# Patient Record
Sex: FEMALE | Race: WHITE | NOT HISPANIC OR LATINO | Employment: OTHER | URBAN - METROPOLITAN AREA
[De-identification: names, ages, dates, MRNs, and addresses within clinical notes are randomized per-mention and may not be internally consistent; named-entity substitution may affect disease eponyms.]

---

## 2017-01-05 ENCOUNTER — TELEPHONE (OUTPATIENT)
Dept: INFUSION THERAPY | Facility: HOSPITAL | Age: 62
End: 2017-01-05

## 2017-01-05 ENCOUNTER — OFFICE VISIT (OUTPATIENT)
Dept: HEMATOLOGY/ONCOLOGY | Facility: CLINIC | Age: 62
End: 2017-01-05
Payer: COMMERCIAL

## 2017-01-05 ENCOUNTER — INFUSION (OUTPATIENT)
Dept: INFUSION THERAPY | Facility: HOSPITAL | Age: 62
End: 2017-01-05
Attending: INTERNAL MEDICINE
Payer: COMMERCIAL

## 2017-01-05 VITALS
BODY MASS INDEX: 27.13 KG/M2 | WEIGHT: 158.06 LBS | HEART RATE: 76 BPM | RESPIRATION RATE: 16 BRPM | DIASTOLIC BLOOD PRESSURE: 84 MMHG | TEMPERATURE: 98 F | SYSTOLIC BLOOD PRESSURE: 172 MMHG

## 2017-01-05 DIAGNOSIS — C50.111 CANCER OF CENTRAL PORTION OF RIGHT FEMALE BREAST: Primary | ICD-10-CM

## 2017-01-05 DIAGNOSIS — C79.51 BONE METASTASIS: ICD-10-CM

## 2017-01-05 DIAGNOSIS — C79.51 METASTATIC CANCER TO SPINE: Primary | ICD-10-CM

## 2017-01-05 DIAGNOSIS — C50.111 CANCER OF CENTRAL PORTION OF RIGHT FEMALE BREAST: ICD-10-CM

## 2017-01-05 PROCEDURE — 63600175 PHARM REV CODE 636 W HCPCS: Performed by: INTERNAL MEDICINE

## 2017-01-05 PROCEDURE — 99999 PR PBB SHADOW E&M-EST. PATIENT-LVL III: CPT | Mod: PBBFAC,,, | Performed by: INTERNAL MEDICINE

## 2017-01-05 PROCEDURE — 1159F MED LIST DOCD IN RCRD: CPT | Mod: S$GLB,,, | Performed by: INTERNAL MEDICINE

## 2017-01-05 PROCEDURE — 3079F DIAST BP 80-89 MM HG: CPT | Mod: S$GLB,,, | Performed by: INTERNAL MEDICINE

## 2017-01-05 PROCEDURE — 99214 OFFICE O/P EST MOD 30 MIN: CPT | Mod: S$GLB,,, | Performed by: INTERNAL MEDICINE

## 2017-01-05 PROCEDURE — 96402 CHEMO HORMON ANTINEOPL SQ/IM: CPT

## 2017-01-05 PROCEDURE — 3077F SYST BP >= 140 MM HG: CPT | Mod: S$GLB,,, | Performed by: INTERNAL MEDICINE

## 2017-01-05 PROCEDURE — 96401 CHEMO ANTI-NEOPL SQ/IM: CPT

## 2017-01-05 RX ORDER — MORPHINE SULFATE 30 MG/1
30 TABLET, FILM COATED, EXTENDED RELEASE ORAL EVERY 8 HOURS PRN
Qty: 60 TABLET | Refills: 0 | Status: SHIPPED | OUTPATIENT
Start: 2017-01-05 | End: 2017-02-07 | Stop reason: SDUPTHER

## 2017-01-05 RX ORDER — MORPHINE SULFATE 15 MG/1
15 TABLET, FILM COATED, EXTENDED RELEASE ORAL EVERY 8 HOURS PRN
Qty: 60 TABLET | Refills: 0 | Status: SHIPPED | OUTPATIENT
Start: 2017-01-05 | End: 2017-02-07 | Stop reason: SDUPTHER

## 2017-01-05 RX ORDER — LAMOTRIGINE 25 MG/1
500 TABLET ORAL
Status: COMPLETED | OUTPATIENT
Start: 2017-01-05 | End: 2017-01-05

## 2017-01-05 RX ORDER — LAMOTRIGINE 25 MG/1
500 TABLET ORAL
Status: CANCELLED | OUTPATIENT
Start: 2017-01-05

## 2017-01-05 RX ORDER — DEXAMETHASONE SODIUM PHOSPHATE 10 MG/ML
10 INJECTION INTRAMUSCULAR; INTRAVENOUS
Status: CANCELLED
Start: 2017-01-05

## 2017-01-05 RX ADMIN — FULVESTRANT 500 MG: 50 INJECTION INTRAMUSCULAR at 12:01

## 2017-01-05 RX ADMIN — DENOSUMAB 120 MG: 120 INJECTION SUBCUTANEOUS at 12:01

## 2017-01-05 NOTE — PROGRESS NOTES
Subjective:       Patient ID: Rebecca Crain is a 61 y.o. female.    Chief Complaint: Breast Cancer    HPI  Ms. Crain is here for clinical followup of carcinoma of the right breast. She was started on Faslodex  in June 2016.        She is doing better since her last visit and has been taking Aleve twice a day with improvement in her generalized pain.  She continues to have some pain in her hips and back after her Faslodex injection.  She also has ongoing skin rash for which she's been using steroid cream for her arms and Benadryl cream for her face.  She tried some Zyrtec as well but that did not help her rash, it did help her with some positional dizziness that she was having.  She continues to have some chalazion issues also seemingly related to her injections.  Appetite is down a bit and bowel function is variable and related to what she eats.  She's having no shortness of breath.  Emotionally she has had some challenges.  She canceled her psychology appointment because she was not sure she would have follow through with the same person.        Breast history: In 2013 she was diagnosed with stage IIB carcinoma of the right breast, ER positive with a low Oncotype score.  She was enrolled on protocol  and randomized to endocrine therapy alone.  She was tried on all 3 aromatase inhibitors and had itching and rash to all of them.  In August 2013 she was started on tamoxifen.   She was found to have  bone metastasis in May 2015, 2015.  A subsequent bone biopsy was performed on a lesion at the T8 vertebra.   The pathology from that was consistent with metastatic breast cancer, ER +80%, MI +80%, and HER-2 negative.      She received letrozole plus palbociclib from July 2015 until May 2016.  She also received bone protective therapy with Xgeva.     Faslodex was started in June 2016.  Scans in September 2016 showed improvement.    Review of Systems   Constitutional: Positive for fatigue. Negative for activity  change, appetite change and unexpected weight change.   HENT: Negative for trouble swallowing.    Respiratory: Negative for cough and shortness of breath.    Cardiovascular: Negative for chest pain.   Gastrointestinal: Positive for nausea. Negative for abdominal pain and constipation.   Musculoskeletal: Positive for arthralgias and back pain ( mid back).   Skin: Positive for rash.   Neurological: Negative for numbness and headaches.   Psychiatric/Behavioral: Positive for sleep disturbance. Negative for dysphoric mood.       Objective:      Physical Exam   Constitutional: She is oriented to person, place, and time. She appears well-developed and well-nourished. No distress.   HENT:   Mouth/Throat: Oropharynx is clear and moist. No oropharyngeal exudate.   Eyes: No scleral icterus.   Cardiovascular: Normal rate, regular rhythm and normal heart sounds.    Pulmonary/Chest: Effort normal and breath sounds normal. She has no wheezes. She has no rales.       Abdominal: Soft. She exhibits no mass. There is no tenderness.   Lymphadenopathy:     She has no cervical adenopathy.     She has no axillary adenopathy.        Right: No supraclavicular adenopathy present.        Left: No supraclavicular adenopathy present.   Neurological: She is alert and oriented to person, place, and time.   Skin: No rash noted.   Psychiatric: She has a normal mood and affect. Her behavior is normal. Thought content normal.   Vitals reviewed.      Assessment:     1. Cancer of central portion of right female breast    2. Bone metastasis        Plan:       Continue current therapy and return in one month.  Scans at that time.  She would like to try to taper her long-acting morphine.  We'll taper to 45 mg twice a day.  Reschedule psychology follow-up.

## 2017-01-05 NOTE — MR AVS SNAPSHOT
[facility]  [address]  [facility] [address]  Phone: [phone]                  Rebecca Crain   2017 10:15 AM   Office Visit    Description:  Female : 1955   Provider:  Rodrigo Schroeder MD   Department:  [facility]           Reason for Visit     Breast Cancer           Diagnoses this Visit        Comments    Cancer of central portion of right female breast    -  Primary     Bone metastasis                To Do List           Future Appointments        Provider Department Dept Phone    2017 11:00 AM INJECTION, [facility] [phone]      Goals (5 Years of Data)     None       These Medications        Disp Refills Start End    morphine (MS CONTIN) 15 MG 12 hr tablet 60 tablet 0 2017     Take 1 tablet (15 mg total) by mouth every 8 (eight) hours as needed for Pain. - Oral    Pharmacy: [facility]/pharmacy #[id number] [facility] [address] [place] [address] Ph #: [phone]       morphine (MS CONTIN) 30 MG 12 hr tablet 60 tablet 0 2017     Take 1 tablet (30 mg total) by mouth every 8 (eight) hours as needed for Pain. - Oral    Pharmacy: [facility]/pharmacy #[id number] [facility] [address] [facility] Ph #: [phone]         [facility] On Call     [facility] Line -  Assistance  Registered nurses in the [facility] Center provide clinical advisement, health education, appointment booking, and other advisory services.  Call for this free service at [phone].             Medications           Message regarding Medications     Verify the changes and/or additions to your medication regime listed below are the same as discussed with your clinician today.  If any of these changes or additions are incorrect, please notify your healthcare provider.        START taking these NEW medications        Refills    morphine (MS CONTIN) 15 MG 12 hr tablet 0    Sig: Take 1 tablet (15 mg  total) by mouth every 8 (eight) hours as needed for Pain.    Class: Normal    Route: Oral    morphine (MS CONTIN) 30 MG 12 hr tablet 0    Sig: Take 1 tablet (30 mg total) by mouth every 8 (eight) hours as needed for Pain.    Class: Normal    Route: Oral           Verify that the below list of medications is an accurate representation of the medications you are currently taking.  If none reported, the list may be blank. If incorrect, please contact your healthcare provider. Carry this list with you in case of emergency.           Current Medications     aspirin (ECOTRIN) 81 MG EC tablet Take 81 mg by mouth once daily.    atorvastatin (LIPITOR) 20 MG tablet Take 20 mg by mouth every evening.     calcium gluconate 650 MG tablet Take 650 mg by mouth 2 (two) times daily. Contains Vit D also.     citalopram (CELEXA) 10 MG tablet TAKE 1 TABLET BY MOUTH EVERY DAY    cloNIDine (CATAPRES) 0.1 MG tablet Take 1 tablet (0.1 mg total) by mouth 2 (two) times daily.    docusate sodium (COLACE) 100 MG capsule Take 100 mg by mouth 2 (two) times daily.    estradiol (VAGIFEM) 10 mcg Tab Place 1 tablet (10 mcg total) vaginally once daily.    fluocinonide 0.05% (LIDEX) 0.05 % cream AAA on hands BID x 1-2 wks then prn flares only. Avoid face, armpits, and groin.    fluticasone (FLONASE) 50 mcg/actuation nasal spray 1 spray by Each Nare route once daily.    gabapentin (NEURONTIN) 300 MG capsule TAKE ONE CAPSULE BY MOUTH 3 TIMES A DAY    HYDROmorphone (DILAUDID) 4 MG tablet Take 1 tablet (4 mg total) by mouth every 4 (four) hours as needed for Pain.    hydrOXYzine HCl (ATARAX) 25 MG tablet Take 1 tablet (25 mg total) by mouth 3 (three) times daily as needed for Itching.    levothyroxine (SYNTHROID) 137 MCG Tab tablet Take 1 tablet (137 mcg total) by mouth before breakfast.    multivit-iron-min-folic acid (MULTIVITAMIN-IRON-MINERALS-FOLIC ACID) 3,500-18-0.4 unit-mg-mg Chew Take 1 tablet by mouth once daily.      MV-MINERALS/FA/OMEGA 3,6,9 #3  (ZS-EV-XG-OMEGA 3,6,9 #3 ORAL) Take 1 tablet by mouth once daily.      NAPROXEN SODIUM (ALEVE ORAL) Take by mouth.    ondansetron (ZOFRAN) 4 MG tablet Take 1 tablet (4 mg total) by mouth every 8 (eight) hours as needed for Nausea.    pantoprazole (PROTONIX) 40 MG tablet Take 1 tablet (40 mg total) by mouth 2 (two) times daily before meals.    simethicone (MYLICON) 80 MG chewable tablet Take 1 tablet (80 mg total) by mouth 3 (three) times daily as needed for Flatulence.    triamterene-hydrochlorothiazide 37.5-25 mg (MAXZIDE-25) 37.5-25 mg per tablet     vitamin D 1000 units Tab Take 2,000 mg by mouth every morning.     morphine (MS CONTIN) 15 MG 12 hr tablet Take 1 tablet (15 mg total) by mouth every 8 (eight) hours as needed for Pain.    morphine (MS CONTIN) 30 MG 12 hr tablet Take 1 tablet (30 mg total) by mouth every 8 (eight) hours as needed for Pain.           Clinical Reference Information           Vital Signs - Last Recorded  Most recent update: 1/5/2017 10:17 AM by Eloisa Sands MA    BP Pulse Temp Resp Wt LMP    (!) 172/84 (BP Location: Right leg, Patient Position: Sitting, BP Method: Automatic) 76 97.5 °F (36.4 °C) 16 71.7 kg (158 lb 1.1 oz) 11/08/2009 (Exact Date)    BMI                27.13 kg/m2          Blood Pressure          Most Recent Value    BP  (!)  172/84      Allergies as of 1/5/2017     Adhesive    Codeine    Demerol [Meperidine]    Iodine And Iodide Containing Products    Penicillins    Arimidex [Anastrozole]    Aromasin [Exemestane]    Clindamycin      Immunizations Administered on Date of Encounter - 1/5/2017     None      Orders Placed During Today's Visit      Normal Orders This Visit    Ambulatory Referral to Hematology/Oncology/Psychology     Future Labs/Procedures Expected by Expires    Cancer antigen 27.29  1/5/2017 1/5/2018    CBC Oncology  1/5/2017 1/5/2018    Comprehensive metabolic panel  1/5/2017 1/5/2018    NM Bone Scan Whole Body  1/5/2017 1/5/2018

## 2017-01-06 ENCOUNTER — PATIENT MESSAGE (OUTPATIENT)
Dept: INTERNAL MEDICINE | Facility: CLINIC | Age: 62
End: 2017-01-06

## 2017-01-06 ENCOUNTER — PATIENT MESSAGE (OUTPATIENT)
Dept: HEMATOLOGY/ONCOLOGY | Facility: CLINIC | Age: 62
End: 2017-01-06

## 2017-01-09 ENCOUNTER — TELEPHONE (OUTPATIENT)
Dept: INFUSION THERAPY | Facility: HOSPITAL | Age: 62
End: 2017-01-09

## 2017-01-09 RX ORDER — ATORVASTATIN CALCIUM 20 MG/1
20 TABLET, FILM COATED ORAL NIGHTLY
Qty: 90 TABLET | Refills: 3 | Status: SHIPPED | OUTPATIENT
Start: 2017-01-09 | End: 2018-10-11

## 2017-01-19 ENCOUNTER — PATIENT MESSAGE (OUTPATIENT)
Dept: HEMATOLOGY/ONCOLOGY | Facility: CLINIC | Age: 62
End: 2017-01-19

## 2017-01-30 ENCOUNTER — PATIENT MESSAGE (OUTPATIENT)
Dept: HEMATOLOGY/ONCOLOGY | Facility: CLINIC | Age: 62
End: 2017-01-30

## 2017-02-03 ENCOUNTER — HOSPITAL ENCOUNTER (OUTPATIENT)
Dept: RADIOLOGY | Facility: HOSPITAL | Age: 62
Discharge: HOME | End: 2017-02-03
Attending: INTERNAL MEDICINE
Payer: COMMERCIAL

## 2017-02-03 DIAGNOSIS — C50.111 CANCER OF CENTRAL PORTION OF RIGHT FEMALE BREAST: ICD-10-CM

## 2017-02-03 DIAGNOSIS — C79.51 BONE METASTASIS: ICD-10-CM

## 2017-02-03 DIAGNOSIS — C50.111 CANCER OF CENTRAL PORTION OF RIGHT FEMALE BREAST: Primary | ICD-10-CM

## 2017-02-03 PROCEDURE — A9503 TC99M MEDRONATE: HCPCS

## 2017-02-03 PROCEDURE — 78306 BONE IMAGING WHOLE BODY: CPT | Mod: 26,,, | Performed by: RADIOLOGY

## 2017-02-06 ENCOUNTER — TELEPHONE (OUTPATIENT)
Dept: HEMATOLOGY/ONCOLOGY | Facility: CLINIC | Age: 62
End: 2017-02-06

## 2017-02-06 NOTE — TELEPHONE ENCOUNTER
----- Message from Swetha Hassan sent at 2/6/2017  9:55 AM CST -----  Contact: Pt  Pt would like to know if her Ct Scans were approved by her insurance also pt state she is allergic to Iodine which is required for one of the scans  and will need a prescription for steroids called in,      Pt contact number [phone]  [facility]

## 2017-02-07 ENCOUNTER — HOSPITAL ENCOUNTER (OUTPATIENT)
Dept: RADIOLOGY | Facility: HOSPITAL | Age: 62
Discharge: HOME | End: 2017-02-07
Attending: INTERNAL MEDICINE
Payer: COMMERCIAL

## 2017-02-07 ENCOUNTER — OFFICE VISIT (OUTPATIENT)
Dept: HEMATOLOGY/ONCOLOGY | Facility: CLINIC | Age: 62
End: 2017-02-07
Payer: COMMERCIAL

## 2017-02-07 ENCOUNTER — INFUSION (OUTPATIENT)
Dept: INFUSION THERAPY | Facility: HOSPITAL | Age: 62
End: 2017-02-07
Attending: INTERNAL MEDICINE
Payer: COMMERCIAL

## 2017-02-07 ENCOUNTER — OFFICE VISIT (OUTPATIENT)
Dept: PSYCHIATRY | Facility: CLINIC | Age: 62
End: 2017-02-07
Payer: COMMERCIAL

## 2017-02-07 VITALS
WEIGHT: 163.56 LBS | DIASTOLIC BLOOD PRESSURE: 109 MMHG | RESPIRATION RATE: 16 BRPM | TEMPERATURE: 98 F | SYSTOLIC BLOOD PRESSURE: 176 MMHG | BODY MASS INDEX: 28.08 KG/M2 | HEART RATE: 69 BPM

## 2017-02-07 DIAGNOSIS — F43.21 ADJUSTMENT DISORDER WITH DEPRESSED MOOD: ICD-10-CM

## 2017-02-07 DIAGNOSIS — C79.51 METASTATIC CANCER TO SPINE: Primary | ICD-10-CM

## 2017-02-07 DIAGNOSIS — C50.111 CANCER OF CENTRAL PORTION OF RIGHT FEMALE BREAST: ICD-10-CM

## 2017-02-07 DIAGNOSIS — C79.51 BONE METASTASIS: ICD-10-CM

## 2017-02-07 DIAGNOSIS — C50.111 CANCER OF CENTRAL PORTION OF RIGHT FEMALE BREAST: Primary | ICD-10-CM

## 2017-02-07 PROCEDURE — 74176 CT ABD & PELVIS W/O CONTRAST: CPT | Mod: 26,,, | Performed by: RADIOLOGY

## 2017-02-07 PROCEDURE — 96402 CHEMO HORMON ANTINEOPL SQ/IM: CPT

## 2017-02-07 PROCEDURE — 90791 PSYCH DIAGNOSTIC EVALUATION: CPT | Mod: PBBFAC | Performed by: PSYCHOLOGIST

## 2017-02-07 PROCEDURE — 99212 OFFICE O/P EST SF 10 MIN: CPT | Mod: PBBFAC,25 | Performed by: PSYCHOLOGIST

## 2017-02-07 PROCEDURE — 3080F DIAST BP >= 90 MM HG: CPT | Mod: S$GLB,,, | Performed by: INTERNAL MEDICINE

## 2017-02-07 PROCEDURE — 90791 PSYCH DIAGNOSTIC EVALUATION: CPT | Mod: S$GLB,,, | Performed by: PSYCHOLOGIST

## 2017-02-07 PROCEDURE — 63600175 PHARM REV CODE 636 W HCPCS: Performed by: INTERNAL MEDICINE

## 2017-02-07 PROCEDURE — 96401 CHEMO ANTI-NEOPL SQ/IM: CPT

## 2017-02-07 PROCEDURE — 99999 PR PBB SHADOW E&M-EST. PATIENT-LVL II: CPT | Mod: PBBFAC,,, | Performed by: PSYCHOLOGIST

## 2017-02-07 PROCEDURE — 99999 PR PBB SHADOW E&M-EST. PATIENT-LVL III: CPT | Mod: PBBFAC,,, | Performed by: INTERNAL MEDICINE

## 2017-02-07 PROCEDURE — 71250 CT THORAX DX C-: CPT | Mod: 26,,, | Performed by: RADIOLOGY

## 2017-02-07 PROCEDURE — 99214 OFFICE O/P EST MOD 30 MIN: CPT | Mod: S$GLB,,, | Performed by: INTERNAL MEDICINE

## 2017-02-07 PROCEDURE — 3077F SYST BP >= 140 MM HG: CPT | Mod: S$GLB,,, | Performed by: INTERNAL MEDICINE

## 2017-02-07 RX ORDER — MORPHINE SULFATE 15 MG/1
15 TABLET, FILM COATED, EXTENDED RELEASE ORAL EVERY 8 HOURS PRN
Qty: 60 TABLET | Refills: 0 | Status: SHIPPED | OUTPATIENT
Start: 2017-02-07 | End: 2017-03-07 | Stop reason: SDUPTHER

## 2017-02-07 RX ORDER — LAMOTRIGINE 25 MG/1
500 TABLET ORAL
Status: CANCELLED | OUTPATIENT
Start: 2017-02-07

## 2017-02-07 RX ORDER — PREDNISONE 50 MG/1
TABLET ORAL
COMMUNITY
Start: 2017-02-06 | End: 2017-02-07

## 2017-02-07 RX ORDER — MORPHINE SULFATE 30 MG/1
30 TABLET, FILM COATED, EXTENDED RELEASE ORAL EVERY 8 HOURS PRN
Qty: 60 TABLET | Refills: 0 | Status: SHIPPED | OUTPATIENT
Start: 2017-02-07 | End: 2017-03-07 | Stop reason: SDUPTHER

## 2017-02-07 RX ORDER — CITALOPRAM 20 MG/1
20 TABLET, FILM COATED ORAL DAILY
Qty: 30 TABLET | Refills: 6 | Status: SHIPPED | OUTPATIENT
Start: 2017-02-07 | End: 2017-03-06 | Stop reason: SDUPTHER

## 2017-02-07 RX ORDER — LAMOTRIGINE 25 MG/1
500 TABLET ORAL
Status: COMPLETED | OUTPATIENT
Start: 2017-02-07 | End: 2017-02-07

## 2017-02-07 RX ORDER — DEXAMETHASONE SODIUM PHOSPHATE 10 MG/ML
10 INJECTION INTRAMUSCULAR; INTRAVENOUS
Status: CANCELLED
Start: 2017-02-07

## 2017-02-07 RX ADMIN — IOHEXOL 15 ML: 350 INJECTION, SOLUTION INTRAVENOUS at 09:02

## 2017-02-07 RX ADMIN — FULVESTRANT 500 MG: 50 INJECTION INTRAMUSCULAR at 02:02

## 2017-02-07 RX ADMIN — IOHEXOL 15 ML: 350 INJECTION, SOLUTION INTRAVENOUS at 10:02

## 2017-02-07 RX ADMIN — DENOSUMAB 120 MG: 120 INJECTION SUBCUTANEOUS at 02:02

## 2017-02-07 NOTE — LETTER
February 8, 2017        Rodrigo Schroeder MD  [address] [facility] [address]             [facility]  [address] [facility] [address]  Phone: [phone]  Fax: [phone]   Patient: Rebecca Crain   MR Number: [id number]   YOB: 1955   Date of Visit: 2/7/2017       Dear Dr. Schroeder:    Thank you for referring Rebecca Crain to me for evaluation. Below are the relevant portions of my assessment and plan of care.    Rebecca Crain is a 61 y.o. female referred by Dr. Schroeder for psychological evaluation and treatment.  Ms. Crain was previously in treatment with Dr. Caba.  She would like to return to therapy to address remaining depression symptoms and chronic pain complaints.  She will follow up with me for CBT in 3 weeks.  She was also encouraged to consider increasing her Cymbalta dose to the more typical range for treatment of depression, in consultation with Dr. Schroeder.    If you have questions, please do not hesitate to call me. I look forward to following Rebecca along with you.    Sincerely,      Nicanor Friedman, PhD           CC  No Recipients

## 2017-02-07 NOTE — PROGRESS NOTES
Subjective:       Patient ID: Rebecca Crain is a 61 y.o. female.    Chief Complaint: Results    HPI  Ms. Crain is here for clinical followup of carcinoma of the right breast. She was started on Faslodex  in June 2016.      Over the last 2 months she's had a tough emotional time due to her inability to leave her house without someone driving her.  There have also been several deaths in her family.  She continues to have significant hot flashes and reports some off-and-on nausea.    Other issues include some positional dizziness when she lays down, that is worsened in spite of taking zyrtec daily.  She continues to have lots of fatigue which is actually worse and she's been having tp nap some during the day and sleeping at night quite a bit.      Her pain continues to be an issue - she has more her fingers and elbows, her back and hips are about the same, while  her shoulders are a bit better.  She continues to experience some locking in her fingers at times.  She did taper her MS Contin to 45 mg twice a day at this however last visit.  She does not wish to go up.  She's not using Dilaudid.  Her facial rash is better with cortisone cream but her eyes are dry in spite of using ointment and drops and she is going to see her ophthalmologist.  In addition her mouth is dry.  Constipation has been better and she's eating fruits and vegetables and has not needed to use Metamucil.  She's been growing some new hair and it's turned out to be curly.        Breast history: In 2013 she was diagnosed with stage IIB carcinoma of the right breast, ER positive with a low Oncotype score.  She was enrolled on protocol  and randomized to endocrine therapy alone.  She was tried on all 3 aromatase inhibitors and had itching and rash to all of them.  In August 2013 she was started on tamoxifen.   She was found to have  bone metastasis in May 2015, 2015.  A subsequent bone biopsy was performed on a lesion at the T8 vertebra.   The  pathology from that was consistent with metastatic breast cancer, ER +80%, SC +80%, and HER-2 negative.      She received letrozole plus palbociclib from July 2015 until May 2016.  She also received bone protective therapy with Xgeva.     Faslodex was started in June 2016.  Scans in September 2016 showed improvement.    Review of Systems   Constitutional: Positive for fatigue. Negative for activity change, appetite change and unexpected weight change.        Hot flashes   HENT: Negative for trouble swallowing.    Respiratory: Negative for cough and shortness of breath.    Cardiovascular: Negative for chest pain.   Gastrointestinal: Positive for nausea. Negative for abdominal pain and constipation.   Musculoskeletal: Positive for arthralgias and back pain ( mid back).   Skin: Positive for rash (facial rash better).   Neurological: Positive for dizziness. Negative for numbness and headaches.   Psychiatric/Behavioral: Negative for dysphoric mood and sleep disturbance.       Objective:      Physical Exam   Constitutional: She is oriented to person, place, and time. She appears well-developed and well-nourished. No distress.   HENT:   Mouth/Throat: Oropharynx is clear and moist. No oropharyngeal exudate.   Eyes: No scleral icterus.   Cardiovascular: Normal rate, regular rhythm and normal heart sounds.    Pulmonary/Chest: Effort normal and breath sounds normal. She has no wheezes. She has no rales.       Abdominal: Soft. She exhibits no mass. There is no tenderness.   Lymphadenopathy:     She has no cervical adenopathy.     She has no axillary adenopathy.        Right: No supraclavicular adenopathy present.        Left: No supraclavicular adenopathy present.   Neurological: She is alert and oriented to person, place, and time.   Skin: No rash noted.   Psychiatric: She has a normal mood and affect. Her behavior is normal. Thought content normal.   Vitals reviewed.      Assessment:   Bone scan shows some improvement in bone  metastasis.  CT scan of the chest and abdomen and pelvis show no evidence of any new malignant changes.  1. Cancer of central portion of right female breast    2. Bone metastasis      3.  Some probable increase in depressive symptoms  Plan:     increase Celexa to 20 mg daily.  Continue Faslodex and other medications.
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
## 2017-02-07 NOTE — LETTER
February 7, 2017    Rebecca Crain  [address] [place] [address]         [facility]  [address] [place] [address]  Phone: [phone] February 7, 2017     Patient: Rebecca Crain   YOB: 1955   Date of Visit: 2/7/2017       To Whom It May Concern:    It is my medical opinion that Rebecca Crain should remain out of work until August 1, 2017.    If you have any questions or concerns, please don't hesitate to call.    Sincerely,        Rodrigo Schroeder MD

## 2017-02-12 ENCOUNTER — PATIENT MESSAGE (OUTPATIENT)
Dept: HEMATOLOGY/ONCOLOGY | Facility: CLINIC | Age: 62
End: 2017-02-12

## 2017-02-13 ENCOUNTER — PATIENT MESSAGE (OUTPATIENT)
Dept: PSYCHIATRY | Facility: CLINIC | Age: 62
End: 2017-02-13

## 2017-03-05 ENCOUNTER — PATIENT MESSAGE (OUTPATIENT)
Dept: HEMATOLOGY/ONCOLOGY | Facility: CLINIC | Age: 62
End: 2017-03-05

## 2017-03-06 DIAGNOSIS — F43.21 ADJUSTMENT DISORDER WITH DEPRESSED MOOD: ICD-10-CM

## 2017-03-06 DIAGNOSIS — I10 BENIGN ESSENTIAL HTN: Primary | ICD-10-CM

## 2017-03-06 RX ORDER — CITALOPRAM 20 MG/1
20 TABLET, FILM COATED ORAL DAILY
Qty: 90 TABLET | Refills: 3 | Status: SHIPPED | OUTPATIENT
Start: 2017-03-06 | End: 2018-02-19 | Stop reason: SDUPTHER

## 2017-03-07 ENCOUNTER — INFUSION (OUTPATIENT)
Dept: INFUSION THERAPY | Facility: HOSPITAL | Age: 62
End: 2017-03-07
Attending: INTERNAL MEDICINE
Payer: COMMERCIAL

## 2017-03-07 ENCOUNTER — OFFICE VISIT (OUTPATIENT)
Dept: HEMATOLOGY/ONCOLOGY | Facility: CLINIC | Age: 62
End: 2017-03-07
Payer: COMMERCIAL

## 2017-03-07 VITALS
RESPIRATION RATE: 16 BRPM | BODY MASS INDEX: 28 KG/M2 | DIASTOLIC BLOOD PRESSURE: 107 MMHG | WEIGHT: 163.13 LBS | SYSTOLIC BLOOD PRESSURE: 151 MMHG | TEMPERATURE: 97 F | HEART RATE: 75 BPM

## 2017-03-07 DIAGNOSIS — C79.51 METASTATIC CANCER TO SPINE: Primary | ICD-10-CM

## 2017-03-07 DIAGNOSIS — C79.51 BONE METASTASIS: ICD-10-CM

## 2017-03-07 DIAGNOSIS — C50.111 CANCER OF CENTRAL PORTION OF RIGHT FEMALE BREAST: Primary | ICD-10-CM

## 2017-03-07 DIAGNOSIS — C50.111 CANCER OF CENTRAL PORTION OF RIGHT FEMALE BREAST: ICD-10-CM

## 2017-03-07 DIAGNOSIS — E03.9 ACQUIRED HYPOTHYROIDISM: ICD-10-CM

## 2017-03-07 DIAGNOSIS — C79.51 METASTATIC CANCER TO SPINE: ICD-10-CM

## 2017-03-07 PROCEDURE — 1160F RVW MEDS BY RX/DR IN RCRD: CPT | Mod: S$GLB,,, | Performed by: INTERNAL MEDICINE

## 2017-03-07 PROCEDURE — 3080F DIAST BP >= 90 MM HG: CPT | Mod: S$GLB,,, | Performed by: INTERNAL MEDICINE

## 2017-03-07 PROCEDURE — 63600175 PHARM REV CODE 636 W HCPCS: Performed by: INTERNAL MEDICINE

## 2017-03-07 PROCEDURE — 99214 OFFICE O/P EST MOD 30 MIN: CPT | Mod: S$GLB,,, | Performed by: INTERNAL MEDICINE

## 2017-03-07 PROCEDURE — 99999 PR PBB SHADOW E&M-EST. PATIENT-LVL III: CPT | Mod: PBBFAC,,, | Performed by: INTERNAL MEDICINE

## 2017-03-07 PROCEDURE — 3077F SYST BP >= 140 MM HG: CPT | Mod: S$GLB,,, | Performed by: INTERNAL MEDICINE

## 2017-03-07 PROCEDURE — 96401 CHEMO ANTI-NEOPL SQ/IM: CPT

## 2017-03-07 PROCEDURE — 96402 CHEMO HORMON ANTINEOPL SQ/IM: CPT

## 2017-03-07 RX ORDER — LEVOTHYROXINE SODIUM 150 UG/1
150 TABLET ORAL DAILY
Qty: 90 TABLET | Refills: 3 | Status: SHIPPED | OUTPATIENT
Start: 2017-03-07 | End: 2017-03-10 | Stop reason: SDUPTHER

## 2017-03-07 RX ORDER — LAMOTRIGINE 25 MG/1
500 TABLET ORAL
Status: COMPLETED | OUTPATIENT
Start: 2017-03-07 | End: 2017-03-07

## 2017-03-07 RX ORDER — MORPHINE SULFATE 30 MG/1
30 TABLET, FILM COATED, EXTENDED RELEASE ORAL EVERY 8 HOURS PRN
Qty: 60 TABLET | Refills: 0 | Status: SHIPPED | OUTPATIENT
Start: 2017-03-07 | End: 2017-04-07 | Stop reason: SDUPTHER

## 2017-03-07 RX ORDER — LAMOTRIGINE 25 MG/1
500 TABLET ORAL
Status: CANCELLED | OUTPATIENT
Start: 2017-03-30

## 2017-03-07 RX ORDER — DEXAMETHASONE SODIUM PHOSPHATE 10 MG/ML
10 INJECTION INTRAMUSCULAR; INTRAVENOUS
Status: CANCELLED
Start: 2017-03-30

## 2017-03-07 RX ORDER — MORPHINE SULFATE 15 MG/1
15 TABLET, FILM COATED, EXTENDED RELEASE ORAL EVERY 8 HOURS PRN
Qty: 60 TABLET | Refills: 0 | Status: SHIPPED | OUTPATIENT
Start: 2017-03-07 | End: 2017-04-07 | Stop reason: SDUPTHER

## 2017-03-07 RX ORDER — LAMOTRIGINE 25 MG/1
500 TABLET ORAL
Status: CANCELLED | OUTPATIENT
Start: 2017-03-07

## 2017-03-07 RX ORDER — DEXAMETHASONE SODIUM PHOSPHATE 10 MG/ML
10 INJECTION INTRAMUSCULAR; INTRAVENOUS
Status: CANCELLED
Start: 2017-03-07

## 2017-03-07 RX ORDER — DIPHENHYDRAMINE HCL 50 MG
CAPSULE ORAL
Refills: 0 | COMMUNITY
Start: 2017-02-06 | End: 2019-02-21

## 2017-03-07 RX ADMIN — FULVESTRANT 500 MG: 50 INJECTION INTRAMUSCULAR at 11:03

## 2017-03-07 RX ADMIN — DENOSUMAB 120 MG: 120 INJECTION SUBCUTANEOUS at 11:03

## 2017-03-07 NOTE — PROGRESS NOTES
Subjective:       Patient ID: Rebecca Crain is a 61 y.o. female.    Chief Complaint: Breast Cancer    HPI  Ms. Crain is here for clinical followup of carcinoma of the right breast. She was started on Faslodex  in June 2016.    Her pain was worse over the last 6 weeks.  She found that she had not been taking her full dose of morphine for almost a week.  She's been back on her regular dosing for the last 10 days.  She's had especially bad problems with her shoulders and her hips.  Her mood is better since increasing her Celexa.  She does still cry easily.         Breast history: In 2013 she was diagnosed with stage IIB carcinoma of the right breast, ER positive with a low Oncotype score.  She was enrolled on protocol  and randomized to endocrine therapy alone.  She was tried on all 3 aromatase inhibitors and had itching and rash to all of them.  In August 2013 she was started on tamoxifen.   She was found to have  bone metastasis in May 2015, 2015.  A subsequent bone biopsy was performed on a lesion at the T8 vertebra.   The pathology from that was consistent with metastatic breast cancer, ER +80%, SD +80%, and HER-2 negative.      She received letrozole plus palbociclib from July 2015 until May 2016.  She also received bone protective therapy with Xgeva.     Faslodex was started in June 2016.  Bone scan in February 2017 showed improvement, CT scans showed no new findings.    Review of Systems   Constitutional: Positive for fatigue. Negative for activity change, appetite change and unexpected weight change.        Hot flashes   HENT: Negative for trouble swallowing.    Respiratory: Negative for cough and shortness of breath.    Cardiovascular: Negative for chest pain.   Gastrointestinal: Negative for abdominal pain and constipation.   Musculoskeletal: Positive for arthralgias ( Hips and shoulders) and back pain ( mid back).   Skin: Positive for rash.   Neurological: Negative for numbness and headaches.    Psychiatric/Behavioral: Negative for dysphoric mood and sleep disturbance.       Objective:      Physical Exam   Constitutional: She is oriented to person, place, and time. She appears well-developed and well-nourished. No distress.   HENT:   Mouth/Throat: Oropharynx is clear and moist. No oropharyngeal exudate.   Eyes: No scleral icterus.   Cardiovascular: Normal rate, regular rhythm and normal heart sounds.    Pulmonary/Chest: Effort normal and breath sounds normal. She has no wheezes. She has no rales.       Abdominal: Soft. She exhibits no mass. There is no tenderness.   Lymphadenopathy:     She has no cervical adenopathy.     She has no axillary adenopathy.        Right: No supraclavicular adenopathy present.        Left: No supraclavicular adenopathy present.   Neurological: She is alert and oriented to person, place, and time.   Skin: No rash noted.   Psychiatric: She has a normal mood and affect. Her behavior is normal. Thought content normal.   Vitals reviewed.      Assessment:     1. Cancer of central portion of right female breast    2. Bone metastasis    3. Metastatic cancer to spine        Plan:    Continue current therapy. RTC 1 M.

## 2017-03-08 ENCOUNTER — PATIENT MESSAGE (OUTPATIENT)
Dept: PSYCHIATRY | Facility: CLINIC | Age: 62
End: 2017-03-08

## 2017-03-10 ENCOUNTER — PATIENT MESSAGE (OUTPATIENT)
Dept: HEMATOLOGY/ONCOLOGY | Facility: CLINIC | Age: 62
End: 2017-03-10

## 2017-03-10 DIAGNOSIS — E03.9 ACQUIRED HYPOTHYROIDISM: ICD-10-CM

## 2017-03-10 RX ORDER — LEVOTHYROXINE SODIUM 150 UG/1
150 TABLET ORAL DAILY
Qty: 90 TABLET | Refills: 3 | Status: SHIPPED | OUTPATIENT
Start: 2017-03-10 | End: 2018-01-12 | Stop reason: SDUPTHER

## 2017-03-15 ENCOUNTER — TELEPHONE (OUTPATIENT)
Dept: HEMATOLOGY/ONCOLOGY | Facility: CLINIC | Age: 62
End: 2017-03-15

## 2017-03-15 NOTE — TELEPHONE ENCOUNTER
Spoke to Pina with [employer] and she had questions about the patient long term disability. I provided her with our fax number and asked if she could send something by fax requesting the required information.

## 2017-03-15 NOTE — TELEPHONE ENCOUNTER
----- Message from Oanh Ann sent at 3/14/2017  2:29 PM CDT -----  Contact: Anurag Torres  Need to speak with someone regarding patient, long term disability claim. Need information regarding claim to be faxed to her.    Call: [phone]

## 2017-04-04 ENCOUNTER — LAB VISIT (OUTPATIENT)
Dept: LAB | Facility: HOSPITAL | Age: 62
End: 2017-04-04
Payer: COMMERCIAL

## 2017-04-04 ENCOUNTER — OFFICE VISIT (OUTPATIENT)
Dept: PSYCHIATRY | Facility: CLINIC | Age: 62
End: 2017-04-04
Payer: COMMERCIAL

## 2017-04-04 ENCOUNTER — OFFICE VISIT (OUTPATIENT)
Dept: HEMATOLOGY/ONCOLOGY | Facility: CLINIC | Age: 62
End: 2017-04-04
Payer: COMMERCIAL

## 2017-04-04 ENCOUNTER — INFUSION (OUTPATIENT)
Dept: INFUSION THERAPY | Facility: HOSPITAL | Age: 62
End: 2017-04-04
Attending: INTERNAL MEDICINE
Payer: COMMERCIAL

## 2017-04-04 VITALS
RESPIRATION RATE: 16 BRPM | BODY MASS INDEX: 28 KG/M2 | HEART RATE: 71 BPM | WEIGHT: 163.13 LBS | SYSTOLIC BLOOD PRESSURE: 152 MMHG | DIASTOLIC BLOOD PRESSURE: 105 MMHG | TEMPERATURE: 98 F

## 2017-04-04 DIAGNOSIS — C79.51 METASTATIC CANCER TO SPINE: Primary | ICD-10-CM

## 2017-04-04 DIAGNOSIS — C50.111 CANCER OF CENTRAL PORTION OF RIGHT FEMALE BREAST: ICD-10-CM

## 2017-04-04 DIAGNOSIS — C79.51 BONE METASTASIS: ICD-10-CM

## 2017-04-04 DIAGNOSIS — C50.111 CANCER OF CENTRAL PORTION OF RIGHT FEMALE BREAST: Primary | ICD-10-CM

## 2017-04-04 DIAGNOSIS — F43.21 ADJUSTMENT DISORDER WITH DEPRESSED MOOD: Primary | ICD-10-CM

## 2017-04-04 DIAGNOSIS — G89.3 CANCER RELATED PAIN: ICD-10-CM

## 2017-04-04 LAB
ALBUMIN SERPL BCP-MCNC: 3.4 G/DL
ALP SERPL-CCNC: 60 U/L
ALT SERPL W/O P-5'-P-CCNC: 13 U/L
ANION GAP SERPL CALC-SCNC: 5 MMOL/L
AST SERPL-CCNC: 20 U/L
BILIRUB SERPL-MCNC: 0.5 MG/DL
BUN SERPL-MCNC: 19 MG/DL
CALCIUM SERPL-MCNC: 8.8 MG/DL
CHLORIDE SERPL-SCNC: 109 MMOL/L
CO2 SERPL-SCNC: 28 MMOL/L
CREAT SERPL-MCNC: 0.8 MG/DL
ERYTHROCYTE [DISTWIDTH] IN BLOOD BY AUTOMATED COUNT: 13.1 %
EST. GFR  (AFRICAN AMERICAN): >60 ML/MIN/1.73 M^2
EST. GFR  (NON AFRICAN AMERICAN): >60 ML/MIN/1.73 M^2
GLUCOSE SERPL-MCNC: 122 MG/DL
HCT VFR BLD AUTO: 38.7 %
HGB BLD-MCNC: 13.3 G/DL
MCH RBC QN AUTO: 29.4 PG
MCHC RBC AUTO-ENTMCNC: 34.4 %
MCV RBC AUTO: 86 FL
NEUTROPHILS # BLD AUTO: 2.2 K/UL
PLATELET # BLD AUTO: 157 K/UL
PMV BLD AUTO: 9.7 FL
POTASSIUM SERPL-SCNC: 4.1 MMOL/L
PROT SERPL-MCNC: 6.5 G/DL
RBC # BLD AUTO: 4.52 M/UL
SODIUM SERPL-SCNC: 142 MMOL/L
WBC # BLD AUTO: 3.98 K/UL

## 2017-04-04 PROCEDURE — 90834 PSYTX W PT 45 MINUTES: CPT | Mod: PBBFAC | Performed by: PSYCHOLOGIST

## 2017-04-04 PROCEDURE — 90834 PSYTX W PT 45 MINUTES: CPT | Mod: S$GLB,,, | Performed by: PSYCHOLOGIST

## 2017-04-04 PROCEDURE — 99212 OFFICE O/P EST SF 10 MIN: CPT | Mod: PBBFAC,25 | Performed by: PSYCHOLOGIST

## 2017-04-04 PROCEDURE — 99999 PR PBB SHADOW E&M-EST. PATIENT-LVL II: CPT | Mod: PBBFAC,,, | Performed by: INTERNAL MEDICINE

## 2017-04-04 PROCEDURE — 3077F SYST BP >= 140 MM HG: CPT | Mod: S$GLB,,, | Performed by: INTERNAL MEDICINE

## 2017-04-04 PROCEDURE — 96402 CHEMO HORMON ANTINEOPL SQ/IM: CPT

## 2017-04-04 PROCEDURE — 1160F RVW MEDS BY RX/DR IN RCRD: CPT | Mod: S$GLB,,, | Performed by: INTERNAL MEDICINE

## 2017-04-04 PROCEDURE — 99214 OFFICE O/P EST MOD 30 MIN: CPT | Mod: S$GLB,,, | Performed by: INTERNAL MEDICINE

## 2017-04-04 PROCEDURE — 63600175 PHARM REV CODE 636 W HCPCS: Performed by: INTERNAL MEDICINE

## 2017-04-04 PROCEDURE — 3080F DIAST BP >= 90 MM HG: CPT | Mod: S$GLB,,, | Performed by: INTERNAL MEDICINE

## 2017-04-04 PROCEDURE — 99999 PR PBB SHADOW E&M-EST. PATIENT-LVL II: CPT | Mod: PBBFAC,,, | Performed by: PSYCHOLOGIST

## 2017-04-04 RX ORDER — LAMOTRIGINE 25 MG/1
500 TABLET ORAL
Status: COMPLETED | OUTPATIENT
Start: 2017-04-04 | End: 2017-04-04

## 2017-04-04 RX ADMIN — FULVESTRANT 500 MG: 50 INJECTION INTRAMUSCULAR at 09:04

## 2017-04-04 NOTE — PROGRESS NOTES
Subjective:       Patient ID: Rebecca Crain is a 61 y.o. female.    Chief Complaint: No chief complaint on file.    HPI  Ms. Crain is here for followup of metastatic carcinoma of the right breast. She was started on Faslodex  in June 2016.    She reports that her major areas of pain are her shoulders and neck and her knees.  Her hip are doing all right.  She's sleeping less.  Appetite has been good, bowel function is better with increase ingestion of foods.  She reports no shortness of breath.  Her mood is better overall.          Breast history: In 2013 she was diagnosed with stage IIB carcinoma of the right breast, ER positive with a low Oncotype score.  She was enrolled on protocol  and randomized to endocrine therapy alone.  She was tried on all 3 aromatase inhibitors and had itching and rash to all of them.  In August 2013 she was started on tamoxifen.   She was found to have  bone metastasis in May 2015, 2015.  A subsequent bone biopsy was performed on a lesion at the T8 vertebra.   The pathology from that was consistent with metastatic breast cancer, ER +80%, NM +80%, and HER-2 negative.      She received letrozole plus palbociclib from July 2015 until May 2016.  She also received bone protective therapy with Xgeva.     Faslodex was started in June 2016.  Bone scan in February 2017 showed improvement, CT scans showed no new findings.    Review of Systems   Constitutional: Positive for fatigue. Negative for activity change, appetite change and unexpected weight change.        Hot flashes   HENT: Negative for trouble swallowing.    Respiratory: Negative for cough and shortness of breath.    Cardiovascular: Negative for chest pain.   Gastrointestinal: Negative for abdominal pain and constipation.   Musculoskeletal: Positive for arthralgias ( neck shoulders ) and back pain.   Skin: Positive for rash.   Neurological: Negative for numbness and headaches.   Psychiatric/Behavioral: Negative for dysphoric  mood and sleep disturbance.       Objective:      Physical Exam   Constitutional: She is oriented to person, place, and time. She appears well-developed and well-nourished. No distress.   HENT:   Mouth/Throat: Oropharynx is clear and moist. No oropharyngeal exudate.   Eyes: No scleral icterus.   Cardiovascular: Normal rate, regular rhythm and normal heart sounds.    Pulmonary/Chest: Effort normal and breath sounds normal. She has no wheezes. She has no rales.       Abdominal: Soft. She exhibits no mass. There is no tenderness.   Lymphadenopathy:     She has no cervical adenopathy.     She has no axillary adenopathy.        Right: No supraclavicular adenopathy present.        Left: No supraclavicular adenopathy present.   Neurological: She is alert and oriented to person, place, and time.   Skin: No rash noted.   Psychiatric: She has a normal mood and affect. Her behavior is normal. Thought content normal.   Vitals reviewed.      Assessment:     1. Cancer of central portion of right female breast    2. Bone metastasis    3. Cancer related pain        Plan:    Continue current therapy. RTC 1 M.

## 2017-04-04 NOTE — PROGRESS NOTES
PSYCHO-ONCOLOGY NOTE/ Individual Psychotherapy     Date: 4/4/2017   Site:  [facility]         Therapeutic Intervention: Met with patient.  Outpatient - Behavior modifying psychotherapy 45 min - CPT code 04838    Chief complaint/reason for encounter: depression    Objective:  Rebecca Crain arrived promptly for the session.  Ms. Crain was independently ambulatory at the time of session. The patient was fully cooperative throughout the session.  Appearance: age appropriate, casually dressed, well groomed  Behavior/Cooperation: friendly and cooperative  Speech: Not pressured, clearly audible, no slurring  Mood:  depressed  Affect: mildly constricted  Thought Process: goal-directed, logical  Thought Content: normal,  No delusions or paranoia; did not appear to be responding to internal stimuli during the session  Orientation: grossly intact  Memory: Grossly intact  Attention Span/Concentration: Attends to session without distraction; reports no difficulty  Fund of Knowledge: average  Estimate of Intelligence: average from verbal skills and history  Cognition: grossly intact  Insight: patient has awareness of illness; good insight into own behavior and behavior of others  Judgment: the patient's behavior is adequate to circumstances    Interval history and content of current session:  Discussed current adaptation to disease and treatment status.  Patient reports improved mood and activity level with change in Cymbalta dose and Synthroid.  Patient engaged in increased daily activities and outings with family.  Trip to [place] planned for mid-month. Still not driving due to sedation with meds.  Does plan to increase physical exercise to improve coping with chronic pain (regular walking, yoga).    Discussed maintenance insomnia- Patient encourage to improve sleep hygiene ( d/c ipad use at night) and consider sleep restriction in AM (stay up after 7 AM medications) to improve sleep continuity.     Risk  parameters:   Patient reports no suicidal ideation  Patient reports no homicidal ideation  Patient reports no self-injurious behavior  Patient reports no violent behavior   Safety needs:  None at this time      Verbal deficits: None     Patient's response to intervention:The patient's response to intervention is accepting.     Progress toward goals and other mental status changes:  The patient's progress toward goals is good.        Progress to date:Progress as Expected      Goals from last visit: partially met      Patient reported outcomes:  Distress Thermometer:    Distress Score    Distress Score: 4        Practical Problems Physical Problems         Housing: Yes            Transportation: Yes                            Family Problems Fatigue: Yes                                   Memory / Concentration: Yes   Emotional Problems      Depression: Yes               Nervousness: Yes  Pain: Yes           Worry: Yes Skin Dry / Itchy: Yes      Sleep: Yes          Spiritual/Religions Concerns Tingling in Hands / Feet: Yes             Other Problems             PHQ-9= 7 (11 at initial visit)   MAIA-7= 4 (5 at initial visit)      Client Strengths: determined, verbal, intelligent      Diagnosis:     ICD-10-CM ICD-9-CM   1. Adjustment disorder with depressed mood F43.21 309.0       Treatment Plan:individual psychotherapy and medication management by physician  · Target symptoms: depression, sleep, pain  · Why chosen therapy is appropriate versus another modality: relevant to diagnosis, evidence based practice  · Outcome monitoring methods: self-report, checklist/rating scale  · Therapeutic intervention type: behavior modifying psychotherapy  · Prognosis: Good      Behavioral goals:    Exercise:  Increase walking, try yoga   Stress management:  Photos on trip with family   Social engagement: trip with family    Nutrition:   Smoking Cessation:   Therapy: imp sleep hygiene, sleep restriction in AM, early light    Patient has  never used Remeron for sleep, has had hypnotics    Return to clinic: 1 month     Length of Service (minutes direct face-to-face contact): 45    Nicanor Friedman, PhD  [place] License #[id number]

## 2017-04-05 LAB — CANCER AG27-29 SERPL-ACNC: 64 U/ML

## 2017-04-06 ENCOUNTER — PATIENT MESSAGE (OUTPATIENT)
Dept: HEMATOLOGY/ONCOLOGY | Facility: CLINIC | Age: 62
End: 2017-04-06

## 2017-04-07 DIAGNOSIS — C50.111 CANCER OF CENTRAL PORTION OF RIGHT FEMALE BREAST: ICD-10-CM

## 2017-04-07 DIAGNOSIS — L24.9 IRRITANT CONTACT DERMATITIS, UNSPECIFIED TRIGGER: ICD-10-CM

## 2017-04-07 DIAGNOSIS — C79.51 BONE METASTASIS: ICD-10-CM

## 2017-04-07 RX ORDER — MORPHINE SULFATE 30 MG/1
30 TABLET, FILM COATED, EXTENDED RELEASE ORAL EVERY 8 HOURS PRN
Qty: 60 TABLET | Refills: 0 | Status: SHIPPED | OUTPATIENT
Start: 2017-04-07 | End: 2017-05-02 | Stop reason: SDUPTHER

## 2017-04-07 RX ORDER — MORPHINE SULFATE 15 MG/1
15 TABLET, FILM COATED, EXTENDED RELEASE ORAL EVERY 8 HOURS PRN
Qty: 60 TABLET | Refills: 0 | Status: SHIPPED | OUTPATIENT
Start: 2017-04-07 | End: 2017-06-29

## 2017-04-07 RX ORDER — FLUOCINONIDE 0.5 MG/G
CREAM TOPICAL
Qty: 60 G | Refills: 1 | Status: SHIPPED | OUTPATIENT
Start: 2017-04-07 | End: 2019-04-04

## 2017-05-02 ENCOUNTER — INFUSION (OUTPATIENT)
Dept: INFUSION THERAPY | Facility: HOSPITAL | Age: 62
End: 2017-05-02
Attending: INTERNAL MEDICINE
Payer: COMMERCIAL

## 2017-05-02 ENCOUNTER — OFFICE VISIT (OUTPATIENT)
Dept: HEMATOLOGY/ONCOLOGY | Facility: CLINIC | Age: 62
End: 2017-05-02
Payer: COMMERCIAL

## 2017-05-02 ENCOUNTER — OFFICE VISIT (OUTPATIENT)
Dept: PSYCHIATRY | Facility: CLINIC | Age: 62
End: 2017-05-02
Payer: COMMERCIAL

## 2017-05-02 VITALS
HEART RATE: 62 BPM | SYSTOLIC BLOOD PRESSURE: 178 MMHG | BODY MASS INDEX: 28.42 KG/M2 | WEIGHT: 165.56 LBS | TEMPERATURE: 98 F | RESPIRATION RATE: 17 BRPM | DIASTOLIC BLOOD PRESSURE: 93 MMHG

## 2017-05-02 DIAGNOSIS — T30.0 BURN: ICD-10-CM

## 2017-05-02 DIAGNOSIS — C79.51 METASTATIC CANCER TO SPINE: Primary | ICD-10-CM

## 2017-05-02 DIAGNOSIS — C79.51 BONE METASTASIS: ICD-10-CM

## 2017-05-02 DIAGNOSIS — R52 INTRACTABLE PAIN: ICD-10-CM

## 2017-05-02 DIAGNOSIS — C50.111 CANCER OF CENTRAL PORTION OF RIGHT FEMALE BREAST: Primary | ICD-10-CM

## 2017-05-02 DIAGNOSIS — F43.21 ADJUSTMENT DISORDER WITH DEPRESSED MOOD: Primary | ICD-10-CM

## 2017-05-02 DIAGNOSIS — C50.111 CANCER OF CENTRAL PORTION OF RIGHT FEMALE BREAST: ICD-10-CM

## 2017-05-02 PROCEDURE — 96401 CHEMO ANTI-NEOPL SQ/IM: CPT

## 2017-05-02 PROCEDURE — 3080F DIAST BP >= 90 MM HG: CPT | Mod: S$GLB,,, | Performed by: INTERNAL MEDICINE

## 2017-05-02 PROCEDURE — 99214 OFFICE O/P EST MOD 30 MIN: CPT | Mod: S$GLB,,, | Performed by: INTERNAL MEDICINE

## 2017-05-02 PROCEDURE — 96402 CHEMO HORMON ANTINEOPL SQ/IM: CPT

## 2017-05-02 PROCEDURE — 63600175 PHARM REV CODE 636 W HCPCS: Performed by: INTERNAL MEDICINE

## 2017-05-02 PROCEDURE — 90834 PSYTX W PT 45 MINUTES: CPT | Mod: S$GLB,,, | Performed by: PSYCHOLOGIST

## 2017-05-02 PROCEDURE — 99999 PR PBB SHADOW E&M-EST. PATIENT-LVL I: CPT | Mod: PBBFAC,,, | Performed by: PSYCHOLOGIST

## 2017-05-02 PROCEDURE — 1160F RVW MEDS BY RX/DR IN RCRD: CPT | Mod: S$GLB,,, | Performed by: INTERNAL MEDICINE

## 2017-05-02 PROCEDURE — 99999 PR PBB SHADOW E&M-EST. PATIENT-LVL III: CPT | Mod: 25,PBBFAC,, | Performed by: INTERNAL MEDICINE

## 2017-05-02 PROCEDURE — 3077F SYST BP >= 140 MM HG: CPT | Mod: S$GLB,,, | Performed by: INTERNAL MEDICINE

## 2017-05-02 RX ORDER — ONDANSETRON 4 MG/1
4 TABLET, FILM COATED ORAL EVERY 8 HOURS PRN
Qty: 30 TABLET | Refills: 11 | Status: SHIPPED | OUTPATIENT
Start: 2017-05-02 | End: 2018-11-30 | Stop reason: SDUPTHER

## 2017-05-02 RX ORDER — MORPHINE SULFATE 30 MG/1
60 TABLET, FILM COATED, EXTENDED RELEASE ORAL 2 TIMES DAILY
Qty: 120 TABLET | Refills: 0 | Status: SHIPPED | OUTPATIENT
Start: 2017-05-02 | End: 2017-05-30 | Stop reason: SDUPTHER

## 2017-05-02 RX ORDER — SILVER SULFADIAZINE 10 G/1000G
CREAM TOPICAL
Qty: 50 G | Refills: 3 | Status: SHIPPED | OUTPATIENT
Start: 2017-05-02 | End: 2017-08-02 | Stop reason: ALTCHOICE

## 2017-05-02 RX ORDER — LAMOTRIGINE 25 MG/1
500 TABLET ORAL
Status: CANCELLED | OUTPATIENT
Start: 2017-05-02

## 2017-05-02 RX ORDER — DEXAMETHASONE SODIUM PHOSPHATE 10 MG/ML
10 INJECTION INTRAMUSCULAR; INTRAVENOUS
Status: CANCELLED
Start: 2017-05-02

## 2017-05-02 RX ORDER — LAMOTRIGINE 25 MG/1
500 TABLET ORAL
Status: COMPLETED | OUTPATIENT
Start: 2017-05-02 | End: 2017-05-02

## 2017-05-02 RX ADMIN — FULVESTRANT 500 MG: 50 INJECTION INTRAMUSCULAR at 10:05

## 2017-05-02 RX ADMIN — DENOSUMAB 120 MG: 120 INJECTION SUBCUTANEOUS at 10:05

## 2017-05-02 NOTE — PROGRESS NOTES
Subjective:       Patient ID: Rebecca Crain is a 61 y.o. female.    Chief Complaint: Follow-up    HPI  Ms. Crain is here for followup of metastatic carcinoma of the right breast. She was started on Faslodex  in June 2016.      For the last month she's had more pain especially in her shoulders and back.  Yesterday she developed some pain in her right anterior lower ribs.  She has increased her MS Contin to 60 mg twice a day.  She continues with significant hot flashes.  Appetites been good but she has some constipation.  She has minimal and shortness of breath.  She also reports continued positional lightheadedness when she lays her head back or turns her head on the pillow.  She has some occasional nausea her skin remained sensitive.          Breast history: In 2013 she was diagnosed with stage IIB carcinoma of the right breast, ER positive with a low Oncotype score.  She was enrolled on protocol  and randomized to endocrine therapy alone.  She was tried on all 3 aromatase inhibitors and had itching and rash to all of them.  In August 2013 she was started on tamoxifen.   She was found to have  bone metastasis in May 2015, 2015.  A subsequent bone biopsy was performed on a lesion at the T8 vertebra.   The pathology from that was consistent with metastatic breast cancer, ER +80%, CT +80%, and HER-2 negative.      She received letrozole plus palbociclib from July 2015 until May 2016.  She also received bone protective therapy with Xgeva.     Faslodex was started in June 2016.  Bone scan in February 2017 showed improvement, CT scans showed no new findings.    Review of Systems   Constitutional: Positive for fatigue. Negative for activity change, appetite change and unexpected weight change.        Hot flashes   HENT: Negative for trouble swallowing.    Respiratory: Negative for cough and shortness of breath.    Cardiovascular: Negative for chest pain.   Gastrointestinal: Positive for nausea. Negative for  abdominal pain and constipation.   Musculoskeletal: Positive for arthralgias ( neck shoulders ) and back pain.   Skin: Positive for rash.   Neurological: Positive for light-headedness. Negative for numbness and headaches.   Psychiatric/Behavioral: Negative for dysphoric mood and sleep disturbance.       Objective:      Physical Exam   Constitutional: She is oriented to person, place, and time. She appears well-developed and well-nourished. No distress.   HENT:   Mouth/Throat: Oropharynx is clear and moist. No oropharyngeal exudate.   Eyes: No scleral icterus.   Cardiovascular: Normal rate, regular rhythm and normal heart sounds.    Pulmonary/Chest: Effort normal and breath sounds normal. She has no wheezes. She has no rales.       Abdominal: Soft. She exhibits no mass. There is no tenderness.   Lymphadenopathy:     She has no cervical adenopathy.     She has no axillary adenopathy.        Right: No supraclavicular adenopathy present.        Left: No supraclavicular adenopathy present.   Neurological: She is alert and oriented to person, place, and time.   Skin: No rash noted.   Psychiatric: She has a normal mood and affect. Her behavior is normal. Thought content normal.   Vitals reviewed.      Assessment:     1. Cancer of central portion of right female breast    2. Bone metastasis        Plan:    Continue current therapy. RTC 1 M. scans and labs at that time.

## 2017-05-02 NOTE — NURSING
Patient here for injections-complains of pain in leg and right lower ribs-no jaw pain-tolerated injection s well-Faslodex given in 2 divided doses and xgeva given in 1 injection.

## 2017-05-03 NOTE — PROGRESS NOTES
PSYCHO-ONCOLOGY NOTE/ Individual Psychotherapy     Date: 5/2/2017   Site:  [facility]         Therapeutic Intervention: Met with patient.  Outpatient - Behavior modifying psychotherapy 45 min - CPT code 53627    Chief complaint/reason for encounter: depression    Objective:  Rebecca Crain arrived promptly for the session.  Ms. Crain was independently ambulatory at the time of session. The patient was fully cooperative throughout the session.  Appearance: age appropriate, casually dressed, well groomed  Behavior/Cooperation: friendly and cooperative  Speech: Not pressured, clearly audible, no slurring  Mood:  depressed  Affect: mildly constricted  Thought Process: goal-directed, logical  Thought Content: normal,  No delusions or paranoia; did not appear to be responding to internal stimuli during the session  Orientation: grossly intact  Memory: Grossly intact  Attention Span/Concentration: Attends to session without distraction; reports no difficulty  Fund of Knowledge: average  Estimate of Intelligence: average from verbal skills and history  Cognition: grossly intact  Insight: patient has awareness of illness; good insight into own behavior and behavior of others  Judgment: the patient's behavior is adequate to circumstances    Interval history and content of current session:  Discussed current adaptation to disease and treatment status.  Patient reports continued improved mood and activity level with change in Celexa dose (decreased tearfulness).  Patient discussed worry related to inc bone pain and concern that cancer is spreading.  Redirecting worry thoughts discussed.  Trip to [place] with family discussed. Is planning another trip (possibly- Mother's Day) and cruise in December. Ongoing sleep problems discussed (has improved sleep hygiene but difficulty in continuity remains).     Risk parameters:   Patient reports no suicidal ideation  Patient reports no homicidal ideation  Patient reports  "no self-injurious behavior  Patient reports no violent behavior   Safety needs:  None at this time      Verbal deficits: None     Patient's response to intervention:The patient's response to intervention is accepting.     Progress toward goals and other mental status changes:  The patient's progress toward goals is good.        Progress to date:Progress as Expected      Goals from last visit: partially met      Patient reported outcomes:  Distress Thermometer:    Distress Score    Distress Score: 2        Practical Problems Physical Problems                 Breathing: Yes    Transportation: Yes          Constipation: Yes                 Family Problems Fatigue: Yes      Feeling Swollen: Yes                            Memory / Concentration: Yes   Emotional Problems      Depression: Yes  Nausea: Yes            Nervousness: Yes  Pain: Yes           Worry: Yes Skin Dry / Itchy: Yes    Lost of Interest in Usual Activities: Yes Sleep: Yes          Spiritual/Religions Concerns Tingling in Hands / Feet: Yes             Other Problems             PHQ-9= 5 (11 at initial visit)   MAIA-7= 5 (5 at initial visit)      Client Strengths: determined, verbal, intelligent      Diagnosis:     ICD-10-CM ICD-9-CM   1. Adjustment disorder with depressed mood F43.21 309.0       Treatment Plan:individual psychotherapy and medication management by physician  · Target symptoms: depression, sleep, pain  · Why chosen therapy is appropriate versus another modality: relevant to diagnosis, evidence based practice  · Outcome monitoring methods: self-report, checklist/rating scale  · Therapeutic intervention type: behavior modifying psychotherapy  · Prognosis: Good      Behavioral goals:    Exercise:  Increase walking   Stress management:  Photos on trip with family   Social engagement: trip with family    Nutrition:   Smoking Cessation:   Therapy: sleep restriction past 7 AM, challenges to "what if" thinking, increase activity level     Return to " clinic: 1 month     Length of Service (minutes direct face-to-face contact): 45    Nicanor Friedman, PhD  [place] License #[id number]

## 2017-05-08 ENCOUNTER — PATIENT MESSAGE (OUTPATIENT)
Dept: HEMATOLOGY/ONCOLOGY | Facility: CLINIC | Age: 62
End: 2017-05-08

## 2017-05-11 ENCOUNTER — HOSPITAL ENCOUNTER (OUTPATIENT)
Dept: RADIOLOGY | Facility: HOSPITAL | Age: 62
Discharge: HOME | End: 2017-05-11
Attending: INTERNAL MEDICINE
Payer: COMMERCIAL

## 2017-05-11 ENCOUNTER — PATIENT MESSAGE (OUTPATIENT)
Dept: HEMATOLOGY/ONCOLOGY | Facility: CLINIC | Age: 62
End: 2017-05-11

## 2017-05-11 DIAGNOSIS — C50.111 CANCER OF CENTRAL PORTION OF RIGHT FEMALE BREAST: ICD-10-CM

## 2017-05-11 PROCEDURE — 71250 CT THORAX DX C-: CPT | Mod: 26,,, | Performed by: RADIOLOGY

## 2017-05-11 PROCEDURE — 74176 CT ABD & PELVIS W/O CONTRAST: CPT | Mod: TC

## 2017-05-11 PROCEDURE — 74176 CT ABD & PELVIS W/O CONTRAST: CPT | Mod: 26,,, | Performed by: RADIOLOGY

## 2017-05-11 PROCEDURE — 78306 BONE IMAGING WHOLE BODY: CPT | Mod: 26,,, | Performed by: RADIOLOGY

## 2017-05-11 PROCEDURE — 25500020 PHARM REV CODE 255: Performed by: INTERNAL MEDICINE

## 2017-05-11 PROCEDURE — A9503 TC99M MEDRONATE: HCPCS

## 2017-05-11 PROCEDURE — 71250 CT THORAX DX C-: CPT | Mod: TC

## 2017-05-11 RX ADMIN — IOHEXOL 15 ML: 300 INJECTION, SOLUTION INTRAVENOUS at 09:05

## 2017-05-30 ENCOUNTER — OFFICE VISIT (OUTPATIENT)
Dept: HEMATOLOGY/ONCOLOGY | Facility: CLINIC | Age: 62
End: 2017-05-30
Payer: COMMERCIAL

## 2017-05-30 ENCOUNTER — OFFICE VISIT (OUTPATIENT)
Dept: PSYCHIATRY | Facility: CLINIC | Age: 62
End: 2017-05-30
Payer: COMMERCIAL

## 2017-05-30 VITALS
BODY MASS INDEX: 26.65 KG/M2 | SYSTOLIC BLOOD PRESSURE: 194 MMHG | OXYGEN SATURATION: 97 % | DIASTOLIC BLOOD PRESSURE: 90 MMHG | WEIGHT: 165.81 LBS | HEART RATE: 56 BPM | HEIGHT: 66 IN | RESPIRATION RATE: 14 BRPM | TEMPERATURE: 98 F

## 2017-05-30 DIAGNOSIS — F43.21 ADJUSTMENT DISORDER WITH DEPRESSED MOOD: Primary | ICD-10-CM

## 2017-05-30 DIAGNOSIS — C79.51 BONE METASTASIS: ICD-10-CM

## 2017-05-30 DIAGNOSIS — C50.111 CANCER OF CENTRAL PORTION OF RIGHT FEMALE BREAST: Primary | ICD-10-CM

## 2017-05-30 PROCEDURE — 99999 PR PBB SHADOW E&M-EST. PATIENT-LVL II: CPT | Mod: PBBFAC,,, | Performed by: PSYCHOLOGIST

## 2017-05-30 PROCEDURE — 99214 OFFICE O/P EST MOD 30 MIN: CPT | Mod: S$GLB,,, | Performed by: INTERNAL MEDICINE

## 2017-05-30 PROCEDURE — 90834 PSYTX W PT 45 MINUTES: CPT | Mod: S$GLB,,, | Performed by: PSYCHOLOGIST

## 2017-05-30 PROCEDURE — 99999 PR PBB SHADOW E&M-EST. PATIENT-LVL III: CPT | Mod: PBBFAC,,, | Performed by: INTERNAL MEDICINE

## 2017-05-30 RX ORDER — PREDNISONE 50 MG/1
TABLET ORAL
COMMUNITY
Start: 2017-05-09 | End: 2017-08-02 | Stop reason: ALTCHOICE

## 2017-05-30 RX ORDER — MORPHINE SULFATE 30 MG/1
60 TABLET, FILM COATED, EXTENDED RELEASE ORAL 2 TIMES DAILY
Qty: 120 TABLET | Refills: 0 | Status: SHIPPED | OUTPATIENT
Start: 2017-05-30 | End: 2017-06-29
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
## 2017-05-30 NOTE — PROGRESS NOTES
Subjective:       Patient ID: Rebecca Crain is a 61 y.o. female.    Chief Complaint: No chief complaint on file.    HPI  Ms. Crain is here for followup of metastatic carcinoma of the right breast. She was started on Faslodex  in June 2016.      She recently had more pain and increased her MS Contin 60 mg twice a day.  CA 27.29 had increased to 94.    Subsequently she has undergone scans.  Bone scan from May 11 showed a new focal uptake at T4 which is felt to be possibly related to a prior compression fraction or a Schmorl's node.  Uptake in T12 was unchanged.    CT scan of the chest abdomen and pelvis showed no evidence of lung metastasis, liver was unremarkable.      Overall, there is not been much change in her symptoms.  She did have another fall since her last visit.        Breast history: In 2013 she was diagnosed with stage IIB carcinoma of the right breast, ER positive with a low Oncotype score.  She was enrolled on protocol  and randomized to endocrine therapy alone.  She was tried on all 3 aromatase inhibitors and had itching and rash to all of them.  In August 2013 she was started on tamoxifen.   She was found to have  bone metastasis in May 2015, 2015.  A subsequent bone biopsy was performed on a lesion at the T8 vertebra.   The pathology from that was consistent with metastatic breast cancer, ER +80%, IN +80%, and HER-2 negative.      She received letrozole plus palbociclib from July 2015 until May 2016.  She also received bone protective therapy with Xgeva.     Faslodex was started in June 2016.  Bone scan in February 2017 showed improvement, CT scans showed no new findings.    Review of Systems   Constitutional: Positive for fatigue. Negative for activity change, appetite change and unexpected weight change.        Hot flashes   Respiratory: Negative for cough and shortness of breath.    Cardiovascular: Negative for chest pain.   Gastrointestinal: Positive for nausea. Negative for abdominal  pain and constipation.   Musculoskeletal: Positive for arthralgias ( neck shoulders ) and back pain.   Skin: Positive for rash.   Neurological: Negative for numbness and headaches.   Psychiatric/Behavioral: Negative for dysphoric mood and sleep disturbance.       Objective:      Physical Exam   Constitutional: She is oriented to person, place, and time. She appears well-developed and well-nourished. No distress.   HENT:   Mouth/Throat: Oropharynx is clear and moist. No oropharyngeal exudate.   Eyes: No scleral icterus.   Cardiovascular: Normal rate, regular rhythm and normal heart sounds.    Pulmonary/Chest: Effort normal and breath sounds normal. She has no wheezes. She has no rales.       Abdominal: Soft. She exhibits no mass. There is no tenderness.   Lymphadenopathy:     She has no cervical adenopathy.     She has no axillary adenopathy.        Right: No supraclavicular adenopathy present.        Left: No supraclavicular adenopathy present.   Neurological: She is alert and oriented to person, place, and time.   Skin: No rash noted.   Psychiatric: She has a normal mood and affect. Her behavior is normal. Thought content normal.   Vitals reviewed.      Assessment:     1. Cancer of central portion of right female breast    2. Bone metastasis        Plan:

## 2017-06-29 ENCOUNTER — LAB VISIT (OUTPATIENT)
Dept: LAB | Facility: HOSPITAL | Age: 62
End: 2017-06-29
Attending: INTERNAL MEDICINE
Payer: COMMERCIAL

## 2017-06-29 ENCOUNTER — OFFICE VISIT (OUTPATIENT)
Dept: HEMATOLOGY/ONCOLOGY | Facility: CLINIC | Age: 62
End: 2017-06-29
Payer: COMMERCIAL

## 2017-06-29 VITALS
WEIGHT: 163.13 LBS | HEART RATE: 75 BPM | TEMPERATURE: 99 F | SYSTOLIC BLOOD PRESSURE: 138 MMHG | HEIGHT: 64 IN | OXYGEN SATURATION: 100 % | BODY MASS INDEX: 27.85 KG/M2 | DIASTOLIC BLOOD PRESSURE: 78 MMHG | RESPIRATION RATE: 18 BRPM

## 2017-06-29 DIAGNOSIS — R52 PAIN: Primary | ICD-10-CM

## 2017-06-29 DIAGNOSIS — E03.9 ACQUIRED HYPOTHYROIDISM: ICD-10-CM

## 2017-06-29 DIAGNOSIS — C50.111 CANCER OF CENTRAL PORTION OF RIGHT FEMALE BREAST: ICD-10-CM

## 2017-06-29 DIAGNOSIS — C50.111 CANCER OF CENTRAL PORTION OF RIGHT FEMALE BREAST: Primary | ICD-10-CM

## 2017-06-29 DIAGNOSIS — C79.51 METASTATIC CANCER TO SPINE: ICD-10-CM

## 2017-06-29 DIAGNOSIS — G89.3 CANCER RELATED PAIN: ICD-10-CM

## 2017-06-29 LAB
ALBUMIN SERPL BCP-MCNC: 3.5 G/DL
ALP SERPL-CCNC: 78 U/L
ALT SERPL W/O P-5'-P-CCNC: 15 U/L
ANION GAP SERPL CALC-SCNC: 10 MMOL/L
AST SERPL-CCNC: 35 U/L
BILIRUB SERPL-MCNC: 0.7 MG/DL
BUN SERPL-MCNC: 24 MG/DL
CALCIUM SERPL-MCNC: 9.5 MG/DL
CHLORIDE SERPL-SCNC: 103 MMOL/L
CO2 SERPL-SCNC: 25 MMOL/L
CREAT SERPL-MCNC: 0.8 MG/DL
ERYTHROCYTE [DISTWIDTH] IN BLOOD BY AUTOMATED COUNT: 12.5 %
EST. GFR  (AFRICAN AMERICAN): >60 ML/MIN/1.73 M^2
EST. GFR  (NON AFRICAN AMERICAN): >60 ML/MIN/1.73 M^2
GLUCOSE SERPL-MCNC: 107 MG/DL
HCT VFR BLD AUTO: 40.7 %
HGB BLD-MCNC: 13.9 G/DL
MCH RBC QN AUTO: 29.8 PG
MCHC RBC AUTO-ENTMCNC: 34.2 %
MCV RBC AUTO: 87 FL
NEUTROPHILS # BLD AUTO: 3.3 K/UL
PLATELET # BLD AUTO: 186 K/UL
PMV BLD AUTO: 9.3 FL
POTASSIUM SERPL-SCNC: 4.4 MMOL/L
PROT SERPL-MCNC: 7.1 G/DL
RBC # BLD AUTO: 4.67 M/UL
SODIUM SERPL-SCNC: 138 MMOL/L
T4 FREE SERPL-MCNC: 0.96 NG/DL
TSH SERPL DL<=0.005 MIU/L-ACNC: 24.9 UIU/ML
WBC # BLD AUTO: 5.45 K/UL

## 2017-06-29 PROCEDURE — 86300 IMMUNOASSAY TUMOR CA 15-3: CPT

## 2017-06-29 PROCEDURE — 84439 ASSAY OF FREE THYROXINE: CPT

## 2017-06-29 PROCEDURE — 84443 ASSAY THYROID STIM HORMONE: CPT

## 2017-06-29 PROCEDURE — 99999 PR PBB SHADOW E&M-EST. PATIENT-LVL V: CPT | Mod: PBBFAC,,, | Performed by: PHYSICIAN ASSISTANT

## 2017-06-29 PROCEDURE — 80053 COMPREHEN METABOLIC PANEL: CPT

## 2017-06-29 PROCEDURE — 85027 COMPLETE CBC AUTOMATED: CPT

## 2017-06-29 PROCEDURE — 36415 COLL VENOUS BLD VENIPUNCTURE: CPT

## 2017-06-29 PROCEDURE — 99213 OFFICE O/P EST LOW 20 MIN: CPT | Mod: S$GLB,,, | Performed by: PHYSICIAN ASSISTANT

## 2017-06-29 RX ORDER — MORPHINE SULFATE 60 MG/1
60 TABLET, FILM COATED, EXTENDED RELEASE ORAL EVERY 12 HOURS
Qty: 60 TABLET | Refills: 0 | Status: SHIPPED | OUTPATIENT
Start: 2017-06-29 | End: 2017-08-10 | Stop reason: SDUPTHER

## 2017-06-29 NOTE — Clinical Note
Can you send refill on patient's MS contin 60 mg BID to [facility] on [address]? She takes 2, 30 mg tablets but thinking we can just send for the 60's. Thanks.

## 2017-06-29 NOTE — Clinical Note
Also,please refer asap to endorcinology for hypothyroid And add tsh/free T4 to her next appt with dallin, thanks

## 2017-06-29 NOTE — PROGRESS NOTES
Subjective:       Patient ID: Rebecca Crain is a 61 y.o. female.    Chief Complaint: No chief complaint on file.    Dr. Schroeder's patient    61 year old female with metastatic breast cancer, most recently on Faslodex but with progression seen on scans from May.    She was given a 4 week break from treatment with plans to start Aromasin/Afinitor.     Patient feeling well other than cold symptoms that she has had for 2 weeks. She denies fever but has had productive cough and congestion.   No nausea, vomiting. She has some shortness of breath associated with cough.  Back pain is currently controlled with MS Contin 60 mg BID and breakthrough Aleve used very occasionally.   She notes continued fatigue.  Also admits to dry eyes and mouth.  Remains on 150 mcg synthroid daily; previously has seen outside endocrinologist but would like to switch that service to [facility].       Review of Systems   Constitutional: Negative for appetite change and unexpected weight change.   Eyes: Negative for visual disturbance.   Respiratory: Positive for cough (recent URI) and shortness of breath.    Cardiovascular: Positive for chest pain (intermittent, associated with productive cough and recent cold).   Gastrointestinal: Negative for abdominal pain and diarrhea.   Genitourinary: Negative for frequency.   Musculoskeletal: Positive for back pain (controlled wt current pain meds).   Skin: Negative for rash.   Neurological: Negative for headaches.   Hematological: Negative for adenopathy.   Psychiatric/Behavioral: The patient is not nervous/anxious.        Objective:      Physical Exam   Constitutional: She is oriented to person, place, and time. She appears well-developed and well-nourished. No distress.   Presents alone  ECOG 0   HENT:   Head: Normocephalic and atraumatic.   Mouth/Throat: No oropharyngeal exudate.   Eyes: EOM are normal. Pupils are equal, round, and reactive to light. No scleral icterus.   Neck: Normal range of motion.  Neck supple.   Cardiovascular: Normal rate, regular rhythm and normal heart sounds.    Pulmonary/Chest: Effort normal and breath sounds normal. She has no wheezes. She has no rales.   Right and left breast without mass, nodule or skin changes. No axillary or supraclavicular adenopathy    Lungs clear to auscultation bilaterally   Abdominal: Soft. Bowel sounds are normal. She exhibits no mass. There is no tenderness.   Musculoskeletal: Normal range of motion. She exhibits no edema or deformity.   No spinal or paraspinal tenderness to palpation       Lymphadenopathy:     She has no cervical adenopathy.     She has no axillary adenopathy.        Right: No supraclavicular adenopathy present.        Left: No supraclavicular adenopathy present.   Neurological: She is alert and oriented to person, place, and time. She has normal reflexes. She displays normal reflexes. No cranial nerve deficit. She exhibits normal muscle tone. Coordination normal.   Walking with slight limp to left side  No focal neurologic deficits   Skin: Skin is warm and dry. No rash noted. No erythema.        Psychiatric: She has a normal mood and affect. Her behavior is normal. Judgment and thought content normal.   Patient is teary     Vitals reviewed.      Assessment:       1. Cancer of central portion of right female breast    2. 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 Acquired hypothyroidism    4. Cancer related pain        Plan:       Will discuss next line of treatment with Dr. Schroeder- as previously mentioned to patient likely Aromasin/Afinitor with patient to start those next week.  Referral to endocrinology for hypothyroid; TSH is elevated but normal T4 today.  Refill on MS Contin 60 mg to be taken BID; pain currently controlled on this regimen.

## 2017-06-29 NOTE — LETTER
To Whom it May Concern:    Rebecca Crain is under my care at [facility] for breast cancer.  She has our permission to complete a dive in the shark tank at the aquarium.  If you have any questions please do not hesitate to contact me at [phone].    Roshni Sxaena PA-C

## 2017-06-29 NOTE — LETTER
June 29, 2017                 [facility]  [address]  [place] [address]  Phone: [phone] June 29, 2017     Patient: Rebecca Crain    YOB: 1955   Date of Visit: 6/29/2017       To Whom It May Concern:    It is my medical opinion that Rebecca Crain can participate in an underwater dive at the [facility] the [facility].  She is currently under my care for breast cancer.     If you have any questions or concerns, please don't hesitate to call.    Sincerely,        Roshni Briceno PA-C

## 2017-07-03 LAB — CANCER AG27-29 SERPL-ACNC: 199 U/ML

## 2017-07-06 DIAGNOSIS — C50.111 CANCER OF CENTRAL PORTION OF RIGHT FEMALE BREAST: Primary | ICD-10-CM

## 2017-07-06 RX ORDER — EXEMESTANE 25 MG/1
25 TABLET ORAL DAILY
Qty: 30 TABLET | Refills: 11 | Status: SHIPPED | OUTPATIENT
Start: 2017-07-06 | End: 2017-08-05

## 2017-07-06 RX ORDER — EVEROLIMUS 10 MG/1
10 TABLET ORAL DAILY
Qty: 30 TABLET | Refills: 6 | Status: SHIPPED | OUTPATIENT
Start: 2017-07-06 | End: 2018-02-06 | Stop reason: ALTCHOICE

## 2017-07-07 ENCOUNTER — TELEPHONE (OUTPATIENT)
Dept: PHARMACY | Facility: CLINIC | Age: 62
End: 2017-07-07

## 2017-07-07 ENCOUNTER — PATIENT MESSAGE (OUTPATIENT)
Dept: HEMATOLOGY/ONCOLOGY | Facility: CLINIC | Age: 62
End: 2017-07-07

## 2017-07-07 DIAGNOSIS — R23.2 HOT FLASHES RELATED TO AROMATASE INHIBITOR THERAPY: ICD-10-CM

## 2017-07-07 DIAGNOSIS — T45.1X5A HOT FLASHES RELATED TO AROMATASE INHIBITOR THERAPY: ICD-10-CM

## 2017-07-07 RX ORDER — CLONIDINE HYDROCHLORIDE 0.1 MG/1
0.1 TABLET ORAL 2 TIMES DAILY
Qty: 270 TABLET | Refills: 2 | OUTPATIENT
Start: 2017-07-07

## 2017-07-07 NOTE — LETTER
[facility]  [address]  [facility] [address]  Phone: [phone]  Fax: [phone]    2017   [facility] Appeals Unit  Fax: [phone]    Regarding:  Rebecca Crain    ID: [id number]  :  1955  PA Ref: [id number]    Greetings:    I have recently requested authorization to treat my patient, Rebecca Crain, with Afinitor (everolimus) for the treatment of her metastatic breast cancer.  Unfortunately, coverage for Afinitor was denied because plan requires its use with Aromasin (exemestane) after progression on Arimidex (anastrozole), Femara (letrozole), or tamoxifen.     Ms. Crain was diagnosed with stage IIB carcinoma of the right breast, ER positive with a low Oncotype score in . She was enrolled on protocol  and randomized to endocrine therapy alone. In 2013, she was started on tamoxifen and Arimidex, and she experienced an allergic reaction (hives) to Arimidex. She was found to have bone metastasis in May 2015. A subsequent bone biopsy was performed on a lesion at the T8 vertebra. The pathology from that was consistent with metastatic breast cancer, ER +80%, SD +80%, and HER-2 negative. She received letrozole plus palbociclib from 2015 until May 2016. She developed a rash while on letrozole plus palbociclib. She also received bone protective therapy with Xgeva. She was most recently on Faslodex but, there was some progression seen on scans from May 2017. The NCCN guidelines recommend the use of everolimus with exemestane in postmenopausal women with metastatic breast cancer (NCCN Guidelines 2.2017 Invasive Breast Cancer). The patient will be taking the Afinitor with exemestane, which is in accordance with the NCCN guidelines. Ms. Crain has progressed on all required prerequisite treatment for Afinitor and NCCN supports its use as well in patients with metastatic breast cancer.    At this time, Ms. Crain and I ask that you reconsider and  authorize the use of Afinitor with exemestane. Although I appreciate the significant cost of the drug, the treatment is medically necessary to prevent further progression of her breast cancer. Please dennis Ms. Crain an opportunity to achieve and maintain maximum functional capacity in performing her daily activities. Ms. Crain and I eagerly await your response to this urgent reconsideration of everolimus therapy.      Yours truly,        Rodrigo Schroeder MD

## 2017-07-07 NOTE — TELEPHONE ENCOUNTER
[facility] received prescription for Afinitor 10mg daily.     Patient's Estimated Creatinine Clearance: 86.548 mL/min (based on Cr of 0.8) from 6/29/2017     Dosage appropriate based on therapy for breast cancer, advanced, hormone receptor-positive, HER2-negative: Oral: 10 mg once daily (in combination with exemestane), continue treatment until disease progression or unacceptable toxicity.

## 2017-07-08 DIAGNOSIS — R23.2 HOT FLASHES RELATED TO AROMATASE INHIBITOR THERAPY: ICD-10-CM

## 2017-07-08 DIAGNOSIS — T45.1X5A HOT FLASHES RELATED TO AROMATASE INHIBITOR THERAPY: ICD-10-CM

## 2017-07-08 RX ORDER — CLONIDINE HYDROCHLORIDE 0.1 MG/1
0.1 TABLET ORAL 2 TIMES DAILY
Qty: 270 TABLET | Refills: 3 | Status: SHIPPED | OUTPATIENT
Start: 2017-07-08 | End: 2017-09-19 | Stop reason: SDUPTHER

## 2017-07-18 NOTE — TELEPHONE ENCOUNTER
Afinitor prior authorization denied because plan requires use with exemestane.  Patient will be taking with exemestane, but plan refused to review decision without an appeal.    [facility]GENT appeal faxed to [phone]

## 2017-07-27 ENCOUNTER — TELEPHONE (OUTPATIENT)
Dept: HEMATOLOGY/ONCOLOGY | Facility: CLINIC | Age: 62
End: 2017-07-27

## 2017-07-27 NOTE — TELEPHONE ENCOUNTER
----- Message from Louis Wang sent at 7/27/2017  3:29 PM CDT -----  Contact: Patient  Patient stated that Dr. Wallace prescribed a chemo medication that has not been filled yet and Patient wants to see if Change or continue waiting. Patient stated that its been a month and michael hasn't approved it. Please contact Patient at [phone].

## 2017-07-27 NOTE — TELEPHONE ENCOUNTER
Patient is panicking because she has not been on anything for almost 2 months. Afinitor prescribed July 7 was denied and now still in appeals status. Our pharmacy is working on it, but patient is not taking anything and worried right now

## 2017-07-28 DIAGNOSIS — C50.111 MALIGNANT NEOPLASM OF CENTRAL PORTION OF RIGHT BREAST IN FEMALE, ESTROGEN RECEPTOR POSITIVE: Primary | ICD-10-CM

## 2017-07-28 DIAGNOSIS — Z17.0 MALIGNANT NEOPLASM OF CENTRAL PORTION OF RIGHT BREAST IN FEMALE, ESTROGEN RECEPTOR POSITIVE: Primary | ICD-10-CM

## 2017-08-01 NOTE — TELEPHONE ENCOUNTER
Documentation Only    Afinitor 10 mg  Approved 7-28-17 through 7-28-18      Afin[employer] Co Pay Card  15,000 annual  $0 co pay  Effective through 12-31-17  Id# [id number]  Group [id number]  [facility]  BIN [id number]

## 2017-08-02 NOTE — TELEPHONE ENCOUNTER
[facility] received prescription for Afinitor 10mg.     Results from SWISH trial show reduction in incidence and severity of stomatitis with the use of alcohol free dexamethasone mouth wash.       2016; J Clin Oncol. 34(suppl; abstr 525).    If appropriate, please send dexamethasone 0.5mg/5mL solution (swish for 2 min then spit 10ml qid) prescription to [facility].  1000mL per 4 week cycle (2 cycles assessed in trial with optional 2 cycle extension)

## 2017-08-02 NOTE — TELEPHONE ENCOUNTER
[facility] Initial Medication Consult: Afinitor     Initial Afinitor 10 mg consult completed on 17. Afinitor 10mg (everolimus) will be shipped on 8/3/17 to arrive at patient's home on 17 via [employer]. $0.00 copay. Patient will start Afinitor on 17. Address confirmed, CC on file. Confirmed 2 patient identifiers - name and . Therapy Appropriate.     Patient was counseled on the administration directions for Afinitor:  Administration Directions:  - Take 1 tablet by mouth once daily with or without food, at the same time of the day.    - Do not chew, crush, or break the tablets.   - If possible, patient was instructed tip the tablets from the RX bottle to the cap, and take directly from the cap to the mouth.  Patient may handle the medication with their hands if they wear with a latex or nitrile glove and wash their hands before and after handling the tablets.    Patient was counseled on the following possible side effects, which include, but are not limited to:   - swelling, hypertension, fatigue, headache, skin rash, hyperglycemia, mouth sores, diarrhea, nausea, loss of appetite, increased risk for infection, and may bleed more easily.   - Patient was given hydrocortisone cream for dermatitis.     DDIs:  Reviewed medication list; None     Patient was given 2 patient education handouts on how to handle oral chemotherapy and specific recommendations- do's and don'ts. Instructed the patient that if they have any remaining oral chemotherapy, not to flush down the toilet or throw away in the trash; the patient or caregiver should return the unused oral chemotherapy to either the clinic or to myself in the Pharmacy where the oral chemotherapy can be disposed of properly.     Patient confirmed understanding. Patient did not have additional questions.   Patient will start Afinitor. Consultation included: indication; goals of treatment; administration; storage and handling; side effects; how to handle side effects; the  importance of compliance; how to handle missed doses; the importance of laboratory monitoring; the importance of keeping all follow up appointments.  Patient understands to report any medication changes to [facility] and provider. All questions answered and addressed to patients satisfaction. I will f/u with patient in 1 week from start, [facility] to contact patient in 3 weeks for refills.     Giovana Bass, PharmD, [facility]  Clinical Pharmacist   [facility]  Phone: [phone]

## 2017-08-02 NOTE — ADDENDUM NOTE
Encounter addended by: Giovana Dixon, PharmD on: 8/2/2017 12:11 PM<BR>    Actions taken: Pend clinical note

## 2017-08-10 ENCOUNTER — OFFICE VISIT (OUTPATIENT)
Dept: HEMATOLOGY/ONCOLOGY | Facility: CLINIC | Age: 62
End: 2017-08-10
Payer: COMMERCIAL

## 2017-08-10 ENCOUNTER — LAB VISIT (OUTPATIENT)
Dept: LAB | Facility: HOSPITAL | Age: 62
End: 2017-08-10
Payer: COMMERCIAL

## 2017-08-10 VITALS
TEMPERATURE: 91 F | DIASTOLIC BLOOD PRESSURE: 79 MMHG | BODY MASS INDEX: 28.19 KG/M2 | RESPIRATION RATE: 16 BRPM | WEIGHT: 164.25 LBS | HEART RATE: 63 BPM | SYSTOLIC BLOOD PRESSURE: 135 MMHG

## 2017-08-10 DIAGNOSIS — C50.111 MALIGNANT NEOPLASM OF CENTRAL PORTION OF RIGHT BREAST IN FEMALE, ESTROGEN RECEPTOR POSITIVE: ICD-10-CM

## 2017-08-10 DIAGNOSIS — Z17.0 MALIGNANT NEOPLASM OF CENTRAL PORTION OF RIGHT BREAST IN FEMALE, ESTROGEN RECEPTOR POSITIVE: ICD-10-CM

## 2017-08-10 DIAGNOSIS — R52 PAIN: ICD-10-CM

## 2017-08-10 DIAGNOSIS — Z17.0 MALIGNANT NEOPLASM OF CENTRAL PORTION OF RIGHT BREAST IN FEMALE, ESTROGEN RECEPTOR POSITIVE: Primary | ICD-10-CM

## 2017-08-10 DIAGNOSIS — C50.111 CANCER OF CENTRAL PORTION OF RIGHT FEMALE BREAST: ICD-10-CM

## 2017-08-10 DIAGNOSIS — C50.111 MALIGNANT NEOPLASM OF CENTRAL PORTION OF RIGHT BREAST IN FEMALE, ESTROGEN RECEPTOR POSITIVE: Primary | ICD-10-CM

## 2017-08-10 DIAGNOSIS — C79.51 BONE METASTASIS: ICD-10-CM

## 2017-08-10 LAB
ALBUMIN SERPL BCP-MCNC: 3.4 G/DL
ALP SERPL-CCNC: 78 U/L
ALT SERPL W/O P-5'-P-CCNC: 16 U/L
ANION GAP SERPL CALC-SCNC: 8 MMOL/L
AST SERPL-CCNC: 31 U/L
BILIRUB SERPL-MCNC: 0.5 MG/DL
BUN SERPL-MCNC: 20 MG/DL
CALCIUM SERPL-MCNC: 8.9 MG/DL
CHLORIDE SERPL-SCNC: 105 MMOL/L
CO2 SERPL-SCNC: 26 MMOL/L
CREAT SERPL-MCNC: 0.9 MG/DL
ERYTHROCYTE [DISTWIDTH] IN BLOOD BY AUTOMATED COUNT: 12.7 %
EST. GFR  (AFRICAN AMERICAN): >60 ML/MIN/1.73 M^2
EST. GFR  (NON AFRICAN AMERICAN): >60 ML/MIN/1.73 M^2
GLUCOSE SERPL-MCNC: 113 MG/DL
HCT VFR BLD AUTO: 40 %
HGB BLD-MCNC: 13.5 G/DL
MCH RBC QN AUTO: 29.2 PG
MCHC RBC AUTO-ENTMCNC: 33.8 G/DL
MCV RBC AUTO: 87 FL
NEUTROPHILS # BLD AUTO: 1.9 K/UL
PLATELET # BLD AUTO: 132 K/UL
PMV BLD AUTO: 9.7 FL
POTASSIUM SERPL-SCNC: 4.3 MMOL/L
PROT SERPL-MCNC: 7 G/DL
RBC # BLD AUTO: 4.62 M/UL
SODIUM SERPL-SCNC: 139 MMOL/L
WBC # BLD AUTO: 3.62 K/UL

## 2017-08-10 PROCEDURE — 86300 IMMUNOASSAY TUMOR CA 15-3: CPT

## 2017-08-10 PROCEDURE — 85027 COMPLETE CBC AUTOMATED: CPT

## 2017-08-10 PROCEDURE — 99213 OFFICE O/P EST LOW 20 MIN: CPT | Mod: S$GLB,,, | Performed by: INTERNAL MEDICINE

## 2017-08-10 PROCEDURE — 99999 PR PBB SHADOW E&M-EST. PATIENT-LVL III: CPT | Mod: PBBFAC,,, | Performed by: INTERNAL MEDICINE

## 2017-08-10 PROCEDURE — 3075F SYST BP GE 130 - 139MM HG: CPT | Mod: S$GLB,,, | Performed by: INTERNAL MEDICINE

## 2017-08-10 PROCEDURE — 3078F DIAST BP <80 MM HG: CPT | Mod: S$GLB,,, | Performed by: INTERNAL MEDICINE

## 2017-08-10 PROCEDURE — 3008F BODY MASS INDEX DOCD: CPT | Mod: S$GLB,,, | Performed by: INTERNAL MEDICINE

## 2017-08-10 PROCEDURE — 80053 COMPREHEN METABOLIC PANEL: CPT

## 2017-08-10 RX ORDER — MORPHINE SULFATE 60 MG/1
60 TABLET, FILM COATED, EXTENDED RELEASE ORAL EVERY 12 HOURS
Qty: 60 TABLET | Refills: 0 | Status: SHIPPED | OUTPATIENT
Start: 2017-08-10 | End: 2017-09-11 | Stop reason: SDUPTHER

## 2017-08-10 RX ORDER — EXEMESTANE 25 MG/1
25 TABLET ORAL DAILY
COMMUNITY
End: 2017-09-11 | Stop reason: SDUPTHER

## 2017-08-10 NOTE — PROGRESS NOTES
Subjective:       Patient ID: Rebecca Crain is a 61 y.o. female.    Chief Complaint: Breast Cancer    HPI    Ms. Crain is here for followup of metastatic carcinoma of the right breast. She has had multiple endocrine therapies.  She recently has been changed to exemestane and Afinitor.  She started exemestane about 2 weeks ago and after one week ago.    She had felt much better after stopping Faslodex but after restarting the exemestane she's had a number of different side effects including some increased frequency of bowel movements-8 per day.  She also had some decrease in her appetite.  She has some cramps in her hands right greater than left.  She has occasional mild shortness of breath.  Her back pain is present in her mid back and interscapular area which is less intense compared to previous.  She has noted some itching since starting the Aromasin.    Breast history: In 2013 she was diagnosed with stage IIB carcinoma of the right breast, ER positive with a low Oncotype score.  She was enrolled on protocol  and randomized to endocrine therapy alone.  She was tried on all 3 aromatase inhibitors and had itching and rash to all of them.  In August 2013 she was started on tamoxifen.   She was found to have  bone metastasis in May 2015, 2015.  A subsequent bone biopsy was performed on a lesion at the T8 vertebra.   The pathology from that was consistent with metastatic breast cancer, ER +80%, [place] +80%, and HER-2 negative.      She received letrozole plus palbociclib from July 2015 until May 2016.  She also received bone protective therapy with Xgeva.     Faslodex was started in June 2016.  Bone scan in February 2017 showed improvement, CT scans showed no new findings.    In May she had more pain.  CA 27.29 had increased to 94.    Subsequently she has undergone scans.  Bone scan from May 11 showed a new focal uptake at T4 which is felt to be possibly related to a prior compression fraction or a Schmorl's  node.  Uptake in T12 was unchanged.    CT scan of the chest abdomen and pelvis showed no evidence of lung metastasis, liver was unremarkable.    Review of Systems   Constitutional: Positive for fatigue. Negative for activity change, appetite change and unexpected weight change.        Hot flashes   Respiratory: Negative for cough and shortness of breath.    Cardiovascular: Negative for chest pain.   Gastrointestinal: Positive for nausea. Negative for abdominal pain and constipation. Diarrhea: lose . frequent BMs.   Musculoskeletal: Positive for arthralgias ( neck shoulders ) and back pain.   Skin: Positive for rash.   Neurological: Positive for dizziness (positional). Negative for numbness and headaches.   Psychiatric/Behavioral: Negative for dysphoric mood and sleep disturbance.       Objective:      Physical Exam   Constitutional: She is oriented to person, place, and time. She appears well-developed and well-nourished. No distress.   HENT:   Mouth/Throat: Oropharynx is clear and moist. No oropharyngeal exudate.   Eyes: No scleral icterus.   Cardiovascular: Normal rate, regular rhythm and normal heart sounds.    Pulmonary/Chest: Effort normal and breath sounds normal. She has no wheezes. She has no rales.       Abdominal: Soft. She exhibits no mass. There is no tenderness.   Lymphadenopathy:     She has no cervical adenopathy.     She has no axillary adenopathy.        Right: No supraclavicular adenopathy present.        Left: No supraclavicular adenopathy present.   Neurological: She is alert and oriented to person, place, and time.   Skin: No rash noted.   Psychiatric: She has a normal mood and affect. Her behavior is normal. Thought content normal.   Vitals reviewed.      Assessment:     1. Malignant neoplasm of central portion of right breast in female, estrogen receptor positive    2. Bone metastasis        Plan:        RTC 1 Month

## 2017-08-11 ENCOUNTER — TELEPHONE (OUTPATIENT)
Dept: PHARMACY | Facility: CLINIC | Age: 62
End: 2017-08-11

## 2017-08-11 NOTE — TELEPHONE ENCOUNTER
Patient reports that she has been tolerating Afinitor well with no missed doses.  She sets an alarm to remind herself to take medication in the evenings.  Today she woke up nauseated but has not taken any ondansetron yet.  She wants to delay taking ondansetron until she absolutely needs it.  Advised patient that she can take ondansetron as preventive as well if nausea becomes persistent.      She reports that she has had some issues that she attributes to exemestane - rash on her face, itching, diarrhea.  She has been using Benadryl cream for the rash that has controlled the itching.  Patient reports Dr Schroeder instructed her to start Metamucil wafers and to use Imodium as needed.  Reminded patient that if she if she has >4 episodes of diarrhea with imodium on board to contact pharmacy or provider.

## 2017-08-14 LAB — CANCER AG27-29 SERPL-ACNC: 258 U/ML

## 2017-08-18 ENCOUNTER — TELEPHONE (OUTPATIENT)
Dept: HEMATOLOGY/ONCOLOGY | Facility: CLINIC | Age: 62
End: 2017-08-18

## 2017-08-18 ENCOUNTER — PATIENT MESSAGE (OUTPATIENT)
Dept: HEMATOLOGY/ONCOLOGY | Facility: CLINIC | Age: 62
End: 2017-08-18

## 2017-08-18 DIAGNOSIS — K12.30 MUCOSITIS: Primary | ICD-10-CM

## 2017-08-18 NOTE — TELEPHONE ENCOUNTER
Patient called back to pharmacy reporting that she has developed ulcerations in her mouth.  Currently she has 2 that are throbbing and causing major disruption to her day.  Patient requesting mouth wash to help with ulcers.

## 2017-08-18 NOTE — TELEPHONE ENCOUNTER
MD Becki Cruz   Caller: Unspecified (1 week ago, 12:14 PM)             I sent a prescription for Duke's solution.

## 2017-08-25 ENCOUNTER — TELEPHONE (OUTPATIENT)
Dept: PHARMACY | Facility: CLINIC | Age: 62
End: 2017-08-25

## 2017-08-25 NOTE — TELEPHONE ENCOUNTER
patient confirmed need of refill for afinitor, 7 doses remaining, no missed doses, no new medications or allergies and has no questions for a pharmacist at this time. rx will ship 8/29 to arrive 8/30 with consent, shipping address confirmed, $0 copay. patient stated that she is having alot of symptoms from medication , but stated that she did not want to speak with a pharmacist.    Katie Hondroulis [facility]- Refill Technician  Phone: [phone]

## 2017-08-31 ENCOUNTER — PATIENT MESSAGE (OUTPATIENT)
Dept: HEMATOLOGY/ONCOLOGY | Facility: CLINIC | Age: 62
End: 2017-08-31

## 2017-09-11 ENCOUNTER — OFFICE VISIT (OUTPATIENT)
Dept: HEMATOLOGY/ONCOLOGY | Facility: CLINIC | Age: 62
End: 2017-09-11
Payer: COMMERCIAL

## 2017-09-11 ENCOUNTER — HOSPITAL ENCOUNTER (OUTPATIENT)
Dept: RADIOLOGY | Facility: HOSPITAL | Age: 62
Discharge: HOME | End: 2017-09-11
Attending: INTERNAL MEDICINE
Payer: COMMERCIAL

## 2017-09-11 ENCOUNTER — PATIENT MESSAGE (OUTPATIENT)
Dept: HEMATOLOGY/ONCOLOGY | Facility: CLINIC | Age: 62
End: 2017-09-11

## 2017-09-11 VITALS
WEIGHT: 160.94 LBS | RESPIRATION RATE: 17 BRPM | HEART RATE: 73 BPM | TEMPERATURE: 98 F | DIASTOLIC BLOOD PRESSURE: 111 MMHG | BODY MASS INDEX: 27.62 KG/M2 | SYSTOLIC BLOOD PRESSURE: 182 MMHG

## 2017-09-11 DIAGNOSIS — R52 PAIN: ICD-10-CM

## 2017-09-11 DIAGNOSIS — C50.111 MALIGNANT NEOPLASM OF CENTRAL PORTION OF RIGHT BREAST IN FEMALE, ESTROGEN RECEPTOR POSITIVE: Primary | ICD-10-CM

## 2017-09-11 DIAGNOSIS — Z17.0 MALIGNANT NEOPLASM OF CENTRAL PORTION OF RIGHT BREAST IN FEMALE, ESTROGEN RECEPTOR POSITIVE: ICD-10-CM

## 2017-09-11 DIAGNOSIS — Z17.0 MALIGNANT NEOPLASM OF CENTRAL PORTION OF RIGHT BREAST IN FEMALE, ESTROGEN RECEPTOR POSITIVE: Primary | ICD-10-CM

## 2017-09-11 DIAGNOSIS — C50.111 MALIGNANT NEOPLASM OF CENTRAL PORTION OF RIGHT BREAST IN FEMALE, ESTROGEN RECEPTOR POSITIVE: ICD-10-CM

## 2017-09-11 DIAGNOSIS — E03.9 ACQUIRED HYPOTHYROIDISM: ICD-10-CM

## 2017-09-11 DIAGNOSIS — G89.3 CANCER RELATED PAIN: ICD-10-CM

## 2017-09-11 DIAGNOSIS — C79.51 BONE METASTASIS: ICD-10-CM

## 2017-09-11 PROCEDURE — 71020 XR CHEST PA AND LATERAL: CPT | Mod: 26,,, | Performed by: RADIOLOGY

## 2017-09-11 PROCEDURE — 99999 PR PBB SHADOW E&M-EST. PATIENT-LVL III: CPT | Mod: PBBFAC,,, | Performed by: INTERNAL MEDICINE

## 2017-09-11 PROCEDURE — 3080F DIAST BP >= 90 MM HG: CPT | Mod: S$GLB,,, | Performed by: INTERNAL MEDICINE

## 2017-09-11 PROCEDURE — 3077F SYST BP >= 140 MM HG: CPT | Mod: S$GLB,,, | Performed by: INTERNAL MEDICINE

## 2017-09-11 PROCEDURE — 99214 OFFICE O/P EST MOD 30 MIN: CPT | Mod: S$GLB,,, | Performed by: INTERNAL MEDICINE

## 2017-09-11 PROCEDURE — 71020 XR CHEST PA AND LATERAL: CPT | Mod: TC

## 2017-09-11 PROCEDURE — 3008F BODY MASS INDEX DOCD: CPT | Mod: S$GLB,,, | Performed by: INTERNAL MEDICINE

## 2017-09-11 RX ORDER — MORPHINE SULFATE 60 MG/1
60 TABLET, FILM COATED, EXTENDED RELEASE ORAL EVERY 12 HOURS
Qty: 60 TABLET | Refills: 0 | Status: ON HOLD | OUTPATIENT
Start: 2017-09-11 | End: 2017-09-23 | Stop reason: HOSPADM

## 2017-09-11 RX ORDER — EXEMESTANE 25 MG/1
25 TABLET ORAL DAILY
Qty: 90 TABLET | Refills: 3 | Status: SHIPPED | OUTPATIENT
Start: 2017-09-11 | End: 2017-12-05 | Stop reason: SDUPTHER

## 2017-09-11 NOTE — PROGRESS NOTES
Subjective:       Patient ID: Rebecca Crain is a 61 y.o. female.    Chief Complaint: No chief complaint on file.    HPI  Ms. Crain is here for followup of metastatic carcinoma of the right breast. She has had multiple endocrine therapies.  She has been on exemestane and Afinitor.  That was started August 2017.   She has been feeling poorly over the last month with some mouth sores and tenderness of her tongue which seems to be a bit better now.  In addition she's had some pleuritic chest pain and some shortness of breath.  The pain was worse with movement.  Bowels have been slightly more regular and soft.  Her activity level is markedly decreased because of her symptoms.    She continues to have dizziness and hot flashes and some chronic nausea.        Breast history: In 2013 she was diagnosed with stage IIB carcinoma of the right breast, ER positive with a low Oncotype score.  She was enrolled on protocol  and randomized to endocrine therapy alone.  She was tried on all 3 aromatase inhibitors and had itching and rash to all of them.  In August 2013 she was started on tamoxifen.   She was found to have  bone metastasis in May 2015, 2015.  A subsequent bone biopsy was performed on a lesion at the T8 vertebra.   The pathology from that was consistent with metastatic breast cancer, ER +80%, [place] +80%, and HER-2 negative.      She received letrozole plus palbociclib from July 2015 until May 2016.  She also received bone protective therapy with Xgeva.     Faslodex was started in June 2016.  Bone scan in February 2017 showed improvement, CT scans showed no new findings.    In May she had more pain.  CA 27.29 had increased to 94.    Subsequently she has undergone scans.  Bone scan from May 11 showed a new focal uptake at T4 which is felt to be possibly related to a prior compression fraction or a Schmorl's node.  Uptake in T12 was unchanged.    CT scan of the chest abdomen and pelvis showed no evidence of lung  metastasis, liver was unremarkable.    Review of Systems   Constitutional: Positive for fatigue. Negative for activity change, appetite change and unexpected weight change.        Hot flashes   HENT: Positive for mouth sores. Negative for trouble swallowing.    Respiratory: Positive for shortness of breath. Negative for cough.    Cardiovascular: Positive for chest pain (chest wall, pleuritic).   Gastrointestinal: Positive for nausea. Negative for abdominal pain and constipation. Diarrhea: lose . frequent BMs.   Musculoskeletal: Positive for arthralgias ( neck shoulders ) and back pain.   Skin: Positive for rash.   Neurological: Positive for dizziness (positional). Negative for numbness and headaches.   Psychiatric/Behavioral: Negative for dysphoric mood and sleep disturbance.       Objective:      Physical Exam   Constitutional: She is oriented to person, place, and time. She appears well-developed and well-nourished. No distress.   HENT:   Mouth/Throat: No oropharyngeal exudate.   Single ulcer lower inner lip   Eyes: No scleral icterus.   Cardiovascular: Normal rate, regular rhythm and normal heart sounds.    Pulmonary/Chest: Effort normal and breath sounds normal. She has no wheezes. She has no rales. She exhibits tenderness (anterior chest wall).       Abdominal: Soft. She exhibits no mass. There is no tenderness.   Lymphadenopathy:     She has no cervical adenopathy.     She has no axillary adenopathy.        Right: No supraclavicular adenopathy present.        Left: No supraclavicular adenopathy present.   Neurological: She is alert and oriented to person, place, and time.   Skin: No rash noted.   Psychiatric: She has a normal mood and affect. Her behavior is normal. Thought content normal.   Vitals reviewed.      Assessment:     1. Malignant neoplasm of central portion of right breast in female, estrogen receptor positive    2. Bone metastasis    3. Cancer related pain        Plan:      I instructed her to  hold her Afinitor.  We will obtain a chest x-ray today.  She will let me know how she's feeling in 1 week.  Chest x-ray  RTC 1 Month

## 2017-09-18 ENCOUNTER — PATIENT MESSAGE (OUTPATIENT)
Dept: HEMATOLOGY/ONCOLOGY | Facility: CLINIC | Age: 62
End: 2017-09-18

## 2017-09-19 ENCOUNTER — TELEPHONE (OUTPATIENT)
Dept: HEMATOLOGY/ONCOLOGY | Facility: CLINIC | Age: 62
End: 2017-09-19

## 2017-09-19 DIAGNOSIS — R23.2 HOT FLASHES RELATED TO AROMATASE INHIBITOR THERAPY: ICD-10-CM

## 2017-09-19 DIAGNOSIS — T45.1X5A HOT FLASHES RELATED TO AROMATASE INHIBITOR THERAPY: ICD-10-CM

## 2017-09-19 RX ORDER — CLONIDINE HYDROCHLORIDE 0.1 MG/1
0.1 TABLET ORAL 2 TIMES DAILY
Qty: 270 TABLET | Refills: 3 | Status: SHIPPED | OUTPATIENT
Start: 2017-09-19 | End: 2017-12-05 | Stop reason: SDUPTHER

## 2017-09-19 NOTE — TELEPHONE ENCOUNTER
Spoke to patient daughter Becki and she's very concerned about the patient. Complaints of chest pain and has not been out of bed since Sunday. Taking morphine and dilaudid. Tried to bring patient to the hospital and she refused. She would like to know what she can do. Please advise

## 2017-09-19 NOTE — TELEPHONE ENCOUNTER
----- Message from Elena Jackson sent at 9/19/2017  9:36 AM CDT -----  Contact: pt daughter Becki  Pt daughter Becki [phone] contact    Daughter states pt came in last week and she been complaining of chest pain that started on the right side now the pt says it is on the left side and she can't get out the bed    Daughter also states that an Xray was done and that it came back clear    Daughter states she is worried about mom health and needs a call back to let her know what to do

## 2017-09-19 NOTE — TELEPHONE ENCOUNTER
Spoke to Becki and informed she should take patient to emergency room and she voiced understanding.

## 2017-09-20 ENCOUNTER — TELEPHONE (OUTPATIENT)
Dept: PHARMACY | Facility: CLINIC | Age: 62
End: 2017-09-20

## 2017-09-21 ENCOUNTER — HOSPITAL ENCOUNTER (INPATIENT)
Facility: HOSPITAL | Age: 62
LOS: 2 days | Discharge: HOME | DRG: 948 | End: 2017-09-23
Attending: EMERGENCY MEDICINE | Admitting: INTERNAL MEDICINE
Payer: COMMERCIAL

## 2017-09-21 DIAGNOSIS — C50.919 METASTATIC BREAST CANCER: ICD-10-CM

## 2017-09-21 DIAGNOSIS — R07.9 CHEST PAIN: Primary | ICD-10-CM

## 2017-09-21 LAB
ALBUMIN SERPL BCP-MCNC: 3.4 G/DL
ALP SERPL-CCNC: 157 U/L
ALT SERPL W/O P-5'-P-CCNC: 23 U/L
ANION GAP SERPL CALC-SCNC: 10 MMOL/L
AST SERPL-CCNC: 25 U/L
BASOPHILS # BLD AUTO: 0 K/UL
BASOPHILS NFR BLD: 0 %
BILIRUB SERPL-MCNC: 0.6 MG/DL
BNP SERPL-MCNC: 72 PG/ML
BUN SERPL-MCNC: 12 MG/DL
CALCIUM SERPL-MCNC: 9.8 MG/DL
CHLORIDE SERPL-SCNC: 107 MMOL/L
CO2 SERPL-SCNC: 24 MMOL/L
CREAT SERPL-MCNC: 0.8 MG/DL
DIFFERENTIAL METHOD: ABNORMAL
EOSINOPHIL # BLD AUTO: 0 K/UL
EOSINOPHIL NFR BLD: 0.1 %
ERYTHROCYTE [DISTWIDTH] IN BLOOD BY AUTOMATED COUNT: 13 %
EST. GFR  (AFRICAN AMERICAN): >60 ML/MIN/1.73 M^2
EST. GFR  (NON AFRICAN AMERICAN): >60 ML/MIN/1.73 M^2
GLUCOSE SERPL-MCNC: 118 MG/DL
HCT VFR BLD AUTO: 38.7 %
HGB BLD-MCNC: 13.2 G/DL
LYMPHOCYTES # BLD AUTO: 1.2 K/UL
LYMPHOCYTES NFR BLD: 16.8 %
MCH RBC QN AUTO: 27.8 PG
MCHC RBC AUTO-ENTMCNC: 34.1 G/DL
MCV RBC AUTO: 82 FL
MONOCYTES # BLD AUTO: 0.6 K/UL
MONOCYTES NFR BLD: 8 %
NEUTROPHILS # BLD AUTO: 5.4 K/UL
NEUTROPHILS NFR BLD: 74.8 %
PLATELET # BLD AUTO: 210 K/UL
PMV BLD AUTO: 9.2 FL
POTASSIUM SERPL-SCNC: 4.1 MMOL/L
PROT SERPL-MCNC: 7.2 G/DL
RBC # BLD AUTO: 4.75 M/UL
SODIUM SERPL-SCNC: 141 MMOL/L
TROPONIN I SERPL DL<=0.01 NG/ML-MCNC: <0.006 NG/ML
TROPONIN I SERPL DL<=0.01 NG/ML-MCNC: <0.006 NG/ML
WBC # BLD AUTO: 7.16 K/UL

## 2017-09-21 PROCEDURE — 84484 ASSAY OF TROPONIN QUANT: CPT

## 2017-09-21 PROCEDURE — 96374 THER/PROPH/DIAG INJ IV PUSH: CPT

## 2017-09-21 PROCEDURE — 94761 N-INVAS EAR/PLS OXIMETRY MLT: CPT

## 2017-09-21 PROCEDURE — 63600175 PHARM REV CODE 636 W HCPCS: Performed by: EMERGENCY MEDICINE

## 2017-09-21 PROCEDURE — 25000003 PHARM REV CODE 250: Performed by: STUDENT IN AN ORGANIZED HEALTH CARE EDUCATION/TRAINING PROGRAM

## 2017-09-21 PROCEDURE — 83880 ASSAY OF NATRIURETIC PEPTIDE: CPT

## 2017-09-21 PROCEDURE — 99285 EMERGENCY DEPT VISIT HI MDM: CPT | Mod: 25

## 2017-09-21 PROCEDURE — 12000002 HC ACUTE/MED SURGE SEMI-PRIVATE ROOM

## 2017-09-21 PROCEDURE — 80053 COMPREHEN METABOLIC PANEL: CPT

## 2017-09-21 PROCEDURE — 99285 EMERGENCY DEPT VISIT HI MDM: CPT | Mod: ,,, | Performed by: EMERGENCY MEDICINE

## 2017-09-21 PROCEDURE — 93010 ELECTROCARDIOGRAM REPORT: CPT | Mod: ,,, | Performed by: INTERNAL MEDICINE

## 2017-09-21 PROCEDURE — 96375 TX/PRO/DX INJ NEW DRUG ADDON: CPT

## 2017-09-21 PROCEDURE — 93010 ELECTROCARDIOGRAM REPORT: CPT | Mod: 76,,, | Performed by: INTERNAL MEDICINE

## 2017-09-21 PROCEDURE — 63600175 PHARM REV CODE 636 W HCPCS: Performed by: STUDENT IN AN ORGANIZED HEALTH CARE EDUCATION/TRAINING PROGRAM

## 2017-09-21 PROCEDURE — 85025 COMPLETE CBC W/AUTO DIFF WBC: CPT

## 2017-09-21 PROCEDURE — 93005 ELECTROCARDIOGRAM TRACING: CPT

## 2017-09-21 RX ORDER — ONDANSETRON 2 MG/ML
4 INJECTION INTRAMUSCULAR; INTRAVENOUS
Status: COMPLETED | OUTPATIENT
Start: 2017-09-21 | End: 2017-09-21

## 2017-09-21 RX ORDER — CITALOPRAM 20 MG/1
20 TABLET, FILM COATED ORAL DAILY
Status: DISCONTINUED | OUTPATIENT
Start: 2017-09-22 | End: 2017-09-23 | Stop reason: HOSPADM

## 2017-09-21 RX ORDER — IBUPROFEN 200 MG
16 TABLET ORAL
Status: DISCONTINUED | OUTPATIENT
Start: 2017-09-22 | End: 2017-09-23 | Stop reason: HOSPADM

## 2017-09-21 RX ORDER — MORPHINE SULFATE 15 MG/1
60 TABLET, FILM COATED, EXTENDED RELEASE ORAL EVERY 12 HOURS
Status: DISCONTINUED | OUTPATIENT
Start: 2017-09-22 | End: 2017-09-21

## 2017-09-21 RX ORDER — ATORVASTATIN CALCIUM 20 MG/1
20 TABLET, FILM COATED ORAL NIGHTLY
Status: DISCONTINUED | OUTPATIENT
Start: 2017-09-22 | End: 2017-09-23 | Stop reason: HOSPADM

## 2017-09-21 RX ORDER — AMOXICILLIN 250 MG
1 CAPSULE ORAL 2 TIMES DAILY PRN
Status: DISCONTINUED | OUTPATIENT
Start: 2017-09-22 | End: 2017-09-22

## 2017-09-21 RX ORDER — HYDROXYZINE HYDROCHLORIDE 25 MG/1
25 TABLET, FILM COATED ORAL 3 TIMES DAILY PRN
Status: DISCONTINUED | OUTPATIENT
Start: 2017-09-22 | End: 2017-09-23 | Stop reason: HOSPADM

## 2017-09-21 RX ORDER — SODIUM,POTASSIUM PHOSPHATES 280-250MG
2 POWDER IN PACKET (EA) ORAL
Status: DISCONTINUED | OUTPATIENT
Start: 2017-09-22 | End: 2017-09-23 | Stop reason: HOSPADM

## 2017-09-21 RX ORDER — HYDROMORPHONE HYDROCHLORIDE 1 MG/ML
2 INJECTION, SOLUTION INTRAMUSCULAR; INTRAVENOUS; SUBCUTANEOUS
Status: DISCONTINUED | OUTPATIENT
Start: 2017-09-21 | End: 2017-09-22

## 2017-09-21 RX ORDER — GLUCAGON 1 MG
1 KIT INJECTION
Status: DISCONTINUED | OUTPATIENT
Start: 2017-09-22 | End: 2017-09-23 | Stop reason: HOSPADM

## 2017-09-21 RX ORDER — LEVOTHYROXINE SODIUM 75 UG/1
150 TABLET ORAL DAILY
Status: DISCONTINUED | OUTPATIENT
Start: 2017-09-22 | End: 2017-09-23 | Stop reason: HOSPADM

## 2017-09-21 RX ORDER — ACETAMINOPHEN 325 MG/1
650 TABLET ORAL EVERY 4 HOURS PRN
Status: DISCONTINUED | OUTPATIENT
Start: 2017-09-22 | End: 2017-09-23 | Stop reason: HOSPADM

## 2017-09-21 RX ORDER — NALOXONE HYDROCHLORIDE 1 MG/ML
1 INJECTION INTRAMUSCULAR; INTRAVENOUS; SUBCUTANEOUS
Status: DISCONTINUED | OUTPATIENT
Start: 2017-09-22 | End: 2017-09-23 | Stop reason: HOSPADM

## 2017-09-21 RX ORDER — RAMELTEON 8 MG/1
8 TABLET ORAL NIGHTLY PRN
Status: DISCONTINUED | OUTPATIENT
Start: 2017-09-22 | End: 2017-09-23 | Stop reason: HOSPADM

## 2017-09-21 RX ORDER — IPRATROPIUM BROMIDE AND ALBUTEROL SULFATE 2.5; .5 MG/3ML; MG/3ML
3 SOLUTION RESPIRATORY (INHALATION) EVERY 4 HOURS PRN
Status: DISCONTINUED | OUTPATIENT
Start: 2017-09-22 | End: 2017-09-23 | Stop reason: HOSPADM

## 2017-09-21 RX ORDER — CLONIDINE HYDROCHLORIDE 0.1 MG/1
0.1 TABLET ORAL
Status: COMPLETED | OUTPATIENT
Start: 2017-09-21 | End: 2017-09-21

## 2017-09-21 RX ORDER — ASPIRIN 81 MG/1
81 TABLET ORAL DAILY
Status: DISCONTINUED | OUTPATIENT
Start: 2017-09-22 | End: 2017-09-23 | Stop reason: HOSPADM

## 2017-09-21 RX ORDER — GABAPENTIN 300 MG/1
300 CAPSULE ORAL 3 TIMES DAILY
Status: DISCONTINUED | OUTPATIENT
Start: 2017-09-22 | End: 2017-09-23 | Stop reason: HOSPADM

## 2017-09-21 RX ORDER — ASPIRIN 325 MG
325 TABLET ORAL
Status: COMPLETED | OUTPATIENT
Start: 2017-09-21 | End: 2017-09-21

## 2017-09-21 RX ORDER — IBUPROFEN 200 MG
24 TABLET ORAL
Status: DISCONTINUED | OUTPATIENT
Start: 2017-09-22 | End: 2017-09-23 | Stop reason: HOSPADM

## 2017-09-21 RX ORDER — MORPHINE SULFATE 4 MG/ML
8 INJECTION, SOLUTION INTRAMUSCULAR; INTRAVENOUS
Status: COMPLETED | OUTPATIENT
Start: 2017-09-21 | End: 2017-09-21

## 2017-09-21 RX ORDER — HYDROMORPHONE HYDROCHLORIDE 4 MG/1
4 TABLET ORAL EVERY 6 HOURS PRN
Status: ON HOLD | COMMUNITY
End: 2017-09-23

## 2017-09-21 RX ORDER — ENOXAPARIN SODIUM 100 MG/ML
40 INJECTION SUBCUTANEOUS EVERY 24 HOURS
Status: DISCONTINUED | OUTPATIENT
Start: 2017-09-22 | End: 2017-09-23 | Stop reason: HOSPADM

## 2017-09-21 RX ORDER — CLONIDINE HYDROCHLORIDE 0.1 MG/1
0.1 TABLET ORAL 3 TIMES DAILY
Status: DISCONTINUED | OUTPATIENT
Start: 2017-09-22 | End: 2017-09-23 | Stop reason: HOSPADM

## 2017-09-21 RX ORDER — HYDROMORPHONE HYDROCHLORIDE 1 MG/ML
1 INJECTION, SOLUTION INTRAMUSCULAR; INTRAVENOUS; SUBCUTANEOUS
Status: DISCONTINUED | OUTPATIENT
Start: 2017-09-22 | End: 2017-09-22

## 2017-09-21 RX ORDER — CHOLECALCIFEROL (VITAMIN D3) 25 MCG
2000 TABLET ORAL NIGHTLY
Status: DISCONTINUED | OUTPATIENT
Start: 2017-09-22 | End: 2017-09-23 | Stop reason: HOSPADM

## 2017-09-21 RX ORDER — ONDANSETRON 2 MG/ML
4 INJECTION INTRAMUSCULAR; INTRAVENOUS EVERY 6 HOURS PRN
Status: DISCONTINUED | OUTPATIENT
Start: 2017-09-22 | End: 2017-09-23 | Stop reason: HOSPADM

## 2017-09-21 RX ORDER — CALCIUM CARBONATE 500(1250)
500 TABLET ORAL 2 TIMES DAILY
Status: DISCONTINUED | OUTPATIENT
Start: 2017-09-22 | End: 2017-09-23 | Stop reason: HOSPADM

## 2017-09-21 RX ORDER — KETOROLAC TROMETHAMINE 30 MG/ML
10 INJECTION, SOLUTION INTRAMUSCULAR; INTRAVENOUS
Status: COMPLETED | OUTPATIENT
Start: 2017-09-21 | End: 2017-09-22

## 2017-09-21 RX ADMIN — ONDANSETRON 4 MG: 2 INJECTION INTRAMUSCULAR; INTRAVENOUS at 09:09

## 2017-09-21 RX ADMIN — MORPHINE SULFATE 8 MG: 4 INJECTION INTRAVENOUS at 09:09

## 2017-09-21 RX ADMIN — CLONIDINE HYDROCHLORIDE 0.1 MG: 0.1 TABLET ORAL at 10:09

## 2017-09-21 RX ADMIN — ASPIRIN 325 MG ORAL TABLET 325 MG: 325 PILL ORAL at 08:09

## 2017-09-22 PROBLEM — R51.9 HEADACHE: Status: ACTIVE | Noted: 2017-09-22

## 2017-09-22 LAB
ALBUMIN SERPL BCP-MCNC: 3.5 G/DL
ALP SERPL-CCNC: 155 U/L
ALT SERPL W/O P-5'-P-CCNC: 21 U/L
ANION GAP SERPL CALC-SCNC: 8 MMOL/L
AST SERPL-CCNC: 24 U/L
BACTERIA #/AREA URNS AUTO: ABNORMAL /HPF
BASOPHILS # BLD AUTO: 0 K/UL
BASOPHILS NFR BLD: 0 %
BILIRUB SERPL-MCNC: 0.6 MG/DL
BILIRUB UR QL STRIP: NEGATIVE
BUN SERPL-MCNC: 14 MG/DL
CALCIUM SERPL-MCNC: 9.1 MG/DL
CHLORIDE SERPL-SCNC: 105 MMOL/L
CLARITY UR REFRACT.AUTO: ABNORMAL
CO2 SERPL-SCNC: 27 MMOL/L
COLOR UR AUTO: YELLOW
CREAT SERPL-MCNC: 0.8 MG/DL
DIFFERENTIAL METHOD: ABNORMAL
EOSINOPHIL # BLD AUTO: 0 K/UL
EOSINOPHIL NFR BLD: 0 %
ERYTHROCYTE [DISTWIDTH] IN BLOOD BY AUTOMATED COUNT: 13.2 %
EST. GFR  (AFRICAN AMERICAN): >60 ML/MIN/1.73 M^2
EST. GFR  (NON AFRICAN AMERICAN): >60 ML/MIN/1.73 M^2
GLUCOSE SERPL-MCNC: 136 MG/DL
GLUCOSE UR QL STRIP: ABNORMAL
HCT VFR BLD AUTO: 40.8 %
HGB BLD-MCNC: 13.5 G/DL
HGB UR QL STRIP: ABNORMAL
KETONES UR QL STRIP: NEGATIVE
LEUKOCYTE ESTERASE UR QL STRIP: ABNORMAL
LYMPHOCYTES # BLD AUTO: 1 K/UL
LYMPHOCYTES NFR BLD: 11 %
MAGNESIUM SERPL-MCNC: 2.2 MG/DL
MCH RBC QN AUTO: 27.8 PG
MCHC RBC AUTO-ENTMCNC: 33.1 G/DL
MCV RBC AUTO: 84 FL
MICROSCOPIC COMMENT: ABNORMAL
MONOCYTES # BLD AUTO: 0.4 K/UL
MONOCYTES NFR BLD: 4.4 %
NEUTROPHILS # BLD AUTO: 7.3 K/UL
NEUTROPHILS NFR BLD: 84.4 %
NITRITE UR QL STRIP: POSITIVE
NON-SQ EPI CELLS #/AREA URNS AUTO: 1 /HPF
PH UR STRIP: 5 [PH] (ref 5–8)
PHOSPHATE SERPL-MCNC: 2.9 MG/DL
PLATELET # BLD AUTO: 191 K/UL
PMV BLD AUTO: 9.2 FL
POTASSIUM SERPL-SCNC: 4.4 MMOL/L
PROT SERPL-MCNC: 7.3 G/DL
PROT UR QL STRIP: NEGATIVE
RBC # BLD AUTO: 4.86 M/UL
RBC #/AREA URNS AUTO: 4 /HPF (ref 0–4)
SODIUM SERPL-SCNC: 140 MMOL/L
SP GR UR STRIP: 1.03 (ref 1–1.03)
SQUAMOUS #/AREA URNS AUTO: 1 /HPF
URN SPEC COLLECT METH UR: ABNORMAL
UROBILINOGEN UR STRIP-ACNC: NEGATIVE EU/DL
WBC # BLD AUTO: 8.66 K/UL
WBC #/AREA URNS AUTO: 23 /HPF (ref 0–5)

## 2017-09-22 PROCEDURE — 97530 THERAPEUTIC ACTIVITIES: CPT

## 2017-09-22 PROCEDURE — 81001 URINALYSIS AUTO W/SCOPE: CPT

## 2017-09-22 PROCEDURE — 87186 SC STD MICRODIL/AGAR DIL: CPT

## 2017-09-22 PROCEDURE — 99223 1ST HOSP IP/OBS HIGH 75: CPT | Mod: ,,, | Performed by: INTERNAL MEDICINE

## 2017-09-22 PROCEDURE — 97161 PT EVAL LOW COMPLEX 20 MIN: CPT

## 2017-09-22 PROCEDURE — 25000003 PHARM REV CODE 250: Performed by: INTERNAL MEDICINE

## 2017-09-22 PROCEDURE — 87088 URINE BACTERIA CULTURE: CPT

## 2017-09-22 PROCEDURE — 80053 COMPREHEN METABOLIC PANEL: CPT

## 2017-09-22 PROCEDURE — 36415 COLL VENOUS BLD VENIPUNCTURE: CPT

## 2017-09-22 PROCEDURE — 63600175 PHARM REV CODE 636 W HCPCS: Performed by: STUDENT IN AN ORGANIZED HEALTH CARE EDUCATION/TRAINING PROGRAM

## 2017-09-22 PROCEDURE — 25000003 PHARM REV CODE 250: Performed by: STUDENT IN AN ORGANIZED HEALTH CARE EDUCATION/TRAINING PROGRAM

## 2017-09-22 PROCEDURE — 63600175 PHARM REV CODE 636 W HCPCS: Performed by: INTERNAL MEDICINE

## 2017-09-22 PROCEDURE — 85025 COMPLETE CBC W/AUTO DIFF WBC: CPT

## 2017-09-22 PROCEDURE — 20600001 HC STEP DOWN PRIVATE ROOM

## 2017-09-22 PROCEDURE — 87086 URINE CULTURE/COLONY COUNT: CPT

## 2017-09-22 PROCEDURE — 84100 ASSAY OF PHOSPHORUS: CPT

## 2017-09-22 PROCEDURE — 83735 ASSAY OF MAGNESIUM: CPT

## 2017-09-22 PROCEDURE — 97165 OT EVAL LOW COMPLEX 30 MIN: CPT

## 2017-09-22 PROCEDURE — 87077 CULTURE AEROBIC IDENTIFY: CPT

## 2017-09-22 RX ORDER — BUTALBITAL, ACETAMINOPHEN AND CAFFEINE 50; 325; 40 MG/1; MG/1; MG/1
1 TABLET ORAL ONCE
Status: COMPLETED | OUTPATIENT
Start: 2017-09-22 | End: 2017-09-22

## 2017-09-22 RX ORDER — CYCLOBENZAPRINE HCL 5 MG
5 TABLET ORAL 3 TIMES DAILY PRN
Status: DISCONTINUED | OUTPATIENT
Start: 2017-09-22 | End: 2017-09-23 | Stop reason: HOSPADM

## 2017-09-22 RX ORDER — BUTALBITAL, ACETAMINOPHEN AND CAFFEINE 50; 325; 40 MG/1; MG/1; MG/1
1 TABLET ORAL EVERY 6 HOURS PRN
Status: DISCONTINUED | OUTPATIENT
Start: 2017-09-22 | End: 2017-09-23 | Stop reason: HOSPADM

## 2017-09-22 RX ORDER — PROCHLORPERAZINE EDISYLATE 5 MG/ML
10 INJECTION INTRAMUSCULAR; INTRAVENOUS EVERY 6 HOURS PRN
Status: DISCONTINUED | OUTPATIENT
Start: 2017-09-22 | End: 2017-09-23 | Stop reason: HOSPADM

## 2017-09-22 RX ORDER — HYDROMORPHONE HYDROCHLORIDE 2 MG/1
4 TABLET ORAL EVERY 6 HOURS PRN
Status: DISCONTINUED | OUTPATIENT
Start: 2017-09-22 | End: 2017-09-23 | Stop reason: HOSPADM

## 2017-09-22 RX ORDER — AMOXICILLIN 250 MG
1 CAPSULE ORAL 2 TIMES DAILY
Status: DISCONTINUED | OUTPATIENT
Start: 2017-09-22 | End: 2017-09-23 | Stop reason: HOSPADM

## 2017-09-22 RX ADMIN — Medication 2000 UNITS: at 09:09

## 2017-09-22 RX ADMIN — HYDROMORPHONE HYDROCHLORIDE 4 MG: 2 TABLET ORAL at 09:09

## 2017-09-22 RX ADMIN — STANDARDIZED SENNA CONCENTRATE AND DOCUSATE SODIUM 1 TABLET: 8.6; 5 TABLET, FILM COATED ORAL at 09:09

## 2017-09-22 RX ADMIN — HYDROMORPHONE HYDROCHLORIDE 4 MG: 2 TABLET ORAL at 02:09

## 2017-09-22 RX ADMIN — BUTALBITAL, ACETAMINOPHEN AND CAFFEINE 1 TABLET: 50; 325; 40 TABLET ORAL at 05:09

## 2017-09-22 RX ADMIN — Medication 10 ML: at 12:09

## 2017-09-22 RX ADMIN — CYCLOBENZAPRINE HYDROCHLORIDE 5 MG: 5 TABLET, FILM COATED ORAL at 02:09

## 2017-09-22 RX ADMIN — CLONIDINE HYDROCHLORIDE 0.1 MG: 0.1 TABLET ORAL at 09:09

## 2017-09-22 RX ADMIN — CLONIDINE HYDROCHLORIDE 0.1 MG: 0.1 TABLET ORAL at 01:09

## 2017-09-22 RX ADMIN — CALCIUM 500 MG: 500 TABLET ORAL at 09:09

## 2017-09-22 RX ADMIN — ATORVASTATIN CALCIUM 20 MG: 20 TABLET, FILM COATED ORAL at 09:09

## 2017-09-22 RX ADMIN — ONDANSETRON 4 MG: 2 INJECTION INTRAMUSCULAR; INTRAVENOUS at 02:09

## 2017-09-22 RX ADMIN — ASPIRIN 81 MG: 81 TABLET, COATED ORAL at 09:09

## 2017-09-22 RX ADMIN — LEVOTHYROXINE SODIUM 150 MCG: 75 TABLET ORAL at 06:09

## 2017-09-22 RX ADMIN — HYDROMORPHONE HYDROCHLORIDE 2 MG: 1 INJECTION, SOLUTION INTRAMUSCULAR; INTRAVENOUS; SUBCUTANEOUS at 03:09

## 2017-09-22 RX ADMIN — KETOROLAC TROMETHAMINE 10 MG: 30 INJECTION, SOLUTION INTRAMUSCULAR at 12:09

## 2017-09-22 RX ADMIN — ENOXAPARIN SODIUM 40 MG: 100 INJECTION SUBCUTANEOUS at 04:09

## 2017-09-22 RX ADMIN — MORPHINE SULFATE 75 MG: 60 TABLET, EXTENDED RELEASE ORAL at 09:09

## 2017-09-22 RX ADMIN — GABAPENTIN 300 MG: 300 CAPSULE ORAL at 01:09

## 2017-09-22 RX ADMIN — HYDROMORPHONE HYDROCHLORIDE 1 MG: 1 INJECTION, SOLUTION INTRAMUSCULAR; INTRAVENOUS; SUBCUTANEOUS at 12:09

## 2017-09-22 RX ADMIN — GABAPENTIN 300 MG: 300 CAPSULE ORAL at 09:09

## 2017-09-22 RX ADMIN — CLONIDINE HYDROCHLORIDE 0.1 MG: 0.1 TABLET ORAL at 05:09

## 2017-09-22 RX ADMIN — Medication 10 ML: at 07:09

## 2017-09-22 RX ADMIN — GABAPENTIN 300 MG: 300 CAPSULE ORAL at 05:09

## 2017-09-22 RX ADMIN — CITALOPRAM HYDROBROMIDE 20 MG: 20 TABLET ORAL at 09:09

## 2017-09-22 RX ADMIN — CYCLOBENZAPRINE HYDROCHLORIDE 5 MG: 5 TABLET, FILM COATED ORAL at 09:09

## 2017-09-22 RX ADMIN — HYDROMORPHONE HYDROCHLORIDE 2 MG: 1 INJECTION, SOLUTION INTRAMUSCULAR; INTRAVENOUS; SUBCUTANEOUS at 07:09

## 2017-09-22 NOTE — PT/OT/SLP EVAL
Physical Therapy   Evaluation/Discharge Summary  Rebecca Crain   [id number]    PT Received On: 17   PT Start Time: 1328   PT Stop Time: 1340   PT Total Time (min): 12 min     Charges: Evaluation 12      Discharge recommendations: Home     Equipment recommendations: none    Barriers to discharge: None    Assessment: Rebecca Crain is a 61 y.o. female with a medical diagnosis of Chest pain.  Pt is functioning at a high level and does not require further acute PT services at this time.  Please reconsult if situation changes.      Rehab identified problems: none    PLAN:    D/C Acute PT    Plan of Care reviewed with: patient    General Precautions: Standard, fall  Orthopedic Precautions : N/A  Braces: N/A    Past Medical History:   Diagnosis Date    Adjustment disorder with depressed mood 2017    Allergy     Anxiety     Asthma     seasonal    Blood transfusion         Breast cancer     stage IIB carcinoma of the right breast, ER positive with a low Oncotype score    Chemotherapy-induced neuropathy 2016    Depression     Diverticulosis     Falls frequently 2014    Hepatic cyst 2014    Hx of psychiatric care     Hypercholesteremia     Hypertension     Lung nodule 2014    Lymphedema     S/P right breast mastectomy    Metastatic bone cancer     MVP (mitral valve prolapse)     Osteoporosis, unspecified 2014    Psychiatric problem     Therapy     Thyroid disease     Hasimoto disease     Past Surgical History:   Procedure Laterality Date    BREAST LUMPECTOMY Right     X 2    BREAST RECONSTRUCTION  2013    X 2     SECTION      x2    COLONOSCOPY N/A 2016    Procedure: COLONOSCOPY;  Surgeon: Miguel Vu MD;  Location: [address];  Service: Endoscopy;  Laterality: N/A;  MRSA-at time of scheduling pending results on 2016    endometriosis      EYE SURGERY      RK bilateral     GANGLION CYST EXCISION      right index finger  "   HEMORRHOID SURGERY  1995    KNEE SURGERY Bilateral     ortho    MASTECTOMY  1/2013    Bilateral     TONSILLECTOMY  1962    TUBAL LIGATION         Does this patient have any cultural, spiritual, Latter day conflicts given the current situation? Patient has no barriers to learning. Patient verbalizes understanding of his/her program and goals and demonstrates them correctly. No cultural, spiritual, or educational needs identified.    Subjective:  Communicated with RN prior to evaluation, appropriate to see for evaluation.    Pt found supine upon PT entry to room, agreeable to evaluation.    Patient comments: "I move around alright"    Pain Rating 1: 0/10   Pain Rating Post-Intervention 1: 0/10    Living Environment:  Living Environment Comment: Pt reported that she lives in [place], [place], 27 y/o daughter lives with her. Home is 2 SH with threshold to enter. Walk in shower with suction grab bar. She reported she is active at home with home task (laundry, mopping, cleaning) with rest breaks as needed. (daughter works during the day, but is home with her at night. Sister reported family assistance)  Equipment Currently Used at Home: Rolling Walker (uses in the community)    Objective:    Cognitive Exam:  Patient is oriented to Person, Place, Time and Situation. Patient follows 100% of single-step commands.    Physical Exam:  Postural examination/scapula alignment: No postural abnormalities identified    Skin integrity: Visible skin intact  Edema: None noted     Sensation:   Intact    Lower Extremity Range of Motion:  Right Lower Extremity: WFL  Left Lower Extremity: WFL    Lower Extremity Strength:  Right Lower Extremity: WFL  Left Lower Extremity: WFL    Functional Mobility:    Bed Mobility:  Scooting/Bridging: Modified Independent  Supine to Sit: Modified Independent  Sit to Supine: Modified Independent    Transfers:    Sit <> Stand Assistance: Modified Independent  Sit <> Stand Assistive Device: No Assistive " Device    Ambulation:    Gait Distance: ~200 feet with no LOB or SOB   Assistance 1: Stand by Assistance, Supervision    Therapeutic Activities and Exercises:  PT evaluation performed.  White board updated.  Pt educated on role of PT, safety with functional mobility  Pt encouraged to ambulate 3x/daily with nursing staff or family.        Pt safe to transfer with RN staff    Patient left supine with all lines intact, call button in reach and RN notified.    AM-PAC 6 CLICK MOBILITY  Turning over in bed (including adjusting bedclothes, sheets and blankets)?: 4  Sitting down on and standing up from a chair with arms (e.g., wheelchair, bedside commode, etc.): 4  Moving from lying on back to sitting on the side of the bed?: 4  Moving to and from a bed to a chair (including a wheelchair)?: 4  Need to walk in hospital room?: 3  Climbing 3-5 steps with a railing?: 3  Total Score: 22    AM-PAC Raw Score   CMS G-Code Modifier   Level of Impairment   Assistance     6   CN   100%         Total / Unable   7 - 9   CM   80 - 100%   Maximal Assist     10 - 14   CL   60 - 80%   Moderate Assist     15 - 19   CK   40 - 60%   Moderate Assist     20 - 22   CJ   20 - 40%   Minimal Assist     23   CI   1-20%         SBA / CGA     24 CH   0%   Independent/Modified Independent       GOALS:   None on file    Oscar Jones, PT, DPT  9/22/2017   [phone]

## 2017-09-22 NOTE — ASSESSMENT & PLAN NOTE
"- troponin and BNP wnl  - CXR unremarkable  - CT chest shows "New sclerotic lesions involving the T4 and T10 vertebral bodies, worrisome for metastatic disease. Possible sclerotic lesion involving the right 5th rib anteriorly."  - consider bone scan  - increase home dose MS contin from 60 mg BID to 75 mg BID, titrate up prn  - dilaudid IV prn  "

## 2017-09-22 NOTE — HPI
61 YOF with breast cancer with mets to spine, osteoporosis, HTN, hypothyroidism presents for evaluation of chest wall and back pain for the past 6 weeks. R sided CP along anterior rib cage, constant, associated with R sided back spasms with movement, not relieved by MS contin 60 mg BID and PRN dilaudid 4 mg - taken BID - outpatient. Onset of symptoms coincided with starting afinitor and exemestane in 8/2017. Chest pain and back spasms improved when she discontinued afinitor and restarted when she resumed it. She denies trauma to chest wall and back. ROS notable for mouth sores over the past month that resolved with duke's solution and chronic nausea. She also reports headache for the last two days that is described as throbbing pain that wraps from posterior head to temples. She denies vision changes, weakness, chills, dysuria. She reports mild low grade fever at home at night (tmax 99 degrees).    Oncological history:  In 2013 she was diagnosed with stage IIB carcinoma of the right breast, ER positive with a low Oncotype score.  She was enrolled on protocol  and randomized to endocrine therapy alone.  She was tried on all 3 aromatase inhibitors and had itching and rash to all of them.  In August 2013 she was started on tamoxifen.  She was found to have  bone metastasis in May 2015, 2015.  A subsequent bone biopsy was performed on a lesion at the T8 vertebra.   The pathology from that was consistent with metastatic breast cancer, ER +80%, [place] +80%, and HER-2 negative.      She received letrozole plus palbociclib from July 2015 until May 2016.  She also received bone protective therapy with Xgeva.      Faslodex was started in June 2016.  Bone scan in February 2017 showed improvement, CT scans showed no new findings.     In May she had more pain.  CA 27.29 had increased to 94.     Subsequently she has undergone scans.  Bone scan from May 11 showed a new focal uptake at T4 which is felt to be possibly related to a  prior compression fraction or a Schmorl's node.  Uptake in T12 was unchanged.     CT scan of the chest abdomen and pelvis showed no evidence of lung metastasis, liver was unremarkable.

## 2017-09-22 NOTE — PLAN OF CARE
Problem: Occupational Therapy Goal  Goal: Occupational Therapy Goal  Outcome: Outcome(s) achieved Date Met: 09/22/17  No acute care OT needs. Safe for functional mobility/Completion of ADLs/self care with staff supervision at this time. Recommending outpatient for d/c planning. LUIS Pollard 9/22/2017

## 2017-09-22 NOTE — PROGRESS NOTES
TISSUE NOTE:  No breakdown noted to back, buttocks, back of head, elbows, or heels. Also, no redness found in these areas. Patient encouraged to change position frequently while in bed and ambulate when possible.     FALLS:  Patient at risk for falls at this time. Complaints of HA, SOB, and weakness at this time. Connected to a telemetry unit. Currently is not taking drugs for HTN.  Dilaudid and morphine IV administered in ED. Patient instructed to call for help when getting out of bed or needing assistance. Non-skid socks in place. Lighting adjusted for safety. Will continue to monitor.

## 2017-09-22 NOTE — PLAN OF CARE
Problem: Patient Care Overview  Goal: Plan of Care Review  Outcome: Ongoing (interventions implemented as appropriate)  Patient is AAOx4. Pt has a persistent HA from the posterior head to the temples. No medications has shown any relief. Pt is calm and cooperative. Teaching and education performed. Patient remains free from falls and injury this shift. Bed in low, locked position, environment is free of clutter, with call bell in reach. Patient encouraged to call for assistance. Patient verbalized understanding. All belongings within reach. NPO diet. No N/V throughout shift. Afebrile. C/O HA and back spasm throughout shift - administered flexeril and dilaudid IV x2. Will continue to monitor.

## 2017-09-22 NOTE — HOSPITAL COURSE
Found to be afebrile on admission. No chest wall tenderness, abdominal tenderness, CVA/paraspinal tenderness on exam. CMP and CBC unremarkable, trop negative, BNP 71, CXR negative, CT head negative. CT chest was notable for new sclerotic lesions in T4 and T10 vertebral body concerning for mets and possible R anterior 5th rib involvement. Stable T 11 compression fracture. Was given IV morphine and IV dilaudid on admission with resolution of rib/chest pain the next morning. On hospital day 2, was started on MS contin 75 mg BID (increased home dose) with PRN PO dilaudid. Narcotic requirement during first 24 hours of admission: 8 mg IV morphine, 5 mg IV dilaudid, 8 mg PO dilaudid, 150 mg PO long acting morphine. Narcotic requirement during second 24 hours of admission: 16 mg PO dilaudid, 150 mg PO long acting morphine.    Back pain/spasm controlled with flexeril 5 mg PRN.  HA not improved with toradol x 1 but resolved with fiorecet x 1 on admission.  Nausea resolved with zofran 8 mg IV x 1 on admission. Tolerated regular diet without n/v throughout hospital course.    Reported burning pain with urination and hx of UTI 2 years ago. UA on hospital day 1 showed UTI. Started on 3 day course of cipro on day of discharge. Urine culture pending.    Participated in PT/OT without difficulty. Per OT evaluation, pt would benefit from outpatient OT.

## 2017-09-22 NOTE — PLAN OF CARE
Problem: Physical Therapy Goal  Goal: Physical Therapy Goal  Outcome: Outcome(s) achieved Date Met: 09/22/17  PT eval complete.  No further needs.

## 2017-09-22 NOTE — SUBJECTIVE & OBJECTIVE
Oncology Treatment Plan:   OP FULVESTRANT (LOADING & MAINTENANCE)    Medications:  Continuous Infusions:   Scheduled Meds:   aspirin  81 mg Oral Daily    atorvastatin  20 mg Oral QHS    calcium carbonate  500 mg Oral BID    citalopram  20 mg Oral Daily    cloNIDine  0.1 mg Oral TID    duke's soln (benadryl 30 mL, mylanta 30 mL, lidocane 30 mL, nystatin 30 mL) 120 mL  10 mL Oral QID    enoxaparin  40 mg Subcutaneous Daily    gabapentin  300 mg Oral TID    levothyroxine  150 mcg Oral Daily    morphine  75 mg Oral Q12H    vitamin D  2,000 Units Oral QHS     PRN Meds:acetaminophen, albuterol-ipratropium 2.5mg-0.5mg/3mL, dextrose 50%, dextrose 50%, glucagon (human recombinant), glucose, glucose, HYDROmorphone, HYDROmorphone, hydrOXYzine HCl, naloxone, ondansetron, potassium, sodium phosphates, potassium, sodium phosphates, ramelteon, senna-docusate 8.6-50 mg     Review of patient's allergies indicates:   Allergen Reactions    Adhesive Rash     Tape, Paper tape is OK.    Codeine Itching     Itching very bad to upper body only.    Demerol [meperidine] Itching     To upper body only    Iodine and iodide containing products Anaphylaxis    Penicillins      Ulcers in mouth.    Arimidex [anastrozole] Hives    Aromasin [exemestane]     Clindamycin Itching and Rash        Past Medical History:   Diagnosis Date    Adjustment disorder with depressed mood 2/7/2017    Allergy     Anxiety     Asthma     seasonal    Blood transfusion     1981    Breast cancer 2013    stage IIB carcinoma of the right breast, ER positive with a low Oncotype score    Chemotherapy-induced neuropathy 7/26/2016    Depression     Diverticulosis     Falls frequently 6/18/2014    Hepatic cyst 1/30/2014    Hx of psychiatric care     Hypercholesteremia     Hypertension     Lung nodule 1/14 1/30/2014    Lymphedema     S/P right breast mastectomy    Metastatic bone cancer 2015    MVP (mitral valve prolapse)     Osteoporosis,  unspecified 2014    Psychiatric problem     Therapy     Thyroid disease     Hasimoto disease     Past Surgical History:   Procedure Laterality Date    BREAST LUMPECTOMY Right     X 2    BREAST RECONSTRUCTION  2013    X 2     SECTION      x2    COLONOSCOPY N/A 2016    Procedure: COLONOSCOPY;  Surgeon: Miguel Vu MD;  Location: [facility] ([address] [place] [address]);  Service: Endoscopy;  Laterality: N/A;  MRSA-at time of scheduling pending results on 2016    endometriosis      EYE SURGERY      RK bilateral     GANGLION CYST EXCISION      right index finger    HEMORRHOID SURGERY      KNEE SURGERY Bilateral     ortho    MASTECTOMY  2013    Bilateral     TONSILLECTOMY  196    TUBAL LIGATION       Family History     Problem Relation (Age of Onset)    ADD / ADHD Daughter    Arthritis Brother    COPD Paternal Grandfather    Cancer Mother, Father, Paternal Grandmother (94)    Crohn's disease Son    Depression Sister, Brother, Sister, Sister    Heart disease Sister    Hypertension Mother    Melanoma Mother    Ovarian cancer Paternal Grandmother    Physical abuse Father    Stroke Mother        Social History Main Topics    Smoking status: Never Smoker    Smokeless tobacco: Never Used    Alcohol use No    Drug use: No    Sexual activity: Not Currently     Birth control/ protection: Post-menopausal       Review of Systems   Constitutional: Positive for fatigue and fever. Negative for chills.   Eyes: Negative for visual disturbance.   Respiratory: Positive for shortness of breath. Negative for cough.    Cardiovascular: Positive for chest pain. Negative for palpitations.   Gastrointestinal: Positive for abdominal pain, constipation and nausea. Negative for diarrhea and vomiting.   Genitourinary: Negative for dysuria.   Musculoskeletal: Positive for arthralgias, back pain, gait problem and myalgias.   Neurological: Positive for headaches. Negative for weakness.     Objective:     Vital Signs (Most  Recent):  Temp: 98.4 °F (36.9 °C) (09/21/17 1859)  Pulse: 71 (09/22/17 0008)  Resp: 18 (09/22/17 0008)  BP: (!) 161/72 (09/22/17 0008)  SpO2: 96 % (09/22/17 0008) Vital Signs (24h Range):  Temp:  [98.4 °F (36.9 °C)] 98.4 °F (36.9 °C)  Pulse:  [71-87] 71  Resp:  [15-22] 18  SpO2:  [92 %-99 %] 96 %  BP: (152-227)/(72-99) 161/72     Weight: 71.7 kg (158 lb)  Body mass index is 27.12 kg/m².  Body surface area is 1.8 meters squared.    No intake or output data in the 24 hours ending 09/22/17 0033    Physical Exam   Constitutional: She is oriented to person, place, and time. She appears well-developed and well-nourished. She appears distressed.   HENT:   Head: Normocephalic and atraumatic.   Eyes: Conjunctivae are normal. No scleral icterus.   Neck: Normal range of motion. Neck supple.   Cardiovascular: Normal rate and regular rhythm.    Pulmonary/Chest: Effort normal and breath sounds normal. No respiratory distress. She has no wheezes. She has no rales.   Abdominal: Soft. Bowel sounds are normal. She exhibits no distension. There is no tenderness.   Musculoskeletal: She exhibits no edema or tenderness.   No point tenderness along spine  Chest wall nontender    Neurological: She is alert and oriented to person, place, and time.   Skin: Skin is warm and dry. She is not diaphoretic.   Vitals reviewed.      Significant Labs:   All pertinent labs from the last 24 hours have been reviewed.    Diagnostic Results:  I have reviewed all pertinent imaging results/findings within the past 24 hours.

## 2017-09-22 NOTE — ED TRIAGE NOTES
Reports chest pain x 6 weeks, to (r) side of chest and back. States it started on (l) and moved to (r) side. Dmitriy  10/10. (+) SOB

## 2017-09-22 NOTE — H&P
[facility]  Hematology/Oncology  H&P    Patient Name: Rebecca Crain  MRN: [id number]  Admission Date: 9/21/2017  Code Status: Full Code   Attending Provider: Maxwell Townsend MD  Primary Care Physician: Colleen Valero MD  Principal Problem:Chest pain    Subjective:     HPI: 61 YOF with breast cancer with mets to spine, HTN, hypothyroid, and osteoporosis presenting for evaluation of chest wall and back pain not controlled by home medications. Patient reports both the CP and back pain began 6 weeks ago when she started taking a new chemo drug (afinitor). She also noted new dyspnea that began at the same time as the chest and back pain. She also reports headache for the last two days that is described as throbbing pain that wraps from posterior head to temples. She denies vision changes, weakness, chills, dysuria. She reports mild low grade fever at home at night (tmax 99 degrees).  chest and back pain are constant but become much worse with movement. Reports movement causes back muscle spasms. She denies trauma to chest wall and back    Oncology Treatment Plan:   OP FULVESTRANT (LOADING & MAINTENANCE)    Medications:  Continuous Infusions:   Scheduled Meds:   aspirin  81 mg Oral Daily    atorvastatin  20 mg Oral QHS    calcium carbonate  500 mg Oral BID    citalopram  20 mg Oral Daily    cloNIDine  0.1 mg Oral TID    duke's soln (benadryl 30 mL, mylanta 30 mL, lidocane 30 mL, nystatin 30 mL) 120 mL  10 mL Oral QID    enoxaparin  40 mg Subcutaneous Daily    gabapentin  300 mg Oral TID    levothyroxine  150 mcg Oral Daily    morphine  75 mg Oral Q12H    vitamin D  2,000 Units Oral QHS     PRN Meds:acetaminophen, albuterol-ipratropium 2.5mg-0.5mg/3mL, dextrose 50%, dextrose 50%, glucagon (human recombinant), glucose, glucose, HYDROmorphone, HYDROmorphone, hydrOXYzine HCl, naloxone, ondansetron, potassium, sodium phosphates, potassium, sodium phosphates, ramelteon, senna-docusate 8.6-50 mg      Review of patient's allergies indicates:   Allergen Reactions    Adhesive Rash     Tape, Paper tape is OK.    Codeine Itching     Itching very bad to upper body only.    Demerol [meperidine] Itching     To upper body only    Iodine and iodide containing products Anaphylaxis    Penicillins      Ulcers in mouth.    Arimidex [anastrozole] Hives    Aromasin [exemestane]     Clindamycin Itching and Rash        Past Medical History:   Diagnosis Date    Adjustment disorder with depressed mood 2017    Allergy     Anxiety     Asthma     seasonal    Blood transfusion     1981    Breast cancer     stage IIB carcinoma of the right breast, ER positive with a low Oncotype score    Chemotherapy-induced neuropathy 2016    Depression     Diverticulosis     Falls frequently 2014    Hepatic cyst 2014    Hx of psychiatric care     Hypercholesteremia     Hypertension     Lung nodule 2014    Lymphedema     S/P right breast mastectomy    Metastatic bone cancer     MVP (mitral valve prolapse)     Osteoporosis, unspecified 2014    Psychiatric problem     Therapy     Thyroid disease     Hasimoto disease     Past Surgical History:   Procedure Laterality Date    BREAST LUMPECTOMY Right     X 2    BREAST RECONSTRUCTION  2013    X 2     SECTION      x2    COLONOSCOPY N/A 2016    Procedure: COLONOSCOPY;  Surgeon: Miguel Vu MD;  Location: [address]);  Service: Endoscopy;  Laterality: N/A;  MRSA-at time of scheduling pending results on 2016    endometriosis      EYE SURGERY      RK bilateral     GANGLION CYST EXCISION      right index finger    HEMORRHOID SURGERY      KNEE SURGERY Bilateral     ortho    MASTECTOMY  2013    Bilateral     TONSILLECTOMY      TUBAL LIGATION       Family History     Problem Relation (Age of Onset)    ADD / ADHD Daughter    Arthritis Brother    COPD Paternal Grandfather    Cancer Mother,  Father, Paternal Grandmother (94)    Crohn's disease Son    Depression Sister, Brother, Sister, Sister    Heart disease Sister    Hypertension Mother    Melanoma Mother    Ovarian cancer Paternal Grandmother    Physical abuse Father    Stroke Mother        Social History Main Topics    Smoking status: Never Smoker    Smokeless tobacco: Never Used    Alcohol use No    Drug use: No    Sexual activity: Not Currently     Birth control/ protection: Post-menopausal       Review of Systems   Constitutional: Positive for fatigue and fever. Negative for chills.   Eyes: Negative for visual disturbance.   Respiratory: Positive for shortness of breath. Negative for cough.    Cardiovascular: Positive for chest pain. Negative for palpitations.   Gastrointestinal: Positive for abdominal pain, constipation and nausea. Negative for diarrhea and vomiting.   Genitourinary: Negative for dysuria.   Musculoskeletal: Positive for arthralgias, back pain, gait problem and myalgias.   Neurological: Positive for headaches. Negative for weakness.     Objective:     Vital Signs (Most Recent):  Temp: 98.4 °F (36.9 °C) (09/21/17 1859)  Pulse: 71 (09/22/17 0008)  Resp: 18 (09/22/17 0008)  BP: (!) 161/72 (09/22/17 0008)  SpO2: 96 % (09/22/17 0008) Vital Signs (24h Range):  Temp:  [98.4 °F (36.9 °C)] 98.4 °F (36.9 °C)  Pulse:  [71-87] 71  Resp:  [15-22] 18  SpO2:  [92 %-99 %] 96 %  BP: (152-227)/(72-99) 161/72     Weight: 71.7 kg (158 lb)  Body mass index is 27.12 kg/m².  Body surface area is 1.8 meters squared.    No intake or output data in the 24 hours ending 09/22/17 0033    Physical Exam   Constitutional: She is oriented to person, place, and time. She appears well-developed and well-nourished. She appears distressed.   HENT:   Head: Normocephalic and atraumatic.   Eyes: Conjunctivae are normal. No scleral icterus.   Neck: Normal range of motion. Neck supple.   Cardiovascular: Normal rate and regular rhythm.    Pulmonary/Chest: Effort  "normal and breath sounds normal. No respiratory distress. She has no wheezes. She has no rales.   Abdominal: Soft. Bowel sounds are normal. She exhibits no distension. There is no tenderness.   Musculoskeletal: She exhibits no edema or tenderness.   No point tenderness along spine  Chest wall nontender    Neurological: She is alert and oriented to person, place, and time.   Skin: Skin is warm and dry. She is not diaphoretic.   Vitals reviewed.      Significant Labs:   All pertinent labs from the last 24 hours have been reviewed.    Diagnostic Results:  I have reviewed all pertinent imaging results/findings within the past 24 hours.    Assessment/Plan:     * Chest pain    - troponin and BNP wnl  - CXR unremarkable  - CT chest shows "New sclerotic lesions involving the T4 and T10 vertebral bodies, worrisome for metastatic disease. Possible sclerotic lesion involving the right 5th rib anteriorly."  - consider bone scan  - increase home dose MS contin from 60 mg BID to 75 mg BID, titrate up prn  - dilaudid IV prn        Adjustment disorder with depressed mood    - cont home dose cymbalta        Chemotherapy-induced neuropathy    - cont home dose gabapentin        Benign essential HTN    - cont home dose clonidine  - hold triamterene/HCTZ for now; restart prn        Gait instability    - fall precautions  - PT/OT        Constipation    - senna-doc BID; psylium daily        Acquired hypothyroidism    - cont home dose synthroid        Cancer of central portion of right female breast    - Per oncology              Karen Chandler MD  Hematology/Oncology  [facility]      "

## 2017-09-22 NOTE — ED NOTES
Pt identifiers Rebecca B Paras checked and correct  LOC: The patient is awake, alert, aware of environment with an appropriate affect. Oriented x3, speaking appropriately  APPEARANCE: Pt restless, pt is clean and well groomed, clothing properly fastened  SKIN: Skin warm, dry and intact, normal skin turgor, moist mucus membranes  RESPIRATORY: Airway is open and patent, respirations are spontaneous, even and unlabored, normal effort and rate  CARDIAC: Normal rate and rhythm, no peripheral edema noted, capillary refill < 3 seconds, bilateral radial pulses 2+  ABDOMEN: Soft, nontender, nondistended. (+) nausea  NEUROLOGIC: PERRL, facial expression is symmetrical, patient moving all extremities spontaneously, normal sensation in all extremities when touched with a finger.  Follows all commands appropriately  MUSCULOSKELETAL: No obvious deformities.

## 2017-09-22 NOTE — PLAN OF CARE
Problem: Patient Care Overview  Goal: Plan of Care Review  Outcome: Ongoing (interventions implemented as appropriate)  Patient VSS, afebrile, and without injury. Family at bedside. Fall precautions maintained; patient instructed on how to contact nurse when in need of assistance to ambulate. Pain medications and side effects reviewed with the patient. Pain controlled with extended release morphine (scheduled) and PRN dilautid tablets q6. Nausea controlled by PRN zofran. OT visited with patient today. Patient is without questions or complaints at this time; will continue to monitor.

## 2017-09-22 NOTE — PLAN OF CARE
MDR's with Dr Vargas.  Patient admitted for pain control.  On PRN pain meds and plan to adjust long acting as appropriate.  Plan for potential d/c tomorrow.  Message sent to the oncology clinic to schedule a f/u in 1 week. PT/OT eval placed to assess d/c needs.  Will continue to follow.

## 2017-09-22 NOTE — PROVIDER PROGRESS NOTES - EMERGENCY DEPT.
Encounter Date: 9/21/2017    ED Physician Progress Notes       SCRIBE NOTE: I, Letty Benton, am scribing for, and in the presence of,  Dr. Coe.  Physician Statement: I, Dr. Coe, personally performed the services described in this documentation as scribed by Letty Benton in my presence, and it is both accurate and complete.      EKG - STEMI Decision  Initial Reading: No STEMI present.

## 2017-09-22 NOTE — PT/OT/SLP EVAL
Occupational Therapy  Evaluation/Discharge    Rebecca Crain   MRN: [id number]   Admitting Diagnosis: Chest pain    OT Date of Treatment: 17   OT Start Time: 1007  OT Stop Time: 1033  OT Total Time (min): 26 min    Billable Minutes:  Evaluation 19  Therapeutic Activity 8    Diagnosis: Chest pain   Metastatic cancer to spine; Bone metastasis; Cancer related pain and fatigue    Past Medical History:   Diagnosis Date    Adjustment disorder with depressed mood 2017    Allergy     Anxiety     Asthma     seasonal    Blood transfusion     1981    Breast cancer     stage IIB carcinoma of the right breast, ER positive with a low Oncotype score    Chemotherapy-induced neuropathy 2016    Depression     Diverticulosis     Falls frequently 2014    Hepatic cyst 2014    Hx of psychiatric care     Hypercholesteremia     Hypertension     Lung nodule 2014    Lymphedema     S/P right breast mastectomy    Metastatic bone cancer     MVP (mitral valve prolapse)     Osteoporosis, unspecified 2014    Psychiatric problem     Therapy     Thyroid disease     Hasimoto disease      Past Surgical History:   Procedure Laterality Date    BREAST LUMPECTOMY Right     X 2    BREAST RECONSTRUCTION  2013    X 2     SECTION      x2    COLONOSCOPY N/A 2016    Procedure: COLONOSCOPY;  Surgeon: Miguel Vu MD;  Location: [address]);  Service: Endoscopy;  Laterality: N/A;  MRSA-at time of scheduling pending results on 2016    endometriosis      EYE SURGERY      RK bilateral     GANGLION CYST EXCISION      right index finger    HEMORRHOID SURGERY      KNEE SURGERY Bilateral     ortho    MASTECTOMY  2013    Bilateral     TONSILLECTOMY      TUBAL LIGATION         Referring physician: Geraldine  Date referred to OT: 2017    General Precautions: Standard, fall  Orthopedic Precautions: N/A  Braces: N/A    Do you have any cultural, spiritual,  "Anglican conflicts, given your current situation?: Gnosticism     Patient History:  Living Environment  Living Environment Comment: Pt reported that she lives in [place], [place], 27 y/o daughter lives with her. Home is 2 SH with threshold to enter. Walk in shower with suction grab bar. She reported she is active at home with home task (laundry, mopping, cleaning) with rest breaks as needed. (daughter works during the day, but is home with her at night. Sister reported family assistance)  Equipment Currently Used at Home: Rolling Walker (uses in the community)  *Pt reported 1 fall in past 6 months; 0 falls in past 3 months.    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  Pt's daughter and sister present for portion of session. Pt agreeable to therapy session.   Chief Complaint: no reports  Patient/Family stated goals: "To find normalcy"    Pain/Comfort  Pain Rating 1: 0/10 ("My headache went away around 8 this morning")  Pain Rating Post-Intervention 1: 0/10    Objective:  Patient found with: peripheral IV    Cognitive Exam:  Oriented to: Person, Place, Time and Situation  Follows Commands/attention: Follows multistep  commands  Communication: clear/fluent  Memory:  No Deficits noted  Safety awareness/insight to disability: intact  Coping skills/emotional control: Appropriate to situation    Visual/perceptual:  Intact    Physical Exam:  Postural examination/scapula " alignment: Rounded shoulder and Posterior pelvic tilt  Skin integrity: Visible skin intact  Edema: None noted     Sensation:   Impaired  at times reports L UE with ulnar nerve distribution numbness/tingling    Upper Extremity Range of Motion:  Right Upper Extremity: WFL  Left Upper Extremity: WFL    Upper Extremity Strength:  Right Upper Extremity: 4+/5  Left Upper Extremity: 4+/5   Strength: 5/5 B    Fine motor coordination:   Intact  Left hand, finger to nose, Right hand, finger to nose, Left hand thumb/finger opposition skills, Right hand thumb/finger opposition skills, Left hand, manipulation of objects and Right hand, manipulation of objects    Gross motor coordination: WFL for B UE    Functional Mobility:  Bed Mobility:  Rolling/Turning Right: Modified independent, With side rail  Scooting/Bridging: Modified Independent  Supine to Sit: Modified Independent    Transfers:  Sit <> Stand Assistance: Supervision  Sit <> Stand Assistive Device: No Assistive Device  Bed <> Chair Technique: Stand Pivot  Bed <> Chair Transfer Assistance: Supervision  Bed <> Chair Assistive Device: No Assistive Device    Functional Ambulation: SBA for household distance x2 trials  *Verbal cues for postural control at times 2/2 tendency to reach out for wall     Activities of Daily Living:  Feeding Level of Assistance: Modified independent, Set-up Assistance  UE Dressing Level of Assistance: Set-up Assistance (gown as jacket; completed fine motor aspects for tie and snaps)  LE Dressing Level of Assistance: Set-up Assistance (seated in chair; B socks)  Grooming Position: Standing at sink  Grooming Level of Assistance: Stand by assistance    Balance:   Dynamic stand: SBA    Additional:  -Pt alert and agreeable; edu on OT role in care  -Discussed energy conversation techniques for home chores with pt and daughter' edu on modifications for laundry tasks   -Edu on cancer related fatigue and need for exercise and functional mobility within  "safe range for best functional outcomes  -Discussed outpatient therapy as possible d/c plan  -Communication board updated; discussed with RN pt's functional mobility status     AM-PAC 6 CLICK ADL  How much help from another person does this patient currently need?  1 = Unable, Total/Dependent Assistance  2 = A lot, Maximum/Moderate Assistance  3 = A little, Minimum/Contact Guard/Supervision  4 = None, Modified [place]/Independent    Putting on and taking off regular lower body clothing? : 3  Bathing (including washing, rinsing, drying)?: 3  Toileting, which includes using toilet, bedpan, or urinal? : 3  Putting on and taking off regular upper body clothing?: 3  Taking care of personal grooming such as brushing teeth?: 3  Eating meals?: 4  Total Score: 19    AM-PAC Raw Score CMS "G-Code Modifier Level of Impairment Assistance   6 % Total / Unable   7 - 9 CM 80 - 100% Maximal Assist   10-14 CL 60 - 80% Moderate Assist   15 - 19 CK 40 - 60% Moderate Assist   20 - 22 CJ 20 - 40% Minimal Assist   23 CI 1-20% SBA / CGA   24 CH 0% Independent/ Mod I       Patient left up in chair with all lines intact, call button in reach, RN notified and family present    Assessment:  Rebecca Crain is a 61 y.o. female with a medical diagnosis of Chest pain and presents with Metastatic cancer to spine, Bone metastasis and Cancer related pain and fatigue. She demo overall decline in endurance with poor safety awareness. She demo openness to education provided on this date and she will cont to benefit from skilled OT services in outpatient setting for best functional outcome.     Rehab identified problem list/impairments: Rehab identified problem list/impairments: impaired endurance, weakness, impaired functional mobilty, gait instability, impaired balance, impaired self care skills    Rehab potential is good.    Activity tolerance: Good    Discharge recommendations: Discharge Facility/Level Of Care Needs: outpatient OT " (endurance/strengthening/safety)     Barriers to discharge: Barriers to Discharge: None    Equipment recommendations: none     PLAN:  Patient would benefit from outpatient OT services. No need for skilled OT services at this time.  Plan of Care reviewed with: patient, family    LUIS Pollard  09/22/2017

## 2017-09-23 ENCOUNTER — NURSE TRIAGE (OUTPATIENT)
Dept: ADMINISTRATIVE | Facility: CLINIC | Age: 62
End: 2017-09-23

## 2017-09-23 VITALS
BODY MASS INDEX: 26.95 KG/M2 | DIASTOLIC BLOOD PRESSURE: 49 MMHG | TEMPERATURE: 98 F | OXYGEN SATURATION: 97 % | WEIGHT: 157.88 LBS | RESPIRATION RATE: 17 BRPM | HEIGHT: 64 IN | SYSTOLIC BLOOD PRESSURE: 96 MMHG | HEART RATE: 69 BPM

## 2017-09-23 LAB
ALBUMIN SERPL BCP-MCNC: 3 G/DL
ALP SERPL-CCNC: 139 U/L
ALT SERPL W/O P-5'-P-CCNC: 17 U/L
ANION GAP SERPL CALC-SCNC: 6 MMOL/L
AST SERPL-CCNC: 19 U/L
BASOPHILS # BLD AUTO: 0 K/UL
BASOPHILS NFR BLD: 0 %
BILIRUB SERPL-MCNC: 0.4 MG/DL
BUN SERPL-MCNC: 19 MG/DL
CALCIUM SERPL-MCNC: 8.3 MG/DL
CHLORIDE SERPL-SCNC: 106 MMOL/L
CO2 SERPL-SCNC: 27 MMOL/L
CREAT SERPL-MCNC: 0.8 MG/DL
DIFFERENTIAL METHOD: NORMAL
EOSINOPHIL # BLD AUTO: 0.1 K/UL
EOSINOPHIL NFR BLD: 1.5 %
ERYTHROCYTE [DISTWIDTH] IN BLOOD BY AUTOMATED COUNT: 13.4 %
EST. GFR  (AFRICAN AMERICAN): >60 ML/MIN/1.73 M^2
EST. GFR  (NON AFRICAN AMERICAN): >60 ML/MIN/1.73 M^2
GLUCOSE SERPL-MCNC: 123 MG/DL
HCT VFR BLD AUTO: 37.6 %
HGB BLD-MCNC: 12.1 G/DL
LYMPHOCYTES # BLD AUTO: 1.8 K/UL
LYMPHOCYTES NFR BLD: 32.1 %
MAGNESIUM SERPL-MCNC: 2.3 MG/DL
MCH RBC QN AUTO: 27.7 PG
MCHC RBC AUTO-ENTMCNC: 32.2 G/DL
MCV RBC AUTO: 86 FL
MONOCYTES # BLD AUTO: 0.4 K/UL
MONOCYTES NFR BLD: 7.5 %
NEUTROPHILS # BLD AUTO: 3.2 K/UL
NEUTROPHILS NFR BLD: 58.7 %
PHOSPHATE SERPL-MCNC: 2.4 MG/DL
PLATELET # BLD AUTO: 180 K/UL
PMV BLD AUTO: 9.6 FL
POTASSIUM SERPL-SCNC: 4.3 MMOL/L
PROT SERPL-MCNC: 6.3 G/DL
RBC # BLD AUTO: 4.37 M/UL
SODIUM SERPL-SCNC: 139 MMOL/L
WBC # BLD AUTO: 5.45 K/UL

## 2017-09-23 PROCEDURE — 83735 ASSAY OF MAGNESIUM: CPT

## 2017-09-23 PROCEDURE — 99238 HOSP IP/OBS DSCHRG MGMT 30/<: CPT | Mod: ,,, | Performed by: INTERNAL MEDICINE

## 2017-09-23 PROCEDURE — 25000003 PHARM REV CODE 250: Performed by: INTERNAL MEDICINE

## 2017-09-23 PROCEDURE — 25000003 PHARM REV CODE 250: Performed by: STUDENT IN AN ORGANIZED HEALTH CARE EDUCATION/TRAINING PROGRAM

## 2017-09-23 PROCEDURE — 36415 COLL VENOUS BLD VENIPUNCTURE: CPT

## 2017-09-23 PROCEDURE — 80053 COMPREHEN METABOLIC PANEL: CPT

## 2017-09-23 PROCEDURE — 85025 COMPLETE CBC W/AUTO DIFF WBC: CPT

## 2017-09-23 PROCEDURE — 84100 ASSAY OF PHOSPHORUS: CPT

## 2017-09-23 RX ORDER — CYCLOBENZAPRINE HCL 5 MG
5 TABLET ORAL 3 TIMES DAILY PRN
Qty: 90 TABLET | Refills: 11 | Status: SHIPPED | OUTPATIENT
Start: 2017-09-23 | End: 2017-10-03

## 2017-09-23 RX ORDER — RAMELTEON 8 MG/1
8 TABLET ORAL NIGHTLY PRN
Qty: 30 TABLET | Refills: 0 | Status: SHIPPED | OUTPATIENT
Start: 2017-09-23 | End: 2017-12-04

## 2017-09-23 RX ORDER — HYDROMORPHONE HYDROCHLORIDE 4 MG/1
4 TABLET ORAL EVERY 4 HOURS PRN
Qty: 180 TABLET | Refills: 0 | Status: SHIPPED | OUTPATIENT
Start: 2017-09-23 | End: 2017-12-05 | Stop reason: SDUPTHER

## 2017-09-23 RX ORDER — CIPROFLOXACIN 250 MG/1
500 TABLET, FILM COATED ORAL EVERY 12 HOURS
Status: DISCONTINUED | OUTPATIENT
Start: 2017-09-23 | End: 2017-09-23 | Stop reason: HOSPADM

## 2017-09-23 RX ORDER — BUTALBITAL, ACETAMINOPHEN AND CAFFEINE 50; 325; 40 MG/1; MG/1; MG/1
1 TABLET ORAL EVERY 6 HOURS PRN
Qty: 30 TABLET | Refills: 3 | Status: SHIPPED | OUTPATIENT
Start: 2017-09-23 | End: 2017-10-23

## 2017-09-23 RX ORDER — AMOXICILLIN 250 MG
1 CAPSULE ORAL 2 TIMES DAILY
Qty: 60 TABLET | Refills: 11 | Status: SHIPPED | OUTPATIENT
Start: 2017-09-23 | End: 2018-02-09

## 2017-09-23 RX ORDER — MORPHINE SULFATE 15 MG/1
75 TABLET, FILM COATED, EXTENDED RELEASE ORAL EVERY 12 HOURS
Qty: 60 TABLET | Refills: 0 | Status: SHIPPED | OUTPATIENT
Start: 2017-09-23 | End: 2017-09-28 | Stop reason: DRUGHIGH

## 2017-09-23 RX ORDER — CIPROFLOXACIN 500 MG/1
500 TABLET ORAL EVERY 12 HOURS
Qty: 5 TABLET | Refills: 0 | Status: SHIPPED | OUTPATIENT
Start: 2017-09-23 | End: 2017-09-28

## 2017-09-23 RX ORDER — RAMELTEON 8 MG/1
8 TABLET ORAL NIGHTLY PRN
Qty: 30 TABLET | Refills: 0 | Status: SHIPPED | OUTPATIENT
Start: 2017-09-23 | End: 2017-09-23

## 2017-09-23 RX ADMIN — CALCIUM 500 MG: 500 TABLET ORAL at 08:09

## 2017-09-23 RX ADMIN — STANDARDIZED SENNA CONCENTRATE AND DOCUSATE SODIUM 1 TABLET: 8.6; 5 TABLET, FILM COATED ORAL at 08:09

## 2017-09-23 RX ADMIN — POTASSIUM & SODIUM PHOSPHATES POWDER PACK 280-160-250 MG 2 PACKET: 280-160-250 PACK at 09:09

## 2017-09-23 RX ADMIN — CIPROFLOXACIN HYDROCHLORIDE 500 MG: 250 TABLET, FILM COATED ORAL at 09:09

## 2017-09-23 RX ADMIN — Medication 10 ML: at 12:09

## 2017-09-23 RX ADMIN — HYDROMORPHONE HYDROCHLORIDE 4 MG: 2 TABLET ORAL at 04:09

## 2017-09-23 RX ADMIN — ASPIRIN 81 MG: 81 TABLET, COATED ORAL at 08:09

## 2017-09-23 RX ADMIN — CLONIDINE HYDROCHLORIDE 0.1 MG: 0.1 TABLET ORAL at 05:09

## 2017-09-23 RX ADMIN — Medication 10 ML: at 11:09

## 2017-09-23 RX ADMIN — Medication 10 ML: at 06:09

## 2017-09-23 RX ADMIN — LEVOTHYROXINE SODIUM 150 MCG: 75 TABLET ORAL at 06:09

## 2017-09-23 RX ADMIN — CITALOPRAM HYDROBROMIDE 20 MG: 20 TABLET ORAL at 08:09

## 2017-09-23 RX ADMIN — MORPHINE SULFATE 75 MG: 60 TABLET, EXTENDED RELEASE ORAL at 08:09

## 2017-09-23 RX ADMIN — GABAPENTIN 300 MG: 300 CAPSULE ORAL at 05:09

## 2017-09-23 RX ADMIN — HYDROMORPHONE HYDROCHLORIDE 4 MG: 2 TABLET ORAL at 10:09

## 2017-09-23 NOTE — TELEPHONE ENCOUNTER
Call placed to pharmacy call regarding MS Contin dosage of 75 mg every 12 hr; provided/confirmed/accept per Floridalma Aleman

## 2017-09-23 NOTE — DISCHARGE SUMMARY
[facility]  Hematology/Oncology  Discharge Summary      Patient Name: Rebecca Crain  MRN: [id number]  Admission Date: 9/21/2017  Hospital Length of Stay: 2 days  Discharge Date and Time:  09/23/2017 12:11 PM  Attending Physician: Penny Wiley MD   Discharging Provider: Heladio Ash MD  Primary Care Provider: Colleen Valero MD    HPI: 61 YOF with breast cancer with mets to spine, osteoporosis, HTN, hypothyroidism presents for evaluation of chest wall and back pain for the past 6 weeks. R sided CP along anterior rib cage, constant, associated with R sided back spasms with movement, not relieved by MS contin 60 mg BID and PRN dilaudid 4 mg - taken BID - outpatient. Onset of symptoms coincided with starting afinitor and exemestane in 8/2017. Chest pain and back spasms improved when she discontinued afinitor and restarted when she resumed it. She denies trauma to chest wall and back. ROS notable for mouth sores over the past month that resolved with duke's solution and chronic nausea. She also reports headache for the last two days that is described as throbbing pain that wraps from posterior head to temples. She denies vision changes, weakness, chills, dysuria. She reports mild low grade fever at home at night (tmax 99 degrees).    Oncological history:  In 2013 she was diagnosed with stage IIB carcinoma of the right breast, ER positive with a low Oncotype score.  She was enrolled on protocol  and randomized to endocrine therapy alone.  She was tried on all 3 aromatase inhibitors and had itching and rash to all of them.  In August 2013 she was started on tamoxifen.  She was found to have  bone metastasis in May 2015, 2015.  A subsequent bone biopsy was performed on a lesion at the T8 vertebra.   The pathology from that was consistent with metastatic breast cancer, ER +80%, [place] +80%, and HER-2 negative.      She received letrozole plus palbociclib from July 2015 until May 2016.  She also  received bone protective therapy with Xgeva.      Faslodex was started in June 2016.  Bone scan in February 2017 showed improvement, CT scans showed no new findings.     In May she had more pain.  CA 27.29 had increased to 94.     Subsequently she has undergone scans.  Bone scan from May 11 showed a new focal uptake at T4 which is felt to be possibly related to a prior compression fraction or a Schmorl's node.  Uptake in T12 was unchanged.     CT scan of the chest abdomen and pelvis showed no evidence of lung metastasis, liver was unremarkable.    Hospital Course: Found to be afebrile on admission. No chest wall tenderness, abdominal tenderness, CVA/paraspinal tenderness on exam. CMP and CBC unremarkable, trop negative, BNP 71, CXR negative, CT head negative. CT chest was notable for new sclerotic lesions in T4 and T10 vertebral body concerning for mets and possible R anterior 5th rib involvement. Stable T 11 compression fracture. Was given IV morphine and IV dilaudid on admission with resolution of rib/chest pain the next morning. On hospital day 2, was started on MS contin 75 mg BID (increased home dose) with PRN PO dilaudid. Narcotic requirement during first 24 hours of admission: 8 mg IV morphine, 5 mg IV dilaudid, 8 mg PO dilaudid, 150 mg PO long acting morphine. Narcotic requirement during second 24 hours of admission: 16 mg PO dilaudid, 150 mg PO long acting morphine.    Back pain/spasm controlled with flexeril 5 mg PRN.  HA not improved with toradol x 1 but resolved with fiorecet x 1 on admission.  Nausea resolved with zofran 8 mg IV x 1 on admission. Tolerated regular diet without n/v throughout hospital course.    Reported burning pain with urination and hx of UTI 2 years ago. UA on hospital day 1 showed UTI. Started on 3 day course of cipro on day of discharge. Urine culture pending.    Participated in PT/OT without difficulty. Per OT evaluation, pt would benefit from outpatient OT.    Physical exam on  day of discharge:  Constitutional: She is oriented to person, place, and time. She appears well-developed and well-nourished.   HENT:   Head: Normocephalic and atraumatic.   Eyes: Conjunctivae are normal. No scleral icterus.   Neck: Normal range of motion. Neck supple.   Cardiovascular: Normal rate and regular rhythm.    Pulmonary/Chest: Effort normal and breath sounds normal. No respiratory distress. She has no wheezes. She has no rales.   Abdominal: Soft. Bowel sounds are normal. She exhibits no distension. There is no tenderness.   Musculoskeletal: She exhibits no edema or tenderness.   No chest wall tenderness  No CVA tenderness  No point tenderness along spine  Neurological: She is alert and oriented to person, place, and time.   Skin: Skin is warm and dry. She is not diaphoretic.   Vitals reviewed.    Consults:   Consults         Status Ordering Provider     Inpatient consult to Hematology/Oncology  Once     Provider:  (Not yet assigned)    Completed LIBERTY LONGO        Surgeries: none.    Significant Diagnostic Studies:  Labs:  Cardiac profile:   9/21/2017 19:35 9/21/2017 22:51   Troponin I <0.006 <0.006   BNP 72      UA 9/22/17:   9/22/2017 12:21   Specimen UA Urine, Clean Catch   Color, UA Yellow   pH, UA 5.0   Specific [place], UA 1.030   Appearance, UA Hazy (A)   Protein, UA Negative   Glucose, UA 1+ (A)   Ketones, UA Negative   Occult Blood UA 1+ (A)   Nitrite, UA Positive (A)   Urobilinogen, UA Negative   Bilirubin (UA) Negative   Leukocytes, UA 2+ (A)   RBC, UA 4   WBC, UA 23 (H)   Bacteria, UA Many (A)   Squam Epithel, UA 1   Non-Squam Epith 1 (A)   Microscopic Comment SEE COMMENT     CMP:   9/21/2017 19:35 9/22/2017 05:32 9/23/2017 04:47   Sodium 141 140 139   Potassium 4.1 4.4 4.3   Chloride 107 105 106   CO2 24 27 27   Anion Gap 10 8 6 (L)   BUN, Bld 12 14 19   Creatinine 0.8 0.8 0.8   eGFR if non African American >60.0 >60.0 >60.0   eGFR if African American >60.0 >60.0 >60.0   Glucose 118  (H) 136 (H) 123 (H)   Calcium 9.8 9.1 8.3 (L)   Phosphorus  2.9 2.4 (L)   Magnesium  2.2 2.3   Alkaline Phosphatase 157 (H) 155 (H) 139 (H)   Total Protein 7.2 7.3 6.3   Albumin 3.4 (L) 3.5 3.0 (L)   Total Bilirubin 0.6 0.6 0.4   AST 25 24 19   ALT 23 21 17     CBC:   9/21/2017 19:35 9/22/2017 05:32 9/23/2017 04:47   WBC 7.16 8.66 5.45   RBC 4.75 4.86 4.37   Hemoglobin 13.2 13.5 12.1   Hematocrit 38.7 40.8 37.6   MCV 82 84 86   MCH 27.8 27.8 27.7   MCHC 34.1 33.1 32.2   RDW 13.0 13.2 13.4   Platelets 210 191 180   MPV 9.2 9.2 9.6   Gran% 74.8 (H) 84.4 (H) 58.7   Gran # 5.4 7.3 3.2   Lymph% 16.8 (L) 11.0 (L) 32.1   Lymph # 1.2 1.0 1.8   Mono% 8.0 4.4 7.5   Mono # 0.6 0.4 0.4   Eosinophil% 0.1 0.0 1.5   Eos # 0.0 0.0 0.1   Basophil% 0.0 0.0 0.0   Baso # 0.00 0.00 0.00     Imaging:  CT chest without contrast 9/22/17:  New sclerotic lesions involving the T4 and T10 vertebral bodies, worrisome for metastatic disease. Possible sclerotic lesion involving the right 5th rib anteriorly. Consider correlation with nuclear medicine bone scan.  Stable sclerotic compression fracture involving the T11 vertebral body.    CT head without contrast 9/22/17:  No acute intracranial abnormality. Specifically, no intracranial hemorrhage.    Pending Diagnostic Studies:     Urine culture 9/22/17        Final Active Diagnoses:    Diagnosis Date Noted POA    PRINCIPAL PROBLEM:  Chest pain [R07.9] 09/21/2017 Yes    Headache [R51] 09/22/2017 Yes    Cancer related pain [G89.3] 04/04/2017 Yes    Adjustment disorder with depressed mood [F43.21] 02/07/2017 Yes    Bone metastasis [C79.51] 09/14/2016 Yes    Chemotherapy-induced neuropathy [G62.0, T45.1X5A] 07/26/2016 Yes    Benign essential HTN [I10] 06/03/2016 Yes    Gait instability [R26.81] 06/02/2016 Yes    Constipation [K59.00] 05/26/2016 Yes    Metastatic cancer to spine [C79.51] 05/26/2016 Yes    Acquired hypothyroidism [E03.9] 03/16/2016 Yes    Cancer of central portion of right  female breast [C50.111] 10/19/2015 Yes      Problems Resolved During this Admission:    Diagnosis Date Noted Date Resolved POA      Discharged Condition: stable    Disposition: Home or Self Care    Follow Up:  Follow-up Information     Rodrigo Schroeder MD On 9/28/2017.    Specialties:  Hematology and Oncology, Hematology  Why:  follow up- 10:00am  Contact information:  [address]  [facility] [address]  [phone]                 Patient Instructions:     Referral to Outpatient Occupational therapy   Referral Priority: Routine Referral Type: Occupational Therapy   Referral Reason: Specialty Services Required    Requested Specialty: Occupational Therapy    Number of Visits Requested: 1      Diet general     Activity as tolerated     Call MD for:  temperature >100.4     Call MD for:  persistent nausea and vomiting or diarrhea     Call MD for:  severe uncontrolled pain     Call MD for:  redness, tenderness, or signs of infection (pain, swelling, redness, odor or green/yellow discharge around incision site)     Call MD for:  difficulty breathing or increased cough     Call MD for:  severe persistent headache     Call MD for:  worsening rash     Call MD for:  persistent dizziness, light-headedness, or visual disturbances     Call MD for:  increased confusion or weakness       Medications:  Reconciled Home Medications:   Current Discharge Medication List      START taking these medications    Details   butalbital-acetaminophen-caffeine -40 mg (FIORICET, ESGIC) -40 mg per tablet Take 1 tablet by mouth every 6 (six) hours as needed for Headaches.  Qty: 30 tablet, Refills: 3      ciprofloxacin HCl (CIPRO) 500 MG tablet Take 1 tablet (500 mg total) by mouth every 12 (twelve) hours.  Qty: 5 tablet, Refills: 0      cyclobenzaprine (FLEXERIL) 5 MG tablet Take 1 tablet (5 mg total) by mouth 3 (three) times daily as needed for Muscle spasms.  Qty: 90 tablet, Refills: 11      ramelteon (ROZEREM) 8 mg tablet Take 1  tablet (8 mg total) by mouth nightly as needed for Insomnia.  Qty: 30 tablet, Refills: 0      senna-docusate 8.6-50 mg (PERICOLACE) 8.6-50 mg per tablet Take 1 tablet by mouth 2 (two) times daily.  Qty: 60 tablet, Refills: 11         CONTINUE these medications which have CHANGED    Details   HYDROmorphone (DILAUDID) 4 MG tablet Take 1 tablet (4 mg total) by mouth every 4 (four) hours as needed for Pain.  Qty: 180 tablet, Refills: 0      morphine (MS CONTIN) 15 MG 12 hr tablet Take 5 tablets (75 mg total) by mouth every 12 (twelve) hours.  Qty: 60 tablet, Refills: 0         CONTINUE these medications which have NOT CHANGED    Details   aspirin (ECOTRIN) 81 MG EC tablet Take 81 mg by mouth once daily.      atorvastatin (LIPITOR) 20 MG tablet Take 1 tablet (20 mg total) by mouth every evening.  Qty: 90 tablet, Refills: 3      calcium gluconate 650 MG tablet Take 650 mg by mouth 2 (two) times daily. Contains Vit D also.       citalopram (CELEXA) 20 MG tablet Take 1 tablet (20 mg total) by mouth once daily.  Qty: 90 tablet, Refills: 3    Associated Diagnoses: Adjustment disorder with depressed mood      cloNIDine (CATAPRES) 0.1 MG tablet Take 1 tablet (0.1 mg total) by mouth 2 (two) times daily.  Qty: 270 tablet, Refills: 3    Associated Diagnoses: Hot flashes related to aromatase inhibitor therapy      docusate sodium (COLACE) 100 MG capsule Take 100 mg by mouth 2 (two) times daily.      everolimus (AFINITOR) 10 mg Tab Take 1 tablet (10 mg total) by mouth once daily at 6am.  Qty: 30 tablet, Refills: 6    Associated Diagnoses: Cancer of central portion of right female breast      exemestane (AROMASIN) 25 mg tablet Take 1 tablet (25 mg total) by mouth once daily.  Qty: 90 tablet, Refills: 3    Associated Diagnoses: Malignant neoplasm of central portion of right breast in female, estrogen receptor positive      gabapentin (NEURONTIN) 300 MG capsule TAKE ONE CAPSULE BY MOUTH 3 TIMES A DAY  Qty: 90 capsule, Refills: 11     Associated Diagnoses: Neuropathy      levothyroxine (SYNTHROID) 150 MCG tablet Take 1 tablet (150 mcg total) by mouth once daily.  Qty: 90 tablet, Refills: 3    Associated Diagnoses: Acquired hypothyroidism      multivit-iron-min-folic acid (MULTIVITAMIN-IRON-MINERALS-FOLIC ACID) 3,500-18-0.4 unit-mg-mg Chew Take 1 tablet by mouth once daily.        NYSTATIN (DUKE'S SOLUTION) Take 10 mLs by mouth 4 (four) times daily.  Qty: 240 mL, Refills: 2    Comments: Mix in equal parts.  Generic is fine.  Associated Diagnoses: Mucositis      ondansetron (ZOFRAN) 4 MG tablet Take 1 tablet (4 mg total) by mouth every 8 (eight) hours as needed for Nausea.  Qty: 30 tablet, Refills: 11    Associated Diagnoses: Cancer of central portion of right female breast      vitamin D 1000 units Tab Take 2,000 mg by mouth every morning.       diphenhydrAMINE (BENADRYL) 50 MG capsule TAKE 1 CAPSULE 1 HOUR BEFORE CT SCAN  Refills: 0      fluocinonide 0.05% (LIDEX) 0.05 % cream AAA on hands BID x 1-2 wks then prn flares only. Avoid face, armpits, and groin.  Qty: 60 g, Refills: 1    Associated Diagnoses: Irritant contact dermatitis, unspecified trigger      hydrOXYzine HCl (ATARAX) 25 MG tablet Take 1 tablet (25 mg total) by mouth 3 (three) times daily as needed for Itching.  Qty: 30 tablet, Refills: 0    Associated Diagnoses: Contact dermatitis, unspecified contact dermatitis type, unspecified trigger      MV-MINERALS/FA/OMEGA 3,6,9 #3 (WH-ZS-MV-OMEGA 3,6,9 #3 ORAL) Take 1 tablet by mouth once daily.        pantoprazole (PROTONIX) 40 MG tablet Take 1 tablet (40 mg total) by mouth 2 (two) times daily before meals.  Qty: 180 tablet, Refills: 3    Associated Diagnoses: Intractable pain      triamterene-hydrochlorothiazide 37.5-25 mg (MAXZIDE-25) 37.5-25 mg per tablet          STOP taking these medications       NAPROXEN SODIUM (ALEVE ORAL) Comments:   Reason for Stopping:               Electronically signed by:  Heladio Ash  MD  Hematology/Oncology  [facility]

## 2017-09-23 NOTE — PLAN OF CARE
Problem: Patient Care Overview  Goal: Plan of Care Review  Outcome: Ongoing (interventions implemented as appropriate)  Patient is AAOx4. Pt is pleasant, calm and cooperative. Teaching and education performed. Patient remains free from falls and injury this shift. Bed in low, locked position, environment is free of clutter, with call bell in reach. Patient encouraged to call for assistance. Patient verbalized understanding. All belongings within reach. Regular diet - great appetite. No N/V throughout shift. Afebrile. C/O pre-existing chest and back spasm throughout shift - administered flexeril and dilaudid PO x2. Pt states Fioricet has provided full relief for HA. UA resulted showing UTI - Dr. Chandler ordered Rocephin. Delayed administration due to no IV pole. Awaiting equipment for administration. Will continue to monitor.

## 2017-09-23 NOTE — PROGRESS NOTES
Road Test  Oxygen-Patient tolerating room air, no distress.  Ambulation-Patient up with stand-by assistance.  Devices-Patient going home with no devices  Tolerating-Regular diet.  Elimination-Patient voiding without difficulty.  Self Care-Performs self care with standby assist to prevent falls.  Teaching-Verbal and written discharge teaching given.     Patient tolerates room air, ambulates with stand-by assistance, regular diet, voiding without difficulty, and is going home with no devices. Peripheral IV removed, catheter intact, dressed with dry gauze and coban. No bleeding present. Patient going home with family. Discharge paperwork discussed; paper prescriptions with the patient. Medications reviewed. Patient has all belongings and has no questions at this time. Transport arrived with wheelchair to bring patient to car.

## 2017-09-25 ENCOUNTER — PATIENT MESSAGE (OUTPATIENT)
Dept: HEMATOLOGY/ONCOLOGY | Facility: CLINIC | Age: 62
End: 2017-09-25

## 2017-09-25 ENCOUNTER — TELEPHONE (OUTPATIENT)
Dept: INTERNAL MEDICINE | Facility: CLINIC | Age: 62
End: 2017-09-25

## 2017-09-25 LAB — BACTERIA UR CULT: NORMAL

## 2017-09-25 NOTE — TELEPHONE ENCOUNTER
----- Message from Rox Flores sent at 9/25/2017 12:45 PM CDT -----  Contact: [facility] [phone]  Mihir Valdes ordered a morphine sulfate 15mg rx and pharmacy is waiting for prior authorization.

## 2017-09-28 ENCOUNTER — OFFICE VISIT (OUTPATIENT)
Dept: HEMATOLOGY/ONCOLOGY | Facility: CLINIC | Age: 62
End: 2017-09-28
Payer: COMMERCIAL

## 2017-09-28 VITALS
DIASTOLIC BLOOD PRESSURE: 113 MMHG | WEIGHT: 166.44 LBS | TEMPERATURE: 98 F | HEART RATE: 91 BPM | BODY MASS INDEX: 28.57 KG/M2 | RESPIRATION RATE: 16 BRPM | SYSTOLIC BLOOD PRESSURE: 178 MMHG

## 2017-09-28 DIAGNOSIS — C50.111 MALIGNANT NEOPLASM OF CENTRAL PORTION OF RIGHT BREAST IN FEMALE, ESTROGEN RECEPTOR POSITIVE: Primary | ICD-10-CM

## 2017-09-28 DIAGNOSIS — C79.51 METASTATIC CANCER TO SPINE: ICD-10-CM

## 2017-09-28 DIAGNOSIS — G89.3 CANCER RELATED PAIN: ICD-10-CM

## 2017-09-28 DIAGNOSIS — Z17.0 MALIGNANT NEOPLASM OF CENTRAL PORTION OF RIGHT BREAST IN FEMALE, ESTROGEN RECEPTOR POSITIVE: Primary | ICD-10-CM

## 2017-09-28 PROCEDURE — 99999 PR PBB SHADOW E&M-EST. PATIENT-LVL III: CPT | Mod: PBBFAC,,, | Performed by: INTERNAL MEDICINE

## 2017-09-28 PROCEDURE — 3080F DIAST BP >= 90 MM HG: CPT | Mod: S$GLB,,, | Performed by: INTERNAL MEDICINE

## 2017-09-28 PROCEDURE — 99214 OFFICE O/P EST MOD 30 MIN: CPT | Mod: S$GLB,,, | Performed by: INTERNAL MEDICINE

## 2017-09-28 PROCEDURE — 3008F BODY MASS INDEX DOCD: CPT | Mod: S$GLB,,, | Performed by: INTERNAL MEDICINE

## 2017-09-28 PROCEDURE — 3077F SYST BP >= 140 MM HG: CPT | Mod: S$GLB,,, | Performed by: INTERNAL MEDICINE

## 2017-09-28 RX ORDER — NYSTATIN 100000 [USP'U]/ML
SUSPENSION ORAL
COMMUNITY
Start: 2017-08-18 | End: 2017-12-04 | Stop reason: SDUPTHER

## 2017-09-28 NOTE — PROGRESS NOTES
Subjective:       Patient ID: Rebecca Crain is a 61 y.o. female.    Chief Complaint: No chief complaint on file.    HPI Ms. Crain is here for followup of metastatic carcinoma of the right breast. She has had multiple endocrine therapies.  She has been on exemestane and Afinitor.  That was started August 2017.     She was admitted on September 21  with poorly controlled pain including headache.  She underwent imaging including an negative head CT.  Labs were unremarkable.    Chest CT scan showed sclerotic lesions involving T4 and T10 and possible sclerotic lesion involving the right fifth rib.  This is stable compression fracture T11. Lungs were clear.   Her MS Contin was increased to 75 mg twice a day.  Her headache responded to Fioricet.  She was also treated for urinary tract infection.    Since discharge she has continued to have significant pain which is primarily around her lower ribs extending into her mid back.  This increases with breathing.   She's also had constipation in spite of taking Colace and fiber.        Breast history: In 2013 she was diagnosed with stage IIB carcinoma of the right breast, ER positive with a low Oncotype score.  She was enrolled on protocol  and randomized to endocrine therapy alone.  She was tried on all 3 aromatase inhibitors and had itching and rash to all of them.  In August 2013 she was started on tamoxifen.   She was found to have  bone metastasis in May 2015, 2015.  A subsequent bone biopsy was performed on a lesion at the T8 vertebra.   The pathology from that was consistent with metastatic breast cancer, ER +80%, [place] +80%, and HER-2 negative.      She received letrozole plus palbociclib from July 2015 until May 2016.  She also received bone protective therapy with Xgeva.      Faslodex was started in June 2016.  Bone scan in February 2017 showed improvement, CT scans showed no new findings.     In May she had more pain.  CA 27.29 had increased to  94.     Subsequently she has undergone scans.  Bone scan from May 11 showed a new focal uptake at T4 which is felt to be possibly related to a prior compression fraction or a Schmorl's node.  Uptake in T12 was unchanged.     CT scan of the chest abdomen and pelvis showed no evidence of lung metastasis, liver was unremarkable.  Review of Systems   Constitutional: Negative for appetite change and unexpected weight change.   Eyes: Negative for visual disturbance.   Respiratory: Positive for shortness of breath (due to chest pain). Negative for cough.    Cardiovascular: Positive for chest pain.   Gastrointestinal: Positive for abdominal pain and constipation. Negative for diarrhea.   Genitourinary: Negative for frequency.   Musculoskeletal: Positive for back pain.   Skin: Positive for rash.   Neurological: Positive for headaches.   Hematological: Negative for adenopathy.   Psychiatric/Behavioral: The patient is not nervous/anxious.        Objective:      Physical Exam   Constitutional: She is oriented to person, place, and time. She appears well-developed and well-nourished.   Uncomfortable   Cardiovascular: Normal rate.    Pulmonary/Chest: Effort normal and breath sounds normal. She has no wheezes. She has no rales.   Abdominal: Soft. Bowel sounds are normal. She exhibits no distension. There is no tenderness.   Lymphadenopathy:     She has no cervical adenopathy.   Neurological: She is alert and oriented to person, place, and time.   Skin: Skin is warm.   Psychiatric: She has a normal mood and affect. Her behavior is normal. Thought content normal.   Vitals reviewed.      Assessment:       1. Malignant neoplasm of central portion of right breast in female, estrogen receptor positive    2. 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 Cancer related pain        Plan:       She will increase her MS Contin to 60 mg every 8 hours.  Continue when necessary Dilaudid and add ibuprofen 40 mg 3 times a day.  She will hold her exemestane  and everolimus.  Also instructed her to hold her atorvastatin.  She will call me in 3 days with an update.

## 2017-09-29 ENCOUNTER — TELEPHONE (OUTPATIENT)
Dept: HEMATOLOGY/ONCOLOGY | Facility: CLINIC | Age: 62
End: 2017-09-29

## 2017-09-29 ENCOUNTER — PATIENT MESSAGE (OUTPATIENT)
Dept: HEMATOLOGY/ONCOLOGY | Facility: CLINIC | Age: 62
End: 2017-09-29

## 2017-09-29 NOTE — TELEPHONE ENCOUNTER
----- Message from Rachel Espitia sent at 9/29/2017 12:18 PM CDT -----  Contact: Yuki from [facility]  Yuki from [facility] is calling about medication     Prescription is being denied needs prior authorization    morphine 60 mg Tr12    For authorization at [facility] prior authorization is [phone] Opt 2 then opt 4    Yuki can be reached at [phone] thanks

## 2017-09-29 NOTE — TELEPHONE ENCOUNTER
Prior authorization has been submitted there are additional questions that the patient must initial and sign. Dr zamarripa will have to do the same. I emailed a copy to the patient for her to sign and send back to us.

## 2017-10-02 ENCOUNTER — PATIENT MESSAGE (OUTPATIENT)
Dept: HEMATOLOGY/ONCOLOGY | Facility: CLINIC | Age: 62
End: 2017-10-02

## 2017-10-02 DIAGNOSIS — K59.01 SLOW TRANSIT CONSTIPATION: Primary | ICD-10-CM

## 2017-10-02 RX ORDER — LACTULOSE 10 G/15ML
10 SOLUTION ORAL 3 TIMES DAILY
Qty: 450 ML | Refills: 6 | Status: SHIPPED | OUTPATIENT
Start: 2017-10-02 | End: 2017-10-12

## 2017-10-03 ENCOUNTER — OUTPATIENT CASE MANAGEMENT (OUTPATIENT)
Dept: ADMINISTRATIVE | Facility: OTHER | Age: 62
End: 2017-10-03

## 2017-10-03 NOTE — PROGRESS NOTES
10-3-17--OPCM Referral received from Leeann Rodriguez RN with [facility]. Spoke with patient. Patient reports that she is doing better. Patient reports that she had recent hospital stay for excruciating pain associated with her cancer. Patient reports that she has extensive history with cancer- breast cancer with mets to spine and sternum. Patient reports that she has a spot on T11 that will never completely be rid of cancer. Patient reports that she had reoccurrence on rib and sternum, sacrum T2 and T4. Patient reports that she was given new medication-Ms Contin. Patient reports that she also had additional bone scans preformed while she was in the hospital. Patient reports that she also had follow-up with  on 9-28-17. Patient reports that MD increased MS contin, instruct patient to continue taking dilaudid as needed and instructed patient to stop taking her 2 cancer pills and her cholesterol pill. Patient reports that her spasms are much better, patient is no longer having chest pain. Patient reports that she still has pain to her back left side. Patient reports that her current pain is at 3/10on PRS. Patient reports that she messaged MD to see if she could resume her 2 cancer medications, Md instructed patient to wait a few days and then reach back out to him. Patient reports that she will call MD on Friday to see if she can begin taking cancer pills again(exemestane and everolimus). Patient reports that while she was in the hospital there were 2 new spots that may be cancerous found on bone scan- on T10 and on right 5th rib. Patient reports that she is unsure if these areas are cancer and unsure of treatment plan. Patient reports that she has an appointment on Monday 10-9-17 with  to discuss treatment plan at that time. Patient reports that she was ordered for Outpatient PT, she had  write an order for Outpatient PT in [place] near her home, as she is unable to drive and relies on her daughter  "to bring her after her daughter gets off of work. Patient reports that she is waiting on approval from [employer]. At the end of our conversation patient became very emotional and started crying on the phone. Patient reports that she received a form from [employer], she was very offended from form. Patient reports that the form makes her feel like [facility] is saying she is a "Druggie". Patient reports that she refuses to fill out form, Patient reports that she would rather stop taking pain medication and suffer. I provided emotional support. I advise patient not to stop taking pain medication abruptly. I advise that she should return form as requested- so [facility] could have a clear picture of her needs, patient again refused. Patient reports that she is very frustrated with [employer], as  has to preform a Peer to peer every time he orders a test on her. Patient reports that she needs to be scanned every 3 months to checks for any new suspicious areas, so she can begin treatment for them if they are cancer. Patient reports that she feels that she is not valued by [employer]. I inform patient that I would refer her case to [facility] . Patient reports that she is already being called by Herlinda Jiménez(?spelling) with [employer] on a monthly basis. Patient reports that she would be more than happy to have assistance from [employer], but she just did not know where to go to get the help that she needs with care coordination. I will send e-mail to Elia with above information and ask for assistance.  I will mail my contact information should any new problems/concerns arise in the future.      "

## 2017-10-04 DIAGNOSIS — C79.51 METASTATIC CANCER TO SPINE: Primary | ICD-10-CM

## 2017-10-04 DIAGNOSIS — G89.3 CANCER RELATED PAIN: ICD-10-CM

## 2017-10-09 ENCOUNTER — OFFICE VISIT (OUTPATIENT)
Dept: HEMATOLOGY/ONCOLOGY | Facility: CLINIC | Age: 62
End: 2017-10-09
Payer: COMMERCIAL

## 2017-10-09 ENCOUNTER — INFUSION (OUTPATIENT)
Dept: INFUSION THERAPY | Facility: HOSPITAL | Age: 62
End: 2017-10-09
Attending: INTERNAL MEDICINE
Payer: COMMERCIAL

## 2017-10-09 VITALS
WEIGHT: 164.69 LBS | DIASTOLIC BLOOD PRESSURE: 111 MMHG | HEART RATE: 99 BPM | TEMPERATURE: 98 F | BODY MASS INDEX: 28.27 KG/M2 | RESPIRATION RATE: 16 BRPM | SYSTOLIC BLOOD PRESSURE: 176 MMHG

## 2017-10-09 DIAGNOSIS — C79.51 METASTATIC CANCER TO SPINE: Primary | ICD-10-CM

## 2017-10-09 DIAGNOSIS — E03.9 ACQUIRED HYPOTHYROIDISM: ICD-10-CM

## 2017-10-09 DIAGNOSIS — Z17.0 MALIGNANT NEOPLASM OF CENTRAL PORTION OF RIGHT BREAST IN FEMALE, ESTROGEN RECEPTOR POSITIVE: ICD-10-CM

## 2017-10-09 DIAGNOSIS — C79.51 METASTATIC CANCER TO SPINE: ICD-10-CM

## 2017-10-09 DIAGNOSIS — C50.111 MALIGNANT NEOPLASM OF CENTRAL PORTION OF RIGHT BREAST IN FEMALE, ESTROGEN RECEPTOR POSITIVE: ICD-10-CM

## 2017-10-09 DIAGNOSIS — C50.111 MALIGNANT NEOPLASM OF CENTRAL PORTION OF RIGHT BREAST IN FEMALE, ESTROGEN RECEPTOR POSITIVE: Primary | ICD-10-CM

## 2017-10-09 DIAGNOSIS — Z17.0 MALIGNANT NEOPLASM OF CENTRAL PORTION OF RIGHT BREAST IN FEMALE, ESTROGEN RECEPTOR POSITIVE: Primary | ICD-10-CM

## 2017-10-09 DIAGNOSIS — G89.3 CANCER RELATED PAIN: ICD-10-CM

## 2017-10-09 PROCEDURE — 96401 CHEMO ANTI-NEOPL SQ/IM: CPT

## 2017-10-09 PROCEDURE — 99999 PR PBB SHADOW E&M-EST. PATIENT-LVL III: CPT | Mod: PBBFAC,,, | Performed by: INTERNAL MEDICINE

## 2017-10-09 PROCEDURE — 63600175 PHARM REV CODE 636 W HCPCS: Performed by: INTERNAL MEDICINE

## 2017-10-09 PROCEDURE — 99213 OFFICE O/P EST LOW 20 MIN: CPT | Mod: S$GLB,,, | Performed by: INTERNAL MEDICINE

## 2017-10-09 RX ORDER — LACTULOSE 10 G/15ML
SOLUTION ORAL; RECTAL
COMMUNITY
Start: 2017-10-02 | End: 2018-02-09

## 2017-10-09 RX ORDER — MORPHINE SULFATE 60 MG/1
TABLET, FILM COATED, EXTENDED RELEASE ORAL
COMMUNITY
Start: 2017-10-06 | End: 2018-02-09 | Stop reason: SDUPTHER

## 2017-10-09 RX ADMIN — DENOSUMAB 120 MG: 120 INJECTION SUBCUTANEOUS at 02:10

## 2017-10-09 NOTE — PROGRESS NOTES
Subjective:       Patient ID: Rebecca Crain is a 62 y.o. female.    Chief Complaint: Breast Cancer    HPI Ms. Crain is here for followup of metastatic carcinoma of the right breast. She has had multiple endocrine therapies.  She has been on exemestane and Afinitor.  That was started August 2017.     She was admitted on September 21  with poorly controlled pain including headache.  She underwent imaging including an negative head CT.  Labs were unremarkable.  Chest CT scan showed sclerotic lesions involving T4 and T10 and possible sclerotic lesion involving the right fifth rib.  This is stable compression fracture T11. Lungs were clear.  At the time of her last visit I increased her MS Contin 60 mg 3 times a day and ask her to hold her hormonal therapy, her Afinitor, and her atorvastatin.    She reports that her pain control is better.  Her rib pain is largely resolved but she continues to have some midsternal pain radiating posteriorly into the left.  Constipation continues to be a problem and she is taking lactulose plus stool softeners.          Breast history: In 2013 she was diagnosed with stage IIB carcinoma of the right breast, ER positive with a low Oncotype score.  She was enrolled on protocol  and randomized to endocrine therapy alone.  She was tried on all 3 aromatase inhibitors and had itching and rash to all of them.  In August 2013 she was started on tamoxifen.   She was found to have  bone metastasis in May 2015, 2015.  A subsequent bone biopsy was performed on a lesion at the T8 vertebra.   The pathology from that was consistent with metastatic breast cancer, ER +80%, [place] +80%, and HER-2 negative.      She received letrozole plus palbociclib from July 2015 until May 2016.  She also received bone protective therapy with Xgeva.      Faslodex was started in June 2016.  Bone scan in February 2017 showed improvement, CT scans showed no new findings.     In May she had more pain.  CA 27.29 had  increased to 94.     Subsequently she has undergone scans.  Bone scan from May 11 showed a new focal uptake at T4 which is felt to be possibly related to a prior compression fraction or a Schmorl's node.  Uptake in T12 was unchanged.     CT scan of the chest abdomen and pelvis showed no evidence of lung metastasis, liver was unremarkable.  Review of Systems   Constitutional: Negative for appetite change and unexpected weight change.   Eyes: Negative for visual disturbance.   Respiratory: Negative for cough and shortness of breath.    Cardiovascular: Positive for chest pain.   Gastrointestinal: Positive for constipation. Negative for abdominal pain and diarrhea.   Genitourinary: Negative for frequency.   Musculoskeletal: Positive for back pain.   Skin: Negative for rash.   Neurological: Positive for headaches.   Hematological: Negative for adenopathy.   Psychiatric/Behavioral: The patient is not nervous/anxious.        Objective:      Physical Exam   Constitutional: She is oriented to person, place, and time. She appears well-developed and well-nourished.   Cardiovascular: Normal rate.    Pulmonary/Chest: Effort normal and breath sounds normal. She has no wheezes. She has no rales.   Abdominal: Soft. Bowel sounds are normal. She exhibits no distension. There is no tenderness.   Lymphadenopathy:     She has no cervical adenopathy.   Neurological: She is alert and oriented to person, place, and time.   Skin: Skin is warm.   Psychiatric: She has a normal mood and affect. Her behavior is normal. Thought content normal.   Vitals reviewed.      Assessment:       1. Malignant neoplasm of central portion of right breast in female, estrogen receptor positive    2. 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 Cancer related pain    4. Acquired hypothyroidism        Plan:         She will restart her exemestane and Afinitor.  She will restart her Lipitor 1 week after she has restarted her other medications.  Xgeva today.  Return in one  month.

## 2017-10-09 NOTE — PROGRESS NOTES
Distress Screening Results: Psychosocial Distress screening score of Distress Score: 1 noted and reviewed. No intervention indicated.

## 2017-10-13 ENCOUNTER — TELEPHONE (OUTPATIENT)
Dept: PHARMACY | Facility: CLINIC | Age: 62
End: 2017-10-13

## 2017-10-26 ENCOUNTER — TELEPHONE (OUTPATIENT)
Dept: HEMATOLOGY/ONCOLOGY | Facility: CLINIC | Age: 62
End: 2017-10-26

## 2017-10-26 DIAGNOSIS — R53.81 DEBILITY: Primary | ICD-10-CM

## 2017-10-31 ENCOUNTER — TELEPHONE (OUTPATIENT)
Dept: PHARMACY | Facility: CLINIC | Age: 62
End: 2017-10-31

## 2017-10-31 NOTE — TELEPHONE ENCOUNTER
Patient reports she has been tolerating Afinitor well with no missed doses.  She denies any side effects at this time.

## 2017-11-06 ENCOUNTER — OFFICE VISIT (OUTPATIENT)
Dept: HEMATOLOGY/ONCOLOGY | Facility: CLINIC | Age: 62
End: 2017-11-06
Payer: COMMERCIAL

## 2017-11-06 ENCOUNTER — LAB VISIT (OUTPATIENT)
Dept: LAB | Facility: HOSPITAL | Age: 62
End: 2017-11-06
Attending: INTERNAL MEDICINE
Payer: COMMERCIAL

## 2017-11-06 VITALS
SYSTOLIC BLOOD PRESSURE: 176 MMHG | TEMPERATURE: 98 F | HEART RATE: 82 BPM | RESPIRATION RATE: 16 BRPM | BODY MASS INDEX: 27.36 KG/M2 | DIASTOLIC BLOOD PRESSURE: 90 MMHG | WEIGHT: 159.38 LBS

## 2017-11-06 DIAGNOSIS — Z17.0 MALIGNANT NEOPLASM OF CENTRAL PORTION OF RIGHT BREAST IN FEMALE, ESTROGEN RECEPTOR POSITIVE: ICD-10-CM

## 2017-11-06 DIAGNOSIS — C79.51 BONE METASTASIS: ICD-10-CM

## 2017-11-06 DIAGNOSIS — C50.111 MALIGNANT NEOPLASM OF CENTRAL PORTION OF RIGHT BREAST IN FEMALE, ESTROGEN RECEPTOR POSITIVE: ICD-10-CM

## 2017-11-06 DIAGNOSIS — G89.3 CANCER RELATED PAIN: ICD-10-CM

## 2017-11-06 LAB
ALBUMIN SERPL BCP-MCNC: 3.4 G/DL
ALP SERPL-CCNC: 113 U/L
ALT SERPL W/O P-5'-P-CCNC: 21 U/L
ANION GAP SERPL CALC-SCNC: 7 MMOL/L
AST SERPL-CCNC: 25 U/L
BASOPHILS # BLD AUTO: 0.01 K/UL
BASOPHILS NFR BLD: 0.3 %
BILIRUB SERPL-MCNC: 0.9 MG/DL
BUN SERPL-MCNC: 13 MG/DL
CALCIUM SERPL-MCNC: 8.9 MG/DL
CHLORIDE SERPL-SCNC: 106 MMOL/L
CO2 SERPL-SCNC: 27 MMOL/L
CREAT SERPL-MCNC: 0.8 MG/DL
DIFFERENTIAL METHOD: ABNORMAL
EOSINOPHIL # BLD AUTO: 0.1 K/UL
EOSINOPHIL NFR BLD: 3.8 %
ERYTHROCYTE [DISTWIDTH] IN BLOOD BY AUTOMATED COUNT: 13.5 %
EST. GFR  (AFRICAN AMERICAN): >60 ML/MIN/1.73 M^2
EST. GFR  (NON AFRICAN AMERICAN): >60 ML/MIN/1.73 M^2
GLUCOSE SERPL-MCNC: 128 MG/DL
HCT VFR BLD AUTO: 37.7 %
HGB BLD-MCNC: 12.3 G/DL
IMM GRANULOCYTES # BLD AUTO: 0.01 K/UL
IMM GRANULOCYTES NFR BLD AUTO: 0.3 %
LYMPHOCYTES # BLD AUTO: 1.1 K/UL
LYMPHOCYTES NFR BLD: 29.9 %
MCH RBC QN AUTO: 27.2 PG
MCHC RBC AUTO-ENTMCNC: 32.6 G/DL
MCV RBC AUTO: 83 FL
MONOCYTES # BLD AUTO: 0.3 K/UL
MONOCYTES NFR BLD: 7.1 %
NEUTROPHILS # BLD AUTO: 2.1 K/UL
NEUTROPHILS NFR BLD: 58.6 %
NRBC BLD-RTO: 0 /100 WBC
PLATELET # BLD AUTO: 172 K/UL
PMV BLD AUTO: 9.8 FL
POTASSIUM SERPL-SCNC: 4 MMOL/L
PROT SERPL-MCNC: 6.8 G/DL
RBC # BLD AUTO: 4.53 M/UL
SODIUM SERPL-SCNC: 140 MMOL/L
WBC # BLD AUTO: 3.64 K/UL

## 2017-11-06 PROCEDURE — 80053 COMPREHEN METABOLIC PANEL: CPT

## 2017-11-06 PROCEDURE — 99999 PR PBB SHADOW E&M-EST. PATIENT-LVL III: CPT | Mod: PBBFAC,,, | Performed by: INTERNAL MEDICINE

## 2017-11-06 PROCEDURE — 85025 COMPLETE CBC W/AUTO DIFF WBC: CPT

## 2017-11-06 PROCEDURE — 86300 IMMUNOASSAY TUMOR CA 15-3: CPT

## 2017-11-06 PROCEDURE — 36415 COLL VENOUS BLD VENIPUNCTURE: CPT

## 2017-11-06 PROCEDURE — 99213 OFFICE O/P EST LOW 20 MIN: CPT | Mod: S$GLB,,, | Performed by: INTERNAL MEDICINE

## 2017-11-06 NOTE — PROGRESS NOTES
Subjective:       Patient ID: Rebecca Crain is a 62 y.o. female.    Chief Complaint: No chief complaint on file.    HPI   Ms. Crain is here for followup of metastatic carcinoma of the right breast. She has had multiple endocrine therapies.  She has been on exemestane and Afinitor.  That was started August 2017.     Her pain control has been better since she has been on 60 mg of MS Contin 3 times per day.  She takes her Dilaudid sparingly.  She has had a single oral ulcer week on her hard palate.  She's also had some skin rash and some itchy areas in her scalp.  She's had some constipation which is been manageable.  Appetites been good.  She denies any shortness of breath.          Breast history: In 2013 she was diagnosed with stage IIB carcinoma of the right breast, ER positive with a low Oncotype score.  She was enrolled on protocol  and randomized to endocrine therapy alone.  She was tried on all 3 aromatase inhibitors and had itching and rash to all of them.  In August 2013 she was started on tamoxifen.   She was found to have  bone metastasis in May 2015, 2015.  A subsequent bone biopsy was performed on a lesion at the T8 vertebra.   The pathology from that was consistent with metastatic breast cancer, ER +80%, [place] +80%, and HER-2 negative.      She received letrozole plus palbociclib from July 2015 until May 2016.  She also received bone protective therapy with Xgeva.      Faslodex was started in June 2016.  Bone scan in February 2017 showed improvement, CT scans showed no new findings.     In May she had more pain.  CA 27.29 had increased to 94.     Subsequently she has undergone scans.  Bone scan from May 11 showed a new focal uptake at T4 which is felt to be possibly related to a prior compression fraction or a Schmorl's node.  Uptake in T12 was unchanged.     CT scan of the chest abdomen and pelvis showed no evidence of lung metastasis, liver was unremarkable.    She was admitted on September 21   with poorly controlled pain including headache.  She underwent imaging including an negative head CT.  Labs were unremarkable.  Chest CT scan showed sclerotic lesions involving T4 and T10 and possible sclerotic lesion involving the right fifth rib.  This is stable compression fracture T11. Lungs were clear.        Review of Systems   Constitutional: Negative for appetite change and unexpected weight change.   Eyes: Negative for visual disturbance.   Respiratory: Negative for cough and shortness of breath.    Cardiovascular: Negative for chest pain.   Gastrointestinal: Positive for constipation. Negative for abdominal pain and diarrhea.   Genitourinary: Negative for frequency.   Musculoskeletal: Positive for back pain.   Skin: Negative for rash.   Neurological: Positive for headaches. Light-headedness: mild.   Hematological: Negative for adenopathy.   Psychiatric/Behavioral: The patient is not nervous/anxious.        Objective:      Physical Exam   Constitutional: She is oriented to person, place, and time. She appears well-developed and well-nourished.   Cardiovascular: Normal rate.    Pulmonary/Chest: Effort normal and breath sounds normal. She has no wheezes. She has no rales.   Abdominal: Soft. Bowel sounds are normal. She exhibits no distension. There is no tenderness.   Lymphadenopathy:     She has no cervical adenopathy.   Neurological: She is alert and oriented to person, place, and time.   Skin: Skin is warm.   Psychiatric: She has a normal mood and affect. Her behavior is normal. Thought content normal.   Vitals reviewed.      Assessment:       1. Malignant neoplasm of central portion of right breast in female, estrogen receptor positive    2. Bone metastasis    3. Cancer related pain        Plan:         She will continue her exemestane and Afinitor.   Return in one month.  Xgeva at that time    Distress Screening Results: Psychosocial Distress screening score of Distress Score: 0 noted and reviewed. No  intervention indicated.

## 2017-11-07 ENCOUNTER — TELEPHONE (OUTPATIENT)
Dept: PHARMACY | Facility: CLINIC | Age: 62
End: 2017-11-07

## 2017-11-07 LAB — CANCER AG27-29 SERPL-ACNC: 37 U/ML

## 2017-11-08 ENCOUNTER — PATIENT MESSAGE (OUTPATIENT)
Dept: HEMATOLOGY/ONCOLOGY | Facility: CLINIC | Age: 62
End: 2017-11-08

## 2017-11-20 ENCOUNTER — TELEPHONE (OUTPATIENT)
Dept: HEMATOLOGY/ONCOLOGY | Facility: CLINIC | Age: 62
End: 2017-11-20

## 2017-11-20 NOTE — TELEPHONE ENCOUNTER
----- Message from Tarik Ramsey sent at 11/20/2017  2:39 PM CST -----  Contact: Marty Nurse   Will like a call from office regarding updating info     States if pt is having any gaps she's willing to help office    Contact::[phone] Ext::[phone]

## 2017-11-27 ENCOUNTER — PATIENT MESSAGE (OUTPATIENT)
Dept: HEMATOLOGY/ONCOLOGY | Facility: CLINIC | Age: 62
End: 2017-11-27

## 2017-11-30 ENCOUNTER — TELEPHONE (OUTPATIENT)
Dept: PHARMACY | Facility: CLINIC | Age: 62
End: 2017-11-30

## 2017-12-03 ENCOUNTER — PATIENT MESSAGE (OUTPATIENT)
Dept: HEMATOLOGY/ONCOLOGY | Facility: CLINIC | Age: 62
End: 2017-12-03

## 2017-12-04 ENCOUNTER — LAB VISIT (OUTPATIENT)
Dept: LAB | Facility: HOSPITAL | Age: 62
End: 2017-12-04
Attending: INTERNAL MEDICINE
Payer: COMMERCIAL

## 2017-12-04 ENCOUNTER — OFFICE VISIT (OUTPATIENT)
Dept: HEMATOLOGY/ONCOLOGY | Facility: CLINIC | Age: 62
End: 2017-12-04
Payer: COMMERCIAL

## 2017-12-04 VITALS
HEART RATE: 71 BPM | TEMPERATURE: 98 F | WEIGHT: 160.5 LBS | HEIGHT: 64 IN | SYSTOLIC BLOOD PRESSURE: 132 MMHG | OXYGEN SATURATION: 96 % | RESPIRATION RATE: 16 BRPM | BODY MASS INDEX: 27.4 KG/M2 | DIASTOLIC BLOOD PRESSURE: 58 MMHG

## 2017-12-04 DIAGNOSIS — C50.111 MALIGNANT NEOPLASM OF CENTRAL PORTION OF RIGHT BREAST IN FEMALE, ESTROGEN RECEPTOR POSITIVE: ICD-10-CM

## 2017-12-04 DIAGNOSIS — Z17.0 MALIGNANT NEOPLASM OF CENTRAL PORTION OF RIGHT BREAST IN FEMALE, ESTROGEN RECEPTOR POSITIVE: ICD-10-CM

## 2017-12-04 DIAGNOSIS — R21 RASH: ICD-10-CM

## 2017-12-04 DIAGNOSIS — G89.3 CANCER RELATED PAIN: ICD-10-CM

## 2017-12-04 DIAGNOSIS — C79.51 BONE METASTASIS: ICD-10-CM

## 2017-12-04 LAB
ALBUMIN SERPL BCP-MCNC: 3.1 G/DL
ALP SERPL-CCNC: 104 U/L
ALT SERPL W/O P-5'-P-CCNC: 42 U/L
ANION GAP SERPL CALC-SCNC: 7 MMOL/L
AST SERPL-CCNC: 33 U/L
BASOPHILS # BLD AUTO: 0.01 K/UL
BASOPHILS NFR BLD: 0.2 %
BILIRUB SERPL-MCNC: 0.6 MG/DL
BUN SERPL-MCNC: 13 MG/DL
CALCIUM SERPL-MCNC: 9.3 MG/DL
CHLORIDE SERPL-SCNC: 106 MMOL/L
CO2 SERPL-SCNC: 30 MMOL/L
CREAT SERPL-MCNC: 0.8 MG/DL
DIFFERENTIAL METHOD: ABNORMAL
EOSINOPHIL # BLD AUTO: 0.1 K/UL
EOSINOPHIL NFR BLD: 3.1 %
ERYTHROCYTE [DISTWIDTH] IN BLOOD BY AUTOMATED COUNT: 13.1 %
EST. GFR  (AFRICAN AMERICAN): >60 ML/MIN/1.73 M^2
EST. GFR  (NON AFRICAN AMERICAN): >60 ML/MIN/1.73 M^2
GLUCOSE SERPL-MCNC: 135 MG/DL
HCT VFR BLD AUTO: 38 %
HGB BLD-MCNC: 12.3 G/DL
IMM GRANULOCYTES # BLD AUTO: 0 K/UL
IMM GRANULOCYTES NFR BLD AUTO: 0 %
LYMPHOCYTES # BLD AUTO: 1.3 K/UL
LYMPHOCYTES NFR BLD: 31.4 %
MCH RBC QN AUTO: 26.5 PG
MCHC RBC AUTO-ENTMCNC: 32.4 G/DL
MCV RBC AUTO: 82 FL
MONOCYTES # BLD AUTO: 0.4 K/UL
MONOCYTES NFR BLD: 8.3 %
NEUTROPHILS # BLD AUTO: 2.4 K/UL
NEUTROPHILS NFR BLD: 57 %
NRBC BLD-RTO: 0 /100 WBC
PLATELET # BLD AUTO: 141 K/UL
PMV BLD AUTO: 9.7 FL
POTASSIUM SERPL-SCNC: 4 MMOL/L
PROT SERPL-MCNC: 6.5 G/DL
RBC # BLD AUTO: 4.64 M/UL
SODIUM SERPL-SCNC: 143 MMOL/L
WBC # BLD AUTO: 4.24 K/UL

## 2017-12-04 PROCEDURE — 80053 COMPREHEN METABOLIC PANEL: CPT

## 2017-12-04 PROCEDURE — 36415 COLL VENOUS BLD VENIPUNCTURE: CPT

## 2017-12-04 PROCEDURE — 86300 IMMUNOASSAY TUMOR CA 15-3: CPT

## 2017-12-04 PROCEDURE — 85025 COMPLETE CBC W/AUTO DIFF WBC: CPT

## 2017-12-04 PROCEDURE — 99214 OFFICE O/P EST MOD 30 MIN: CPT | Mod: S$GLB,,, | Performed by: PHYSICIAN ASSISTANT

## 2017-12-04 PROCEDURE — 99999 PR PBB SHADOW E&M-EST. PATIENT-LVL V: CPT | Mod: PBBFAC,,, | Performed by: PHYSICIAN ASSISTANT

## 2017-12-04 RX ORDER — MORPHINE SULFATE 60 MG/1
TABLET, FILM COATED, EXTENDED RELEASE ORAL
Status: CANCELLED | OUTPATIENT
Start: 2017-12-04

## 2017-12-04 RX ORDER — HYDROMORPHONE HYDROCHLORIDE 4 MG/1
4 TABLET ORAL EVERY 4 HOURS PRN
Qty: 180 TABLET | Refills: 0 | Status: CANCELLED | OUTPATIENT
Start: 2017-12-04

## 2017-12-04 RX ORDER — CYCLOBENZAPRINE HCL 5 MG
5 TABLET ORAL
Refills: 11 | COMMUNITY
Start: 2017-11-09 | End: 2020-10-21

## 2017-12-04 RX ORDER — EXEMESTANE 25 MG/1
25 TABLET ORAL DAILY
Qty: 90 TABLET | Refills: 3 | Status: CANCELLED | OUTPATIENT
Start: 2017-12-04

## 2017-12-04 RX ORDER — DOXYCYCLINE 100 MG/1
100 CAPSULE ORAL 2 TIMES DAILY
Qty: 10 CAPSULE | Refills: 0 | Status: SHIPPED | OUTPATIENT
Start: 2017-12-04 | End: 2018-02-09

## 2017-12-04 NOTE — Clinical Note
Can you refill her Aromasin, MS Contin 60 mg TID and Dilaudid (breakthrough) - she said this might be a little earlier than prior but wants them in anticipation of upcoming cruise she is going on. Thanks.

## 2017-12-04 NOTE — PROGRESS NOTES
"Subjective:       Patient ID: Rebecca Crain is a 62 y.o. female.    Chief Complaint: Malignant neoplasm of central portion of right breast in fem    Ms. Crain is here for followup of metastatic carcinoma of the right breast. She has had multiple endocrine therapies.  She has been on exemestane and Afinitor.  That was started August 2017.     Symptoms overall getting better.   Rash at face, neck and scalp.  Not sleeping well, bouts of insomnia followed by a day or two of sleeping. Takes occasional benadryl for relief.  Patient with continued hot flashes;  Dry mouth continues, on RX toothpaste for relief.   Nausea or headache, these symptoms "rotate" but not at the same time.  Headache encompasses entire head, relieved with fiorcet, if mild doesn't take anything.  No visual changes. Headache present for 6-8 weeks.  Patient with negative CT head in 9/2017.  Still with pain in back, takes occasional dilaudid for breakthrough pain, on MS Contin 60 mg TID.   Going on Cruise 12/31.    Has cracked tooth and hopefully getting crown placed soon.        Breast history: In 2013 she was diagnosed with stage IIB carcinoma of the right breast, ER positive with a low Oncotype score.  She was enrolled on protocol  and randomized to endocrine therapy alone.  She was tried on all 3 aromatase inhibitors and had itching and rash to all of them.  In August 2013 she was started on tamoxifen.   She was found to have  bone metastasis in May 2015, 2015.  A subsequent bone biopsy was performed on a lesion at the T8 vertebra.   The pathology from that was consistent with metastatic breast cancer, ER +80%, [place] +80%, and HER-2 negative.      She received letrozole plus palbociclib from July 2015 until May 2016.  She also received bone protective therapy with Xgeva.      Faslodex was started in June 2016.  Bone scan in February 2017 showed improvement, CT scans showed no new findings.     In May she had more pain.  CA 27.29 had increased " to 94.     Subsequently she has undergone scans.  Bone scan from May 11 showed a new focal uptake at T4 which is felt to be possibly related to a prior compression fraction or a Schmorl's node.  Uptake in T12 was unchanged.     CT scan of the chest abdomen and pelvis showed no evidence of lung metastasis, liver was unremarkable.     She was admitted on September 21  with poorly controlled pain including headache.  She underwent imaging including an negative head CT.  Labs were unremarkable.  Chest CT scan showed sclerotic lesions involving T4 and T10 and possible sclerotic lesion involving the right fifth rib.  This is stable compression fracture T11. Lungs were clear.         Review of Systems   Constitutional: Positive for appetite change (appetite decreased). Negative for unexpected weight change.   HENT: Negative for congestion and mouth sores.    Respiratory: Negative for cough and shortness of breath.    Cardiovascular: Negative for chest pain.   Gastrointestinal: Negative for abdominal pain and diarrhea.   Genitourinary: Negative for frequency.   Musculoskeletal: Positive for back pain (controlled on current pain medications).   Skin: Positive for rash. Negative for pallor.   Neurological: Positive for headaches (see HPI).   Hematological: Negative for adenopathy.   Psychiatric/Behavioral: The patient is not nervous/anxious.        Objective:      Physical Exam   Constitutional: She is oriented to person, place, and time. She appears well-developed and well-nourished. No distress.   Presents alone  ECOG 0   HENT:   Head: Normocephalic and atraumatic.   Mouth/Throat: No oropharyngeal exudate.   Eyes: EOM are normal. Pupils are equal, round, and reactive to light. No scleral icterus.   Neck: Normal range of motion. Neck supple.   Cardiovascular: Normal rate, regular rhythm and normal heart sounds.    Pulmonary/Chest: Effort normal and breath sounds normal. She has no wheezes. She has no rales.   Right and left  breast without mass, nodule or skin changes. No axillary or supraclavicular adenopathy    Lungs clear to auscultation bilaterally   Abdominal: Soft. Bowel sounds are normal. She exhibits no mass. There is no tenderness.   Musculoskeletal: Normal range of motion. She exhibits no edema or deformity.   No spinal or paraspinal tenderness to palpation       Lymphadenopathy:     She has no cervical adenopathy.     She has no axillary adenopathy.        Right: No supraclavicular adenopathy present.        Left: No supraclavicular adenopathy present.   Neurological: She is alert and oriented to person, place, and time. She has normal reflexes. She displays normal reflexes. No cranial nerve deficit. She exhibits normal muscle tone. Coordination normal.   Walking with slight limp to left side  No focal neurologic deficits   Skin: Skin is warm and dry. No rash noted. No erythema.   Acneform rash at chin, bridge of nose and scalp     Psychiatric: She has a normal mood and affect. Her behavior is normal. Judgment and thought content normal.   Patient is teary     Vitals reviewed.      Assessment:       1. Malignant neoplasm of central portion of right breast in female, estrogen receptor positive    2. Bone metastasis    3. Cancer related pain    4. Rash        Plan:       Continue Aromasin and Arimidex and return to clinic in 1 month with repeat labs.  Hold Xgeva today as possible invasive dental work coming up related to recent cracked tooth.  Pain controlled on dilaudid (breakthrough) and  MS Contin 60 mg TID.  Doxycycline for drug related rash.  All questions answered to satisfaction of patient; she knows to call in interim if any issues.

## 2017-12-04 NOTE — PROGRESS NOTES
Subjective:       Patient ID: Rebecca Crain is a 62 y.o. female.    Chief Complaint: Malignant neoplasm of central portion of right breast in fem    HPI  Review of Systems   Constitutional: Positive for appetite change. Negative for unexpected weight change.   Eyes: Negative for visual disturbance.   Respiratory: Negative for cough and shortness of breath.    Cardiovascular: Negative for chest pain.   Gastrointestinal: Negative for abdominal pain and diarrhea.   Genitourinary: Negative for frequency.   Musculoskeletal: Positive for back pain.   Skin: Positive for rash.   Neurological: Positive for headaches.   Hematological: Negative for adenopathy.   Psychiatric/Behavioral: The patient is not nervous/anxious.        Objective:      Physical Exam    Assessment:       1. Rash        Plan:       ***

## 2017-12-04 NOTE — Clinical Note
Alexandre with [facility] and cA27.29; Xgeva the same day.  She can see him either that tday or 1/11, whenever he has availability. thanks

## 2017-12-05 ENCOUNTER — PATIENT MESSAGE (OUTPATIENT)
Dept: HEMATOLOGY/ONCOLOGY | Facility: CLINIC | Age: 62
End: 2017-12-05

## 2017-12-05 DIAGNOSIS — T45.1X5A HOT FLASHES RELATED TO AROMATASE INHIBITOR THERAPY: ICD-10-CM

## 2017-12-05 DIAGNOSIS — R23.2 HOT FLASHES RELATED TO AROMATASE INHIBITOR THERAPY: ICD-10-CM

## 2017-12-05 DIAGNOSIS — C50.111 MALIGNANT NEOPLASM OF CENTRAL PORTION OF RIGHT BREAST IN FEMALE, ESTROGEN RECEPTOR POSITIVE: ICD-10-CM

## 2017-12-05 DIAGNOSIS — Z17.0 MALIGNANT NEOPLASM OF CENTRAL PORTION OF RIGHT BREAST IN FEMALE, ESTROGEN RECEPTOR POSITIVE: ICD-10-CM

## 2017-12-05 LAB — CANCER AG27-29 SERPL-ACNC: 39 U/ML

## 2017-12-05 RX ORDER — CLONIDINE HYDROCHLORIDE 0.1 MG/1
0.1 TABLET ORAL 3 TIMES DAILY
Qty: 270 TABLET | Refills: 3 | Status: SHIPPED | OUTPATIENT
Start: 2017-12-05 | End: 2018-11-01 | Stop reason: SDUPTHER

## 2017-12-05 RX ORDER — EXEMESTANE 25 MG/1
25 TABLET ORAL DAILY
Qty: 90 TABLET | Refills: 3 | Status: SHIPPED | OUTPATIENT
Start: 2017-12-05 | End: 2018-02-09

## 2017-12-05 RX ORDER — HYDROMORPHONE HYDROCHLORIDE 4 MG/1
4 TABLET ORAL EVERY 4 HOURS PRN
Qty: 180 TABLET | Refills: 0 | Status: SHIPPED | OUTPATIENT
Start: 2017-12-05 | End: 2019-04-04 | Stop reason: SDUPTHER

## 2017-12-06 ENCOUNTER — OFFICE VISIT (OUTPATIENT)
Dept: ENDOCRINOLOGY | Facility: CLINIC | Age: 62
End: 2017-12-06
Payer: COMMERCIAL

## 2017-12-06 VITALS
HEIGHT: 64 IN | SYSTOLIC BLOOD PRESSURE: 149 MMHG | WEIGHT: 162.25 LBS | HEART RATE: 82 BPM | RESPIRATION RATE: 16 BRPM | DIASTOLIC BLOOD PRESSURE: 82 MMHG | BODY MASS INDEX: 27.7 KG/M2

## 2017-12-06 DIAGNOSIS — C79.51 METASTATIC CANCER TO SPINE: ICD-10-CM

## 2017-12-06 DIAGNOSIS — I10 BENIGN ESSENTIAL HTN: ICD-10-CM

## 2017-12-06 DIAGNOSIS — E03.8 HYPOTHYROIDISM DUE TO HASHIMOTO'S THYROIDITIS: Primary | ICD-10-CM

## 2017-12-06 DIAGNOSIS — Z17.0 MALIGNANT NEOPLASM OF CENTRAL PORTION OF RIGHT BREAST IN FEMALE, ESTROGEN RECEPTOR POSITIVE: ICD-10-CM

## 2017-12-06 DIAGNOSIS — C50.111 MALIGNANT NEOPLASM OF CENTRAL PORTION OF RIGHT BREAST IN FEMALE, ESTROGEN RECEPTOR POSITIVE: ICD-10-CM

## 2017-12-06 DIAGNOSIS — E06.3 HYPOTHYROIDISM DUE TO HASHIMOTO'S THYROIDITIS: Primary | ICD-10-CM

## 2017-12-06 PROCEDURE — 99204 OFFICE O/P NEW MOD 45 MIN: CPT | Mod: S$GLB,,, | Performed by: INTERNAL MEDICINE

## 2017-12-06 PROCEDURE — 99999 PR PBB SHADOW E&M-EST. PATIENT-LVL III: CPT | Mod: PBBFAC,,, | Performed by: INTERNAL MEDICINE

## 2017-12-06 NOTE — LETTER
December 6, 2017      Rodrigo Schroeder MD  [address]  [facility] [address]           [facility] - Endo/Diab/Metab  [address]  [facility] [address]  Phone: [phone]  Fax: [phone]          Patient: Rebecca Crain   MR Number: [id number]   YOB: 1955   Date of Visit: 12/6/2017       Dear Dr. Rodrigo Schroeder:    Thank you for referring Rebecca Crain to me for evaluation. Attached you will find relevant portions of my assessment and plan of care.    If you have questions, please do not hesitate to call me. I look forward to following Rebecca Crain along with you.    Sincerely,    Katelynn Mcdonald, JESUS    Enclosure  CC:  No Recipients    If you would like to receive this communication electronically, please contact [email or web address] or [phone] to request more information on [employer] Link access.    For providers and/or their staff who would like to refer a patient to [facility], please contact us through our one-stop-shop provider referral line, [facility], at [phone].    If you feel you have received this communication in error or would no longer like to receive these types of communications, please e-mail [email or web address]

## 2017-12-06 NOTE — PROGRESS NOTES
Chief Complaint: Hypothyroidism      HPI:  Rebecca Crain is 62 y.o. female with preexisting thyroid disease. Patient is referred by Dr. Schroeder for further evaluation     She was diagnosed with Hypothyroidism due to Hashimoto's thyroiditis approx. 20 years ago   Currently she is taking Synthroid 150 mcg once daily     She is taking in the morning with all of her medications. She takes vitamins and minerals around 2pm. The patient reports taking LT4 with water and she waits 1 hour before eating     She denies family history of thyroid disease   The patient has + family history of autoimmune disease:   Son -  crohn's disease   Sister - Lupus     Currently, she endorses fatigue, dry skin, dry mouth, constipation, cold intolerance and decreased concentration     The patient has metastatic carcinoma of the right breast, diagnosed with stage IIB in 2013. ER positive with low Oncotype score. She was found to have bone metastasis in May 2015.    According to most recent Oncology note from 12/2017 - the patient will continue Aromasin and Arimidex and return to clinic in 1 month with repeat labs     For bone metastasis - she is taking Xgeva which is on hold for now due to possible invasive dental work     Review of Systems   Constitutional: Positive for fatigue. Negative for unexpected weight change.   HENT:        Dry mouth    Eyes: Negative for visual disturbance.   Respiratory: Negative for cough and shortness of breath.    Cardiovascular: Negative for chest pain and palpitations.   Gastrointestinal: Positive for abdominal pain, constipation and nausea. Negative for vomiting.   Endocrine: Positive for heat intolerance.   Genitourinary: Negative for dysuria and hematuria.   Musculoskeletal: Positive for back pain, gait problem and myalgias.   Skin:        Dry skin    Allergic/Immunologic: Positive for immunocompromised state.   Neurological: Positive for headaches. Negative for tremors.   Hematological: Does not  bruise/bleed easily.   Psychiatric/Behavioral: Positive for decreased concentration. Negative for sleep disturbance.     Physical Exam   Constitutional: She is oriented to person, place, and time. She appears well-developed and well-nourished. No distress.   HENT:   Head: Normocephalic and atraumatic.   Right Ear: External ear normal.   Left Ear: External ear normal.   Eyes: EOM are normal. Pupils are equal, round, and reactive to light.   Neck: Normal range of motion. Neck supple. No tracheal deviation present. No thyromegaly present.   Cardiovascular: Normal rate and regular rhythm.    Pulmonary/Chest: Effort normal and breath sounds normal.   Abdominal: Soft. She exhibits no mass. There is no tenderness.   Musculoskeletal: Normal range of motion. She exhibits no edema.   Lymphadenopathy:     She has no cervical adenopathy.   Neurological: She is alert and oriented to person, place, and time. She displays normal reflexes.   Skin: Skin is warm and dry.   Psychiatric: She has a normal mood and affect. Her behavior is normal.   Nursing note and vitals reviewed.    Labs:  Results for STAN SCHMIDT (MRN [id number]) as of 12/6/2017 08:52   Ref. Range 3/7/2017 07:18 6/29/2017 07:23 10/9/2017 11:38   TSH Latest Ref Range: 0.400 - 4.000 uIU/mL 15.125 (H) 24.904 (H) 2.134   Free T4 Latest Ref Range: 0.71 - 1.51 ng/dL 0.91 0.96      Results for STAN SCHMIDT (MRN [id number]) as of 12/6/2017 08:52   Ref. Range 5/26/2015 13:50   Hemoglobin A1C Latest Ref Range: 4.5 - 6.2 % 5.5   Estimated Avg Glucose Latest Ref Range: 68 - 131 mg/dL 111     Results for STAN SCHMIDT (MRN [id number]) as of 12/6/2017 08:52   Ref. Range 12/4/2017 07:40   Sodium Latest Ref Range: 136 - 145 mmol/L 143   Potassium Latest Ref Range: 3.5 - 5.1 mmol/L 4.0   Chloride Latest Ref Range: 95 - 110 mmol/L 106   CO2 Latest Ref Range: 23 - 29 mmol/L 30 (H)   Anion Gap Latest Ref Range: 8 - 16 mmol/L 7 (L)   BUN, Bld Latest Ref Range: 8 - 23 mg/dL 13    Creatinine Latest Ref Range: 0.5 - 1.4 mg/dL 0.8   eGFR if non African American Latest Ref Range: >60 mL/min/1.73 m^2 >60.0   eGFR if African American Latest Ref Range: >60 mL/min/1.73 m^2 >60.0   Glucose Latest Ref Range: 70 - 110 mg/dL 135 (H)   Calcium Latest Ref Range: 8.7 - 10.5 mg/dL 9.3   Alkaline Phosphatase Latest Ref Range: 55 - 135 U/L 104   Total Protein Latest Ref Range: 6.0 - 8.4 g/dL 6.5   Albumin Latest Ref Range: 3.5 - 5.2 g/dL 3.1 (L)   Total Bilirubin Latest Ref Range: 0.1 - 1.0 mg/dL 0.6   AST Latest Ref Range: 10 - 40 U/L 33   ALT Latest Ref Range: 10 - 44 U/L 42     Assessment and Plan:  1. Hypothyroidism due to Hashimoto's thyroiditis  -- clinically and biochemically euthyroid   -- check TFTs with next set of labs   -- goal of therapy is a normal TSH   -- reinforced to take LT4 as prescribed. On an empty stomach with water ideally an hour before eating or taking other medications. Vitamins and supplements 4 hours apart from LT4   -- avoid exogenous hyperthyroidism as this can accelerate bone loss and increase risk of CV complications   -     TSH; Future; Expected date: 12/06/2017  -     Hemoglobin A1c; Future; Expected date: 12/06/2017   -     Vitamin D; Future; Expected date: 12/06/2017    2. Metastatic cancer to spine  -- continue regimen as prescribed by Oncology     3. Malignant neoplasm of central portion of right breast in female, estrogen receptor positive  -- continue regimen as prescribed by Oncology     4. Benign essential HTN  -- BP elevated today   -- encouraged medication compliance   -- follow a low salt diet   -- continue current regimen         Case discussed in consultation with Dr. Guerrero   Recommendations were discussed with the patient in detail  The patient verbalized understanding and agrees with the treatment plan as outlined above.

## 2017-12-12 ENCOUNTER — PATIENT MESSAGE (OUTPATIENT)
Dept: HEMATOLOGY/ONCOLOGY | Facility: CLINIC | Age: 62
End: 2017-12-12

## 2017-12-22 ENCOUNTER — PATIENT MESSAGE (OUTPATIENT)
Dept: HEMATOLOGY/ONCOLOGY | Facility: CLINIC | Age: 62
End: 2017-12-22

## 2017-12-26 DIAGNOSIS — G62.9 NEUROPATHY: ICD-10-CM

## 2017-12-26 RX ORDER — GABAPENTIN 300 MG/1
300 CAPSULE ORAL 3 TIMES DAILY
Qty: 270 CAPSULE | Refills: 0 | Status: SHIPPED | OUTPATIENT
Start: 2017-12-26 | End: 2018-03-22 | Stop reason: SDUPTHER

## 2017-12-27 ENCOUNTER — PATIENT MESSAGE (OUTPATIENT)
Dept: HEMATOLOGY/ONCOLOGY | Facility: CLINIC | Age: 62
End: 2017-12-27

## 2017-12-28 ENCOUNTER — TELEPHONE (OUTPATIENT)
Dept: PHARMACY | Facility: CLINIC | Age: 62
End: 2017-12-28

## 2017-12-29 ENCOUNTER — PATIENT MESSAGE (OUTPATIENT)
Dept: HEMATOLOGY/ONCOLOGY | Facility: CLINIC | Age: 62
End: 2017-12-29

## 2018-01-02 ENCOUNTER — PATIENT MESSAGE (OUTPATIENT)
Dept: HEMATOLOGY/ONCOLOGY | Facility: CLINIC | Age: 63
End: 2018-01-02

## 2018-01-08 ENCOUNTER — TELEPHONE (OUTPATIENT)
Dept: PHARMACY | Facility: CLINIC | Age: 63
End: 2018-01-08

## 2018-01-08 NOTE — PROGRESS NOTES
Subjective:       Patient ID: Rebecca Crain is a 62 y.o. female.    Chief Complaint: No chief complaint on file.    HPI Ms. Crain is here for followup of metastatic carcinoma of the right breast. She has had multiple endocrine therapies.  She has been on exemestane and Afinitor.  That was started August 2017.     She had a significant flare in her pain at the latter part of December and had to cancel a planned trip.  She took extra pain medication with hydromorphone and also took muscle relaxers for about 6 days then tapered off.  I had her take a break from her exemestane, Afinitor, and cholesterol lowering medication to see if that would help her.  Her pain did improve and then she restarted her exemestane and Lipitor.  She had a recurrence of pain and I advised her to stop her Lipitor.  Overall she is improving but not back to baseline quite yet.  She had a recent URI but some antibiotics and cough medicine for that in her breathing is improved.  Appetite remains poor/decreased.              Breast history: In 2013 she was diagnosed with stage IIB carcinoma of the right breast, ER positive with a low Oncotype score.  She was enrolled on protocol  and randomized to endocrine therapy alone.  She was tried on all 3 aromatase inhibitors and had itching and rash to all of them.  In August 2013 she was started on tamoxifen.   She was found to have  bone metastasis in May 2015, 2015.  A subsequent bone biopsy was performed on a lesion at the T8 vertebra.   The pathology from that was consistent with metastatic breast cancer, ER +80%, PA +80%, and HER-2 negative.      She received letrozole plus palbociclib from July 2015 until May 2016.  She also received bone protective therapy with Xgeva.      Faslodex was started in June 2016.  Bone scan in February 2017 showed improvement, CT scans showed no new findings.     In May she had more pain.  CA 27.29 had increased to 94.     Subsequently she has undergone scans.   Bone scan from May 11 showed a new focal uptake at T4 which is felt to be possibly related to a prior compression fraction or a Schmorl's node.  Uptake in T12 was unchanged.     CT scan of the chest abdomen and pelvis showed no evidence of lung metastasis, liver was unremarkable.    She was admitted on September 21  with poorly controlled pain including headache.  She underwent imaging including an negative head CT.  Labs were unremarkable.  Chest CT scan showed sclerotic lesions involving T4 and T10 and possible sclerotic lesion involving the right fifth rib.  This is stable compression fracture T11. Lungs were clear.        Review of Systems   Constitutional: Negative for appetite change and unexpected weight change.   Eyes: Negative for visual disturbance.   Respiratory: Negative for cough and shortness of breath.    Cardiovascular: Positive for chest pain.   Gastrointestinal: Positive for constipation. Negative for abdominal pain and diarrhea.   Genitourinary: Negative for frequency.   Musculoskeletal: Positive for back pain.   Skin: Negative for rash.   Neurological: Positive for headaches.   Hematological: Negative for adenopathy.   Psychiatric/Behavioral: The patient is not nervous/anxious.        Objective:      Physical Exam   Constitutional: She is oriented to person, place, and time. She appears well-developed and well-nourished.   Cardiovascular: Normal rate.    Pulmonary/Chest: Effort normal and breath sounds normal. She has no wheezes. She has no rales.   Abdominal: Soft. Bowel sounds are normal. She exhibits no distension. There is no tenderness.   Lymphadenopathy:     She has no cervical adenopathy.   Neurological: She is alert and oriented to person, place, and time.   Skin: Skin is warm.   Psychiatric: She has a normal mood and affect. Her behavior is normal. Thought content normal.   Vitals reviewed.      Assessment:     CBC is normal, metabolic profile shows normal hepatic and renal function,  TSH is 7, hemoglobin A1c is 5.5  1. Malignant neoplasm of central portion of right breast in female, estrogen receptor positive    2. 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 Cancer related pain        Plan:         She will continue her exemestane and restart Afinitor.   Continue to hold Xgeva due to her dental issues which she has not resolved yet.  Rest a lot so hectic right  Return in one month with scans.      Distress Screening Results: Psychosocial Distress screening score of Distress Score: 0 noted and reviewed. No intervention indicated.

## 2018-01-09 ENCOUNTER — OFFICE VISIT (OUTPATIENT)
Dept: HEMATOLOGY/ONCOLOGY | Facility: CLINIC | Age: 63
End: 2018-01-09
Payer: COMMERCIAL

## 2018-01-09 ENCOUNTER — LAB VISIT (OUTPATIENT)
Dept: LAB | Facility: HOSPITAL | Age: 63
End: 2018-01-09
Attending: INTERNAL MEDICINE
Payer: COMMERCIAL

## 2018-01-09 VITALS
SYSTOLIC BLOOD PRESSURE: 138 MMHG | DIASTOLIC BLOOD PRESSURE: 86 MMHG | BODY MASS INDEX: 27.47 KG/M2 | WEIGHT: 160.06 LBS | HEART RATE: 89 BPM | RESPIRATION RATE: 16 BRPM | TEMPERATURE: 98 F

## 2018-01-09 DIAGNOSIS — Z17.0 MALIGNANT NEOPLASM OF CENTRAL PORTION OF RIGHT BREAST IN FEMALE, ESTROGEN RECEPTOR POSITIVE: Primary | ICD-10-CM

## 2018-01-09 DIAGNOSIS — C50.111 MALIGNANT NEOPLASM OF CENTRAL PORTION OF RIGHT BREAST IN FEMALE, ESTROGEN RECEPTOR POSITIVE: Primary | ICD-10-CM

## 2018-01-09 DIAGNOSIS — C50.111 MALIGNANT NEOPLASM OF CENTRAL PORTION OF RIGHT BREAST IN FEMALE, ESTROGEN RECEPTOR POSITIVE: ICD-10-CM

## 2018-01-09 DIAGNOSIS — C79.51 METASTATIC CANCER TO SPINE: ICD-10-CM

## 2018-01-09 DIAGNOSIS — E03.8 HYPOTHYROIDISM DUE TO HASHIMOTO'S THYROIDITIS: ICD-10-CM

## 2018-01-09 DIAGNOSIS — E06.3 HYPOTHYROIDISM DUE TO HASHIMOTO'S THYROIDITIS: ICD-10-CM

## 2018-01-09 DIAGNOSIS — G89.3 CANCER RELATED PAIN: ICD-10-CM

## 2018-01-09 DIAGNOSIS — Z17.0 MALIGNANT NEOPLASM OF CENTRAL PORTION OF RIGHT BREAST IN FEMALE, ESTROGEN RECEPTOR POSITIVE: ICD-10-CM

## 2018-01-09 LAB
25(OH)D3+25(OH)D2 SERPL-MCNC: 48 NG/ML
ALBUMIN SERPL BCP-MCNC: 3.3 G/DL
ALP SERPL-CCNC: 140 U/L
ALT SERPL W/O P-5'-P-CCNC: 30 U/L
ANION GAP SERPL CALC-SCNC: 6 MMOL/L
AST SERPL-CCNC: 26 U/L
BILIRUB SERPL-MCNC: 0.7 MG/DL
BUN SERPL-MCNC: 12 MG/DL
CALCIUM SERPL-MCNC: 9 MG/DL
CHLORIDE SERPL-SCNC: 104 MMOL/L
CO2 SERPL-SCNC: 27 MMOL/L
CREAT SERPL-MCNC: 0.9 MG/DL
ERYTHROCYTE [DISTWIDTH] IN BLOOD BY AUTOMATED COUNT: 14.5 %
EST. GFR  (AFRICAN AMERICAN): >60 ML/MIN/1.73 M^2
EST. GFR  (NON AFRICAN AMERICAN): >60 ML/MIN/1.73 M^2
ESTIMATED AVG GLUCOSE: 111 MG/DL
GLUCOSE SERPL-MCNC: 110 MG/DL
HBA1C MFR BLD HPLC: 5.5 %
HCT VFR BLD AUTO: 37.6 %
HGB BLD-MCNC: 12.3 G/DL
IMM GRANULOCYTES # BLD AUTO: 0.02 K/UL
MCH RBC QN AUTO: 26.6 PG
MCHC RBC AUTO-ENTMCNC: 32.7 G/DL
MCV RBC AUTO: 81 FL
NEUTROPHILS # BLD AUTO: 5.1 K/UL
PLATELET # BLD AUTO: 256 K/UL
PMV BLD AUTO: 9.7 FL
POTASSIUM SERPL-SCNC: 4.2 MMOL/L
PROT SERPL-MCNC: 6.9 G/DL
RBC # BLD AUTO: 4.63 M/UL
SODIUM SERPL-SCNC: 137 MMOL/L
T4 FREE SERPL-MCNC: 1.04 NG/DL
TSH SERPL DL<=0.005 MIU/L-ACNC: 7 UIU/ML
WBC # BLD AUTO: 7 K/UL

## 2018-01-09 PROCEDURE — 36415 COLL VENOUS BLD VENIPUNCTURE: CPT

## 2018-01-09 PROCEDURE — 99999 PR PBB SHADOW E&M-EST. PATIENT-LVL III: CPT | Mod: PBBFAC,,, | Performed by: INTERNAL MEDICINE

## 2018-01-09 PROCEDURE — 84439 ASSAY OF FREE THYROXINE: CPT

## 2018-01-09 PROCEDURE — 84443 ASSAY THYROID STIM HORMONE: CPT

## 2018-01-09 PROCEDURE — 99214 OFFICE O/P EST MOD 30 MIN: CPT | Mod: S$GLB,,, | Performed by: INTERNAL MEDICINE

## 2018-01-09 PROCEDURE — 83036 HEMOGLOBIN GLYCOSYLATED A1C: CPT

## 2018-01-09 PROCEDURE — 80053 COMPREHEN METABOLIC PANEL: CPT

## 2018-01-09 PROCEDURE — 82306 VITAMIN D 25 HYDROXY: CPT

## 2018-01-09 PROCEDURE — 86300 IMMUNOASSAY TUMOR CA 15-3: CPT

## 2018-01-09 PROCEDURE — 85027 COMPLETE CBC AUTOMATED: CPT

## 2018-01-10 ENCOUNTER — PATIENT MESSAGE (OUTPATIENT)
Dept: ENDOCRINOLOGY | Facility: CLINIC | Age: 63
End: 2018-01-10

## 2018-01-11 LAB — CANCER AG27-29 SERPL-ACNC: 56.9 U/ML

## 2018-01-12 ENCOUNTER — PATIENT MESSAGE (OUTPATIENT)
Dept: ENDOCRINOLOGY | Facility: CLINIC | Age: 63
End: 2018-01-12

## 2018-01-12 DIAGNOSIS — E03.9 ACQUIRED HYPOTHYROIDISM: ICD-10-CM

## 2018-01-12 DIAGNOSIS — E03.8 HYPOTHYROIDISM DUE TO HASHIMOTO'S THYROIDITIS: Primary | ICD-10-CM

## 2018-01-12 DIAGNOSIS — E06.3 HYPOTHYROIDISM DUE TO HASHIMOTO'S THYROIDITIS: Primary | ICD-10-CM

## 2018-01-12 RX ORDER — LEVOTHYROXINE SODIUM 150 MCG
150 TABLET ORAL DAILY
Qty: 90 TABLET | Refills: 3 | Status: SHIPPED | OUTPATIENT
Start: 2018-01-12 | End: 2018-01-18 | Stop reason: DRUGHIGH

## 2018-01-12 RX ORDER — LEVOTHYROXINE SODIUM 150 MCG
150 TABLET ORAL DAILY
Qty: 90 TABLET | Refills: 3 | Status: CANCELLED | OUTPATIENT
Start: 2018-01-12 | End: 2019-01-12

## 2018-01-17 ENCOUNTER — PATIENT MESSAGE (OUTPATIENT)
Dept: ENDOCRINOLOGY | Facility: CLINIC | Age: 63
End: 2018-01-17

## 2018-01-17 ENCOUNTER — TELEPHONE (OUTPATIENT)
Dept: ENDOCRINOLOGY | Facility: CLINIC | Age: 63
End: 2018-01-17

## 2018-01-17 DIAGNOSIS — E06.3 HYPOTHYROIDISM DUE TO HASHIMOTO'S THYROIDITIS: Primary | ICD-10-CM

## 2018-01-17 DIAGNOSIS — E03.8 HYPOTHYROIDISM DUE TO HASHIMOTO'S THYROIDITIS: Primary | ICD-10-CM

## 2018-01-18 ENCOUNTER — TELEPHONE (OUTPATIENT)
Dept: PHARMACY | Facility: CLINIC | Age: 63
End: 2018-01-18

## 2018-01-18 RX ORDER — LEVOTHYROXINE SODIUM 175 UG/1
175 TABLET ORAL DAILY
Qty: 90 TABLET | Refills: 3 | Status: SHIPPED | OUTPATIENT
Start: 2018-01-18 | End: 2018-09-14 | Stop reason: DRUGHIGH

## 2018-02-02 ENCOUNTER — HOSPITAL ENCOUNTER (OUTPATIENT)
Dept: RADIOLOGY | Facility: HOSPITAL | Age: 63
Discharge: HOME | End: 2018-02-02
Attending: INTERNAL MEDICINE
Payer: COMMERCIAL

## 2018-02-02 DIAGNOSIS — Z17.0 MALIGNANT NEOPLASM OF CENTRAL PORTION OF RIGHT BREAST IN FEMALE, ESTROGEN RECEPTOR POSITIVE: Primary | ICD-10-CM

## 2018-02-02 DIAGNOSIS — Z17.0 MALIGNANT NEOPLASM OF CENTRAL PORTION OF RIGHT BREAST IN FEMALE, ESTROGEN RECEPTOR POSITIVE: ICD-10-CM

## 2018-02-02 DIAGNOSIS — C79.51 METASTATIC CANCER TO SPINE: ICD-10-CM

## 2018-02-02 DIAGNOSIS — C50.111 MALIGNANT NEOPLASM OF CENTRAL PORTION OF RIGHT BREAST IN FEMALE, ESTROGEN RECEPTOR POSITIVE: ICD-10-CM

## 2018-02-02 DIAGNOSIS — C50.111 MALIGNANT NEOPLASM OF CENTRAL PORTION OF RIGHT BREAST IN FEMALE, ESTROGEN RECEPTOR POSITIVE: Primary | ICD-10-CM

## 2018-02-02 PROCEDURE — A9698 NON-RAD CONTRAST MATERIALNOC: HCPCS | Performed by: INTERNAL MEDICINE

## 2018-02-02 PROCEDURE — 25500020 PHARM REV CODE 255: Performed by: INTERNAL MEDICINE

## 2018-02-02 PROCEDURE — 71250 CT THORAX DX C-: CPT | Mod: 26,,, | Performed by: RADIOLOGY

## 2018-02-02 PROCEDURE — 74176 CT ABD & PELVIS W/O CONTRAST: CPT | Mod: 26,,, | Performed by: RADIOLOGY

## 2018-02-02 PROCEDURE — 78306 BONE IMAGING WHOLE BODY: CPT | Mod: 26,,, | Performed by: RADIOLOGY

## 2018-02-02 PROCEDURE — 74176 CT ABD & PELVIS W/O CONTRAST: CPT | Mod: TC

## 2018-02-02 PROCEDURE — 71250 CT THORAX DX C-: CPT | Mod: TC

## 2018-02-02 PROCEDURE — A9503 TC99M MEDRONATE: HCPCS

## 2018-02-02 RX ADMIN — Medication 450 ML: at 01:02

## 2018-02-05 PROBLEM — C78.7 METASTASIS TO LIVER: Status: ACTIVE | Noted: 2018-02-05

## 2018-02-05 NOTE — PROGRESS NOTES
Subjective:       Patient ID: Rebecca Crain is a 62 y.o. female.    Chief Complaint: No chief complaint on file.    HPI Ms. Crain is here for followup of metastatic carcinoma of the right breast. She has had multiple endocrine therapies.  She has been on exemestane and Afinitor.  That was started August 2017.      Severe pain that she had around Jefry time has abated and her major area of pain now has been bilateral knees.  She was seen in orthopedics and given some Motrin for that with benefit; however, she ran out of that medication.  She has been taking some Aleve with benefit.  Appetite is poor.  She denies any shortness of breath.  She continues to sleep poorly.        Breast history: In 2013 she was diagnosed with stage IIB carcinoma of the right breast, ER positive with a low Oncotype score.  She was enrolled on protocol  and randomized to endocrine therapy alone.  She was tried on all 3 aromatase inhibitors and had itching and rash to all of them.  In August 2013 she was started on tamoxifen.   She was found to have  bone metastasis in May 2015, 2015.  A subsequent bone biopsy was performed on a lesion at the T8 vertebra.   The pathology from that was consistent with metastatic breast cancer, ER +80%, OH +80%, and HER-2 negative.      She received letrozole plus palbociclib from July 2015 until May 2016.  She also received bone protective therapy with Xgeva.      Faslodex was started in June 2016.     Exemestane and Afinitor started in August 2017.  Review of Systems   Constitutional: Negative for appetite change and unexpected weight change.   Eyes: Negative for visual disturbance.   Respiratory: Negative for cough and shortness of breath.    Cardiovascular: Negative for chest pain.   Gastrointestinal: Positive for diarrhea. Negative for abdominal pain.   Genitourinary: Negative for frequency.   Musculoskeletal: Positive for back pain.   Skin: Positive for rash.   Neurological: Positive for  headaches.   Hematological: Negative for adenopathy.   Psychiatric/Behavioral: The patient is nervous/anxious.        Objective:      Physical Exam   Constitutional: She is oriented to person, place, and time. She appears well-developed and well-nourished. No distress.   HENT:   Mouth/Throat: No oropharyngeal exudate.   Eyes: No scleral icterus.   Cardiovascular: Regular rhythm.    Pulmonary/Chest: Effort normal and breath sounds normal. She has no wheezes. She has no rales.   Abdominal: Soft. She exhibits no mass. There is no tenderness.   Lymphadenopathy:     She has no cervical adenopathy.   Neurological: She is alert and oriented to person, place, and time.   Psychiatric: She has a normal mood and affect. Her behavior is normal. Thought content normal.       Assessment:     CT scan from February 2 showed a progression of disease with new hepatic lesions and progression of bony lesions.    Bone scan shows extensive diffuse metastatic disease to the entire central axial skeleton.    Metabolic profile shows normal hepatic and renal function, CBC is unremarkable  1. Malignant neoplasm of central portion of right breast in female, estrogen receptor positive    2. Bone metastasis    3. Metastasis to liver        Plan:       I reviewed the CT results and images .  We discussed alternative therapies.  Given that she has had multiple endocrine therapies, I recommend that we consider chemotherapy.  Standard approaches would include weekly Taxol as a single agent or perhaps Xeloda if she wishes to avoid IV chemotherapy.  She may also be a candidate for clinical trial therapy on the RU11 study-Taxol versus erubulin.    She also asked about survival with no further therapy and I gave her an estimate of 3-6 months.    She will need a port placed     Distress Screening Results: Psychosocial Distress screening score of Distress Score: 10 noted and reviewed. A referral to the Psychologist initiated.

## 2018-02-06 ENCOUNTER — OFFICE VISIT (OUTPATIENT)
Dept: HEMATOLOGY/ONCOLOGY | Facility: CLINIC | Age: 63
End: 2018-02-06
Payer: COMMERCIAL

## 2018-02-06 VITALS
DIASTOLIC BLOOD PRESSURE: 114 MMHG | WEIGHT: 158.75 LBS | TEMPERATURE: 98 F | BODY MASS INDEX: 27.25 KG/M2 | RESPIRATION RATE: 17 BRPM | HEART RATE: 83 BPM | SYSTOLIC BLOOD PRESSURE: 193 MMHG

## 2018-02-06 DIAGNOSIS — C79.51 BONE METASTASIS: ICD-10-CM

## 2018-02-06 DIAGNOSIS — C50.111 MALIGNANT NEOPLASM OF CENTRAL PORTION OF RIGHT BREAST IN FEMALE, ESTROGEN RECEPTOR POSITIVE: Primary | ICD-10-CM

## 2018-02-06 DIAGNOSIS — Z17.0 MALIGNANT NEOPLASM OF CENTRAL PORTION OF RIGHT BREAST IN FEMALE, ESTROGEN RECEPTOR POSITIVE: Primary | ICD-10-CM

## 2018-02-06 DIAGNOSIS — C78.7 METASTASIS TO LIVER: ICD-10-CM

## 2018-02-06 DIAGNOSIS — C50.811 MALIGNANT NEOPLASM OF OVERLAPPING SITES OF RIGHT FEMALE BREAST, UNSPECIFIED ESTROGEN RECEPTOR STATUS: Primary | ICD-10-CM

## 2018-02-06 PROCEDURE — 99999 PR PBB SHADOW E&M-EST. PATIENT-LVL III: CPT | Mod: PBBFAC,,, | Performed by: INTERNAL MEDICINE

## 2018-02-06 PROCEDURE — 99215 OFFICE O/P EST HI 40 MIN: CPT | Mod: S$GLB,,, | Performed by: INTERNAL MEDICINE

## 2018-02-06 PROCEDURE — 3008F BODY MASS INDEX DOCD: CPT | Mod: S$GLB,,, | Performed by: INTERNAL MEDICINE

## 2018-02-07 ENCOUNTER — SURGERY (OUTPATIENT)
Age: 63
End: 2018-02-07

## 2018-02-07 ENCOUNTER — HOSPITAL ENCOUNTER (OUTPATIENT)
Facility: HOSPITAL | Age: 63
Discharge: HOME | End: 2018-02-07
Attending: SURGERY | Admitting: SURGERY
Payer: COMMERCIAL

## 2018-02-07 ENCOUNTER — ANESTHESIA EVENT (OUTPATIENT)
Dept: SURGERY | Facility: HOSPITAL | Age: 63
End: 2018-02-07
Payer: COMMERCIAL

## 2018-02-07 ENCOUNTER — ANESTHESIA (OUTPATIENT)
Dept: SURGERY | Facility: HOSPITAL | Age: 63
End: 2018-02-07
Payer: COMMERCIAL

## 2018-02-07 VITALS
DIASTOLIC BLOOD PRESSURE: 74 MMHG | OXYGEN SATURATION: 100 % | SYSTOLIC BLOOD PRESSURE: 137 MMHG | HEART RATE: 64 BPM | TEMPERATURE: 98 F | RESPIRATION RATE: 17 BRPM | WEIGHT: 158 LBS | BODY MASS INDEX: 26.98 KG/M2 | HEIGHT: 64 IN

## 2018-02-07 DIAGNOSIS — C50.911 BREAST CANCER, RIGHT: Primary | ICD-10-CM

## 2018-02-07 PROCEDURE — 37000008 HC ANESTHESIA 1ST 15 MINUTES: Performed by: SURGERY

## 2018-02-07 PROCEDURE — 25000003 PHARM REV CODE 250: Performed by: STUDENT IN AN ORGANIZED HEALTH CARE EDUCATION/TRAINING PROGRAM

## 2018-02-07 PROCEDURE — C1788 PORT, INDWELLING, IMP: HCPCS | Performed by: SURGERY

## 2018-02-07 PROCEDURE — 63600175 PHARM REV CODE 636 W HCPCS: Performed by: STUDENT IN AN ORGANIZED HEALTH CARE EDUCATION/TRAINING PROGRAM

## 2018-02-07 PROCEDURE — 71000044 HC DOSC ROUTINE RECOVERY FIRST HOUR: Performed by: SURGERY

## 2018-02-07 PROCEDURE — 37000009 HC ANESTHESIA EA ADD 15 MINS: Performed by: SURGERY

## 2018-02-07 PROCEDURE — 63600175 PHARM REV CODE 636 W HCPCS: Performed by: SURGERY

## 2018-02-07 PROCEDURE — 25000003 PHARM REV CODE 250: Performed by: SURGERY

## 2018-02-07 PROCEDURE — 77001 FLUOROGUIDE FOR VEIN DEVICE: CPT | Mod: 26,,, | Performed by: SURGERY

## 2018-02-07 PROCEDURE — 71000015 HC POSTOP RECOV 1ST HR: Performed by: SURGERY

## 2018-02-07 PROCEDURE — 63600175 PHARM REV CODE 636 W HCPCS: Performed by: ANESTHESIOLOGY

## 2018-02-07 PROCEDURE — 36561 INSERT TUNNELED CV CATH: CPT | Mod: ,,, | Performed by: SURGERY

## 2018-02-07 PROCEDURE — S0020 INJECTION, BUPIVICAINE HYDRO: HCPCS | Performed by: SURGERY

## 2018-02-07 PROCEDURE — 36000707: Performed by: SURGERY

## 2018-02-07 PROCEDURE — D9220A PRA ANESTHESIA: Mod: ,,, | Performed by: ANESTHESIOLOGY

## 2018-02-07 PROCEDURE — 36000706: Performed by: SURGERY

## 2018-02-07 PROCEDURE — 71000016 HC POSTOP RECOV ADDL HR: Performed by: SURGERY

## 2018-02-07 DEVICE — KIT POWERPORT SINGLE 8FR: Type: IMPLANTABLE DEVICE | Site: CHEST  WALL | Status: FUNCTIONAL

## 2018-02-07 RX ORDER — BUPIVACAINE HYDROCHLORIDE 5 MG/ML
INJECTION, SOLUTION EPIDURAL; INTRACAUDAL
Status: DISCONTINUED | OUTPATIENT
Start: 2018-02-07 | End: 2018-02-07 | Stop reason: HOSPADM

## 2018-02-07 RX ORDER — PROPOFOL 10 MG/ML
VIAL (ML) INTRAVENOUS CONTINUOUS PRN
Status: DISCONTINUED | OUTPATIENT
Start: 2018-02-07 | End: 2018-02-07

## 2018-02-07 RX ORDER — MIDAZOLAM HYDROCHLORIDE 1 MG/ML
2 INJECTION INTRAMUSCULAR; INTRAVENOUS ONCE
Status: COMPLETED | OUTPATIENT
Start: 2018-02-07 | End: 2018-02-07

## 2018-02-07 RX ORDER — KETAMINE HCL IN 0.9 % NACL 50 MG/5 ML
SYRINGE (ML) INTRAVENOUS
Status: DISCONTINUED | OUTPATIENT
Start: 2018-02-07 | End: 2018-02-07

## 2018-02-07 RX ORDER — DEXMEDETOMIDINE HYDROCHLORIDE 100 UG/ML
INJECTION, SOLUTION INTRAVENOUS
Status: DISCONTINUED | OUTPATIENT
Start: 2018-02-07 | End: 2018-02-07

## 2018-02-07 RX ORDER — FENTANYL CITRATE 50 UG/ML
25 INJECTION, SOLUTION INTRAMUSCULAR; INTRAVENOUS
Status: DISPENSED | OUTPATIENT
Start: 2018-02-07 | End: 2018-02-07

## 2018-02-07 RX ORDER — SODIUM CHLORIDE 0.9 % (FLUSH) 0.9 %
3 SYRINGE (ML) INJECTION
Status: DISCONTINUED | OUTPATIENT
Start: 2018-02-07 | End: 2018-02-07 | Stop reason: HOSPADM

## 2018-02-07 RX ORDER — FENTANYL CITRATE 50 UG/ML
25 INJECTION, SOLUTION INTRAMUSCULAR; INTRAVENOUS EVERY 5 MIN PRN
Status: DISCONTINUED | OUTPATIENT
Start: 2018-02-07 | End: 2018-02-07 | Stop reason: HOSPADM

## 2018-02-07 RX ORDER — LIDOCAINE HYDROCHLORIDE 10 MG/ML
1 INJECTION, SOLUTION EPIDURAL; INFILTRATION; INTRACAUDAL; PERINEURAL ONCE
Status: COMPLETED | OUTPATIENT
Start: 2018-02-07 | End: 2018-02-07

## 2018-02-07 RX ORDER — HEPARIN SODIUM (PORCINE) LOCK FLUSH IV SOLN 100 UNIT/ML 100 UNIT/ML
SOLUTION INTRAVENOUS
Status: DISCONTINUED | OUTPATIENT
Start: 2018-02-07 | End: 2018-02-07 | Stop reason: HOSPADM

## 2018-02-07 RX ORDER — LIDOCAINE HCL/PF 100 MG/5ML
SYRINGE (ML) INTRAVENOUS
Status: DISCONTINUED | OUTPATIENT
Start: 2018-02-07 | End: 2018-02-07

## 2018-02-07 RX ORDER — CIPROFLOXACIN 2 MG/ML
INJECTION, SOLUTION INTRAVENOUS
Status: DISCONTINUED | OUTPATIENT
Start: 2018-02-07 | End: 2018-02-07

## 2018-02-07 RX ORDER — MIDAZOLAM HYDROCHLORIDE 1 MG/ML
INJECTION, SOLUTION INTRAMUSCULAR; INTRAVENOUS
Status: DISCONTINUED | OUTPATIENT
Start: 2018-02-07 | End: 2018-02-07

## 2018-02-07 RX ORDER — FENTANYL CITRATE 50 UG/ML
INJECTION, SOLUTION INTRAMUSCULAR; INTRAVENOUS
Status: DISCONTINUED | OUTPATIENT
Start: 2018-02-07 | End: 2018-02-07

## 2018-02-07 RX ADMIN — MIDAZOLAM HYDROCHLORIDE 2 MG: 1 INJECTION, SOLUTION INTRAMUSCULAR; INTRAVENOUS at 02:02

## 2018-02-07 RX ADMIN — FENTANYL CITRATE 25 MCG: 50 INJECTION, SOLUTION INTRAMUSCULAR; INTRAVENOUS at 04:02

## 2018-02-07 RX ADMIN — BUPIVACAINE HYDROCHLORIDE 10 ML: 5 INJECTION, SOLUTION EPIDURAL; INTRACAUDAL; PERINEURAL at 03:02

## 2018-02-07 RX ADMIN — Medication 20 MG: at 02:02

## 2018-02-07 RX ADMIN — LIDOCAINE HYDROCHLORIDE 1 MG: 10 INJECTION, SOLUTION EPIDURAL; INFILTRATION; INTRACAUDAL; PERINEURAL at 12:02

## 2018-02-07 RX ADMIN — LIDOCAINE HYDROCHLORIDE 100 MG: 20 INJECTION, SOLUTION INTRAVENOUS at 02:02

## 2018-02-07 RX ADMIN — DEXMEDETOMIDINE HYDROCHLORIDE 8 MCG: 100 INJECTION, SOLUTION, CONCENTRATE INTRAVENOUS at 02:02

## 2018-02-07 RX ADMIN — DEXMEDETOMIDINE HYDROCHLORIDE 4 MCG: 100 INJECTION, SOLUTION, CONCENTRATE INTRAVENOUS at 02:02

## 2018-02-07 RX ADMIN — PROPOFOL 50 MCG/KG/MIN: 10 INJECTION, EMULSION INTRAVENOUS at 02:02

## 2018-02-07 RX ADMIN — CIPROFLOXACIN 400 MG: 2 INJECTION, SOLUTION INTRAVENOUS at 02:02

## 2018-02-07 RX ADMIN — MIDAZOLAM HYDROCHLORIDE 2 MG: 1 INJECTION, SOLUTION INTRAMUSCULAR; INTRAVENOUS at 01:02

## 2018-02-07 RX ADMIN — HEPARIN SODIUM (PORCINE) LOCK FLUSH IV SOLN 100 UNIT/ML 5 ML: 100 SOLUTION at 03:02

## 2018-02-07 RX ADMIN — FENTANYL CITRATE 100 MCG: 50 INJECTION, SOLUTION INTRAMUSCULAR; INTRAVENOUS at 02:02

## 2018-02-07 RX ADMIN — DEXMEDETOMIDINE HYDROCHLORIDE 28 MCG: 100 INJECTION, SOLUTION, CONCENTRATE INTRAVENOUS at 02:02

## 2018-02-07 RX ADMIN — FENTANYL CITRATE 25 MCG: 50 INJECTION, SOLUTION INTRAMUSCULAR; INTRAVENOUS at 01:02

## 2018-02-07 NOTE — TRANSFER OF CARE
"Anesthesia Transfer of Care Note    Patient: Rebecca Crain    Procedure(s) Performed: Procedure(s) (LRB):  [id number] (Left)    Patient location: PACU    Anesthesia Type: general    Transport from OR: Transported from OR on room air with adequate spontaneous ventilation    Post pain: adequate analgesia    Post assessment: no apparent anesthetic complications    Post vital signs: stable    Level of consciousness: awake, alert and oriented    Nausea/Vomiting: no nausea/vomiting    Complications: none    Transfer of care protocol was followed      Last vitals:   Visit Vitals  BP (!) 143/81   Pulse 60   Temp 36.6 °C (97.8 °F) (Oral)   Resp 16   Ht 5' 4" (1.626 m)   Wt 71.7 kg (158 lb)   LMP 11/08/2009 (Exact Date)   Breastfeeding? No   BMI 27.12 kg/m²     "

## 2018-02-07 NOTE — BRIEF OP NOTE
[facility]  Brief Operative Note     SUMMARY     Surgery Date: 2/7/2018     Surgeon(s) and Role:     * Keith Daniels MD - Primary    Assisting Surgeon: None    Pre-op Diagnosis:  Malignant neoplasm of overlapping sites of right female breast, unspecified estrogen receptor status [C50.811]    Post-op Diagnosis:  Post-Op Diagnosis Codes:     * Malignant neoplasm of overlapping sites of right female breast, unspecified estrogen receptor status [C50.811]    Procedure(s) (LRB):  Mtgaxhwqk-Rxmr-K-Cath (Left)    Anesthesia: Local MAC    Description of the findings of the procedure: Left subclavian port-a-cath placed via fluoroscopic guidance to the atrial-caval junction     Findings/Key Components: Left subclavian port-a-cath placed via fluoroscopic guidance to the atrial-caval junction      Estimated Blood Loss: * No values recorded between 2/7/2018  2:56 PM and 2/7/2018  3:36 PM *         Specimens:   Specimen (12h ago through future)    None          Discharge Note    SUMMARY     Admit Date: 2/7/2018    Discharge Date and Time:  02/07/2018 3:44 PM    Hospital Course (synopsis of major diagnoses, care, treatment, and services provided during the course of the hospital stay): Patient taken to OR given metastatic breast cancer for port-a-cath placement for chemotherapy administration. She was consented to the risks and benefits of the procedure pre-operatively and brought to the OR where she underwent the above stated procedures without any apparent complications. Post-op she was brought to the PACU where she recovered per protocol and was discharged home with follow up when deemed stable.      Final Diagnosis: Post-Op Diagnosis Codes:     * Malignant neoplasm of overlapping sites of right female breast, unspecified estrogen receptor status [C50.811]    Disposition: Home or Self Care    Follow Up/Patient Instructions:     Medications:  Reconciled Home Medications:   Current Discharge Medication  List      CONTINUE these medications which have NOT CHANGED    Details   aspirin (ECOTRIN) 81 MG EC tablet Take 81 mg by mouth once daily.      atorvastatin (LIPITOR) 20 MG tablet Take 1 tablet (20 mg total) by mouth every evening.  Qty: 90 tablet, Refills: 3      calcium gluconate 650 MG tablet Take 650 mg by mouth 2 (two) times daily. Contains Vit D also.       citalopram (CELEXA) 20 MG tablet Take 1 tablet (20 mg total) by mouth once daily.  Qty: 90 tablet, Refills: 3    Associated Diagnoses: Adjustment disorder with depressed mood      cloNIDine (CATAPRES) 0.1 MG tablet Take 1 tablet (0.1 mg total) by mouth 3 (three) times daily.  Qty: 270 tablet, Refills: 3    Associated Diagnoses: Hot flashes related to aromatase inhibitor therapy      cyclobenzaprine (FLEXERIL) 5 MG tablet Take 5 mg by mouth.  Refills: 11      docusate sodium (COLACE) 100 MG capsule Take 100 mg by mouth 2 (two) times daily.      doxycycline (VIBRAMYCIN) 100 MG Cap Take 1 capsule (100 mg total) by mouth 2 (two) times daily.  Qty: 10 capsule, Refills: 0    Associated Diagnoses: Rash      exemestane (AROMASIN) 25 mg tablet Take 1 tablet (25 mg total) by mouth once daily.  Qty: 90 tablet, Refills: 3    Associated Diagnoses: Malignant neoplasm of central portion of right breast in female, estrogen receptor positive      fluocinonide 0.05% (LIDEX) 0.05 % cream AAA on hands BID x 1-2 wks then prn flares only. Avoid face, armpits, and groin.  Qty: 60 g, Refills: 1    Associated Diagnoses: Irritant contact dermatitis, unspecified trigger      gabapentin (NEURONTIN) 300 MG capsule Take 1 capsule (300 mg total) by mouth 3 (three) times daily.  Qty: 270 capsule, Refills: 0    Associated Diagnoses: Neuropathy      HYDROmorphone (DILAUDID) 4 MG tablet Take 1 tablet (4 mg total) by mouth every 4 (four) hours as needed for Pain.  Qty: 180 tablet, Refills: 0    Associated Diagnoses: Malignant neoplasm of central portion of right breast in female, estrogen  receptor positive      hydrOXYzine HCl (ATARAX) 25 MG tablet Take 1 tablet (25 mg total) by mouth 3 (three) times daily as needed for Itching.  Qty: 30 tablet, Refills: 0    Associated Diagnoses: Contact dermatitis, unspecified contact dermatitis type, unspecified trigger      lactulose (CHRONULAC) 10 gram/15 mL solution       morphine 60 mg TR12 Take 60 mg by mouth every 8 (eight) hours.  Qty: 90 tablet, Refills: 0    Associated Diagnoses: Malignant neoplasm of central portion of right breast in female, estrogen receptor positive      multivit-iron-min-folic acid (MULTIVITAMIN-IRON-MINERALS-FOLIC ACID) 3,500-18-0.4 unit-mg-mg Chew Take 1 tablet by mouth once daily.        MV-MINERALS/FA/OMEGA 3,6,9 #3 (XI-FU-VL-OMEGA 3,6,9 #3 ORAL) Take 1 tablet by mouth once daily.        NYSTATIN (DUKE'S SOLUTION) Take 10 mLs by mouth 4 (four) times daily.  Qty: 240 mL, Refills: 2    Comments: Mix in equal parts.  Generic is fine.  Associated Diagnoses: Mucositis      ondansetron (ZOFRAN) 4 MG tablet Take 1 tablet (4 mg total) by mouth every 8 (eight) hours as needed for Nausea.  Qty: 30 tablet, Refills: 11    Associated Diagnoses: Cancer of central portion of right female breast      senna-docusate 8.6-50 mg (PERICOLACE) 8.6-50 mg per tablet Take 1 tablet by mouth 2 (two) times daily.  Qty: 60 tablet, Refills: 11      SYNTHROID 175 mcg tablet Take 1 tablet (175 mcg total) by mouth once daily.  Qty: 90 tablet, Refills: 3    Associated Diagnoses: Hypothyroidism due to Hashimoto's thyroiditis      vitamin D 1000 units Tab Take 2,000 mg by mouth every morning.       diphenhydrAMINE (BENADRYL) 50 MG capsule TAKE 1 CAPSULE 1 HOUR BEFORE CT SCAN  Refills: 0      morphine (MS CONTIN) 60 MG 12 hr tablet       pantoprazole (PROTONIX) 40 MG tablet Take 1 tablet (40 mg total) by mouth 2 (two) times daily before meals.  Qty: 180 tablet, Refills: 3    Associated Diagnoses: Intractable pain             Discharge Procedure Orders  Other  "restrictions (specify):   Order Comments: POSTOPERATIVE INSTRUCTIONS FOLLOWING PORT-A-CATH PLACMENT    The following are post-operative instructions that will help you to recover from your surgery.  Please read over these instructions carefully and contact us if we can answer any of your questions or concerns.    Dressing  You may remove the clear top dressing ("plastic wrap" looking dressing with attached gauze) after 48 hours. Please do not remove the white strips of tape (steri-strips) that cover your incision- they will be removed at your clinic visit. After 48 hours you can shower/wash over the incision/steri-strips with antibacterial soap and warm water. Do not take a tub bath and do not soak the surgical site.     Activity   You should be able to return to your regular activities 2 days after your surgery.  However, do not engage in strenuous activities in which you use your upper body such as:  golf, tennis, aerobics, washing windows, raking the yard, mopping, vacuuming, heavy lifting (e.g children) until you are seen for your follow-up appointment in clinic. Do not lift anything heavier than a gallon of milk.    Medication for pain  You may find that over the counter pain medications may be sufficient for your pain.  You will be given a prescription for pain medication for more severe pain.  You should not drive or operate machinery while taking these.  Please take prescription pain medicine (narcotics) with food.  Narcotics can cause, or worsen, constipation.  You will need to increase your fluid intake, eat high fiber foods (such as fruits and bran) and make sure that you are up and walking. You may need to take an over the counter stool softener for constipation.    Please report the following:  Temperature greater than 101 degrees  Discharge or bad odor from the wound  Excessive bleeding, such as saturated bloody dressing or extreme bruising  Redness at incision and/or drain sites  Swelling or buildup " of fluid around incision  Shortness of breath    Additional information  Your surgeon or medical oncologist will see you approximately 1-2 weeks following your surgery to check the incision.  If this follow-up appointment has not been made, please call the office.    If you have any questions or problems, please call my office or my nurse.    Dr. Юлия Valle, EDEL Coronado, EDEL Coronado, EDEL Hilliard RN  [phone]    After hours and on weekends, you may call the main [facility] line at [phone] and ask to have the general surgery resident paged.     Notify your health care provider if you experience any of the following:  increased confusion or weakness     Notify your health care provider if you experience any of the following:  persistent dizziness, light-headedness, or visual disturbances     Notify your health care provider if you experience any of the following:  severe persistent headache     Notify your health care provider if you experience any of the following:  worsening rash     Notify your health care provider if you experience any of the following:  difficulty breathing or increased cough     Notify your health care provider if you experience any of the following:  redness, tenderness, or signs of infection (pain, swelling, redness, odor or green/yellow discharge around incision site)     Notify your health care provider if you experience any of the following:  severe uncontrolled pain     Notify your health care provider if you experience any of the following:  persistent nausea and vomiting or diarrhea     Notify your health care provider if you experience any of the following:  temperature >100.4     Remove dressing in 48 hours       Follow-up Information     Keith Daniels MD In 2 weeks.    Specialty:  General Surgery  Contact information:  [address]  [facility] [address]  [phone]                 Anju Isbell MD  PGY-1 General Surgery   [phone]

## 2018-02-07 NOTE — H&P
[facility]  Breast Surgery  History & Physical    Patient Name: Rebecca Crain  MRN: [id number]  Admission Date: 2/7/2018      Subjective:     Reason for Admission: Outpatient surgery    History of Present Illness:  Patient is a 62 y.o. female with Hx of IDCA of the R breast (stage IIB) in 2013 with post-operative course complicated by metastatic disease with bony lesion in 2015 and now with progression of bony lesions to diffuse metastatic disease involving the entire central axial skeleton, as well as new hepatic lesions. She is in need of durable central venous access for chemotherapy and presents for Port-A-Cath placement under fluoroscopy.        No current facility-administered medications on file prior to encounter.      Current Outpatient Prescriptions on File Prior to Encounter   Medication Sig    aspirin (ECOTRIN) 81 MG EC tablet Take 81 mg by mouth once daily.    atorvastatin (LIPITOR) 20 MG tablet Take 1 tablet (20 mg total) by mouth every evening.    calcium gluconate 650 MG tablet Take 650 mg by mouth 2 (two) times daily. Contains Vit D also.     citalopram (CELEXA) 20 MG tablet Take 1 tablet (20 mg total) by mouth once daily.    cloNIDine (CATAPRES) 0.1 MG tablet Take 1 tablet (0.1 mg total) by mouth 3 (three) times daily.    cyclobenzaprine (FLEXERIL) 5 MG tablet Take 5 mg by mouth.    docusate sodium (COLACE) 100 MG capsule Take 100 mg by mouth 2 (two) times daily.    doxycycline (VIBRAMYCIN) 100 MG Cap Take 1 capsule (100 mg total) by mouth 2 (two) times daily.    exemestane (AROMASIN) 25 mg tablet Take 1 tablet (25 mg total) by mouth once daily.    fluocinonide 0.05% (LIDEX) 0.05 % cream AAA on hands BID x 1-2 wks then prn flares only. Avoid face, armpits, and groin.    gabapentin (NEURONTIN) 300 MG capsule Take 1 capsule (300 mg total) by mouth 3 (three) times daily.    HYDROmorphone (DILAUDID) 4 MG tablet Take 1 tablet (4 mg total) by mouth every 4 (four) hours as  needed for Pain.    hydrOXYzine HCl (ATARAX) 25 MG tablet Take 1 tablet (25 mg total) by mouth 3 (three) times daily as needed for Itching.    lactulose (CHRONULAC) 10 gram/15 mL solution     morphine 60 mg TR12 Take 60 mg by mouth every 8 (eight) hours.    multivit-iron-min-folic acid (MULTIVITAMIN-IRON-MINERALS-FOLIC ACID) 3,500-18-0.4 unit-mg-mg Chew Take 1 tablet by mouth once daily.      MV-MINERALS/FA/OMEGA 3,6,9 #3 (MZ-TN-HZ-OMEGA 3,6,9 #3 ORAL) Take 1 tablet by mouth once daily.      NYSTATIN (DUKE'S SOLUTION) Take 10 mLs by mouth 4 (four) times daily.    ondansetron (ZOFRAN) 4 MG tablet Take 1 tablet (4 mg total) by mouth every 8 (eight) hours as needed for Nausea.    senna-docusate 8.6-50 mg (PERICOLACE) 8.6-50 mg per tablet Take 1 tablet by mouth 2 (two) times daily.    SYNTHROID 175 mcg tablet Take 1 tablet (175 mcg total) by mouth once daily.    vitamin D 1000 units Tab Take 2,000 mg by mouth every morning.     diphenhydrAMINE (BENADRYL) 50 MG capsule TAKE 1 CAPSULE 1 HOUR BEFORE CT SCAN    morphine (MS CONTIN) 60 MG 12 hr tablet     pantoprazole (PROTONIX) 40 MG tablet Take 1 tablet (40 mg total) by mouth 2 (two) times daily before meals.     Review of patient's allergies indicates:   Allergen Reactions    Adhesive Rash     Tape, Paper tape is OK.    Codeine Itching     Itching very bad to upper body only.    Demerol [meperidine] Itching     To upper body only    Iodine and iodide containing products Anaphylaxis    Penicillins      Ulcers in mouth.    Arimidex [anastrozole] Hives    Aromasin [exemestane]     Clindamycin Itching and Rash     Past Medical History:   Diagnosis Date    Adjustment disorder with depressed mood 2/7/2017    Allergy     Anxiety     Asthma     seasonal    Blood transfusion     1981    Breast cancer 2013    stage IIB carcinoma of the right breast, ER positive with a low Oncotype score    Chemotherapy-induced neuropathy 7/26/2016    Depression      Diverticulosis     Falls frequently 2014    Hepatic cyst 2014    Hx of psychiatric care     Hypercholesteremia     Hypertension     Lung nodule 2014    Lymphedema     S/P right breast mastectomy    Metastatic bone cancer     MVP (mitral valve prolapse)     Osteoporosis, unspecified 2014    Psychiatric problem     Therapy     Thyroid disease     Hasimoto disease     Past Surgical History:   Procedure Laterality Date    BREAST LUMPECTOMY Right     X 2    BREAST RECONSTRUCTION  2013    X 2     SECTION      x2    COLONOSCOPY N/A 2016    Procedure: COLONOSCOPY;  Surgeon: Miguel Vu MD;  Location: [facility] ([address] [place] [address]);  Service: Endoscopy;  Laterality: N/A;  MRSA-at time of scheduling pending results on 2016    endometriosis      EYE SURGERY      RK bilateral     GANGLION CYST EXCISION      right index finger    HEMORRHOID SURGERY      KNEE SURGERY Bilateral     ortho    MASTECTOMY  2013    Bilateral     TONSILLECTOMY      TUBAL LIGATION       Family History     Problem Relation (Age of Onset)    ADD / ADHD Daughter    Arthritis Brother    COPD Paternal Grandfather    Cancer Mother, Father, Paternal Grandmother (94)    Crohn's disease Son    Depression Sister, Brother, Sister, Sister    Heart disease Sister    Hypertension Mother    Melanoma Mother    Ovarian cancer Paternal Grandmother    Physical abuse Father    Stroke Mother        Social History Main Topics    Smoking status: Never Smoker    Smokeless tobacco: Never Used    Alcohol use No    Drug use: No    Sexual activity: Not Currently     Birth control/ protection: Post-menopausal     Review of Systems   Constitutional: Positive for activity change and fatigue. Negative for chills and fever.   HENT: Negative for congestion, rhinorrhea and sore throat.    Eyes: Negative for visual disturbance.   Respiratory: Negative for cough, shortness of breath and wheezing.     Cardiovascular: Negative for chest pain, palpitations and leg swelling.   Gastrointestinal: Positive for diarrhea. Negative for abdominal distention, abdominal pain, constipation, nausea and vomiting.   Genitourinary: Negative for dysuria, frequency and hematuria.   Musculoskeletal: Positive for back pain. Negative for arthralgias and myalgias.   Skin: Negative for color change, rash and wound.   Neurological: Positive for headaches. Negative for syncope, weakness and numbness.   Hematological: Does not bruise/bleed easily.   Psychiatric/Behavioral: Negative for behavioral problems and confusion.        Objective:     Vital Signs (Most Recent):  Temp: 97.8 °F (36.6 °C) (02/07/18 1231)  Pulse: 60 (02/07/18 1231)  Resp: 16 (02/07/18 1231)  BP: (!) 143/81 (02/07/18 1233) Vital Signs (24h Range):  Temp:  [97.8 °F (36.6 °C)] 97.8 °F (36.6 °C)  Pulse:  [60] 60  Resp:  [16] 16  BP: (143)/(81) 143/81     Weight: 71.7 kg (158 lb)  Body mass index is 27.12 kg/m².    Physical Exam   Constitutional: She is oriented to person, place, and time. She appears well-developed and well-nourished. No distress.   HENT:   Head: Normocephalic and atraumatic.   Eyes: EOM are normal.   Neck: Normal range of motion.   Cardiovascular: Normal rate, regular rhythm and intact distal pulses.    Pulmonary/Chest: Effort normal. No respiratory distress. She has no wheezes.   Abdominal: Soft. She exhibits no distension. There is no tenderness.   Musculoskeletal: She exhibits no edema or deformity.   Neurological: She is alert and oriented to person, place, and time.   Skin: Skin is warm and dry. No rash noted. She is not diaphoretic. No erythema.   Psychiatric: She has a normal mood and affect. Her behavior is normal.   Nursing note and vitals reviewed.      Assessment/Plan:   61 y/o female with diffuse metastatic breast cancer    - Plan for Port-A-Cath placement under fluoroscopy today  - Risks, benefits, alternatives to the procedure discussed  with the patient who wishes to proceed  - All questions and concerns addressed  - Consents obtained today      Talha Haque MD  Surgery Resident, PGY-III  Pager: [phone]  2/7/2018 1:25 PM

## 2018-02-07 NOTE — ANESTHESIA PREPROCEDURE EVALUATION
02/07/2018  Rebecca Crain is a 62 y.o., female.    Anesthesia Evaluation         Review of Systems  Anesthesia Hx:  History of prior surgery of interest to airway management or planning: Denies Family Hx of Anesthesia complications.  Denies Personal Hx of Anesthesia complications.   Hematology/Oncology:        Current/Recent Cancer. Breast chemotherapy Oncology Comments: Metastatic to liver and bone     Cardiovascular:   Hypertension    Hepatic/GI:   Liver Disease,    Neurological:   Headaches    Psych:   Psychiatric History          Physical Exam  General:  Well nourished    Airway/Jaw/Neck:  Airway Findings: Mouth Opening: Normal Tongue: Normal  General Airway Assessment: Adult  Mallampati: I  Jaw/Neck Findings:  Neck ROM: Normal ROM      Dental:  Dental Findings: In tact   Chest/Lungs:  Chest/Lungs Findings: Clear to auscultation     Heart/Vascular:  Heart Findings: Rate: Normal  Rhythm: Regular Rhythm  Sounds: Normal        Mental Status:  Mental Status Findings:  Cooperative         Anesthesia Plan  Type of Anesthesia, risks & benefits discussed:  Anesthesia Type:  general  Patient's Preference:   Intra-op Monitoring Plan:   Intra-op Monitoring Plan Comments:   Post Op Pain Control Plan:   Post Op Pain Control Plan Comments:   Induction:   IV  Beta Blocker:  Patient is not currently on a Beta-Blocker (No further documentation required).       Informed Consent: Patient understands risks and agrees with Anesthesia plan.  Questions answered. Anesthesia consent signed with patient.  ASA Score: 3     Day of Surgery Review of History & Physical:    H&P update referred to the surgeon.         Ready For Surgery From Anesthesia Perspective.

## 2018-02-07 NOTE — PROGRESS NOTES
Power port booklet/patient discharge packet forgotten at bedside.  Spoke to daughter, states she will  tomorrow.  Instructed daughter that packet will be left in [facility] office.  Verbalized understanding

## 2018-02-07 NOTE — PLAN OF CARE
Pre op nursing care complete. Patient complains of anxiety and chronic pain bilateral knees 6/10. Anesthesia consent pending. Family at bedside.

## 2018-02-07 NOTE — PROGRESS NOTES
C/O pain to spine and bilat knees rating #10, Dr. Townsend with anesthesia notified. Xray at bedside.

## 2018-02-08 ENCOUNTER — TELEPHONE (OUTPATIENT)
Dept: HEMATOLOGY/ONCOLOGY | Facility: CLINIC | Age: 63
End: 2018-02-08

## 2018-02-08 NOTE — OP NOTE
DATE OF PROCEDURE:  02/07/2018.    PRIMARY SURGEON:  Keith Daniels M.D.    ASSISTANT:  Dr. Ahsan Isbell.    PREOPERATIVE DIAGNOSIS:  Stage IV metastatic breast cancer with need for central   venous access for intravenous systemic cytotoxic chemotherapy.    POSTOPERATIVE DIAGNOSIS:  Stage IV metastatic breast cancer with need for   central venous access for intravenous systemic cytotoxic chemotherapy.    PROCEDURE:  Insertion of left subclavian vein anterior chest wall 8-[place] MRI   compatible PowerPort Port-A-Cath with intraoperative fluoroscopy.    ANESTHESIA:  Monitored anesthesia care with IV sedation and local anesthesia.    PROCEDURE IN DETAIL:  The patient underwent informed consent.  The history and   physical examination was reviewed and updated.  She was brought to the Operating   Room.  She was in a supine position.  A vertical roll was placed parallel to   her spine between her shoulders.  Both arms were tucked.  The head and neck were   slightly extended and the head turned to the right.  The bilateral anterior   neck and chest regions were prepped and draped in a sterile fashion.  Using   standard landmarks at the mid clavicular line at the infraclavicular region, the   left subclavian vein was cannulated percutaneously with good venous blood   return.  The guidewire was advanced into the central venous system under   fluoroscopic guidance.  The tract was enlarged to create the subcutaneous pocket   for 3 to 4 cm.  Hemostasis was achieved.  The port was anchored to the catheter   with the locking hub.  It was anchored into the pocket with interrupted 2-0   Vicryl sutures x3.  The port and catheter system were connected with the locking   hub and flushed with injectable saline.  The catheter tip had been cut to the   appropriate length of approximately 18 cm.  The dilator and peel-away sheath   were advanced over the guidewire under fluoroscopic guidance.  The dilator and   guidewire were  removed.  The catheter was then advanced through the peel-away   sheath without difficulty.  The peel-away sheath was withdrawn.  The catheter   was in the appropriate position in the superior vena cava near the right atrial   junction.  The port was accessed with the Giron needle.  It flushed and   aspirated easily.  It was flushed with a final solution of 100 units of heparin   per mL of saline.  The deep dermal and subcutaneous layers were closed with 3-0   Vicryl suture and the skin closed with a running 4-0 Monocryl subcuticular skin   closure.  Mastisol, Steri-Strips, sterile Telfa gauze and Tegaderm dressings   were applied.  Estimated blood loss was minimal.  Postop chest x-ray would be   pending.  The patient tolerated the procedure well without complication.  There   were no specimens.  The patient was turned over to Anesthesia for transport to   the recovery area in a satisfactory condition.      MERCEDES/JOE  dd: 02/07/2018 18:32:45 (CST)  td: 02/07/2018 22:43:02 (CST)  Doc ID   #[id number]  Job ID #[id number]    CC:

## 2018-02-08 NOTE — TELEPHONE ENCOUNTER
----- Message from Eloisa Sands MA sent at 2/7/2018  7:56 AM CST -----  Patient getting port in today. Please schedule ultrasound, dallin and weekly taxol for next week.

## 2018-02-08 NOTE — TELEPHONE ENCOUNTER
Called and spoke with patient scheduled her apt with Dr. Schroeder tomorrow and her ulstrasound on Saturday

## 2018-02-09 ENCOUNTER — PATIENT MESSAGE (OUTPATIENT)
Dept: HEMATOLOGY/ONCOLOGY | Facility: CLINIC | Age: 63
End: 2018-02-09

## 2018-02-09 ENCOUNTER — OFFICE VISIT (OUTPATIENT)
Dept: HEMATOLOGY/ONCOLOGY | Facility: CLINIC | Age: 63
End: 2018-02-09
Payer: COMMERCIAL

## 2018-02-09 VITALS
TEMPERATURE: 98 F | BODY MASS INDEX: 26.42 KG/M2 | HEART RATE: 77 BPM | SYSTOLIC BLOOD PRESSURE: 196 MMHG | HEIGHT: 64 IN | WEIGHT: 154.75 LBS | DIASTOLIC BLOOD PRESSURE: 109 MMHG | RESPIRATION RATE: 17 BRPM

## 2018-02-09 DIAGNOSIS — C78.7 METASTASIS TO LIVER: ICD-10-CM

## 2018-02-09 DIAGNOSIS — Z51.11 ENCOUNTER FOR ANTINEOPLASTIC CHEMOTHERAPY: ICD-10-CM

## 2018-02-09 DIAGNOSIS — C79.51 BONE METASTASIS: ICD-10-CM

## 2018-02-09 DIAGNOSIS — Z17.0 MALIGNANT NEOPLASM OF CENTRAL PORTION OF RIGHT BREAST IN FEMALE, ESTROGEN RECEPTOR POSITIVE: Primary | ICD-10-CM

## 2018-02-09 DIAGNOSIS — C50.111 MALIGNANT NEOPLASM OF CENTRAL PORTION OF RIGHT BREAST IN FEMALE, ESTROGEN RECEPTOR POSITIVE: Primary | ICD-10-CM

## 2018-02-09 DIAGNOSIS — G89.3 CANCER RELATED PAIN: ICD-10-CM

## 2018-02-09 DIAGNOSIS — C79.51 METASTATIC CANCER TO SPINE: ICD-10-CM

## 2018-02-09 PROCEDURE — 99999 PR PBB SHADOW E&M-EST. PATIENT-LVL III: CPT | Mod: PBBFAC,,, | Performed by: INTERNAL MEDICINE

## 2018-02-09 PROCEDURE — 99213 OFFICE O/P EST LOW 20 MIN: CPT | Mod: S$GLB,,, | Performed by: INTERNAL MEDICINE

## 2018-02-09 PROCEDURE — 3008F BODY MASS INDEX DOCD: CPT | Mod: S$GLB,,, | Performed by: INTERNAL MEDICINE

## 2018-02-09 RX ORDER — HEPARIN 100 UNIT/ML
500 SYRINGE INTRAVENOUS
Status: CANCELLED | OUTPATIENT
Start: 2018-02-16

## 2018-02-09 RX ORDER — HEPARIN 100 UNIT/ML
500 SYRINGE INTRAVENOUS
Status: CANCELLED | OUTPATIENT
Start: 2018-02-23

## 2018-02-09 RX ORDER — SODIUM CHLORIDE 0.9 % (FLUSH) 0.9 %
10 SYRINGE (ML) INJECTION
Status: CANCELLED | OUTPATIENT
Start: 2018-02-23

## 2018-02-09 RX ORDER — SODIUM CHLORIDE 0.9 % (FLUSH) 0.9 %
10 SYRINGE (ML) INJECTION
Status: CANCELLED | OUTPATIENT
Start: 2018-02-16

## 2018-02-09 RX ORDER — DEXAMETHASONE 0.5 MG/1
4 TABLET ORAL
Status: CANCELLED | OUTPATIENT
Start: 2018-02-16

## 2018-02-09 RX ORDER — DEXAMETHASONE 0.5 MG/1
4 TABLET ORAL
Status: CANCELLED | OUTPATIENT
Start: 2018-02-23

## 2018-02-09 NOTE — PROGRESS NOTES
Subjective:       Patient ID: Rebecca Crain is a 62 y.o. female.    Chief Complaint: No chief complaint on file.    HPI Ms. Crain is here for followup of metastatic carcinoma of the right breast. She has had multiple endocrine therapies, most recently exemestane and Afinitor.      CT scan from February 2 showed a progression of disease with new hepatic lesions and progression of bony lesions.    Bone scan showed extensive diffuse metastatic disease to the entire central axial skeleton.        She had a port placed on February 7 by Dr. Daniels.          Breast history: In 2013 she was diagnosed with stage IIB carcinoma of the right breast, ER positive with a low Oncotype score.  She was enrolled on protocol  and randomized to endocrine therapy alone.  She was tried on all 3 aromatase inhibitors and had itching and rash to all of them.  In August 2013 she was started on tamoxifen.   She was found to have  bone metastasis in May 2015, 2015.  A subsequent bone biopsy was performed on a lesion at the T8 vertebra.   The pathology from that was consistent with metastatic breast cancer, ER +80%, MI +80%, and HER-2 negative.      She received letrozole plus palbociclib from July 2015 until May 2016.  She also received bone protective therapy with Xgeva.      Faslodex was started in June 2016.     Exemestane and Afinitor started in August 2017.  Review of Systems       Objective:      Physical Exam   Constitutional: She is oriented to person, place, and time. She appears well-developed and well-nourished. No distress.   Neurological: She is alert and oriented to person, place, and time.   Psychiatric: She has a normal mood and affect. Her behavior is normal. Thought content normal.       Assessment:       1. Malignant neoplasm of central portion of right breast in female, estrogen receptor positive    2. Metastasis to liver    3. 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 Cancer related pain    5. Bone metastasis         Plan:       I discussed options for chemotherapy.  At present her neuropathy seems very mild with her symptoms being primarily paresthesias of her hands; however, she has continued on gabapentin and did have pain early on.  Because of that I am reluctant to use taxanes therapy for fear of poor tolerance.  I recommended that we start with Navelbine and discussed side effects and provided her with written information.  Written informed consent was executed.    She would like to start on February 16.  Plans will be for day 1 and 8 with a 3 week cycle.   I will plan to see her when she begins cycle 2.      Distress Screening Results: Psychosocial Distress screening score of Distress Score: 0 noted and reviewed. No intervention indicated.

## 2018-02-12 ENCOUNTER — PATIENT MESSAGE (OUTPATIENT)
Dept: HEMATOLOGY/ONCOLOGY | Facility: CLINIC | Age: 63
End: 2018-02-12

## 2018-02-14 ENCOUNTER — HOSPITAL ENCOUNTER (OUTPATIENT)
Dept: RADIOLOGY | Facility: HOSPITAL | Age: 63
Discharge: HOME | End: 2018-02-14
Attending: INTERNAL MEDICINE
Payer: COMMERCIAL

## 2018-02-14 DIAGNOSIS — C50.111 MALIGNANT NEOPLASM OF CENTRAL PORTION OF RIGHT BREAST IN FEMALE, ESTROGEN RECEPTOR POSITIVE: ICD-10-CM

## 2018-02-14 DIAGNOSIS — Z17.0 MALIGNANT NEOPLASM OF CENTRAL PORTION OF RIGHT BREAST IN FEMALE, ESTROGEN RECEPTOR POSITIVE: ICD-10-CM

## 2018-02-14 PROCEDURE — 76705 ECHO EXAM OF ABDOMEN: CPT | Mod: 26,,, | Performed by: RADIOLOGY

## 2018-02-14 PROCEDURE — 76705 ECHO EXAM OF ABDOMEN: CPT | Mod: TC

## 2018-02-14 NOTE — ANESTHESIA POSTPROCEDURE EVALUATION
"Anesthesia Post Evaluation    Patient: Rebecca Crain    Procedure(s) Performed: Procedure(s) (LRB):  Qylxelprq-Letq-P-Cath (Left)    Final Anesthesia Type: general  Patient location during evaluation: PACU  Patient participation: Yes- Able to Participate  Level of consciousness: awake and alert  Post-procedure vital signs: reviewed and stable  Pain management: adequate  Airway patency: patent  PONV status at discharge: No PONV  Anesthetic complications: no      Cardiovascular status: blood pressure returned to baseline  Respiratory status: unassisted  Hydration status: euvolemic  Follow-up not needed.        Visit Vitals  /74 (BP Location: Left arm, Patient Position: Lying)   Pulse 64   Temp 36.8 °C (98.2 °F) (Temporal)   Resp 17   Ht 5' 4" (1.626 m)   Wt 71.7 kg (158 lb)   LMP 11/08/2009 (Exact Date)   SpO2 100%   Breastfeeding? No   BMI 27.12 kg/m²       Pain/Rickey Score: No Data Recorded      "

## 2018-02-15 ENCOUNTER — TELEPHONE (OUTPATIENT)
Dept: HEMATOLOGY/ONCOLOGY | Facility: CLINIC | Age: 63
End: 2018-02-15

## 2018-02-15 NOTE — TELEPHONE ENCOUNTER
Returned call, spoke with patient and gave her the chemo time for tomorrow. Patient voiced understanding

## 2018-02-15 NOTE — TELEPHONE ENCOUNTER
----- Message from Eloisa Sands MA sent at 2/15/2018 10:01 AM CST -----  Contact: Pt       ----- Message -----  From: Rodrigo Schroeder MD  Sent: 2/15/2018   9:58 AM  To: Eloisa Sands MA    nope  ----- Message -----  From: Eloisa Sands MA  Sent: 2/15/2018   8:51 AM  To: Rodrigo Schroeder MD        ----- Message -----  From: Maira Anglin  Sent: 2/15/2018   8:31 AM  To: Eloisa Sands MA    Her chemo is approved. Does she need labs before starting tomorrow?  ----- Message -----  From: Louisa Voss RN  Sent: 2/14/2018   1:39 PM  To: Maira Anglin    It looks like it has been approved right?  ----- Message -----  From: Rachel Espitia  Sent: 2/14/2018  11:19 AM  To: [facility] Staff    Pt says she was supposed to start Chemo on Friday and wants to know the status of the treatment    Pt is requesting a callback at [phone]    Thanks

## 2018-02-16 ENCOUNTER — TELEPHONE (OUTPATIENT)
Dept: HEMATOLOGY/ONCOLOGY | Facility: CLINIC | Age: 63
End: 2018-02-16

## 2018-02-16 ENCOUNTER — INFUSION (OUTPATIENT)
Dept: INFUSION THERAPY | Facility: HOSPITAL | Age: 63
End: 2018-02-16
Attending: INTERNAL MEDICINE
Payer: COMMERCIAL

## 2018-02-16 VITALS
TEMPERATURE: 98 F | SYSTOLIC BLOOD PRESSURE: 124 MMHG | HEART RATE: 80 BPM | RESPIRATION RATE: 17 BRPM | DIASTOLIC BLOOD PRESSURE: 69 MMHG

## 2018-02-16 DIAGNOSIS — C50.011 MALIGNANT NEOPLASM OF NIPPLE OF RIGHT BREAST IN FEMALE, UNSPECIFIED ESTROGEN RECEPTOR STATUS: ICD-10-CM

## 2018-02-16 DIAGNOSIS — C79.51 METASTATIC CANCER TO SPINE: ICD-10-CM

## 2018-02-16 DIAGNOSIS — Z17.0 MALIGNANT NEOPLASM OF CENTRAL PORTION OF RIGHT BREAST IN FEMALE, ESTROGEN RECEPTOR POSITIVE: Primary | ICD-10-CM

## 2018-02-16 DIAGNOSIS — Z51.11 ENCOUNTER FOR ANTINEOPLASTIC CHEMOTHERAPY: Primary | ICD-10-CM

## 2018-02-16 DIAGNOSIS — Z17.0 MALIGNANT NEOPLASM OF CENTRAL PORTION OF RIGHT BREAST IN FEMALE, ESTROGEN RECEPTOR POSITIVE: ICD-10-CM

## 2018-02-16 DIAGNOSIS — C50.111 MALIGNANT NEOPLASM OF CENTRAL PORTION OF RIGHT BREAST IN FEMALE, ESTROGEN RECEPTOR POSITIVE: Primary | ICD-10-CM

## 2018-02-16 DIAGNOSIS — C78.7 METASTASIS TO LIVER: ICD-10-CM

## 2018-02-16 DIAGNOSIS — C50.111 MALIGNANT NEOPLASM OF CENTRAL PORTION OF RIGHT BREAST IN FEMALE, ESTROGEN RECEPTOR POSITIVE: ICD-10-CM

## 2018-02-16 PROCEDURE — A4216 STERILE WATER/SALINE, 10 ML: HCPCS | Performed by: INTERNAL MEDICINE

## 2018-02-16 PROCEDURE — 63600175 PHARM REV CODE 636 W HCPCS: Performed by: INTERNAL MEDICINE

## 2018-02-16 PROCEDURE — 25000003 PHARM REV CODE 250: Performed by: INTERNAL MEDICINE

## 2018-02-16 PROCEDURE — 96409 CHEMO IV PUSH SNGL DRUG: CPT

## 2018-02-16 RX ORDER — HEPARIN 100 UNIT/ML
500 SYRINGE INTRAVENOUS
Status: DISCONTINUED | OUTPATIENT
Start: 2018-02-16 | End: 2018-02-16 | Stop reason: HOSPADM

## 2018-02-16 RX ORDER — DEXAMETHASONE 4 MG/1
4 TABLET ORAL
Status: COMPLETED | OUTPATIENT
Start: 2018-02-16 | End: 2018-02-16

## 2018-02-16 RX ORDER — SODIUM CHLORIDE 0.9 % (FLUSH) 0.9 %
10 SYRINGE (ML) INJECTION
Status: DISCONTINUED | OUTPATIENT
Start: 2018-02-16 | End: 2018-02-16 | Stop reason: HOSPADM

## 2018-02-16 RX ADMIN — VINORELBINE TARTRATE 45 MG: 10 INJECTION INTRAVENOUS at 10:02

## 2018-02-16 RX ADMIN — SODIUM CHLORIDE, PRESERVATIVE FREE 10 ML: 5 INJECTION INTRAVENOUS at 10:02

## 2018-02-16 RX ADMIN — HEPARIN 500 UNITS: 100 SYRINGE at 10:02

## 2018-02-16 RX ADMIN — DEXAMETHASONE 4 MG: 4 TABLET ORAL at 09:02

## 2018-02-16 NOTE — TELEPHONE ENCOUNTER
called and spoke with patient in regards to her wanting to get labs from her port. Since she is getting chemo at 8am on 2/23 her labs must come from her vein since they do not start drawing from the port until 8am. Patient voiced understanding and decided she will just get all labs from her vein

## 2018-02-16 NOTE — TELEPHONE ENCOUNTER
----- Message from Eloisa Sands MA sent at 2/16/2018 11:11 AM CST -----  Patient requesting all lab appointments be from the port. I have asked Dr Schroeder to enter orders as clinic collect

## 2018-02-16 NOTE — PLAN OF CARE
Problem: Chemotherapy Effects (Adult)  Goal: Signs and Symptoms of Listed Potential Problems Will be Absent, Minimized or Managed (Chemotherapy Effects)  Signs and symptoms of listed potential problems will be absent, minimized or managed by discharge/transition of care (reference Chemotherapy Effects (Adult) CPG).   Outcome: Ongoing (interventions implemented as appropriate)  Pt here for navelbine infusion D1C1, labs, hx, meds, allergies reviewed. Reclined in chair, no c/o pain, warm blanket provided, continue to monitor

## 2018-02-16 NOTE — PLAN OF CARE
Problem: Patient Care Overview  Goal: Plan of Care Review  Outcome: Ongoing (interventions implemented as appropriate)  Pt tolerated navelbine infusion without issue, avs given, pt to rtc 2/23/17, no distress noted upon d/c to home

## 2018-02-19 DIAGNOSIS — F43.21 ADJUSTMENT DISORDER WITH DEPRESSED MOOD: ICD-10-CM

## 2018-02-19 RX ORDER — CITALOPRAM 20 MG/1
20 TABLET, FILM COATED ORAL DAILY
Qty: 90 TABLET | Refills: 3 | Status: SHIPPED | OUTPATIENT
Start: 2018-02-19 | End: 2018-03-23 | Stop reason: SDUPTHER

## 2018-02-20 ENCOUNTER — TELEPHONE (OUTPATIENT)
Dept: INFUSION THERAPY | Facility: HOSPITAL | Age: 63
End: 2018-02-20

## 2018-02-21 ENCOUNTER — PATIENT MESSAGE (OUTPATIENT)
Dept: HEMATOLOGY/ONCOLOGY | Facility: CLINIC | Age: 63
End: 2018-02-21

## 2018-02-21 DIAGNOSIS — C79.51 METASTATIC CANCER TO SPINE: ICD-10-CM

## 2018-02-21 DIAGNOSIS — Z51.11 ENCOUNTER FOR ANTINEOPLASTIC CHEMOTHERAPY: Primary | ICD-10-CM

## 2018-02-21 RX ORDER — LIDOCAINE AND PRILOCAINE 25; 25 MG/G; MG/G
CREAM TOPICAL
Qty: 5 G | Refills: 3 | Status: SHIPPED | OUTPATIENT
Start: 2018-02-21 | End: 2018-06-25 | Stop reason: SDUPTHER

## 2018-02-21 RX ORDER — TRIAMCINOLONE ACETONIDE 5 MG/G
CREAM TOPICAL 2 TIMES DAILY
Qty: 15 G | Refills: 4 | Status: SHIPPED | OUTPATIENT
Start: 2018-02-21 | End: 2019-04-04

## 2018-02-22 DIAGNOSIS — C50.111 MALIGNANT NEOPLASM OF CENTRAL PORTION OF RIGHT BREAST IN FEMALE, ESTROGEN RECEPTOR POSITIVE: Primary | ICD-10-CM

## 2018-02-22 DIAGNOSIS — Z17.0 MALIGNANT NEOPLASM OF CENTRAL PORTION OF RIGHT BREAST IN FEMALE, ESTROGEN RECEPTOR POSITIVE: Primary | ICD-10-CM

## 2018-02-23 ENCOUNTER — PATIENT MESSAGE (OUTPATIENT)
Dept: HEMATOLOGY/ONCOLOGY | Facility: CLINIC | Age: 63
End: 2018-02-23

## 2018-02-23 ENCOUNTER — INFUSION (OUTPATIENT)
Dept: INFUSION THERAPY | Facility: HOSPITAL | Age: 63
End: 2018-02-23
Attending: INTERNAL MEDICINE
Payer: COMMERCIAL

## 2018-02-23 VITALS
RESPIRATION RATE: 18 BRPM | BODY MASS INDEX: 26.42 KG/M2 | SYSTOLIC BLOOD PRESSURE: 119 MMHG | HEART RATE: 71 BPM | DIASTOLIC BLOOD PRESSURE: 59 MMHG | TEMPERATURE: 98 F | WEIGHT: 154.75 LBS | HEIGHT: 64 IN

## 2018-02-23 DIAGNOSIS — C50.111 MALIGNANT NEOPLASM OF CENTRAL PORTION OF RIGHT BREAST IN FEMALE, ESTROGEN RECEPTOR POSITIVE: ICD-10-CM

## 2018-02-23 DIAGNOSIS — C50.011 MALIGNANT NEOPLASM OF NIPPLE OF RIGHT BREAST IN FEMALE, UNSPECIFIED ESTROGEN RECEPTOR STATUS: ICD-10-CM

## 2018-02-23 DIAGNOSIS — C78.7 METASTASIS TO LIVER: ICD-10-CM

## 2018-02-23 DIAGNOSIS — C79.51 METASTATIC CANCER TO SPINE: ICD-10-CM

## 2018-02-23 DIAGNOSIS — Z17.0 MALIGNANT NEOPLASM OF CENTRAL PORTION OF RIGHT BREAST IN FEMALE, ESTROGEN RECEPTOR POSITIVE: ICD-10-CM

## 2018-02-23 DIAGNOSIS — Z51.11 ENCOUNTER FOR ANTINEOPLASTIC CHEMOTHERAPY: Primary | ICD-10-CM

## 2018-02-23 PROCEDURE — 63600175 PHARM REV CODE 636 W HCPCS: Performed by: INTERNAL MEDICINE

## 2018-02-23 PROCEDURE — 25000003 PHARM REV CODE 250: Performed by: INTERNAL MEDICINE

## 2018-02-23 PROCEDURE — 96409 CHEMO IV PUSH SNGL DRUG: CPT

## 2018-02-23 PROCEDURE — A4216 STERILE WATER/SALINE, 10 ML: HCPCS | Performed by: INTERNAL MEDICINE

## 2018-02-23 RX ORDER — SODIUM CHLORIDE 0.9 % (FLUSH) 0.9 %
10 SYRINGE (ML) INJECTION
Status: DISCONTINUED | OUTPATIENT
Start: 2018-02-23 | End: 2018-02-23 | Stop reason: HOSPADM

## 2018-02-23 RX ORDER — HEPARIN 100 UNIT/ML
500 SYRINGE INTRAVENOUS
Status: DISCONTINUED | OUTPATIENT
Start: 2018-02-23 | End: 2018-02-23 | Stop reason: HOSPADM

## 2018-02-23 RX ORDER — DEXAMETHASONE 4 MG/1
4 TABLET ORAL
Status: COMPLETED | OUTPATIENT
Start: 2018-02-23 | End: 2018-02-23

## 2018-02-23 RX ADMIN — SODIUM CHLORIDE, PRESERVATIVE FREE 10 ML: 5 INJECTION INTRAVENOUS at 09:02

## 2018-02-23 RX ADMIN — HEPARIN 500 UNITS: 100 SYRINGE at 09:02

## 2018-02-23 RX ADMIN — SODIUM CHLORIDE: 9 INJECTION, SOLUTION INTRAVENOUS at 08:02

## 2018-02-23 RX ADMIN — VINORELBINE TARTRATE 45 MG: 10 INJECTION INTRAVENOUS at 08:02

## 2018-02-23 RX ADMIN — DEXAMETHASONE 4 MG: 4 TABLET ORAL at 08:02

## 2018-02-23 NOTE — PLAN OF CARE
Problem: Chemotherapy Effects (Adult)  Goal: Signs and Symptoms of Listed Potential Problems Will be Absent, Minimized or Managed (Chemotherapy Effects)  Signs and symptoms of listed potential problems will be absent, minimized or managed by discharge/transition of care (reference Chemotherapy Effects (Adult) CPG).   Outcome: Ongoing (interventions implemented as appropriate)  Pt here for D8C1 Navelbine. VSS. C/o fatigue,nausea, diarrhea. Consent/labs/meds/allergies reviewed. PAC accessed with blood return noted. All questions answered. Will continue to monitor.

## 2018-02-23 NOTE — PLAN OF CARE
Problem: Patient Care Overview  Goal: Plan of Care Review  Outcome: Ongoing (interventions implemented as appropriate)  Pt tolerated tx without complications. VSS. No s/s of reaction. Instructed to contact MD with any questions. PAC heparin locked and de-accessed. AVS given to patient.

## 2018-03-02 ENCOUNTER — TELEPHONE (OUTPATIENT)
Dept: INFUSION THERAPY | Facility: HOSPITAL | Age: 63
End: 2018-03-02

## 2018-03-08 ENCOUNTER — LAB VISIT (OUTPATIENT)
Dept: LAB | Facility: HOSPITAL | Age: 63
End: 2018-03-08
Attending: INTERNAL MEDICINE
Payer: COMMERCIAL

## 2018-03-08 ENCOUNTER — TELEPHONE (OUTPATIENT)
Dept: SURGERY | Facility: CLINIC | Age: 63
End: 2018-03-08

## 2018-03-08 ENCOUNTER — OFFICE VISIT (OUTPATIENT)
Dept: HEMATOLOGY/ONCOLOGY | Facility: CLINIC | Age: 63
End: 2018-03-08
Payer: COMMERCIAL

## 2018-03-08 VITALS
HEART RATE: 70 BPM | HEIGHT: 64 IN | WEIGHT: 156.5 LBS | SYSTOLIC BLOOD PRESSURE: 123 MMHG | DIASTOLIC BLOOD PRESSURE: 58 MMHG | BODY MASS INDEX: 26.72 KG/M2 | TEMPERATURE: 98 F | OXYGEN SATURATION: 95 % | RESPIRATION RATE: 16 BRPM

## 2018-03-08 DIAGNOSIS — C78.7 METASTASIS TO LIVER: ICD-10-CM

## 2018-03-08 DIAGNOSIS — C50.111 MALIGNANT NEOPLASM OF CENTRAL PORTION OF RIGHT BREAST IN FEMALE, ESTROGEN RECEPTOR POSITIVE: Primary | ICD-10-CM

## 2018-03-08 DIAGNOSIS — Z51.11 ENCOUNTER FOR ANTINEOPLASTIC CHEMOTHERAPY: ICD-10-CM

## 2018-03-08 DIAGNOSIS — C50.111 MALIGNANT NEOPLASM OF CENTRAL PORTION OF RIGHT BREAST IN FEMALE, ESTROGEN RECEPTOR POSITIVE: ICD-10-CM

## 2018-03-08 DIAGNOSIS — D70.1 CHEMOTHERAPY INDUCED NEUTROPENIA: ICD-10-CM

## 2018-03-08 DIAGNOSIS — Z17.0 MALIGNANT NEOPLASM OF CENTRAL PORTION OF RIGHT BREAST IN FEMALE, ESTROGEN RECEPTOR POSITIVE: Primary | ICD-10-CM

## 2018-03-08 DIAGNOSIS — T45.1X5A CHEMOTHERAPY INDUCED NEUTROPENIA: ICD-10-CM

## 2018-03-08 DIAGNOSIS — Z17.0 MALIGNANT NEOPLASM OF CENTRAL PORTION OF RIGHT BREAST IN FEMALE, ESTROGEN RECEPTOR POSITIVE: ICD-10-CM

## 2018-03-08 DIAGNOSIS — R19.7 DIARRHEA, UNSPECIFIED TYPE: ICD-10-CM

## 2018-03-08 DIAGNOSIS — C79.51 METASTATIC CANCER TO SPINE: ICD-10-CM

## 2018-03-08 DIAGNOSIS — E03.9 ACQUIRED HYPOTHYROIDISM: ICD-10-CM

## 2018-03-08 LAB
ALBUMIN SERPL BCP-MCNC: 3.5 G/DL
ALP SERPL-CCNC: 122 U/L
ALT SERPL W/O P-5'-P-CCNC: 18 U/L
ANION GAP SERPL CALC-SCNC: 8 MMOL/L
AST SERPL-CCNC: 22 U/L
BASOPHILS # BLD AUTO: 0.01 K/UL
BASOPHILS NFR BLD: 0.4 %
BILIRUB SERPL-MCNC: 0.8 MG/DL
BUN SERPL-MCNC: 14 MG/DL
CALCIUM SERPL-MCNC: 10.1 MG/DL
CHLORIDE SERPL-SCNC: 103 MMOL/L
CO2 SERPL-SCNC: 26 MMOL/L
CREAT SERPL-MCNC: 0.9 MG/DL
DIFFERENTIAL METHOD: ABNORMAL
EOSINOPHIL # BLD AUTO: 0.1 K/UL
EOSINOPHIL NFR BLD: 2.5 %
ERYTHROCYTE [DISTWIDTH] IN BLOOD BY AUTOMATED COUNT: 17.1 %
EST. GFR  (AFRICAN AMERICAN): >60 ML/MIN/1.73 M^2
EST. GFR  (NON AFRICAN AMERICAN): >60 ML/MIN/1.73 M^2
GLUCOSE SERPL-MCNC: 112 MG/DL
HCT VFR BLD AUTO: 36.5 %
HGB BLD-MCNC: 12.1 G/DL
IMM GRANULOCYTES # BLD AUTO: 0.02 K/UL
IMM GRANULOCYTES NFR BLD AUTO: 0.8 %
LYMPHOCYTES # BLD AUTO: 0.9 K/UL
LYMPHOCYTES NFR BLD: 39.5 %
MCH RBC QN AUTO: 27.1 PG
MCHC RBC AUTO-ENTMCNC: 33.2 G/DL
MCV RBC AUTO: 82 FL
MONOCYTES # BLD AUTO: 0.4 K/UL
MONOCYTES NFR BLD: 14.7 %
NEUTROPHILS # BLD AUTO: 1 K/UL
NEUTROPHILS NFR BLD: 42.1 %
NRBC BLD-RTO: 0 /100 WBC
PLATELET # BLD AUTO: 283 K/UL
PMV BLD AUTO: 9.2 FL
POTASSIUM SERPL-SCNC: 4.3 MMOL/L
PROT SERPL-MCNC: 6.7 G/DL
RBC # BLD AUTO: 4.46 M/UL
SODIUM SERPL-SCNC: 137 MMOL/L
T4 FREE SERPL-MCNC: 1.19 NG/DL
WBC # BLD AUTO: 2.38 K/UL

## 2018-03-08 PROCEDURE — 3074F SYST BP LT 130 MM HG: CPT | Mod: S$GLB,,, | Performed by: PHYSICIAN ASSISTANT

## 2018-03-08 PROCEDURE — 84439 ASSAY OF FREE THYROXINE: CPT

## 2018-03-08 PROCEDURE — 86300 IMMUNOASSAY TUMOR CA 15-3: CPT

## 2018-03-08 PROCEDURE — 36415 COLL VENOUS BLD VENIPUNCTURE: CPT

## 2018-03-08 PROCEDURE — 3078F DIAST BP <80 MM HG: CPT | Mod: S$GLB,,, | Performed by: PHYSICIAN ASSISTANT

## 2018-03-08 PROCEDURE — 80053 COMPREHEN METABOLIC PANEL: CPT

## 2018-03-08 PROCEDURE — 85025 COMPLETE CBC W/AUTO DIFF WBC: CPT

## 2018-03-08 PROCEDURE — 99214 OFFICE O/P EST MOD 30 MIN: CPT | Mod: S$GLB,,, | Performed by: PHYSICIAN ASSISTANT

## 2018-03-08 PROCEDURE — 99999 PR PBB SHADOW E&M-EST. PATIENT-LVL IV: CPT | Mod: PBBFAC,,, | Performed by: PHYSICIAN ASSISTANT

## 2018-03-08 RX ORDER — DEXAMETHASONE 0.5 MG/1
4 TABLET ORAL
Status: CANCELLED | OUTPATIENT
Start: 2018-03-09

## 2018-03-08 RX ORDER — MELOXICAM 15 MG/1
15 TABLET ORAL
Refills: 2 | COMMUNITY
Start: 2018-02-23 | End: 2018-11-29

## 2018-03-08 RX ORDER — SODIUM CHLORIDE 0.9 % (FLUSH) 0.9 %
10 SYRINGE (ML) INJECTION
Status: CANCELLED | OUTPATIENT
Start: 2018-03-09

## 2018-03-08 RX ORDER — HEPARIN 100 UNIT/ML
500 SYRINGE INTRAVENOUS
Status: CANCELLED | OUTPATIENT
Start: 2018-03-09

## 2018-03-08 NOTE — Clinical Note
neupogen on 3/10 and 3/12 (saturday and Monday)- is it possible to do these neupogen injections at [facility]? If not main campus is fine and patient requests 11 am.   CBC/cmp on 3/16 with navelbine the same day, neupogen on 3/17 and 3/19 Alexandre on 3/29 with cbc/cmp/ca.27.29 and navelbine on 3/30

## 2018-03-08 NOTE — TELEPHONE ENCOUNTER
----- Message from Pipo Dove sent at 3/8/2018  8:09 AM CST -----  Patient states that she had a port placed in Feb and believes that she has sutures that need to be removed;pt would like to speak with nurse to see if she needs to come in or not//please call back at [phone]//thank you

## 2018-03-08 NOTE — TELEPHONE ENCOUNTER
Returned call, instructed the pt that the sutures are dissolvable and that sometime the skin will push them out, so she can trim them with a small pair of scissors, if she was not comfortable, will schedule her a nurse visit and I will trim them for her. She stated that she is comfortable  And will call if she has any complications

## 2018-03-08 NOTE — PROGRESS NOTES
"Subjective:       Patient ID: Rebecca Crain is a 62 y.o. female.    Chief Complaint: Malignant neoplasm of central portion of right breast in fem    Ms. Crain is here for followup of metastatic carcinoma of the right breast. She has had multiple endocrine therapies, most recently exemestane and Afinitor.  She recently initiated therapy with Navelbine as CT from 2/2/8 showed progression of disease in the liver and Bone scan showed extensive diffuse metastatic disease to the entire central axial skeleton.    She is due to start C2D1 tomorrow.   Notes some mouth sores the first few days after chemo - "didn't hurt" so didn't pursue any treatment.  Those only happened after week 1.  Joint pain 2-3 days after chemo, gradually gets better.   She has had diarrhea since starting treatment.  She has not taken any medication for diarrhea.   No fever, chills.   She notes fatigue.   No infectious complaints.       Lab Results       Component                Value               Date                       WBC                      2.38 (L)            03/08/2018          (ANC 1.0)       HGB                      12.1                03/08/2018                 HCT                      36.5 (L)            03/08/2018                 MCV                      82                  03/08/2018                 PLT                      283                 03/08/2018                       Breast history: In 2013 she was diagnosed with stage IIB carcinoma of the right breast, ER positive with a low Oncotype score.  She was enrolled on protocol  and randomized to endocrine therapy alone.  She was tried on all 3 aromatase inhibitors and had itching and rash to all of them.  In August 2013 she was started on tamoxifen.   She was found to have  bone metastasis in May 2015, 2015.  A subsequent bone biopsy was performed on a lesion at the T8 vertebra.   The pathology from that was consistent with metastatic breast cancer, ER +80%, TX +80%, and " HER-2 negative.      She received letrozole plus palbociclib from July 2015 until May 2016.  She also received bone protective therapy with Xgeva.      Faslodex was started in June 2016.      Exemestane and Afinitor started in August 2017.      Review of Systems   Constitutional: Positive for appetite change (appetite decreased). Negative for unexpected weight change.   HENT: Negative for congestion, mouth sores and trouble swallowing.    Eyes: Negative for visual disturbance.   Respiratory: Negative for cough, chest tightness and shortness of breath.    Cardiovascular: Negative for chest pain.   Gastrointestinal: Positive for diarrhea (since starting navelbine). Negative for abdominal pain.   Genitourinary: Negative for dysuria and frequency.   Musculoskeletal: Positive for back pain (some worsening of upper left lateral back pain, felt on palpation).   Skin: Positive for rash. Negative for pallor.   Neurological: Negative for dizziness, weakness and headaches.   Hematological: Negative for adenopathy.   Psychiatric/Behavioral: The patient is not nervous/anxious.        Objective:      Physical Exam   Constitutional: She is oriented to person, place, and time. She appears well-developed and well-nourished. No distress.   Presents alone  ECOG 0  Ambulates with walker   HENT:   Head: Normocephalic and atraumatic.   Mouth/Throat: No oropharyngeal exudate.   Eyes: EOM are normal. Pupils are equal, round, and reactive to light. No scleral icterus.   Neck: Normal range of motion. Neck supple.   Cardiovascular: Normal rate, regular rhythm and normal heart sounds.    Pulmonary/Chest: Effort normal and breath sounds normal. She has no wheezes. She has no rales.     Lungs clear to auscultation bilaterally   Abdominal: Soft. Bowel sounds are normal. She exhibits no mass. There is no tenderness.   Musculoskeletal: Normal range of motion. She exhibits no edema or deformity.   Some mild tenderness to palpation at left upper  back, no pain at midline         Lymphadenopathy:     She has no cervical adenopathy.     She has no axillary adenopathy.        Right: No supraclavicular adenopathy present.        Left: No supraclavicular adenopathy present.   Neurological: She is alert and oriented to person, place, and time. She has normal reflexes. She displays normal reflexes. No cranial nerve deficit. She exhibits normal muscle tone. Coordination normal.   Walking with slight limp to left side  No focal neurologic deficits   Skin: Skin is warm and dry. No rash noted. No erythema.   Acneform rash at chin, bridge of nose and scalp     Psychiatric: She has a normal mood and affect. Her behavior is normal. Judgment and thought content normal.   Patient is teary     Vitals reviewed.      Assessment:       1. Malignant neoplasm of central portion of right breast in female, estrogen receptor positive    2. 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 Metastasis to liver    4. Chemotherapy induced neutropenia    5. Diarrhea, unspecified type        Plan:        1-3)tolerating treatment overall fairly well, continue with C2D1 tomorrow and return weekly to complete cycle. RTC to see Dr. Schroeder in 3 weeks with labs.  4)mild, no infectious signs/symptoms. Plan to give neupogen on days 2 and 4.  Patient knows to call if she develops fever or infectious signs/symptoms.  5)onset with start of new chemotherapy; she was instructed to use Imodium as needed.  All questions answered to satisfaction of patient.     Distress Screening Results: Psychosocial Distress screening score of Distress Score: 0 noted and reviewed. No intervention indicated.

## 2018-03-09 ENCOUNTER — INFUSION (OUTPATIENT)
Dept: INFUSION THERAPY | Facility: HOSPITAL | Age: 63
End: 2018-03-09
Attending: INTERNAL MEDICINE
Payer: COMMERCIAL

## 2018-03-09 VITALS
SYSTOLIC BLOOD PRESSURE: 122 MMHG | RESPIRATION RATE: 16 BRPM | HEART RATE: 63 BPM | TEMPERATURE: 98 F | DIASTOLIC BLOOD PRESSURE: 56 MMHG

## 2018-03-09 DIAGNOSIS — C50.111 MALIGNANT NEOPLASM OF CENTRAL PORTION OF RIGHT BREAST IN FEMALE, ESTROGEN RECEPTOR POSITIVE: ICD-10-CM

## 2018-03-09 DIAGNOSIS — C79.51 METASTATIC CANCER TO SPINE: ICD-10-CM

## 2018-03-09 DIAGNOSIS — C78.7 METASTASIS TO LIVER: ICD-10-CM

## 2018-03-09 DIAGNOSIS — Z17.0 MALIGNANT NEOPLASM OF CENTRAL PORTION OF RIGHT BREAST IN FEMALE, ESTROGEN RECEPTOR POSITIVE: ICD-10-CM

## 2018-03-09 DIAGNOSIS — C50.011 MALIGNANT NEOPLASM OF NIPPLE OF RIGHT BREAST IN FEMALE, UNSPECIFIED ESTROGEN RECEPTOR STATUS: ICD-10-CM

## 2018-03-09 DIAGNOSIS — Z51.11 ENCOUNTER FOR ANTINEOPLASTIC CHEMOTHERAPY: Primary | ICD-10-CM

## 2018-03-09 LAB — CANCER AG27-29 SERPL-ACNC: 78.1 U/ML

## 2018-03-09 PROCEDURE — 25000003 PHARM REV CODE 250: Performed by: PEDIATRICS

## 2018-03-09 PROCEDURE — A4216 STERILE WATER/SALINE, 10 ML: HCPCS | Performed by: INTERNAL MEDICINE

## 2018-03-09 PROCEDURE — 63600175 PHARM REV CODE 636 W HCPCS: Performed by: INTERNAL MEDICINE

## 2018-03-09 PROCEDURE — 25000003 PHARM REV CODE 250: Performed by: INTERNAL MEDICINE

## 2018-03-09 PROCEDURE — 96409 CHEMO IV PUSH SNGL DRUG: CPT

## 2018-03-09 RX ORDER — SODIUM CHLORIDE 0.9 % (FLUSH) 0.9 %
10 SYRINGE (ML) INJECTION
Status: DISCONTINUED | OUTPATIENT
Start: 2018-03-09 | End: 2018-03-09 | Stop reason: HOSPADM

## 2018-03-09 RX ORDER — HEPARIN 100 UNIT/ML
500 SYRINGE INTRAVENOUS
Status: DISCONTINUED | OUTPATIENT
Start: 2018-03-09 | End: 2018-03-09 | Stop reason: HOSPADM

## 2018-03-09 RX ORDER — DEXAMETHASONE 4 MG/1
4 TABLET ORAL
Status: COMPLETED | OUTPATIENT
Start: 2018-03-09 | End: 2018-03-09

## 2018-03-09 RX ADMIN — HEPARIN 500 UNITS: 100 SYRINGE at 09:03

## 2018-03-09 RX ADMIN — SODIUM CHLORIDE, PRESERVATIVE FREE 10 ML: 5 INJECTION INTRAVENOUS at 09:03

## 2018-03-09 RX ADMIN — DEXAMETHASONE 4 MG: 4 TABLET ORAL at 07:03

## 2018-03-09 RX ADMIN — VINORELBINE TARTRATE 45 MG: 10 INJECTION INTRAVENOUS at 09:03

## 2018-03-09 RX ADMIN — SODIUM CHLORIDE: 9 INJECTION, SOLUTION INTRAVENOUS at 07:03

## 2018-03-09 NOTE — PLAN OF CARE
Problem: Patient Care Overview  Goal: Plan of Care Review  Outcome: Ongoing (interventions implemented as appropriate)  Pt's ANC 1000. Per CANDICE Dias, okay to give today's treatment. Pt tolerated Navelbine with no complications. Pt instructed to call MD with any problems and RTC tomorrow and Monday for Granix injection. NAD. AVS given to patient and patient discharged home.

## 2018-03-10 ENCOUNTER — INFUSION (OUTPATIENT)
Dept: INFUSION THERAPY | Facility: HOSPITAL | Age: 63
End: 2018-03-10
Attending: INTERNAL MEDICINE
Payer: COMMERCIAL

## 2018-03-10 DIAGNOSIS — Z51.11 ENCOUNTER FOR ANTINEOPLASTIC CHEMOTHERAPY: Primary | ICD-10-CM

## 2018-03-10 DIAGNOSIS — C50.111 MALIGNANT NEOPLASM OF CENTRAL PORTION OF RIGHT BREAST IN FEMALE, ESTROGEN RECEPTOR POSITIVE: ICD-10-CM

## 2018-03-10 DIAGNOSIS — C78.7 METASTASIS TO LIVER: ICD-10-CM

## 2018-03-10 DIAGNOSIS — C50.011 MALIGNANT NEOPLASM OF NIPPLE OF RIGHT BREAST IN FEMALE, UNSPECIFIED ESTROGEN RECEPTOR STATUS: ICD-10-CM

## 2018-03-10 DIAGNOSIS — Z17.0 MALIGNANT NEOPLASM OF CENTRAL PORTION OF RIGHT BREAST IN FEMALE, ESTROGEN RECEPTOR POSITIVE: ICD-10-CM

## 2018-03-10 DIAGNOSIS — C79.51 METASTATIC CANCER TO SPINE: ICD-10-CM

## 2018-03-10 PROCEDURE — 63600175 PHARM REV CODE 636 W HCPCS: Mod: TB | Performed by: PHYSICIAN ASSISTANT

## 2018-03-10 PROCEDURE — 96372 THER/PROPH/DIAG INJ SC/IM: CPT

## 2018-03-10 RX ORDER — LOPERAMIDE HCL 2 MG
2 TABLET ORAL 3 TIMES DAILY PRN
Status: ON HOLD | COMMUNITY
End: 2021-08-25 | Stop reason: HOSPADM

## 2018-03-10 RX ADMIN — FILGRASTIM-SNDZ 480 MCG: 480 INJECTION, SOLUTION INTRAVENOUS; SUBCUTANEOUS at 10:03

## 2018-03-10 NOTE — NURSING
Pt. Tolerated treatment well. Discharged without complaints or signs of adverse effects. Pt. aware of Neupogen injection appointment for Monday.

## 2018-03-12 ENCOUNTER — INFUSION (OUTPATIENT)
Dept: INFUSION THERAPY | Facility: HOSPITAL | Age: 63
End: 2018-03-12
Attending: INTERNAL MEDICINE
Payer: COMMERCIAL

## 2018-03-12 DIAGNOSIS — Z17.0 MALIGNANT NEOPLASM OF CENTRAL PORTION OF RIGHT BREAST IN FEMALE, ESTROGEN RECEPTOR POSITIVE: ICD-10-CM

## 2018-03-12 DIAGNOSIS — C50.011 MALIGNANT NEOPLASM OF NIPPLE OF RIGHT BREAST IN FEMALE, UNSPECIFIED ESTROGEN RECEPTOR STATUS: ICD-10-CM

## 2018-03-12 DIAGNOSIS — Z51.11 ENCOUNTER FOR ANTINEOPLASTIC CHEMOTHERAPY: Primary | ICD-10-CM

## 2018-03-12 DIAGNOSIS — C79.51 METASTATIC CANCER TO SPINE: ICD-10-CM

## 2018-03-12 DIAGNOSIS — C78.7 METASTASIS TO LIVER: ICD-10-CM

## 2018-03-12 DIAGNOSIS — C50.111 MALIGNANT NEOPLASM OF CENTRAL PORTION OF RIGHT BREAST IN FEMALE, ESTROGEN RECEPTOR POSITIVE: ICD-10-CM

## 2018-03-12 PROCEDURE — 96372 THER/PROPH/DIAG INJ SC/IM: CPT

## 2018-03-12 PROCEDURE — 63600175 PHARM REV CODE 636 W HCPCS: Mod: TB | Performed by: PHYSICIAN ASSISTANT

## 2018-03-12 RX ADMIN — FILGRASTIM-SNDZ 480 MCG: 480 INJECTION, SOLUTION INTRAVENOUS; SUBCUTANEOUS at 11:03

## 2018-03-16 ENCOUNTER — INFUSION (OUTPATIENT)
Dept: INFUSION THERAPY | Facility: HOSPITAL | Age: 63
End: 2018-03-16
Attending: INTERNAL MEDICINE
Payer: COMMERCIAL

## 2018-03-16 VITALS
BODY MASS INDEX: 26.72 KG/M2 | SYSTOLIC BLOOD PRESSURE: 131 MMHG | RESPIRATION RATE: 16 BRPM | DIASTOLIC BLOOD PRESSURE: 72 MMHG | HEIGHT: 64 IN | WEIGHT: 156.5 LBS | HEART RATE: 60 BPM | TEMPERATURE: 98 F

## 2018-03-16 DIAGNOSIS — C79.51 METASTATIC CANCER TO SPINE: ICD-10-CM

## 2018-03-16 DIAGNOSIS — C50.111 MALIGNANT NEOPLASM OF CENTRAL PORTION OF RIGHT BREAST IN FEMALE, ESTROGEN RECEPTOR POSITIVE: ICD-10-CM

## 2018-03-16 DIAGNOSIS — Z17.0 MALIGNANT NEOPLASM OF CENTRAL PORTION OF RIGHT BREAST IN FEMALE, ESTROGEN RECEPTOR POSITIVE: ICD-10-CM

## 2018-03-16 DIAGNOSIS — C78.7 METASTASIS TO LIVER: ICD-10-CM

## 2018-03-16 DIAGNOSIS — Z51.11 ENCOUNTER FOR ANTINEOPLASTIC CHEMOTHERAPY: Primary | ICD-10-CM

## 2018-03-16 DIAGNOSIS — C50.011 MALIGNANT NEOPLASM OF NIPPLE OF RIGHT BREAST IN FEMALE, UNSPECIFIED ESTROGEN RECEPTOR STATUS: ICD-10-CM

## 2018-03-16 PROCEDURE — 96413 CHEMO IV INFUSION 1 HR: CPT

## 2018-03-16 PROCEDURE — 25000003 PHARM REV CODE 250: Performed by: INTERNAL MEDICINE

## 2018-03-16 PROCEDURE — 63600175 PHARM REV CODE 636 W HCPCS: Performed by: INTERNAL MEDICINE

## 2018-03-16 RX ORDER — SODIUM CHLORIDE 0.9 % (FLUSH) 0.9 %
10 SYRINGE (ML) INJECTION
Status: CANCELLED | OUTPATIENT
Start: 2018-03-16

## 2018-03-16 RX ORDER — HEPARIN 100 UNIT/ML
500 SYRINGE INTRAVENOUS
Status: DISCONTINUED | OUTPATIENT
Start: 2018-03-16 | End: 2018-03-16 | Stop reason: HOSPADM

## 2018-03-16 RX ORDER — HEPARIN 100 UNIT/ML
500 SYRINGE INTRAVENOUS
Status: CANCELLED | OUTPATIENT
Start: 2018-03-16

## 2018-03-16 RX ORDER — DEXAMETHASONE 0.5 MG/1
4 TABLET ORAL
Status: CANCELLED | OUTPATIENT
Start: 2018-03-16

## 2018-03-16 RX ORDER — DEXAMETHASONE 4 MG/1
4 TABLET ORAL
Status: COMPLETED | OUTPATIENT
Start: 2018-03-16 | End: 2018-03-16

## 2018-03-16 RX ORDER — SODIUM CHLORIDE 0.9 % (FLUSH) 0.9 %
10 SYRINGE (ML) INJECTION
Status: DISCONTINUED | OUTPATIENT
Start: 2018-03-16 | End: 2018-03-16 | Stop reason: HOSPADM

## 2018-03-16 RX ADMIN — SODIUM CHLORIDE: 9 INJECTION, SOLUTION INTRAVENOUS at 09:03

## 2018-03-16 RX ADMIN — HEPARIN 500 UNITS: 100 SYRINGE at 10:03

## 2018-03-16 RX ADMIN — VINORELBINE TARTRATE 45 MG: 10 INJECTION INTRAVENOUS at 09:03

## 2018-03-16 RX ADMIN — DEXAMETHASONE 4 MG: 4 TABLET ORAL at 09:03

## 2018-03-16 NOTE — PLAN OF CARE
Problem: Patient Care Overview  Goal: Plan of Care Review  Outcome: Ongoing (interventions implemented as appropriate)  Patient tolerated Navelbine infusion well.  VSS, NAD noted.  Released to home in stable condition.  To return tomorrow for injection.

## 2018-03-17 ENCOUNTER — INFUSION (OUTPATIENT)
Dept: INFUSION THERAPY | Facility: HOSPITAL | Age: 63
End: 2018-03-17
Attending: INTERNAL MEDICINE
Payer: COMMERCIAL

## 2018-03-17 VITALS — SYSTOLIC BLOOD PRESSURE: 139 MMHG | DIASTOLIC BLOOD PRESSURE: 91 MMHG | HEART RATE: 86 BPM | RESPIRATION RATE: 16 BRPM

## 2018-03-17 DIAGNOSIS — C50.111 MALIGNANT NEOPLASM OF CENTRAL PORTION OF RIGHT BREAST IN FEMALE, ESTROGEN RECEPTOR POSITIVE: ICD-10-CM

## 2018-03-17 DIAGNOSIS — Z51.11 ENCOUNTER FOR ANTINEOPLASTIC CHEMOTHERAPY: Primary | ICD-10-CM

## 2018-03-17 DIAGNOSIS — C50.011 MALIGNANT NEOPLASM OF NIPPLE OF RIGHT BREAST IN FEMALE, UNSPECIFIED ESTROGEN RECEPTOR STATUS: ICD-10-CM

## 2018-03-17 DIAGNOSIS — Z17.0 MALIGNANT NEOPLASM OF CENTRAL PORTION OF RIGHT BREAST IN FEMALE, ESTROGEN RECEPTOR POSITIVE: ICD-10-CM

## 2018-03-17 DIAGNOSIS — C78.7 METASTASIS TO LIVER: ICD-10-CM

## 2018-03-17 DIAGNOSIS — C79.51 METASTATIC CANCER TO SPINE: ICD-10-CM

## 2018-03-17 PROCEDURE — 63600175 PHARM REV CODE 636 W HCPCS: Mod: TB | Performed by: INTERNAL MEDICINE

## 2018-03-17 PROCEDURE — 96372 THER/PROPH/DIAG INJ SC/IM: CPT

## 2018-03-17 RX ADMIN — FILGRASTIM-SNDZ 480 MCG: 480 INJECTION, SOLUTION INTRAVENOUS; SUBCUTANEOUS at 10:03

## 2018-03-17 NOTE — PLAN OF CARE
Problem: Patient Care Overview  Goal: Plan of Care Review  Outcome: Ongoing (interventions implemented as appropriate)  Patient here for Zarxio injection, VSS, NAD noted.  Tolerated injection well.  Released to home in stable condition.

## 2018-03-19 ENCOUNTER — INFUSION (OUTPATIENT)
Dept: INFUSION THERAPY | Facility: HOSPITAL | Age: 63
End: 2018-03-19
Attending: INTERNAL MEDICINE
Payer: COMMERCIAL

## 2018-03-19 DIAGNOSIS — C50.119: Primary | ICD-10-CM

## 2018-03-19 PROCEDURE — 63600175 PHARM REV CODE 636 W HCPCS: Mod: TB | Performed by: INTERNAL MEDICINE

## 2018-03-19 PROCEDURE — 96372 THER/PROPH/DIAG INJ SC/IM: CPT

## 2018-03-19 PROCEDURE — 25000003 PHARM REV CODE 250: Performed by: INTERNAL MEDICINE

## 2018-03-19 RX ORDER — DIPHENHYDRAMINE HCL 25 MG
25 CAPSULE ORAL
Status: COMPLETED | OUTPATIENT
Start: 2018-03-19 | End: 2018-03-19

## 2018-03-19 RX ADMIN — DIPHENHYDRAMINE HYDROCHLORIDE 25 MG: 25 CAPSULE ORAL at 11:03

## 2018-03-19 RX ADMIN — FILGRASTIM-SNDZ 480 MCG: 480 INJECTION, SOLUTION INTRAVENOUS; SUBCUTANEOUS at 11:03

## 2018-03-19 NOTE — NURSING
Patient here for zarxio#2, patient reports after about  4 hours after receiving first dose face became very red and swollen, lasting all day.  Denies sob or throat tightening, no itching or rashes.  States swelling and redness resolved the next morning. Dr Schroeder notified of possible reaction, verbal order for benadryl 25mg po to be given before zarxio.  Patient update on plan of care, verbalized understanding.

## 2018-03-22 DIAGNOSIS — G62.9 NEUROPATHY: ICD-10-CM

## 2018-03-22 DIAGNOSIS — Z17.0 MALIGNANT NEOPLASM OF CENTRAL PORTION OF RIGHT BREAST IN FEMALE, ESTROGEN RECEPTOR POSITIVE: ICD-10-CM

## 2018-03-22 DIAGNOSIS — F43.21 ADJUSTMENT DISORDER WITH DEPRESSED MOOD: ICD-10-CM

## 2018-03-22 DIAGNOSIS — C50.111 MALIGNANT NEOPLASM OF CENTRAL PORTION OF RIGHT BREAST IN FEMALE, ESTROGEN RECEPTOR POSITIVE: ICD-10-CM

## 2018-03-22 RX ORDER — GABAPENTIN 300 MG/1
300 CAPSULE ORAL 3 TIMES DAILY
Qty: 270 CAPSULE | Refills: 0 | Status: SHIPPED | OUTPATIENT
Start: 2018-03-22 | End: 2018-03-23 | Stop reason: SDUPTHER

## 2018-03-22 RX ORDER — GABAPENTIN 300 MG/1
CAPSULE ORAL
Qty: 270 CAPSULE | Refills: 0 | OUTPATIENT
Start: 2018-03-22

## 2018-03-23 ENCOUNTER — PATIENT MESSAGE (OUTPATIENT)
Dept: HEMATOLOGY/ONCOLOGY | Facility: CLINIC | Age: 63
End: 2018-03-23

## 2018-03-23 RX ORDER — CITALOPRAM 20 MG/1
20 TABLET, FILM COATED ORAL DAILY
Qty: 90 TABLET | Refills: 3 | Status: SHIPPED | OUTPATIENT
Start: 2018-03-23 | End: 2018-11-01 | Stop reason: SDUPTHER

## 2018-03-23 RX ORDER — GABAPENTIN 300 MG/1
300 CAPSULE ORAL 3 TIMES DAILY
Qty: 270 CAPSULE | Refills: 0 | Status: SHIPPED | OUTPATIENT
Start: 2018-03-23 | End: 2018-06-26 | Stop reason: SDUPTHER

## 2018-03-26 ENCOUNTER — PATIENT MESSAGE (OUTPATIENT)
Dept: ENDOCRINOLOGY | Facility: CLINIC | Age: 63
End: 2018-03-26

## 2018-03-26 DIAGNOSIS — Z17.0 MALIGNANT NEOPLASM OF CENTRAL PORTION OF RIGHT BREAST IN FEMALE, ESTROGEN RECEPTOR POSITIVE: ICD-10-CM

## 2018-03-26 DIAGNOSIS — C50.111 MALIGNANT NEOPLASM OF CENTRAL PORTION OF RIGHT BREAST IN FEMALE, ESTROGEN RECEPTOR POSITIVE: ICD-10-CM

## 2018-03-26 DIAGNOSIS — E03.8 HYPOTHYROIDISM DUE TO HASHIMOTO'S THYROIDITIS: Primary | ICD-10-CM

## 2018-03-26 DIAGNOSIS — E06.3 HYPOTHYROIDISM DUE TO HASHIMOTO'S THYROIDITIS: Primary | ICD-10-CM

## 2018-03-26 RX ORDER — SODIUM CHLORIDE 0.9 % (FLUSH) 0.9 %
10 SYRINGE (ML) INJECTION
Status: CANCELLED | OUTPATIENT
Start: 2018-04-06

## 2018-03-26 RX ORDER — HEPARIN 100 UNIT/ML
500 SYRINGE INTRAVENOUS
Status: CANCELLED | OUTPATIENT
Start: 2018-03-30

## 2018-03-26 RX ORDER — DEXAMETHASONE 0.5 MG/1
4 TABLET ORAL
Status: CANCELLED | OUTPATIENT
Start: 2018-04-06

## 2018-03-26 RX ORDER — SODIUM CHLORIDE 0.9 % (FLUSH) 0.9 %
10 SYRINGE (ML) INJECTION
Status: CANCELLED | OUTPATIENT
Start: 2018-03-30

## 2018-03-26 RX ORDER — DEXAMETHASONE 0.5 MG/1
4 TABLET ORAL
Status: CANCELLED | OUTPATIENT
Start: 2018-03-30

## 2018-03-26 RX ORDER — HEPARIN 100 UNIT/ML
500 SYRINGE INTRAVENOUS
Status: CANCELLED | OUTPATIENT
Start: 2018-04-06

## 2018-03-26 NOTE — TELEPHONE ENCOUNTER
----- Message from Tarik Ramsey sent at 3/26/2018  8:12 AM CDT -----  Contact: [facility]   Will like office to know that Morphine is not in stock at that pharmacy     Contact::[phone]

## 2018-03-26 NOTE — TELEPHONE ENCOUNTER
Called patient to let her know we can send to [facility]. Provided her with the number. Medication is morphine.

## 2018-03-26 NOTE — TELEPHONE ENCOUNTER
Patient contacted via phone to discuss test results. Patient notified that I did not receive an email. She states she has Synthroid 150 mcg tabs and 175 mcg tabs. Adjust dosage as follows: take 1/2 150 mcg tab and 175 mcg on Sundays only and 175mcg the remainder of the week. Repeat labs in 6 weeks. She voiced understanding of the above information

## 2018-03-28 NOTE — PROGRESS NOTES
Subjective:       Patient ID: Rebecca Crain is a 62 y.o. female.    Chief Complaint: No chief complaint on file.    HPI Ms. Crain is here for followup of metastatic carcinoma of the right breast.  She's currently on Navelbine therapy and presents for cycle 3.    She reports that her pain is much better since getting off endocrine therapy.  Energy is improving as well.  She continues to have some diarrhea and occasionally has some leakage of stool.  She has no shortness of breath.  Appetite is variable.  She does have some sleep disturbance.          Breast history: In 2013 she was diagnosed with stage IIB carcinoma of the right breast, ER positive with a low Oncotype score.  She was enrolled on protocol  and randomized to endocrine therapy alone.  She was tried on all 3 aromatase inhibitors and had itching and rash to all of them.  In August 2013 she was started on tamoxifen.   She was found to have  bone metastasis in May 2015, 2015.  A subsequent bone biopsy was performed on a lesion at the T8 vertebra.   The pathology from that was consistent with metastatic breast cancer, ER +80%, CA +80%, and HER-2 negative.      She received letrozole plus palbociclib from July 2015 until May 2016.  She also received bone protective therapy with Xgeva.      Faslodex was started in June 2016.     Exemestane and Afinitor started in August 2017.  That was discontinued in February 2018 due to progression in the liver and bone.    Navelbine was started February 16, 2018.  Review of Systems   Constitutional: Positive for activity change (improved) and fatigue (improved).   HENT: Negative for trouble swallowing.         Dry mouth   Respiratory: Negative for cough and shortness of breath.    Gastrointestinal: Positive for diarrhea. Negative for abdominal pain and nausea.   Genitourinary: Negative.    Neurological: Positive for numbness (feet - mild).   Psychiatric/Behavioral: Negative for dysphoric mood. The patient is not  nervous/anxious.        Objective:      Physical Exam   Constitutional: She is oriented to person, place, and time. She appears well-developed and well-nourished. No distress.   HENT:   Mouth/Throat: No oropharyngeal exudate.   Eyes: No scleral icterus.   Cardiovascular: Normal rate and regular rhythm.    Pulmonary/Chest: Effort normal and breath sounds normal.       Abdominal: Soft. She exhibits no mass. There is no tenderness.   Lymphadenopathy:     She has no cervical adenopathy.   Neurological: She is alert and oriented to person, place, and time.   Psychiatric: She has a normal mood and affect. Her behavior is normal. Thought content normal.       Assessment:       1. Malignant neoplasm of central portion of right breast in female, estrogen receptor positive    2. Metastasis to liver    3. 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 Encounter for antineoplastic chemotherapy    5. Cancer related pain        Plan:       Continue current therapy and return in 3 weeks with scans.    Distress Screening Results: Psychosocial Distress screening score of Distress Score: 0 noted and reviewed. No intervention indicated.

## 2018-03-29 ENCOUNTER — OFFICE VISIT (OUTPATIENT)
Dept: HEMATOLOGY/ONCOLOGY | Facility: CLINIC | Age: 63
End: 2018-03-29
Payer: COMMERCIAL

## 2018-03-29 ENCOUNTER — INFUSION (OUTPATIENT)
Dept: INFUSION THERAPY | Facility: HOSPITAL | Age: 63
End: 2018-03-29
Attending: INTERNAL MEDICINE
Payer: COMMERCIAL

## 2018-03-29 VITALS
SYSTOLIC BLOOD PRESSURE: 133 MMHG | DIASTOLIC BLOOD PRESSURE: 86 MMHG | HEART RATE: 76 BPM | WEIGHT: 156.31 LBS | BODY MASS INDEX: 26.69 KG/M2 | RESPIRATION RATE: 18 BRPM | HEIGHT: 64 IN

## 2018-03-29 VITALS
WEIGHT: 156.31 LBS | RESPIRATION RATE: 17 BRPM | DIASTOLIC BLOOD PRESSURE: 79 MMHG | BODY MASS INDEX: 26.83 KG/M2 | HEART RATE: 80 BPM | SYSTOLIC BLOOD PRESSURE: 135 MMHG | TEMPERATURE: 98 F

## 2018-03-29 DIAGNOSIS — G89.3 CANCER RELATED PAIN: ICD-10-CM

## 2018-03-29 DIAGNOSIS — C50.111 MALIGNANT NEOPLASM OF CENTRAL PORTION OF RIGHT BREAST IN FEMALE, ESTROGEN RECEPTOR POSITIVE: Primary | ICD-10-CM

## 2018-03-29 DIAGNOSIS — C79.51 METASTATIC CANCER TO SPINE: ICD-10-CM

## 2018-03-29 DIAGNOSIS — Z17.0 MALIGNANT NEOPLASM OF CENTRAL PORTION OF RIGHT BREAST IN FEMALE, ESTROGEN RECEPTOR POSITIVE: ICD-10-CM

## 2018-03-29 DIAGNOSIS — C50.011 MALIGNANT NEOPLASM OF NIPPLE OF RIGHT BREAST IN FEMALE, UNSPECIFIED ESTROGEN RECEPTOR STATUS: ICD-10-CM

## 2018-03-29 DIAGNOSIS — C78.7 METASTASIS TO LIVER: ICD-10-CM

## 2018-03-29 DIAGNOSIS — Z51.11 ENCOUNTER FOR ANTINEOPLASTIC CHEMOTHERAPY: ICD-10-CM

## 2018-03-29 DIAGNOSIS — Z17.0 MALIGNANT NEOPLASM OF CENTRAL PORTION OF RIGHT BREAST IN FEMALE, ESTROGEN RECEPTOR POSITIVE: Primary | ICD-10-CM

## 2018-03-29 DIAGNOSIS — C50.111 MALIGNANT NEOPLASM OF CENTRAL PORTION OF RIGHT BREAST IN FEMALE, ESTROGEN RECEPTOR POSITIVE: ICD-10-CM

## 2018-03-29 DIAGNOSIS — Z51.11 ENCOUNTER FOR ANTINEOPLASTIC CHEMOTHERAPY: Primary | ICD-10-CM

## 2018-03-29 PROCEDURE — A4216 STERILE WATER/SALINE, 10 ML: HCPCS | Performed by: INTERNAL MEDICINE

## 2018-03-29 PROCEDURE — 25000003 PHARM REV CODE 250: Performed by: INTERNAL MEDICINE

## 2018-03-29 PROCEDURE — 96409 CHEMO IV PUSH SNGL DRUG: CPT

## 2018-03-29 PROCEDURE — 99214 OFFICE O/P EST MOD 30 MIN: CPT | Mod: S$GLB,,, | Performed by: INTERNAL MEDICINE

## 2018-03-29 PROCEDURE — 63600175 PHARM REV CODE 636 W HCPCS: Performed by: INTERNAL MEDICINE

## 2018-03-29 PROCEDURE — 3075F SYST BP GE 130 - 139MM HG: CPT | Mod: CPTII,S$GLB,, | Performed by: INTERNAL MEDICINE

## 2018-03-29 PROCEDURE — 99999 PR PBB SHADOW E&M-EST. PATIENT-LVL III: CPT | Mod: PBBFAC,,, | Performed by: INTERNAL MEDICINE

## 2018-03-29 PROCEDURE — 3078F DIAST BP <80 MM HG: CPT | Mod: CPTII,S$GLB,, | Performed by: INTERNAL MEDICINE

## 2018-03-29 RX ORDER — SODIUM CHLORIDE 0.9 % (FLUSH) 0.9 %
10 SYRINGE (ML) INJECTION
Status: DISCONTINUED | OUTPATIENT
Start: 2018-03-29 | End: 2018-03-29 | Stop reason: HOSPADM

## 2018-03-29 RX ORDER — DEXAMETHASONE 4 MG/1
4 TABLET ORAL
Status: COMPLETED | OUTPATIENT
Start: 2018-03-29 | End: 2018-03-29

## 2018-03-29 RX ORDER — HEPARIN 100 UNIT/ML
500 SYRINGE INTRAVENOUS
Status: DISCONTINUED | OUTPATIENT
Start: 2018-03-29 | End: 2018-03-29 | Stop reason: HOSPADM

## 2018-03-29 RX ADMIN — DEXAMETHASONE 4 MG: 4 TABLET ORAL at 08:03

## 2018-03-29 RX ADMIN — VINORELBINE TARTRATE 45 MG: 10 INJECTION INTRAVENOUS at 08:03

## 2018-03-29 RX ADMIN — SODIUM CHLORIDE, PRESERVATIVE FREE 10 ML: 5 INJECTION INTRAVENOUS at 09:03

## 2018-03-29 RX ADMIN — HEPARIN 500 UNITS: 100 SYRINGE at 09:03

## 2018-03-29 RX ADMIN — SODIUM CHLORIDE: 9 INJECTION, SOLUTION INTRAVENOUS at 08:03

## 2018-03-29 NOTE — PLAN OF CARE
Problem: Chemotherapy Effects (Adult)  Goal: Signs and Symptoms of Listed Potential Problems Will be Absent, Minimized or Managed (Chemotherapy Effects)  Signs and symptoms of listed potential problems will be absent, minimized or managed by discharge/transition of care (reference Chemotherapy Effects (Adult) CPG).   Outcome: Ongoing (interventions implemented as appropriate)  Pt here for D1C3 Navelbine. VSS. No complaints voiced. Consent/labs/meds/allergies reviewed. PAC accessed with blood return noted. All questions answered. Will continue to monitor.

## 2018-03-31 ENCOUNTER — INFUSION (OUTPATIENT)
Dept: INFUSION THERAPY | Facility: HOSPITAL | Age: 63
End: 2018-03-31
Attending: INTERNAL MEDICINE
Payer: COMMERCIAL

## 2018-03-31 DIAGNOSIS — Z51.11 ENCOUNTER FOR ANTINEOPLASTIC CHEMOTHERAPY: Primary | ICD-10-CM

## 2018-03-31 DIAGNOSIS — C50.011 MALIGNANT NEOPLASM OF NIPPLE OF RIGHT BREAST IN FEMALE, UNSPECIFIED ESTROGEN RECEPTOR STATUS: ICD-10-CM

## 2018-03-31 DIAGNOSIS — C50.111 MALIGNANT NEOPLASM OF CENTRAL PORTION OF RIGHT BREAST IN FEMALE, ESTROGEN RECEPTOR POSITIVE: ICD-10-CM

## 2018-03-31 DIAGNOSIS — C78.7 METASTASIS TO LIVER: ICD-10-CM

## 2018-03-31 DIAGNOSIS — Z17.0 MALIGNANT NEOPLASM OF CENTRAL PORTION OF RIGHT BREAST IN FEMALE, ESTROGEN RECEPTOR POSITIVE: ICD-10-CM

## 2018-03-31 DIAGNOSIS — C79.51 METASTATIC CANCER TO SPINE: ICD-10-CM

## 2018-03-31 PROCEDURE — 63600175 PHARM REV CODE 636 W HCPCS: Performed by: INTERNAL MEDICINE

## 2018-03-31 PROCEDURE — 96372 THER/PROPH/DIAG INJ SC/IM: CPT

## 2018-03-31 RX ADMIN — FILGRASTIM-SNDZ 480 MCG: 480 INJECTION, SOLUTION INTRAVENOUS; SUBCUTANEOUS at 11:03

## 2018-04-03 ENCOUNTER — INFUSION (OUTPATIENT)
Dept: INFUSION THERAPY | Facility: HOSPITAL | Age: 63
End: 2018-04-03
Attending: INTERNAL MEDICINE
Payer: COMMERCIAL

## 2018-04-03 DIAGNOSIS — Z51.11 ENCOUNTER FOR ANTINEOPLASTIC CHEMOTHERAPY: Primary | ICD-10-CM

## 2018-04-03 DIAGNOSIS — Z17.0 MALIGNANT NEOPLASM OF CENTRAL PORTION OF RIGHT BREAST IN FEMALE, ESTROGEN RECEPTOR POSITIVE: ICD-10-CM

## 2018-04-03 DIAGNOSIS — C79.51 METASTATIC CANCER TO SPINE: ICD-10-CM

## 2018-04-03 DIAGNOSIS — C50.011 MALIGNANT NEOPLASM OF NIPPLE OF RIGHT BREAST IN FEMALE, UNSPECIFIED ESTROGEN RECEPTOR STATUS: ICD-10-CM

## 2018-04-03 DIAGNOSIS — C78.7 METASTASIS TO LIVER: ICD-10-CM

## 2018-04-03 DIAGNOSIS — C50.111 MALIGNANT NEOPLASM OF CENTRAL PORTION OF RIGHT BREAST IN FEMALE, ESTROGEN RECEPTOR POSITIVE: ICD-10-CM

## 2018-04-03 PROCEDURE — 96372 THER/PROPH/DIAG INJ SC/IM: CPT

## 2018-04-03 PROCEDURE — 63600175 PHARM REV CODE 636 W HCPCS: Performed by: INTERNAL MEDICINE

## 2018-04-03 RX ADMIN — FILGRASTIM-SNDZ 480 MCG: 480 INJECTION, SOLUTION INTRAVENOUS; SUBCUTANEOUS at 02:04

## 2018-04-06 ENCOUNTER — PATIENT MESSAGE (OUTPATIENT)
Dept: HEMATOLOGY/ONCOLOGY | Facility: CLINIC | Age: 63
End: 2018-04-06

## 2018-04-06 ENCOUNTER — INFUSION (OUTPATIENT)
Dept: INFUSION THERAPY | Facility: HOSPITAL | Age: 63
End: 2018-04-06
Attending: INTERNAL MEDICINE
Payer: COMMERCIAL

## 2018-04-06 VITALS
HEART RATE: 64 BPM | TEMPERATURE: 98 F | SYSTOLIC BLOOD PRESSURE: 129 MMHG | RESPIRATION RATE: 16 BRPM | DIASTOLIC BLOOD PRESSURE: 72 MMHG

## 2018-04-06 DIAGNOSIS — C79.51 METASTATIC CANCER TO SPINE: ICD-10-CM

## 2018-04-06 DIAGNOSIS — C50.111 MALIGNANT NEOPLASM OF CENTRAL PORTION OF RIGHT BREAST IN FEMALE, ESTROGEN RECEPTOR POSITIVE: ICD-10-CM

## 2018-04-06 DIAGNOSIS — C78.7 METASTASIS TO LIVER: ICD-10-CM

## 2018-04-06 DIAGNOSIS — Z51.11 ENCOUNTER FOR ANTINEOPLASTIC CHEMOTHERAPY: Primary | ICD-10-CM

## 2018-04-06 DIAGNOSIS — C50.011 MALIGNANT NEOPLASM OF NIPPLE OF RIGHT BREAST IN FEMALE, UNSPECIFIED ESTROGEN RECEPTOR STATUS: ICD-10-CM

## 2018-04-06 DIAGNOSIS — Z17.0 MALIGNANT NEOPLASM OF CENTRAL PORTION OF RIGHT BREAST IN FEMALE, ESTROGEN RECEPTOR POSITIVE: ICD-10-CM

## 2018-04-06 PROCEDURE — 96409 CHEMO IV PUSH SNGL DRUG: CPT

## 2018-04-06 PROCEDURE — 25000003 PHARM REV CODE 250: Performed by: INTERNAL MEDICINE

## 2018-04-06 PROCEDURE — A4216 STERILE WATER/SALINE, 10 ML: HCPCS | Performed by: INTERNAL MEDICINE

## 2018-04-06 PROCEDURE — 63600175 PHARM REV CODE 636 W HCPCS: Performed by: INTERNAL MEDICINE

## 2018-04-06 RX ORDER — SODIUM CHLORIDE 0.9 % (FLUSH) 0.9 %
10 SYRINGE (ML) INJECTION
Status: DISCONTINUED | OUTPATIENT
Start: 2018-04-06 | End: 2018-04-06 | Stop reason: HOSPADM

## 2018-04-06 RX ORDER — DEXAMETHASONE 4 MG/1
4 TABLET ORAL
Status: COMPLETED | OUTPATIENT
Start: 2018-04-06 | End: 2018-04-06

## 2018-04-06 RX ORDER — HEPARIN 100 UNIT/ML
500 SYRINGE INTRAVENOUS
Status: DISCONTINUED | OUTPATIENT
Start: 2018-04-06 | End: 2018-04-06 | Stop reason: HOSPADM

## 2018-04-06 RX ADMIN — VINORELBINE TARTRATE 45 MG: 10 INJECTION INTRAVENOUS at 09:04

## 2018-04-06 RX ADMIN — DEXAMETHASONE 4 MG: 4 TABLET ORAL at 09:04

## 2018-04-06 RX ADMIN — HEPARIN 500 UNITS: 100 SYRINGE at 10:04

## 2018-04-06 RX ADMIN — SODIUM CHLORIDE: 9 INJECTION, SOLUTION INTRAVENOUS at 09:04

## 2018-04-06 RX ADMIN — SODIUM CHLORIDE, PRESERVATIVE FREE 10 ML: 5 INJECTION INTRAVENOUS at 10:04

## 2018-04-06 NOTE — PLAN OF CARE
Problem: Chemotherapy Effects (Adult)  Goal: Signs and Symptoms of Listed Potential Problems Will be Absent, Minimized or Managed (Chemotherapy Effects)  Signs and symptoms of listed potential problems will be absent, minimized or managed by discharge/transition of care (reference Chemotherapy Effects (Adult) CPG).   Outcome: Ongoing (interventions implemented as appropriate)  Pt here for D 8 C 3 OF Navelbine. VSS.  No complaints  voiced.Consent/labs/meds/allergies/treatment reviewed.  Accessed per flowsheet.  All questions asked were answered. Will Continue to monitor

## 2018-04-06 NOTE — PLAN OF CARE
Problem: Patient Care Overview  Goal: Plan of Care Review  Outcome: Ongoing (interventions implemented as appropriate)  Patient received Navelbine without any issues. Notified to call MD with any further concerns . Vss. No apparent distress noted.

## 2018-04-13 RX ORDER — HEPARIN 100 UNIT/ML
500 SYRINGE INTRAVENOUS
Status: CANCELLED | OUTPATIENT
Start: 2018-04-28

## 2018-04-13 RX ORDER — HEPARIN 100 UNIT/ML
500 SYRINGE INTRAVENOUS
Status: CANCELLED | OUTPATIENT
Start: 2018-04-20

## 2018-04-13 RX ORDER — SODIUM CHLORIDE 0.9 % (FLUSH) 0.9 %
10 SYRINGE (ML) INJECTION
Status: CANCELLED | OUTPATIENT
Start: 2018-04-20

## 2018-04-13 RX ORDER — DEXAMETHASONE 0.5 MG/1
4 TABLET ORAL
Status: CANCELLED | OUTPATIENT
Start: 2018-04-20

## 2018-04-13 RX ORDER — SODIUM CHLORIDE 0.9 % (FLUSH) 0.9 %
10 SYRINGE (ML) INJECTION
Status: CANCELLED | OUTPATIENT
Start: 2018-04-28

## 2018-04-13 RX ORDER — DEXAMETHASONE 0.5 MG/1
4 TABLET ORAL
Status: CANCELLED | OUTPATIENT
Start: 2018-04-28

## 2018-04-16 ENCOUNTER — TELEPHONE (OUTPATIENT)
Dept: HEMATOLOGY/ONCOLOGY | Facility: CLINIC | Age: 63
End: 2018-04-16

## 2018-04-16 ENCOUNTER — HOSPITAL ENCOUNTER (OUTPATIENT)
Dept: RADIOLOGY | Facility: HOSPITAL | Age: 63
Discharge: HOME | End: 2018-04-16
Attending: INTERNAL MEDICINE
Payer: COMMERCIAL

## 2018-04-16 ENCOUNTER — PATIENT MESSAGE (OUTPATIENT)
Dept: HEMATOLOGY/ONCOLOGY | Facility: CLINIC | Age: 63
End: 2018-04-16

## 2018-04-16 DIAGNOSIS — C50.111 MALIGNANT NEOPLASM OF CENTRAL PORTION OF RIGHT BREAST IN FEMALE, ESTROGEN RECEPTOR POSITIVE: ICD-10-CM

## 2018-04-16 DIAGNOSIS — Z17.0 MALIGNANT NEOPLASM OF CENTRAL PORTION OF RIGHT BREAST IN FEMALE, ESTROGEN RECEPTOR POSITIVE: ICD-10-CM

## 2018-04-16 PROCEDURE — 78306 BONE IMAGING WHOLE BODY: CPT | Mod: 26,,, | Performed by: RADIOLOGY

## 2018-04-16 PROCEDURE — 74176 CT ABD & PELVIS W/O CONTRAST: CPT | Mod: TC

## 2018-04-16 PROCEDURE — 71250 CT THORAX DX C-: CPT | Mod: TC

## 2018-04-16 PROCEDURE — A9503 TC99M MEDRONATE: HCPCS

## 2018-04-16 PROCEDURE — 71250 CT THORAX DX C-: CPT | Mod: 26,,, | Performed by: INTERNAL MEDICINE

## 2018-04-16 PROCEDURE — 74176 CT ABD & PELVIS W/O CONTRAST: CPT | Mod: 26,,, | Performed by: INTERNAL MEDICINE

## 2018-04-16 NOTE — TELEPHONE ENCOUNTER
----- Message from Tarik Ramsey sent at 4/16/2018  8:41 AM CDT -----  Contact: [facility]   Pt has allergy to contrast for ct scan appt and will like to know may they proceed w/o contrast    Ext::[phone]

## 2018-04-18 ENCOUNTER — PATIENT MESSAGE (OUTPATIENT)
Dept: HEMATOLOGY/ONCOLOGY | Facility: CLINIC | Age: 63
End: 2018-04-18

## 2018-04-19 ENCOUNTER — OFFICE VISIT (OUTPATIENT)
Dept: HEMATOLOGY/ONCOLOGY | Facility: CLINIC | Age: 63
End: 2018-04-19
Payer: COMMERCIAL

## 2018-04-19 VITALS
WEIGHT: 154.31 LBS | RESPIRATION RATE: 16 BRPM | DIASTOLIC BLOOD PRESSURE: 80 MMHG | BODY MASS INDEX: 26.49 KG/M2 | SYSTOLIC BLOOD PRESSURE: 178 MMHG | TEMPERATURE: 98 F | HEART RATE: 68 BPM

## 2018-04-19 DIAGNOSIS — C78.7 METASTASIS TO LIVER: ICD-10-CM

## 2018-04-19 DIAGNOSIS — C50.111 MALIGNANT NEOPLASM OF CENTRAL PORTION OF RIGHT BREAST IN FEMALE, ESTROGEN RECEPTOR POSITIVE: Primary | ICD-10-CM

## 2018-04-19 DIAGNOSIS — Z51.11 ENCOUNTER FOR ANTINEOPLASTIC CHEMOTHERAPY: ICD-10-CM

## 2018-04-19 DIAGNOSIS — Z17.0 MALIGNANT NEOPLASM OF CENTRAL PORTION OF RIGHT BREAST IN FEMALE, ESTROGEN RECEPTOR POSITIVE: Primary | ICD-10-CM

## 2018-04-19 DIAGNOSIS — N30.90 CYSTITIS: ICD-10-CM

## 2018-04-19 DIAGNOSIS — C79.51 METASTATIC CANCER TO SPINE: ICD-10-CM

## 2018-04-19 LAB
BACTERIA #/AREA URNS AUTO: ABNORMAL /HPF
BILIRUB UR QL STRIP: NEGATIVE
CAOX CRY UR QL COMP ASSIST: ABNORMAL
CLARITY UR REFRACT.AUTO: ABNORMAL
COLOR UR AUTO: YELLOW
GLUCOSE UR QL STRIP: NEGATIVE
HGB UR QL STRIP: NEGATIVE
HYALINE CASTS UR QL AUTO: 8 /LPF
KETONES UR QL STRIP: NEGATIVE
LEUKOCYTE ESTERASE UR QL STRIP: ABNORMAL
MICROSCOPIC COMMENT: ABNORMAL
NITRITE UR QL STRIP: NEGATIVE
PH UR STRIP: 5 [PH] (ref 5–8)
PROT UR QL STRIP: NEGATIVE
RBC #/AREA URNS AUTO: 2 /HPF (ref 0–4)
SP GR UR STRIP: 1.02 (ref 1–1.03)
SQUAMOUS #/AREA URNS AUTO: 1 /HPF
URN SPEC COLLECT METH UR: ABNORMAL
UROBILINOGEN UR STRIP-ACNC: NEGATIVE EU/DL
WBC #/AREA URNS AUTO: 3 /HPF (ref 0–5)

## 2018-04-19 PROCEDURE — 99999 PR PBB SHADOW E&M-EST. PATIENT-LVL III: CPT | Mod: PBBFAC,,, | Performed by: INTERNAL MEDICINE

## 2018-04-19 PROCEDURE — 87086 URINE CULTURE/COLONY COUNT: CPT

## 2018-04-19 PROCEDURE — 3077F SYST BP >= 140 MM HG: CPT | Mod: CPTII,S$GLB,, | Performed by: INTERNAL MEDICINE

## 2018-04-19 PROCEDURE — 99214 OFFICE O/P EST MOD 30 MIN: CPT | Mod: S$GLB,,, | Performed by: INTERNAL MEDICINE

## 2018-04-19 PROCEDURE — 81001 URINALYSIS AUTO W/SCOPE: CPT

## 2018-04-19 PROCEDURE — 3079F DIAST BP 80-89 MM HG: CPT | Mod: CPTII,S$GLB,, | Performed by: INTERNAL MEDICINE

## 2018-04-19 RX ORDER — SODIUM CHLORIDE 0.9 % (FLUSH) 0.9 %
10 SYRINGE (ML) INJECTION
Status: CANCELLED | OUTPATIENT
Start: 2018-05-29

## 2018-04-19 RX ORDER — HEPARIN 100 UNIT/ML
500 SYRINGE INTRAVENOUS
Status: CANCELLED | OUTPATIENT
Start: 2018-05-12

## 2018-04-19 RX ORDER — DEXAMETHASONE 0.5 MG/1
4 TABLET ORAL
Status: CANCELLED | OUTPATIENT
Start: 2018-05-12

## 2018-04-19 RX ORDER — SODIUM CHLORIDE 0.9 % (FLUSH) 0.9 %
10 SYRINGE (ML) INJECTION
Status: CANCELLED | OUTPATIENT
Start: 2018-05-12

## 2018-04-19 RX ORDER — HEPARIN 100 UNIT/ML
500 SYRINGE INTRAVENOUS
Status: CANCELLED | OUTPATIENT
Start: 2018-05-29

## 2018-04-19 RX ORDER — DEXAMETHASONE 0.5 MG/1
4 TABLET ORAL
Status: CANCELLED | OUTPATIENT
Start: 2018-05-29

## 2018-04-19 NOTE — PROGRESS NOTES
Subjective:       Patient ID: Rebecca Crain is a 62 y.o. female.    Chief Complaint: No chief complaint on file.    HPI Ms. Crain is here for followup of metastatic carcinoma of the right breast.  She's currently on Navelbine therapy and presents for cycle 4.    Today she reports that she's had some recent abdominal symptoms with bloating over the last week, some epigastric discomfort.  In addition, she's had some chronic loose bowels with anywhere from 1-8 bowel movements per day.  She has some fatigue which is variable with energy currently about 6 out of 10.  Appetites down a bit but she's not had any major issues with nausea.  She denies any shortness of breath.  She has noted some blurry vision.        Breast history: In 2013 she was diagnosed with stage IIB carcinoma of the right breast, ER positive with a low Oncotype score.  She was enrolled on protocol  and randomized to endocrine therapy alone.  She was tried on all 3 aromatase inhibitors and had itching and rash to all of them.  In August 2013 she was started on tamoxifen.   She was found to have  bone metastasis in May 2015, 2015.  A subsequent bone biopsy was performed on a lesion at the T8 vertebra.   The pathology from that was consistent with metastatic breast cancer, ER +80%, PA +80%, and HER-2 negative.      She received letrozole plus palbociclib from July 2015 until May 2016.  She also received bone protective therapy with Xgeva.      Faslodex was started in June 2016.     Exemestane and Afinitor started in August 2017.  That was discontinued in February 2018 due to progression in the liver and bone.    Navelbine was started February 16, 2018.  Review of Systems   HENT: Negative for trouble swallowing.    Respiratory: Negative for cough and shortness of breath.    Gastrointestinal: Negative for abdominal pain and nausea.   Genitourinary: Negative.    Neurological: Positive for numbness (feet - mild ).   Psychiatric/Behavioral: Negative  for dysphoric mood. The patient is not nervous/anxious.        Objective:      Physical Exam   Constitutional: She is oriented to person, place, and time. She appears well-developed and well-nourished. No distress.   HENT:   Mouth/Throat: No oropharyngeal exudate.   Eyes: No scleral icterus.   Cardiovascular: Normal rate and regular rhythm.    Pulmonary/Chest: Effort normal and breath sounds normal.       Abdominal: Soft. She exhibits no mass. There is no tenderness.   Lymphadenopathy:     She has no cervical adenopathy.   Neurological: She is alert and oriented to person, place, and time.   Psychiatric: She has a normal mood and affect. Her behavior is normal. Thought content normal.       Assessment:     CT and bone scan show stable disease  1. Malignant neoplasm of central portion of right breast in female, estrogen receptor positive    2. Metastasis to liver    3. 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 Encounter for antineoplastic chemotherapy        Plan:       Continue current therapy and RTC 3 weeks.  Trial of simethicone.    Distress Screening Results: Psychosocial Distress screening score of Distress Score: 6 noted and reviewed. No intervention indicated.

## 2018-04-20 ENCOUNTER — TELEPHONE (OUTPATIENT)
Dept: HEMATOLOGY/ONCOLOGY | Facility: CLINIC | Age: 63
End: 2018-04-20

## 2018-04-20 ENCOUNTER — INFUSION (OUTPATIENT)
Dept: INFUSION THERAPY | Facility: HOSPITAL | Age: 63
End: 2018-04-20
Attending: INTERNAL MEDICINE
Payer: COMMERCIAL

## 2018-04-20 VITALS
HEIGHT: 64 IN | RESPIRATION RATE: 20 BRPM | TEMPERATURE: 98 F | BODY MASS INDEX: 26.34 KG/M2 | DIASTOLIC BLOOD PRESSURE: 57 MMHG | HEART RATE: 64 BPM | SYSTOLIC BLOOD PRESSURE: 129 MMHG | WEIGHT: 154.31 LBS

## 2018-04-20 DIAGNOSIS — C79.51 METASTATIC CANCER TO SPINE: ICD-10-CM

## 2018-04-20 DIAGNOSIS — C50.111 MALIGNANT NEOPLASM OF CENTRAL PORTION OF RIGHT BREAST IN FEMALE, ESTROGEN RECEPTOR POSITIVE: ICD-10-CM

## 2018-04-20 DIAGNOSIS — C78.7 METASTASIS TO LIVER: ICD-10-CM

## 2018-04-20 DIAGNOSIS — Z51.11 ENCOUNTER FOR ANTINEOPLASTIC CHEMOTHERAPY: Primary | ICD-10-CM

## 2018-04-20 DIAGNOSIS — C50.011 MALIGNANT NEOPLASM OF NIPPLE OF RIGHT BREAST IN FEMALE, UNSPECIFIED ESTROGEN RECEPTOR STATUS: ICD-10-CM

## 2018-04-20 DIAGNOSIS — Z17.0 MALIGNANT NEOPLASM OF CENTRAL PORTION OF RIGHT BREAST IN FEMALE, ESTROGEN RECEPTOR POSITIVE: ICD-10-CM

## 2018-04-20 LAB — BACTERIA UR CULT: NO GROWTH

## 2018-04-20 PROCEDURE — 63600175 PHARM REV CODE 636 W HCPCS: Performed by: INTERNAL MEDICINE

## 2018-04-20 PROCEDURE — A4216 STERILE WATER/SALINE, 10 ML: HCPCS | Performed by: INTERNAL MEDICINE

## 2018-04-20 PROCEDURE — 25000003 PHARM REV CODE 250: Performed by: INTERNAL MEDICINE

## 2018-04-20 PROCEDURE — 96409 CHEMO IV PUSH SNGL DRUG: CPT

## 2018-04-20 RX ORDER — HEPARIN 100 UNIT/ML
500 SYRINGE INTRAVENOUS
Status: DISCONTINUED | OUTPATIENT
Start: 2018-04-20 | End: 2018-04-20 | Stop reason: HOSPADM

## 2018-04-20 RX ORDER — SODIUM CHLORIDE 0.9 % (FLUSH) 0.9 %
10 SYRINGE (ML) INJECTION
Status: DISCONTINUED | OUTPATIENT
Start: 2018-04-20 | End: 2018-04-20 | Stop reason: HOSPADM

## 2018-04-20 RX ORDER — DEXAMETHASONE 4 MG/1
4 TABLET ORAL
Status: COMPLETED | OUTPATIENT
Start: 2018-04-20 | End: 2018-04-20

## 2018-04-20 RX ADMIN — SODIUM CHLORIDE: 9 INJECTION, SOLUTION INTRAVENOUS at 08:04

## 2018-04-20 RX ADMIN — HEPARIN 500 UNITS: 100 SYRINGE at 09:04

## 2018-04-20 RX ADMIN — DEXAMETHASONE 4 MG: 4 TABLET ORAL at 08:04

## 2018-04-20 RX ADMIN — VINORELBINE 45 MG: 10 INJECTION, SOLUTION INTRAVENOUS at 08:04

## 2018-04-20 RX ADMIN — SODIUM CHLORIDE, PRESERVATIVE FREE 10 ML: 5 INJECTION INTRAVENOUS at 09:04

## 2018-04-20 NOTE — PLAN OF CARE
Problem: Patient Care Overview  Goal: Plan of Care Review  Outcome: Ongoing (interventions implemented as appropriate)  Pt tolerated D1C4 navelbine without adverse effects. VSS. Provided AVS & verbalized understanding of RTC date. DC ambulating independently.

## 2018-04-20 NOTE — TELEPHONE ENCOUNTER
Called and spoke with patient and assisted with scheduling her Neupogen injection for tomorrow and Monday.

## 2018-04-21 ENCOUNTER — INFUSION (OUTPATIENT)
Dept: INFUSION THERAPY | Facility: HOSPITAL | Age: 63
End: 2018-04-21
Attending: INTERNAL MEDICINE
Payer: COMMERCIAL

## 2018-04-21 VITALS — DIASTOLIC BLOOD PRESSURE: 56 MMHG | SYSTOLIC BLOOD PRESSURE: 127 MMHG | RESPIRATION RATE: 18 BRPM | HEART RATE: 70 BPM

## 2018-04-21 DIAGNOSIS — C79.51 METASTATIC CANCER TO SPINE: ICD-10-CM

## 2018-04-21 DIAGNOSIS — Z51.11 ENCOUNTER FOR ANTINEOPLASTIC CHEMOTHERAPY: Primary | ICD-10-CM

## 2018-04-21 DIAGNOSIS — C50.111 MALIGNANT NEOPLASM OF CENTRAL PORTION OF RIGHT BREAST IN FEMALE, ESTROGEN RECEPTOR POSITIVE: ICD-10-CM

## 2018-04-21 DIAGNOSIS — C78.7 METASTASIS TO LIVER: ICD-10-CM

## 2018-04-21 DIAGNOSIS — C50.011 MALIGNANT NEOPLASM OF NIPPLE OF RIGHT BREAST IN FEMALE, UNSPECIFIED ESTROGEN RECEPTOR STATUS: ICD-10-CM

## 2018-04-21 DIAGNOSIS — Z17.0 MALIGNANT NEOPLASM OF CENTRAL PORTION OF RIGHT BREAST IN FEMALE, ESTROGEN RECEPTOR POSITIVE: ICD-10-CM

## 2018-04-21 PROCEDURE — 63600175 PHARM REV CODE 636 W HCPCS: Mod: JG | Performed by: INTERNAL MEDICINE

## 2018-04-21 PROCEDURE — 96372 THER/PROPH/DIAG INJ SC/IM: CPT

## 2018-04-21 RX ADMIN — FILGRASTIM 480 MCG: 480 INJECTION, SOLUTION INTRAVENOUS; SUBCUTANEOUS at 08:04

## 2018-04-23 ENCOUNTER — INFUSION (OUTPATIENT)
Dept: INFUSION THERAPY | Facility: HOSPITAL | Age: 63
End: 2018-04-23
Attending: INTERNAL MEDICINE
Payer: COMMERCIAL

## 2018-04-23 ENCOUNTER — TELEPHONE (OUTPATIENT)
Dept: HEMATOLOGY/ONCOLOGY | Facility: CLINIC | Age: 63
End: 2018-04-23

## 2018-04-23 DIAGNOSIS — C78.7 METASTASIS TO LIVER: ICD-10-CM

## 2018-04-23 DIAGNOSIS — C50.011 MALIGNANT NEOPLASM OF NIPPLE OF RIGHT BREAST IN FEMALE, UNSPECIFIED ESTROGEN RECEPTOR STATUS: ICD-10-CM

## 2018-04-23 DIAGNOSIS — Z17.0 MALIGNANT NEOPLASM OF CENTRAL PORTION OF RIGHT BREAST IN FEMALE, ESTROGEN RECEPTOR POSITIVE: ICD-10-CM

## 2018-04-23 DIAGNOSIS — Z51.11 ENCOUNTER FOR ANTINEOPLASTIC CHEMOTHERAPY: Primary | ICD-10-CM

## 2018-04-23 DIAGNOSIS — C50.111 MALIGNANT NEOPLASM OF CENTRAL PORTION OF RIGHT BREAST IN FEMALE, ESTROGEN RECEPTOR POSITIVE: ICD-10-CM

## 2018-04-23 DIAGNOSIS — C79.51 METASTATIC CANCER TO SPINE: ICD-10-CM

## 2018-04-23 PROCEDURE — 96372 THER/PROPH/DIAG INJ SC/IM: CPT

## 2018-04-23 PROCEDURE — 63600175 PHARM REV CODE 636 W HCPCS: Mod: JG | Performed by: INTERNAL MEDICINE

## 2018-04-23 RX ORDER — MORPHINE SULFATE 60 MG/1
60 TABLET, FILM COATED, EXTENDED RELEASE ORAL 3 TIMES DAILY
COMMUNITY
Start: 2018-03-26 | End: 2018-08-27 | Stop reason: SDUPTHER

## 2018-04-23 RX ADMIN — FILGRASTIM 300 MCG: 300 INJECTION, SOLUTION INTRAVENOUS; SUBCUTANEOUS at 10:04

## 2018-04-23 NOTE — TELEPHONE ENCOUNTER
----- Message from Liz Kennedy sent at 4/23/2018  2:42 PM CDT -----  Contact: [facility]  Ms. Rivero is calling to speak with Staff regarding her Neulasta Injection.    She can be reached at [phone].    Thank you.

## 2018-04-24 NOTE — TELEPHONE ENCOUNTER
Patient insurance called and wanted to know if they could give the patient her neulasta injection. The will set everything up, but wanted to see if it would be ok.

## 2018-04-24 NOTE — TELEPHONE ENCOUNTER
Message left letting Josefa with [employer] know Dr Schroeder said it was ok for them to give the patient the neulasta. Asked patient to call back and let me know they received the message.

## 2018-04-27 ENCOUNTER — PATIENT MESSAGE (OUTPATIENT)
Dept: HEMATOLOGY/ONCOLOGY | Facility: CLINIC | Age: 63
End: 2018-04-27

## 2018-04-27 ENCOUNTER — INFUSION (OUTPATIENT)
Dept: INFUSION THERAPY | Facility: HOSPITAL | Age: 63
End: 2018-04-27
Attending: INTERNAL MEDICINE
Payer: COMMERCIAL

## 2018-04-27 VITALS
HEIGHT: 64 IN | SYSTOLIC BLOOD PRESSURE: 133 MMHG | RESPIRATION RATE: 18 BRPM | TEMPERATURE: 98 F | HEART RATE: 106 BPM | WEIGHT: 154.31 LBS | BODY MASS INDEX: 26.34 KG/M2 | DIASTOLIC BLOOD PRESSURE: 91 MMHG

## 2018-04-27 DIAGNOSIS — R19.7 DIARRHEA, UNSPECIFIED TYPE: Primary | ICD-10-CM

## 2018-04-27 DIAGNOSIS — Z51.11 ENCOUNTER FOR ANTINEOPLASTIC CHEMOTHERAPY: Primary | ICD-10-CM

## 2018-04-27 DIAGNOSIS — Z17.0 MALIGNANT NEOPLASM OF CENTRAL PORTION OF RIGHT BREAST IN FEMALE, ESTROGEN RECEPTOR POSITIVE: ICD-10-CM

## 2018-04-27 DIAGNOSIS — C50.111 MALIGNANT NEOPLASM OF CENTRAL PORTION OF RIGHT BREAST IN FEMALE, ESTROGEN RECEPTOR POSITIVE: ICD-10-CM

## 2018-04-27 DIAGNOSIS — C78.7 METASTASIS TO LIVER: ICD-10-CM

## 2018-04-27 DIAGNOSIS — C79.51 METASTATIC CANCER TO SPINE: ICD-10-CM

## 2018-04-27 DIAGNOSIS — C50.011 MALIGNANT NEOPLASM OF NIPPLE OF RIGHT BREAST IN FEMALE, UNSPECIFIED ESTROGEN RECEPTOR STATUS: ICD-10-CM

## 2018-04-27 PROCEDURE — 96409 CHEMO IV PUSH SNGL DRUG: CPT

## 2018-04-27 PROCEDURE — 25000003 PHARM REV CODE 250: Performed by: INTERNAL MEDICINE

## 2018-04-27 PROCEDURE — 63600175 PHARM REV CODE 636 W HCPCS: Performed by: INTERNAL MEDICINE

## 2018-04-27 RX ORDER — SODIUM CHLORIDE 0.9 % (FLUSH) 0.9 %
10 SYRINGE (ML) INJECTION
Status: DISCONTINUED | OUTPATIENT
Start: 2018-04-27 | End: 2018-04-27 | Stop reason: HOSPADM

## 2018-04-27 RX ORDER — HEPARIN 100 UNIT/ML
500 SYRINGE INTRAVENOUS
Status: DISCONTINUED | OUTPATIENT
Start: 2018-04-27 | End: 2018-04-27 | Stop reason: HOSPADM

## 2018-04-27 RX ORDER — DEXAMETHASONE 4 MG/1
4 TABLET ORAL
Status: COMPLETED | OUTPATIENT
Start: 2018-04-27 | End: 2018-04-27

## 2018-04-27 RX ORDER — DIPHENOXYLATE HYDROCHLORIDE AND ATROPINE SULFATE 2.5; .025 MG/1; MG/1
1 TABLET ORAL 4 TIMES DAILY PRN
Qty: 40 TABLET | Refills: 0 | Status: SHIPPED | OUTPATIENT
Start: 2018-04-27 | End: 2018-05-07

## 2018-04-27 RX ADMIN — HEPARIN 500 UNITS: 100 SYRINGE at 09:04

## 2018-04-27 RX ADMIN — VINORELBINE 45 MG: 10 INJECTION, SOLUTION INTRAVENOUS at 08:04

## 2018-04-27 RX ADMIN — DEXAMETHASONE 4 MG: 4 TABLET ORAL at 08:04

## 2018-04-27 NOTE — PLAN OF CARE
"Problem: Patient Care Overview  Goal: Plan of Care Review  Outcome: Ongoing (interventions implemented as appropriate)  Pt tolerated infusion. VSS. Port flushed, hep-locked and de-accessed. Per Dr. Schroeder, pt instructed to start taking magnesium and to report changes in "spasm-like pains." Per Dr. Schroeder, pt does not need neulasta injection tomorrow due to higher white counts.no questions at this time.      "

## 2018-04-27 NOTE — PLAN OF CARE
"Problem: Chemotherapy Effects (Adult)  Goal: Signs and Symptoms of Listed Potential Problems Will be Absent, Minimized or Managed (Chemotherapy Effects)  Signs and symptoms of listed potential problems will be absent, minimized or managed by discharge/transition of care (reference Chemotherapy Effects (Adult) CPG).   Outcome: Ongoing (interventions implemented as appropriate)  Pt here today for navelbine. Port accessed, blood return present, infusing without difficulty. Pt reports fatigue along with "spasm-like pains" that come and go in the legs and abdomen. Reports eating well and hydrating often. MD notified.       "

## 2018-05-10 ENCOUNTER — LAB VISIT (OUTPATIENT)
Dept: LAB | Facility: HOSPITAL | Age: 63
End: 2018-05-10
Attending: INTERNAL MEDICINE
Payer: COMMERCIAL

## 2018-05-10 ENCOUNTER — OFFICE VISIT (OUTPATIENT)
Dept: HEMATOLOGY/ONCOLOGY | Facility: CLINIC | Age: 63
End: 2018-05-10
Payer: COMMERCIAL

## 2018-05-10 VITALS
BODY MASS INDEX: 26.31 KG/M2 | RESPIRATION RATE: 16 BRPM | WEIGHT: 154.13 LBS | HEART RATE: 74 BPM | HEIGHT: 64 IN | TEMPERATURE: 98 F | DIASTOLIC BLOOD PRESSURE: 57 MMHG | SYSTOLIC BLOOD PRESSURE: 119 MMHG | OXYGEN SATURATION: 94 %

## 2018-05-10 DIAGNOSIS — Z51.11 ENCOUNTER FOR ANTINEOPLASTIC CHEMOTHERAPY: ICD-10-CM

## 2018-05-10 DIAGNOSIS — Z17.0 MALIGNANT NEOPLASM OF CENTRAL PORTION OF RIGHT BREAST IN FEMALE, ESTROGEN RECEPTOR POSITIVE: ICD-10-CM

## 2018-05-10 DIAGNOSIS — G89.3 CANCER RELATED PAIN: ICD-10-CM

## 2018-05-10 DIAGNOSIS — C79.51 METASTATIC CANCER TO SPINE: ICD-10-CM

## 2018-05-10 DIAGNOSIS — C50.111 MALIGNANT NEOPLASM OF CENTRAL PORTION OF RIGHT BREAST IN FEMALE, ESTROGEN RECEPTOR POSITIVE: Primary | ICD-10-CM

## 2018-05-10 DIAGNOSIS — C50.111 MALIGNANT NEOPLASM OF CENTRAL PORTION OF RIGHT BREAST IN FEMALE, ESTROGEN RECEPTOR POSITIVE: ICD-10-CM

## 2018-05-10 DIAGNOSIS — C78.7 METASTASIS TO LIVER: ICD-10-CM

## 2018-05-10 DIAGNOSIS — Z17.0 MALIGNANT NEOPLASM OF CENTRAL PORTION OF RIGHT BREAST IN FEMALE, ESTROGEN RECEPTOR POSITIVE: Primary | ICD-10-CM

## 2018-05-10 LAB
ALBUMIN SERPL BCP-MCNC: 3.4 G/DL
ALP SERPL-CCNC: 90 U/L
ALT SERPL W/O P-5'-P-CCNC: 16 U/L
ANION GAP SERPL CALC-SCNC: 10 MMOL/L
AST SERPL-CCNC: 17 U/L
BASOPHILS # BLD AUTO: 0.03 K/UL
BASOPHILS NFR BLD: 0.9 %
BILIRUB SERPL-MCNC: 0.8 MG/DL
BUN SERPL-MCNC: 12 MG/DL
CALCIUM SERPL-MCNC: 10.3 MG/DL
CHLORIDE SERPL-SCNC: 102 MMOL/L
CO2 SERPL-SCNC: 27 MMOL/L
CREAT SERPL-MCNC: 0.9 MG/DL
DIFFERENTIAL METHOD: ABNORMAL
EOSINOPHIL # BLD AUTO: 0.2 K/UL
EOSINOPHIL NFR BLD: 4.4 %
ERYTHROCYTE [DISTWIDTH] IN BLOOD BY AUTOMATED COUNT: 16.4 %
EST. GFR  (AFRICAN AMERICAN): >60 ML/MIN/1.73 M^2
EST. GFR  (NON AFRICAN AMERICAN): >60 ML/MIN/1.73 M^2
GLUCOSE SERPL-MCNC: 115 MG/DL
HCT VFR BLD AUTO: 36.4 %
HGB BLD-MCNC: 11.7 G/DL
IMM GRANULOCYTES # BLD AUTO: 0.01 K/UL
IMM GRANULOCYTES NFR BLD AUTO: 0.3 %
LYMPHOCYTES # BLD AUTO: 0.9 K/UL
LYMPHOCYTES NFR BLD: 25.4 %
MCH RBC QN AUTO: 28.4 PG
MCHC RBC AUTO-ENTMCNC: 32.1 G/DL
MCV RBC AUTO: 88 FL
MONOCYTES # BLD AUTO: 0.6 K/UL
MONOCYTES NFR BLD: 16.5 %
NEUTROPHILS # BLD AUTO: 1.8 K/UL
NEUTROPHILS NFR BLD: 52.5 %
NRBC BLD-RTO: 0 /100 WBC
PLATELET # BLD AUTO: 304 K/UL
PMV BLD AUTO: 9.1 FL
POTASSIUM SERPL-SCNC: 4.4 MMOL/L
PROT SERPL-MCNC: 6.6 G/DL
RBC # BLD AUTO: 4.12 M/UL
SODIUM SERPL-SCNC: 139 MMOL/L
WBC # BLD AUTO: 3.39 K/UL

## 2018-05-10 PROCEDURE — 36415 COLL VENOUS BLD VENIPUNCTURE: CPT

## 2018-05-10 PROCEDURE — 3074F SYST BP LT 130 MM HG: CPT | Mod: CPTII,S$GLB,, | Performed by: PHYSICIAN ASSISTANT

## 2018-05-10 PROCEDURE — 80053 COMPREHEN METABOLIC PANEL: CPT

## 2018-05-10 PROCEDURE — 85025 COMPLETE CBC W/AUTO DIFF WBC: CPT

## 2018-05-10 PROCEDURE — 86300 IMMUNOASSAY TUMOR CA 15-3: CPT

## 2018-05-10 PROCEDURE — 99999 PR PBB SHADOW E&M-EST. PATIENT-LVL V: CPT | Mod: PBBFAC,,, | Performed by: PHYSICIAN ASSISTANT

## 2018-05-10 PROCEDURE — 3078F DIAST BP <80 MM HG: CPT | Mod: CPTII,S$GLB,, | Performed by: PHYSICIAN ASSISTANT

## 2018-05-10 PROCEDURE — 3008F BODY MASS INDEX DOCD: CPT | Mod: CPTII,S$GLB,, | Performed by: PHYSICIAN ASSISTANT

## 2018-05-10 PROCEDURE — 99214 OFFICE O/P EST MOD 30 MIN: CPT | Mod: S$GLB,,, | Performed by: PHYSICIAN ASSISTANT

## 2018-05-10 NOTE — PROGRESS NOTES
Subjective:       Patient ID: Rebecca Crain is a 62 y.o. female.    Chief Complaint: Malignant neoplasm of central portion of right breast in fem    Ms. Crain is here for followup of metastatic carcinoma of the right breast.  She's currently on Navelbine therapy and presents for cycle 5.    CT scans from 4/16/18 showed stable disease.     Her main complain is fatigue.  She has been eating smaller meals which has resulted in less bloating.   Still with diarrhea. She is currently taking 2 imodium in the morning and lomotil.   No fever, chills, nausea or vomiting.   She denies any shortness of breath.  Some mild neuropathy in fingerttips.      Breast history: In 2013 she was diagnosed with stage IIB carcinoma of the right breast, ER positive with a low Oncotype score.  She was enrolled on protocol  and randomized to endocrine therapy alone.  She was tried on all 3 aromatase inhibitors and had itching and rash to all of them.  In August 2013 she was started on tamoxifen.   She was found to have  bone metastasis in May 2015, 2015.  A subsequent bone biopsy was performed on a lesion at the T8 vertebra.   The pathology from that was consistent with metastatic breast cancer, ER +80%, CA +80%, and HER-2 negative.      She received letrozole plus palbociclib from July 2015 until May 2016.  She also received bone protective therapy with Xgeva.      Faslodex was started in June 2016.      Exemestane and Afinitor started in August 2017.  That was discontinued in February 2018 due to progression in the liver and bone.     Navelbine was started February 16, 2018.      Review of Systems   Constitutional: Positive for fatigue. Negative for activity change, appetite change, chills, diaphoresis, fever and unexpected weight change.   HENT: Negative for congestion, rhinorrhea and trouble swallowing.         Dry mouth     Eyes: Positive for visual disturbance (blurry vision for 6 weeks, intermittent; has f/u with optho in  "august/september).   Respiratory: Positive for shortness of breath (occasionally and associated with post-prandial bloating). Negative for cough.    Cardiovascular: Negative for chest pain.   Gastrointestinal: Positive for diarrhea (see HPI). Negative for abdominal pain.   Genitourinary: Negative for dysuria and frequency.   Musculoskeletal: Negative for arthralgias and back pain.   Skin: Negative for pallor and rash.   Neurological: Positive for headaches (within the last few days, doesn't know if its related to weather/allergies, "not bad enough to take medicine").   Hematological: Negative for adenopathy.   Psychiatric/Behavioral: The patient is not nervous/anxious.        Objective:      Physical Exam   Constitutional: She is oriented to person, place, and time. She appears well-developed and well-nourished. No distress.   Presents alone  ECOG 0     HENT:   Head: Normocephalic and atraumatic.   Mouth/Throat: No oropharyngeal exudate.   Eyes: EOM are normal. Pupils are equal, round, and reactive to light. No scleral icterus.   Neck: Normal range of motion. Neck supple.   Cardiovascular: Normal rate, regular rhythm and normal heart sounds.    Pulmonary/Chest: Effort normal and breath sounds normal. She has no wheezes. She has no rales.   Lungs clear to auscultation bilaterally   Abdominal: Soft. Bowel sounds are normal. She exhibits no mass. There is no tenderness.   Musculoskeletal: Normal range of motion. She exhibits no edema or deformity.   Some mild tenderness to palpation at left upper back, no pain at midline         Lymphadenopathy:     She has no cervical adenopathy.     She has no axillary adenopathy.        Right: No supraclavicular adenopathy present.        Left: No supraclavicular adenopathy present.   Neurological: She is alert and oriented to person, place, and time. She has normal reflexes. She displays normal reflexes. No cranial nerve deficit. She exhibits normal muscle tone. Coordination " normal.   Ambulating without assistance     Skin: Skin is warm and dry. No rash noted. No erythema.        Psychiatric: She has a normal mood and affect. Her behavior is normal. Judgment and thought content normal.   Patient is teary     Vitals reviewed.      Assessment:       1. Malignant neoplasm of central portion of right breast in female, estrogen receptor positive    2. 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 Metastasis to liver    4. Encounter for antineoplastic chemotherapy    5. Cancer related pain        Plan:       1-4) Continue with Navelbine cycle #5 tomorrow, followed by neuopgen on days 2 and 4.  RTC in one week for C5D8.  5)currently controlled with MS contin 60 mg taken TID      neupogen on days 2 and 4 ( 9 am on Saturday and 10 on Monday)    Distress Screening Results: Psychosocial Distress screening score of Distress Score: 0 noted and reviewed. No intervention indicated.

## 2018-05-10 NOTE — Clinical Note
Please schedule her for neupogen on 5/12 and 5/14 Cbc/cmp and navelbine on 5/18, neupogen on 5/19 and 5/21 Alexandre on 5/31 with cbc/cmp; navelbine on 6/1 Alexandre with cbc/cmp on

## 2018-05-11 ENCOUNTER — INFUSION (OUTPATIENT)
Dept: INFUSION THERAPY | Facility: HOSPITAL | Age: 63
End: 2018-05-11
Attending: INTERNAL MEDICINE
Payer: COMMERCIAL

## 2018-05-11 ENCOUNTER — TELEPHONE (OUTPATIENT)
Dept: HEMATOLOGY/ONCOLOGY | Facility: CLINIC | Age: 63
End: 2018-05-11

## 2018-05-11 VITALS
TEMPERATURE: 98 F | HEART RATE: 65 BPM | WEIGHT: 150 LBS | DIASTOLIC BLOOD PRESSURE: 76 MMHG | SYSTOLIC BLOOD PRESSURE: 116 MMHG | RESPIRATION RATE: 16 BRPM | BODY MASS INDEX: 25.75 KG/M2

## 2018-05-11 DIAGNOSIS — C79.51 METASTATIC CANCER TO SPINE: ICD-10-CM

## 2018-05-11 DIAGNOSIS — Z17.0 MALIGNANT NEOPLASM OF CENTRAL PORTION OF RIGHT BREAST IN FEMALE, ESTROGEN RECEPTOR POSITIVE: ICD-10-CM

## 2018-05-11 DIAGNOSIS — C78.7 METASTASIS TO LIVER: ICD-10-CM

## 2018-05-11 DIAGNOSIS — C50.111 MALIGNANT NEOPLASM OF CENTRAL PORTION OF RIGHT BREAST IN FEMALE, ESTROGEN RECEPTOR POSITIVE: ICD-10-CM

## 2018-05-11 DIAGNOSIS — Z51.11 ENCOUNTER FOR ANTINEOPLASTIC CHEMOTHERAPY: Primary | ICD-10-CM

## 2018-05-11 DIAGNOSIS — C50.011 MALIGNANT NEOPLASM OF NIPPLE OF RIGHT BREAST IN FEMALE, UNSPECIFIED ESTROGEN RECEPTOR STATUS: ICD-10-CM

## 2018-05-11 PROCEDURE — 25000003 PHARM REV CODE 250: Performed by: INTERNAL MEDICINE

## 2018-05-11 PROCEDURE — 96409 CHEMO IV PUSH SNGL DRUG: CPT

## 2018-05-11 PROCEDURE — 63600175 PHARM REV CODE 636 W HCPCS: Performed by: INTERNAL MEDICINE

## 2018-05-11 RX ORDER — DEXAMETHASONE 4 MG/1
4 TABLET ORAL
Status: COMPLETED | OUTPATIENT
Start: 2018-05-11 | End: 2018-05-11

## 2018-05-11 RX ORDER — SODIUM CHLORIDE 0.9 % (FLUSH) 0.9 %
10 SYRINGE (ML) INJECTION
Status: DISCONTINUED | OUTPATIENT
Start: 2018-05-11 | End: 2018-05-11 | Stop reason: HOSPADM

## 2018-05-11 RX ORDER — HEPARIN 100 UNIT/ML
500 SYRINGE INTRAVENOUS
Status: DISCONTINUED | OUTPATIENT
Start: 2018-05-11 | End: 2018-05-11 | Stop reason: HOSPADM

## 2018-05-11 RX ADMIN — DEXAMETHASONE 4 MG: 4 TABLET ORAL at 08:05

## 2018-05-11 RX ADMIN — SODIUM CHLORIDE: 9 INJECTION, SOLUTION INTRAVENOUS at 08:05

## 2018-05-11 RX ADMIN — HEPARIN 500 UNITS: 100 SYRINGE at 09:05

## 2018-05-11 RX ADMIN — VINORELBINE 45 MG: 10 INJECTION, SOLUTION INTRAVENOUS at 08:05

## 2018-05-11 NOTE — PLAN OF CARE
Problem: Patient Care Overview  Goal: Plan of Care Review  Outcome: Ongoing (interventions implemented as appropriate)  Patient tolerated infusion well.  No reaction suspected.  VSS.  No questions or concerns.  AVS given to patient. Patient ambulated off unit unassisted.

## 2018-05-13 LAB — CANCER AG27-29 SERPL-ACNC: 43 U/ML

## 2018-05-18 ENCOUNTER — INFUSION (OUTPATIENT)
Dept: INFUSION THERAPY | Facility: HOSPITAL | Age: 63
End: 2018-05-18
Attending: INTERNAL MEDICINE
Payer: COMMERCIAL

## 2018-05-18 VITALS
HEART RATE: 113 BPM | SYSTOLIC BLOOD PRESSURE: 141 MMHG | RESPIRATION RATE: 18 BRPM | DIASTOLIC BLOOD PRESSURE: 79 MMHG | TEMPERATURE: 98 F

## 2018-05-18 DIAGNOSIS — C50.011 MALIGNANT NEOPLASM OF NIPPLE OF RIGHT BREAST IN FEMALE, UNSPECIFIED ESTROGEN RECEPTOR STATUS: ICD-10-CM

## 2018-05-18 DIAGNOSIS — Z17.0 MALIGNANT NEOPLASM OF CENTRAL PORTION OF RIGHT BREAST IN FEMALE, ESTROGEN RECEPTOR POSITIVE: ICD-10-CM

## 2018-05-18 DIAGNOSIS — Z51.11 ENCOUNTER FOR ANTINEOPLASTIC CHEMOTHERAPY: Primary | ICD-10-CM

## 2018-05-18 DIAGNOSIS — C78.7 METASTASIS TO LIVER: ICD-10-CM

## 2018-05-18 DIAGNOSIS — C79.51 METASTATIC CANCER TO SPINE: ICD-10-CM

## 2018-05-18 DIAGNOSIS — C50.111 MALIGNANT NEOPLASM OF CENTRAL PORTION OF RIGHT BREAST IN FEMALE, ESTROGEN RECEPTOR POSITIVE: ICD-10-CM

## 2018-05-18 PROCEDURE — 25000003 PHARM REV CODE 250: Performed by: INTERNAL MEDICINE

## 2018-05-18 PROCEDURE — A4216 STERILE WATER/SALINE, 10 ML: HCPCS | Performed by: INTERNAL MEDICINE

## 2018-05-18 PROCEDURE — 63600175 PHARM REV CODE 636 W HCPCS: Performed by: INTERNAL MEDICINE

## 2018-05-18 PROCEDURE — 96409 CHEMO IV PUSH SNGL DRUG: CPT

## 2018-05-18 RX ORDER — SODIUM CHLORIDE 0.9 % (FLUSH) 0.9 %
10 SYRINGE (ML) INJECTION
Status: DISCONTINUED | OUTPATIENT
Start: 2018-05-18 | End: 2018-05-18 | Stop reason: HOSPADM

## 2018-05-18 RX ORDER — DEXAMETHASONE 4 MG/1
4 TABLET ORAL
Status: COMPLETED | OUTPATIENT
Start: 2018-05-18 | End: 2018-05-18

## 2018-05-18 RX ORDER — HEPARIN 100 UNIT/ML
500 SYRINGE INTRAVENOUS
Status: DISCONTINUED | OUTPATIENT
Start: 2018-05-18 | End: 2018-05-18 | Stop reason: HOSPADM

## 2018-05-18 RX ADMIN — HEPARIN 500 UNITS: 100 SYRINGE at 09:05

## 2018-05-18 RX ADMIN — VINORELBINE TARTRATE 45 MG: 10 INJECTION INTRAVENOUS at 08:05

## 2018-05-18 RX ADMIN — SODIUM CHLORIDE: 9 INJECTION, SOLUTION INTRAVENOUS at 08:05

## 2018-05-18 RX ADMIN — SODIUM CHLORIDE, PRESERVATIVE FREE 10 ML: 5 INJECTION INTRAVENOUS at 09:05

## 2018-05-18 RX ADMIN — DEXAMETHASONE 4 MG: 4 TABLET ORAL at 08:05

## 2018-05-18 NOTE — PLAN OF CARE
Problem: Patient Care Overview  Goal: Plan of Care Review  Outcome: Ongoing (interventions implemented as appropriate)  Pt tolerated Navelbine without adverse effects. VSS. Provided AVS & verbalized understanding of RTC date. DC ambulating independently.

## 2018-05-25 DIAGNOSIS — Z17.0 MALIGNANT NEOPLASM OF CENTRAL PORTION OF RIGHT BREAST IN FEMALE, ESTROGEN RECEPTOR POSITIVE: ICD-10-CM

## 2018-05-25 DIAGNOSIS — C50.111 MALIGNANT NEOPLASM OF CENTRAL PORTION OF RIGHT BREAST IN FEMALE, ESTROGEN RECEPTOR POSITIVE: ICD-10-CM

## 2018-05-25 RX ORDER — HYDROMORPHONE HYDROCHLORIDE 4 MG/1
4 TABLET ORAL EVERY 4 HOURS PRN
Qty: 180 TABLET | Refills: 0 | Status: CANCELLED | OUTPATIENT
Start: 2018-05-25

## 2018-05-25 NOTE — TELEPHONE ENCOUNTER
Spoke to patient's daughter Becki.  She said the prescription requested should have been the morphine.     Request sent to Dr. Schroeder, let the patient's daughter know it may not be filled until Monday.  She is aware.

## 2018-05-25 NOTE — TELEPHONE ENCOUNTER
----- Message from Tarik Ramsey sent at 5/25/2018  4:50 PM CDT -----  Contact: Daughter-Becki  Requesting refill on HYDROmorphone (DILAUDID) 4 MG tablet    Will like Rx sent to [facility]/pharmacy on  [address]    Contact::[phone]

## 2018-05-26 ENCOUNTER — PATIENT MESSAGE (OUTPATIENT)
Dept: ENDOCRINOLOGY | Facility: CLINIC | Age: 63
End: 2018-05-26

## 2018-05-28 DIAGNOSIS — C50.111 MALIGNANT NEOPLASM OF CENTRAL PORTION OF RIGHT BREAST IN FEMALE, ESTROGEN RECEPTOR POSITIVE: ICD-10-CM

## 2018-05-28 DIAGNOSIS — Z17.0 MALIGNANT NEOPLASM OF CENTRAL PORTION OF RIGHT BREAST IN FEMALE, ESTROGEN RECEPTOR POSITIVE: ICD-10-CM

## 2018-05-30 NOTE — PROGRESS NOTES
Subjective:       Patient ID: Rebecca Crain is a 62 y.o. female.    Chief Complaint: No chief complaint on file.    HPI Ms. Crain is here for followup of metastatic carcinoma of the right breast.  She's currently on Navelbine therapy and presents for cycle 6.  Scans in April showed stable disease.    Overall she has been doing very well.  Her pain control has been excellent.  She does get several days of pain after Neulasta injection.  Energy is variable and she has about 3 days of fatigue after each chemotherapy.  She has no shortness of breath.  Appetite and bowel function has been good.        Breast history: In 2013 she was diagnosed with stage IIB carcinoma of the right breast, ER positive with a low Oncotype score.  She was enrolled on protocol  and randomized to endocrine therapy alone.  She was tried on all 3 aromatase inhibitors and had itching and rash to all of them.  In August 2013 she was started on tamoxifen.   She was found to have  bone metastasis in May 2015, 2015.  A subsequent bone biopsy was performed on a lesion at the T8 vertebra.   The pathology from that was consistent with metastatic breast cancer, ER +80%, MA +80%, and HER-2 negative.      She received letrozole plus palbociclib from July 2015 until May 2016.  She also received bone protective therapy with Xgeva.      Faslodex was started in June 2016.     Exemestane and Afinitor started in August 2017.  That was discontinued in February 2018 due to progression in the liver and bone.    Navelbine was started February 16, 2018.  Review of Systems   HENT: Negative for trouble swallowing.    Respiratory: Negative for cough and shortness of breath.    Gastrointestinal: Negative for abdominal pain and nausea.   Genitourinary: Negative.    Neurological: Positive for numbness (feet - mild ).   Psychiatric/Behavioral: Negative for dysphoric mood. The patient is not nervous/anxious.        Objective:      Physical Exam   Constitutional:  She is oriented to person, place, and time. She appears well-developed and well-nourished. No distress.   HENT:   Mouth/Throat: No oropharyngeal exudate.   Eyes: No scleral icterus.   Cardiovascular: Normal rate and regular rhythm.    Pulmonary/Chest: Effort normal and breath sounds normal.       Abdominal: Soft. She exhibits no mass. There is no tenderness.   Lymphadenopathy:     She has no cervical adenopathy.   Neurological: She is alert and oriented to person, place, and time.   Psychiatric: She has a normal mood and affect. Her behavior is normal. Thought content normal.       Assessment:     CBC today shows white count 2440 with 900 neutrophils, hemoglobin 11.6 and platelet count [id number], metabolic profile shows normal hepatic and renal function.  1. Malignant neoplasm of central portion of right breast in female, estrogen receptor positive    2. Bone metastasis    3. Metastasis to liver    4. Cancer related pain        Plan:       Continue current therapy (delay 1 week) and RTC 4 weeks.  Scans at that time.  Distress Screening Results: Psychosocial Distress screening score of Distress Score: 0 noted and reviewed. No intervention indicated.

## 2018-05-31 ENCOUNTER — LAB VISIT (OUTPATIENT)
Dept: LAB | Facility: HOSPITAL | Age: 63
End: 2018-05-31
Payer: COMMERCIAL

## 2018-05-31 ENCOUNTER — OFFICE VISIT (OUTPATIENT)
Dept: HEMATOLOGY/ONCOLOGY | Facility: CLINIC | Age: 63
End: 2018-05-31
Payer: COMMERCIAL

## 2018-05-31 VITALS
SYSTOLIC BLOOD PRESSURE: 180 MMHG | DIASTOLIC BLOOD PRESSURE: 109 MMHG | HEART RATE: 80 BPM | TEMPERATURE: 98 F | BODY MASS INDEX: 25.73 KG/M2 | WEIGHT: 149.94 LBS

## 2018-05-31 DIAGNOSIS — C79.51 BONE METASTASIS: ICD-10-CM

## 2018-05-31 DIAGNOSIS — C50.111 MALIGNANT NEOPLASM OF CENTRAL PORTION OF RIGHT BREAST IN FEMALE, ESTROGEN RECEPTOR POSITIVE: ICD-10-CM

## 2018-05-31 DIAGNOSIS — G89.3 CANCER RELATED PAIN: ICD-10-CM

## 2018-05-31 DIAGNOSIS — Z17.0 MALIGNANT NEOPLASM OF CENTRAL PORTION OF RIGHT BREAST IN FEMALE, ESTROGEN RECEPTOR POSITIVE: Primary | ICD-10-CM

## 2018-05-31 DIAGNOSIS — C50.111 MALIGNANT NEOPLASM OF CENTRAL PORTION OF RIGHT BREAST IN FEMALE, ESTROGEN RECEPTOR POSITIVE: Primary | ICD-10-CM

## 2018-05-31 DIAGNOSIS — C78.7 METASTASIS TO LIVER: ICD-10-CM

## 2018-05-31 DIAGNOSIS — Z17.0 MALIGNANT NEOPLASM OF CENTRAL PORTION OF RIGHT BREAST IN FEMALE, ESTROGEN RECEPTOR POSITIVE: ICD-10-CM

## 2018-05-31 LAB
ALBUMIN SERPL BCP-MCNC: 3.5 G/DL
ALP SERPL-CCNC: 90 U/L
ALT SERPL W/O P-5'-P-CCNC: 15 U/L
ANION GAP SERPL CALC-SCNC: 11 MMOL/L
AST SERPL-CCNC: 16 U/L
BILIRUB SERPL-MCNC: 0.7 MG/DL
BUN SERPL-MCNC: 23 MG/DL
CALCIUM SERPL-MCNC: 10.1 MG/DL
CHLORIDE SERPL-SCNC: 107 MMOL/L
CO2 SERPL-SCNC: 23 MMOL/L
CREAT SERPL-MCNC: 0.8 MG/DL
ERYTHROCYTE [DISTWIDTH] IN BLOOD BY AUTOMATED COUNT: 16.1 %
EST. GFR  (AFRICAN AMERICAN): >60 ML/MIN/1.73 M^2
EST. GFR  (NON AFRICAN AMERICAN): >60 ML/MIN/1.73 M^2
GLUCOSE SERPL-MCNC: 115 MG/DL
HCT VFR BLD AUTO: 36 %
HGB BLD-MCNC: 11.6 G/DL
IMM GRANULOCYTES # BLD AUTO: 0.02 K/UL
MCH RBC QN AUTO: 28.1 PG
MCHC RBC AUTO-ENTMCNC: 32.2 G/DL
MCV RBC AUTO: 87 FL
NEUTROPHILS # BLD AUTO: 0.9 K/UL
PLATELET # BLD AUTO: 420 K/UL
PMV BLD AUTO: 9.4 FL
POTASSIUM SERPL-SCNC: 3.7 MMOL/L
PROT SERPL-MCNC: 6.6 G/DL
RBC # BLD AUTO: 4.13 M/UL
SODIUM SERPL-SCNC: 141 MMOL/L
WBC # BLD AUTO: 2.44 K/UL

## 2018-05-31 PROCEDURE — 85027 COMPLETE CBC AUTOMATED: CPT

## 2018-05-31 PROCEDURE — 3008F BODY MASS INDEX DOCD: CPT | Mod: CPTII,S$GLB,, | Performed by: INTERNAL MEDICINE

## 2018-05-31 PROCEDURE — 3077F SYST BP >= 140 MM HG: CPT | Mod: CPTII,S$GLB,, | Performed by: INTERNAL MEDICINE

## 2018-05-31 PROCEDURE — 99999 PR PBB SHADOW E&M-EST. PATIENT-LVL III: CPT | Mod: PBBFAC,,, | Performed by: INTERNAL MEDICINE

## 2018-05-31 PROCEDURE — 80053 COMPREHEN METABOLIC PANEL: CPT

## 2018-05-31 PROCEDURE — 3080F DIAST BP >= 90 MM HG: CPT | Mod: CPTII,S$GLB,, | Performed by: INTERNAL MEDICINE

## 2018-05-31 PROCEDURE — 36415 COLL VENOUS BLD VENIPUNCTURE: CPT

## 2018-05-31 PROCEDURE — 99214 OFFICE O/P EST MOD 30 MIN: CPT | Mod: S$GLB,,, | Performed by: INTERNAL MEDICINE

## 2018-05-31 NOTE — Clinical Note
Navelbine day 1 and 8 every 3 weeks.  Delay this week due to low blood counts, labs next week prior to treatment. Labs and scans when she sees me in a month

## 2018-06-06 ENCOUNTER — TELEPHONE (OUTPATIENT)
Dept: HEMATOLOGY/ONCOLOGY | Facility: CLINIC | Age: 63
End: 2018-06-06

## 2018-06-06 NOTE — TELEPHONE ENCOUNTER
----- Message from Meenu Tellez sent at 6/6/2018  3:13 PM CDT -----  Contact: abhi    Abhi  called to speak with Nurse   Callback#[phone] [id number]  Thank You  FAVIO Tellez

## 2018-06-07 ENCOUNTER — INFUSION (OUTPATIENT)
Dept: INFUSION THERAPY | Facility: HOSPITAL | Age: 63
End: 2018-06-07
Attending: INTERNAL MEDICINE
Payer: COMMERCIAL

## 2018-06-07 VITALS
RESPIRATION RATE: 18 BRPM | TEMPERATURE: 98 F | HEART RATE: 71 BPM | SYSTOLIC BLOOD PRESSURE: 118 MMHG | DIASTOLIC BLOOD PRESSURE: 54 MMHG | BODY MASS INDEX: 25.6 KG/M2 | HEIGHT: 64 IN | WEIGHT: 149.94 LBS

## 2018-06-07 DIAGNOSIS — Z17.0 MALIGNANT NEOPLASM OF CENTRAL PORTION OF RIGHT BREAST IN FEMALE, ESTROGEN RECEPTOR POSITIVE: ICD-10-CM

## 2018-06-07 DIAGNOSIS — C50.011 MALIGNANT NEOPLASM OF NIPPLE OF RIGHT BREAST IN FEMALE, UNSPECIFIED ESTROGEN RECEPTOR STATUS: ICD-10-CM

## 2018-06-07 DIAGNOSIS — Z51.11 ENCOUNTER FOR ANTINEOPLASTIC CHEMOTHERAPY: Primary | ICD-10-CM

## 2018-06-07 DIAGNOSIS — C79.51 METASTATIC CANCER TO SPINE: ICD-10-CM

## 2018-06-07 DIAGNOSIS — C50.111 MALIGNANT NEOPLASM OF CENTRAL PORTION OF RIGHT BREAST IN FEMALE, ESTROGEN RECEPTOR POSITIVE: ICD-10-CM

## 2018-06-07 DIAGNOSIS — C78.7 METASTASIS TO LIVER: ICD-10-CM

## 2018-06-07 PROCEDURE — 96409 CHEMO IV PUSH SNGL DRUG: CPT

## 2018-06-07 PROCEDURE — 63600175 PHARM REV CODE 636 W HCPCS: Performed by: INTERNAL MEDICINE

## 2018-06-07 PROCEDURE — 25000003 PHARM REV CODE 250: Performed by: INTERNAL MEDICINE

## 2018-06-07 RX ORDER — DEXAMETHASONE 4 MG/1
4 TABLET ORAL
Status: COMPLETED | OUTPATIENT
Start: 2018-06-07 | End: 2018-06-07

## 2018-06-07 RX ORDER — SODIUM CHLORIDE 0.9 % (FLUSH) 0.9 %
10 SYRINGE (ML) INJECTION
Status: CANCELLED | OUTPATIENT
Start: 2018-06-19

## 2018-06-07 RX ORDER — HEPARIN 100 UNIT/ML
500 SYRINGE INTRAVENOUS
Status: CANCELLED | OUTPATIENT
Start: 2018-06-19

## 2018-06-07 RX ORDER — DEXAMETHASONE 4 MG/1
4 TABLET ORAL
Status: CANCELLED | OUTPATIENT
Start: 2018-06-19

## 2018-06-07 RX ORDER — HEPARIN 100 UNIT/ML
500 SYRINGE INTRAVENOUS
Status: DISCONTINUED | OUTPATIENT
Start: 2018-06-07 | End: 2018-06-07 | Stop reason: HOSPADM

## 2018-06-07 RX ORDER — SODIUM CHLORIDE 0.9 % (FLUSH) 0.9 %
10 SYRINGE (ML) INJECTION
Status: DISCONTINUED | OUTPATIENT
Start: 2018-06-07 | End: 2018-06-07 | Stop reason: HOSPADM

## 2018-06-07 RX ADMIN — SODIUM CHLORIDE: 9 INJECTION, SOLUTION INTRAVENOUS at 08:06

## 2018-06-07 RX ADMIN — VINORELBINE 45 MG: 10 INJECTION, SOLUTION INTRAVENOUS at 08:06

## 2018-06-07 RX ADMIN — DEXAMETHASONE 4 MG: 4 TABLET ORAL at 08:06

## 2018-06-07 RX ADMIN — HEPARIN SODIUM (PORCINE) LOCK FLUSH IV SOLN 100 UNIT/ML 500 UNITS: 100 SOLUTION at 09:06

## 2018-06-07 NOTE — PLAN OF CARE
Problem: Patient Care Overview  Goal: Plan of Care Review  Outcome: Ongoing (interventions implemented as appropriate)  Pt. Tolerated infusion. VSS. Port flushed, hep-locked and de-accessed. AVS Given. Future plan of care and appts reviewed.

## 2018-06-12 ENCOUNTER — PATIENT MESSAGE (OUTPATIENT)
Dept: ENDOCRINOLOGY | Facility: CLINIC | Age: 63
End: 2018-06-12

## 2018-06-14 ENCOUNTER — INFUSION (OUTPATIENT)
Dept: INFUSION THERAPY | Facility: HOSPITAL | Age: 63
End: 2018-06-14
Attending: INTERNAL MEDICINE
Payer: COMMERCIAL

## 2018-06-14 ENCOUNTER — TELEPHONE (OUTPATIENT)
Dept: ENDOCRINOLOGY | Facility: CLINIC | Age: 63
End: 2018-06-14

## 2018-06-14 VITALS
WEIGHT: 149.94 LBS | DIASTOLIC BLOOD PRESSURE: 58 MMHG | HEIGHT: 64 IN | HEART RATE: 87 BPM | SYSTOLIC BLOOD PRESSURE: 115 MMHG | RESPIRATION RATE: 18 BRPM | TEMPERATURE: 98 F | BODY MASS INDEX: 25.6 KG/M2

## 2018-06-14 DIAGNOSIS — E06.3 HYPOTHYROIDISM DUE TO HASHIMOTO'S THYROIDITIS: Primary | ICD-10-CM

## 2018-06-14 DIAGNOSIS — E03.8 HYPOTHYROIDISM DUE TO HASHIMOTO'S THYROIDITIS: Primary | ICD-10-CM

## 2018-06-14 DIAGNOSIS — C50.111 MALIGNANT NEOPLASM OF CENTRAL PORTION OF RIGHT BREAST IN FEMALE, ESTROGEN RECEPTOR POSITIVE: ICD-10-CM

## 2018-06-14 DIAGNOSIS — C79.51 METASTATIC CANCER TO SPINE: ICD-10-CM

## 2018-06-14 DIAGNOSIS — C50.011 MALIGNANT NEOPLASM OF NIPPLE OF RIGHT BREAST IN FEMALE, UNSPECIFIED ESTROGEN RECEPTOR STATUS: ICD-10-CM

## 2018-06-14 DIAGNOSIS — C78.7 METASTASIS TO LIVER: ICD-10-CM

## 2018-06-14 DIAGNOSIS — Z51.11 ENCOUNTER FOR ANTINEOPLASTIC CHEMOTHERAPY: Primary | ICD-10-CM

## 2018-06-14 DIAGNOSIS — Z17.0 MALIGNANT NEOPLASM OF CENTRAL PORTION OF RIGHT BREAST IN FEMALE, ESTROGEN RECEPTOR POSITIVE: ICD-10-CM

## 2018-06-14 PROCEDURE — 63600175 PHARM REV CODE 636 W HCPCS: Performed by: INTERNAL MEDICINE

## 2018-06-14 PROCEDURE — 25000003 PHARM REV CODE 250: Performed by: INTERNAL MEDICINE

## 2018-06-14 PROCEDURE — 96409 CHEMO IV PUSH SNGL DRUG: CPT

## 2018-06-14 RX ORDER — DEXAMETHASONE 4 MG/1
4 TABLET ORAL
Status: COMPLETED | OUTPATIENT
Start: 2018-06-14 | End: 2018-06-14

## 2018-06-14 RX ORDER — HEPARIN 100 UNIT/ML
500 SYRINGE INTRAVENOUS
Status: DISCONTINUED | OUTPATIENT
Start: 2018-06-14 | End: 2018-06-14 | Stop reason: HOSPADM

## 2018-06-14 RX ORDER — SODIUM CHLORIDE 0.9 % (FLUSH) 0.9 %
10 SYRINGE (ML) INJECTION
Status: DISCONTINUED | OUTPATIENT
Start: 2018-06-14 | End: 2018-06-14 | Stop reason: HOSPADM

## 2018-06-14 RX ADMIN — SODIUM CHLORIDE: 9 INJECTION, SOLUTION INTRAVENOUS at 08:06

## 2018-06-14 RX ADMIN — HEPARIN 500 UNITS: 100 SYRINGE at 08:06

## 2018-06-14 RX ADMIN — VINORELBINE TARTRATE 45 MG: 10 INJECTION INTRAVENOUS at 08:06

## 2018-06-14 RX ADMIN — DEXAMETHASONE 4 MG: 4 TABLET ORAL at 08:06

## 2018-06-14 NOTE — TELEPHONE ENCOUNTER
Patient notified to discuss test results. TSH is now 0.204. Patient states she is feeling well with increased energy. She had chemotherapy earlier today. She states she is taking Synthroid as prescribed on an empty stomach with water and she waits 30 minutes before eating or taking other medications. Patient advised to take Synthroid 175 mcg once daily. We will check labs in 6 weeks time.

## 2018-06-25 DIAGNOSIS — Z17.0 MALIGNANT NEOPLASM OF CENTRAL PORTION OF RIGHT BREAST IN FEMALE, ESTROGEN RECEPTOR POSITIVE: ICD-10-CM

## 2018-06-25 DIAGNOSIS — C50.111 MALIGNANT NEOPLASM OF CENTRAL PORTION OF RIGHT BREAST IN FEMALE, ESTROGEN RECEPTOR POSITIVE: ICD-10-CM

## 2018-06-25 DIAGNOSIS — Z51.11 ENCOUNTER FOR ANTINEOPLASTIC CHEMOTHERAPY: ICD-10-CM

## 2018-06-25 RX ORDER — LIDOCAINE AND PRILOCAINE 25; 25 MG/G; MG/G
CREAM TOPICAL
Qty: 5 G | Refills: 3 | Status: SHIPPED | OUTPATIENT
Start: 2018-06-25 | End: 2019-08-22 | Stop reason: SDUPTHER

## 2018-06-26 ENCOUNTER — HOSPITAL ENCOUNTER (OUTPATIENT)
Dept: RADIOLOGY | Facility: HOSPITAL | Age: 63
Discharge: HOME | End: 2018-06-26
Attending: INTERNAL MEDICINE
Payer: COMMERCIAL

## 2018-06-26 DIAGNOSIS — C50.111 MALIGNANT NEOPLASM OF CENTRAL PORTION OF RIGHT BREAST IN FEMALE, ESTROGEN RECEPTOR POSITIVE: ICD-10-CM

## 2018-06-26 DIAGNOSIS — G62.9 NEUROPATHY: ICD-10-CM

## 2018-06-26 DIAGNOSIS — Z17.0 MALIGNANT NEOPLASM OF CENTRAL PORTION OF RIGHT BREAST IN FEMALE, ESTROGEN RECEPTOR POSITIVE: ICD-10-CM

## 2018-06-26 PROCEDURE — 71250 CT THORAX DX C-: CPT | Mod: 26,,, | Performed by: RADIOLOGY

## 2018-06-26 PROCEDURE — A9698 NON-RAD CONTRAST MATERIALNOC: HCPCS | Performed by: INTERNAL MEDICINE

## 2018-06-26 PROCEDURE — 71250 CT THORAX DX C-: CPT | Mod: TC

## 2018-06-26 PROCEDURE — 74176 CT ABD & PELVIS W/O CONTRAST: CPT | Mod: 26,,, | Performed by: RADIOLOGY

## 2018-06-26 PROCEDURE — 74176 CT ABD & PELVIS W/O CONTRAST: CPT | Mod: TC

## 2018-06-26 PROCEDURE — 25500020 PHARM REV CODE 255: Performed by: INTERNAL MEDICINE

## 2018-06-26 PROCEDURE — 78306 BONE IMAGING WHOLE BODY: CPT | Mod: 26,,, | Performed by: RADIOLOGY

## 2018-06-26 PROCEDURE — A9503 TC99M MEDRONATE: HCPCS

## 2018-06-26 RX ORDER — GABAPENTIN 300 MG/1
300 CAPSULE ORAL 3 TIMES DAILY
Qty: 270 CAPSULE | Refills: 0 | Status: SHIPPED | OUTPATIENT
Start: 2018-06-26 | End: 2018-11-01 | Stop reason: SDUPTHER

## 2018-06-26 RX ADMIN — Medication 450 ML: at 11:06

## 2018-06-26 NOTE — TELEPHONE ENCOUNTER
Returned call to pt.   Pt calling to stated that as of now her CT scans are not approved- informed pt to reach out to CT and see if can get approved same day and if not may possibly need to be r/s.   Pt stated that Dr. Schroeder has completed peer to peers in past.   Informed pt if a peer to peer is needed will get Dr. Schroeder to complete.   Pt verbalized understanding.           ----- Message from Anya Lazo sent at 6/26/2018 10:31 AM CDT -----  Contact: Pt  Pt calling regarding CT scans scheduled for today. She states that there are issues with appts.         Pt call back number [phone]

## 2018-06-27 NOTE — PROGRESS NOTES
Subjective:       Patient ID: Rebecca Crain is a 62 y.o. female.    Chief Complaint: No chief complaint on file.    HPI Ms. Crain is here for followup of metastatic carcinoma of the right breast.  She's currently on Navelbine therapy and presents for cycle 6.    Overall, she has been doing well.  Pain control is been good.            Breast history: In 2013 she was diagnosed with stage IIB carcinoma of the right breast, ER positive with a low Oncotype score.  She was enrolled on protocol  and randomized to endocrine therapy alone.  She was tried on all 3 aromatase inhibitors and had itching and rash to all of them.  In August 2013 she was started on tamoxifen.   She was found to have  bone metastasis in May 2015, 2015.  A subsequent bone biopsy was performed on a lesion at the T8 vertebra.   The pathology from that was consistent with metastatic breast cancer, ER +80%, DC +80%, and HER-2 negative.      She received letrozole plus palbociclib from July 2015 until May 2016.  She also received bone protective therapy with Xgeva.      Faslodex was started in June 2016.     Exemestane and Afinitor started in August 2017.  That was discontinued in February 2018 due to progression in the liver and bone.    Navelbine was started February 16, 2018.  Review of Systems   Constitutional: Negative for appetite change and unexpected weight change.   HENT: Negative for trouble swallowing.    Eyes: Positive for visual disturbance.   Respiratory: Negative for cough and shortness of breath.    Cardiovascular: Negative for chest pain.   Gastrointestinal: Positive for diarrhea. Negative for abdominal pain and nausea.   Genitourinary: Negative.  Negative for frequency.   Musculoskeletal: Positive for back pain.   Skin: Negative for rash.   Neurological: Positive for numbness (feet - mild ) and headaches.   Hematological: Negative for adenopathy.   Psychiatric/Behavioral: Negative for dysphoric mood. The patient is not  nervous/anxious.        Objective:      Physical Exam   Constitutional: She is oriented to person, place, and time. She appears well-developed and well-nourished. No distress.   HENT:   Mouth/Throat: No oropharyngeal exudate.   Eyes: No scleral icterus.   Cardiovascular: Normal rate and regular rhythm.    Pulmonary/Chest: Effort normal and breath sounds normal.       Abdominal: Soft. She exhibits no mass. There is no tenderness.   Lymphadenopathy:     She has no cervical adenopathy.   Neurological: She is alert and oriented to person, place, and time.   Psychiatric: She has a normal mood and affect. Her behavior is normal. Thought content normal.       Assessment:     bone scan shows marked improvement in bone metastasis.  CT scan shows no evidence new findings in the lungs, there are stable hepatic hypodensities.  numerous osseous lesions throughout the axial skeleton along with compression deformity of the T6 and T11 vertebral bodies stable from prior examination dated 02/02/2018. The lytic osseous lesions involving the posterior elements of T6, left posterior cortex of the T7 vertebral body as well as the left superior endplate of the S1 vertebral body appear stable from most recent prior, but are new in comparison to 02/09/2018 exam.  The T7 lesion may be minimally increased in size (1.4 cm today, previously 1.3 cm.  1. Bone metastasis    2. Metastasis to liver    3. Encounter for antineoplastic chemotherapy    4. Malignant neoplasm of central portion of right breast in female, estrogen receptor positive        Plan:       Continue current therapy and return in 3 weeks.  Will re-scanned in 9 weeks.

## 2018-06-28 ENCOUNTER — INFUSION (OUTPATIENT)
Dept: INFUSION THERAPY | Facility: HOSPITAL | Age: 63
End: 2018-06-28
Attending: INTERNAL MEDICINE
Payer: COMMERCIAL

## 2018-06-28 ENCOUNTER — OFFICE VISIT (OUTPATIENT)
Dept: HEMATOLOGY/ONCOLOGY | Facility: CLINIC | Age: 63
End: 2018-06-28
Payer: COMMERCIAL

## 2018-06-28 VITALS
TEMPERATURE: 98 F | SYSTOLIC BLOOD PRESSURE: 132 MMHG | DIASTOLIC BLOOD PRESSURE: 59 MMHG | RESPIRATION RATE: 18 BRPM | HEART RATE: 62 BPM

## 2018-06-28 VITALS
TEMPERATURE: 98 F | DIASTOLIC BLOOD PRESSURE: 85 MMHG | SYSTOLIC BLOOD PRESSURE: 135 MMHG | HEART RATE: 75 BPM | WEIGHT: 151.69 LBS | RESPIRATION RATE: 17 BRPM | BODY MASS INDEX: 26.04 KG/M2

## 2018-06-28 DIAGNOSIS — Z51.11 ENCOUNTER FOR ANTINEOPLASTIC CHEMOTHERAPY: ICD-10-CM

## 2018-06-28 DIAGNOSIS — C79.51 BONE METASTASIS: Primary | ICD-10-CM

## 2018-06-28 DIAGNOSIS — C50.011 MALIGNANT NEOPLASM OF NIPPLE OF RIGHT BREAST IN FEMALE, UNSPECIFIED ESTROGEN RECEPTOR STATUS: ICD-10-CM

## 2018-06-28 DIAGNOSIS — Z51.11 ENCOUNTER FOR ANTINEOPLASTIC CHEMOTHERAPY: Primary | ICD-10-CM

## 2018-06-28 DIAGNOSIS — Z17.0 MALIGNANT NEOPLASM OF CENTRAL PORTION OF RIGHT BREAST IN FEMALE, ESTROGEN RECEPTOR POSITIVE: ICD-10-CM

## 2018-06-28 DIAGNOSIS — C78.7 METASTASIS TO LIVER: ICD-10-CM

## 2018-06-28 DIAGNOSIS — C50.111 MALIGNANT NEOPLASM OF CENTRAL PORTION OF RIGHT BREAST IN FEMALE, ESTROGEN RECEPTOR POSITIVE: ICD-10-CM

## 2018-06-28 DIAGNOSIS — C79.51 METASTATIC CANCER TO SPINE: ICD-10-CM

## 2018-06-28 PROCEDURE — 3008F BODY MASS INDEX DOCD: CPT | Mod: CPTII,S$GLB,, | Performed by: INTERNAL MEDICINE

## 2018-06-28 PROCEDURE — A4216 STERILE WATER/SALINE, 10 ML: HCPCS | Performed by: INTERNAL MEDICINE

## 2018-06-28 PROCEDURE — 25000003 PHARM REV CODE 250: Performed by: INTERNAL MEDICINE

## 2018-06-28 PROCEDURE — 63600175 PHARM REV CODE 636 W HCPCS: Performed by: INTERNAL MEDICINE

## 2018-06-28 PROCEDURE — 96409 CHEMO IV PUSH SNGL DRUG: CPT

## 2018-06-28 PROCEDURE — 99999 PR PBB SHADOW E&M-EST. PATIENT-LVL III: CPT | Mod: PBBFAC,,, | Performed by: INTERNAL MEDICINE

## 2018-06-28 PROCEDURE — 99214 OFFICE O/P EST MOD 30 MIN: CPT | Mod: S$GLB,,, | Performed by: INTERNAL MEDICINE

## 2018-06-28 RX ORDER — SODIUM CHLORIDE 0.9 % (FLUSH) 0.9 %
10 SYRINGE (ML) INJECTION
Status: DISCONTINUED | OUTPATIENT
Start: 2018-06-28 | End: 2018-06-28 | Stop reason: HOSPADM

## 2018-06-28 RX ORDER — HEPARIN 100 UNIT/ML
500 SYRINGE INTRAVENOUS
Status: CANCELLED | OUTPATIENT
Start: 2018-06-28

## 2018-06-28 RX ORDER — SODIUM CHLORIDE 0.9 % (FLUSH) 0.9 %
10 SYRINGE (ML) INJECTION
Status: CANCELLED | OUTPATIENT
Start: 2018-06-28

## 2018-06-28 RX ORDER — HEPARIN 100 UNIT/ML
500 SYRINGE INTRAVENOUS
Status: CANCELLED | OUTPATIENT
Start: 2018-07-05

## 2018-06-28 RX ORDER — DEXAMETHASONE 0.5 MG/1
4 TABLET ORAL
Status: CANCELLED | OUTPATIENT
Start: 2018-07-05

## 2018-06-28 RX ORDER — DIPHENOXYLATE HYDROCHLORIDE AND ATROPINE SULFATE 2.5; .025 MG/1; MG/1
1 TABLET ORAL 4 TIMES DAILY PRN
COMMUNITY
End: 2018-08-30 | Stop reason: SDUPTHER

## 2018-06-28 RX ORDER — SODIUM CHLORIDE 0.9 % (FLUSH) 0.9 %
10 SYRINGE (ML) INJECTION
Status: CANCELLED | OUTPATIENT
Start: 2018-07-05

## 2018-06-28 RX ORDER — HEPARIN 100 UNIT/ML
500 SYRINGE INTRAVENOUS
Status: DISCONTINUED | OUTPATIENT
Start: 2018-06-28 | End: 2018-06-28 | Stop reason: HOSPADM

## 2018-06-28 RX ORDER — DEXAMETHASONE 0.5 MG/1
4 TABLET ORAL
Status: CANCELLED | OUTPATIENT
Start: 2018-06-28

## 2018-06-28 RX ORDER — DEXAMETHASONE 4 MG/1
4 TABLET ORAL
Status: COMPLETED | OUTPATIENT
Start: 2018-06-28 | End: 2018-06-28

## 2018-06-28 RX ADMIN — DEXAMETHASONE 4 MG: 4 TABLET ORAL at 08:06

## 2018-06-28 RX ADMIN — SODIUM CHLORIDE: 0.9 INJECTION, SOLUTION INTRAVENOUS at 08:06

## 2018-06-28 RX ADMIN — SODIUM CHLORIDE, PRESERVATIVE FREE 10 ML: 5 INJECTION INTRAVENOUS at 09:06

## 2018-06-28 RX ADMIN — HEPARIN 500 UNITS: 100 SYRINGE at 09:06

## 2018-06-28 RX ADMIN — VINORELBINE TARTRATE 45 MG: 10 INJECTION INTRAVENOUS at 09:06

## 2018-06-28 NOTE — PLAN OF CARE
Problem: Patient Care Overview  Goal: Individualization & Mutuality  Left chest PAC  BP on leg   Outcome: Ongoing (interventions implemented as appropriate)  0895-Labs , hx, and medications reviewed, patient was seen by Md prior to arrival. Assessment completed. Discussed plan of care with patient. Patient in agreement. Chair reclined and warm blanket and snack offered.

## 2018-06-28 NOTE — PLAN OF CARE
Problem: Patient Care Overview  Goal: Plan of Care Review  0918-Patient tolerated treatment well. Discharged without complaints or S/S of adverse event. AVS given.  Instructed to call provider for any questions or concerns.

## 2018-06-28 NOTE — Clinical Note
Sarahbine day 1 & 8-she will skip day 8 this cycle. Return to clinic in 3 weeks with CBC, CMP and cancer antigen

## 2018-07-02 ENCOUNTER — RESEARCH ENCOUNTER (OUTPATIENT)
Dept: RESEARCH | Facility: HOSPITAL | Age: 63
End: 2018-07-02

## 2018-07-02 ENCOUNTER — PATIENT MESSAGE (OUTPATIENT)
Dept: HEMATOLOGY/ONCOLOGY | Facility: CLINIC | Age: 63
End: 2018-07-02

## 2018-07-02 NOTE — PROGRESS NOTES
"Protocol: [employer]/ "an observational study profiling biospecimens from cancer patients to screen for molecular alterations"  Protocol # [id number]-[id number]  Amendment 3 Date approved May 1, 2018  PI: Dr. Nestor High  Pt: Rebecca Crain         Consent waiver and Eligibility Note      Patient meets eligibility this week for study based on the following criteria. Consent waiver in place.       4.1.1 Patient is ? 19 years of age. YOB: 1955, and she is 62 years old.     4.1.2 Patient has histologically documented Metastatic adenocarcinoma consistent with breast originper path report on 06/01/2015.      4.1.3.4. Patient has Stage IV metastatic breast cancer  with bone and liver metastasis per clinic note on 06/28/2018.     4.1.4 Pending receipt of adequate tissue from Pathology.     All other eligibility criteria does not apply to this patient.   Specimen requested.  "

## 2018-07-17 RX ORDER — HEPARIN 100 UNIT/ML
500 SYRINGE INTRAVENOUS
Status: CANCELLED | OUTPATIENT
Start: 2018-08-09

## 2018-07-17 RX ORDER — SODIUM CHLORIDE 0.9 % (FLUSH) 0.9 %
10 SYRINGE (ML) INJECTION
Status: CANCELLED | OUTPATIENT
Start: 2018-08-09

## 2018-07-17 RX ORDER — HEPARIN 100 UNIT/ML
500 SYRINGE INTRAVENOUS
Status: CANCELLED | OUTPATIENT
Start: 2018-08-02

## 2018-07-17 RX ORDER — DEXAMETHASONE 0.5 MG/1
4 TABLET ORAL
Status: CANCELLED | OUTPATIENT
Start: 2018-08-02

## 2018-07-17 RX ORDER — SODIUM CHLORIDE 0.9 % (FLUSH) 0.9 %
10 SYRINGE (ML) INJECTION
Status: CANCELLED | OUTPATIENT
Start: 2018-08-02

## 2018-07-17 RX ORDER — DEXAMETHASONE 0.5 MG/1
4 TABLET ORAL
Status: CANCELLED | OUTPATIENT
Start: 2018-08-09

## 2018-07-19 ENCOUNTER — INFUSION (OUTPATIENT)
Dept: INFUSION THERAPY | Facility: HOSPITAL | Age: 63
End: 2018-07-19
Attending: INTERNAL MEDICINE
Payer: COMMERCIAL

## 2018-07-19 ENCOUNTER — OFFICE VISIT (OUTPATIENT)
Dept: HEMATOLOGY/ONCOLOGY | Facility: CLINIC | Age: 63
End: 2018-07-19
Payer: COMMERCIAL

## 2018-07-19 ENCOUNTER — LAB VISIT (OUTPATIENT)
Dept: LAB | Facility: HOSPITAL | Age: 63
End: 2018-07-19
Attending: INTERNAL MEDICINE
Payer: COMMERCIAL

## 2018-07-19 VITALS
BODY MASS INDEX: 26.11 KG/M2 | TEMPERATURE: 98 F | SYSTOLIC BLOOD PRESSURE: 153 MMHG | HEART RATE: 83 BPM | DIASTOLIC BLOOD PRESSURE: 70 MMHG | OXYGEN SATURATION: 95 % | WEIGHT: 152.13 LBS | RESPIRATION RATE: 16 BRPM

## 2018-07-19 VITALS
HEIGHT: 64 IN | TEMPERATURE: 99 F | SYSTOLIC BLOOD PRESSURE: 117 MMHG | BODY MASS INDEX: 25.97 KG/M2 | RESPIRATION RATE: 18 BRPM | HEART RATE: 71 BPM | WEIGHT: 152.13 LBS | DIASTOLIC BLOOD PRESSURE: 59 MMHG

## 2018-07-19 DIAGNOSIS — C79.51 METASTATIC CANCER TO SPINE: ICD-10-CM

## 2018-07-19 DIAGNOSIS — C50.011 MALIGNANT NEOPLASM OF NIPPLE OF RIGHT BREAST IN FEMALE, UNSPECIFIED ESTROGEN RECEPTOR STATUS: ICD-10-CM

## 2018-07-19 DIAGNOSIS — Z51.11 ENCOUNTER FOR ANTINEOPLASTIC CHEMOTHERAPY: Primary | ICD-10-CM

## 2018-07-19 DIAGNOSIS — C78.7 METASTASIS TO LIVER: ICD-10-CM

## 2018-07-19 DIAGNOSIS — Z17.0 MALIGNANT NEOPLASM OF CENTRAL PORTION OF RIGHT BREAST IN FEMALE, ESTROGEN RECEPTOR POSITIVE: ICD-10-CM

## 2018-07-19 DIAGNOSIS — C50.111 MALIGNANT NEOPLASM OF CENTRAL PORTION OF RIGHT BREAST IN FEMALE, ESTROGEN RECEPTOR POSITIVE: Primary | ICD-10-CM

## 2018-07-19 DIAGNOSIS — C50.111 MALIGNANT NEOPLASM OF CENTRAL PORTION OF RIGHT BREAST IN FEMALE, ESTROGEN RECEPTOR POSITIVE: ICD-10-CM

## 2018-07-19 DIAGNOSIS — C79.51 BONE METASTASIS: ICD-10-CM

## 2018-07-19 DIAGNOSIS — Z17.0 MALIGNANT NEOPLASM OF CENTRAL PORTION OF RIGHT BREAST IN FEMALE, ESTROGEN RECEPTOR POSITIVE: Primary | ICD-10-CM

## 2018-07-19 LAB
ALBUMIN SERPL BCP-MCNC: 3.8 G/DL
ALP SERPL-CCNC: 133 U/L
ALT SERPL W/O P-5'-P-CCNC: 20 U/L
ANION GAP SERPL CALC-SCNC: 8 MMOL/L
AST SERPL-CCNC: 28 U/L
BASOPHILS # BLD AUTO: 0.03 K/UL
BASOPHILS NFR BLD: 0.5 %
BILIRUB SERPL-MCNC: 0.8 MG/DL
BUN SERPL-MCNC: 21 MG/DL
CALCIUM SERPL-MCNC: 10.2 MG/DL
CHLORIDE SERPL-SCNC: 102 MMOL/L
CO2 SERPL-SCNC: 27 MMOL/L
CREAT SERPL-MCNC: 0.8 MG/DL
DIFFERENTIAL METHOD: ABNORMAL
EOSINOPHIL # BLD AUTO: 0.2 K/UL
EOSINOPHIL NFR BLD: 2.9 %
ERYTHROCYTE [DISTWIDTH] IN BLOOD BY AUTOMATED COUNT: 16.9 %
EST. GFR  (AFRICAN AMERICAN): >60 ML/MIN/1.73 M^2
EST. GFR  (NON AFRICAN AMERICAN): >60 ML/MIN/1.73 M^2
GLUCOSE SERPL-MCNC: 109 MG/DL
HCT VFR BLD AUTO: 40.1 %
HGB BLD-MCNC: 12.9 G/DL
IMM GRANULOCYTES # BLD AUTO: 0.01 K/UL
IMM GRANULOCYTES NFR BLD AUTO: 0.2 %
LYMPHOCYTES # BLD AUTO: 1.1 K/UL
LYMPHOCYTES NFR BLD: 18.9 %
MCH RBC QN AUTO: 27.6 PG
MCHC RBC AUTO-ENTMCNC: 32.2 G/DL
MCV RBC AUTO: 86 FL
MONOCYTES # BLD AUTO: 0.6 K/UL
MONOCYTES NFR BLD: 10.9 %
NEUTROPHILS # BLD AUTO: 3.7 K/UL
NEUTROPHILS NFR BLD: 66.6 %
NRBC BLD-RTO: 0 /100 WBC
PLATELET # BLD AUTO: 215 K/UL
PMV BLD AUTO: 9.7 FL
POTASSIUM SERPL-SCNC: 4.3 MMOL/L
PROT SERPL-MCNC: 6.9 G/DL
RBC # BLD AUTO: 4.67 M/UL
SODIUM SERPL-SCNC: 137 MMOL/L
WBC # BLD AUTO: 5.6 K/UL

## 2018-07-19 PROCEDURE — 86300 IMMUNOASSAY TUMOR CA 15-3: CPT

## 2018-07-19 PROCEDURE — 25000003 PHARM REV CODE 250: Performed by: INTERNAL MEDICINE

## 2018-07-19 PROCEDURE — 99214 OFFICE O/P EST MOD 30 MIN: CPT | Mod: S$GLB,,, | Performed by: INTERNAL MEDICINE

## 2018-07-19 PROCEDURE — 85025 COMPLETE CBC W/AUTO DIFF WBC: CPT

## 2018-07-19 PROCEDURE — 99999 PR PBB SHADOW E&M-EST. PATIENT-LVL III: CPT | Mod: PBBFAC,,, | Performed by: INTERNAL MEDICINE

## 2018-07-19 PROCEDURE — 36415 COLL VENOUS BLD VENIPUNCTURE: CPT

## 2018-07-19 PROCEDURE — 96409 CHEMO IV PUSH SNGL DRUG: CPT

## 2018-07-19 PROCEDURE — 3078F DIAST BP <80 MM HG: CPT | Mod: CPTII,S$GLB,, | Performed by: INTERNAL MEDICINE

## 2018-07-19 PROCEDURE — A4216 STERILE WATER/SALINE, 10 ML: HCPCS | Performed by: INTERNAL MEDICINE

## 2018-07-19 PROCEDURE — 63600175 PHARM REV CODE 636 W HCPCS: Performed by: INTERNAL MEDICINE

## 2018-07-19 PROCEDURE — 3008F BODY MASS INDEX DOCD: CPT | Mod: CPTII,S$GLB,, | Performed by: INTERNAL MEDICINE

## 2018-07-19 PROCEDURE — 80053 COMPREHEN METABOLIC PANEL: CPT

## 2018-07-19 PROCEDURE — 3077F SYST BP >= 140 MM HG: CPT | Mod: CPTII,S$GLB,, | Performed by: INTERNAL MEDICINE

## 2018-07-19 RX ORDER — SODIUM CHLORIDE 0.9 % (FLUSH) 0.9 %
10 SYRINGE (ML) INJECTION
Status: DISCONTINUED | OUTPATIENT
Start: 2018-07-19 | End: 2018-07-19 | Stop reason: HOSPADM

## 2018-07-19 RX ORDER — DEXAMETHASONE 4 MG/1
4 TABLET ORAL
Status: COMPLETED | OUTPATIENT
Start: 2018-07-19 | End: 2018-07-19

## 2018-07-19 RX ORDER — HEPARIN 100 UNIT/ML
500 SYRINGE INTRAVENOUS
Status: DISCONTINUED | OUTPATIENT
Start: 2018-07-19 | End: 2018-07-19 | Stop reason: HOSPADM

## 2018-07-19 RX ORDER — DEXAMETHASONE 4 MG/1
4 TABLET ORAL
Status: DISCONTINUED | OUTPATIENT
Start: 2018-07-19 | End: 2018-07-19 | Stop reason: HOSPADM

## 2018-07-19 RX ADMIN — SODIUM CHLORIDE, PRESERVATIVE FREE 10 ML: 5 INJECTION INTRAVENOUS at 10:07

## 2018-07-19 RX ADMIN — VINORELBINE TARTRATE 45 MG: 10 INJECTION INTRAVENOUS at 09:07

## 2018-07-19 RX ADMIN — SODIUM CHLORIDE: 0.9 INJECTION, SOLUTION INTRAVENOUS at 09:07

## 2018-07-19 RX ADMIN — DEXAMETHASONE 4 MG: 4 TABLET ORAL at 09:07

## 2018-07-19 NOTE — NURSING
0938  RN released C7D8 in error, discussed same with COLLEEN Pierson RN and MARLEN Stone, PharmD; RN then completed day for C7D8 and C7D9 (Neulasta), and released correct treatment plan for C8D1.  Per pt, Neulasta injection given by HH following D8 each cycle.

## 2018-07-19 NOTE — PLAN OF CARE
Problem: Patient Care Overview  Goal: Plan of Care Review  Outcome: Ongoing (interventions implemented as appropriate)  Infusion completed, pt tolerated well; pt instructed to remain well hydrated, to contact MD for any needs or concerns; declined printed schedule, verbalized understanding of all discussed and when to report next

## 2018-07-19 NOTE — PROGRESS NOTES
Subjective:       Patient ID: Rebecca Crain is a 62 y.o. female.    Chief Complaint: No chief complaint on file.    HPI Ms. Crain is here for followup of metastatic carcinoma of the right breast.  She's currently on Navelbine therapy and presents for cycle 8.  Last bone scan showed significant improvement.    Overall, she has been doing very well. She just got back from vacation and she felt well during that trip.  Appetite has been good, she does have some constipation but no more diarrhea.  She denies any shortness of breath.  Pain control has been excellent.  She takes only an occasional Aleve when she feels achy and that works well for her.            Breast history: In 2013 she was diagnosed with stage IIB carcinoma of the right breast, ER positive with a low Oncotype score.  She was enrolled on protocol  and randomized to endocrine therapy alone.  She was tried on all 3 aromatase inhibitors and had itching and rash to all of them.  In August 2013 she was started on tamoxifen.   She was found to have  bone metastasis in May 2015, 2015.  A subsequent bone biopsy was performed on a lesion at the T8 vertebra.   The pathology from that was consistent with metastatic breast cancer, ER +80%, [place] +80%, and HER-2 negative.      She received letrozole plus palbociclib from July 2015 until May 2016.  She also received bone protective therapy with Xgeva.      Faslodex was started in June 2016.     Exemestane and Afinitor started in August 2017.  That was discontinued in February 2018 due to progression in the liver and bone.    Navelbine was started February 16, 2018.    Scans in June:   bone scan showed marked improvement in bone metastasis.     CT scan showed no evidence new findings in the lungs, there were stable hepatic hypodensities.  numerous osseous lesions throughout the axial skeleton along with compression deformity of the T6 and T11 vertebral bodies stable from prior examination dated 02/02/2018. The  lytic osseous lesions involving the posterior elements of T6, left posterior cortex of the T7 vertebral body as well as the left superior endplate of the S1 vertebral body appeared stable from most recent prior, but were new in comparison to 02/09/2018 exam.  The T7 lesion may be minimally increased in size (1.4 cm today, previously 1.3 cm).  Review of Systems   Constitutional: Negative for appetite change and unexpected weight change.   HENT: Negative for trouble swallowing.    Eyes: Negative for visual disturbance.   Respiratory: Negative for cough and shortness of breath.    Cardiovascular: Negative for chest pain.   Gastrointestinal: Positive for constipation. Negative for abdominal pain, diarrhea and nausea.   Genitourinary: Negative.  Negative for frequency.   Musculoskeletal: Positive for back pain (mild).   Skin: Negative for rash.   Neurological: Positive for numbness (feet - mild ). Negative for headaches.   Hematological: Negative for adenopathy.   Psychiatric/Behavioral: Negative for dysphoric mood. The patient is not nervous/anxious.        Objective:      Physical Exam   Constitutional: She is oriented to person, place, and time. She appears well-developed and well-nourished. No distress.   HENT:   Mouth/Throat: No oropharyngeal exudate.   Eyes: No scleral icterus.   Cardiovascular: Normal rate and regular rhythm.    Pulmonary/Chest: Effort normal and breath sounds normal.       Abdominal: Soft. She exhibits no mass. There is no tenderness.   Lymphadenopathy:     She has no cervical adenopathy.   Neurological: She is alert and oriented to person, place, and time.   Psychiatric: She has a normal mood and affect. Her behavior is normal. Thought content normal.       Assessment:   CBC is normal  1. Malignant neoplasm of central portion of right breast in female, estrogen receptor positive    2. Bone metastasis    3. Metastasis to liver        Plan:       Continue current therapy and return in 3  weeks.    Distress Screening Results: Psychosocial Distress screening score of Distress Score: 4 noted and reviewed. No intervention indicated.

## 2018-07-19 NOTE — PLAN OF CARE
Problem: Chemotherapy Effects (Adult)  Goal: Signs and Symptoms of Listed Potential Problems Will be Absent, Minimized or Managed (Chemotherapy Effects)  Signs and symptoms of listed potential problems will be absent, minimized or managed by discharge/transition of care (reference Chemotherapy Effects (Adult) CPG).   Outcome: Ongoing (interventions implemented as appropriate)  Pt here for Navelbine infusion, no complaints or concerns at present; discussed treatment plan today, all questions answered and pt agrees to proceed

## 2018-07-20 LAB — CANCER AG27-29 SERPL-ACNC: 45.4 U/ML

## 2018-07-24 DIAGNOSIS — Z17.0 MALIGNANT NEOPLASM OF CENTRAL PORTION OF RIGHT BREAST IN FEMALE, ESTROGEN RECEPTOR POSITIVE: ICD-10-CM

## 2018-07-24 DIAGNOSIS — C50.111 MALIGNANT NEOPLASM OF CENTRAL PORTION OF RIGHT BREAST IN FEMALE, ESTROGEN RECEPTOR POSITIVE: ICD-10-CM

## 2018-07-26 ENCOUNTER — INFUSION (OUTPATIENT)
Dept: INFUSION THERAPY | Facility: HOSPITAL | Age: 63
End: 2018-07-26
Attending: INTERNAL MEDICINE
Payer: COMMERCIAL

## 2018-07-26 VITALS
RESPIRATION RATE: 18 BRPM | SYSTOLIC BLOOD PRESSURE: 113 MMHG | WEIGHT: 152.13 LBS | BODY MASS INDEX: 25.97 KG/M2 | TEMPERATURE: 98 F | DIASTOLIC BLOOD PRESSURE: 64 MMHG | HEIGHT: 64 IN | HEART RATE: 82 BPM

## 2018-07-26 DIAGNOSIS — C50.011 MALIGNANT NEOPLASM OF NIPPLE OF RIGHT BREAST IN FEMALE, UNSPECIFIED ESTROGEN RECEPTOR STATUS: ICD-10-CM

## 2018-07-26 DIAGNOSIS — Z17.0 MALIGNANT NEOPLASM OF CENTRAL PORTION OF RIGHT BREAST IN FEMALE, ESTROGEN RECEPTOR POSITIVE: ICD-10-CM

## 2018-07-26 DIAGNOSIS — C50.111 MALIGNANT NEOPLASM OF CENTRAL PORTION OF RIGHT BREAST IN FEMALE, ESTROGEN RECEPTOR POSITIVE: ICD-10-CM

## 2018-07-26 DIAGNOSIS — C78.7 METASTASIS TO LIVER: ICD-10-CM

## 2018-07-26 DIAGNOSIS — C79.51 METASTATIC CANCER TO SPINE: ICD-10-CM

## 2018-07-26 DIAGNOSIS — Z51.11 ENCOUNTER FOR ANTINEOPLASTIC CHEMOTHERAPY: Primary | ICD-10-CM

## 2018-07-26 PROCEDURE — 63600175 PHARM REV CODE 636 W HCPCS: Performed by: INTERNAL MEDICINE

## 2018-07-26 PROCEDURE — 25000003 PHARM REV CODE 250: Performed by: INTERNAL MEDICINE

## 2018-07-26 PROCEDURE — A4216 STERILE WATER/SALINE, 10 ML: HCPCS | Performed by: INTERNAL MEDICINE

## 2018-07-26 PROCEDURE — 96413 CHEMO IV INFUSION 1 HR: CPT

## 2018-07-26 RX ORDER — SODIUM CHLORIDE 0.9 % (FLUSH) 0.9 %
10 SYRINGE (ML) INJECTION
Status: DISCONTINUED | OUTPATIENT
Start: 2018-07-26 | End: 2018-07-26 | Stop reason: HOSPADM

## 2018-07-26 RX ORDER — DEXAMETHASONE 4 MG/1
4 TABLET ORAL
Status: COMPLETED | OUTPATIENT
Start: 2018-07-26 | End: 2018-07-26

## 2018-07-26 RX ORDER — HEPARIN 100 UNIT/ML
500 SYRINGE INTRAVENOUS
Status: DISCONTINUED | OUTPATIENT
Start: 2018-07-26 | End: 2018-07-26 | Stop reason: HOSPADM

## 2018-07-26 RX ADMIN — VINORELBINE TARTRATE 45 MG: 10 INJECTION INTRAVENOUS at 08:07

## 2018-07-26 RX ADMIN — HEPARIN 500 UNITS: 100 SYRINGE at 09:07

## 2018-07-26 RX ADMIN — SODIUM CHLORIDE: 0.9 INJECTION, SOLUTION INTRAVENOUS at 08:07

## 2018-07-26 RX ADMIN — DEXAMETHASONE 4 MG: 4 TABLET ORAL at 08:07

## 2018-07-26 RX ADMIN — SODIUM CHLORIDE, PRESERVATIVE FREE 10 ML: 5 INJECTION INTRAVENOUS at 09:07

## 2018-07-26 NOTE — PLAN OF CARE
Problem: Patient Care Overview  Goal: Plan of Care Review  Outcome: Ongoing (interventions implemented as appropriate)  Patient tolerated infusion well.  No reactions noted.  VSS.  AVS give.  No questions or concerns at this time.  Ambulated off unit unassisted.

## 2018-08-09 ENCOUNTER — DOCUMENTATION ONLY (OUTPATIENT)
Dept: HEMATOLOGY/ONCOLOGY | Facility: CLINIC | Age: 63
End: 2018-08-09

## 2018-08-09 ENCOUNTER — OFFICE VISIT (OUTPATIENT)
Dept: HEMATOLOGY/ONCOLOGY | Facility: CLINIC | Age: 63
End: 2018-08-09
Payer: COMMERCIAL

## 2018-08-09 ENCOUNTER — INFUSION (OUTPATIENT)
Dept: INFUSION THERAPY | Facility: HOSPITAL | Age: 63
End: 2018-08-09
Attending: INTERNAL MEDICINE
Payer: COMMERCIAL

## 2018-08-09 VITALS — SYSTOLIC BLOOD PRESSURE: 142 MMHG | HEART RATE: 88 BPM | RESPIRATION RATE: 18 BRPM | DIASTOLIC BLOOD PRESSURE: 70 MMHG

## 2018-08-09 VITALS
BODY MASS INDEX: 26.12 KG/M2 | WEIGHT: 153 LBS | SYSTOLIC BLOOD PRESSURE: 153 MMHG | RESPIRATION RATE: 18 BRPM | HEIGHT: 64 IN | OXYGEN SATURATION: 97 % | DIASTOLIC BLOOD PRESSURE: 75 MMHG | TEMPERATURE: 97 F | HEART RATE: 83 BPM

## 2018-08-09 DIAGNOSIS — C79.51 METASTATIC CANCER TO SPINE: ICD-10-CM

## 2018-08-09 DIAGNOSIS — C50.011 MALIGNANT NEOPLASM OF NIPPLE OF RIGHT BREAST IN FEMALE, UNSPECIFIED ESTROGEN RECEPTOR STATUS: ICD-10-CM

## 2018-08-09 DIAGNOSIS — G89.3 CANCER RELATED PAIN: ICD-10-CM

## 2018-08-09 DIAGNOSIS — C78.7 METASTASIS TO LIVER: ICD-10-CM

## 2018-08-09 DIAGNOSIS — Z17.0 MALIGNANT NEOPLASM OF CENTRAL PORTION OF RIGHT BREAST IN FEMALE, ESTROGEN RECEPTOR POSITIVE: ICD-10-CM

## 2018-08-09 DIAGNOSIS — Z51.11 ENCOUNTER FOR ANTINEOPLASTIC CHEMOTHERAPY: Primary | ICD-10-CM

## 2018-08-09 DIAGNOSIS — Z17.0 MALIGNANT NEOPLASM OF CENTRAL PORTION OF RIGHT BREAST IN FEMALE, ESTROGEN RECEPTOR POSITIVE: Primary | ICD-10-CM

## 2018-08-09 DIAGNOSIS — C79.51 BONE METASTASIS: ICD-10-CM

## 2018-08-09 DIAGNOSIS — Z51.11 ENCOUNTER FOR ANTINEOPLASTIC CHEMOTHERAPY: ICD-10-CM

## 2018-08-09 DIAGNOSIS — C50.111 MALIGNANT NEOPLASM OF CENTRAL PORTION OF RIGHT BREAST IN FEMALE, ESTROGEN RECEPTOR POSITIVE: ICD-10-CM

## 2018-08-09 DIAGNOSIS — C50.111 MALIGNANT NEOPLASM OF CENTRAL PORTION OF RIGHT BREAST IN FEMALE, ESTROGEN RECEPTOR POSITIVE: Primary | ICD-10-CM

## 2018-08-09 PROCEDURE — 99214 OFFICE O/P EST MOD 30 MIN: CPT | Mod: S$GLB,,, | Performed by: INTERNAL MEDICINE

## 2018-08-09 PROCEDURE — 25000003 PHARM REV CODE 250: Performed by: INTERNAL MEDICINE

## 2018-08-09 PROCEDURE — 3008F BODY MASS INDEX DOCD: CPT | Mod: CPTII,S$GLB,, | Performed by: INTERNAL MEDICINE

## 2018-08-09 PROCEDURE — 96409 CHEMO IV PUSH SNGL DRUG: CPT

## 2018-08-09 PROCEDURE — 3078F DIAST BP <80 MM HG: CPT | Mod: CPTII,S$GLB,, | Performed by: INTERNAL MEDICINE

## 2018-08-09 PROCEDURE — 99999 PR PBB SHADOW E&M-EST. PATIENT-LVL III: CPT | Mod: PBBFAC,,, | Performed by: INTERNAL MEDICINE

## 2018-08-09 PROCEDURE — 63600175 PHARM REV CODE 636 W HCPCS: Performed by: INTERNAL MEDICINE

## 2018-08-09 PROCEDURE — 3077F SYST BP >= 140 MM HG: CPT | Mod: CPTII,S$GLB,, | Performed by: INTERNAL MEDICINE

## 2018-08-09 RX ORDER — DEXAMETHASONE 4 MG/1
4 TABLET ORAL
Status: COMPLETED | OUTPATIENT
Start: 2018-08-09 | End: 2018-08-09

## 2018-08-09 RX ORDER — HEPARIN 100 UNIT/ML
500 SYRINGE INTRAVENOUS
Status: CANCELLED | OUTPATIENT
Start: 2018-08-16

## 2018-08-09 RX ORDER — HEPARIN 100 UNIT/ML
500 SYRINGE INTRAVENOUS
Status: DISCONTINUED | OUTPATIENT
Start: 2018-08-09 | End: 2018-08-09 | Stop reason: HOSPADM

## 2018-08-09 RX ORDER — DEXAMETHASONE 0.5 MG/1
4 TABLET ORAL
Status: CANCELLED | OUTPATIENT
Start: 2018-08-09

## 2018-08-09 RX ORDER — SODIUM CHLORIDE 0.9 % (FLUSH) 0.9 %
10 SYRINGE (ML) INJECTION
Status: DISCONTINUED | OUTPATIENT
Start: 2018-08-09 | End: 2018-08-09 | Stop reason: HOSPADM

## 2018-08-09 RX ORDER — SODIUM CHLORIDE 0.9 % (FLUSH) 0.9 %
10 SYRINGE (ML) INJECTION
Status: CANCELLED | OUTPATIENT
Start: 2018-08-09

## 2018-08-09 RX ORDER — HEPARIN 100 UNIT/ML
500 SYRINGE INTRAVENOUS
Status: CANCELLED | OUTPATIENT
Start: 2018-08-09

## 2018-08-09 RX ORDER — DEXAMETHASONE 0.5 MG/1
4 TABLET ORAL
Status: CANCELLED | OUTPATIENT
Start: 2018-08-16

## 2018-08-09 RX ORDER — SODIUM CHLORIDE 0.9 % (FLUSH) 0.9 %
10 SYRINGE (ML) INJECTION
Status: CANCELLED | OUTPATIENT
Start: 2018-08-16

## 2018-08-09 RX ADMIN — DEXAMETHASONE 4 MG: 4 TABLET ORAL at 09:08

## 2018-08-09 RX ADMIN — VINORELBINE TARTRATE 45 MG: 10 INJECTION INTRAVENOUS at 10:08

## 2018-08-09 RX ADMIN — SODIUM CHLORIDE: 0.9 INJECTION, SOLUTION INTRAVENOUS at 09:08

## 2018-08-09 NOTE — PROGRESS NOTES
Received call from patient requesting information regarding insurance, particularly Medicare plans.  Provided information and questions answered.  Also provided contact information for [facility] [facility] program.  Pt reports that she will contact Alexandra for further assistance.  No other needs identified at this time.

## 2018-08-09 NOTE — PROGRESS NOTES
Subjective:       Patient ID: Rebecca Crain is a 62 y.o. female.    Chief Complaint: No chief complaint on file.    HPI Ms. Crain is here for followup of metastatic carcinoma of the right breast.  She's currently on Navelbine therapy and presents for cycle 9.  Last bone scan in June 2018 showed significant improvement.    Overall, she has been doing well.  Only complaint is some aching in her feet and left arm and some left hand swelling which occurs at night.  That does not keep her awake.  Appetite has been good.  She does have some intermittent diarrhea which flares up after chemotherapy.  She has no shortness of breath in her pain control has been good.            Breast history: In 2013 she was diagnosed with stage IIB carcinoma of the right breast, ER positive with a low Oncotype score.  She was enrolled on protocol  and randomized to endocrine therapy alone.  She was tried on all 3 aromatase inhibitors and had itching and rash to all of them.  In August 2013 she was started on tamoxifen.   She was found to have  bone metastasis in May 2015, 2015.  A subsequent bone biopsy was performed on a lesion at the T8 vertebra.   The pathology from that was consistent with metastatic breast cancer, ER +80%, [place] +80%, and HER-2 negative.      She received letrozole plus palbociclib from July 2015 until May 2016.  She also received bone protective therapy with Xgeva.      Faslodex was started in June 2016.     Exemestane and Afinitor started in August 2017.  That was discontinued in February 2018 due to progression in the liver and bone.    Navelbine was started February 16, 2018.    Scans in June:   bone scan showed marked improvement in bone metastasis.     CT scan showed no evidence new findings in the lungs, there were stable hepatic hypodensities.  numerous osseous lesions throughout the axial skeleton along with compression deformity of the T6 and T11 vertebral bodies stable from prior examination dated  02/02/2018. The lytic osseous lesions involving the posterior elements of T6, left posterior cortex of the T7 vertebral body as well as the left superior endplate of the S1 vertebral body appeared stable from most recent prior, but were new in comparison to 02/09/2018 exam.  The T7 lesion may be minimally increased in size (1.4 cm today, previously 1.3 cm).  Review of Systems   Constitutional: Negative for appetite change and unexpected weight change.   HENT: Negative for trouble swallowing.    Eyes: Negative for visual disturbance.   Respiratory: Negative for cough and shortness of breath.    Cardiovascular: Negative for chest pain.   Gastrointestinal: Positive for constipation. Negative for abdominal pain, diarrhea and nausea.   Genitourinary: Negative.  Negative for frequency.   Musculoskeletal: Positive for back pain (mild).   Skin: Negative for rash.   Neurological: Positive for numbness (feet - mild ). Negative for headaches.   Hematological: Negative for adenopathy.   Psychiatric/Behavioral: Negative for dysphoric mood. The patient is not nervous/anxious.        Objective:      Physical Exam   Constitutional: She is oriented to person, place, and time. She appears well-developed and well-nourished. No distress.   HENT:   Mouth/Throat: No oropharyngeal exudate.   Eyes: No scleral icterus.   Cardiovascular: Normal rate and regular rhythm.    Pulmonary/Chest: Effort normal and breath sounds normal.       Abdominal: Soft. She exhibits no mass. There is no tenderness.   Lymphadenopathy:     She has no cervical adenopathy.   Neurological: She is alert and oriented to person, place, and time.   Psychiatric: She has a normal mood and affect. Her behavior is normal. Thought content normal.       Assessment:   CBC   1. Malignant neoplasm of central portion of right breast in female, estrogen receptor positive    2. Bone metastasis    3. Metastasis to liver    4. Cancer related pain    5. Encounter for antineoplastic  chemotherapy        Plan:       Continue current therapy and return in 3 weeks.    Distress Screening Results: Psychosocial Distress screening score of Distress Score: 0 noted and reviewed. No intervention indicated.

## 2018-08-09 NOTE — PLAN OF CARE
Problem: Patient Care Overview  Goal: Plan of Care Review  Outcome: Ongoing (interventions implemented as appropriate)  Pt tolerated Navelbine well.  No s/s of reaction. Vitals stable, NAD.

## 2018-08-16 ENCOUNTER — INFUSION (OUTPATIENT)
Dept: INFUSION THERAPY | Facility: HOSPITAL | Age: 63
End: 2018-08-16
Attending: INTERNAL MEDICINE
Payer: COMMERCIAL

## 2018-08-16 VITALS
HEART RATE: 82 BPM | RESPIRATION RATE: 18 BRPM | HEIGHT: 64 IN | WEIGHT: 153 LBS | DIASTOLIC BLOOD PRESSURE: 66 MMHG | BODY MASS INDEX: 26.12 KG/M2 | TEMPERATURE: 98 F | SYSTOLIC BLOOD PRESSURE: 113 MMHG

## 2018-08-16 DIAGNOSIS — Z17.0 MALIGNANT NEOPLASM OF CENTRAL PORTION OF RIGHT BREAST IN FEMALE, ESTROGEN RECEPTOR POSITIVE: ICD-10-CM

## 2018-08-16 DIAGNOSIS — C50.011 MALIGNANT NEOPLASM OF NIPPLE OF RIGHT BREAST IN FEMALE, UNSPECIFIED ESTROGEN RECEPTOR STATUS: ICD-10-CM

## 2018-08-16 DIAGNOSIS — R23.2 FLUSH: Primary | ICD-10-CM

## 2018-08-16 DIAGNOSIS — C79.51 METASTATIC CANCER TO SPINE: ICD-10-CM

## 2018-08-16 DIAGNOSIS — C50.111 MALIGNANT NEOPLASM OF CENTRAL PORTION OF RIGHT BREAST IN FEMALE, ESTROGEN RECEPTOR POSITIVE: ICD-10-CM

## 2018-08-16 DIAGNOSIS — C78.7 METASTASIS TO LIVER: ICD-10-CM

## 2018-08-16 DIAGNOSIS — Z51.11 ENCOUNTER FOR ANTINEOPLASTIC CHEMOTHERAPY: ICD-10-CM

## 2018-08-16 PROCEDURE — A4216 STERILE WATER/SALINE, 10 ML: HCPCS | Performed by: INTERNAL MEDICINE

## 2018-08-16 PROCEDURE — 25000003 PHARM REV CODE 250: Performed by: INTERNAL MEDICINE

## 2018-08-16 PROCEDURE — 63600175 PHARM REV CODE 636 W HCPCS: Performed by: INTERNAL MEDICINE

## 2018-08-16 PROCEDURE — 96413 CHEMO IV INFUSION 1 HR: CPT

## 2018-08-16 RX ORDER — DEXAMETHASONE 4 MG/1
4 TABLET ORAL
Status: COMPLETED | OUTPATIENT
Start: 2018-08-16 | End: 2018-08-16

## 2018-08-16 RX ORDER — SODIUM CHLORIDE 0.9 % (FLUSH) 0.9 %
10 SYRINGE (ML) INJECTION
Status: DISCONTINUED | OUTPATIENT
Start: 2018-08-16 | End: 2018-08-16 | Stop reason: HOSPADM

## 2018-08-16 RX ORDER — HEPARIN 100 UNIT/ML
500 SYRINGE INTRAVENOUS
Status: DISCONTINUED | OUTPATIENT
Start: 2018-08-16 | End: 2018-08-16 | Stop reason: HOSPADM

## 2018-08-16 RX ADMIN — Medication 10 ML: at 09:08

## 2018-08-16 RX ADMIN — VINORELBINE TARTRATE 45 MG: 10 INJECTION INTRAVENOUS at 09:08

## 2018-08-16 RX ADMIN — SODIUM CHLORIDE: 0.9 INJECTION, SOLUTION INTRAVENOUS at 09:08

## 2018-08-16 RX ADMIN — DEXAMETHASONE 4 MG: 4 TABLET ORAL at 09:08

## 2018-08-16 RX ADMIN — HEPARIN 500 UNITS: 100 SYRINGE at 09:08

## 2018-08-16 NOTE — PLAN OF CARE
Problem: Chemotherapy Effects (Adult)  Goal: Signs and Symptoms of Listed Potential Problems Will be Absent, Minimized or Managed (Chemotherapy Effects)  Signs and symptoms of listed potential problems will be absent, minimized or managed by discharge/transition of care (reference Chemotherapy Effects (Adult) CPG).   Outcome: Ongoing (interventions implemented as appropriate)  Patient here for Navkristabine.  Assessment completed and labs reviewed.  VSS.  No needs at this time.  Chair reclined and blanket offered.  Will continue to monitor.

## 2018-08-16 NOTE — PLAN OF CARE
Problem: Patient Care Overview  Goal: Plan of Care Review  Outcome: Ongoing (interventions implemented as appropriate)  Patient tolerated infusion well.  No reactions noted.  AVS refused.  No questions or concerns at this time.  Ambulated off unit unassisted.

## 2018-08-27 ENCOUNTER — PATIENT MESSAGE (OUTPATIENT)
Dept: HEMATOLOGY/ONCOLOGY | Facility: CLINIC | Age: 63
End: 2018-08-27

## 2018-08-27 DIAGNOSIS — G89.3 CANCER ASSOCIATED PAIN: Primary | ICD-10-CM

## 2018-08-27 DIAGNOSIS — C50.111 MALIGNANT NEOPLASM OF CENTRAL PORTION OF RIGHT BREAST IN FEMALE, ESTROGEN RECEPTOR POSITIVE: ICD-10-CM

## 2018-08-27 DIAGNOSIS — Z17.0 MALIGNANT NEOPLASM OF CENTRAL PORTION OF RIGHT BREAST IN FEMALE, ESTROGEN RECEPTOR POSITIVE: ICD-10-CM

## 2018-08-27 RX ORDER — MORPHINE SULFATE 60 MG/1
60 TABLET, FILM COATED, EXTENDED RELEASE ORAL 3 TIMES DAILY
Qty: 60 TABLET | Refills: 0 | Status: SHIPPED | OUTPATIENT
Start: 2018-08-27 | End: 2018-08-27 | Stop reason: SDUPTHER

## 2018-08-29 NOTE — PROGRESS NOTES
Subjective:       Patient ID: Rebecca Crain is a 62 y.o. female.    Chief Complaint: No chief complaint on file.    HPI Ms. Crain is here for followup of metastatic carcinoma of the right breast.  She's currently on Navelbine therapy and presents for cycle 10.  Last bone scan in June 2018 showed significant improvement.              Breast history: In 2013 she was diagnosed with stage IIB carcinoma of the right breast, ER positive with a low Oncotype score.  She was enrolled on protocol  and randomized to endocrine therapy alone.  She was tried on all 3 aromatase inhibitors and had itching and rash to all of them.  In August 2013 she was started on tamoxifen.   She was found to have  bone metastasis in May 2015, 2015.  A subsequent bone biopsy was performed on a lesion at the T8 vertebra.   The pathology from that was consistent with metastatic breast cancer, ER +80%, [place] +80%, and HER-2 negative.      She received letrozole plus palbociclib from July 2015 until May 2016.  She also received bone protective therapy with Xgeva.      Faslodex was started in June 2016.     Exemestane and Afinitor started in August 2017.  That was discontinued in February 2018 due to progression in the liver and bone.    Navelbine was started February 16, 2018.    Scans in June:   bone scan showed marked improvement in bone metastasis.     CT scan showed no evidence new findings in the lungs, there were stable hepatic hypodensities.  numerous osseous lesions throughout the axial skeleton along with compression deformity of the T6 and T11 vertebral bodies stable from prior examination dated 02/02/2018. The lytic osseous lesions involving the posterior elements of T6, left posterior cortex of the T7 vertebral body as well as the left superior endplate of the S1 vertebral body appeared stable from most recent prior, but were new in comparison to 02/09/2018 exam.  The T7 lesion may be minimally increased in size (1.4 cm today,  previously 1.3 cm).  Review of Systems   Constitutional: Negative for appetite change and unexpected weight change.   HENT: Negative for trouble swallowing.    Eyes: Negative for visual disturbance.   Respiratory: Negative for cough and shortness of breath.    Cardiovascular: Negative for chest pain.   Gastrointestinal: Positive for constipation. Negative for abdominal pain, diarrhea and nausea.   Genitourinary: Negative.  Negative for frequency.   Musculoskeletal: Positive for back pain (mild).   Skin: Negative for rash.   Neurological: Positive for numbness (feet - mild ). Negative for headaches.   Hematological: Negative for adenopathy.   Psychiatric/Behavioral: Negative for dysphoric mood. The patient is not nervous/anxious.        Objective:      Physical Exam   Constitutional: She is oriented to person, place, and time. She appears well-developed and well-nourished. No distress.   HENT:   Mouth/Throat: No oropharyngeal exudate.   Eyes: No scleral icterus.   Cardiovascular: Normal rate and regular rhythm.   Pulmonary/Chest: Effort normal and breath sounds normal.   Abdominal: Soft. She exhibits no mass. There is no tenderness.   Lymphadenopathy:     She has no cervical adenopathy.   Neurological: She is alert and oriented to person, place, and time.   Psychiatric: She has a normal mood and affect. Her behavior is normal. Thought content normal.       Assessment:   CBC   1. Malignant neoplasm of central portion of right breast in female, estrogen receptor positive    2. 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 Metastasis to liver    4. Encounter for antineoplastic chemotherapy        Plan:       Continue current therapy and return in 3 weeks.    Distress Screening Results: Psychosocial Distress screening score of Distress Score: 0 noted and reviewed. No intervention indicated.

## 2018-08-30 ENCOUNTER — OFFICE VISIT (OUTPATIENT)
Dept: HEMATOLOGY/ONCOLOGY | Facility: CLINIC | Age: 63
End: 2018-08-30
Payer: COMMERCIAL

## 2018-08-30 ENCOUNTER — INFUSION (OUTPATIENT)
Dept: INFUSION THERAPY | Facility: HOSPITAL | Age: 63
End: 2018-08-30
Attending: INTERNAL MEDICINE
Payer: COMMERCIAL

## 2018-08-30 VITALS
HEART RATE: 81 BPM | RESPIRATION RATE: 16 BRPM | HEIGHT: 64 IN | OXYGEN SATURATION: 95 % | BODY MASS INDEX: 26.49 KG/M2 | DIASTOLIC BLOOD PRESSURE: 55 MMHG | TEMPERATURE: 98 F | WEIGHT: 155.19 LBS | SYSTOLIC BLOOD PRESSURE: 115 MMHG

## 2018-08-30 VITALS
BODY MASS INDEX: 26.49 KG/M2 | RESPIRATION RATE: 16 BRPM | DIASTOLIC BLOOD PRESSURE: 56 MMHG | SYSTOLIC BLOOD PRESSURE: 112 MMHG | HEART RATE: 72 BPM | TEMPERATURE: 98 F | HEIGHT: 64 IN | WEIGHT: 155.19 LBS

## 2018-08-30 DIAGNOSIS — C78.7 METASTASIS TO LIVER: ICD-10-CM

## 2018-08-30 DIAGNOSIS — Z51.11 ENCOUNTER FOR ANTINEOPLASTIC CHEMOTHERAPY: ICD-10-CM

## 2018-08-30 DIAGNOSIS — Z51.11 ENCOUNTER FOR ANTINEOPLASTIC CHEMOTHERAPY: Primary | ICD-10-CM

## 2018-08-30 DIAGNOSIS — C50.011 MALIGNANT NEOPLASM OF NIPPLE OF RIGHT BREAST IN FEMALE, UNSPECIFIED ESTROGEN RECEPTOR STATUS: ICD-10-CM

## 2018-08-30 DIAGNOSIS — C50.111 MALIGNANT NEOPLASM OF CENTRAL PORTION OF RIGHT BREAST IN FEMALE, ESTROGEN RECEPTOR POSITIVE: ICD-10-CM

## 2018-08-30 DIAGNOSIS — Z17.0 MALIGNANT NEOPLASM OF CENTRAL PORTION OF RIGHT BREAST IN FEMALE, ESTROGEN RECEPTOR POSITIVE: ICD-10-CM

## 2018-08-30 DIAGNOSIS — G89.3 CANCER RELATED PAIN: ICD-10-CM

## 2018-08-30 DIAGNOSIS — Z17.0 MALIGNANT NEOPLASM OF CENTRAL PORTION OF RIGHT BREAST IN FEMALE, ESTROGEN RECEPTOR POSITIVE: Primary | ICD-10-CM

## 2018-08-30 DIAGNOSIS — C79.51 METASTATIC CANCER TO SPINE: ICD-10-CM

## 2018-08-30 DIAGNOSIS — C50.111 MALIGNANT NEOPLASM OF CENTRAL PORTION OF RIGHT BREAST IN FEMALE, ESTROGEN RECEPTOR POSITIVE: Primary | ICD-10-CM

## 2018-08-30 PROCEDURE — 3074F SYST BP LT 130 MM HG: CPT | Mod: CPTII,S$GLB,, | Performed by: INTERNAL MEDICINE

## 2018-08-30 PROCEDURE — 3078F DIAST BP <80 MM HG: CPT | Mod: CPTII,S$GLB,, | Performed by: INTERNAL MEDICINE

## 2018-08-30 PROCEDURE — 96409 CHEMO IV PUSH SNGL DRUG: CPT

## 2018-08-30 PROCEDURE — 96413 CHEMO IV INFUSION 1 HR: CPT

## 2018-08-30 PROCEDURE — 3008F BODY MASS INDEX DOCD: CPT | Mod: CPTII,S$GLB,, | Performed by: INTERNAL MEDICINE

## 2018-08-30 PROCEDURE — A4216 STERILE WATER/SALINE, 10 ML: HCPCS | Performed by: INTERNAL MEDICINE

## 2018-08-30 PROCEDURE — 25000003 PHARM REV CODE 250: Performed by: INTERNAL MEDICINE

## 2018-08-30 PROCEDURE — 99999 PR PBB SHADOW E&M-EST. PATIENT-LVL IV: CPT | Mod: PBBFAC,,, | Performed by: INTERNAL MEDICINE

## 2018-08-30 PROCEDURE — 99214 OFFICE O/P EST MOD 30 MIN: CPT | Mod: S$GLB,,, | Performed by: INTERNAL MEDICINE

## 2018-08-30 PROCEDURE — 63600175 PHARM REV CODE 636 W HCPCS: Performed by: INTERNAL MEDICINE

## 2018-08-30 RX ORDER — SODIUM CHLORIDE 0.9 % (FLUSH) 0.9 %
10 SYRINGE (ML) INJECTION
Status: DISCONTINUED | OUTPATIENT
Start: 2018-08-30 | End: 2018-08-30 | Stop reason: HOSPADM

## 2018-08-30 RX ORDER — DIPHENOXYLATE HYDROCHLORIDE AND ATROPINE SULFATE 2.5; .025 MG/1; MG/1
1 TABLET ORAL 4 TIMES DAILY PRN
Qty: 120 TABLET | Refills: 3 | Status: SHIPPED | OUTPATIENT
Start: 2018-08-30 | End: 2018-09-21 | Stop reason: SDUPTHER

## 2018-08-30 RX ORDER — DEXAMETHASONE 0.5 MG/1
4 TABLET ORAL
Status: CANCELLED | OUTPATIENT
Start: 2018-08-30

## 2018-08-30 RX ORDER — HEPARIN 100 UNIT/ML
500 SYRINGE INTRAVENOUS
Status: CANCELLED | OUTPATIENT
Start: 2018-08-30

## 2018-08-30 RX ORDER — DEXAMETHASONE 4 MG/1
4 TABLET ORAL
Status: COMPLETED | OUTPATIENT
Start: 2018-08-30 | End: 2018-08-30

## 2018-08-30 RX ORDER — HEPARIN 100 UNIT/ML
500 SYRINGE INTRAVENOUS
Status: DISCONTINUED | OUTPATIENT
Start: 2018-08-30 | End: 2018-08-30 | Stop reason: HOSPADM

## 2018-08-30 RX ORDER — SODIUM CHLORIDE 0.9 % (FLUSH) 0.9 %
10 SYRINGE (ML) INJECTION
Status: CANCELLED | OUTPATIENT
Start: 2018-09-06

## 2018-08-30 RX ORDER — DEXAMETHASONE 0.5 MG/1
4 TABLET ORAL
Status: CANCELLED | OUTPATIENT
Start: 2018-09-06

## 2018-08-30 RX ORDER — HEPARIN 100 UNIT/ML
500 SYRINGE INTRAVENOUS
Status: CANCELLED | OUTPATIENT
Start: 2018-09-06

## 2018-08-30 RX ORDER — SODIUM CHLORIDE 0.9 % (FLUSH) 0.9 %
10 SYRINGE (ML) INJECTION
Status: CANCELLED | OUTPATIENT
Start: 2018-08-30

## 2018-08-30 RX ADMIN — DEXAMETHASONE 4 MG: 4 TABLET ORAL at 09:08

## 2018-08-30 RX ADMIN — VINORELBINE TARTRATE 45 MG: 10 INJECTION INTRAVENOUS at 10:08

## 2018-08-30 RX ADMIN — SODIUM CHLORIDE, PRESERVATIVE FREE 10 ML: 5 INJECTION INTRAVENOUS at 10:08

## 2018-08-30 RX ADMIN — SODIUM CHLORIDE: 9 INJECTION, SOLUTION INTRAVENOUS at 09:08

## 2018-08-30 RX ADMIN — HEPARIN 500 UNITS: 100 SYRINGE at 10:08

## 2018-08-30 NOTE — Clinical Note
Lilie day 1 and 8 every 21 daysCBC, CMP, CA 27.29 on day 1, CBC on day 8Get TSH next blood work on day 8Scans at next visit

## 2018-08-30 NOTE — PLAN OF CARE
Problem: Patient Care Overview  Goal: Plan of Care Review  Outcome: Ongoing (interventions implemented as appropriate)  Patient tolerated infusion well.   No reactions noted.  AVS given. No questions or concerns at this time.  Ambulated off unit unassisted.

## 2018-08-30 NOTE — PLAN OF CARE
Problem: Chemotherapy Effects (Adult)  Goal: Signs and Symptoms of Listed Potential Problems Will be Absent, Minimized or Managed (Chemotherapy Effects)  Signs and symptoms of listed potential problems will be absent, minimized or managed by discharge/transition of care (reference Chemotherapy Effects (Adult) CPG).   Outcome: Ongoing (interventions implemented as appropriate)  Patient here for Navelbine.  Assessment completed and labs reviewed.  VSS.  No needs at this time.  Chair recline and blanket offered.  Will continue to monitor.

## 2018-08-31 ENCOUNTER — PATIENT MESSAGE (OUTPATIENT)
Dept: HEMATOLOGY/ONCOLOGY | Facility: CLINIC | Age: 63
End: 2018-08-31

## 2018-09-04 ENCOUNTER — PATIENT MESSAGE (OUTPATIENT)
Dept: HEMATOLOGY/ONCOLOGY | Facility: CLINIC | Age: 63
End: 2018-09-04

## 2018-09-06 DIAGNOSIS — G62.9 NEUROPATHY: ICD-10-CM

## 2018-09-06 RX ORDER — GABAPENTIN 300 MG/1
300 CAPSULE ORAL 3 TIMES DAILY
Qty: 270 CAPSULE | Refills: 3 | Status: SHIPPED | OUTPATIENT
Start: 2018-09-06 | End: 2018-10-11

## 2018-09-07 ENCOUNTER — INFUSION (OUTPATIENT)
Dept: INFUSION THERAPY | Facility: HOSPITAL | Age: 63
End: 2018-09-07
Attending: INTERNAL MEDICINE
Payer: MEDICARE

## 2018-09-07 VITALS
TEMPERATURE: 98 F | SYSTOLIC BLOOD PRESSURE: 121 MMHG | WEIGHT: 154 LBS | BODY MASS INDEX: 26.29 KG/M2 | HEART RATE: 71 BPM | DIASTOLIC BLOOD PRESSURE: 68 MMHG | HEIGHT: 64 IN | RESPIRATION RATE: 16 BRPM

## 2018-09-07 DIAGNOSIS — C78.7 METASTASIS TO LIVER: ICD-10-CM

## 2018-09-07 DIAGNOSIS — C50.011 MALIGNANT NEOPLASM OF NIPPLE OF RIGHT BREAST IN FEMALE, UNSPECIFIED ESTROGEN RECEPTOR STATUS: ICD-10-CM

## 2018-09-07 DIAGNOSIS — Z51.11 ENCOUNTER FOR ANTINEOPLASTIC CHEMOTHERAPY: Primary | ICD-10-CM

## 2018-09-07 DIAGNOSIS — C79.51 METASTATIC CANCER TO SPINE: ICD-10-CM

## 2018-09-07 DIAGNOSIS — Z17.0 MALIGNANT NEOPLASM OF CENTRAL PORTION OF RIGHT BREAST IN FEMALE, ESTROGEN RECEPTOR POSITIVE: ICD-10-CM

## 2018-09-07 DIAGNOSIS — C50.111 MALIGNANT NEOPLASM OF CENTRAL PORTION OF RIGHT BREAST IN FEMALE, ESTROGEN RECEPTOR POSITIVE: ICD-10-CM

## 2018-09-07 PROCEDURE — 96409 CHEMO IV PUSH SNGL DRUG: CPT

## 2018-09-07 PROCEDURE — 25000003 PHARM REV CODE 250: Performed by: INTERNAL MEDICINE

## 2018-09-07 PROCEDURE — 63600175 PHARM REV CODE 636 W HCPCS: Performed by: INTERNAL MEDICINE

## 2018-09-07 RX ORDER — DEXAMETHASONE 4 MG/1
4 TABLET ORAL
Status: COMPLETED | OUTPATIENT
Start: 2018-09-07 | End: 2018-09-07

## 2018-09-07 RX ORDER — HEPARIN 100 UNIT/ML
500 SYRINGE INTRAVENOUS
Status: DISCONTINUED | OUTPATIENT
Start: 2018-09-07 | End: 2018-09-07 | Stop reason: HOSPADM

## 2018-09-07 RX ORDER — SODIUM CHLORIDE 0.9 % (FLUSH) 0.9 %
10 SYRINGE (ML) INJECTION
Status: DISCONTINUED | OUTPATIENT
Start: 2018-09-07 | End: 2018-09-07 | Stop reason: HOSPADM

## 2018-09-07 RX ADMIN — SODIUM CHLORIDE: 0.9 INJECTION, SOLUTION INTRAVENOUS at 01:09

## 2018-09-07 RX ADMIN — DEXAMETHASONE 4 MG: 4 TABLET ORAL at 01:09

## 2018-09-07 RX ADMIN — VINORELBINE TARTRATE 45 MG: 10 INJECTION INTRAVENOUS at 02:09

## 2018-09-07 RX ADMIN — HEPARIN 500 UNITS: 100 SYRINGE at 02:09

## 2018-09-07 NOTE — PLAN OF CARE
Problem: Chemotherapy Effects (Adult)  Goal: Signs and Symptoms of Listed Potential Problems Will be Absent, Minimized or Managed (Chemotherapy Effects)  Signs and symptoms of listed potential problems will be absent, minimized or managed by discharge/transition of care (reference Chemotherapy Effects (Adult) CPG).   Outcome: Ongoing (interventions implemented as appropriate)  Patient here for Vinorelbine.  Assessment complete and labs reviewed.  VSS.  Chair reclined and blanket offered.  No needs expressed at this time. Will continue to monitor.

## 2018-09-10 ENCOUNTER — PATIENT MESSAGE (OUTPATIENT)
Dept: ENDOCRINOLOGY | Facility: CLINIC | Age: 63
End: 2018-09-10

## 2018-09-10 ENCOUNTER — PATIENT MESSAGE (OUTPATIENT)
Dept: HEMATOLOGY/ONCOLOGY | Facility: CLINIC | Age: 63
End: 2018-09-10

## 2018-09-10 DIAGNOSIS — E06.3 HYPOTHYROIDISM DUE TO HASHIMOTO'S THYROIDITIS: ICD-10-CM

## 2018-09-10 DIAGNOSIS — E03.8 HYPOTHYROIDISM DUE TO HASHIMOTO'S THYROIDITIS: ICD-10-CM

## 2018-09-10 RX ORDER — LEVOTHYROXINE SODIUM 175 UG/1
175 TABLET ORAL DAILY
Qty: 90 TABLET | Refills: 3 | Status: CANCELLED | OUTPATIENT
Start: 2018-09-10 | End: 2019-09-10

## 2018-09-14 ENCOUNTER — PATIENT MESSAGE (OUTPATIENT)
Dept: HEMATOLOGY/ONCOLOGY | Facility: CLINIC | Age: 63
End: 2018-09-14

## 2018-09-14 ENCOUNTER — PATIENT MESSAGE (OUTPATIENT)
Dept: ENDOCRINOLOGY | Facility: CLINIC | Age: 63
End: 2018-09-14

## 2018-09-14 ENCOUNTER — TELEPHONE (OUTPATIENT)
Dept: ENDOCRINOLOGY | Facility: CLINIC | Age: 63
End: 2018-09-14

## 2018-09-14 DIAGNOSIS — E06.3 HYPOTHYROIDISM DUE TO HASHIMOTO'S THYROIDITIS: Primary | ICD-10-CM

## 2018-09-14 DIAGNOSIS — E03.8 HYPOTHYROIDISM DUE TO HASHIMOTO'S THYROIDITIS: Primary | ICD-10-CM

## 2018-09-14 RX ORDER — LEVOTHYROXINE SODIUM 137 UG/1
137 TABLET ORAL
Qty: 30 TABLET | Refills: 6 | Status: SHIPPED | OUTPATIENT
Start: 2018-09-14 | End: 2018-10-17 | Stop reason: SDUPTHER

## 2018-09-17 ENCOUNTER — HOSPITAL ENCOUNTER (OUTPATIENT)
Dept: RADIOLOGY | Facility: HOSPITAL | Age: 63
Discharge: HOME | End: 2018-09-17
Attending: INTERNAL MEDICINE
Payer: MEDICARE

## 2018-09-17 ENCOUNTER — PATIENT MESSAGE (OUTPATIENT)
Dept: HEMATOLOGY/ONCOLOGY | Facility: CLINIC | Age: 63
End: 2018-09-17

## 2018-09-17 DIAGNOSIS — C50.111 MALIGNANT NEOPLASM OF CENTRAL PORTION OF RIGHT BREAST IN FEMALE, ESTROGEN RECEPTOR POSITIVE: ICD-10-CM

## 2018-09-17 DIAGNOSIS — Z17.0 MALIGNANT NEOPLASM OF CENTRAL PORTION OF RIGHT BREAST IN FEMALE, ESTROGEN RECEPTOR POSITIVE: ICD-10-CM

## 2018-09-17 DIAGNOSIS — Z51.11 ENCOUNTER FOR ANTINEOPLASTIC CHEMOTHERAPY: ICD-10-CM

## 2018-09-17 PROCEDURE — A9503 TC99M MEDRONATE: HCPCS

## 2018-09-17 PROCEDURE — 78306 BONE IMAGING WHOLE BODY: CPT | Mod: 26,,, | Performed by: RADIOLOGY

## 2018-09-17 PROCEDURE — 71250 CT THORAX DX C-: CPT | Mod: TC

## 2018-09-17 PROCEDURE — 74176 CT ABD & PELVIS W/O CONTRAST: CPT | Mod: TC

## 2018-09-17 PROCEDURE — 71250 CT THORAX DX C-: CPT | Mod: 26,,, | Performed by: RADIOLOGY

## 2018-09-17 PROCEDURE — 74176 CT ABD & PELVIS W/O CONTRAST: CPT | Mod: 26,,, | Performed by: RADIOLOGY

## 2018-09-18 NOTE — PROGRESS NOTES
Subjective:       Patient ID: Rebecca Crain is a 62 y.o. female.    Chief Complaint: No chief complaint on file.    HPI Ms. Crain is here for followup of metastatic carcinoma of the right breast.  She's currently on Navelbine therapy and presents for cycle 11.  She has tolerated that very well and globally has improved, especially in terms of her pain and functional status.              Breast history: In 2013 she was diagnosed with stage IIB carcinoma of the right breast, ER positive with a low Oncotype score.  She was enrolled on protocol  and randomized to endocrine therapy alone.  She was tried on all 3 aromatase inhibitors and had itching and rash to all of them.  In August 2013 she was started on tamoxifen.   She was found to have  bone metastasis in May 2015, 2015.  A subsequent bone biopsy was performed on a lesion at the T8 vertebra.   The pathology from that was consistent with metastatic breast cancer, ER +80%, [place] +80%, and HER-2 negative.      She received letrozole plus palbociclib from July 2015 until May 2016.  She also received bone protective therapy with Xgeva.      Faslodex was started in June 2016.     Exemestane and Afinitor started in August 2017.  That was discontinued in February 2018 due to progression in the liver and bone.    Navelbine was started February 16, 2018.      Review of Systems   Constitutional: Negative for appetite change and unexpected weight change.   HENT: Negative for trouble swallowing.    Eyes: Negative for visual disturbance.   Respiratory: Negative for cough and shortness of breath.    Cardiovascular: Negative for chest pain.   Gastrointestinal: Positive for constipation. Negative for abdominal pain, diarrhea and nausea.   Genitourinary: Negative.  Negative for frequency.   Musculoskeletal: Positive for back pain (mild).   Skin: Negative for rash.   Neurological: Positive for numbness (feet - mild ). Negative for headaches.   Hematological: Negative for  adenopathy.   Psychiatric/Behavioral: Negative for dysphoric mood. The patient is not nervous/anxious.        Objective:      Physical Exam   Constitutional: She is oriented to person, place, and time. She appears well-developed and well-nourished. No distress.   HENT:   Mouth/Throat: No oropharyngeal exudate.   Eyes: No scleral icterus.   Cardiovascular: Normal rate and regular rhythm.   Pulmonary/Chest: Effort normal and breath sounds normal.   Abdominal: Soft. She exhibits no mass. There is no tenderness.   Lymphadenopathy:     She has no cervical adenopathy.   Neurological: She is alert and oriented to person, place, and time.   Psychiatric: She has a normal mood and affect. Her behavior is normal. Thought content normal.       Assessment:   CT scan from 9/17/18 shows a stable 4 mm nodule in the left upper lobe.  The liver is normal in size and the previously seen right hepatic lobe lesions are difficult to visualize however they appear to be stable with index right liver lesion measuring 1.5 cm.  There is no lymphadenopathy in the abdomen or pelvis or chest.  Numerous osseous sclerotic lesions are seen with stable distribution.  There was development subsegmental branching opacity in left lower lobe which is felt to represent benign etiology.    Bone scan showed stable distribution of radiotracer uptake with no new areas of abnormal uptake and no new lesions.    1. Malignant neoplasm of central portion of right breast in female, estrogen receptor positive    2. Bone metastasis    3. Metastasis to liver    4. Encounter for antineoplastic chemotherapy        Plan:       Azithromycin for bronchitis.  Continue current therapy and return in 3 weeks.  Distress Screening Results: Psychosocial Distress screening score of Distress Score: 0 noted and reviewed. No intervention indicated.

## 2018-09-19 ENCOUNTER — OFFICE VISIT (OUTPATIENT)
Dept: HEMATOLOGY/ONCOLOGY | Facility: CLINIC | Age: 63
End: 2018-09-19
Payer: MEDICARE

## 2018-09-19 ENCOUNTER — INFUSION (OUTPATIENT)
Dept: INFUSION THERAPY | Facility: HOSPITAL | Age: 63
End: 2018-09-19
Attending: INTERNAL MEDICINE
Payer: MEDICARE

## 2018-09-19 VITALS
SYSTOLIC BLOOD PRESSURE: 103 MMHG | HEART RATE: 88 BPM | TEMPERATURE: 99 F | RESPIRATION RATE: 20 BRPM | DIASTOLIC BLOOD PRESSURE: 54 MMHG

## 2018-09-19 VITALS
TEMPERATURE: 98 F | HEIGHT: 64 IN | DIASTOLIC BLOOD PRESSURE: 85 MMHG | WEIGHT: 153.44 LBS | RESPIRATION RATE: 18 BRPM | SYSTOLIC BLOOD PRESSURE: 136 MMHG | OXYGEN SATURATION: 97 % | BODY MASS INDEX: 26.2 KG/M2 | HEART RATE: 88 BPM

## 2018-09-19 DIAGNOSIS — C79.51 METASTATIC CANCER TO SPINE: ICD-10-CM

## 2018-09-19 DIAGNOSIS — C78.7 METASTASIS TO LIVER: ICD-10-CM

## 2018-09-19 DIAGNOSIS — Z51.11 ENCOUNTER FOR ANTINEOPLASTIC CHEMOTHERAPY: ICD-10-CM

## 2018-09-19 DIAGNOSIS — Z17.0 MALIGNANT NEOPLASM OF CENTRAL PORTION OF RIGHT BREAST IN FEMALE, ESTROGEN RECEPTOR POSITIVE: Primary | ICD-10-CM

## 2018-09-19 DIAGNOSIS — C50.011 MALIGNANT NEOPLASM OF NIPPLE OF RIGHT BREAST IN FEMALE, UNSPECIFIED ESTROGEN RECEPTOR STATUS: ICD-10-CM

## 2018-09-19 DIAGNOSIS — C50.111 MALIGNANT NEOPLASM OF CENTRAL PORTION OF RIGHT BREAST IN FEMALE, ESTROGEN RECEPTOR POSITIVE: Primary | ICD-10-CM

## 2018-09-19 DIAGNOSIS — Z17.0 MALIGNANT NEOPLASM OF CENTRAL PORTION OF RIGHT BREAST IN FEMALE, ESTROGEN RECEPTOR POSITIVE: ICD-10-CM

## 2018-09-19 DIAGNOSIS — J20.2 ACUTE BRONCHITIS DUE TO STREPTOCOCCUS: ICD-10-CM

## 2018-09-19 DIAGNOSIS — Z51.11 ENCOUNTER FOR ANTINEOPLASTIC CHEMOTHERAPY: Primary | ICD-10-CM

## 2018-09-19 DIAGNOSIS — C50.111 MALIGNANT NEOPLASM OF CENTRAL PORTION OF RIGHT BREAST IN FEMALE, ESTROGEN RECEPTOR POSITIVE: ICD-10-CM

## 2018-09-19 DIAGNOSIS — C79.51 BONE METASTASIS: ICD-10-CM

## 2018-09-19 PROCEDURE — A4216 STERILE WATER/SALINE, 10 ML: HCPCS | Performed by: INTERNAL MEDICINE

## 2018-09-19 PROCEDURE — 3079F DIAST BP 80-89 MM HG: CPT | Mod: CPTII,,, | Performed by: INTERNAL MEDICINE

## 2018-09-19 PROCEDURE — 63600175 PHARM REV CODE 636 W HCPCS: Performed by: INTERNAL MEDICINE

## 2018-09-19 PROCEDURE — 96409 CHEMO IV PUSH SNGL DRUG: CPT

## 2018-09-19 PROCEDURE — 99213 OFFICE O/P EST LOW 20 MIN: CPT | Mod: PBBFAC | Performed by: INTERNAL MEDICINE

## 2018-09-19 PROCEDURE — 3008F BODY MASS INDEX DOCD: CPT | Mod: CPTII,,, | Performed by: INTERNAL MEDICINE

## 2018-09-19 PROCEDURE — 25000003 PHARM REV CODE 250: Performed by: INTERNAL MEDICINE

## 2018-09-19 PROCEDURE — 99214 OFFICE O/P EST MOD 30 MIN: CPT | Mod: S$PBB,,, | Performed by: INTERNAL MEDICINE

## 2018-09-19 PROCEDURE — 3075F SYST BP GE 130 - 139MM HG: CPT | Mod: CPTII,,, | Performed by: INTERNAL MEDICINE

## 2018-09-19 PROCEDURE — 99999 PR PBB SHADOW E&M-EST. PATIENT-LVL III: CPT | Mod: PBBFAC,,, | Performed by: INTERNAL MEDICINE

## 2018-09-19 RX ORDER — DEXAMETHASONE 0.5 MG/1
4 TABLET ORAL
Status: CANCELLED | OUTPATIENT
Start: 2018-09-19

## 2018-09-19 RX ORDER — DEXAMETHASONE 4 MG/1
4 TABLET ORAL
Status: COMPLETED | OUTPATIENT
Start: 2018-09-19 | End: 2018-09-19

## 2018-09-19 RX ORDER — SODIUM CHLORIDE 0.9 % (FLUSH) 0.9 %
10 SYRINGE (ML) INJECTION
Status: CANCELLED | OUTPATIENT
Start: 2018-09-19

## 2018-09-19 RX ORDER — HYDROCODONE BITARTRATE AND HOMATROPINE METHYLBROMIDE ORAL SOLUTION 5; 1.5 MG/5ML; MG/5ML
5 LIQUID ORAL EVERY 4 HOURS PRN
Qty: 120 ML | Refills: 0 | Status: SHIPPED | OUTPATIENT
Start: 2018-09-19 | End: 2019-08-16 | Stop reason: SDUPTHER

## 2018-09-19 RX ORDER — HEPARIN 100 UNIT/ML
500 SYRINGE INTRAVENOUS
Status: DISCONTINUED | OUTPATIENT
Start: 2018-09-19 | End: 2018-09-19 | Stop reason: HOSPADM

## 2018-09-19 RX ORDER — HEPARIN 100 UNIT/ML
500 SYRINGE INTRAVENOUS
Status: CANCELLED | OUTPATIENT
Start: 2018-09-19

## 2018-09-19 RX ORDER — SODIUM CHLORIDE 0.9 % (FLUSH) 0.9 %
10 SYRINGE (ML) INJECTION
Status: CANCELLED | OUTPATIENT
Start: 2018-09-26

## 2018-09-19 RX ORDER — AZITHROMYCIN 250 MG/1
250 TABLET, FILM COATED ORAL DAILY
Qty: 6 TABLET | Refills: 0 | Status: SHIPPED | OUTPATIENT
Start: 2018-09-19 | End: 2018-11-29 | Stop reason: ALTCHOICE

## 2018-09-19 RX ORDER — SODIUM CHLORIDE 0.9 % (FLUSH) 0.9 %
10 SYRINGE (ML) INJECTION
Status: DISCONTINUED | OUTPATIENT
Start: 2018-09-19 | End: 2018-09-19 | Stop reason: HOSPADM

## 2018-09-19 RX ORDER — DEXAMETHASONE 0.5 MG/1
4 TABLET ORAL
Status: CANCELLED | OUTPATIENT
Start: 2018-09-26

## 2018-09-19 RX ORDER — HEPARIN 100 UNIT/ML
500 SYRINGE INTRAVENOUS
Status: CANCELLED | OUTPATIENT
Start: 2018-09-26

## 2018-09-19 RX ADMIN — SODIUM CHLORIDE: 0.9 INJECTION, SOLUTION INTRAVENOUS at 09:09

## 2018-09-19 RX ADMIN — SODIUM CHLORIDE, PRESERVATIVE FREE 10 ML: 5 INJECTION INTRAVENOUS at 09:09

## 2018-09-19 RX ADMIN — HEPARIN 500 UNITS: 100 SYRINGE at 09:09

## 2018-09-19 RX ADMIN — VINORELBINE TARTRATE 45 MG: 10 INJECTION INTRAVENOUS at 09:09

## 2018-09-19 RX ADMIN — DEXAMETHASONE 4 MG: 4 TABLET ORAL at 09:09

## 2018-09-19 NOTE — PLAN OF CARE
Problem: Patient Care Overview  Goal: Plan of Care Review  Outcome: Ongoing (interventions implemented as appropriate)  Tolerated chemo infusion without difficulty. No complaints voiced.  Instructed to notify MD with any concerns or problems. Pt verbalized understanding.

## 2018-09-21 DIAGNOSIS — Z51.11 ENCOUNTER FOR ANTINEOPLASTIC CHEMOTHERAPY: ICD-10-CM

## 2018-09-21 DIAGNOSIS — R23.2 HOT FLASHES RELATED TO AROMATASE INHIBITOR THERAPY: ICD-10-CM

## 2018-09-21 DIAGNOSIS — F43.21 ADJUSTMENT DISORDER WITH DEPRESSED MOOD: ICD-10-CM

## 2018-09-21 DIAGNOSIS — T45.1X5A HOT FLASHES RELATED TO AROMATASE INHIBITOR THERAPY: ICD-10-CM

## 2018-09-21 RX ORDER — CLONIDINE HYDROCHLORIDE 0.1 MG/1
0.1 TABLET ORAL 3 TIMES DAILY
Qty: 270 TABLET | Refills: 3 | OUTPATIENT
Start: 2018-09-21

## 2018-09-21 RX ORDER — DIPHENOXYLATE HYDROCHLORIDE AND ATROPINE SULFATE 2.5; .025 MG/1; MG/1
1 TABLET ORAL 4 TIMES DAILY PRN
Qty: 120 TABLET | Refills: 3 | Status: SHIPPED | OUTPATIENT
Start: 2018-09-21 | End: 2018-11-01 | Stop reason: SDUPTHER

## 2018-09-21 RX ORDER — CITALOPRAM 20 MG/1
20 TABLET, FILM COATED ORAL DAILY
Qty: 90 TABLET | Refills: 3 | OUTPATIENT
Start: 2018-09-21 | End: 2019-09-21

## 2018-09-26 ENCOUNTER — INFUSION (OUTPATIENT)
Dept: INFUSION THERAPY | Facility: HOSPITAL | Age: 63
End: 2018-09-26
Attending: INTERNAL MEDICINE
Payer: MEDICARE

## 2018-09-26 VITALS
WEIGHT: 154.31 LBS | TEMPERATURE: 98 F | SYSTOLIC BLOOD PRESSURE: 127 MMHG | RESPIRATION RATE: 18 BRPM | HEIGHT: 64 IN | HEART RATE: 85 BPM | BODY MASS INDEX: 26.34 KG/M2 | DIASTOLIC BLOOD PRESSURE: 62 MMHG

## 2018-09-26 DIAGNOSIS — C79.51 METASTATIC CANCER TO SPINE: ICD-10-CM

## 2018-09-26 DIAGNOSIS — C78.7 METASTASIS TO LIVER: ICD-10-CM

## 2018-09-26 DIAGNOSIS — C50.111 MALIGNANT NEOPLASM OF CENTRAL PORTION OF RIGHT BREAST IN FEMALE, ESTROGEN RECEPTOR POSITIVE: ICD-10-CM

## 2018-09-26 DIAGNOSIS — C50.011 MALIGNANT NEOPLASM OF NIPPLE OF RIGHT BREAST IN FEMALE, UNSPECIFIED ESTROGEN RECEPTOR STATUS: ICD-10-CM

## 2018-09-26 DIAGNOSIS — Z51.11 ENCOUNTER FOR ANTINEOPLASTIC CHEMOTHERAPY: Primary | ICD-10-CM

## 2018-09-26 DIAGNOSIS — Z17.0 MALIGNANT NEOPLASM OF CENTRAL PORTION OF RIGHT BREAST IN FEMALE, ESTROGEN RECEPTOR POSITIVE: ICD-10-CM

## 2018-09-26 PROCEDURE — 25000003 PHARM REV CODE 250: Performed by: INTERNAL MEDICINE

## 2018-09-26 PROCEDURE — 96409 CHEMO IV PUSH SNGL DRUG: CPT

## 2018-09-26 PROCEDURE — 63600175 PHARM REV CODE 636 W HCPCS: Performed by: INTERNAL MEDICINE

## 2018-09-26 RX ORDER — SODIUM CHLORIDE 0.9 % (FLUSH) 0.9 %
10 SYRINGE (ML) INJECTION
Status: DISCONTINUED | OUTPATIENT
Start: 2018-09-26 | End: 2018-09-26 | Stop reason: HOSPADM

## 2018-09-26 RX ORDER — HEPARIN 100 UNIT/ML
500 SYRINGE INTRAVENOUS
Status: DISCONTINUED | OUTPATIENT
Start: 2018-09-26 | End: 2018-09-26 | Stop reason: HOSPADM

## 2018-09-26 RX ORDER — DEXAMETHASONE 4 MG/1
4 TABLET ORAL
Status: COMPLETED | OUTPATIENT
Start: 2018-09-26 | End: 2018-09-26

## 2018-09-26 RX ADMIN — HEPARIN 500 UNITS: 100 SYRINGE at 08:09

## 2018-09-26 RX ADMIN — DEXAMETHASONE 4 MG: 4 TABLET ORAL at 07:09

## 2018-09-26 RX ADMIN — SODIUM CHLORIDE: 0.9 INJECTION, SOLUTION INTRAVENOUS at 07:09

## 2018-09-26 RX ADMIN — VINORELBINE TARTRATE 45 MG: 10 INJECTION INTRAVENOUS at 08:09

## 2018-09-26 NOTE — PLAN OF CARE
Problem: Patient Care Overview  Goal: Plan of Care Review  Outcome: Ongoing (interventions implemented as appropriate)  Pt received Navelbine; tolerated well. VSS and NAD. Pt instructed to call MD with any concerns. Pt discharged home independently

## 2018-10-01 DIAGNOSIS — Z17.0 MALIGNANT NEOPLASM OF CENTRAL PORTION OF RIGHT BREAST IN FEMALE, ESTROGEN RECEPTOR POSITIVE: ICD-10-CM

## 2018-10-01 DIAGNOSIS — C50.111 MALIGNANT NEOPLASM OF CENTRAL PORTION OF RIGHT BREAST IN FEMALE, ESTROGEN RECEPTOR POSITIVE: ICD-10-CM

## 2018-10-10 NOTE — PROGRESS NOTES
Subjective:       Patient ID: Rebecca Crain is a 63 y.o. female.    Chief Complaint: Malignant neoplasm of central portion of right breast in fem    Ms. Crain is here for followup of metastatic carcinoma of the right breast.  She's currently on Navelbine therapy and presents for cycle 11.    She has some continued chest congestions. No fever or chills. She has a productive cough that is associated with some chest wall pain.   She has associated sinus drainage.   No headaches.  No nausea, vomiting.   She has some achiness in both hands, swelling in the morning.   She has pain in mid back and at mid rib cage, 4/10.  She is taking morphine 60 mg TID which usually provides good relief but she has had some exacerbation of chest wall pain with recent cough.                    Breast history: In 2013 she was diagnosed with stage IIB carcinoma of the right breast, ER positive with a low Oncotype score.  She was enrolled on protocol  and randomized to endocrine therapy alone.  She was tried on all 3 aromatase inhibitors and had itching and rash to all of them.  In August 2013 she was started on tamoxifen.   She was found to have  bone metastasis in May 2015, 2015.  A subsequent bone biopsy was performed on a lesion at the T8 vertebra.   The pathology from that was consistent with metastatic breast cancer, ER +80%, [place] +80%, and HER-2 negative.      She received letrozole plus palbociclib from July 2015 until May 2016.  She also received bone protective therapy with Xgeva.      Faslodex was started in June 2016.      Exemestane and Afinitor started in August 2017.  That was discontinued in February 2018 due to progression in the liver and bone.     Navelbine was started February 16, 2018.          Review of Systems   Constitutional: Positive for fatigue. Negative for activity change, appetite change, chills, diaphoresis, fever and unexpected weight change.   HENT: Positive for rhinorrhea. Negative for congestion and  sore throat.    Eyes: Negative for visual disturbance.   Respiratory: Positive for cough (see HPI, productve X 1 month, using cough supressent as needed). Negative for shortness of breath.    Cardiovascular: Positive for chest pain (chest wall pain with coughing, located bilaterally at anterior rib cage).   Gastrointestinal: Positive for diarrhea (using lomotil as needed). Negative for abdominal pain.   Genitourinary: Negative for dysuria and frequency.   Musculoskeletal: Positive for back pain (chronic, currently controlled with pain meds).   Skin: Negative for rash.   Neurological: Negative for dizziness, weakness and headaches.   Hematological: Negative for adenopathy.   Psychiatric/Behavioral: Negative for dysphoric mood and suicidal ideas. The patient is not nervous/anxious.        Objective:      Physical Exam   Constitutional: She is oriented to person, place, and time. She appears well-developed and well-nourished. No distress.   Presents alone  ECOG 0     HENT:   Head: Normocephalic and atraumatic.   Mouth/Throat: No oropharyngeal exudate.   Eyes: EOM are normal. Pupils are equal, round, and reactive to light. No scleral icterus.   Neck: Normal range of motion. Neck supple.   Cardiovascular: Normal rate, regular rhythm and normal heart sounds.   Pulmonary/Chest: Effort normal and breath sounds normal. She has no wheezes. She has no rales.   Lungs clear to auscultation bilaterally  Bilateral breasts without mass, nodule or skin changes.   No axillary or supraclavicular adenopathy.    Abdominal: Soft. Bowel sounds are normal. She exhibits no mass. There is no tenderness.   Musculoskeletal: Normal range of motion. She exhibits no edema or deformity.   No spinal or paraspinal tenderness to palpation  No reproducible tenderness to palpation at anterior rib cage         Lymphadenopathy:     She has no cervical adenopathy.     She has no axillary adenopathy.        Right: No supraclavicular adenopathy present.         Left: No supraclavicular adenopathy present.   Neurological: She is alert and oriented to person, place, and time. She has normal reflexes. She displays normal reflexes. No cranial nerve deficit. She exhibits normal muscle tone. Coordination normal.   Ambulating without assistance     Skin: Skin is warm and dry. No rash noted. No erythema.        Psychiatric: She has a normal mood and affect. Her behavior is normal. Judgment and thought content normal.        Vitals reviewed.      Assessment:       1. Malignant neoplasm of central portion of right breast in female, estrogen receptor positive    2. Cough    3. 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 Bone metastasis    5. Metastasis to liver    6. Cancer related pain        Plan:       1)Continue Navelbine today and weekly (she receives 2 weeks on, one week off).   2)CXR today  3-5)as above in #1  6)well controlled on morphine    Distress Screening Results: Psychosocial Distress screening score of Distress Score: 0 noted and reviewed. No intervention indicated.

## 2018-10-11 ENCOUNTER — DOCUMENTATION ONLY (OUTPATIENT)
Dept: HEMATOLOGY/ONCOLOGY | Facility: CLINIC | Age: 63
End: 2018-10-11

## 2018-10-11 ENCOUNTER — OFFICE VISIT (OUTPATIENT)
Dept: HEMATOLOGY/ONCOLOGY | Facility: CLINIC | Age: 63
End: 2018-10-11
Payer: MEDICARE

## 2018-10-11 ENCOUNTER — INFUSION (OUTPATIENT)
Dept: INFUSION THERAPY | Facility: HOSPITAL | Age: 63
End: 2018-10-11
Attending: INTERNAL MEDICINE
Payer: MEDICARE

## 2018-10-11 ENCOUNTER — HOSPITAL ENCOUNTER (OUTPATIENT)
Dept: RADIOLOGY | Facility: HOSPITAL | Age: 63
Discharge: HOME | End: 2018-10-11
Attending: PHYSICIAN ASSISTANT
Payer: MEDICARE

## 2018-10-11 VITALS
RESPIRATION RATE: 16 BRPM | HEART RATE: 75 BPM | SYSTOLIC BLOOD PRESSURE: 168 MMHG | WEIGHT: 154.56 LBS | BODY MASS INDEX: 26.39 KG/M2 | OXYGEN SATURATION: 97 % | TEMPERATURE: 98 F | DIASTOLIC BLOOD PRESSURE: 96 MMHG | HEIGHT: 64 IN

## 2018-10-11 VITALS
RESPIRATION RATE: 18 BRPM | DIASTOLIC BLOOD PRESSURE: 59 MMHG | HEART RATE: 68 BPM | TEMPERATURE: 97 F | SYSTOLIC BLOOD PRESSURE: 125 MMHG

## 2018-10-11 DIAGNOSIS — Z17.0 MALIGNANT NEOPLASM OF CENTRAL PORTION OF RIGHT BREAST IN FEMALE, ESTROGEN RECEPTOR POSITIVE: Primary | ICD-10-CM

## 2018-10-11 DIAGNOSIS — G89.3 CANCER RELATED PAIN: ICD-10-CM

## 2018-10-11 DIAGNOSIS — C50.011 MALIGNANT NEOPLASM OF NIPPLE OF RIGHT BREAST IN FEMALE, UNSPECIFIED ESTROGEN RECEPTOR STATUS: ICD-10-CM

## 2018-10-11 DIAGNOSIS — Z51.11 ENCOUNTER FOR ANTINEOPLASTIC CHEMOTHERAPY: Primary | ICD-10-CM

## 2018-10-11 DIAGNOSIS — C79.51 BONE METASTASIS: ICD-10-CM

## 2018-10-11 DIAGNOSIS — C79.51 METASTATIC CANCER TO SPINE: ICD-10-CM

## 2018-10-11 DIAGNOSIS — C50.111 MALIGNANT NEOPLASM OF CENTRAL PORTION OF RIGHT BREAST IN FEMALE, ESTROGEN RECEPTOR POSITIVE: Primary | ICD-10-CM

## 2018-10-11 DIAGNOSIS — C78.7 METASTASIS TO LIVER: ICD-10-CM

## 2018-10-11 DIAGNOSIS — Z17.0 MALIGNANT NEOPLASM OF CENTRAL PORTION OF RIGHT BREAST IN FEMALE, ESTROGEN RECEPTOR POSITIVE: ICD-10-CM

## 2018-10-11 DIAGNOSIS — C50.111 MALIGNANT NEOPLASM OF CENTRAL PORTION OF RIGHT BREAST IN FEMALE, ESTROGEN RECEPTOR POSITIVE: ICD-10-CM

## 2018-10-11 DIAGNOSIS — R05.9 COUGH: ICD-10-CM

## 2018-10-11 PROCEDURE — 99999 PR PBB SHADOW E&M-EST. PATIENT-LVL V: CPT | Mod: PBBFAC,,, | Performed by: PHYSICIAN ASSISTANT

## 2018-10-11 PROCEDURE — 96409 CHEMO IV PUSH SNGL DRUG: CPT

## 2018-10-11 PROCEDURE — 71046 X-RAY EXAM CHEST 2 VIEWS: CPT | Mod: TC,FY

## 2018-10-11 PROCEDURE — 99215 OFFICE O/P EST HI 40 MIN: CPT | Mod: PBBFAC,25 | Performed by: PHYSICIAN ASSISTANT

## 2018-10-11 PROCEDURE — 71046 X-RAY EXAM CHEST 2 VIEWS: CPT | Mod: 26,,, | Performed by: RADIOLOGY

## 2018-10-11 PROCEDURE — A4216 STERILE WATER/SALINE, 10 ML: HCPCS | Performed by: INTERNAL MEDICINE

## 2018-10-11 PROCEDURE — 3008F BODY MASS INDEX DOCD: CPT | Mod: CPTII,,, | Performed by: PHYSICIAN ASSISTANT

## 2018-10-11 PROCEDURE — 63600175 PHARM REV CODE 636 W HCPCS: Performed by: INTERNAL MEDICINE

## 2018-10-11 PROCEDURE — 25000003 PHARM REV CODE 250: Performed by: INTERNAL MEDICINE

## 2018-10-11 PROCEDURE — 99214 OFFICE O/P EST MOD 30 MIN: CPT | Mod: S$PBB,,, | Performed by: PHYSICIAN ASSISTANT

## 2018-10-11 RX ORDER — DEXAMETHASONE 0.5 MG/1
4 TABLET ORAL
Status: CANCELLED | OUTPATIENT
Start: 2018-10-17

## 2018-10-11 RX ORDER — DEXAMETHASONE 4 MG/1
4 TABLET ORAL
Status: COMPLETED | OUTPATIENT
Start: 2018-10-11 | End: 2018-10-11

## 2018-10-11 RX ORDER — MORPHINE SULFATE 60 MG/1
60 TABLET, FILM COATED, EXTENDED RELEASE ORAL
Refills: 0 | COMMUNITY
Start: 2018-10-01 | End: 2018-10-29 | Stop reason: SDUPTHER

## 2018-10-11 RX ORDER — HEPARIN 100 UNIT/ML
500 SYRINGE INTRAVENOUS
Status: DISCONTINUED | OUTPATIENT
Start: 2018-10-11 | End: 2018-10-11 | Stop reason: HOSPADM

## 2018-10-11 RX ORDER — HEPARIN 100 UNIT/ML
500 SYRINGE INTRAVENOUS
Status: CANCELLED | OUTPATIENT
Start: 2018-10-11

## 2018-10-11 RX ORDER — SODIUM CHLORIDE 0.9 % (FLUSH) 0.9 %
10 SYRINGE (ML) INJECTION
Status: CANCELLED | OUTPATIENT
Start: 2018-10-17

## 2018-10-11 RX ORDER — DEXAMETHASONE 0.5 MG/1
4 TABLET ORAL
Status: CANCELLED | OUTPATIENT
Start: 2018-10-11

## 2018-10-11 RX ORDER — SODIUM CHLORIDE 0.9 % (FLUSH) 0.9 %
10 SYRINGE (ML) INJECTION
Status: DISCONTINUED | OUTPATIENT
Start: 2018-10-11 | End: 2018-10-11 | Stop reason: HOSPADM

## 2018-10-11 RX ORDER — SODIUM CHLORIDE 0.9 % (FLUSH) 0.9 %
10 SYRINGE (ML) INJECTION
Status: CANCELLED | OUTPATIENT
Start: 2018-10-11

## 2018-10-11 RX ORDER — HEPARIN 100 UNIT/ML
500 SYRINGE INTRAVENOUS
Status: CANCELLED | OUTPATIENT
Start: 2018-10-17

## 2018-10-11 RX ADMIN — HEPARIN 500 UNITS: 100 SYRINGE at 10:10

## 2018-10-11 RX ADMIN — SODIUM CHLORIDE: 9 INJECTION, SOLUTION INTRAVENOUS at 10:10

## 2018-10-11 RX ADMIN — DEXAMETHASONE 4 MG: 4 TABLET ORAL at 10:10

## 2018-10-11 RX ADMIN — VINORELBINE TARTRATE 45 MG: 10 INJECTION INTRAVENOUS at 10:10

## 2018-10-11 RX ADMIN — Medication 10 ML: at 10:10

## 2018-10-11 NOTE — Clinical Note
Hi- patient inquiring about a wheelchair for use when she is out and about with family (decreased stamina [place] example at the zoo over the weekend), I put the order in, let me know if there is anyting else I need to fill out. Thanks!

## 2018-10-11 NOTE — PLAN OF CARE
Problem: Patient Care Overview  Goal: Individualization & Mutuality  Left chest PAC  BP on leg   Outcome: Outcome(s) achieved Date Met: 10/11/18  1015-Labs , hx, and medications reviewed, patient was seen by Md prior to arrival. Assessment completed. Discussed plan of care with patient. Patient in agreement. Chair reclined and warm blanket and snack offered.

## 2018-10-11 NOTE — PROGRESS NOTES
Received referral from CANDICE Herrmann to assist patient with obtaining a wheel chair for transport as needed.  Obtained DME w/c orders and s/w ASHLEY/Ritesh for processing DME order.  S/w pt who would like to  the WC while she is still here today.  Ritesh/ASHLEY will assist pt with obtaining her wheelchair.  No other needs identified at this time.

## 2018-10-17 DIAGNOSIS — E03.8 HYPOTHYROIDISM DUE TO HASHIMOTO'S THYROIDITIS: ICD-10-CM

## 2018-10-17 DIAGNOSIS — E06.3 HYPOTHYROIDISM DUE TO HASHIMOTO'S THYROIDITIS: ICD-10-CM

## 2018-10-18 ENCOUNTER — INFUSION (OUTPATIENT)
Dept: INFUSION THERAPY | Facility: HOSPITAL | Age: 63
End: 2018-10-18
Attending: INTERNAL MEDICINE
Payer: MEDICARE

## 2018-10-18 VITALS
TEMPERATURE: 98 F | BODY MASS INDEX: 26.39 KG/M2 | WEIGHT: 154.56 LBS | HEART RATE: 84 BPM | HEIGHT: 64 IN | SYSTOLIC BLOOD PRESSURE: 138 MMHG | RESPIRATION RATE: 18 BRPM | DIASTOLIC BLOOD PRESSURE: 62 MMHG

## 2018-10-18 DIAGNOSIS — C50.111 MALIGNANT NEOPLASM OF CENTRAL PORTION OF RIGHT BREAST IN FEMALE, ESTROGEN RECEPTOR POSITIVE: ICD-10-CM

## 2018-10-18 DIAGNOSIS — Z17.0 MALIGNANT NEOPLASM OF CENTRAL PORTION OF RIGHT BREAST IN FEMALE, ESTROGEN RECEPTOR POSITIVE: ICD-10-CM

## 2018-10-18 DIAGNOSIS — C79.51 METASTATIC CANCER TO SPINE: ICD-10-CM

## 2018-10-18 DIAGNOSIS — Z51.11 ENCOUNTER FOR ANTINEOPLASTIC CHEMOTHERAPY: Primary | ICD-10-CM

## 2018-10-18 DIAGNOSIS — C50.011 MALIGNANT NEOPLASM OF NIPPLE OF RIGHT BREAST IN FEMALE, UNSPECIFIED ESTROGEN RECEPTOR STATUS: ICD-10-CM

## 2018-10-18 DIAGNOSIS — C78.7 METASTASIS TO LIVER: ICD-10-CM

## 2018-10-18 PROCEDURE — 63600175 PHARM REV CODE 636 W HCPCS: Performed by: INTERNAL MEDICINE

## 2018-10-18 PROCEDURE — 25000003 PHARM REV CODE 250: Performed by: INTERNAL MEDICINE

## 2018-10-18 PROCEDURE — A4216 STERILE WATER/SALINE, 10 ML: HCPCS | Performed by: INTERNAL MEDICINE

## 2018-10-18 PROCEDURE — 96413 CHEMO IV INFUSION 1 HR: CPT

## 2018-10-18 RX ORDER — HEPARIN 100 UNIT/ML
500 SYRINGE INTRAVENOUS
Status: DISCONTINUED | OUTPATIENT
Start: 2018-10-18 | End: 2018-10-18 | Stop reason: HOSPADM

## 2018-10-18 RX ORDER — DEXAMETHASONE 4 MG/1
4 TABLET ORAL
Status: COMPLETED | OUTPATIENT
Start: 2018-10-18 | End: 2018-10-18

## 2018-10-18 RX ORDER — LEVOTHYROXINE SODIUM 137 UG/1
137 TABLET ORAL
Qty: 30 TABLET | Refills: 6 | Status: SHIPPED | OUTPATIENT
Start: 2018-10-18 | End: 2018-11-01 | Stop reason: SDUPTHER

## 2018-10-18 RX ORDER — SODIUM CHLORIDE 0.9 % (FLUSH) 0.9 %
10 SYRINGE (ML) INJECTION
Status: DISCONTINUED | OUTPATIENT
Start: 2018-10-18 | End: 2018-10-18 | Stop reason: HOSPADM

## 2018-10-18 RX ADMIN — Medication 10 ML: at 09:10

## 2018-10-18 RX ADMIN — SODIUM CHLORIDE: 0.9 INJECTION, SOLUTION INTRAVENOUS at 08:10

## 2018-10-18 RX ADMIN — DEXAMETHASONE 4 MG: 4 TABLET ORAL at 08:10

## 2018-10-18 RX ADMIN — HEPARIN 500 UNITS: 100 SYRINGE at 09:10

## 2018-10-18 RX ADMIN — VINORELBINE TARTRATE 45 MG: 10 INJECTION INTRAVENOUS at 09:10

## 2018-10-29 DIAGNOSIS — G62.0 CHEMOTHERAPY-INDUCED NEUROPATHY: Primary | ICD-10-CM

## 2018-10-29 DIAGNOSIS — Z17.0 MALIGNANT NEOPLASM OF CENTRAL PORTION OF RIGHT BREAST IN FEMALE, ESTROGEN RECEPTOR POSITIVE: ICD-10-CM

## 2018-10-29 DIAGNOSIS — C50.111 MALIGNANT NEOPLASM OF CENTRAL PORTION OF RIGHT BREAST IN FEMALE, ESTROGEN RECEPTOR POSITIVE: ICD-10-CM

## 2018-10-29 DIAGNOSIS — T45.1X5A CHEMOTHERAPY-INDUCED NEUROPATHY: Primary | ICD-10-CM

## 2018-10-29 DIAGNOSIS — G62.0 CHEMOTHERAPY-INDUCED NEUROPATHY: ICD-10-CM

## 2018-10-29 DIAGNOSIS — T45.1X5A CHEMOTHERAPY-INDUCED NEUROPATHY: ICD-10-CM

## 2018-10-29 RX ORDER — MORPHINE SULFATE 60 MG/1
60 TABLET, FILM COATED, EXTENDED RELEASE ORAL EVERY 8 HOURS
Qty: 90 TABLET | Refills: 0 | Status: SHIPPED | OUTPATIENT
Start: 2018-10-29 | End: 2018-10-29 | Stop reason: SDUPTHER

## 2018-10-31 NOTE — PROGRESS NOTES
Subjective:       Patient ID: Rebecca Crain is a 63 y.o. female.    Chief Complaint: No chief complaint on file.    HPI Ms. Crain is here for followup of metastatic carcinoma of the right breast.  She's currently on Navelbine therapy and presents for cycle .    She has had some ongoing pain in the left mid back which sometimes radiates to her chest.  She had 1 episode of severe pain with the electric like shock sensation radiating up and down her spine starting when she was in bed lasting about 30 min.  That was relieved by getting out of bed and moving around.  She has had some ongoing cough and some occasional discolored mucus production.  She has exertional dyspnea which is more noticeable.  Appetite has been good.  She continues to have some loose bowels but that been largely controlled with Imodium and Lomotil.              Breast history: In 2013 she was diagnosed with stage IIB carcinoma of the right breast, ER positive with a low Oncotype score.  She was enrolled on protocol  and randomized to endocrine therapy alone.  She was tried on all 3 aromatase inhibitors and had itching and rash to all of them.  In August 2013 she was started on tamoxifen.   She was found to have  bone metastasis in May 2015, 2015.  A subsequent bone biopsy was performed on a lesion at the T8 vertebra.   The pathology from that was consistent with metastatic breast cancer, ER +80%, [place] +80%, and HER-2 negative.      She received letrozole plus palbociclib from July 2015 until May 2016.  She also received bone protective therapy with Xgeva.      Faslodex was started in June 2016.     Exemestane and Afinitor started in August 2017.  That was discontinued in February 2018 due to progression in the liver and bone.    Navelbine was started February 16, 2018.    CT scan from 9/17/18 showed a stable 4 mm nodule in the left upper lobe.  The liver was normal in size and the previously seen right hepatic lobe lesions were difficult to  visualize; they appeared to be stable with index right liver lesion measuring 1.5 cm.  There was no lymphadenopathy in the abdomen or pelvis or chest.  Numerous osseous sclerotic lesions awere seen with stable distribution.  There was development subsegmental branching opacity in left lower lobe which is felt to represent benign etiology.    Bone scan showed stable distribution of radiotracer uptake with no new areas of abnormal uptake and no new lesions.      Review of Systems   Constitutional: Negative for activity change, appetite change, fever and unexpected weight change.   HENT: Negative for trouble swallowing.    Eyes: Negative for visual disturbance.   Respiratory: Positive for cough and shortness of breath.    Cardiovascular: Negative for chest pain.   Gastrointestinal: Positive for diarrhea. Negative for abdominal pain, constipation and nausea.   Genitourinary: Negative.  Negative for frequency.   Musculoskeletal: Positive for back pain (mild).   Skin: Negative for rash.   Neurological: Positive for numbness (feet - left great toe feels cold). Negative for headaches.   Hematological: Negative for adenopathy.   Psychiatric/Behavioral: Negative for dysphoric mood. The patient is not nervous/anxious.        Objective:      Physical Exam   Constitutional: She is oriented to person, place, and time. She appears well-developed and well-nourished. No distress.   HENT:   Mouth/Throat: No oropharyngeal exudate.   Eyes: No scleral icterus.   Cardiovascular: Normal rate and regular rhythm.   Pulmonary/Chest: Effort normal and breath sounds normal.       Abdominal: Soft. She exhibits no mass. There is no tenderness.   Lymphadenopathy:     She has no cervical adenopathy.   Neurological: She is alert and oriented to person, place, and time.   Psychiatric: She has a normal mood and affect. Her behavior is normal. Thought content normal.       Assessment:   Labs are unremarkable.    1. Malignant neoplasm of central  portion of right breast in female, estrogen receptor positive    2. Bone metastasis    3. Metastasis to liver    4. Cancer related pain    5. Encounter for antineoplastic chemotherapy        Plan:       Continue Navelbine.  Rechecks chest CT scan in 3 weeks.

## 2018-11-01 ENCOUNTER — OFFICE VISIT (OUTPATIENT)
Dept: HEMATOLOGY/ONCOLOGY | Facility: CLINIC | Age: 63
End: 2018-11-01
Payer: MEDICARE

## 2018-11-01 ENCOUNTER — INFUSION (OUTPATIENT)
Dept: INFUSION THERAPY | Facility: HOSPITAL | Age: 63
End: 2018-11-01
Attending: INTERNAL MEDICINE
Payer: MEDICARE

## 2018-11-01 ENCOUNTER — PATIENT MESSAGE (OUTPATIENT)
Dept: HEMATOLOGY/ONCOLOGY | Facility: CLINIC | Age: 63
End: 2018-11-01

## 2018-11-01 VITALS
OXYGEN SATURATION: 94 % | RESPIRATION RATE: 18 BRPM | TEMPERATURE: 98 F | SYSTOLIC BLOOD PRESSURE: 165 MMHG | DIASTOLIC BLOOD PRESSURE: 75 MMHG | WEIGHT: 156.75 LBS | HEIGHT: 64 IN | BODY MASS INDEX: 26.76 KG/M2 | HEART RATE: 88 BPM

## 2018-11-01 VITALS
TEMPERATURE: 98 F | SYSTOLIC BLOOD PRESSURE: 117 MMHG | HEIGHT: 64 IN | HEART RATE: 73 BPM | WEIGHT: 156.75 LBS | RESPIRATION RATE: 18 BRPM | DIASTOLIC BLOOD PRESSURE: 56 MMHG | BODY MASS INDEX: 26.76 KG/M2

## 2018-11-01 DIAGNOSIS — T45.1X5A HOT FLASHES RELATED TO AROMATASE INHIBITOR THERAPY: ICD-10-CM

## 2018-11-01 DIAGNOSIS — Z51.11 ENCOUNTER FOR ANTINEOPLASTIC CHEMOTHERAPY: Primary | ICD-10-CM

## 2018-11-01 DIAGNOSIS — F43.21 ADJUSTMENT DISORDER WITH DEPRESSED MOOD: ICD-10-CM

## 2018-11-01 DIAGNOSIS — R23.2 HOT FLASHES RELATED TO AROMATASE INHIBITOR THERAPY: ICD-10-CM

## 2018-11-01 DIAGNOSIS — G62.9 NEUROPATHY: ICD-10-CM

## 2018-11-01 DIAGNOSIS — C50.111 MALIGNANT NEOPLASM OF CENTRAL PORTION OF RIGHT BREAST IN FEMALE, ESTROGEN RECEPTOR POSITIVE: ICD-10-CM

## 2018-11-01 DIAGNOSIS — C79.51 METASTATIC CANCER TO SPINE: ICD-10-CM

## 2018-11-01 DIAGNOSIS — E03.8 HYPOTHYROIDISM DUE TO HASHIMOTO'S THYROIDITIS: ICD-10-CM

## 2018-11-01 DIAGNOSIS — Z51.11 ENCOUNTER FOR ANTINEOPLASTIC CHEMOTHERAPY: ICD-10-CM

## 2018-11-01 DIAGNOSIS — Z17.0 MALIGNANT NEOPLASM OF CENTRAL PORTION OF RIGHT BREAST IN FEMALE, ESTROGEN RECEPTOR POSITIVE: Primary | ICD-10-CM

## 2018-11-01 DIAGNOSIS — Z17.0 MALIGNANT NEOPLASM OF CENTRAL PORTION OF RIGHT BREAST IN FEMALE, ESTROGEN RECEPTOR POSITIVE: ICD-10-CM

## 2018-11-01 DIAGNOSIS — C79.51 BONE METASTASIS: ICD-10-CM

## 2018-11-01 DIAGNOSIS — C50.011 MALIGNANT NEOPLASM OF NIPPLE OF RIGHT BREAST IN FEMALE, UNSPECIFIED ESTROGEN RECEPTOR STATUS: ICD-10-CM

## 2018-11-01 DIAGNOSIS — C78.7 METASTASIS TO LIVER: ICD-10-CM

## 2018-11-01 DIAGNOSIS — E06.3 HYPOTHYROIDISM DUE TO HASHIMOTO'S THYROIDITIS: ICD-10-CM

## 2018-11-01 DIAGNOSIS — G89.3 CANCER RELATED PAIN: ICD-10-CM

## 2018-11-01 DIAGNOSIS — C50.111 MALIGNANT NEOPLASM OF CENTRAL PORTION OF RIGHT BREAST IN FEMALE, ESTROGEN RECEPTOR POSITIVE: Primary | ICD-10-CM

## 2018-11-01 PROCEDURE — 63600175 PHARM REV CODE 636 W HCPCS: Performed by: INTERNAL MEDICINE

## 2018-11-01 PROCEDURE — 25000003 PHARM REV CODE 250: Performed by: INTERNAL MEDICINE

## 2018-11-01 PROCEDURE — 99215 OFFICE O/P EST HI 40 MIN: CPT | Mod: PBBFAC,25 | Performed by: INTERNAL MEDICINE

## 2018-11-01 PROCEDURE — 3077F SYST BP >= 140 MM HG: CPT | Mod: CPTII,S$GLB,, | Performed by: INTERNAL MEDICINE

## 2018-11-01 PROCEDURE — 99999 PR PBB SHADOW E&M-EST. PATIENT-LVL V: CPT | Mod: PBBFAC,,, | Performed by: INTERNAL MEDICINE

## 2018-11-01 PROCEDURE — 96409 CHEMO IV PUSH SNGL DRUG: CPT

## 2018-11-01 PROCEDURE — 3078F DIAST BP <80 MM HG: CPT | Mod: CPTII,S$GLB,, | Performed by: INTERNAL MEDICINE

## 2018-11-01 PROCEDURE — 99214 OFFICE O/P EST MOD 30 MIN: CPT | Mod: S$GLB,,, | Performed by: INTERNAL MEDICINE

## 2018-11-01 PROCEDURE — 3008F BODY MASS INDEX DOCD: CPT | Mod: CPTII,S$GLB,, | Performed by: INTERNAL MEDICINE

## 2018-11-01 RX ORDER — CITALOPRAM 20 MG/1
20 TABLET, FILM COATED ORAL DAILY
Qty: 90 TABLET | Refills: 3 | Status: SHIPPED | OUTPATIENT
Start: 2018-11-01 | End: 2018-12-28 | Stop reason: SDUPTHER

## 2018-11-01 RX ORDER — DEXAMETHASONE 0.5 MG/1
4 TABLET ORAL
Status: CANCELLED | OUTPATIENT
Start: 2018-11-08

## 2018-11-01 RX ORDER — HEPARIN 100 UNIT/ML
500 SYRINGE INTRAVENOUS
Status: CANCELLED | OUTPATIENT
Start: 2018-11-08

## 2018-11-01 RX ORDER — DIPHENOXYLATE HYDROCHLORIDE AND ATROPINE SULFATE 2.5; .025 MG/1; MG/1
1 TABLET ORAL 4 TIMES DAILY PRN
Qty: 120 TABLET | Refills: 3 | Status: ON HOLD | OUTPATIENT
Start: 2018-11-01 | End: 2021-08-25 | Stop reason: HOSPADM

## 2018-11-01 RX ORDER — HEPARIN 100 UNIT/ML
500 SYRINGE INTRAVENOUS
Status: DISCONTINUED | OUTPATIENT
Start: 2018-11-01 | End: 2018-11-01 | Stop reason: HOSPADM

## 2018-11-01 RX ORDER — SODIUM CHLORIDE 0.9 % (FLUSH) 0.9 %
10 SYRINGE (ML) INJECTION
Status: CANCELLED | OUTPATIENT
Start: 2018-11-01

## 2018-11-01 RX ORDER — SODIUM CHLORIDE 0.9 % (FLUSH) 0.9 %
10 SYRINGE (ML) INJECTION
Status: DISCONTINUED | OUTPATIENT
Start: 2018-11-01 | End: 2018-11-01 | Stop reason: HOSPADM

## 2018-11-01 RX ORDER — DEXAMETHASONE 4 MG/1
4 TABLET ORAL
Status: COMPLETED | OUTPATIENT
Start: 2018-11-01 | End: 2018-11-01

## 2018-11-01 RX ORDER — HEPARIN 100 UNIT/ML
500 SYRINGE INTRAVENOUS
Status: CANCELLED | OUTPATIENT
Start: 2018-11-01

## 2018-11-01 RX ORDER — DEXAMETHASONE 0.5 MG/1
4 TABLET ORAL
Status: CANCELLED | OUTPATIENT
Start: 2018-11-01

## 2018-11-01 RX ORDER — LEVOTHYROXINE SODIUM 137 UG/1
137 TABLET ORAL
Qty: 30 TABLET | Refills: 6 | Status: SHIPPED | OUTPATIENT
Start: 2018-11-01 | End: 2018-11-28 | Stop reason: SDUPTHER

## 2018-11-01 RX ORDER — CLONIDINE HYDROCHLORIDE 0.1 MG/1
0.1 TABLET ORAL 3 TIMES DAILY
Qty: 270 TABLET | Refills: 3 | Status: SHIPPED | OUTPATIENT
Start: 2018-11-01 | End: 2018-11-26 | Stop reason: SDUPTHER

## 2018-11-01 RX ORDER — SODIUM CHLORIDE 0.9 % (FLUSH) 0.9 %
10 SYRINGE (ML) INJECTION
Status: CANCELLED | OUTPATIENT
Start: 2018-11-08

## 2018-11-01 RX ORDER — GABAPENTIN 300 MG/1
300 CAPSULE ORAL 3 TIMES DAILY
Qty: 270 CAPSULE | Refills: 0 | Status: SHIPPED | OUTPATIENT
Start: 2018-11-01 | End: 2018-12-28 | Stop reason: SDUPTHER

## 2018-11-01 RX ADMIN — HEPARIN 500 UNITS: 100 SYRINGE at 09:11

## 2018-11-01 RX ADMIN — SODIUM CHLORIDE: 0.9 INJECTION, SOLUTION INTRAVENOUS at 08:11

## 2018-11-01 RX ADMIN — VINORELBINE TARTRATE 45 MG: 10 INJECTION INTRAVENOUS at 09:11

## 2018-11-01 RX ADMIN — DEXAMETHASONE 4 MG: 4 TABLET ORAL at 08:11

## 2018-11-01 NOTE — PLAN OF CARE
Problem: Patient Care Overview  Goal: Plan of Care Review  Outcome: Ongoing (interventions implemented as appropriate)  Pt tolerated infusion well. VSS. Flushed left chest port w normal saline and heparin and de-accessed. Dressed w band-aid. RTC 11/8/18.

## 2018-11-01 NOTE — Clinical Note
Navelbine day 1 and 8 every 3 weeks, CBC, CMP, CA 27.29 day 1, CBC day 8CT chest at next visit in 3 weeks

## 2018-11-08 ENCOUNTER — INFUSION (OUTPATIENT)
Dept: INFUSION THERAPY | Facility: HOSPITAL | Age: 63
End: 2018-11-08
Attending: INTERNAL MEDICINE
Payer: MEDICARE

## 2018-11-08 ENCOUNTER — LAB VISIT (OUTPATIENT)
Dept: LAB | Facility: HOSPITAL | Age: 63
End: 2018-11-08
Payer: MEDICARE

## 2018-11-08 VITALS — HEART RATE: 93 BPM | DIASTOLIC BLOOD PRESSURE: 61 MMHG | SYSTOLIC BLOOD PRESSURE: 136 MMHG | RESPIRATION RATE: 18 BRPM

## 2018-11-08 DIAGNOSIS — C79.51 METASTATIC CANCER TO SPINE: ICD-10-CM

## 2018-11-08 DIAGNOSIS — C78.7 METASTASIS TO LIVER: ICD-10-CM

## 2018-11-08 DIAGNOSIS — Z17.0 MALIGNANT NEOPLASM OF CENTRAL PORTION OF RIGHT BREAST IN FEMALE, ESTROGEN RECEPTOR POSITIVE: ICD-10-CM

## 2018-11-08 DIAGNOSIS — C50.111 MALIGNANT NEOPLASM OF CENTRAL PORTION OF RIGHT BREAST IN FEMALE, ESTROGEN RECEPTOR POSITIVE: ICD-10-CM

## 2018-11-08 DIAGNOSIS — Z51.11 ENCOUNTER FOR ANTINEOPLASTIC CHEMOTHERAPY: Primary | ICD-10-CM

## 2018-11-08 DIAGNOSIS — C50.011 MALIGNANT NEOPLASM OF NIPPLE OF RIGHT BREAST IN FEMALE, UNSPECIFIED ESTROGEN RECEPTOR STATUS: ICD-10-CM

## 2018-11-08 DIAGNOSIS — Z51.11 ENCOUNTER FOR ANTINEOPLASTIC CHEMOTHERAPY: ICD-10-CM

## 2018-11-08 LAB
BASOPHILS # BLD AUTO: 0.08 K/UL
BASOPHILS NFR BLD: 2.2 %
DIFFERENTIAL METHOD: ABNORMAL
EOSINOPHIL # BLD AUTO: 0 K/UL
EOSINOPHIL NFR BLD: 1.1 %
ERYTHROCYTE [DISTWIDTH] IN BLOOD BY AUTOMATED COUNT: 15.9 %
HCT VFR BLD AUTO: 33.3 %
HGB BLD-MCNC: 11.1 G/DL
IMM GRANULOCYTES # BLD AUTO: 0.07 K/UL
IMM GRANULOCYTES NFR BLD AUTO: 1.9 %
LYMPHOCYTES # BLD AUTO: 1 K/UL
LYMPHOCYTES NFR BLD: 28.1 %
MCH RBC QN AUTO: 29.8 PG
MCHC RBC AUTO-ENTMCNC: 33.3 G/DL
MCV RBC AUTO: 90 FL
MONOCYTES # BLD AUTO: 0.2 K/UL
MONOCYTES NFR BLD: 4.6 %
NEUTROPHILS # BLD AUTO: 2.3 K/UL
NEUTROPHILS NFR BLD: 62.1 %
NRBC BLD-RTO: 0 /100 WBC
PLATELET # BLD AUTO: 236 K/UL
PMV BLD AUTO: 9.9 FL
RBC # BLD AUTO: 3.72 M/UL
WBC # BLD AUTO: 3.66 K/UL

## 2018-11-08 PROCEDURE — 96409 CHEMO IV PUSH SNGL DRUG: CPT

## 2018-11-08 PROCEDURE — 36415 COLL VENOUS BLD VENIPUNCTURE: CPT

## 2018-11-08 PROCEDURE — 85025 COMPLETE CBC W/AUTO DIFF WBC: CPT

## 2018-11-08 PROCEDURE — 63600175 PHARM REV CODE 636 W HCPCS: Performed by: INTERNAL MEDICINE

## 2018-11-08 PROCEDURE — 25000003 PHARM REV CODE 250: Performed by: INTERNAL MEDICINE

## 2018-11-08 RX ORDER — DEXAMETHASONE 4 MG/1
4 TABLET ORAL
Status: COMPLETED | OUTPATIENT
Start: 2018-11-08 | End: 2018-11-08

## 2018-11-08 RX ORDER — SODIUM CHLORIDE 0.9 % (FLUSH) 0.9 %
10 SYRINGE (ML) INJECTION
Status: DISCONTINUED | OUTPATIENT
Start: 2018-11-08 | End: 2018-11-08 | Stop reason: HOSPADM

## 2018-11-08 RX ORDER — HEPARIN 100 UNIT/ML
500 SYRINGE INTRAVENOUS
Status: DISCONTINUED | OUTPATIENT
Start: 2018-11-08 | End: 2018-11-08 | Stop reason: HOSPADM

## 2018-11-08 RX ADMIN — VINORELBINE TARTRATE 45 MG: 10 INJECTION INTRAVENOUS at 08:11

## 2018-11-08 RX ADMIN — SODIUM CHLORIDE: 0.9 INJECTION, SOLUTION INTRAVENOUS at 08:11

## 2018-11-08 RX ADMIN — DEXAMETHASONE 4 MG: 4 TABLET ORAL at 08:11

## 2018-11-14 ENCOUNTER — TELEPHONE (OUTPATIENT)
Dept: HEMATOLOGY/ONCOLOGY | Facility: CLINIC | Age: 63
End: 2018-11-14

## 2018-11-14 NOTE — TELEPHONE ENCOUNTER
Spoke with [facility] to clarify Neulasta orders for pt. Verbal given. Order to be placed.       ----- Message from Meenu Tellez sent at 11/14/2018  9:10 AM CST -----  Pharmacy Calling    Reason for call:states that there is no Prescription attached   Pharmacy Name:[facility]  Prescription Name:Neulasta  Phone Number:[phone]  Additional Information:  Thank You  FAVIO Tellez

## 2018-11-16 ENCOUNTER — PATIENT MESSAGE (OUTPATIENT)
Dept: HEMATOLOGY/ONCOLOGY | Facility: CLINIC | Age: 63
End: 2018-11-16

## 2018-11-16 ENCOUNTER — TELEPHONE (OUTPATIENT)
Dept: HEMATOLOGY/ONCOLOGY | Facility: CLINIC | Age: 63
End: 2018-11-16

## 2018-11-16 NOTE — TELEPHONE ENCOUNTER
Spoke to pt about clarification on her chemo appt. Informed pt will forward message to  to try and get her rescheduled for Thursday 11/29/2018 instead of Tuesday 11/27/2018

## 2018-11-24 DIAGNOSIS — R23.2 HOT FLASHES RELATED TO AROMATASE INHIBITOR THERAPY: ICD-10-CM

## 2018-11-24 DIAGNOSIS — T45.1X5A HOT FLASHES RELATED TO AROMATASE INHIBITOR THERAPY: ICD-10-CM

## 2018-11-25 ENCOUNTER — PATIENT MESSAGE (OUTPATIENT)
Dept: ENDOCRINOLOGY | Facility: CLINIC | Age: 63
End: 2018-11-25

## 2018-11-26 ENCOUNTER — HOSPITAL ENCOUNTER (OUTPATIENT)
Dept: RADIOLOGY | Facility: HOSPITAL | Age: 63
Discharge: HOME | End: 2018-11-26
Attending: INTERNAL MEDICINE
Payer: MEDICARE

## 2018-11-26 DIAGNOSIS — C50.111 MALIGNANT NEOPLASM OF CENTRAL PORTION OF RIGHT BREAST IN FEMALE, ESTROGEN RECEPTOR POSITIVE: ICD-10-CM

## 2018-11-26 DIAGNOSIS — Z17.0 MALIGNANT NEOPLASM OF CENTRAL PORTION OF RIGHT BREAST IN FEMALE, ESTROGEN RECEPTOR POSITIVE: ICD-10-CM

## 2018-11-26 PROCEDURE — 71250 CT THORAX DX C-: CPT | Mod: 26,HCNC,, | Performed by: RADIOLOGY

## 2018-11-26 PROCEDURE — 71250 CT THORAX DX C-: CPT | Mod: TC,HCNC

## 2018-11-26 RX ORDER — CLONIDINE HYDROCHLORIDE 0.1 MG/1
0.1 TABLET ORAL 3 TIMES DAILY
Qty: 270 TABLET | Refills: 3 | Status: SHIPPED | OUTPATIENT
Start: 2018-11-26 | End: 2019-10-30 | Stop reason: SDUPTHER

## 2018-11-26 NOTE — PROGRESS NOTES
Subjective:       Patient ID: Rebecca Crain is a 63 y.o. female.    Chief Complaint: No chief complaint on file.    HPI Ms. Crain is here for followup of metastatic carcinoma of the right breast.  She's currently on Navelbine therapy and has had 8 cycles of that therapy..    Today she reports she has had ongoing pain primarily in her back starting from her neck and radiating to the interscapular area.  She has only been taking her MS Contin and not using her breakthrough Dilaudid.  Appetite been good.  She has had some constipation but has been taking her antidiarrheal medications regularly.  She denies any shortness of breath.  She has bilateral foot pain at night.          Breast history: In 2013 she was diagnosed with stage IIB carcinoma of the right breast, ER positive with a low Oncotype score.  She was enrolled on protocol  and randomized to endocrine therapy alone.  She was tried on all 3 aromatase inhibitors and had itching and rash to all of them.  In August 2013 she was started on tamoxifen.   She was found to have  bone metastasis in May 2015, 2015.  A subsequent bone biopsy was performed on a lesion at the T8 vertebra.   The pathology from that was consistent with metastatic breast cancer, ER +80%, [place] +80%, and HER-2 negative.      She received letrozole plus palbociclib from July 2015 until May 2016.  She also received bone protective therapy with Xgeva.      Faslodex was started in June 2016.     Exemestane and Afinitor started in August 2017.  That was discontinued in February 2018 due to progression in the liver and bone.    Navelbine was started February 16, 2018.        Review of Systems   Constitutional: Negative for activity change, appetite change, fever and unexpected weight change.   HENT: Negative for trouble swallowing.    Eyes: Negative for visual disturbance.   Respiratory: Negative for cough and shortness of breath.    Cardiovascular: Negative for chest pain.   Gastrointestinal:  Positive for constipation. Negative for abdominal pain, diarrhea and nausea.   Genitourinary: Negative.  Negative for frequency.   Musculoskeletal: Positive for back pain (mild).   Skin: Negative for rash.   Neurological: Positive for numbness. Negative for headaches.        Bilateral foot pain at night   Hematological: Negative for adenopathy.   Psychiatric/Behavioral: Negative for dysphoric mood. The patient is not nervous/anxious.        Objective:      Physical Exam   Constitutional: She is oriented to person, place, and time. She appears well-developed and well-nourished. No distress.   HENT:   Mouth/Throat: No oropharyngeal exudate.   Eyes: No scleral icterus.   Cardiovascular: Normal rate and regular rhythm.   Pulmonary/Chest: Effort normal and breath sounds normal.       Abdominal: Soft. She exhibits no mass. There is no tenderness.   Lymphadenopathy:     She has no cervical adenopathy.   Neurological: She is alert and oriented to person, place, and time.   Psychiatric: She has a normal mood and affect. Her behavior is normal. Thought content normal.       Assessment:   CT scan shows progressive disease as follows:  Progression of the mixed lytic and sclerotic bony metastasis with new lesions now involving T1.  Lesions in T5, T6, T7 and T11 as well as several ribs noted.    Postop bilateral mastectomy and reconstruction with persistent 8 mm subcutaneous soft tissue nodule in the left breast.    Multiple vague hypodensities in the liver concerning for metastatic disease.  Ultrasound or MRI may be helpful.    Improvement in the opacity at the left lower lobe though a linear band of atelectasis or fibrosis does remain.    1. Malignant neoplasm of central portion of right breast in female, estrogen receptor positive    2. 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 Bone metastasis    4. Cancer related pain        Plan:     I discussed alternative treatment options for her.  I recommended that we start capecitabine therapy  and provided her with written information.  I instructed her to increase her gabapentin to 600 mg at night to see if that helps her with her nocturnal foot pain.    Distress Screening Results: Psychosocial Distress screening score of Distress Score: 0 noted and reviewed. No intervention indicated.

## 2018-11-27 ENCOUNTER — LAB VISIT (OUTPATIENT)
Dept: LAB | Facility: HOSPITAL | Age: 63
End: 2018-11-27
Attending: INTERNAL MEDICINE
Payer: MEDICARE

## 2018-11-27 ENCOUNTER — PATIENT MESSAGE (OUTPATIENT)
Dept: ENDOCRINOLOGY | Facility: CLINIC | Age: 63
End: 2018-11-27

## 2018-11-27 ENCOUNTER — OFFICE VISIT (OUTPATIENT)
Dept: HEMATOLOGY/ONCOLOGY | Facility: CLINIC | Age: 63
End: 2018-11-27
Payer: MEDICARE

## 2018-11-27 ENCOUNTER — TELEPHONE (OUTPATIENT)
Dept: PHARMACY | Facility: CLINIC | Age: 63
End: 2018-11-27

## 2018-11-27 VITALS
SYSTOLIC BLOOD PRESSURE: 146 MMHG | OXYGEN SATURATION: 95 % | TEMPERATURE: 98 F | BODY MASS INDEX: 26.69 KG/M2 | HEIGHT: 64 IN | DIASTOLIC BLOOD PRESSURE: 78 MMHG | RESPIRATION RATE: 20 BRPM | WEIGHT: 156.31 LBS | HEART RATE: 100 BPM

## 2018-11-27 DIAGNOSIS — Z51.11 ENCOUNTER FOR ANTINEOPLASTIC CHEMOTHERAPY: ICD-10-CM

## 2018-11-27 DIAGNOSIS — C50.111 MALIGNANT NEOPLASM OF CENTRAL PORTION OF RIGHT BREAST IN FEMALE, ESTROGEN RECEPTOR POSITIVE: Primary | ICD-10-CM

## 2018-11-27 DIAGNOSIS — G89.3 CANCER RELATED PAIN: ICD-10-CM

## 2018-11-27 DIAGNOSIS — Z17.0 MALIGNANT NEOPLASM OF CENTRAL PORTION OF RIGHT BREAST IN FEMALE, ESTROGEN RECEPTOR POSITIVE: ICD-10-CM

## 2018-11-27 DIAGNOSIS — C79.51 BONE METASTASIS: ICD-10-CM

## 2018-11-27 DIAGNOSIS — E03.8 HYPOTHYROIDISM DUE TO HASHIMOTO'S THYROIDITIS: ICD-10-CM

## 2018-11-27 DIAGNOSIS — C79.51 METASTATIC CANCER TO SPINE: ICD-10-CM

## 2018-11-27 DIAGNOSIS — Z17.0 MALIGNANT NEOPLASM OF CENTRAL PORTION OF RIGHT BREAST IN FEMALE, ESTROGEN RECEPTOR POSITIVE: Primary | ICD-10-CM

## 2018-11-27 DIAGNOSIS — C50.111 MALIGNANT NEOPLASM OF CENTRAL PORTION OF RIGHT BREAST IN FEMALE, ESTROGEN RECEPTOR POSITIVE: ICD-10-CM

## 2018-11-27 DIAGNOSIS — E06.3 HYPOTHYROIDISM DUE TO HASHIMOTO'S THYROIDITIS: ICD-10-CM

## 2018-11-27 LAB
ALBUMIN SERPL BCP-MCNC: 2.5 G/DL
ALP SERPL-CCNC: 135 U/L
ALT SERPL W/O P-5'-P-CCNC: 28 U/L
ANION GAP SERPL CALC-SCNC: 8 MMOL/L
AST SERPL-CCNC: 40 U/L
BASOPHILS # BLD AUTO: 0.04 K/UL
BASOPHILS NFR BLD: 0.6 %
BILIRUB SERPL-MCNC: 0.9 MG/DL
BUN SERPL-MCNC: 19 MG/DL
CALCIUM SERPL-MCNC: 10.3 MG/DL
CHLORIDE SERPL-SCNC: 105 MMOL/L
CO2 SERPL-SCNC: 27 MMOL/L
CREAT SERPL-MCNC: 0.8 MG/DL
DIFFERENTIAL METHOD: ABNORMAL
EOSINOPHIL # BLD AUTO: 0.1 K/UL
EOSINOPHIL NFR BLD: 0.9 %
ERYTHROCYTE [DISTWIDTH] IN BLOOD BY AUTOMATED COUNT: 15.9 %
EST. GFR  (AFRICAN AMERICAN): >60 ML/MIN/1.73 M^2
EST. GFR  (NON AFRICAN AMERICAN): >60 ML/MIN/1.73 M^2
GLUCOSE SERPL-MCNC: 111 MG/DL
HCT VFR BLD AUTO: 37.9 %
HGB BLD-MCNC: 12.4 G/DL
IMM GRANULOCYTES # BLD AUTO: 0.02 K/UL
IMM GRANULOCYTES NFR BLD AUTO: 0.3 %
LYMPHOCYTES # BLD AUTO: 1.2 K/UL
LYMPHOCYTES NFR BLD: 19.4 %
MCH RBC QN AUTO: 31.3 PG
MCHC RBC AUTO-ENTMCNC: 32.7 G/DL
MCV RBC AUTO: 96 FL
MONOCYTES # BLD AUTO: 0.7 K/UL
MONOCYTES NFR BLD: 11 %
NEUTROPHILS # BLD AUTO: 4.3 K/UL
NEUTROPHILS NFR BLD: 67.8 %
NRBC BLD-RTO: 0 /100 WBC
PLATELET # BLD AUTO: 245 K/UL
PMV BLD AUTO: 10.6 FL
POTASSIUM SERPL-SCNC: 4.2 MMOL/L
PROT SERPL-MCNC: 6.9 G/DL
RBC # BLD AUTO: 3.96 M/UL
SODIUM SERPL-SCNC: 140 MMOL/L
TSH SERPL DL<=0.005 MIU/L-ACNC: 1.97 UIU/ML
WBC # BLD AUTO: 6.35 K/UL

## 2018-11-27 PROCEDURE — 3078F DIAST BP <80 MM HG: CPT | Mod: CPTII,HCNC,S$GLB, | Performed by: INTERNAL MEDICINE

## 2018-11-27 PROCEDURE — 99999 PR PBB SHADOW E&M-EST. PATIENT-LVL V: CPT | Mod: PBBFAC,HCNC,, | Performed by: INTERNAL MEDICINE

## 2018-11-27 PROCEDURE — 84443 ASSAY THYROID STIM HORMONE: CPT | Mod: HCNC

## 2018-11-27 PROCEDURE — 99214 OFFICE O/P EST MOD 30 MIN: CPT | Mod: HCNC,S$GLB,, | Performed by: INTERNAL MEDICINE

## 2018-11-27 PROCEDURE — 3008F BODY MASS INDEX DOCD: CPT | Mod: CPTII,HCNC,S$GLB, | Performed by: INTERNAL MEDICINE

## 2018-11-27 PROCEDURE — 85025 COMPLETE CBC W/AUTO DIFF WBC: CPT | Mod: HCNC

## 2018-11-27 PROCEDURE — 86300 IMMUNOASSAY TUMOR CA 15-3: CPT | Mod: HCNC

## 2018-11-27 PROCEDURE — 3077F SYST BP >= 140 MM HG: CPT | Mod: CPTII,HCNC,S$GLB, | Performed by: INTERNAL MEDICINE

## 2018-11-27 PROCEDURE — 80053 COMPREHEN METABOLIC PANEL: CPT | Mod: HCNC

## 2018-11-27 RX ORDER — CAPECITABINE 500 MG/1
TABLET, FILM COATED ORAL
Qty: 98 TABLET | Refills: 6 | Status: SHIPPED | OUTPATIENT
Start: 2018-11-27 | End: 2019-04-04 | Stop reason: ALTCHOICE

## 2018-11-27 NOTE — TELEPHONE ENCOUNTER
DOCUMENTATION ONLY:  Prior authorization for Xeloda (capecitabine) approved from 11/27/2018 to 11/27/2019  Case ID: [id number]    Co-pay: $10.00    Patient Assistance  IS NOT required. Sending to the clinical pharmacist for  and shipment. Scarlett ALLEN

## 2018-11-28 ENCOUNTER — PATIENT MESSAGE (OUTPATIENT)
Dept: HEMATOLOGY/ONCOLOGY | Facility: CLINIC | Age: 63
End: 2018-11-28

## 2018-11-28 DIAGNOSIS — Z17.0 MALIGNANT NEOPLASM OF CENTRAL PORTION OF RIGHT BREAST IN FEMALE, ESTROGEN RECEPTOR POSITIVE: ICD-10-CM

## 2018-11-28 DIAGNOSIS — E06.3 HYPOTHYROIDISM DUE TO HASHIMOTO'S THYROIDITIS: ICD-10-CM

## 2018-11-28 DIAGNOSIS — C50.111 MALIGNANT NEOPLASM OF CENTRAL PORTION OF RIGHT BREAST IN FEMALE, ESTROGEN RECEPTOR POSITIVE: ICD-10-CM

## 2018-11-28 DIAGNOSIS — E03.8 HYPOTHYROIDISM DUE TO HASHIMOTO'S THYROIDITIS: ICD-10-CM

## 2018-11-28 LAB — CANCER AG27-29 SERPL-ACNC: 104 U/ML

## 2018-11-28 RX ORDER — LEVOTHYROXINE SODIUM 137 UG/1
137 TABLET ORAL
Qty: 90 TABLET | Refills: 3 | Status: SHIPPED | OUTPATIENT
Start: 2018-11-28 | End: 2019-09-03 | Stop reason: SDUPTHER

## 2018-11-29 ENCOUNTER — TELEPHONE (OUTPATIENT)
Dept: PHARMACY | Facility: CLINIC | Age: 63
End: 2018-11-29

## 2018-11-29 RX ORDER — LANOLIN ALCOHOL/MO/W.PET/CERES
400 CREAM (GRAM) TOPICAL 2 TIMES DAILY
COMMUNITY
End: 2019-07-31

## 2018-11-29 RX ORDER — BIOTIN 1 MG
1000 TABLET ORAL DAILY
Status: ON HOLD | COMMUNITY
End: 2021-08-25 | Stop reason: HOSPADM

## 2018-11-29 RX ORDER — IBUPROFEN 100 MG/5ML
1000 SUSPENSION, ORAL (FINAL DOSE FORM) ORAL DAILY
Status: ON HOLD | COMMUNITY
End: 2021-08-25 | Stop reason: HOSPADM

## 2018-11-29 NOTE — TELEPHONE ENCOUNTER
Initial capecitabine consult completed on 18 . Capecitabine will be shipped on 18 to arrive at patient's home on 18 via [employer]. $ 10.00 copay. Patient plans to start capecitabine on 18. Address confirmed, CC on file. Confirmed 2 patient identifiers - name and . Therapy Appropriate.     Patient was counseled on the administration directions:  -Take 4 tablets (2000 mg) by mouth within 30 minutes of morning meal and 3 tablets (1500 mg) within 30 min of evening meal or snack x 14 days followed by 7 days off.    -Do not chew, crush, or break the tablets.   If possible, patient was instructed tip the tablets from the RX bottle to the cap, and take directly from the cap to the mouth.  Patient may handle the medication with their hands if they wear with a latex or nitrile glove and wash their hands before and after handling the tablets.    Patient was counseled on the following possible side effects, which include, but are not limited to:  swelling, hand -foot syndrome, skin irritation, diarrhea, nausea, vomiting, loss of appetite, mouth sores, hair loss (6%), changes in taste, indigestion, increased risk for infection, shortness of breath, and may bleed more easily.  Patient was given Eucerin cream for Hand-Foot Syndrome, and hydrocortisone cream for dermatitis.     DDIs:  Medication list reviewed, none expected with capecitabine    Patient was given 2 patient education handouts on how to handle oral chemotherapy and specific recommendations- do's and don'ts. Instructed the patient that if they have any remaining oral chemotherapy, not to flush down the toilet or throw away in the trash; The patient or caregiver should return the unused oral chemotherapy to either the clinic or to myself in the Pharmacy where the oral chemotherapy can be disposed of properly.     Patient confirmed understanding. Patient did not have additional questions.   Patient plans to start capecitabine on 18.  Consultation included: indication; goals of treatment; administration; storage and handling; side effects; how to handle side effects; the importance of compliance; how to handle missed doses; the importance of laboratory monitoring; the importance of keeping all follow up appointments.  Patient understands to report any medication changes to [facility] and provider. All questions answered and addressed to patients satisfaction. I will f/u with patient in 1 week from start, [facility] to contact patient in 2 weeks for refills.

## 2018-11-30 ENCOUNTER — PATIENT MESSAGE (OUTPATIENT)
Dept: HEMATOLOGY/ONCOLOGY | Facility: CLINIC | Age: 63
End: 2018-11-30

## 2018-11-30 DIAGNOSIS — C50.111 CANCER OF CENTRAL PORTION OF RIGHT FEMALE BREAST: ICD-10-CM

## 2018-11-30 RX ORDER — ONDANSETRON 4 MG/1
4 TABLET, FILM COATED ORAL EVERY 8 HOURS PRN
Qty: 30 TABLET | Refills: 11 | Status: SHIPPED | OUTPATIENT
Start: 2018-11-30

## 2018-11-30 RX ORDER — ONDANSETRON 4 MG/1
4 TABLET, FILM COATED ORAL EVERY 8 HOURS PRN
Qty: 30 TABLET | Refills: 11 | Status: SHIPPED | OUTPATIENT
Start: 2018-11-30 | End: 2018-11-30 | Stop reason: SDUPTHER

## 2018-12-05 ENCOUNTER — PATIENT MESSAGE (OUTPATIENT)
Dept: HEMATOLOGY/ONCOLOGY | Facility: CLINIC | Age: 63
End: 2018-12-05

## 2018-12-07 ENCOUNTER — TELEPHONE (OUTPATIENT)
Dept: PHARMACY | Facility: CLINIC | Age: 63
End: 2018-12-07

## 2018-12-07 NOTE — TELEPHONE ENCOUNTER
Contacted patient to complete Xeloda clinical follow up one week after initiating medication. Confirmed patient started Xeloda on 12/1.  Patient confirms taking medication as prescribed within 30 minutes of eating breakfast/dinner around 7 am/7 pm. Xeloda is stored at room temperature. Proper chemo handling procedures are followed (pours into cap of medication bottle).     Patient believes she may have missed one morning dose of her Xeloda due to forgetfulness. Since she was not confident if she took the medication or not, she skipped the dose and resumed normal schedule in the evening. She currently uses an alarm reminder and started using a pillbox (separate from other medications) to assist with adherence. Discussed possibly using a calendar system as well to check off when each dose has been taken. Patient believes she is tolerating Xeloda well with the exception of minor diarrhea, fatigue, cramping hands, soreness on the top of her feet and decreased appetite.     - Nausea: Occasional mild nausea noted, no antiemetics have been taken however patient does have Zofran on hand in case of worsened/prolonged nausea.  - Diarrhea: Patient has had several loose stools throughout the past week. She has anywhere from 2 to 8 BMs per day. Patient has Immodium on hand, advised her to take PRN diarrhea and contact provider if she still has >4 stools per day. Additionally, recommended patient maintain a log of foods that possible worsen diarrhea (i.e. Spicy, greasy, fatty foods, etc). Patient has completely stopped drinking coffee and tries to limit caffeine intake.  - Fatigue: Patient reported decreased energy levels. She is unsure if this is d/t Xeloda or daytime Dilaudid. This has led to a decrease in her daily activities. Advised patient to increase water intake and try to maintain activities that she needs to do as well as those she enjoys doing.  - Soreness on top of feet: Patient has soreness on the top of her feet  that is starting to form a rash. Since creams were not sent with initial fill, patient has been applying scented lotion; advised her to switch to Eucerin upon receiving. Patient does not take long hot showers or baths. She will try to shorten shower length and make water cooler to hopefully avoid further irritation. *Eucerin and HC1% were not included in the patient's initial shipment; will ship in subsequent refill.*  - Decreased appetite: Patient's appetite has slightly decreased to where she is only about to eat about twice per day. Discussed trying to eat smaller, more frequent meals.   - Pain/neuropathy: Pain in lower back/right hip is mostly well-controlled on morphine and break through Dilaudid; unchanged since Xeloda initiation. She did not increase gabapentin to 600mg at bedtime due to hopes she would not experience neuropathy. Patient plans to increase gabapentin dose as instructed.    Patient aware [facility] will reach out next week for a refill and a pharmacist will f/u with her at the end of her first cycle. Encourage patient to contact [facility] with any questions/concerns.

## 2018-12-27 ENCOUNTER — LAB VISIT (OUTPATIENT)
Dept: LAB | Facility: HOSPITAL | Age: 63
End: 2018-12-27
Attending: INTERNAL MEDICINE
Payer: MEDICARE

## 2018-12-27 ENCOUNTER — INFUSION (OUTPATIENT)
Dept: INFUSION THERAPY | Facility: HOSPITAL | Age: 63
End: 2018-12-27
Attending: INTERNAL MEDICINE
Payer: MEDICARE

## 2018-12-27 ENCOUNTER — OFFICE VISIT (OUTPATIENT)
Dept: HEMATOLOGY/ONCOLOGY | Facility: CLINIC | Age: 63
End: 2018-12-27
Payer: MEDICARE

## 2018-12-27 VITALS
DIASTOLIC BLOOD PRESSURE: 84 MMHG | HEIGHT: 64 IN | HEART RATE: 74 BPM | OXYGEN SATURATION: 96 % | TEMPERATURE: 97 F | WEIGHT: 159.38 LBS | SYSTOLIC BLOOD PRESSURE: 173 MMHG | RESPIRATION RATE: 16 BRPM | BODY MASS INDEX: 27.21 KG/M2

## 2018-12-27 DIAGNOSIS — Z17.0 MALIGNANT NEOPLASM OF CENTRAL PORTION OF RIGHT BREAST IN FEMALE, ESTROGEN RECEPTOR POSITIVE: ICD-10-CM

## 2018-12-27 DIAGNOSIS — C79.51 METASTATIC CANCER TO SPINE: ICD-10-CM

## 2018-12-27 DIAGNOSIS — Z51.11 ENCOUNTER FOR ANTINEOPLASTIC CHEMOTHERAPY: Primary | ICD-10-CM

## 2018-12-27 DIAGNOSIS — C50.111 MALIGNANT NEOPLASM OF CENTRAL PORTION OF RIGHT BREAST IN FEMALE, ESTROGEN RECEPTOR POSITIVE: Primary | ICD-10-CM

## 2018-12-27 DIAGNOSIS — C50.111 MALIGNANT NEOPLASM OF CENTRAL PORTION OF RIGHT BREAST IN FEMALE, ESTROGEN RECEPTOR POSITIVE: ICD-10-CM

## 2018-12-27 DIAGNOSIS — G89.3 CANCER RELATED PAIN: ICD-10-CM

## 2018-12-27 DIAGNOSIS — C79.51 BONE METASTASIS: ICD-10-CM

## 2018-12-27 DIAGNOSIS — G62.0 CHEMOTHERAPY-INDUCED NEUROPATHY: ICD-10-CM

## 2018-12-27 DIAGNOSIS — C78.7 METASTASIS TO LIVER: ICD-10-CM

## 2018-12-27 DIAGNOSIS — Z17.0 MALIGNANT NEOPLASM OF CENTRAL PORTION OF RIGHT BREAST IN FEMALE, ESTROGEN RECEPTOR POSITIVE: Primary | ICD-10-CM

## 2018-12-27 DIAGNOSIS — T45.1X5A CHEMOTHERAPY-INDUCED NEUROPATHY: ICD-10-CM

## 2018-12-27 LAB
ALBUMIN SERPL BCP-MCNC: 3.2 G/DL
ALP SERPL-CCNC: 142 U/L
ALT SERPL W/O P-5'-P-CCNC: 11 U/L
ANION GAP SERPL CALC-SCNC: 7 MMOL/L
AST SERPL-CCNC: 18 U/L
BASOPHILS # BLD AUTO: 0.01 K/UL
BASOPHILS NFR BLD: 0.3 %
BILIRUB SERPL-MCNC: 0.9 MG/DL
BUN SERPL-MCNC: 16 MG/DL
CALCIUM SERPL-MCNC: 9 MG/DL
CHLORIDE SERPL-SCNC: 108 MMOL/L
CO2 SERPL-SCNC: 29 MMOL/L
CREAT SERPL-MCNC: 0.9 MG/DL
DIFFERENTIAL METHOD: ABNORMAL
EOSINOPHIL # BLD AUTO: 0.2 K/UL
EOSINOPHIL NFR BLD: 6.7 %
ERYTHROCYTE [DISTWIDTH] IN BLOOD BY AUTOMATED COUNT: 15.5 %
EST. GFR  (AFRICAN AMERICAN): >60 ML/MIN/1.73 M^2
EST. GFR  (NON AFRICAN AMERICAN): >60 ML/MIN/1.73 M^2
GLUCOSE SERPL-MCNC: 119 MG/DL
HCT VFR BLD AUTO: 37.8 %
HGB BLD-MCNC: 12.1 G/DL
IMM GRANULOCYTES # BLD AUTO: 0.01 K/UL
IMM GRANULOCYTES NFR BLD AUTO: 0.3 %
LYMPHOCYTES # BLD AUTO: 1 K/UL
LYMPHOCYTES NFR BLD: 29.4 %
MCH RBC QN AUTO: 30.3 PG
MCHC RBC AUTO-ENTMCNC: 32 G/DL
MCV RBC AUTO: 95 FL
MONOCYTES # BLD AUTO: 0.3 K/UL
MONOCYTES NFR BLD: 8 %
NEUTROPHILS # BLD AUTO: 1.8 K/UL
NEUTROPHILS NFR BLD: 55.3 %
NRBC BLD-RTO: 0 /100 WBC
PLATELET # BLD AUTO: 153 K/UL
PMV BLD AUTO: 9.9 FL
POTASSIUM SERPL-SCNC: 4.3 MMOL/L
PROT SERPL-MCNC: 6.3 G/DL
RBC # BLD AUTO: 3.99 M/UL
SODIUM SERPL-SCNC: 144 MMOL/L
WBC # BLD AUTO: 3.26 K/UL

## 2018-12-27 PROCEDURE — 86300 IMMUNOASSAY TUMOR CA 15-3: CPT | Mod: HCNC

## 2018-12-27 PROCEDURE — 99214 OFFICE O/P EST MOD 30 MIN: CPT | Mod: HCNC,S$GLB,, | Performed by: PHYSICIAN ASSISTANT

## 2018-12-27 PROCEDURE — 96523 IRRIG DRUG DELIVERY DEVICE: CPT | Mod: HCNC

## 2018-12-27 PROCEDURE — 99999 PR PBB SHADOW E&M-EST. PATIENT-LVL V: CPT | Mod: PBBFAC,HCNC,, | Performed by: PHYSICIAN ASSISTANT

## 2018-12-27 PROCEDURE — 63600175 PHARM REV CODE 636 W HCPCS: Mod: HCNC | Performed by: INTERNAL MEDICINE

## 2018-12-27 PROCEDURE — 3077F SYST BP >= 140 MM HG: CPT | Mod: CPTII,HCNC,S$GLB, | Performed by: PHYSICIAN ASSISTANT

## 2018-12-27 PROCEDURE — 85025 COMPLETE CBC W/AUTO DIFF WBC: CPT | Mod: HCNC

## 2018-12-27 PROCEDURE — A4216 STERILE WATER/SALINE, 10 ML: HCPCS | Mod: HCNC | Performed by: INTERNAL MEDICINE

## 2018-12-27 PROCEDURE — 80053 COMPREHEN METABOLIC PANEL: CPT | Mod: HCNC

## 2018-12-27 PROCEDURE — 3008F BODY MASS INDEX DOCD: CPT | Mod: CPTII,HCNC,S$GLB, | Performed by: PHYSICIAN ASSISTANT

## 2018-12-27 PROCEDURE — 3079F DIAST BP 80-89 MM HG: CPT | Mod: CPTII,HCNC,S$GLB, | Performed by: PHYSICIAN ASSISTANT

## 2018-12-27 PROCEDURE — 25000003 PHARM REV CODE 250: Mod: HCNC | Performed by: INTERNAL MEDICINE

## 2018-12-27 RX ORDER — SODIUM CHLORIDE 0.9 % (FLUSH) 0.9 %
10 SYRINGE (ML) INJECTION
Status: CANCELLED | OUTPATIENT
Start: 2018-12-27

## 2018-12-27 RX ORDER — HEPARIN 100 UNIT/ML
500 SYRINGE INTRAVENOUS
Status: COMPLETED | OUTPATIENT
Start: 2018-12-27 | End: 2018-12-27

## 2018-12-27 RX ORDER — SODIUM CHLORIDE 0.9 % (FLUSH) 0.9 %
10 SYRINGE (ML) INJECTION
Status: COMPLETED | OUTPATIENT
Start: 2018-12-27 | End: 2018-12-27

## 2018-12-27 RX ORDER — HEPARIN 100 UNIT/ML
500 SYRINGE INTRAVENOUS
Status: CANCELLED | OUTPATIENT
Start: 2018-12-27

## 2018-12-27 RX ADMIN — HEPARIN 500 UNITS: 100 SYRINGE at 10:12

## 2018-12-27 RX ADMIN — SODIUM CHLORIDE, PRESERVATIVE FREE 10 ML: 5 INJECTION INTRAVENOUS at 10:12

## 2018-12-27 NOTE — PROGRESS NOTES
Subjective:       Patient ID: Rebecca Crain is a 63 y.o. female.    Chief Complaint: Malignant neoplasm of central portion of right breast in fem    Ms. Crain is here for followup of metastatic carcinoma of the right breast.  She recently started therapy with Xeloda after restaging scans on11/26/18 showed progression of disease.  Prior to that she had been on Navelbine since 2/2018.     Patient tolerating Xeloda well, she has some occasional diarrhea for which she takes lomotil.  She has some pain at the bottoms of her feet which started prior to taking xeloda.  She is taking gabapentin 600 mg qhs for relief of this pain which has allowed her to sleep better.  During the day she is taking 4 mg dilaudid every 4-6 hours as needed. She states today that she has not needed to take any Dilaudid. She remains on MS Contin 60 mg TID.  No fever, chills, nausea, vomiting or shortness of breath.  Patient states overall she feels better on the weeks she is on Xeloda than off.   Appetite has been fair, weight is stable.  Notes some fatigue associated with Jef[place] holidays.         Breast history: In 2013 she was diagnosed with stage IIB carcinoma of the right breast, ER positive with a low Oncotype score.  She was enrolled on protocol  and randomized to endocrine therapy alone.  She was tried on all 3 aromatase inhibitors and had itching and rash to all of them.  In August 2013 she was started on tamoxifen.   She was found to have  bone metastasis in May 2015, 2015.  A subsequent bone biopsy was performed on a lesion at the T8 vertebra.   The pathology from that was consistent with metastatic breast cancer, ER +80%, [place] +80%, and HER-2 negative.      She received letrozole plus palbociclib from July 2015 until May 2016.  She also received bone protective therapy with Xgeva.      Faslodex was started in June 2016.      Exemestane and Afinitor started in August 2017.  That was discontinued in February 2018 due to  progression in the liver and bone.     Navelbine was started February 16, 2018.             Review of Systems   Constitutional: Positive for fatigue. Negative for activity change, appetite change, chills, diaphoresis, fever and unexpected weight change.   HENT: Negative for congestion, rhinorrhea and sore throat.    Eyes: Negative for visual disturbance.   Respiratory: Negative for cough and shortness of breath.    Cardiovascular: Negative for chest pain.   Gastrointestinal: Positive for diarrhea (using lomotil as needed). Negative for abdominal pain.   Genitourinary: Negative for dysuria and frequency.   Musculoskeletal: Positive for back pain (chronic, currently controlled with pain meds).   Skin: Negative for rash.   Neurological: Negative for dizziness, weakness and headaches.        Neuropathy at feet at night, see HPI  Notes some worsening short term memory, gradual   Hematological: Negative for adenopathy.   Psychiatric/Behavioral: Negative for dysphoric mood and suicidal ideas. The patient is not nervous/anxious.        Objective:      Physical Exam   Constitutional: She is oriented to person, place, and time. She appears well-developed and well-nourished. No distress.   Presents with family member    ECOG 0     HENT:   Head: Normocephalic and atraumatic.   Mouth/Throat: No oropharyngeal exudate.   Eyes: EOM are normal. Pupils are equal, round, and reactive to light. No scleral icterus.   Neck: Normal range of motion. Neck supple.   Cardiovascular: Normal rate, regular rhythm and normal heart sounds.   Pulmonary/Chest: Effort normal and breath sounds normal. She has no wheezes. She has no rales.   Lungs clear to auscultation bilaterally  No axillary or supraclavicular adenopathy.    Abdominal: Soft. Bowel sounds are normal. She exhibits no mass. There is no tenderness.   Musculoskeletal: Normal range of motion. She exhibits no edema or deformity.   No spinal or paraspinal tenderness to palpation            Lymphadenopathy:     She has no cervical adenopathy.     She has no axillary adenopathy.        Right: No supraclavicular adenopathy present.        Left: No supraclavicular adenopathy present.   Neurological: She is alert and oriented to person, place, and time. She has normal reflexes. She displays normal reflexes. No cranial nerve deficit. She exhibits normal muscle tone. Coordination normal.   Ambulating without assistance     Skin: Skin is warm and dry. No rash noted. No erythema.   Psychiatric: She has a normal mood and affect. Her behavior is normal. Judgment and thought content normal.        Vitals reviewed.      Assessment:       1. Malignant neoplasm of central portion of right breast in female, estrogen receptor positive    2. 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 Bone metastasis    4. Metastasis to liver    5. Chemotherapy-induced neuropathy    6. Cancer related pain        Plan:       1-4)Patient overall tolerating treatment well. Continue Xeloda and return to clinic with labs in 3 weeks. Cancer antigen from today is pending.  5)continue neurontin 600 mg QHS  6)continue Morphine extended release 60 mg TID and dilaudid as needed  All questions answered to satisfaction of patient and she knows to call in interim if any issues.

## 2018-12-27 NOTE — TELEPHONE ENCOUNTER
Patient previously mentioned to pharmacy technician that future Xeloda refills will come through [facility]. Called patient who confirmed first Xeloda shipment from [facility] has been received. Informed patient we will close her out of [facility] at this time but she may contact us with questions/concerns at anytime in the future.

## 2018-12-27 NOTE — NURSING
Pt here today for port flush. Accessed easily, blood return present. Flushed, hep-locked and de-accessed. No questions at this time.

## 2018-12-28 DIAGNOSIS — C50.111 MALIGNANT NEOPLASM OF CENTRAL PORTION OF RIGHT BREAST IN FEMALE, ESTROGEN RECEPTOR POSITIVE: ICD-10-CM

## 2018-12-28 DIAGNOSIS — Z17.0 MALIGNANT NEOPLASM OF CENTRAL PORTION OF RIGHT BREAST IN FEMALE, ESTROGEN RECEPTOR POSITIVE: ICD-10-CM

## 2018-12-28 DIAGNOSIS — G62.9 NEUROPATHY: ICD-10-CM

## 2018-12-28 DIAGNOSIS — F43.21 ADJUSTMENT DISORDER WITH DEPRESSED MOOD: ICD-10-CM

## 2018-12-28 LAB — CANCER AG27-29 SERPL-ACNC: 56 U/ML

## 2018-12-28 RX ORDER — GABAPENTIN 300 MG/1
300 CAPSULE ORAL 3 TIMES DAILY
Qty: 270 CAPSULE | Refills: 0 | Status: SHIPPED | OUTPATIENT
Start: 2018-12-28 | End: 2019-03-22 | Stop reason: SDUPTHER

## 2018-12-28 RX ORDER — CITALOPRAM 20 MG/1
20 TABLET, FILM COATED ORAL DAILY
Qty: 90 TABLET | Refills: 3 | Status: SHIPPED | OUTPATIENT
Start: 2018-12-28 | End: 2019-04-18 | Stop reason: ALTCHOICE

## 2019-01-17 ENCOUNTER — DOCUMENTATION ONLY (OUTPATIENT)
Dept: HEMATOLOGY/ONCOLOGY | Facility: CLINIC | Age: 64
End: 2019-01-17

## 2019-01-17 ENCOUNTER — PATIENT MESSAGE (OUTPATIENT)
Dept: HEMATOLOGY/ONCOLOGY | Facility: CLINIC | Age: 64
End: 2019-01-17

## 2019-01-17 ENCOUNTER — LAB VISIT (OUTPATIENT)
Dept: LAB | Facility: HOSPITAL | Age: 64
End: 2019-01-17
Attending: INTERNAL MEDICINE
Payer: MEDICARE

## 2019-01-17 DIAGNOSIS — C50.111 MALIGNANT NEOPLASM OF CENTRAL PORTION OF RIGHT BREAST IN FEMALE, ESTROGEN RECEPTOR POSITIVE: ICD-10-CM

## 2019-01-17 DIAGNOSIS — Z17.0 MALIGNANT NEOPLASM OF CENTRAL PORTION OF RIGHT BREAST IN FEMALE, ESTROGEN RECEPTOR POSITIVE: ICD-10-CM

## 2019-01-17 LAB
ALBUMIN SERPL BCP-MCNC: 3.8 G/DL
ALP SERPL-CCNC: 150 U/L
ALT SERPL W/O P-5'-P-CCNC: 11 U/L
ANION GAP SERPL CALC-SCNC: 9 MMOL/L
AST SERPL-CCNC: 17 U/L
BASOPHILS # BLD AUTO: 0.03 K/UL
BASOPHILS NFR BLD: 0.5 %
BILIRUB SERPL-MCNC: 1.5 MG/DL
BUN SERPL-MCNC: 17 MG/DL
CALCIUM SERPL-MCNC: 9.5 MG/DL
CHLORIDE SERPL-SCNC: 104 MMOL/L
CO2 SERPL-SCNC: 26 MMOL/L
CREAT SERPL-MCNC: 0.8 MG/DL
DIFFERENTIAL METHOD: ABNORMAL
EOSINOPHIL # BLD AUTO: 0.3 K/UL
EOSINOPHIL NFR BLD: 4 %
ERYTHROCYTE [DISTWIDTH] IN BLOOD BY AUTOMATED COUNT: 16.4 %
EST. GFR  (AFRICAN AMERICAN): >60 ML/MIN/1.73 M^2
EST. GFR  (NON AFRICAN AMERICAN): >60 ML/MIN/1.73 M^2
GLUCOSE SERPL-MCNC: 110 MG/DL
HCT VFR BLD AUTO: 46.3 %
HGB BLD-MCNC: 15 G/DL
IMM GRANULOCYTES # BLD AUTO: 0.01 K/UL
IMM GRANULOCYTES NFR BLD AUTO: 0.2 %
LYMPHOCYTES # BLD AUTO: 1.3 K/UL
LYMPHOCYTES NFR BLD: 20.2 %
MCH RBC QN AUTO: 30.8 PG
MCHC RBC AUTO-ENTMCNC: 32.4 G/DL
MCV RBC AUTO: 95 FL
MONOCYTES # BLD AUTO: 0.5 K/UL
MONOCYTES NFR BLD: 8.4 %
NEUTROPHILS # BLD AUTO: 4.2 K/UL
NEUTROPHILS NFR BLD: 66.7 %
NRBC BLD-RTO: 0 /100 WBC
PLATELET # BLD AUTO: 170 K/UL
PMV BLD AUTO: 10 FL
POTASSIUM SERPL-SCNC: 4.5 MMOL/L
PROT SERPL-MCNC: 7.3 G/DL
RBC # BLD AUTO: 4.87 M/UL
SODIUM SERPL-SCNC: 139 MMOL/L
WBC # BLD AUTO: 6.3 K/UL

## 2019-01-17 PROCEDURE — 86300 IMMUNOASSAY TUMOR CA 15-3: CPT | Mod: HCNC

## 2019-01-17 PROCEDURE — 85025 COMPLETE CBC W/AUTO DIFF WBC: CPT | Mod: HCNC

## 2019-01-17 PROCEDURE — 80053 COMPREHEN METABOLIC PANEL: CPT | Mod: HCNC

## 2019-01-17 NOTE — PROGRESS NOTES
Patient reported to clinic this morning thinking she was scheduled to see Dr. Schroeder. Realized patient was actually scheduled yesterday. Patient was confused.   Patient restarted her xeloda cycle this past Sunday, however no labs were obtained prior. She is now on her 3 day of her 2 weeks on. Rescheduled patients labs to be done today, after discussion with Dr. Donohue. He is to review. Patient not have any complaints and feels fine. Explained to patient would have her scheduled with Dr. Schroeder for beginning of next cycle. She voiced understanding   Will reschedule and discuss with patient

## 2019-01-18 LAB — CANCER AG27-29 SERPL-ACNC: 39.3 U/ML

## 2019-02-01 ENCOUNTER — OFFICE VISIT (OUTPATIENT)
Dept: HEMATOLOGY/ONCOLOGY | Facility: CLINIC | Age: 64
End: 2019-02-01
Payer: MEDICARE

## 2019-02-01 ENCOUNTER — INFUSION (OUTPATIENT)
Dept: INFUSION THERAPY | Facility: HOSPITAL | Age: 64
End: 2019-02-01
Attending: INTERNAL MEDICINE
Payer: MEDICARE

## 2019-02-01 ENCOUNTER — PATIENT MESSAGE (OUTPATIENT)
Dept: HEMATOLOGY/ONCOLOGY | Facility: CLINIC | Age: 64
End: 2019-02-01

## 2019-02-01 VITALS
TEMPERATURE: 98 F | OXYGEN SATURATION: 99 % | BODY MASS INDEX: 27.4 KG/M2 | RESPIRATION RATE: 20 BRPM | HEIGHT: 64 IN | WEIGHT: 160.5 LBS | HEART RATE: 85 BPM | SYSTOLIC BLOOD PRESSURE: 137 MMHG | DIASTOLIC BLOOD PRESSURE: 100 MMHG

## 2019-02-01 DIAGNOSIS — G89.3 CANCER RELATED PAIN: ICD-10-CM

## 2019-02-01 DIAGNOSIS — C78.7 METASTASIS TO LIVER: ICD-10-CM

## 2019-02-01 DIAGNOSIS — Z51.11 ENCOUNTER FOR ANTINEOPLASTIC CHEMOTHERAPY: Primary | ICD-10-CM

## 2019-02-01 DIAGNOSIS — Z17.0 MALIGNANT NEOPLASM OF CENTRAL PORTION OF RIGHT BREAST IN FEMALE, ESTROGEN RECEPTOR POSITIVE: Primary | ICD-10-CM

## 2019-02-01 DIAGNOSIS — Z51.11 ENCOUNTER FOR ANTINEOPLASTIC CHEMOTHERAPY: ICD-10-CM

## 2019-02-01 DIAGNOSIS — C79.51 BONE METASTASIS: ICD-10-CM

## 2019-02-01 DIAGNOSIS — C50.111 MALIGNANT NEOPLASM OF CENTRAL PORTION OF RIGHT BREAST IN FEMALE, ESTROGEN RECEPTOR POSITIVE: Primary | ICD-10-CM

## 2019-02-01 PROCEDURE — 99214 OFFICE O/P EST MOD 30 MIN: CPT | Mod: HCNC,S$GLB,, | Performed by: INTERNAL MEDICINE

## 2019-02-01 PROCEDURE — 3080F PR MOST RECENT DIASTOLIC BLOOD PRESSURE >= 90 MM HG: ICD-10-PCS | Mod: HCNC,CPTII,S$GLB, | Performed by: INTERNAL MEDICINE

## 2019-02-01 PROCEDURE — 3008F PR BODY MASS INDEX (BMI) DOCUMENTED: ICD-10-PCS | Mod: HCNC,CPTII,S$GLB, | Performed by: INTERNAL MEDICINE

## 2019-02-01 PROCEDURE — 63600175 PHARM REV CODE 636 W HCPCS: Mod: HCNC | Performed by: INTERNAL MEDICINE

## 2019-02-01 PROCEDURE — 25000003 PHARM REV CODE 250: Mod: HCNC | Performed by: INTERNAL MEDICINE

## 2019-02-01 PROCEDURE — 96523 IRRIG DRUG DELIVERY DEVICE: CPT | Mod: HCNC

## 2019-02-01 PROCEDURE — A4216 STERILE WATER/SALINE, 10 ML: HCPCS | Mod: HCNC | Performed by: INTERNAL MEDICINE

## 2019-02-01 PROCEDURE — 99999 PR PBB SHADOW E&M-EST. PATIENT-LVL V: CPT | Mod: PBBFAC,HCNC,, | Performed by: INTERNAL MEDICINE

## 2019-02-01 PROCEDURE — 3075F SYST BP GE 130 - 139MM HG: CPT | Mod: HCNC,CPTII,S$GLB, | Performed by: INTERNAL MEDICINE

## 2019-02-01 PROCEDURE — 3080F DIAST BP >= 90 MM HG: CPT | Mod: HCNC,CPTII,S$GLB, | Performed by: INTERNAL MEDICINE

## 2019-02-01 PROCEDURE — 3008F BODY MASS INDEX DOCD: CPT | Mod: HCNC,CPTII,S$GLB, | Performed by: INTERNAL MEDICINE

## 2019-02-01 PROCEDURE — 3075F PR MOST RECENT SYSTOLIC BLOOD PRESS GE 130-139MM HG: ICD-10-PCS | Mod: HCNC,CPTII,S$GLB, | Performed by: INTERNAL MEDICINE

## 2019-02-01 PROCEDURE — 99214 PR OFFICE/OUTPT VISIT, EST, LEVL IV, 30-39 MIN: ICD-10-PCS | Mod: HCNC,S$GLB,, | Performed by: INTERNAL MEDICINE

## 2019-02-01 PROCEDURE — 99999 PR PBB SHADOW E&M-EST. PATIENT-LVL V: ICD-10-PCS | Mod: PBBFAC,HCNC,, | Performed by: INTERNAL MEDICINE

## 2019-02-01 RX ORDER — HEPARIN 100 UNIT/ML
500 SYRINGE INTRAVENOUS
Status: COMPLETED | OUTPATIENT
Start: 2019-02-01 | End: 2019-02-01

## 2019-02-01 RX ORDER — SODIUM CHLORIDE 0.9 % (FLUSH) 0.9 %
10 SYRINGE (ML) INJECTION
Status: CANCELLED | OUTPATIENT
Start: 2019-02-01

## 2019-02-01 RX ORDER — HEPARIN 100 UNIT/ML
500 SYRINGE INTRAVENOUS
Status: CANCELLED | OUTPATIENT
Start: 2019-02-01

## 2019-02-01 RX ORDER — SODIUM CHLORIDE 0.9 % (FLUSH) 0.9 %
10 SYRINGE (ML) INJECTION
Status: COMPLETED | OUTPATIENT
Start: 2019-02-01 | End: 2019-02-01

## 2019-02-01 RX ADMIN — HEPARIN SODIUM (PORCINE) LOCK FLUSH IV SOLN 100 UNIT/ML 500 UNITS: 100 SOLUTION at 09:02

## 2019-02-01 RX ADMIN — Medication 10 ML: at 09:02

## 2019-02-01 NOTE — PROGRESS NOTES
Subjective:       Patient ID: Rebecca Crain is a 63 y.o. female.    Chief Complaint: No chief complaint on file.    HPI Ms. Crain is here for followup of metastatic carcinoma of the right breast.      She's currently on capecitabine therapy and presents for cycle 3.   Her major toxicity from that has been hand-foot syndrome.  She has tenderness and swelling.  She is using moisturizing lotions on a regular basis.  Her other symptom is been some mild mouth sores.  She has had no diarrhea.  Her pain control has been excellent.  She has no shortness of breath.  Her sleep has been off.     Breast history: In 2013 she was diagnosed with stage IIB carcinoma of the right breast, ER positive with a low Oncotype score.  She was enrolled on protocol  and randomized to endocrine therapy alone.  She was tried on all 3 aromatase inhibitors and had itching and rash to all of them.  In August 2013 she was started on tamoxifen.   She was found to have  bone metastasis in May 2015, 2015.  A subsequent bone biopsy was performed on a lesion at the T8 vertebra.   The pathology from that was consistent with metastatic breast cancer, ER +80%, [place] +80%, and HER-2 negative.      She received letrozole plus palbociclib from July 2015 until May 2016.  She also received bone protective therapy with Xgeva.      Faslodex was started in June 2016.     Exemestane and Afinitor started in August 2017.  That was discontinued in February 2018 due to progression in the liver and bone.    Navelbine was started February 16, 2018.  She had 8 cycles for that.  Scans in November 2018 showed progression as follows    Progression of the mixed lytic and sclerotic bony metastasis with new lesions now involving T1.  Lesions in T5, T6, T7 and T11 as well as several ribs noted.    Postop bilateral mastectomy and reconstruction with persistent 8 mm subcutaneous soft tissue nodule in the left breast.    Multiple vague hypodensities in the liver concerning  for metastatic disease.      Capecitabine was started in December 2018.        Review of Systems   Constitutional: Negative for activity change, appetite change, fever and unexpected weight change.   HENT: Positive for mouth sores (Mild). Negative for trouble swallowing.    Eyes: Negative for visual disturbance.   Respiratory: Negative for cough and shortness of breath.    Cardiovascular: Negative for chest pain.   Gastrointestinal: Negative for abdominal pain, constipation, diarrhea and nausea.   Genitourinary: Negative.  Negative for frequency.   Musculoskeletal: Positive for back pain (mild and controlled).   Skin: Negative for rash.        Tenderness of the hands and feet   Neurological: Positive for numbness. Negative for headaches.        Bilateral foot pain at night   Hematological: Negative for adenopathy.   Psychiatric/Behavioral: Positive for sleep disturbance. Negative for dysphoric mood. The patient is not nervous/anxious.        Objective:      Physical Exam   Constitutional: She is oriented to person, place, and time. She appears well-developed and well-nourished. No distress.   HENT:   Mouth/Throat: No oropharyngeal exudate.   Eyes: No scleral icterus.   Cardiovascular: Normal rate and regular rhythm.   Pulmonary/Chest: Effort normal and breath sounds normal.       Abdominal: Soft. She exhibits no mass. There is no tenderness.   Lymphadenopathy:     She has no cervical adenopathy.   Neurological: She is alert and oriented to person, place, and time.   Psychiatric: She has a normal mood and affect. Her behavior is normal. Thought content normal.       Assessment:   Last cancer antigen had decreased to 39.  CBC is normal  1. Malignant neoplasm of central portion of right breast in female, estrogen receptor positive    2. Bone metastasis    3. Cancer related pain    4. Metastasis to liver    5. Encounter for antineoplastic chemotherapy        Plan:       Continue capecitabine and return to clinic in 3  weeks.  Will plan to rescan in 6 weeks.          Distress Screening Results: Psychosocial Distress screening score of Distress Score: 0 noted and reviewed. No intervention indicated.

## 2019-02-01 NOTE — NURSING
Patient arrived for port flush. Tolerated well. + blood return present, flushed, hep locked and deaccessed. Verbalized understanding to call MD for any questions/concerns. Discharged home, ambulated independently.

## 2019-02-08 ENCOUNTER — PATIENT MESSAGE (OUTPATIENT)
Dept: HEMATOLOGY/ONCOLOGY | Facility: CLINIC | Age: 64
End: 2019-02-08

## 2019-02-08 DIAGNOSIS — C50.111 MALIGNANT NEOPLASM OF CENTRAL PORTION OF RIGHT BREAST IN FEMALE, ESTROGEN RECEPTOR POSITIVE: ICD-10-CM

## 2019-02-08 DIAGNOSIS — Z17.0 MALIGNANT NEOPLASM OF CENTRAL PORTION OF RIGHT BREAST IN FEMALE, ESTROGEN RECEPTOR POSITIVE: ICD-10-CM

## 2019-02-21 ENCOUNTER — OFFICE VISIT (OUTPATIENT)
Dept: HEMATOLOGY/ONCOLOGY | Facility: CLINIC | Age: 64
End: 2019-02-21
Payer: MEDICARE

## 2019-02-21 ENCOUNTER — LAB VISIT (OUTPATIENT)
Dept: LAB | Facility: HOSPITAL | Age: 64
End: 2019-02-21
Attending: INTERNAL MEDICINE
Payer: MEDICARE

## 2019-02-21 VITALS
HEIGHT: 64 IN | TEMPERATURE: 98 F | WEIGHT: 161.63 LBS | BODY MASS INDEX: 27.59 KG/M2 | OXYGEN SATURATION: 96 % | RESPIRATION RATE: 16 BRPM | HEART RATE: 88 BPM | DIASTOLIC BLOOD PRESSURE: 95 MMHG | SYSTOLIC BLOOD PRESSURE: 146 MMHG

## 2019-02-21 DIAGNOSIS — C50.111 MALIGNANT NEOPLASM OF CENTRAL PORTION OF RIGHT BREAST IN FEMALE, ESTROGEN RECEPTOR POSITIVE: Primary | ICD-10-CM

## 2019-02-21 DIAGNOSIS — C79.51 BONE METASTASIS: ICD-10-CM

## 2019-02-21 DIAGNOSIS — C78.7 METASTASIS TO LIVER: ICD-10-CM

## 2019-02-21 DIAGNOSIS — C79.51 METASTATIC CANCER TO SPINE: ICD-10-CM

## 2019-02-21 DIAGNOSIS — Z17.0 MALIGNANT NEOPLASM OF CENTRAL PORTION OF RIGHT BREAST IN FEMALE, ESTROGEN RECEPTOR POSITIVE: ICD-10-CM

## 2019-02-21 DIAGNOSIS — C50.111 MALIGNANT NEOPLASM OF CENTRAL PORTION OF RIGHT BREAST IN FEMALE, ESTROGEN RECEPTOR POSITIVE: ICD-10-CM

## 2019-02-21 DIAGNOSIS — Z17.0 MALIGNANT NEOPLASM OF CENTRAL PORTION OF RIGHT BREAST IN FEMALE, ESTROGEN RECEPTOR POSITIVE: Primary | ICD-10-CM

## 2019-02-21 DIAGNOSIS — L27.1 HAND FOOT SYNDROME: ICD-10-CM

## 2019-02-21 LAB
ALBUMIN SERPL BCP-MCNC: 3.8 G/DL
ALP SERPL-CCNC: 122 U/L
ALT SERPL W/O P-5'-P-CCNC: 11 U/L
ANION GAP SERPL CALC-SCNC: 8 MMOL/L
AST SERPL-CCNC: 17 U/L
BASOPHILS # BLD AUTO: 0.02 K/UL
BASOPHILS NFR BLD: 0.4 %
BILIRUB SERPL-MCNC: 1.6 MG/DL
BUN SERPL-MCNC: 14 MG/DL
CALCIUM SERPL-MCNC: 9.8 MG/DL
CHLORIDE SERPL-SCNC: 103 MMOL/L
CO2 SERPL-SCNC: 26 MMOL/L
CREAT SERPL-MCNC: 0.8 MG/DL
DIFFERENTIAL METHOD: ABNORMAL
EOSINOPHIL # BLD AUTO: 0.4 K/UL
EOSINOPHIL NFR BLD: 6.5 %
ERYTHROCYTE [DISTWIDTH] IN BLOOD BY AUTOMATED COUNT: 18.4 %
EST. GFR  (AFRICAN AMERICAN): >60 ML/MIN/1.73 M^2
EST. GFR  (NON AFRICAN AMERICAN): >60 ML/MIN/1.73 M^2
GLUCOSE SERPL-MCNC: 95 MG/DL
HCT VFR BLD AUTO: 40.5 %
HGB BLD-MCNC: 13.7 G/DL
IMM GRANULOCYTES # BLD AUTO: 0.01 K/UL
IMM GRANULOCYTES NFR BLD AUTO: 0.2 %
LYMPHOCYTES # BLD AUTO: 1.4 K/UL
LYMPHOCYTES NFR BLD: 25.1 %
MCH RBC QN AUTO: 32.1 PG
MCHC RBC AUTO-ENTMCNC: 33.8 G/DL
MCV RBC AUTO: 95 FL
MONOCYTES # BLD AUTO: 0.6 K/UL
MONOCYTES NFR BLD: 11.3 %
NEUTROPHILS # BLD AUTO: 3.2 K/UL
NEUTROPHILS NFR BLD: 56.5 %
NRBC BLD-RTO: 0 /100 WBC
PLATELET # BLD AUTO: 171 K/UL
PMV BLD AUTO: 9 FL
POTASSIUM SERPL-SCNC: 4.5 MMOL/L
PROT SERPL-MCNC: 6.8 G/DL
RBC # BLD AUTO: 4.27 M/UL
SODIUM SERPL-SCNC: 137 MMOL/L
WBC # BLD AUTO: 5.58 K/UL

## 2019-02-21 PROCEDURE — 3080F PR MOST RECENT DIASTOLIC BLOOD PRESSURE >= 90 MM HG: ICD-10-PCS | Mod: HCNC,CPTII,S$GLB, | Performed by: PHYSICIAN ASSISTANT

## 2019-02-21 PROCEDURE — 99999 PR PBB SHADOW E&M-EST. PATIENT-LVL V: CPT | Mod: PBBFAC,HCNC,, | Performed by: PHYSICIAN ASSISTANT

## 2019-02-21 PROCEDURE — 99214 PR OFFICE/OUTPT VISIT, EST, LEVL IV, 30-39 MIN: ICD-10-PCS | Mod: HCNC,S$GLB,, | Performed by: PHYSICIAN ASSISTANT

## 2019-02-21 PROCEDURE — 3080F DIAST BP >= 90 MM HG: CPT | Mod: HCNC,CPTII,S$GLB, | Performed by: PHYSICIAN ASSISTANT

## 2019-02-21 PROCEDURE — 80053 COMPREHEN METABOLIC PANEL: CPT | Mod: HCNC

## 2019-02-21 PROCEDURE — 3008F BODY MASS INDEX DOCD: CPT | Mod: HCNC,CPTII,S$GLB, | Performed by: PHYSICIAN ASSISTANT

## 2019-02-21 PROCEDURE — 99999 PR PBB SHADOW E&M-EST. PATIENT-LVL V: ICD-10-PCS | Mod: PBBFAC,HCNC,, | Performed by: PHYSICIAN ASSISTANT

## 2019-02-21 PROCEDURE — 3077F SYST BP >= 140 MM HG: CPT | Mod: HCNC,CPTII,S$GLB, | Performed by: PHYSICIAN ASSISTANT

## 2019-02-21 PROCEDURE — 99214 OFFICE O/P EST MOD 30 MIN: CPT | Mod: HCNC,S$GLB,, | Performed by: PHYSICIAN ASSISTANT

## 2019-02-21 PROCEDURE — 85025 COMPLETE CBC W/AUTO DIFF WBC: CPT | Mod: HCNC

## 2019-02-21 PROCEDURE — 3008F PR BODY MASS INDEX (BMI) DOCUMENTED: ICD-10-PCS | Mod: HCNC,CPTII,S$GLB, | Performed by: PHYSICIAN ASSISTANT

## 2019-02-21 PROCEDURE — 36415 COLL VENOUS BLD VENIPUNCTURE: CPT | Mod: HCNC

## 2019-02-21 PROCEDURE — 3077F PR MOST RECENT SYSTOLIC BLOOD PRESSURE >= 140 MM HG: ICD-10-PCS | Mod: HCNC,CPTII,S$GLB, | Performed by: PHYSICIAN ASSISTANT

## 2019-02-21 PROCEDURE — 86300 IMMUNOASSAY TUMOR CA 15-3: CPT | Mod: HCNC

## 2019-02-21 NOTE — Clinical Note
Hand/foot is improving but still bothersome so i'm delaying start of next cycle by a few days. If it continues I told her we could consider dose reduction.

## 2019-02-21 NOTE — PROGRESS NOTES
Subjective:       Patient ID: Rebecca Crain is a 63 y.o. female.    Chief Complaint: Follow-up    Ms. Crain is here for followup of metastatic carcinoma of the right breast.        She's currently on capecitabine therapy and presents for cycle 4 which she will start this Sunday..   Her major toxicity from that has been hand-foot syndrome.  She has tenderness and swelling. This cycle she was not able to walk for 3 days at the end of the dosing. She notes thick chunks of skin peeling from  Hands.   She is using moisturizing lotions on a regular basis.  Her other symptom is been some mild mouth sores.  She has had no diarrhea.  Her pain control has been excellent.  She has no shortness of breath.  Appetite is good     Breast history: In 2013 she was diagnosed with stage IIB carcinoma of the right breast, ER positive with a low Oncotype score.  She was enrolled on protocol  and randomized to endocrine therapy alone.  She was tried on all 3 aromatase inhibitors and had itching and rash to all of them.  In August 2013 she was started on tamoxifen.   She was found to have  bone metastasis in May 2015, 2015.  A subsequent bone biopsy was performed on a lesion at the T8 vertebra.   The pathology from that was consistent with metastatic breast cancer, ER +80%, [place] +80%, and HER-2 negative.      She received letrozole plus palbociclib from July 2015 until May 2016.  She also received bone protective therapy with Xgeva.      Faslodex was started in June 2016.      Exemestane and Afinitor started in August 2017.  That was discontinued in February 2018 due to progression in the liver and bone.     Navelbine was started February 16, 2018.  She had 8 cycles for that.  Scans in November 2018 showed progression as follows     Progression of the mixed lytic and sclerotic bony metastasis with new lesions now involving T1.  Lesions in T5, T6, T7 and T11 as well as several ribs noted.    Postop bilateral mastectomy and  reconstruction with persistent 8 mm subcutaneous soft tissue nodule in the left breast.    Multiple vague hypodensities in the liver concerning for metastatic disease.       Capecitabine was started in December 2018.                Review of Systems   Constitutional: Positive for fatigue. Negative for activity change, appetite change, chills, diaphoresis, fever and unexpected weight change.   HENT: Negative for congestion, rhinorrhea and sore throat.    Eyes: Negative for visual disturbance.   Respiratory: Negative for cough and shortness of breath.    Cardiovascular: Negative for chest pain.   Gastrointestinal: Positive for diarrhea (using lomotil as needed). Negative for abdominal pain.   Genitourinary: Negative for dysuria and frequency.   Musculoskeletal: Positive for back pain (chronic, currently controlled with pain meds).   Skin: Negative for rash.        Hand foot- see HPI   Neurological: Negative for dizziness, weakness and headaches.        Neuropathy at feet at night, see HPI  Notes some worsening short term memory, gradual   Hematological: Negative for adenopathy.   Psychiatric/Behavioral: Negative for dysphoric mood and suicidal ideas. The patient is not nervous/anxious.        Objective:      Physical Exam   Constitutional: She is oriented to person, place, and time. She appears well-developed and well-nourished. No distress.   Presents with family member    ECOG 0     HENT:   Head: Normocephalic and atraumatic.   Mouth/Throat: No oropharyngeal exudate.   Eyes: EOM are normal. Pupils are equal, round, and reactive to light. No scleral icterus.   Neck: Normal range of motion. Neck supple.   Cardiovascular: Normal rate, regular rhythm and normal heart sounds.   Pulmonary/Chest: Effort normal and breath sounds normal. She has no wheezes. She has no rales.   Lungs clear to auscultation bilaterally  No axillary or supraclavicular adenopathy.    Abdominal: Soft. Bowel sounds are normal. She exhibits no mass.  There is no tenderness.   Musculoskeletal: Normal range of motion. She exhibits no edema or deformity.   No spinal or paraspinal tenderness to palpation           Lymphadenopathy:     She has no cervical adenopathy.     She has no axillary adenopathy.        Right: No supraclavicular adenopathy present.        Left: No supraclavicular adenopathy present.   Neurological: She is alert and oriented to person, place, and time. She has normal reflexes. She displays normal reflexes. No cranial nerve deficit. She exhibits normal muscle tone. Coordination normal.   Ambulating without assistance     Skin: Skin is warm and dry. No rash noted. No erythema.   Hand/foot at bilateral hands with some mild desquamation of skin.  Left heel with blister and desquamation of skin.no drainage or bleeding   Psychiatric: She has a normal mood and affect. Her behavior is normal. Judgment and thought content normal.        Vitals reviewed.      Assessment:       1. Malignant neoplasm of central portion of right breast in female, estrogen receptor positive    2. 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 Bone metastasis    4. Metastasis to liver    5. Hand foot syndrome        Plan:       1-4)Patient tolerating xeloda well other than hand foot syndrome which was worse this cycle. I have asked her delay starting the next cycle by a few days so that her hands/feet have healed prior to cycle #5. She will call on Wednesday to report symptoms and I anticipate she will restart late next week. She knows to call if those symptoms worsen.  5)as above, continue moisturizer  Distress Screening Results: Psychosocial Distress screening score of Distress Score: 0 noted and reviewed. No intervention indicated.

## 2019-02-21 NOTE — Clinical Note
Can we set her up to get a CT C/A/P and bone scan with port flush on 3/25 or 3/26? thanks Ambulatory/Wheelchair/Stroller

## 2019-02-22 LAB — CANCER AG27-29 SERPL-ACNC: 19.4 U/ML

## 2019-02-28 ENCOUNTER — TELEPHONE (OUTPATIENT)
Dept: HEMATOLOGY/ONCOLOGY | Facility: CLINIC | Age: 64
End: 2019-02-28

## 2019-02-28 NOTE — TELEPHONE ENCOUNTER
Returned call to patient. Pt calling to let Roshni know her pain is getting better. It is almost all gone in her feet. She only feels it when she walks and it is a little on her toes. Pt said the plan was to restart medication on Sunday, but she would like to go ahead and restart now. Informed pt would relay this information to Roshni. Pt verbalized understanding.       ----- Message from Debi Bernard sent at 2/28/2019  8:33 AM CST -----  Contact: Patient  Pt would like a call in regards to chemo medication and side effects. Says she was told by Roshni Briceno to call the off this morning to discuss.    Contact:: [phone]

## 2019-03-07 ENCOUNTER — PATIENT MESSAGE (OUTPATIENT)
Dept: HEMATOLOGY/ONCOLOGY | Facility: CLINIC | Age: 64
End: 2019-03-07

## 2019-03-07 DIAGNOSIS — C50.111 MALIGNANT NEOPLASM OF CENTRAL PORTION OF RIGHT BREAST IN FEMALE, ESTROGEN RECEPTOR POSITIVE: ICD-10-CM

## 2019-03-07 DIAGNOSIS — Z17.0 MALIGNANT NEOPLASM OF CENTRAL PORTION OF RIGHT BREAST IN FEMALE, ESTROGEN RECEPTOR POSITIVE: ICD-10-CM

## 2019-03-08 DIAGNOSIS — R42 DIZZINESS: Primary | ICD-10-CM

## 2019-03-08 NOTE — TELEPHONE ENCOUNTER
Contacted patient and got her scheduled for BRAIN MRI on Tuesday 3/12 at 345, advised her to arrive one hour prior.   Explained to patient no fasting required. Maintain adequate hyrdation, and to not wear metal. Patient reports CONTRAST ALLERGY (IODINE), explained that with MRI gadolinium contrast used and not same agent.    She voiced understanding. Patient to review as well with imaging center upon arrival.

## 2019-03-12 ENCOUNTER — HOSPITAL ENCOUNTER (OUTPATIENT)
Dept: RADIOLOGY | Facility: HOSPITAL | Age: 64
Discharge: HOME | End: 2019-03-12
Attending: PHYSICIAN ASSISTANT
Payer: MEDICARE

## 2019-03-12 DIAGNOSIS — R42 DIZZINESS: ICD-10-CM

## 2019-03-12 PROCEDURE — 25500020 PHARM REV CODE 255: Mod: HCNC | Performed by: PHYSICIAN ASSISTANT

## 2019-03-12 PROCEDURE — 70553 MRI BRAIN STEM W/O & W/DYE: CPT | Mod: 26,HCNC,, | Performed by: RADIOLOGY

## 2019-03-12 PROCEDURE — 70553 MRI BRAIN STEM W/O & W/DYE: CPT | Mod: TC,HCNC

## 2019-03-12 PROCEDURE — 70553 MRI BRAIN W WO CONTRAST: ICD-10-PCS | Mod: 26,HCNC,, | Performed by: RADIOLOGY

## 2019-03-12 PROCEDURE — A9585 GADOBUTROL INJECTION: HCPCS | Mod: HCNC | Performed by: PHYSICIAN ASSISTANT

## 2019-03-12 RX ORDER — GADOBUTROL 604.72 MG/ML
8 INJECTION INTRAVENOUS
Status: COMPLETED | OUTPATIENT
Start: 2019-03-12 | End: 2019-03-12

## 2019-03-12 RX ADMIN — GADOBUTROL 8 ML: 604.72 INJECTION INTRAVENOUS at 04:03

## 2019-03-13 ENCOUNTER — PATIENT MESSAGE (OUTPATIENT)
Dept: HEMATOLOGY/ONCOLOGY | Facility: CLINIC | Age: 64
End: 2019-03-13

## 2019-03-22 ENCOUNTER — PATIENT MESSAGE (OUTPATIENT)
Dept: HEMATOLOGY/ONCOLOGY | Facility: CLINIC | Age: 64
End: 2019-03-22

## 2019-03-22 DIAGNOSIS — G62.9 NEUROPATHY: ICD-10-CM

## 2019-03-22 RX ORDER — GABAPENTIN 300 MG/1
300 CAPSULE ORAL 3 TIMES DAILY
Qty: 270 CAPSULE | Refills: 6 | Status: SHIPPED | OUTPATIENT
Start: 2019-03-22 | End: 2020-05-22

## 2019-03-27 ENCOUNTER — PATIENT MESSAGE (OUTPATIENT)
Dept: HEMATOLOGY/ONCOLOGY | Facility: CLINIC | Age: 64
End: 2019-03-27

## 2019-04-03 ENCOUNTER — HOSPITAL ENCOUNTER (OUTPATIENT)
Dept: RADIOLOGY | Facility: HOSPITAL | Age: 64
Discharge: HOME | End: 2019-04-03
Attending: PHYSICIAN ASSISTANT
Payer: MEDICARE

## 2019-04-03 DIAGNOSIS — C50.111 MALIGNANT NEOPLASM OF CENTRAL PORTION OF RIGHT BREAST IN FEMALE, ESTROGEN RECEPTOR POSITIVE: ICD-10-CM

## 2019-04-03 DIAGNOSIS — Z17.0 MALIGNANT NEOPLASM OF CENTRAL PORTION OF RIGHT BREAST IN FEMALE, ESTROGEN RECEPTOR POSITIVE: ICD-10-CM

## 2019-04-03 PROCEDURE — 74176 CT CHEST ABDOMEN PELVIS WITHOUT CONTRAST(XPD): ICD-10-PCS | Mod: 26,HCNC,, | Performed by: RADIOLOGY

## 2019-04-03 PROCEDURE — 74176 CT ABD & PELVIS W/O CONTRAST: CPT | Mod: 26,HCNC,, | Performed by: RADIOLOGY

## 2019-04-03 PROCEDURE — 71250 CT THORAX DX C-: CPT | Mod: TC,HCNC

## 2019-04-03 PROCEDURE — 71250 CT CHEST ABDOMEN PELVIS WITHOUT CONTRAST(XPD): ICD-10-PCS | Mod: 26,HCNC,, | Performed by: RADIOLOGY

## 2019-04-03 PROCEDURE — 71250 CT THORAX DX C-: CPT | Mod: 26,HCNC,, | Performed by: RADIOLOGY

## 2019-04-03 PROCEDURE — 78306 BONE IMAGING WHOLE BODY: CPT | Mod: 26,HCNC,, | Performed by: RADIOLOGY

## 2019-04-03 PROCEDURE — 74176 CT ABD & PELVIS W/O CONTRAST: CPT | Mod: TC,HCNC

## 2019-04-03 PROCEDURE — 78306 NM BONE SCAN WHOLE BODY: ICD-10-PCS | Mod: 26,HCNC,, | Performed by: RADIOLOGY

## 2019-04-03 PROCEDURE — A9503 TC99M MEDRONATE: HCPCS | Mod: HCNC

## 2019-04-03 NOTE — PROGRESS NOTES
Subjective:       Patient ID: Rebecca Crain is a 63 y.o. female.    Chief Complaint: No chief complaint on file.    HPI   Ms. Crain is here for followup of metastatic carcinoma of the right breast.   She is here for cycle 5.    She has had some persistent left-sided headaches occurring on a daily basis.  MRI of the brain was negative several weeks ago.  She has also had some dizziness and imbalance.  In addition, she has had some worsening back pain.     Breast history: In 2013 she was diagnosed with stage IIB carcinoma of the right breast, ER positive with a low Oncotype score.  She was enrolled on protocol  and randomized to endocrine therapy alone.  She was tried on all 3 aromatase inhibitors and had itching and rash to all of them.  In August 2013 she was started on tamoxifen.   She was found to have  bone metastasis in May 2015, 2015.  A subsequent bone biopsy was performed on a lesion at the T8 vertebra.   The pathology from that was consistent with metastatic breast cancer, ER +80%, [place] +80%, and HER-2 negative.      She received letrozole plus palbociclib from July 2015 until May 2016.  She also received bone protective therapy with Xgeva.      Faslodex was started in June 2016.      Exemestane and Afinitor started in August 2017.  That was discontinued in February 2018 due to progression in the liver and bone.     Navelbine was started February 16, 2018.  She had 8 cycles for that.  Scans in November 2018 showed progression as follows     Progression of the mixed lytic and sclerotic bony metastasis with new lesions now involving T1.  Lesions in T5, T6, T7 and T11 as well as several ribs noted.    Multiple vague hypodensities in the liver concerning for metastatic disease.       Capecitabine was started in December 2018.  Review of Systems   Constitutional: Negative for appetite change and unexpected weight change.   Eyes: Negative for visual disturbance.   Respiratory: Negative for cough and  shortness of breath.    Cardiovascular: Negative for chest pain.   Gastrointestinal: Negative for abdominal pain and diarrhea.   Genitourinary: Negative for frequency.   Musculoskeletal: Positive for back pain.   Skin: Negative for rash.   Neurological: Positive for headaches.   Hematological: Negative for adenopathy.   Psychiatric/Behavioral: The patient is not nervous/anxious.        Objective:      Physical Exam   Constitutional: She is oriented to person, place, and time. She appears well-developed and well-nourished. No distress.   Eyes: No scleral icterus.   Cardiovascular: Normal rate and regular rhythm.   Pulmonary/Chest: Effort normal and breath sounds normal. She has no wheezes. She has no rales.       Abdominal: Soft. She exhibits no mass. There is no tenderness.   Lymphadenopathy:     She has no cervical adenopathy.   Neurological: She is alert and oriented to person, place, and time.   Psychiatric: She has a normal mood and affect. Her behavior is normal. Thought content normal.   Vitals reviewed.      Assessment:     CT shows evidence for a new 1.5 cm hypodensity and some subtle changes that could represent additional metastasis.  Bones show evidence for worsening lytic bone disease in the spine and pelvis  1. Malignant neoplasm of central portion of right breast in female, estrogen receptor positive    2. 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 Metastasis to liver    4. Cancer related pain        Plan:       Discontinue capecitabine.  Restart Xgeva.  Consider further options for therapy and discussed with the patient.       Distress Screening Results: Psychosocial Distress screening score of Distress Score: 0 noted and reviewed. No intervention indicated.

## 2019-04-04 ENCOUNTER — INFUSION (OUTPATIENT)
Dept: INFUSION THERAPY | Facility: HOSPITAL | Age: 64
End: 2019-04-04
Attending: INTERNAL MEDICINE
Payer: MEDICARE

## 2019-04-04 ENCOUNTER — OFFICE VISIT (OUTPATIENT)
Dept: HEMATOLOGY/ONCOLOGY | Facility: CLINIC | Age: 64
End: 2019-04-04
Payer: MEDICARE

## 2019-04-04 VITALS
HEIGHT: 64 IN | BODY MASS INDEX: 28.79 KG/M2 | TEMPERATURE: 98 F | RESPIRATION RATE: 16 BRPM | DIASTOLIC BLOOD PRESSURE: 79 MMHG | HEART RATE: 87 BPM | WEIGHT: 168.63 LBS | SYSTOLIC BLOOD PRESSURE: 137 MMHG | OXYGEN SATURATION: 95 %

## 2019-04-04 DIAGNOSIS — C79.51 METASTATIC CANCER TO SPINE: ICD-10-CM

## 2019-04-04 DIAGNOSIS — Z17.0 MALIGNANT NEOPLASM OF CENTRAL PORTION OF RIGHT BREAST IN FEMALE, ESTROGEN RECEPTOR POSITIVE: Primary | ICD-10-CM

## 2019-04-04 DIAGNOSIS — C50.111 MALIGNANT NEOPLASM OF CENTRAL PORTION OF RIGHT BREAST IN FEMALE, ESTROGEN RECEPTOR POSITIVE: Primary | ICD-10-CM

## 2019-04-04 DIAGNOSIS — G89.3 CANCER RELATED PAIN: ICD-10-CM

## 2019-04-04 DIAGNOSIS — C78.7 METASTASIS TO LIVER: ICD-10-CM

## 2019-04-04 DIAGNOSIS — Z51.11 ENCOUNTER FOR ANTINEOPLASTIC CHEMOTHERAPY: Primary | ICD-10-CM

## 2019-04-04 PROCEDURE — 99999 PR PBB SHADOW E&M-EST. PATIENT-LVL III: CPT | Mod: PBBFAC,HCNC,, | Performed by: INTERNAL MEDICINE

## 2019-04-04 PROCEDURE — 99214 OFFICE O/P EST MOD 30 MIN: CPT | Mod: HCNC,S$GLB,, | Performed by: INTERNAL MEDICINE

## 2019-04-04 PROCEDURE — 3075F PR MOST RECENT SYSTOLIC BLOOD PRESS GE 130-139MM HG: ICD-10-PCS | Mod: HCNC,CPTII,S$GLB, | Performed by: INTERNAL MEDICINE

## 2019-04-04 PROCEDURE — 3008F BODY MASS INDEX DOCD: CPT | Mod: HCNC,CPTII,S$GLB, | Performed by: INTERNAL MEDICINE

## 2019-04-04 PROCEDURE — 25000003 PHARM REV CODE 250: Mod: HCNC | Performed by: INTERNAL MEDICINE

## 2019-04-04 PROCEDURE — 3078F DIAST BP <80 MM HG: CPT | Mod: HCNC,CPTII,S$GLB, | Performed by: INTERNAL MEDICINE

## 2019-04-04 PROCEDURE — A4216 STERILE WATER/SALINE, 10 ML: HCPCS | Mod: HCNC | Performed by: INTERNAL MEDICINE

## 2019-04-04 PROCEDURE — 63600175 PHARM REV CODE 636 W HCPCS: Mod: HCNC | Performed by: INTERNAL MEDICINE

## 2019-04-04 PROCEDURE — 99214 PR OFFICE/OUTPT VISIT, EST, LEVL IV, 30-39 MIN: ICD-10-PCS | Mod: HCNC,S$GLB,, | Performed by: INTERNAL MEDICINE

## 2019-04-04 PROCEDURE — 3075F SYST BP GE 130 - 139MM HG: CPT | Mod: HCNC,CPTII,S$GLB, | Performed by: INTERNAL MEDICINE

## 2019-04-04 PROCEDURE — 99999 PR PBB SHADOW E&M-EST. PATIENT-LVL III: ICD-10-PCS | Mod: PBBFAC,HCNC,, | Performed by: INTERNAL MEDICINE

## 2019-04-04 PROCEDURE — 96523 IRRIG DRUG DELIVERY DEVICE: CPT | Mod: HCNC

## 2019-04-04 PROCEDURE — 3078F PR MOST RECENT DIASTOLIC BLOOD PRESSURE < 80 MM HG: ICD-10-PCS | Mod: HCNC,CPTII,S$GLB, | Performed by: INTERNAL MEDICINE

## 2019-04-04 PROCEDURE — 3008F PR BODY MASS INDEX (BMI) DOCUMENTED: ICD-10-PCS | Mod: HCNC,CPTII,S$GLB, | Performed by: INTERNAL MEDICINE

## 2019-04-04 RX ORDER — HYDROMORPHONE HYDROCHLORIDE 4 MG/1
4 TABLET ORAL EVERY 4 HOURS PRN
Qty: 180 TABLET | Refills: 0 | Status: SHIPPED | OUTPATIENT
Start: 2019-04-04 | End: 2019-09-09 | Stop reason: SDUPTHER

## 2019-04-04 RX ORDER — HEPARIN 100 UNIT/ML
500 SYRINGE INTRAVENOUS
Status: COMPLETED | OUTPATIENT
Start: 2019-04-04 | End: 2019-04-04

## 2019-04-04 RX ORDER — SODIUM FLUORIDE 6.1 MG/ML
GEL, DENTIFRICE DENTAL
Refills: 3 | Status: ON HOLD | COMMUNITY
Start: 2019-03-18 | End: 2021-08-25 | Stop reason: HOSPADM

## 2019-04-04 RX ORDER — SODIUM CHLORIDE 0.9 % (FLUSH) 0.9 %
10 SYRINGE (ML) INJECTION
Status: COMPLETED | OUTPATIENT
Start: 2019-04-04 | End: 2019-04-04

## 2019-04-04 RX ORDER — SODIUM CHLORIDE 0.9 % (FLUSH) 0.9 %
10 SYRINGE (ML) INJECTION
Status: CANCELLED | OUTPATIENT
Start: 2019-04-04

## 2019-04-04 RX ORDER — HEPARIN 100 UNIT/ML
500 SYRINGE INTRAVENOUS
Status: CANCELLED | OUTPATIENT
Start: 2019-04-04

## 2019-04-04 RX ADMIN — SODIUM CHLORIDE, PRESERVATIVE FREE 10 ML: 5 INJECTION INTRAVENOUS at 09:04

## 2019-04-04 RX ADMIN — HEPARIN 500 UNITS: 100 SYRINGE at 09:04

## 2019-04-11 NOTE — PROGRESS NOTES
Subjective:       Patient ID: Rebecca Crain is a 63 y.o. female.    Chief Complaint: No chief complaint on file.    HPI   Ms. Crain is here for followup of metastatic carcinoma of the right breast.     She has no new issues compared to her last visit.  Pain control has been adequate.    ECOG-1       Breast history: In 2013 she was diagnosed with stage IIB carcinoma of the right breast, ER positive with a low Oncotype score.  She was enrolled on protocol  and randomized to endocrine therapy alone.  She was tried on all 3 aromatase inhibitors and had itching and rash to all of them.  In August 2013 she was started on tamoxifen.   She was found to have  bone metastasis in May 2015, 2015.  A subsequent bone biopsy was performed on a lesion at the T8 vertebra.   The pathology from that was consistent with metastatic breast cancer, ER +80%, GA +80%, and HER-2 negative.      She received letrozole plus palbociclib from July 2015 until May 2016.  She also received bone protective therapy with Xgeva.      Faslodex was started in June 2016.      Exemestane and Afinitor started in August 2017.  That was discontinued in February 2018 due to progression in the liver and bone.     Navelbine was started February 16, 2018.  She had 8 cycles for that.  Scans in November 2018 showed progression as follows     Capecitabine was started in December 2018.  She received 5 cycles of that therapy.    Follow-up scans in April showed the following: evidence for a new 1.5 cm hepatic hypodensity and some subtle changes that could represent additional metastasis.  Bones show evidence for worsening lytic bone disease in the spine and pelvis  Review of Systems   Constitutional: Negative for activity change and appetite change.       Objective:      Physical Exam   Constitutional: She is oriented to person, place, and time. She appears well-developed and well-nourished. No distress.   Neurological: She is alert and oriented to person,  place, and time.   Psychiatric: She has a normal mood and affect. Her behavior is normal. Thought content normal.   Vitals reviewed.      Assessment:       1. Malignant neoplasm of central portion of right breast in female, estrogen receptor positive    2. 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 Bone metastasis    4. Cancer related pain        Plan:       She is eligible for clinical trial therapy with a fibroblast growth factor inhibitor erdafitinib.  She is interested in that therapy.    Other therapeutic options include megestrol acetate.  She also could be considered for other chemotherapy regimens including Doxil.  I provided her with written information regarding standard therapies.

## 2019-04-12 ENCOUNTER — RESEARCH ENCOUNTER (OUTPATIENT)
Dept: RESEARCH | Facility: HOSPITAL | Age: 64
End: 2019-04-12

## 2019-04-12 ENCOUNTER — LAB VISIT (OUTPATIENT)
Dept: LAB | Facility: HOSPITAL | Age: 64
End: 2019-04-12
Payer: MEDICARE

## 2019-04-12 ENCOUNTER — OFFICE VISIT (OUTPATIENT)
Dept: HEMATOLOGY/ONCOLOGY | Facility: CLINIC | Age: 64
End: 2019-04-12
Payer: MEDICARE

## 2019-04-12 VITALS
TEMPERATURE: 98 F | DIASTOLIC BLOOD PRESSURE: 83 MMHG | HEART RATE: 98 BPM | WEIGHT: 166 LBS | SYSTOLIC BLOOD PRESSURE: 181 MMHG | OXYGEN SATURATION: 99 % | RESPIRATION RATE: 20 BRPM | BODY MASS INDEX: 28.34 KG/M2 | HEIGHT: 64 IN

## 2019-04-12 DIAGNOSIS — C50.111 MALIGNANT NEOPLASM OF CENTRAL PORTION OF RIGHT BREAST IN FEMALE, ESTROGEN RECEPTOR POSITIVE: Primary | ICD-10-CM

## 2019-04-12 DIAGNOSIS — C50.111 MALIGNANT NEOPLASM OF CENTRAL PORTION OF RIGHT BREAST IN FEMALE, ESTROGEN RECEPTOR POSITIVE: ICD-10-CM

## 2019-04-12 DIAGNOSIS — C50.919 BREAST CANCER: Primary | ICD-10-CM

## 2019-04-12 DIAGNOSIS — C79.51 BONE METASTASIS: ICD-10-CM

## 2019-04-12 DIAGNOSIS — Z17.0 MALIGNANT NEOPLASM OF CENTRAL PORTION OF RIGHT BREAST IN FEMALE, ESTROGEN RECEPTOR POSITIVE: ICD-10-CM

## 2019-04-12 DIAGNOSIS — C79.51 METASTATIC CANCER TO SPINE: ICD-10-CM

## 2019-04-12 DIAGNOSIS — Z00.6 EXAMINATION OF PARTICIPANT IN CLINICAL TRIAL: ICD-10-CM

## 2019-04-12 DIAGNOSIS — Z17.0 MALIGNANT NEOPLASM OF CENTRAL PORTION OF RIGHT BREAST IN FEMALE, ESTROGEN RECEPTOR POSITIVE: Primary | ICD-10-CM

## 2019-04-12 DIAGNOSIS — G89.3 CANCER RELATED PAIN: ICD-10-CM

## 2019-04-12 LAB
ALBUMIN SERPL BCP-MCNC: 3.5 G/DL (ref 3.5–5.2)
ALP SERPL-CCNC: 125 U/L (ref 55–135)
ALT SERPL W/O P-5'-P-CCNC: 11 U/L (ref 10–44)
ANION GAP SERPL CALC-SCNC: 7 MMOL/L (ref 8–16)
AST SERPL-CCNC: 16 U/L (ref 10–40)
BASOPHILS # BLD AUTO: 0.01 K/UL (ref 0–0.2)
BASOPHILS NFR BLD: 0.2 % (ref 0–1.9)
BILIRUB SERPL-MCNC: 1.7 MG/DL (ref 0.1–1)
BUN SERPL-MCNC: 17 MG/DL (ref 8–23)
CALCIUM SERPL-MCNC: 9.7 MG/DL (ref 8.7–10.5)
CHLORIDE SERPL-SCNC: 104 MMOL/L (ref 95–110)
CO2 SERPL-SCNC: 29 MMOL/L (ref 23–29)
CREAT SERPL-MCNC: 0.8 MG/DL (ref 0.5–1.4)
DIFFERENTIAL METHOD: ABNORMAL
EOSINOPHIL # BLD AUTO: 0.2 K/UL (ref 0–0.5)
EOSINOPHIL NFR BLD: 4.1 % (ref 0–8)
ERYTHROCYTE [DISTWIDTH] IN BLOOD BY AUTOMATED COUNT: 16.7 % (ref 11.5–14.5)
EST. GFR  (AFRICAN AMERICAN): >60 ML/MIN/1.73 M^2
EST. GFR  (NON AFRICAN AMERICAN): >60 ML/MIN/1.73 M^2
GLUCOSE SERPL-MCNC: 109 MG/DL (ref 70–110)
HCT VFR BLD AUTO: 42.2 % (ref 37–48.5)
HGB BLD-MCNC: 14.3 G/DL (ref 12–16)
IMM GRANULOCYTES # BLD AUTO: 0.01 K/UL (ref 0–0.04)
IMM GRANULOCYTES NFR BLD AUTO: 0.2 % (ref 0–0.5)
LYMPHOCYTES # BLD AUTO: 1.1 K/UL (ref 1–4.8)
LYMPHOCYTES NFR BLD: 22.4 % (ref 18–48)
MCH RBC QN AUTO: 33.4 PG (ref 27–31)
MCHC RBC AUTO-ENTMCNC: 33.9 G/DL (ref 32–36)
MCV RBC AUTO: 99 FL (ref 82–98)
MONOCYTES # BLD AUTO: 0.6 K/UL (ref 0.3–1)
MONOCYTES NFR BLD: 12 % (ref 4–15)
NEUTROPHILS # BLD AUTO: 3 K/UL (ref 1.8–7.7)
NEUTROPHILS NFR BLD: 61.1 % (ref 38–73)
NRBC BLD-RTO: 0 /100 WBC
PLATELET # BLD AUTO: 164 K/UL (ref 150–350)
PMV BLD AUTO: 9.3 FL (ref 9.2–12.9)
POTASSIUM SERPL-SCNC: 4.3 MMOL/L (ref 3.5–5.1)
PROT SERPL-MCNC: 6.7 G/DL (ref 6–8.4)
RBC # BLD AUTO: 4.28 M/UL (ref 4–5.4)
SODIUM SERPL-SCNC: 140 MMOL/L (ref 136–145)
WBC # BLD AUTO: 4.91 K/UL (ref 3.9–12.7)

## 2019-04-12 PROCEDURE — 36415 COLL VENOUS BLD VENIPUNCTURE: CPT | Mod: HCNC

## 2019-04-12 PROCEDURE — 3079F DIAST BP 80-89 MM HG: CPT | Mod: HCNC,CPTII,S$GLB, | Performed by: INTERNAL MEDICINE

## 2019-04-12 PROCEDURE — 86300 IMMUNOASSAY TUMOR CA 15-3: CPT | Mod: HCNC

## 2019-04-12 PROCEDURE — 99999 PR PBB SHADOW E&M-EST. PATIENT-LVL III: ICD-10-PCS | Mod: PBBFAC,HCNC,, | Performed by: INTERNAL MEDICINE

## 2019-04-12 PROCEDURE — 3008F BODY MASS INDEX DOCD: CPT | Mod: HCNC,CPTII,S$GLB, | Performed by: INTERNAL MEDICINE

## 2019-04-12 PROCEDURE — 3079F PR MOST RECENT DIASTOLIC BLOOD PRESSURE 80-89 MM HG: ICD-10-PCS | Mod: HCNC,CPTII,S$GLB, | Performed by: INTERNAL MEDICINE

## 2019-04-12 PROCEDURE — 3077F PR MOST RECENT SYSTOLIC BLOOD PRESSURE >= 140 MM HG: ICD-10-PCS | Mod: HCNC,CPTII,S$GLB, | Performed by: INTERNAL MEDICINE

## 2019-04-12 PROCEDURE — 99213 OFFICE O/P EST LOW 20 MIN: CPT | Mod: HCNC,S$GLB,, | Performed by: INTERNAL MEDICINE

## 2019-04-12 PROCEDURE — 3077F SYST BP >= 140 MM HG: CPT | Mod: HCNC,CPTII,S$GLB, | Performed by: INTERNAL MEDICINE

## 2019-04-12 PROCEDURE — 3008F PR BODY MASS INDEX (BMI) DOCUMENTED: ICD-10-PCS | Mod: HCNC,CPTII,S$GLB, | Performed by: INTERNAL MEDICINE

## 2019-04-12 PROCEDURE — 99999 PR PBB SHADOW E&M-EST. PATIENT-LVL III: CPT | Mod: PBBFAC,HCNC,, | Performed by: INTERNAL MEDICINE

## 2019-04-12 PROCEDURE — 99213 PR OFFICE/OUTPT VISIT, EST, LEVL III, 20-29 MIN: ICD-10-PCS | Mod: HCNC,S$GLB,, | Performed by: INTERNAL MEDICINE

## 2019-04-12 PROCEDURE — 80053 COMPREHEN METABOLIC PANEL: CPT | Mod: HCNC

## 2019-04-12 PROCEDURE — 85025 COMPLETE CBC W/AUTO DIFF WBC: CPT | Mod: HCNC

## 2019-04-14 LAB — CANCER AG27-29 SERPL-ACNC: 20.9 U/ML

## 2019-04-15 NOTE — PROGRESS NOTES
Protocol: MATCH-Molecular Analysis for Therapy choice  Subprotocol K2: Phase 2 of [id number] (Erdafitinib) in Patients with Tumors with FGFR Mutations or Fusions  IRB# [id number]  Sponsor: ODILIA  PI: Dr. High    Informed Consent Process 4/12/19  Met patient after her visit with Dr. Schroeder to discuss the 2 above mentioned studies, MATCH and its subprotocol K2. Pt is AAO x's 3 with appropriate mood and affect. Pt has a diagnosis of breast cancer which has progressed on her current treatment. Pt had her tumor tissue sent off for [employer] NGS which showed a FGFR mutation. Explained to patient that she needs to meet the eligibility criteria for MATCH prior to meeting the eligibility for K2. Pt will need to have labs and an EKG first. Once she meets the eligibility criteria for MATCH, she can then proceed on to the screening procedures for K2. Explained to pt the screening procedures for K2 , the side effects of the medications, and the schedule of events. Explained to the patient that the trials are strictly voluntary, and she can withdraw her consent at anytime. Pt voiced understanding. Pt expressed interest and wishes to proceed with the trials. All of her questions were answered to her satisfaction. Pt signed both consents for MATCH and K2. Pt was given a copy of the consents with my contact card.   Pt has appt for labs and EKG on 4/17/19. Screening eye exam will follow if labs and EKG WNL. Pt voiced understanding.     See link below for copy of signed consent for MATCH and K2

## 2019-04-17 ENCOUNTER — OFFICE VISIT (OUTPATIENT)
Dept: OPHTHALMOLOGY | Facility: CLINIC | Age: 64
End: 2019-04-17
Payer: MEDICARE

## 2019-04-17 ENCOUNTER — HOSPITAL ENCOUNTER (OUTPATIENT)
Dept: CARDIOLOGY | Facility: CLINIC | Age: 64
Discharge: HOME | End: 2019-04-17
Payer: MEDICARE

## 2019-04-17 DIAGNOSIS — C50.919 BREAST CANCER: ICD-10-CM

## 2019-04-17 DIAGNOSIS — Z00.6 EXAMINATION OF PARTICIPANT IN CLINICAL TRIAL: ICD-10-CM

## 2019-04-17 DIAGNOSIS — Z00.6 PATIENT IN CANCER RELATED RESEARCH STUDY: ICD-10-CM

## 2019-04-17 DIAGNOSIS — C50.919 METASTATIC BREAST CARCINOMA: Primary | ICD-10-CM

## 2019-04-17 PROCEDURE — 93010 EKG 12-LEAD: ICD-10-PCS | Mod: S$PBB,HCNC,, | Performed by: INTERNAL MEDICINE

## 2019-04-17 PROCEDURE — 99999 PR PBB SHADOW E&M-EST. PATIENT-LVL III: CPT | Mod: PBBFAC,HCNC,, | Performed by: OPHTHALMOLOGY

## 2019-04-17 PROCEDURE — 93010 ELECTROCARDIOGRAM REPORT: CPT | Mod: S$PBB,HCNC,, | Performed by: INTERNAL MEDICINE

## 2019-04-17 PROCEDURE — 92004 COMPRE OPH EXAM NEW PT 1/>: CPT | Mod: Q1,HCNC,S$GLB, | Performed by: OPHTHALMOLOGY

## 2019-04-17 PROCEDURE — 93005 ELECTROCARDIOGRAM TRACING: CPT | Mod: PBBFAC,HCNC | Performed by: INTERNAL MEDICINE

## 2019-04-17 PROCEDURE — 92004 PR EYE EXAM, NEW PATIENT,COMPREHESV: ICD-10-PCS | Mod: Q1,HCNC,S$GLB, | Performed by: OPHTHALMOLOGY

## 2019-04-17 PROCEDURE — 99999 PR PBB SHADOW E&M-EST. PATIENT-LVL III: ICD-10-PCS | Mod: PBBFAC,HCNC,, | Performed by: OPHTHALMOLOGY

## 2019-04-17 NOTE — LETTER
[facility] - Ophthalmology  [address]  [facility] [place] [address]  Phone: [phone]  Fax: [phone]   April 17, 2019    Rodrigo Schroeder MD  [address]  [facility] [address]    Patient: Rebecca Crain   MR Number: [id number]   YOB: 1955   Date of Visit: 4/17/2019       Dear Dr. Schroeder:    Thank you for referring Rebecca Crain to me for evaluation. Here is my assessment and plan of care:    Assessment:   /Plan     For exam results, see Encounter Report.    Metastatic breast carcinoma    Patient in cancer related research study      Normal baseline eye exam and OCT of macula. Return per protocol.          Plan:       For exam results, see Encounter Report.    Metastatic breast carcinoma    Patient in cancer related research study      Normal baseline eye exam and OCT of macula. Return per protocol.            Below you will find my full exam findings. If you have questions, please do not hesitate to call me. I look forward to following Ms. Rebecca Crain along with you.    Sincerely,          Marshall Stephens MD       CC  Suzi Hobbs, EDEL Cid, EDEL             Base Eye Exam     Visual Acuity (Snellen - Linear)       Right Left    Dist cc 20/40 -2+1 20/30 +2    Dist ph cc 20/20 -2 20/20 -2    Correction:  Glasses          Tonometry (Applanation, 11:04 AM)       Right Left    Pressure 17 17          Pupils       Dark Light Shape React APD    Right 5 3 Round Brisk None    Left 5 3 Round Brisk None          Visual Fields       Right Left     Full Full          Extraocular Movement       Right Left     Full, Ortho Full, Ortho          Neuro/Psych     Oriented x3:  Yes    Mood/Affect:  Normal          Dilation     Both eyes:  1% Mydriacyl, 2.5% Phenylephrine @ 11:07 AM            Additional Tests     Amsler       Right Left     Normal Normal            Slit Lamp and Fundus Exam     External Exam       Right Left    External Normal Normal          Slit Lamp Exam       Right Left     Lids/Lashes Normal Normal    Conjunctiva/Sclera White and quiet White and quiet    Cornea Old RK incisions Old RK incisions    Anterior Chamber Deep and quiet Deep and quiet    Iris Round and reactive Round and reactive    Lens Clear Clear    Vitreous Normal Normal          Fundus Exam       Right Left    Disc Normal Normal    C/D Ratio 0.3 0.3    Macula Normal Normal    Vessels Normal Normal    Periphery Normal Normal

## 2019-04-17 NOTE — PROGRESS NOTES
HPI     Research pt  Patient here for 1st eye exam research breast cancer clinical trial.  Patient states OU vision seem stable.    Eye drops:OTC daily OU                    Ointment QHS OU     I have personally interviewed the patient, reviewed the history and   examined the patient and agree with the technician's exam.      Last edited by Marshall Stephens MD on 4/17/2019 10:52 AM. (History)            Assessment /Plan     For exam results, see Encounter Report.    Metastatic breast carcinoma    Patient in cancer related research study      Normal baseline eye exam and OCT of macula. Return per protocol.

## 2019-04-18 DIAGNOSIS — F43.21 ADJUSTMENT DISORDER WITH DEPRESSED MOOD: Primary | ICD-10-CM

## 2019-04-18 RX ORDER — SERTRALINE HYDROCHLORIDE 50 MG/1
50 TABLET, FILM COATED ORAL DAILY
Qty: 30 TABLET | Refills: 11 | Status: SHIPPED | OUTPATIENT
Start: 2019-04-18 | End: 2019-05-08 | Stop reason: SDUPTHER

## 2019-04-22 ENCOUNTER — TELEPHONE (OUTPATIENT)
Dept: RESEARCH | Facility: HOSPITAL | Age: 64
End: 2019-04-22

## 2019-04-22 NOTE — TELEPHONE ENCOUNTER
Spoke with patient who stated that she is off of the Celexa and started the Zoloft on the morning of 4/20/19. Pt states that she feels fine and no adverse side effects.   Informed pt that she has been registered to the first part part of the MATCH protocol. Will call pt when I have a potential start date. Pt voiced understanding.

## 2019-05-02 ENCOUNTER — RESEARCH ENCOUNTER (OUTPATIENT)
Dept: RESEARCH | Facility: HOSPITAL | Age: 64
End: 2019-05-02

## 2019-05-07 DIAGNOSIS — C50.919 MALIGNANT NEOPLASM OF BREAST, STAGE 4, UNSPECIFIED LATERALITY: Primary | ICD-10-CM

## 2019-05-07 NOTE — PROGRESS NOTES
Subjective:       Patient ID: Rebecca Crain is a 63 y.o. female.    Chief Complaint: No chief complaint on file.    HPI Ms. Crain is here for followup of metastatic carcinoma of the right breast.     She is here to initiate therapy.  Today she reports that she has actually been feeling very well.  She has had a slight increase in her back pain but has been more active.  Her only other complaint is some intermittent burning in her feet especially in the evening when she is inactive.  Does not limit her activity or factor function.  Appetite and bowel function has been good and she has no shortness of breath    ECOG-1       Breast history: In 2013 she was diagnosed with stage IIB carcinoma of the right breast, ER positive with a low Oncotype score.  She was enrolled on protocol  and randomized to endocrine therapy alone.  She was tried on all 3 aromatase inhibitors and had itching and rash to all of them.  In August 2013 she was started on tamoxifen.   She was found to have  bone metastasis in May 2015, 2015.  A subsequent bone biopsy was performed on a lesion at the T8 vertebra.   The pathology from that was consistent with metastatic breast cancer, ER +80%, IA +80%, and HER-2 negative.      She received letrozole plus palbociclib from July 2015 until May 2016.  She also received bone protective therapy with Xgeva.      Faslodex was started in June 2016.      Exemestane and Afinitor started in August 2017.  That was discontinued in February 2018 due to progression in the liver and bone.     Navelbine was started February 16, 2018.  She had 8 cycles for that.  Scans in November 2018 showed progression as follows     Capecitabine was started in December 2018.  She received 5 cycles of that therapy.    Follow-up scans in April showed the following: evidence for a new 1.5 cm hepatic hypodensity and some subtle changes that could represent additional metastasis.  Bones show evidence for worsening lytic bone  disease in the spine and pelvis  Review of Systems   Constitutional: Negative for appetite change and unexpected weight change.   Eyes: Negative for visual disturbance.   Respiratory: Negative for cough and shortness of breath.    Cardiovascular: Negative for chest pain.   Gastrointestinal: Negative for abdominal pain and diarrhea.   Genitourinary: Negative for frequency.   Musculoskeletal: Positive for back pain (Slight increase).   Skin: Negative for rash.   Neurological: Negative for headaches.        Occasional burning sensation in her feet   Hematological: Negative for adenopathy.   Psychiatric/Behavioral: The patient is not nervous/anxious.        Objective:      Physical Exam   Constitutional: She is oriented to person, place, and time. She appears well-developed and well-nourished. No distress.   HENT:   Mouth/Throat: Oropharynx is clear and moist. No oropharyngeal exudate.   Eyes: No scleral icterus.   Cardiovascular: Normal rate and regular rhythm.   Pulmonary/Chest: Effort normal and breath sounds normal. No stridor. She has no wheezes.   Abdominal: Soft. She exhibits no mass. There is no tenderness.   Lymphadenopathy:     She has no cervical adenopathy.     She has no axillary adenopathy.        Right: No supraclavicular adenopathy present.        Left: No supraclavicular adenopathy present.   Neurological: She is alert and oriented to person, place, and time.   Psychiatric: She has a normal mood and affect. Her behavior is normal. Thought content normal.   Vitals reviewed.      Assessment:     CBC and CMP are normal  1. Malignant neoplasm of central portion of right breast in female, estrogen receptor positive    2. Bone metastasis    3. Metastasis to liver        Plan:       Plan to initiate therapy with study drug.  Return to clinic in 2 weeks.       Distress Screening Results: Psychosocial Distress screening score of Distress Score: 5 noted and reviewed. No intervention indicated.

## 2019-05-07 NOTE — PROGRESS NOTES
Protocol: MATCH-Molecular Analysis for Therapy choice  Subprotocol K2: Phase 2 of [id number] (Erdafitinib) in Patients with Tumors with FGFR Mutations or Fusions  IRB# [id number]  Sponsor: [employer]  PI: Dr. High  Study #[id number]    Eligibility Confirmation For Match and Match K2 5/2/19  Pt is deemed eligible for the K2 subprotocol for Match. Double eligibility confirmed with Carol Hawkins RN and signed off by Dr. Schroeder. Awan criteria include:  -Pt eligible for MATCH step 0.  -Pt has a FGFR mutation.  -Normal eye exam.  -No history of hyperphosphatemia.  -Pt is not on meds that increased Calcium or phosphorus.  -Pt is not of child bearing potential.    Pt to start C1D1 on 5/7/19.   See link below for eligibility checklist for Step 0 and K2 protocol.

## 2019-05-08 ENCOUNTER — RESEARCH ENCOUNTER (OUTPATIENT)
Dept: RESEARCH | Facility: HOSPITAL | Age: 64
End: 2019-05-08

## 2019-05-08 ENCOUNTER — LAB VISIT (OUTPATIENT)
Dept: LAB | Facility: HOSPITAL | Age: 64
End: 2019-05-08
Payer: MEDICARE

## 2019-05-08 ENCOUNTER — OFFICE VISIT (OUTPATIENT)
Dept: HEMATOLOGY/ONCOLOGY | Facility: CLINIC | Age: 64
End: 2019-05-08
Payer: MEDICARE

## 2019-05-08 VITALS
BODY MASS INDEX: 28.86 KG/M2 | HEIGHT: 64 IN | RESPIRATION RATE: 20 BRPM | HEART RATE: 83 BPM | WEIGHT: 169.06 LBS | SYSTOLIC BLOOD PRESSURE: 140 MMHG | DIASTOLIC BLOOD PRESSURE: 81 MMHG | TEMPERATURE: 98 F | OXYGEN SATURATION: 92 %

## 2019-05-08 DIAGNOSIS — Z17.0 MALIGNANT NEOPLASM OF CENTRAL PORTION OF RIGHT BREAST IN FEMALE, ESTROGEN RECEPTOR POSITIVE: ICD-10-CM

## 2019-05-08 DIAGNOSIS — Z17.0 MALIGNANT NEOPLASM OF CENTRAL PORTION OF RIGHT BREAST IN FEMALE, ESTROGEN RECEPTOR POSITIVE: Primary | ICD-10-CM

## 2019-05-08 DIAGNOSIS — C50.111 MALIGNANT NEOPLASM OF CENTRAL PORTION OF RIGHT BREAST IN FEMALE, ESTROGEN RECEPTOR POSITIVE: Primary | ICD-10-CM

## 2019-05-08 DIAGNOSIS — C79.51 BONE METASTASIS: ICD-10-CM

## 2019-05-08 DIAGNOSIS — C50.919 BREAST CANCER: ICD-10-CM

## 2019-05-08 DIAGNOSIS — C50.111 MALIGNANT NEOPLASM OF CENTRAL PORTION OF RIGHT BREAST IN FEMALE, ESTROGEN RECEPTOR POSITIVE: ICD-10-CM

## 2019-05-08 DIAGNOSIS — F43.21 ADJUSTMENT DISORDER WITH DEPRESSED MOOD: ICD-10-CM

## 2019-05-08 DIAGNOSIS — C78.7 METASTASIS TO LIVER: ICD-10-CM

## 2019-05-08 DIAGNOSIS — Z00.6 EXAMINATION OF PARTICIPANT IN CLINICAL TRIAL: ICD-10-CM

## 2019-05-08 LAB
ALBUMIN SERPL BCP-MCNC: 3.8 G/DL (ref 3.5–5.2)
ALP SERPL-CCNC: 119 U/L (ref 55–135)
ALT SERPL W/O P-5'-P-CCNC: 13 U/L (ref 10–44)
ANION GAP SERPL CALC-SCNC: 8 MMOL/L (ref 8–16)
AST SERPL-CCNC: 18 U/L (ref 10–40)
BASOPHILS # BLD AUTO: 0.02 K/UL (ref 0–0.2)
BASOPHILS NFR BLD: 0.3 % (ref 0–1.9)
BILIRUB DIRECT SERPL-MCNC: 0.4 MG/DL (ref 0.1–0.3)
BILIRUB SERPL-MCNC: 1 MG/DL (ref 0.1–1)
BUN SERPL-MCNC: 19 MG/DL (ref 8–23)
CALCIUM SERPL-MCNC: 9.7 MG/DL (ref 8.7–10.5)
CHLORIDE SERPL-SCNC: 103 MMOL/L (ref 95–110)
CO2 SERPL-SCNC: 25 MMOL/L (ref 23–29)
CREAT SERPL-MCNC: 0.8 MG/DL (ref 0.5–1.4)
DIFFERENTIAL METHOD: ABNORMAL
EOSINOPHIL # BLD AUTO: 0.2 K/UL (ref 0–0.5)
EOSINOPHIL NFR BLD: 2.9 % (ref 0–8)
ERYTHROCYTE [DISTWIDTH] IN BLOOD BY AUTOMATED COUNT: 12.9 % (ref 11.5–14.5)
EST. GFR  (AFRICAN AMERICAN): >60 ML/MIN/1.73 M^2
EST. GFR  (NON AFRICAN AMERICAN): >60 ML/MIN/1.73 M^2
GLUCOSE SERPL-MCNC: 107 MG/DL (ref 70–110)
HCT VFR BLD AUTO: 42.8 % (ref 37–48.5)
HGB BLD-MCNC: 14.5 G/DL (ref 12–16)
IMM GRANULOCYTES # BLD AUTO: 0.01 K/UL (ref 0–0.04)
IMM GRANULOCYTES NFR BLD AUTO: 0.2 % (ref 0–0.5)
LDH SERPL L TO P-CCNC: 194 U/L (ref 110–260)
LYMPHOCYTES # BLD AUTO: 1.4 K/UL (ref 1–4.8)
LYMPHOCYTES NFR BLD: 24.1 % (ref 18–48)
MAGNESIUM SERPL-MCNC: 2 MG/DL (ref 1.6–2.6)
MCH RBC QN AUTO: 32.2 PG (ref 27–31)
MCHC RBC AUTO-ENTMCNC: 33.9 G/DL (ref 32–36)
MCV RBC AUTO: 95 FL (ref 82–98)
MONOCYTES # BLD AUTO: 0.6 K/UL (ref 0.3–1)
MONOCYTES NFR BLD: 10.6 % (ref 4–15)
NEUTROPHILS # BLD AUTO: 3.6 K/UL (ref 1.8–7.7)
NEUTROPHILS NFR BLD: 61.9 % (ref 38–73)
NRBC BLD-RTO: 0 /100 WBC
PHOSPHATE SERPL-MCNC: 4 MG/DL (ref 2.7–4.5)
PLATELET # BLD AUTO: 190 K/UL (ref 150–350)
PMV BLD AUTO: 9.8 FL (ref 9.2–12.9)
POTASSIUM SERPL-SCNC: 3.9 MMOL/L (ref 3.5–5.1)
PROT SERPL-MCNC: 6.9 G/DL (ref 6–8.4)
RBC # BLD AUTO: 4.5 M/UL (ref 4–5.4)
SODIUM SERPL-SCNC: 136 MMOL/L (ref 136–145)
WBC # BLD AUTO: 5.86 K/UL (ref 3.9–12.7)

## 2019-05-08 PROCEDURE — 36415 COLL VENOUS BLD VENIPUNCTURE: CPT | Mod: HCNC,Q1

## 2019-05-08 PROCEDURE — 99214 OFFICE O/P EST MOD 30 MIN: CPT | Mod: Q1,HCNC,S$GLB, | Performed by: INTERNAL MEDICINE

## 2019-05-08 PROCEDURE — 83735 ASSAY OF MAGNESIUM: CPT | Mod: HCNC,Q1

## 2019-05-08 PROCEDURE — 80053 COMPREHEN METABOLIC PANEL: CPT | Mod: HCNC

## 2019-05-08 PROCEDURE — 3008F PR BODY MASS INDEX (BMI) DOCUMENTED: ICD-10-PCS | Mod: HCNC,CPTII,S$GLB, | Performed by: INTERNAL MEDICINE

## 2019-05-08 PROCEDURE — 3008F BODY MASS INDEX DOCD: CPT | Mod: HCNC,CPTII,S$GLB, | Performed by: INTERNAL MEDICINE

## 2019-05-08 PROCEDURE — 83615 LACTATE (LD) (LDH) ENZYME: CPT | Mod: HCNC

## 2019-05-08 PROCEDURE — 99999 PR PBB SHADOW E&M-EST. PATIENT-LVL III: ICD-10-PCS | Mod: PBBFAC,HCNC,, | Performed by: INTERNAL MEDICINE

## 2019-05-08 PROCEDURE — 82248 BILIRUBIN DIRECT: CPT | Mod: HCNC,Q1

## 2019-05-08 PROCEDURE — 99999 PR PBB SHADOW E&M-EST. PATIENT-LVL III: CPT | Mod: PBBFAC,HCNC,, | Performed by: INTERNAL MEDICINE

## 2019-05-08 PROCEDURE — 84100 ASSAY OF PHOSPHORUS: CPT | Mod: HCNC,Q1

## 2019-05-08 PROCEDURE — 99214 PR OFFICE/OUTPT VISIT, EST, LEVL IV, 30-39 MIN: ICD-10-PCS | Mod: Q1,HCNC,S$GLB, | Performed by: INTERNAL MEDICINE

## 2019-05-08 PROCEDURE — 85025 COMPLETE CBC W/AUTO DIFF WBC: CPT | Mod: HCNC

## 2019-05-08 PROCEDURE — 86300 IMMUNOASSAY TUMOR CA 15-3: CPT | Mod: HCNC

## 2019-05-08 RX ORDER — SERTRALINE HYDROCHLORIDE 50 MG/1
50 TABLET, FILM COATED ORAL DAILY
Qty: 30 TABLET | Refills: 11 | Status: SHIPPED | OUTPATIENT
Start: 2019-05-08 | End: 2020-05-12

## 2019-05-08 NOTE — PROGRESS NOTES
Protocol: MATCH-Molecular Analysis for Therapy choice  Subprotocol K2: Phase 2 of [id number] (Erdafitinib) in Patients with Tumors with FGFR Mutations or Fusions  IRB# [id number]  Sponsor: [employer]  PI: Dr. High  Study #[id number]    Cycle 1 Day 1   5/8/19  Pt arrived this morning for Cycle 1 Day 1 of the above mentioned protocol. Pt is AAO x's 3 with appropriate and affect. Pt had local labs done this morning per protocol. Pt expresses willingness to continue to participate in the above mentioned protocol and is eager to start treatment today. Pt was seen and examined by Dr. Schroeder. Labs reviewed by Dr. Schroeder and ok for pt to begin treatment today. No new symptoms noted today. Pt said that she did not sleep too well last night as she was ready to start treatment.   Pt was dispensed a bottle of 30 pills each containing 4mg of Erdafitnib. Pt to take a total of 8mg daily (2 pills) for 14 days. Pt took first dose with research RN and time noted on pill diary. Pt was given pill diary, and I explained how to fill it out on a daily basis. Pt voiced understanding. Pt to RTC in 2 weeks for C1 D15 where her dose may be escalated to 9mg pending lab work and assessment. Pt voiced understanding. All of her questions were answered to her satisfaction by myself and Dr. Schroeder. Instructed pt to call me or Dr. Schroeder with any questions, concerns and any unusual symptoms. Pt has appts for Cycle 1 Day 15 and Cycle 2 Day 1.      SEE SHADOW CHART FOR MOST UPDATED AE LIST    AE's  None    Baseline History  Hypertension, Grade 2 (pt denies this)  Peripheral neuropathy, Grade 1  Cancer Pain, Grade 2  Depression  Seasonal Allergies  Anxiety  Asthma R/T seasonal allergies  Hypercholesteremia  Lymphedema  Osteoporosis  Hasimoto Disease  Dry Eyes, Grade 1

## 2019-05-09 LAB — CANCER AG27-29 SERPL-ACNC: 22.8 U/ML

## 2019-05-10 DIAGNOSIS — C50.111 MALIGNANT NEOPLASM OF CENTRAL PORTION OF RIGHT BREAST IN FEMALE, ESTROGEN RECEPTOR POSITIVE: ICD-10-CM

## 2019-05-10 DIAGNOSIS — Z17.0 MALIGNANT NEOPLASM OF CENTRAL PORTION OF RIGHT BREAST IN FEMALE, ESTROGEN RECEPTOR POSITIVE: ICD-10-CM

## 2019-05-21 NOTE — PROGRESS NOTES
Subjective:       Patient ID: Rebecca Crain is a 63 y.o. female.    Chief Complaint: No chief complaint on file.    HPI Ms. Crain is here for followup of metastatic carcinoma of the right breast.     She is currently on clinical trial therapy with erdafitinib.  Her major side effects thus far has been some dry mouth and some intermittent ulcers on her tongue.  She has to moisten her food to swallow but has no dysphagia.  The tongue ulcers come and go and are better at present.  She has had some increase in her constipation but has not been taking anything for that.  Her only pain is some back pain which occurred after she twisted are currently while repairing something at her house.  She has had no skin rash.  She has had no fever.  She did have 1 episode of nausea vomiting and diarrhea which lasted 1 evening and was associated with some chills-likely secondary to viral infection.      ECOG-1       Breast history: In 2013 she was diagnosed with stage IIB carcinoma of the right breast, ER positive with a low Oncotype score.  She was enrolled on protocol  and randomized to endocrine therapy alone.  She was tried on all 3 aromatase inhibitors and had itching and rash to all of them.  In August 2013 she was started on tamoxifen.   She was found to have  bone metastasis in May 2015, 2015.  A subsequent bone biopsy was performed on a lesion at the T8 vertebra.   The pathology from that was consistent with metastatic breast cancer, ER +80%, TN +80%, and HER-2 negative.      She received letrozole plus palbociclib from July 2015 until May 2016.  She also received bone protective therapy with Xgeva.      Faslodex was started in June 2016.      Exemestane and Afinitor started in August 2017.  That was discontinued in February 2018 due to progression in the liver and bone.     Navelbine was started February 16, 2018.  She had 8 cycles for that.  Scans in November 2018 showed progression as follows     Capecitabine  was started in December 2018.  She received 5 cycles of that therapy.    Follow-up scans in April showed the following: evidence for a new 1.5 cm hepatic hypodensity and some subtle changes that could represent additional metastasis.  Bones show evidence for worsening lytic bone disease in the spine and pelvis  Review of Systems   Constitutional: Negative for appetite change and unexpected weight change.   HENT: Positive for mouth sores.         Dry mouth   Eyes: Negative for visual disturbance.   Respiratory: Negative for cough and shortness of breath.    Cardiovascular: Negative for chest pain.   Gastrointestinal: Negative for abdominal pain and diarrhea.   Genitourinary: Negative for frequency.   Musculoskeletal: Positive for back pain.   Skin: Negative for rash.   Neurological: Negative for headaches.   Hematological: Negative for adenopathy.   Psychiatric/Behavioral: The patient is not nervous/anxious.        Objective:      Physical Exam   Constitutional: She is oriented to person, place, and time. She appears well-developed and well-nourished. No distress.   HENT:   Mouth/Throat: Oropharynx is clear and moist. No oropharyngeal exudate.   Eyes: No scleral icterus.   Cardiovascular: Normal rate and regular rhythm.   Pulmonary/Chest: Effort normal and breath sounds normal. She has no wheezes. She has no rales.       Abdominal: Soft. She exhibits no mass. There is no tenderness.   Musculoskeletal: She exhibits no edema.   Lymphadenopathy:     She has no cervical adenopathy.   Neurological: She is alert and oriented to person, place, and time.   Skin: No rash noted.   Psychiatric: She has a normal mood and affect. Her behavior is normal. Thought content normal.   Vitals reviewed.      Assessment:      labs-CBC is normal, metabolic profile shows normal renal function, hepatic function normal other than alkaline phosphatase 150, glucose 118  Phosphorus 7  1. Malignant neoplasm of central portion of right breast in  female, estrogen receptor positive    2. Bone metastasis        Plan:       Plan to start sevelamer 800 3 times a day.  Recheck labs in 1 week.  Hold study medication until phosphorus normalizes.  Return to clinic in 2 weeks.

## 2019-05-22 ENCOUNTER — OFFICE VISIT (OUTPATIENT)
Dept: HEMATOLOGY/ONCOLOGY | Facility: CLINIC | Age: 64
End: 2019-05-22
Payer: MEDICARE

## 2019-05-22 ENCOUNTER — INFUSION (OUTPATIENT)
Dept: INFUSION THERAPY | Facility: HOSPITAL | Age: 64
End: 2019-05-22
Payer: MEDICARE

## 2019-05-22 ENCOUNTER — RESEARCH ENCOUNTER (OUTPATIENT)
Dept: RESEARCH | Facility: HOSPITAL | Age: 64
End: 2019-05-22

## 2019-05-22 VITALS
WEIGHT: 166.25 LBS | HEART RATE: 80 BPM | OXYGEN SATURATION: 96 % | BODY MASS INDEX: 28.38 KG/M2 | HEIGHT: 64 IN | RESPIRATION RATE: 20 BRPM | DIASTOLIC BLOOD PRESSURE: 78 MMHG | TEMPERATURE: 98 F | SYSTOLIC BLOOD PRESSURE: 166 MMHG

## 2019-05-22 DIAGNOSIS — C79.51 BONE METASTASIS: ICD-10-CM

## 2019-05-22 DIAGNOSIS — E83.39 HYPERPHOSPHATEMIA: ICD-10-CM

## 2019-05-22 DIAGNOSIS — Z51.11 ENCOUNTER FOR ANTINEOPLASTIC CHEMOTHERAPY: Primary | ICD-10-CM

## 2019-05-22 DIAGNOSIS — Z17.0 MALIGNANT NEOPLASM OF CENTRAL PORTION OF RIGHT BREAST IN FEMALE, ESTROGEN RECEPTOR POSITIVE: Primary | ICD-10-CM

## 2019-05-22 DIAGNOSIS — C50.919 MALIGNANT NEOPLASM OF BREAST, STAGE 4, UNSPECIFIED LATERALITY: ICD-10-CM

## 2019-05-22 DIAGNOSIS — C50.111 MALIGNANT NEOPLASM OF CENTRAL PORTION OF RIGHT BREAST IN FEMALE, ESTROGEN RECEPTOR POSITIVE: Primary | ICD-10-CM

## 2019-05-22 PROCEDURE — 3008F PR BODY MASS INDEX (BMI) DOCUMENTED: ICD-10-PCS | Mod: HCNC,CPTII,S$GLB, | Performed by: INTERNAL MEDICINE

## 2019-05-22 PROCEDURE — 3008F BODY MASS INDEX DOCD: CPT | Mod: HCNC,CPTII,S$GLB, | Performed by: INTERNAL MEDICINE

## 2019-05-22 PROCEDURE — 99999 PR PBB SHADOW E&M-EST. PATIENT-LVL III: CPT | Mod: PBBFAC,HCNC,, | Performed by: INTERNAL MEDICINE

## 2019-05-22 PROCEDURE — 99214 OFFICE O/P EST MOD 30 MIN: CPT | Mod: HCNC,S$GLB,, | Performed by: INTERNAL MEDICINE

## 2019-05-22 PROCEDURE — 3078F DIAST BP <80 MM HG: CPT | Mod: HCNC,CPTII,S$GLB, | Performed by: INTERNAL MEDICINE

## 2019-05-22 PROCEDURE — A4216 STERILE WATER/SALINE, 10 ML: HCPCS | Mod: HCNC | Performed by: INTERNAL MEDICINE

## 2019-05-22 PROCEDURE — 25000003 PHARM REV CODE 250: Mod: HCNC | Performed by: INTERNAL MEDICINE

## 2019-05-22 PROCEDURE — 99214 PR OFFICE/OUTPT VISIT, EST, LEVL IV, 30-39 MIN: ICD-10-PCS | Mod: HCNC,S$GLB,, | Performed by: INTERNAL MEDICINE

## 2019-05-22 PROCEDURE — 63600175 PHARM REV CODE 636 W HCPCS: Mod: HCNC | Performed by: INTERNAL MEDICINE

## 2019-05-22 PROCEDURE — 99999 PR PBB SHADOW E&M-EST. PATIENT-LVL III: ICD-10-PCS | Mod: PBBFAC,HCNC,, | Performed by: INTERNAL MEDICINE

## 2019-05-22 PROCEDURE — 96523 IRRIG DRUG DELIVERY DEVICE: CPT | Mod: HCNC

## 2019-05-22 PROCEDURE — 3077F SYST BP >= 140 MM HG: CPT | Mod: HCNC,CPTII,S$GLB, | Performed by: INTERNAL MEDICINE

## 2019-05-22 PROCEDURE — 3078F PR MOST RECENT DIASTOLIC BLOOD PRESSURE < 80 MM HG: ICD-10-PCS | Mod: HCNC,CPTII,S$GLB, | Performed by: INTERNAL MEDICINE

## 2019-05-22 PROCEDURE — 3077F PR MOST RECENT SYSTOLIC BLOOD PRESSURE >= 140 MM HG: ICD-10-PCS | Mod: HCNC,CPTII,S$GLB, | Performed by: INTERNAL MEDICINE

## 2019-05-22 RX ORDER — HEPARIN 100 UNIT/ML
500 SYRINGE INTRAVENOUS
Status: COMPLETED | OUTPATIENT
Start: 2019-05-22 | End: 2019-05-22

## 2019-05-22 RX ORDER — SODIUM CHLORIDE 0.9 % (FLUSH) 0.9 %
10 SYRINGE (ML) INJECTION
Status: COMPLETED | OUTPATIENT
Start: 2019-05-22 | End: 2019-05-22

## 2019-05-22 RX ORDER — HEPARIN 100 UNIT/ML
500 SYRINGE INTRAVENOUS
Status: CANCELLED | OUTPATIENT
Start: 2019-05-22

## 2019-05-22 RX ORDER — SEVELAMER CARBONATE 800 MG/1
800 TABLET, FILM COATED ORAL
Qty: 30 TABLET | Refills: 11 | Status: SHIPPED | OUTPATIENT
Start: 2019-05-22 | End: 2019-06-06 | Stop reason: SDUPTHER

## 2019-05-22 RX ORDER — SODIUM CHLORIDE 0.9 % (FLUSH) 0.9 %
10 SYRINGE (ML) INJECTION
Status: CANCELLED | OUTPATIENT
Start: 2019-05-22

## 2019-05-22 RX ADMIN — Medication 10 ML: at 08:05

## 2019-05-22 RX ADMIN — HEPARIN 500 UNITS: 100 SYRINGE at 08:05

## 2019-05-23 DIAGNOSIS — Z00.6 EXAMINATION OF PARTICIPANT IN CLINICAL TRIAL: ICD-10-CM

## 2019-05-23 DIAGNOSIS — C50.919 BREAST CANCER IN FEMALE: Primary | ICD-10-CM

## 2019-05-23 NOTE — PROGRESS NOTES
Protocol: MATCH-Molecular Analysis for Therapy choice  Subprotocol K2: Phase 2 of [id number] (Erdafitinib) in Patients with Tumors with FGFR Mutations or Fusions  IRB# [id number]  Sponsor: [employer]  PI: Dr. High  Study #[id number]    Cycle 1 Day 15   5/22/19  Pt arrived this morning for Cycle 1 Day 15 of the above mentioned protocol. Pt is AAO x's 3 with appropriate and affect. Pt had local labs done this morning per protocol. Pt expresses willingness to continue to participate in the above mentioned protocol. Pt was seen and examined by Dr. Schroeder. Pt complains of having dry mouth and ulcers on her tongue which come and go. Pt's labs also show a phosphorus level of 7 today. Pt's dose of Erdafitnib will not be escalated to the 9mg dose. Pt will actually hold med for 1 week and repeat labs in 1 week. Pt will bring in bottle next week when she comes for her labs. Pt to start Sevelamer 800mg 3 times per day to lower phosphorus level.  Per pill diary, pt only missed one day of medicine.  Pt to RTC in 1 week for repeat labs and RTC in 2 weeks to see Dr. Schroeder and labs for possible Cycle 2 Day.  Pt voiced understanding. All of her questions were answered to her satisfaction by myself and Dr. Schroeder. Instructed pt to call me or Dr. Schroeder with any questions, concerns and any unusual symptoms. Pt has appts for labs in 1 week and Cycle 2 Day 1.      SEE SHADOW CHART FOR MOST UPDATED AE LIST    AE's  Dry Mouth, Grade 1:  Mucositis oral, Grade 1: ulcers on tongue that come and go.  Constipation, Grade 1: Pt to try stool softeners.  Back Pain, Grade 1: pt pulled doing housework  Nausea, Grade 1: times 1 episode  Vomiting, Grade 1: times 1 episode  Diarrhea, Grade 1: times 1 episode  Hyperphosphatemia, Grade 2: Level at 7. Pt to hold study med and start Sevelamer.   Alkaline phosphatase, Grade 1:       Baseline History  Hypertension, Grade 2 (pt denies this)  Peripheral neuropathy, Grade 1  Cancer Pain, Grade 2  Depression  Seasonal  Allergies  Anxiety  Asthma R/T seasonal allergies  Hypercholesteremia  Lymphedema  Osteoporosis  Hasimoto Disease  Dry Eyes, Grade 1

## 2019-05-29 ENCOUNTER — LAB VISIT (OUTPATIENT)
Dept: LAB | Facility: HOSPITAL | Age: 64
End: 2019-05-29
Payer: MEDICARE

## 2019-05-29 ENCOUNTER — RESEARCH ENCOUNTER (OUTPATIENT)
Dept: RESEARCH | Facility: HOSPITAL | Age: 64
End: 2019-05-29

## 2019-05-29 DIAGNOSIS — Z00.6 EXAMINATION OF PARTICIPANT IN CLINICAL TRIAL: ICD-10-CM

## 2019-05-29 DIAGNOSIS — C50.919 BREAST CANCER: Primary | ICD-10-CM

## 2019-05-29 DIAGNOSIS — C50.919 BREAST CANCER: ICD-10-CM

## 2019-05-29 DIAGNOSIS — C50.919 MALIGNANT NEOPLASM OF BREAST, STAGE 4, UNSPECIFIED LATERALITY: ICD-10-CM

## 2019-05-29 LAB — PHOSPHATE SERPL-MCNC: 3.5 MG/DL (ref 2.7–4.5)

## 2019-05-29 PROCEDURE — 84100 ASSAY OF PHOSPHORUS: CPT | Mod: HCNC

## 2019-05-29 PROCEDURE — 36415 COLL VENOUS BLD VENIPUNCTURE: CPT | Mod: HCNC

## 2019-05-29 NOTE — PROGRESS NOTES
Protocol: MATCH-Molecular Analysis for Therapy choice  Subprotocol K2: Phase 2 of [id number] (Erdafitinib) in Patients with Tumors with FGFR Mutations or Fusions  IRB# [id number]  Sponsor: ODILIA  PI: Dr. High  Study #[id number]    Cycle 1 Day 21  5/29/19  Pt arrived this morning for Cycle 1 Day 21 of the above mentioned protocol. Pt is AAO x's 3 with appropriate and affect. Pt had local labs done this morning to check phosphorus level.  Pt expresses willingness to continue to participate in the above mentioned protocol. Pt's phosphorus level normal today at 3.5. Pt to stop Sevelamer per protocol. Pt to resume 8mg of Erdafitnib daily.   Per pill diary, pt only missed one day of medicine. Per bottle count, pt missed 2 doses of drug.  Pt took today's dose from original bottle. Bottle returned to [facility] pharmacy with 4 pills. Pt dispensed new bottle with 30 pills. Pt's only new complaint was that yesterday she felt pain all over.  She took 1 Alleve which she says did help, and she feels much better today..  Only new  Pt to  RTC in 1 weeks to see Dr. Schroeder and labs for possible Cycle 2 Day.  Pt voiced understanding. All of her questions were answered to her satisfaction by myself. Instructed pt to call me or Dr. Schroeder with any questions, concerns and any unusual symptoms.   Pt has appts for Cycle 2 Day 1.      SEE SHADOW CHART FOR MOST UPDATED AE LIST    AE's  Dry Mouth, Grade 1:  Mucositis oral, Grade 1: ulcers on tongue that come and go.  Constipation, Grade 1: Pt to try stool softeners.  Back Pain, Grade 1: pt pulled doing housework  Alkaline phosphatase, Grade 1:   Pain, Grade 2: all over, took Alleve which helped.    Baseline History  Hypertension, Grade 2 (pt denies this)  Peripheral neuropathy, Grade 1  Cancer Pain, Grade 2  Depression  Seasonal Allergies  Anxiety  Asthma R/T seasonal allergies  Hypercholesteremia  Lymphedema  Osteoporosis  Hasimoto Disease  Dry Eyes, Grade 1

## 2019-06-05 ENCOUNTER — OFFICE VISIT (OUTPATIENT)
Dept: HEMATOLOGY/ONCOLOGY | Facility: CLINIC | Age: 64
End: 2019-06-05
Payer: MEDICARE

## 2019-06-05 ENCOUNTER — PATIENT MESSAGE (OUTPATIENT)
Dept: HEMATOLOGY/ONCOLOGY | Facility: CLINIC | Age: 64
End: 2019-06-05

## 2019-06-05 ENCOUNTER — LAB VISIT (OUTPATIENT)
Dept: LAB | Facility: HOSPITAL | Age: 64
End: 2019-06-05
Payer: MEDICARE

## 2019-06-05 ENCOUNTER — RESEARCH ENCOUNTER (OUTPATIENT)
Dept: RESEARCH | Facility: HOSPITAL | Age: 64
End: 2019-06-05

## 2019-06-05 VITALS
OXYGEN SATURATION: 95 % | WEIGHT: 167.56 LBS | RESPIRATION RATE: 20 BRPM | TEMPERATURE: 97 F | SYSTOLIC BLOOD PRESSURE: 165 MMHG | HEIGHT: 64 IN | DIASTOLIC BLOOD PRESSURE: 94 MMHG | BODY MASS INDEX: 28.6 KG/M2 | HEART RATE: 89 BPM

## 2019-06-05 DIAGNOSIS — K12.30 MUCOSITIS: Primary | ICD-10-CM

## 2019-06-05 DIAGNOSIS — Z17.0 MALIGNANT NEOPLASM OF CENTRAL PORTION OF RIGHT BREAST IN FEMALE, ESTROGEN RECEPTOR POSITIVE: ICD-10-CM

## 2019-06-05 DIAGNOSIS — C50.111 MALIGNANT NEOPLASM OF CENTRAL PORTION OF RIGHT BREAST IN FEMALE, ESTROGEN RECEPTOR POSITIVE: ICD-10-CM

## 2019-06-05 DIAGNOSIS — C78.7 METASTASIS TO LIVER: ICD-10-CM

## 2019-06-05 DIAGNOSIS — Z00.6 EXAMINATION OF PARTICIPANT IN CLINICAL TRIAL: ICD-10-CM

## 2019-06-05 DIAGNOSIS — C50.919 BREAST CANCER: ICD-10-CM

## 2019-06-05 DIAGNOSIS — C79.51 BONE METASTASIS: ICD-10-CM

## 2019-06-05 DIAGNOSIS — C50.111 MALIGNANT NEOPLASM OF CENTRAL PORTION OF RIGHT BREAST IN FEMALE, ESTROGEN RECEPTOR POSITIVE: Primary | ICD-10-CM

## 2019-06-05 DIAGNOSIS — Z17.0 MALIGNANT NEOPLASM OF CENTRAL PORTION OF RIGHT BREAST IN FEMALE, ESTROGEN RECEPTOR POSITIVE: Primary | ICD-10-CM

## 2019-06-05 LAB
ALBUMIN SERPL BCP-MCNC: 3.3 G/DL (ref 3.5–5.2)
ALP SERPL-CCNC: 133 U/L (ref 55–135)
ALT SERPL W/O P-5'-P-CCNC: 16 U/L (ref 10–44)
ANION GAP SERPL CALC-SCNC: 9 MMOL/L (ref 8–16)
AST SERPL-CCNC: 18 U/L (ref 10–40)
BASOPHILS # BLD AUTO: 0.02 K/UL (ref 0–0.2)
BASOPHILS NFR BLD: 0.4 % (ref 0–1.9)
BILIRUB DIRECT SERPL-MCNC: 0.3 MG/DL (ref 0.1–0.3)
BILIRUB SERPL-MCNC: 0.7 MG/DL (ref 0.1–1)
BUN SERPL-MCNC: 16 MG/DL (ref 8–23)
CALCIUM SERPL-MCNC: 9.8 MG/DL (ref 8.7–10.5)
CHLORIDE SERPL-SCNC: 106 MMOL/L (ref 95–110)
CO2 SERPL-SCNC: 25 MMOL/L (ref 23–29)
CREAT SERPL-MCNC: 0.9 MG/DL (ref 0.5–1.4)
DIFFERENTIAL METHOD: NORMAL
EOSINOPHIL # BLD AUTO: 0.2 K/UL (ref 0–0.5)
EOSINOPHIL NFR BLD: 4.6 % (ref 0–8)
ERYTHROCYTE [DISTWIDTH] IN BLOOD BY AUTOMATED COUNT: 12.1 % (ref 11.5–14.5)
EST. GFR  (AFRICAN AMERICAN): >60 ML/MIN/1.73 M^2
EST. GFR  (NON AFRICAN AMERICAN): >60 ML/MIN/1.73 M^2
GLUCOSE SERPL-MCNC: 117 MG/DL (ref 70–110)
HCT VFR BLD AUTO: 41.6 % (ref 37–48.5)
HGB BLD-MCNC: 13.8 G/DL (ref 12–16)
IMM GRANULOCYTES # BLD AUTO: 0.01 K/UL (ref 0–0.04)
IMM GRANULOCYTES NFR BLD AUTO: 0.2 % (ref 0–0.5)
LDH SERPL L TO P-CCNC: 160 U/L (ref 110–260)
LYMPHOCYTES # BLD AUTO: 1.2 K/UL (ref 1–4.8)
LYMPHOCYTES NFR BLD: 25.3 % (ref 18–48)
MAGNESIUM SERPL-MCNC: 2.1 MG/DL (ref 1.6–2.6)
MCH RBC QN AUTO: 30.8 PG (ref 27–31)
MCHC RBC AUTO-ENTMCNC: 33.2 G/DL (ref 32–36)
MCV RBC AUTO: 93 FL (ref 82–98)
MONOCYTES # BLD AUTO: 0.4 K/UL (ref 0.3–1)
MONOCYTES NFR BLD: 8.6 % (ref 4–15)
NEUTROPHILS # BLD AUTO: 2.9 K/UL (ref 1.8–7.7)
NEUTROPHILS NFR BLD: 60.9 % (ref 38–73)
NRBC BLD-RTO: 0 /100 WBC
PHOSPHATE SERPL-MCNC: 5.9 MG/DL (ref 2.7–4.5)
PLATELET # BLD AUTO: 195 K/UL (ref 150–350)
PMV BLD AUTO: 9.3 FL (ref 9.2–12.9)
POTASSIUM SERPL-SCNC: 4.2 MMOL/L (ref 3.5–5.1)
PROT SERPL-MCNC: 6.6 G/DL (ref 6–8.4)
RBC # BLD AUTO: 4.48 M/UL (ref 4–5.4)
SODIUM SERPL-SCNC: 140 MMOL/L (ref 136–145)
WBC # BLD AUTO: 4.78 K/UL (ref 3.9–12.7)

## 2019-06-05 PROCEDURE — 99999 PR PBB SHADOW E&M-EST. PATIENT-LVL III: CPT | Mod: PBBFAC,HCNC,, | Performed by: INTERNAL MEDICINE

## 2019-06-05 PROCEDURE — 99214 OFFICE O/P EST MOD 30 MIN: CPT | Mod: Q1,HCNC,S$GLB, | Performed by: INTERNAL MEDICINE

## 2019-06-05 PROCEDURE — 80053 COMPREHEN METABOLIC PANEL: CPT | Mod: HCNC,Q1

## 2019-06-05 PROCEDURE — 84100 ASSAY OF PHOSPHORUS: CPT | Mod: HCNC

## 2019-06-05 PROCEDURE — 85025 COMPLETE CBC W/AUTO DIFF WBC: CPT | Mod: HCNC,Q1

## 2019-06-05 PROCEDURE — 3008F BODY MASS INDEX DOCD: CPT | Mod: HCNC,CPTII,S$GLB, | Performed by: INTERNAL MEDICINE

## 2019-06-05 PROCEDURE — 83735 ASSAY OF MAGNESIUM: CPT | Mod: HCNC,Q1

## 2019-06-05 PROCEDURE — 82248 BILIRUBIN DIRECT: CPT | Mod: HCNC,Q1

## 2019-06-05 PROCEDURE — 3008F PR BODY MASS INDEX (BMI) DOCUMENTED: ICD-10-PCS | Mod: HCNC,CPTII,S$GLB, | Performed by: INTERNAL MEDICINE

## 2019-06-05 PROCEDURE — 36415 COLL VENOUS BLD VENIPUNCTURE: CPT | Mod: HCNC,Q1

## 2019-06-05 PROCEDURE — 99999 PR PBB SHADOW E&M-EST. PATIENT-LVL III: ICD-10-PCS | Mod: PBBFAC,HCNC,, | Performed by: INTERNAL MEDICINE

## 2019-06-05 PROCEDURE — 83615 LACTATE (LD) (LDH) ENZYME: CPT | Mod: HCNC,Q1

## 2019-06-05 PROCEDURE — 99214 PR OFFICE/OUTPT VISIT, EST, LEVL IV, 30-39 MIN: ICD-10-PCS | Mod: Q1,HCNC,S$GLB, | Performed by: INTERNAL MEDICINE

## 2019-06-05 PROCEDURE — 86300 IMMUNOASSAY TUMOR CA 15-3: CPT | Mod: HCNC

## 2019-06-05 NOTE — PROGRESS NOTES
Subjective:       Patient ID: Rebecca Crain is a 63 y.o. female.    Chief Complaint: No chief complaint on file.    HPI  Ms. Crain is here for followup of metastatic carcinoma of the right breast.     She is currently on clinical trial therapy with erdafitinib.  She begin that therapy on May 8th.  She has been tolerating that very well. Today she reports that her pain control is good.  Her taste is off a bit in that the intensity of taste has decreased.  Her appetite is good.  Bowels have been variable.  She had 5 days of constipation and then 2 days of diarrhea.  She has not had a bowel movement over the last 2 days.  She has had no cough or shortness of breath.  Her energy level has improved and now is 6 to 7/10.  ECOG-1  Her vision has improved both distant and near vision.  She has no skin rash or pruritus.  She has no dysuria or urinary symptoms.         Breast history: In 2013 she was diagnosed with stage IIB carcinoma of the right breast, ER positive with a low Oncotype score.  She was enrolled on protocol  and randomized to endocrine therapy alone.  She was tried on all 3 aromatase inhibitors and had itching and rash to all of them.  In August 2013 she was started on tamoxifen.   She was found to have  bone metastasis in May 2015, 2015.  A subsequent bone biopsy was performed on a lesion at the T8 vertebra.   The pathology from that was consistent with metastatic breast cancer, ER +80%, AR +80%, and HER-2 negative.      She received letrozole plus palbociclib from July 2015 until May 2016.  She also received bone protective therapy with Xgeva.      Faslodex was started in June 2016.      Exemestane and Afinitor started in August 2017.  That was discontinued in February 2018 due to progression in the liver and bone.     Navelbine was started February 16, 2018.  She had 8 cycles for that.  Scans in November 2018 showed progression as follows     Capecitabine was started in December 2018.  She  received 5 cycles of that therapy.    Follow-up scans in April showed the following: evidence for a new 1.5 cm hepatic hypodensity and some subtle changes that could represent additional metastasis.  Bones showed evidence for worsening lytic bone disease in the spine and pelvis.      Review of Systems   Constitutional: Negative for appetite change and unexpected weight change.   Eyes: Positive for visual disturbance.   Respiratory: Negative for cough and shortness of breath.    Cardiovascular: Negative for chest pain.   Gastrointestinal: Negative for abdominal pain and diarrhea.   Genitourinary: Negative for frequency.   Musculoskeletal: Negative for back pain.   Skin: Negative for rash.   Neurological: Negative for headaches.   Hematological: Negative for adenopathy.   Psychiatric/Behavioral: The patient is not nervous/anxious.        Objective:      Physical Exam   Constitutional: She is oriented to person, place, and time. She appears well-developed and well-nourished. No distress.   HENT:   Mouth/Throat: No oropharyngeal exudate.   Eyes: No scleral icterus.   Cardiovascular: Normal rate and regular rhythm.   Pulmonary/Chest: Effort normal and breath sounds normal.       Abdominal: Soft. She exhibits no mass. There is no tenderness.   Lymphadenopathy:     She has no cervical adenopathy.   Neurological: She is alert and oriented to person, place, and time.   Skin: No rash noted.   Psychiatric: She has a normal mood and affect. Her behavior is normal. Thought content normal.   Vitals reviewed.      Assessment:     CBC is normal, metabolic profile shows normal hepatic and renal function, phosphorus 5.9  1. Malignant neoplasm of central portion of right breast in female, estrogen receptor positive    2. Bone metastasis    3. Metastasis to liver        Plan:       Continue current therapy.    restart Selvemer, recheck phosphorus in 2 weeks.  Return to clinic in 1 month.

## 2019-06-05 NOTE — PROGRESS NOTES
Protocol: MATCH-Molecular Analysis for Therapy choice  Subprotocol K2: Phase 2 of [id number] (Erdafitinib) in Patients with Tumors with FGFR Mutations or Fusions  IRB# [id number]  Sponsor: ODILIA  PI: Dr. High  Study #[id number]    Cycle 2 Day 1  6/5/19  Pt arrived this morning for Cycle 2 Day 1 of the above mentioned protocol. Pt is AAO x's 3 with appropriate and affect. Pt had local labs done this morning as per protocol.  Pt expresses willingness to continue to participate in the above mentioned protocol. Pt was seen and examined by Dr. Schroeder. Labs also reviewed by Dr. Schroeder. Pt was administered Amsler Grid test and no abnormalities were noted. Pt does not need an eye examination today. EKG also not needed as protocol states if clinically indicated.  Pt states that she continues to have dry mouth, but it does not interfere with her eating. Pt continues to have one sore on her tongue which she says does not interfere with her eating habits. Pt has slight redness to palms of hands and feet. Only one finger noted to be peeling. Pt also has some burning to feet but does not interfere with her walking and ADL's.  Pt states that she is actually feeling better. She is less fatigue and able to start cooking again. Phos level noted to be 5.9 today.  Pt to resume 8mg of Erdafitnib daily and Sevelamer 800mg 3 times daily.   Per pill diary and pill count, pt took correct number of pills daily. Today's dose taken from bottles dispensed today. Pt dispensed 60 pills for a 1 month supply. [facility].  Pt to RTC in 2 weeks to have labs, and in 4 weeks for labs, scans, and to see Dr. Schroeder.  Pt voiced understanding. All of her questions were answered to her satisfaction by myself and Dr. Schroeder. Instructed pt to call me or Dr. Schroeder with any questions, concerns and any unusual symptoms.   Pt has appts for labs in 2 weeks and Cycle 3 day 1.     SEE SHADOW CHART FOR MOST UPDATED AE  LIST    AE's  Dry Mouth, Grade 1:  Mucositis oral, Grade 1: ulcer on tongue that come and go.  Constipation, Grade 1: Pt taking Colace  Hyperphosphatemia, Grade 2: level 5.9 today. Pt to restart Sevelamer.   Diarrhea, Grade 1: pt states lasted 12 hours.   Palmar-plantar erythrodysesthesia syndrome, Grade 1:    Baseline History  Hypertension, Grade 2 (pt denies this)  Peripheral neuropathy, Grade 1  Cancer Pain, Grade 2  Depression  Seasonal Allergies  Anxiety  Asthma R/T seasonal allergies  Hypercholesteremia  Lymphedema  Osteoporosis  Hasimoto Disease  Dry Eyes, Grade 1

## 2019-06-06 ENCOUNTER — PATIENT MESSAGE (OUTPATIENT)
Dept: HEMATOLOGY/ONCOLOGY | Facility: CLINIC | Age: 64
End: 2019-06-06

## 2019-06-06 DIAGNOSIS — E83.39 HYPERPHOSPHATEMIA: ICD-10-CM

## 2019-06-06 DIAGNOSIS — Z17.0 MALIGNANT NEOPLASM OF CENTRAL PORTION OF RIGHT BREAST IN FEMALE, ESTROGEN RECEPTOR POSITIVE: ICD-10-CM

## 2019-06-06 DIAGNOSIS — C50.111 MALIGNANT NEOPLASM OF CENTRAL PORTION OF RIGHT BREAST IN FEMALE, ESTROGEN RECEPTOR POSITIVE: ICD-10-CM

## 2019-06-06 RX ORDER — SEVELAMER CARBONATE 800 MG/1
800 TABLET, FILM COATED ORAL
Qty: 270 TABLET | Refills: 3 | Status: SHIPPED | OUTPATIENT
Start: 2019-06-06 | End: 2019-06-28 | Stop reason: SDUPTHER

## 2019-06-07 LAB — CANCER AG27-29 SERPL-ACNC: 18 U/ML

## 2019-06-07 NOTE — TELEPHONE ENCOUNTER
Contacted patient to clarify that would mail original copy of completed documented.   Verified patient address on chart, correct.   She verbally confirmed.   Letter mailed. She expressed gratitude.

## 2019-06-11 ENCOUNTER — PATIENT MESSAGE (OUTPATIENT)
Dept: HEMATOLOGY/ONCOLOGY | Facility: CLINIC | Age: 64
End: 2019-06-11

## 2019-06-11 DIAGNOSIS — K12.30 MUCOSITIS: ICD-10-CM

## 2019-06-19 ENCOUNTER — TELEPHONE (OUTPATIENT)
Dept: RESEARCH | Facility: HOSPITAL | Age: 64
End: 2019-06-19

## 2019-06-19 ENCOUNTER — LAB VISIT (OUTPATIENT)
Dept: LAB | Facility: HOSPITAL | Age: 64
End: 2019-06-19
Attending: INTERNAL MEDICINE
Payer: MEDICARE

## 2019-06-19 DIAGNOSIS — Z00.6 EXAMINATION OF PARTICIPANT IN CLINICAL TRIAL: ICD-10-CM

## 2019-06-19 DIAGNOSIS — C50.919 BREAST CANCER: ICD-10-CM

## 2019-06-19 LAB — PHOSPHATE SERPL-MCNC: 5.9 MG/DL (ref 2.7–4.5)

## 2019-06-19 PROCEDURE — 84100 ASSAY OF PHOSPHORUS: CPT | Mod: HCNC

## 2019-06-19 PROCEDURE — 36415 COLL VENOUS BLD VENIPUNCTURE: CPT | Mod: HCNC

## 2019-06-19 NOTE — TELEPHONE ENCOUNTER
Called pt with phos level. Level today remains at 5.9. Pt to remain on current dose of Erdafitinib and continue the Sevelamer at 800mg 3 times per day. Pt voiced understanding.

## 2019-06-28 ENCOUNTER — TELEPHONE (OUTPATIENT)
Dept: HEMATOLOGY/ONCOLOGY | Facility: CLINIC | Age: 64
End: 2019-06-28

## 2019-06-28 DIAGNOSIS — E83.39 HYPERPHOSPHATEMIA: ICD-10-CM

## 2019-06-28 RX ORDER — SEVELAMER CARBONATE 800 MG/1
800 TABLET, FILM COATED ORAL
Qty: 270 TABLET | Refills: 3 | Status: SHIPPED | OUTPATIENT
Start: 2019-06-28 | End: 2019-10-31

## 2019-06-28 NOTE — TELEPHONE ENCOUNTER
Returned call to Cyndi to her know we received the fax and are faxing the requested paperwork back to them.       ----- Message from Meenu Tellez sent at 6/28/2019  9:33 AM CDT -----  Contact: [place] called to speak with nurse about a medical  records request faxed received   Callback#[phone]  Thank You  FAVIO Tellez

## 2019-07-02 NOTE — PROGRESS NOTES
Subjective:       Patient ID: Rebecca Crain is a 63 y.o. female.    Chief Complaint: No chief complaint on file.    HPI Ms. Crain is here for followup of metastatic carcinoma of the right breast.     She is currently on clinical trial therapy with erdafitinib.  She begin that therapy on May 8th.    She has had some mucositis especially in her lower lip and under her tongue and occasionally in her palate.  Those ulcers fluctuate.  She is able to eat soft foods.  She has had some mild erythema of her palms and soles.  She has no shortness of breath.  Appetite been good.  She does have some chronic constipation which is unchanged.  ECOG-1    Breast history: In 2013 she was diagnosed with stage IIB carcinoma of the right breast, ER positive with a low Oncotype score.  She was enrolled on protocol  and randomized to endocrine therapy alone.  She was tried on all 3 aromatase inhibitors and had itching and rash to all of them.  In August 2013 she was started on tamoxifen.   She was found to have  bone metastasis in May 2015, 2015.  A subsequent bone biopsy was performed on a lesion at the T8 vertebra.   The pathology from that was consistent with metastatic breast cancer, ER +80%, NE +80%, and HER-2 negative.      She received letrozole plus palbociclib from July 2015 until May 2016.  She also received bone protective therapy with Xgeva.      Faslodex was started in June 2016.      Exemestane and Afinitor started in August 2017.  That was discontinued in February 2018 due to progression in the liver and bone.     Navelbine was started February 16, 2018.  She had 8 cycles for that.  Scans in November 2018 showed progression as follows     Capecitabine was started in December 2018.  She received 5 cycles of that therapy.    Follow-up scans in April showed the following: evidence for a new 1.5 cm hepatic hypodensity and some subtle changes that was felt to possibly represent additional metastasis.  Bones showed  evidence for worsening lytic bone disease in the spine and pelvis.  Review of Systems   Constitutional: Negative for appetite change and unexpected weight change.   HENT: Positive for mouth sores. Negative for trouble swallowing.    Eyes: Negative for visual disturbance.   Respiratory: Negative for cough and shortness of breath.    Cardiovascular: Negative for chest pain.   Gastrointestinal: Negative for abdominal pain and diarrhea.   Genitourinary: Negative for frequency.   Musculoskeletal: Negative for back pain.   Skin: Negative for rash.        Erythema of hands and feet   Neurological: Negative for headaches.   Hematological: Negative for adenopathy.   Psychiatric/Behavioral: The patient is not nervous/anxious.        Objective:      Physical Exam   Constitutional: She is oriented to person, place, and time. She appears well-developed and well-nourished. No distress.   HENT:   Several shallow ulcers in the lower lip.   Eyes: No scleral icterus.   Cardiovascular: Normal rate and regular rhythm.   Pulmonary/Chest: Effort normal and breath sounds normal.   Abdominal: Soft. Bowel sounds are normal. She exhibits no mass.   Lymphadenopathy:     She has no cervical adenopathy.   Neurological: She is alert and oriented to person, place, and time.   Psychiatric: She has a normal mood and affect. Her behavior is normal. Thought content normal.   Vitals reviewed.      Assessment:     CT shows stable liver lesions and some improvement in bone lesions.  Metabolic profile is remarkable for phosphorus of 5.8.  1. Malignant neoplasm of central portion of right breast in female, estrogen receptor positive    2. Bone metastasis      3.  Grade 2 mucositis, grade 1 skin toxicity  Plan:       Continue current therapy.  She will continue her phosphate binders.  Return in 1 month.

## 2019-07-03 ENCOUNTER — INFUSION (OUTPATIENT)
Dept: INFUSION THERAPY | Facility: HOSPITAL | Age: 64
End: 2019-07-03
Attending: INTERNAL MEDICINE
Payer: MEDICARE

## 2019-07-03 ENCOUNTER — OFFICE VISIT (OUTPATIENT)
Dept: HEMATOLOGY/ONCOLOGY | Facility: CLINIC | Age: 64
End: 2019-07-03
Payer: MEDICARE

## 2019-07-03 ENCOUNTER — HOSPITAL ENCOUNTER (OUTPATIENT)
Dept: RADIOLOGY | Facility: HOSPITAL | Age: 64
Discharge: HOME | End: 2019-07-03
Attending: INTERNAL MEDICINE
Payer: MEDICARE

## 2019-07-03 ENCOUNTER — RESEARCH ENCOUNTER (OUTPATIENT)
Dept: RESEARCH | Facility: HOSPITAL | Age: 64
End: 2019-07-03

## 2019-07-03 ENCOUNTER — PATIENT MESSAGE (OUTPATIENT)
Dept: HEMATOLOGY/ONCOLOGY | Facility: CLINIC | Age: 64
End: 2019-07-03

## 2019-07-03 VITALS
WEIGHT: 168 LBS | OXYGEN SATURATION: 97 % | DIASTOLIC BLOOD PRESSURE: 63 MMHG | SYSTOLIC BLOOD PRESSURE: 113 MMHG | BODY MASS INDEX: 28.84 KG/M2 | RESPIRATION RATE: 20 BRPM | HEART RATE: 67 BPM

## 2019-07-03 DIAGNOSIS — C50.111 MALIGNANT NEOPLASM OF CENTRAL PORTION OF RIGHT BREAST IN FEMALE, ESTROGEN RECEPTOR POSITIVE: Primary | ICD-10-CM

## 2019-07-03 DIAGNOSIS — K12.30 MUCOSITIS: ICD-10-CM

## 2019-07-03 DIAGNOSIS — Z00.6 EXAMINATION OF PARTICIPANT IN CLINICAL TRIAL: ICD-10-CM

## 2019-07-03 DIAGNOSIS — C50.919 BREAST CANCER: ICD-10-CM

## 2019-07-03 DIAGNOSIS — C50.919 BREAST CANCER IN FEMALE: ICD-10-CM

## 2019-07-03 DIAGNOSIS — C79.51 BONE METASTASIS: ICD-10-CM

## 2019-07-03 DIAGNOSIS — Z51.11 ENCOUNTER FOR ANTINEOPLASTIC CHEMOTHERAPY: Primary | ICD-10-CM

## 2019-07-03 DIAGNOSIS — Z17.0 MALIGNANT NEOPLASM OF CENTRAL PORTION OF RIGHT BREAST IN FEMALE, ESTROGEN RECEPTOR POSITIVE: Primary | ICD-10-CM

## 2019-07-03 PROCEDURE — 99214 OFFICE O/P EST MOD 30 MIN: CPT | Mod: HCNC,S$GLB,, | Performed by: INTERNAL MEDICINE

## 2019-07-03 PROCEDURE — 74176 CT ABD & PELVIS W/O CONTRAST: CPT | Mod: TC,HCNC

## 2019-07-03 PROCEDURE — 3008F PR BODY MASS INDEX (BMI) DOCUMENTED: ICD-10-PCS | Mod: HCNC,CPTII,S$GLB, | Performed by: INTERNAL MEDICINE

## 2019-07-03 PROCEDURE — 71250 CT THORAX DX C-: CPT | Mod: 26,HCNC,, | Performed by: RADIOLOGY

## 2019-07-03 PROCEDURE — 3008F BODY MASS INDEX DOCD: CPT | Mod: HCNC,CPTII,S$GLB, | Performed by: INTERNAL MEDICINE

## 2019-07-03 PROCEDURE — 3078F DIAST BP <80 MM HG: CPT | Mod: HCNC,CPTII,S$GLB, | Performed by: INTERNAL MEDICINE

## 2019-07-03 PROCEDURE — 63600175 PHARM REV CODE 636 W HCPCS: Mod: HCNC | Performed by: INTERNAL MEDICINE

## 2019-07-03 PROCEDURE — 71250 CT THORAX DX C-: CPT | Mod: TC,HCNC

## 2019-07-03 PROCEDURE — 3074F SYST BP LT 130 MM HG: CPT | Mod: HCNC,CPTII,S$GLB, | Performed by: INTERNAL MEDICINE

## 2019-07-03 PROCEDURE — 99999 PR PBB SHADOW E&M-EST. PATIENT-LVL III: ICD-10-PCS | Mod: PBBFAC,HCNC,, | Performed by: INTERNAL MEDICINE

## 2019-07-03 PROCEDURE — 99999 PR PBB SHADOW E&M-EST. PATIENT-LVL III: CPT | Mod: PBBFAC,HCNC,, | Performed by: INTERNAL MEDICINE

## 2019-07-03 PROCEDURE — 96523 IRRIG DRUG DELIVERY DEVICE: CPT | Mod: HCNC

## 2019-07-03 PROCEDURE — 99214 PR OFFICE/OUTPT VISIT, EST, LEVL IV, 30-39 MIN: ICD-10-PCS | Mod: HCNC,S$GLB,, | Performed by: INTERNAL MEDICINE

## 2019-07-03 PROCEDURE — 71250 CT CHEST ABDOMEN PELVIS WITHOUT CONTRAST(XPD): ICD-10-PCS | Mod: 26,HCNC,, | Performed by: RADIOLOGY

## 2019-07-03 PROCEDURE — 74176 CT ABD & PELVIS W/O CONTRAST: CPT | Mod: 26,HCNC,, | Performed by: RADIOLOGY

## 2019-07-03 PROCEDURE — 25000003 PHARM REV CODE 250: Mod: HCNC | Performed by: INTERNAL MEDICINE

## 2019-07-03 PROCEDURE — A4216 STERILE WATER/SALINE, 10 ML: HCPCS | Mod: HCNC | Performed by: INTERNAL MEDICINE

## 2019-07-03 PROCEDURE — 3074F PR MOST RECENT SYSTOLIC BLOOD PRESSURE < 130 MM HG: ICD-10-PCS | Mod: HCNC,CPTII,S$GLB, | Performed by: INTERNAL MEDICINE

## 2019-07-03 PROCEDURE — 74176 CT CHEST ABDOMEN PELVIS WITHOUT CONTRAST(XPD): ICD-10-PCS | Mod: 26,HCNC,, | Performed by: RADIOLOGY

## 2019-07-03 PROCEDURE — 3078F PR MOST RECENT DIASTOLIC BLOOD PRESSURE < 80 MM HG: ICD-10-PCS | Mod: HCNC,CPTII,S$GLB, | Performed by: INTERNAL MEDICINE

## 2019-07-03 RX ORDER — HEPARIN 100 UNIT/ML
500 SYRINGE INTRAVENOUS
Status: CANCELLED | OUTPATIENT
Start: 2019-07-03

## 2019-07-03 RX ORDER — SODIUM CHLORIDE 0.9 % (FLUSH) 0.9 %
10 SYRINGE (ML) INJECTION
Status: CANCELLED | OUTPATIENT
Start: 2019-07-03

## 2019-07-03 RX ORDER — SODIUM CHLORIDE 0.9 % (FLUSH) 0.9 %
10 SYRINGE (ML) INJECTION
Status: COMPLETED | OUTPATIENT
Start: 2019-07-03 | End: 2019-07-03

## 2019-07-03 RX ORDER — HEPARIN 100 UNIT/ML
500 SYRINGE INTRAVENOUS
Status: COMPLETED | OUTPATIENT
Start: 2019-07-03 | End: 2019-07-03

## 2019-07-03 RX ADMIN — HEPARIN 500 UNITS: 100 SYRINGE at 09:07

## 2019-07-03 RX ADMIN — SODIUM CHLORIDE, PRESERVATIVE FREE 10 ML: 5 INJECTION INTRAVENOUS at 09:07

## 2019-07-03 NOTE — PROGRESS NOTES
Protocol: MATCH-Molecular Analysis for Therapy choice  Subprotocol K2: Phase 2 of [id number] (Erdafitinib) in Patients with Tumors with FGFR Mutations or Fusions  IRB# [id number]  Sponsor: [employer]  PI: Dr. High  Study #[id number]    Cycle 3 Day 1  7/3/19  Pt arrived this morning for Cycle 3 Day 1 of the above mentioned protocol. Pt is AAO x's 3 with appropriate and affect. Pt had local labs and CT scans  done this morning as per protocol.  Pt expresses willingness to continue to participate in the above mentioned protocol. Pt was seen and examined by Dr. Schroeder. Labs and scans also reviewed by Dr. Schroeder. Pt was administered Amsler Grid test and no abnormalities were noted. Pt states that he vision is even better. Pt does not need an eye examination today. EKG also not needed as protocol states if clinically indicated. Scans show pt has stable disease.  Pt states that for this cycle she had more sores in her mouth, but it did not interfere with her eating habits. Pt said that would have to eat some softer foods, but overall she is able to eat normally. Pt has slight redness to palms of hands and feet. Slight peeling but no blisters noted.  Pt also has some burning to feet but does not interfere with her walking and ADL's.  Pt states that she is actually feeling better.  Phos level noted to be 5.8 today.  Pt to resume 8mg of Erdafitnib daily and Sevelamer 800mg 3 times daily.   Per pill diary,  pt took correct number of pills daily. Per pill count, pt missed one dose of medication. Pt reminded to take pills first then oliver the time on the drug diary. Today's dose taken from bottles dispensed today. Pt dispensed 60 pills for a 1 month supply. [facility].  Pt to RTC in 4 weeks to have labs and see Dr. Schroeder.  Pt voiced understanding. All of her questions were answered to her satisfaction by myself and Dr. Schroeder. Instructed pt to call me or Dr. Schroeder with any questions, concerns and  any unusual symptoms.   Appts to be made for Cycle 3 day 1.     SEE SHADOW CHART FOR MOST UPDATED AE LIST    AE's  Dry Mouth, Grade 1:  Mucositis oral, Grade 2: ulcer on tongue that come and go.  Constipation, Grade 1: Pt taking Colace  Hyperphosphatemia, Grade 2: level 5.8 today. Pt to restart Sevelamer.   Diarrhea intermittent, Grade 1: lasted 1 day   Palmar-plantar erythrodysesthesia syndrome, Grade 1:  Alkaline phosphatase increased, Grade 1:     Baseline History  Hypertension, Grade 2 (pt denies this)  Peripheral neuropathy, Grade 1  Cancer Pain, Grade 2  Depression  Seasonal Allergies  Anxiety  Asthma R/T seasonal allergies  Hypercholesteremia  Lymphedema  Osteoporosis  Hasimoto Disease  Dry Eyes, Grade 1

## 2019-07-03 NOTE — NURSING
Patient present for port flush. Port accessed without difficulty; blood return present. Patient has no complaints regarding port at this time. Discharged home.

## 2019-07-10 ENCOUNTER — PATIENT MESSAGE (OUTPATIENT)
Dept: HEMATOLOGY/ONCOLOGY | Facility: CLINIC | Age: 64
End: 2019-07-10

## 2019-07-10 DIAGNOSIS — Z17.0 MALIGNANT NEOPLASM OF CENTRAL PORTION OF RIGHT BREAST IN FEMALE, ESTROGEN RECEPTOR POSITIVE: ICD-10-CM

## 2019-07-10 DIAGNOSIS — C50.111 MALIGNANT NEOPLASM OF CENTRAL PORTION OF RIGHT BREAST IN FEMALE, ESTROGEN RECEPTOR POSITIVE: ICD-10-CM

## 2019-07-11 DIAGNOSIS — C50.111 MALIGNANT NEOPLASM OF CENTRAL PORTION OF RIGHT BREAST IN FEMALE, ESTROGEN RECEPTOR POSITIVE: ICD-10-CM

## 2019-07-11 DIAGNOSIS — Z17.0 MALIGNANT NEOPLASM OF CENTRAL PORTION OF RIGHT BREAST IN FEMALE, ESTROGEN RECEPTOR POSITIVE: ICD-10-CM

## 2019-07-12 ENCOUNTER — PATIENT MESSAGE (OUTPATIENT)
Dept: HEMATOLOGY/ONCOLOGY | Facility: CLINIC | Age: 64
End: 2019-07-12

## 2019-07-12 DIAGNOSIS — Z17.0 MALIGNANT NEOPLASM OF CENTRAL PORTION OF RIGHT BREAST IN FEMALE, ESTROGEN RECEPTOR POSITIVE: ICD-10-CM

## 2019-07-12 DIAGNOSIS — C50.111 MALIGNANT NEOPLASM OF CENTRAL PORTION OF RIGHT BREAST IN FEMALE, ESTROGEN RECEPTOR POSITIVE: ICD-10-CM

## 2019-07-15 DIAGNOSIS — C50.111 MALIGNANT NEOPLASM OF CENTRAL PORTION OF RIGHT BREAST IN FEMALE, ESTROGEN RECEPTOR POSITIVE: ICD-10-CM

## 2019-07-15 DIAGNOSIS — Z17.0 MALIGNANT NEOPLASM OF CENTRAL PORTION OF RIGHT BREAST IN FEMALE, ESTROGEN RECEPTOR POSITIVE: ICD-10-CM

## 2019-07-15 NOTE — TELEPHONE ENCOUNTER
Contacted pharmacy to follow up and confirm issue was resolved after being told medication was being refilled to soon. Per  patient last filled med on 6/11/19 and it has been >30 days.   Pharmacy stated that the medication is now in process and should be ready for  at 630 p.m. Information relayed to patient. She expressed gratitude.

## 2019-07-30 NOTE — PROGRESS NOTES
Subjective:       Patient ID: Rebecca Crain is a 63 y.o. female.    Chief Complaint: No chief complaint on file.    HPI Ms. Crain is here for followup of metastatic carcinoma of the right breast.     She is currently on clinical trial therapy with erdafitinib.  She began that therapy on May 8th.    Today she reports she had severe mouth pain and mucositis necessitating cessation of her therapy on July 25th.  That is improving and she is able eat soft foods.  It has not resolved and she still has some blisters.  She is using [employer] solution for that.  She also reports some acid reflux.  She has fatigue.  She has painful discoloration in her fingernails and toenails and pain in her hands and feet with some erythema and skin changes.  She has no shortness of breath.  Appetite is decreased and her taste is off with everything tasting salty.  She also reports that her smell is off.  Constipation is a bit better.  She reports some dry mouth, dry eyes, and dry nasal passages.  She also reports some pain in the left groin.      ECOG-1    Breast history: In 2013 she was diagnosed with stage IIB carcinoma of the right breast, ER positive with a low Oncotype score.  She was enrolled on protocol  and randomized to endocrine therapy alone.  She was tried on all 3 aromatase inhibitors and had itching and rash to all of them.  In August 2013 she was started on tamoxifen.   She was found to have  bone metastasis in May 2015, 2015.  A subsequent bone biopsy was performed on a lesion at the T8 vertebra.   The pathology from that was consistent with metastatic breast cancer, ER +80%, [place] +80%, and HER-2 negative.      She received letrozole plus palbociclib from July 2015 until May 2016.  She also received bone protective therapy with Xgeva.      Faslodex was started in June 2016.      Exemestane and Afinitor started in August 2017.  That was discontinued in February 2018 due to progression in the liver and bone.     Navelbine  was started February 16, 2018.  She had 8 cycles for that.  Scans in November 2018 showed progression as follows     Capecitabine was started in December 2018.  She received 5 cycles of that therapy.    Follow-up scans in April 2019 showed progression with a new hepatic lesion and worsening bone disease.      Review of Systems   Constitutional: Positive for appetite change. Negative for unexpected weight change.   Eyes: Negative for visual disturbance.   Respiratory: Negative for cough and shortness of breath.    Cardiovascular: Negative for chest pain.   Gastrointestinal: Negative for abdominal pain and diarrhea.   Genitourinary: Negative for frequency.   Musculoskeletal: Negative for back pain.   Skin: Negative for rash.   Neurological: Negative for headaches.   Hematological: Negative for adenopathy.   Psychiatric/Behavioral: The patient is not nervous/anxious.        Objective:      Physical Exam   Constitutional: She is oriented to person, place, and time. She appears well-developed and well-nourished.   HENT:   Mouth/Throat: Oropharynx is clear and moist. No oropharyngeal exudate.   Eyes: No scleral icterus.   Cardiovascular: Regular rhythm.   Pulmonary/Chest: Effort normal and breath sounds normal.   Abdominal: Soft. She exhibits no mass. There is no tenderness.   Musculoskeletal:   Mild left inguinal pain to palpation, increased pain to left leg flexion   Lymphadenopathy:     She has no cervical adenopathy.   Neurological: She is alert and oriented to person, place, and time.   Skin: No rash noted. No erythema.   Psychiatric: She has a normal mood and affect. Her behavior is normal. Thought content normal.   Vitals reviewed.      Assessment:     CBC is unremarkable, metabolic profile shows normal hepatic and renal function, phosphorus 4.6  1. Malignant neoplasm of central portion of right breast in female, estrogen receptor positive    2. Bone metastasis        Plan:       Hold current therapy and return in  1 week.  X-ray left hip.

## 2019-07-31 ENCOUNTER — HOSPITAL ENCOUNTER (OUTPATIENT)
Dept: RADIOLOGY | Facility: HOSPITAL | Age: 64
Discharge: HOME | End: 2019-07-31
Attending: INTERNAL MEDICINE
Payer: MEDICARE

## 2019-07-31 ENCOUNTER — RESEARCH ENCOUNTER (OUTPATIENT)
Dept: RESEARCH | Facility: HOSPITAL | Age: 64
End: 2019-07-31

## 2019-07-31 ENCOUNTER — PATIENT MESSAGE (OUTPATIENT)
Dept: HEMATOLOGY/ONCOLOGY | Facility: CLINIC | Age: 64
End: 2019-07-31

## 2019-07-31 ENCOUNTER — OFFICE VISIT (OUTPATIENT)
Dept: HEMATOLOGY/ONCOLOGY | Facility: CLINIC | Age: 64
End: 2019-07-31
Payer: MEDICARE

## 2019-07-31 VITALS
DIASTOLIC BLOOD PRESSURE: 57 MMHG | TEMPERATURE: 98 F | BODY MASS INDEX: 28.07 KG/M2 | RESPIRATION RATE: 18 BRPM | SYSTOLIC BLOOD PRESSURE: 111 MMHG | HEART RATE: 80 BPM | HEIGHT: 64 IN | OXYGEN SATURATION: 94 % | WEIGHT: 164.44 LBS

## 2019-07-31 DIAGNOSIS — K21.00 REFLUX ESOPHAGITIS: ICD-10-CM

## 2019-07-31 DIAGNOSIS — M25.552 LEFT HIP PAIN: ICD-10-CM

## 2019-07-31 DIAGNOSIS — C79.51 BONE METASTASIS: ICD-10-CM

## 2019-07-31 DIAGNOSIS — C50.111 MALIGNANT NEOPLASM OF CENTRAL PORTION OF RIGHT BREAST IN FEMALE, ESTROGEN RECEPTOR POSITIVE: Primary | ICD-10-CM

## 2019-07-31 DIAGNOSIS — Z17.0 MALIGNANT NEOPLASM OF CENTRAL PORTION OF RIGHT BREAST IN FEMALE, ESTROGEN RECEPTOR POSITIVE: Primary | ICD-10-CM

## 2019-07-31 DIAGNOSIS — K12.30 MUCOSITIS: ICD-10-CM

## 2019-07-31 PROCEDURE — 99999 PR PBB SHADOW E&M-EST. PATIENT-LVL III: ICD-10-PCS | Mod: PBBFAC,HCNC,, | Performed by: INTERNAL MEDICINE

## 2019-07-31 PROCEDURE — 99999 PR PBB SHADOW E&M-EST. PATIENT-LVL III: CPT | Mod: PBBFAC,HCNC,, | Performed by: INTERNAL MEDICINE

## 2019-07-31 PROCEDURE — 73502 X-RAY EXAM HIP UNI 2-3 VIEWS: CPT | Mod: 26,HCNC,LT, | Performed by: RADIOLOGY

## 2019-07-31 PROCEDURE — 73502 XR HIP 2 VIEW LEFT: ICD-10-PCS | Mod: 26,HCNC,LT, | Performed by: RADIOLOGY

## 2019-07-31 PROCEDURE — 73502 X-RAY EXAM HIP UNI 2-3 VIEWS: CPT | Mod: TC,HCNC,LT,Q1

## 2019-07-31 PROCEDURE — 99214 PR OFFICE/OUTPT VISIT, EST, LEVL IV, 30-39 MIN: ICD-10-PCS | Mod: Q1,HCNC,S$GLB, | Performed by: INTERNAL MEDICINE

## 2019-07-31 PROCEDURE — 99214 OFFICE O/P EST MOD 30 MIN: CPT | Mod: Q1,HCNC,S$GLB, | Performed by: INTERNAL MEDICINE

## 2019-07-31 RX ORDER — CITALOPRAM 20 MG/1
20 TABLET, FILM COATED ORAL DAILY
Refills: 3 | COMMUNITY
Start: 2019-06-18 | End: 2019-07-31

## 2019-07-31 RX ORDER — PANTOPRAZOLE SODIUM 40 MG/1
40 TABLET, DELAYED RELEASE ORAL DAILY
Qty: 90 TABLET | Refills: 1 | Status: ON HOLD | OUTPATIENT
Start: 2019-07-31 | End: 2020-01-30

## 2019-07-31 NOTE — PROGRESS NOTES
Protocol: MATCH-Molecular Analysis for Therapy choice  Subprotocol K2: Phase 2 of [id number] (Erdafitinib) in Patients with Tumors with FGFR Mutations or Fusions  IRB# [id number]  Sponsor: [employer]  PI: Dr. High  Study #[id number]    Cycle 3 continuing, Cycle 4 delayed  7/31/19  Pt arrived this morning for possible cycle 4 day 1 of the above mentioned protocol. Pt is AAO x's 3 with appropriate and affect. Pt had local labs done this morning.  Pt expresses willingness to continue to participate in the above mentioned protocol. Pt was seen and examined by Dr. Schroeder. Labs also reviewed by Dr. Schroeder. Pt had called [facility] nurse on morning of 7/25/19 stating that the sores in her mouth were too painful. Pt held dose starting on 7/25/19. Pt states that this is improving since holding the drug. Pt still has some blisters, and she continues to use the [employer]'s Solution. She also reports some acid reflux and fatigue. Pt has some painful discoloration in her fingernails and toenails. Pt also has some palmar-plantar erythrodysesthesia to her fingertips and feet. There is some pain associated with this.   Pt will be seen next week to re-evaluate her mouth. Pt will be dosed reduced at least one dose level. Per pill diary and pill count, pt took correct number of pills daily. Pt to RTC in one week to have her mouth re-evaluated.  Pt voiced understanding. All of her questions were answered to her satisfaction by myself and Dr. Schroeder. Instructed pt to call me or Dr. Schroeder with any questions, concerns and any unusual symptoms.      SEE SHADOW CHART FOR MOST UPDATED AE LIST    AE's  Dry Mouth, Grade 1:  Mucositis oral, Grade 3: Pt held times one week. Today noted to between grade 3 and 2  Constipation, Grade 1: Pt taking Colace  Hyperphosphatemia, Grade 1: level 4.6 today. Pt to stop Sevelamer.   Diarrhea intermittent, Grade 1: none now but will watch    Palmar-plantar erythrodysesthesia syndrome, Grade 2:  Alkaline phosphatase  increased, Grade 1:   Skin and subcutaneous tissue disorders-Other (nail disorder), Grade 2:  Pain, left groin, Grade 2: Pt having hip xray today.   Hypoalbuminemia, Grade 1:   Dyspepsia, Grade 1  Fatigue, Grade 2:     Baseline History  Hypertension, Grade 2 (pt denies this)  Peripheral neuropathy, Grade 1  Cancer Pain, Grade 2  Depression  Seasonal Allergies  Anxiety  Asthma R/T seasonal allergies  Hypercholesteremia  Lymphedema  Osteoporosis  Hasimoto Disease  Dry Eyes, Grade 1

## 2019-08-06 NOTE — PROGRESS NOTES
Subjective:       Patient ID: Rebecca Crain is a 63 y.o. female.    Chief Complaint: No chief complaint on file.    HPI Ms. Crain is here for followup of metastatic carcinoma of the right breast.     She is currently on clinical trial therapy with erdafitinib.  She began that therapy on May 8th.  Therapy was held last week due to mucositis.  Last dose was July 25th.    Today she reports that her mouth is greatly improved.  She is eating normally all of her ulcers have healed.  Her only complaint is some ongoing left groin pain. Bowel function is stable.  She has no shortness of breath.    ECOG-1    Breast history: In 2013 she was diagnosed with stage IIB carcinoma of the right breast, ER positive with a low Oncotype score.  She was enrolled on protocol  and randomized to endocrine therapy alone.  She was tried on all 3 aromatase inhibitors and had itching and rash to all of them.  In August 2013 she was started on tamoxifen.   She was found to have  bone metastasis in May 2015, 2015.  A subsequent bone biopsy was performed on a lesion at the T8 vertebra.   The pathology from that was consistent with metastatic breast cancer, ER +80%, AL +80%, and HER-2 negative.      She received letrozole plus palbociclib from July 2015 until May 2016.  She also received bone protective therapy with Xgeva.      Faslodex was started in June 2016.      Exemestane and Afinitor started in August 2017.  That was discontinued in February 2018 due to progression in the liver and bone.     Navelbine was started February 16, 2018.  She had 8 cycles for that.  Scans in November 2018 showed progression as follows     Capecitabine was started in December 2018.  She received 5 cycles of that therapy.    Follow-up scans in April 2019 showed progression with a new hepatic lesion and worsening bone disease.  Review of Systems   Constitutional: Positive for appetite change (Improved). Negative for unexpected weight change.   HENT:  Negative for mouth sores and trouble swallowing.    Eyes: Negative for visual disturbance.   Respiratory: Negative for cough and shortness of breath.    Cardiovascular: Negative for chest pain.   Gastrointestinal: Negative for abdominal pain and diarrhea.   Genitourinary: Negative for frequency.   Musculoskeletal: Positive for back pain.        Left groin pain   Skin: Negative for rash.   Neurological: Negative for headaches.   Hematological: Negative for adenopathy.   Psychiatric/Behavioral: The patient is not nervous/anxious.        Objective:      Physical Exam   Constitutional: She is oriented to person, place, and time. She appears well-developed and well-nourished. No distress.   HENT:   Mouth/Throat: Oropharynx is clear and moist. No oropharyngeal exudate.   Eyes: No scleral icterus.   Cardiovascular: Normal rate and regular rhythm.   Pulmonary/Chest: Effort normal and breath sounds normal.   Abdominal: Soft. She exhibits no mass. There is no tenderness.   Musculoskeletal:   Pain in left inguinal area to flexion and abduction on the left side   Lymphadenopathy:     She has no cervical adenopathy.   Neurological: She is alert and oriented to person, place, and time.   Psychiatric: She has a normal mood and affect. Her behavior is normal. Thought content normal.   Vitals reviewed.      Assessment:       1. Malignant neoplasm of central portion of right breast in female, estrogen receptor positive    2. Bone metastasis    3. Metastasis to liver        Plan:       Restart study medication.    She will take some ibuprofen daily for 3 or 4 days to see if her left groin pain improves.  If it does not then we will likely do some additional imaging.       Distress Screening Results: Psychosocial Distress screening score of Distress Score: 0 noted and reviewed. No intervention indicated.

## 2019-08-07 ENCOUNTER — OFFICE VISIT (OUTPATIENT)
Dept: HEMATOLOGY/ONCOLOGY | Facility: CLINIC | Age: 64
End: 2019-08-07
Payer: MEDICARE

## 2019-08-07 ENCOUNTER — RESEARCH ENCOUNTER (OUTPATIENT)
Dept: RESEARCH | Facility: HOSPITAL | Age: 64
End: 2019-08-07

## 2019-08-07 ENCOUNTER — LAB VISIT (OUTPATIENT)
Dept: LAB | Facility: HOSPITAL | Age: 64
End: 2019-08-07
Attending: INTERNAL MEDICINE
Payer: MEDICARE

## 2019-08-07 VITALS
SYSTOLIC BLOOD PRESSURE: 112 MMHG | BODY MASS INDEX: 27.36 KG/M2 | HEIGHT: 64 IN | DIASTOLIC BLOOD PRESSURE: 61 MMHG | TEMPERATURE: 98 F | RESPIRATION RATE: 18 BRPM | WEIGHT: 160.25 LBS | HEART RATE: 82 BPM | OXYGEN SATURATION: 93 %

## 2019-08-07 DIAGNOSIS — C50.919 BREAST CANCER: Primary | ICD-10-CM

## 2019-08-07 DIAGNOSIS — Z00.6 EXAMINATION OF PARTICIPANT IN CLINICAL TRIAL: ICD-10-CM

## 2019-08-07 DIAGNOSIS — C79.51 BONE METASTASIS: ICD-10-CM

## 2019-08-07 DIAGNOSIS — C50.919 BREAST CANCER METASTASIZED TO BONE: ICD-10-CM

## 2019-08-07 DIAGNOSIS — C78.7 METASTASIS TO LIVER: ICD-10-CM

## 2019-08-07 DIAGNOSIS — C50.111 MALIGNANT NEOPLASM OF CENTRAL PORTION OF RIGHT BREAST IN FEMALE, ESTROGEN RECEPTOR POSITIVE: Primary | ICD-10-CM

## 2019-08-07 DIAGNOSIS — C50.919 BREAST CANCER: ICD-10-CM

## 2019-08-07 DIAGNOSIS — Z17.0 MALIGNANT NEOPLASM OF CENTRAL PORTION OF RIGHT BREAST IN FEMALE, ESTROGEN RECEPTOR POSITIVE: Primary | ICD-10-CM

## 2019-08-07 DIAGNOSIS — C79.51 BREAST CANCER METASTASIZED TO BONE: ICD-10-CM

## 2019-08-07 LAB
ALBUMIN SERPL BCP-MCNC: 3.7 G/DL (ref 3.5–5.2)
ALP SERPL-CCNC: 150 U/L (ref 55–135)
ALT SERPL W/O P-5'-P-CCNC: 9 U/L (ref 10–44)
ANION GAP SERPL CALC-SCNC: 10 MMOL/L (ref 8–16)
AST SERPL-CCNC: 19 U/L (ref 10–40)
BASOPHILS # BLD AUTO: 0.02 K/UL (ref 0–0.2)
BASOPHILS NFR BLD: 0.4 % (ref 0–1.9)
BILIRUB DIRECT SERPL-MCNC: 0.2 MG/DL (ref 0.1–0.3)
BILIRUB SERPL-MCNC: 0.7 MG/DL (ref 0.1–1)
BUN SERPL-MCNC: 21 MG/DL (ref 8–23)
CALCIUM SERPL-MCNC: 9.9 MG/DL (ref 8.7–10.5)
CHLORIDE SERPL-SCNC: 104 MMOL/L (ref 95–110)
CO2 SERPL-SCNC: 26 MMOL/L (ref 23–29)
CREAT SERPL-MCNC: 0.9 MG/DL (ref 0.5–1.4)
DIFFERENTIAL METHOD: NORMAL
EOSINOPHIL # BLD AUTO: 0.2 K/UL (ref 0–0.5)
EOSINOPHIL NFR BLD: 4.4 % (ref 0–8)
ERYTHROCYTE [DISTWIDTH] IN BLOOD BY AUTOMATED COUNT: 12.8 % (ref 11.5–14.5)
EST. GFR  (AFRICAN AMERICAN): >60 ML/MIN/1.73 M^2
EST. GFR  (NON AFRICAN AMERICAN): >60 ML/MIN/1.73 M^2
GLUCOSE SERPL-MCNC: 120 MG/DL (ref 70–110)
HCT VFR BLD AUTO: 43.9 % (ref 37–48.5)
HGB BLD-MCNC: 14.3 G/DL (ref 12–16)
IMM GRANULOCYTES # BLD AUTO: 0.01 K/UL (ref 0–0.04)
IMM GRANULOCYTES NFR BLD AUTO: 0.2 % (ref 0–0.5)
LDH SERPL L TO P-CCNC: 180 U/L (ref 110–260)
LYMPHOCYTES # BLD AUTO: 1.1 K/UL (ref 1–4.8)
LYMPHOCYTES NFR BLD: 24.6 % (ref 18–48)
MAGNESIUM SERPL-MCNC: 2.1 MG/DL (ref 1.6–2.6)
MCH RBC QN AUTO: 29.4 PG (ref 27–31)
MCHC RBC AUTO-ENTMCNC: 32.6 G/DL (ref 32–36)
MCV RBC AUTO: 90 FL (ref 82–98)
MONOCYTES # BLD AUTO: 0.4 K/UL (ref 0.3–1)
MONOCYTES NFR BLD: 8.5 % (ref 4–15)
NEUTROPHILS # BLD AUTO: 2.8 K/UL (ref 1.8–7.7)
NEUTROPHILS NFR BLD: 61.9 % (ref 38–73)
NRBC BLD-RTO: 0 /100 WBC
PHOSPHATE SERPL-MCNC: 3.5 MG/DL (ref 2.7–4.5)
PLATELET # BLD AUTO: 193 K/UL (ref 150–350)
PMV BLD AUTO: 9.5 FL (ref 9.2–12.9)
POTASSIUM SERPL-SCNC: 4.1 MMOL/L (ref 3.5–5.1)
PROT SERPL-MCNC: 7.2 G/DL (ref 6–8.4)
RBC # BLD AUTO: 4.86 M/UL (ref 4–5.4)
SODIUM SERPL-SCNC: 140 MMOL/L (ref 136–145)
WBC # BLD AUTO: 4.59 K/UL (ref 3.9–12.7)

## 2019-08-07 PROCEDURE — 83615 LACTATE (LD) (LDH) ENZYME: CPT | Mod: HCNC

## 2019-08-07 PROCEDURE — 80053 COMPREHEN METABOLIC PANEL: CPT | Mod: HCNC

## 2019-08-07 PROCEDURE — 99999 PR PBB SHADOW E&M-EST. PATIENT-LVL V: ICD-10-PCS | Mod: PBBFAC,HCNC,, | Performed by: INTERNAL MEDICINE

## 2019-08-07 PROCEDURE — 84100 ASSAY OF PHOSPHORUS: CPT | Mod: HCNC,Q1

## 2019-08-07 PROCEDURE — 82248 BILIRUBIN DIRECT: CPT | Mod: HCNC,Q1

## 2019-08-07 PROCEDURE — 99213 OFFICE O/P EST LOW 20 MIN: CPT | Mod: Q1,HCNC,S$GLB, | Performed by: INTERNAL MEDICINE

## 2019-08-07 PROCEDURE — 83735 ASSAY OF MAGNESIUM: CPT | Mod: HCNC,Q1

## 2019-08-07 PROCEDURE — 99999 PR PBB SHADOW E&M-EST. PATIENT-LVL V: CPT | Mod: PBBFAC,HCNC,, | Performed by: INTERNAL MEDICINE

## 2019-08-07 PROCEDURE — 36415 COLL VENOUS BLD VENIPUNCTURE: CPT | Mod: HCNC,Q1

## 2019-08-07 PROCEDURE — 3008F PR BODY MASS INDEX (BMI) DOCUMENTED: ICD-10-PCS | Mod: HCNC,CPTII,S$GLB, | Performed by: INTERNAL MEDICINE

## 2019-08-07 PROCEDURE — 3008F BODY MASS INDEX DOCD: CPT | Mod: HCNC,CPTII,S$GLB, | Performed by: INTERNAL MEDICINE

## 2019-08-07 PROCEDURE — 85025 COMPLETE CBC W/AUTO DIFF WBC: CPT | Mod: HCNC,Q1

## 2019-08-07 PROCEDURE — 99213 PR OFFICE/OUTPT VISIT, EST, LEVL III, 20-29 MIN: ICD-10-PCS | Mod: Q1,HCNC,S$GLB, | Performed by: INTERNAL MEDICINE

## 2019-08-07 NOTE — PROGRESS NOTES
Protocol: MATCH-Molecular Analysis for Therapy choice  Subprotocol K2: Phase 2 of [id number] (Erdafitinib) in Patients with Tumors with FGFR Mutations or Fusions  IRB# [id number]  Sponsor: [employer]  PI: Dr. High  Study #[id number]    Cycle 4 Day 1   8/7/19  Pt arrived this morning for  cycle 4 day 1 of the above mentioned protocol. Pt is AAO x's 3 with appropriate and affect. Pt had local labs done this morning.  Pt expresses willingness to continue to participate in the above mentioned protocol. Pt was seen and examined by Dr. Schroeder. Labs also reviewed by Dr. Schroeder. Amsler eye grid test normal today.  Pt's mouth was examined by Dr. Schroeder and oral mucositis has resolved. Pt is eating normal solid foods again. Pt's finger and toenails continue to improve.  Pt also has some palmar-plantar erythrodysesthesia to her fingertips and feet which is also improving. Pt continues to have dry mouth which she is managing by drinking more water and using Biotene products. Pt will be dosed reduced today to 6mg daily. Pt given drug diary for cycle 4 and 60 pills. Pt to take 2 of the 3mg pills daily. Pt will also start back on Sevelamer also to prevent phosphorus level from being too high.  Pt to RTC in 4 weeks to have CT scans, labs, and see Dr. Schroeder. Pt to call us if the sores in her mouth come back.  Pt voiced understanding. All of her questions were answered to her satisfaction by myself and Dr. Schrodeer. Instructed pt to call me or Dr. Schroeder with any questions, concerns and any unusual symptoms.      SEE SHADOW CHART FOR MOST UPDATED AE LIST    AE's  Dry Mouth, Grade 1:  Constipation, Grade 1: Pt taking Colace  Diarrhea intermittent, Grade 1: none now but will watch    Palmar-plantar erythrodysesthesia syndrome, Grade 2:  Alkaline phosphatase increased, Grade 1:   Skin and subcutaneous tissue disorders-Other (nail disorder), Grade 1:  Pain, left groin, Grade 2:  Xray normal, pt to try advil for a few days.  Dyspepsia, Grade  1  Fatigue, Grade 1:     Baseline History  Hypertension, Grade 2 (pt denies this)  Peripheral neuropathy, Grade 1  Cancer Pain, Grade 2  Depression  Seasonal Allergies  Anxiety  Asthma R/T seasonal allergies  Hypercholesteremia  Lymphedema  Osteoporosis  Hasimoto Disease  Dry Eyes, Grade 1

## 2019-08-14 ENCOUNTER — PATIENT MESSAGE (OUTPATIENT)
Dept: HEMATOLOGY/ONCOLOGY | Facility: CLINIC | Age: 64
End: 2019-08-14

## 2019-08-14 DIAGNOSIS — K12.30 MUCOSITIS: ICD-10-CM

## 2019-08-14 DIAGNOSIS — Z17.0 MALIGNANT NEOPLASM OF CENTRAL PORTION OF RIGHT BREAST IN FEMALE, ESTROGEN RECEPTOR POSITIVE: ICD-10-CM

## 2019-08-14 DIAGNOSIS — C50.111 MALIGNANT NEOPLASM OF CENTRAL PORTION OF RIGHT BREAST IN FEMALE, ESTROGEN RECEPTOR POSITIVE: ICD-10-CM

## 2019-08-16 ENCOUNTER — PATIENT MESSAGE (OUTPATIENT)
Dept: HEMATOLOGY/ONCOLOGY | Facility: CLINIC | Age: 64
End: 2019-08-16

## 2019-08-16 DIAGNOSIS — J20.2 ACUTE BRONCHITIS DUE TO STREPTOCOCCUS: ICD-10-CM

## 2019-08-16 RX ORDER — HYDROCODONE BITARTRATE AND HOMATROPINE METHYLBROMIDE ORAL SOLUTION 5; 1.5 MG/5ML; MG/5ML
5 LIQUID ORAL EVERY 4 HOURS PRN
Qty: 120 ML | Refills: 0 | Status: ON HOLD | OUTPATIENT
Start: 2019-08-16 | End: 2020-01-12 | Stop reason: HOSPADM

## 2019-08-16 NOTE — TELEPHONE ENCOUNTER
Spoke with patient to let her know I talked with [facility] and they have the hycodan syrup in stock and we will be sending the prescription over for her. Pt verbalized understanding of this information and asked if pharmacy was open tomorrow if she couldn't pick it up today. Informed pt pharmacy is open from 10am-4pm tomorrow. Pt thankful for all the help in getting this prescription sent in for her.     ----- Message from Micaela Sanchez MA sent at 8/16/2019  3:27 PM CDT -----  Contact: self/[phone]  Pt states she would like to speak with someone about her medication and she sent over a msg on the portal but have not heard word of anything.       hydrocodone-homatropine 5-1.5 mg/5 ml (HYCODAN) 5-1.5 mg/5 mL Syrp

## 2019-08-19 ENCOUNTER — TELEPHONE (OUTPATIENT)
Dept: HEMATOLOGY/ONCOLOGY | Facility: CLINIC | Age: 64
End: 2019-08-19

## 2019-08-19 ENCOUNTER — PATIENT MESSAGE (OUTPATIENT)
Dept: HEMATOLOGY/ONCOLOGY | Facility: CLINIC | Age: 64
End: 2019-08-19

## 2019-08-19 DIAGNOSIS — C50.111 MALIGNANT NEOPLASM OF CENTRAL PORTION OF RIGHT BREAST IN FEMALE, ESTROGEN RECEPTOR POSITIVE: ICD-10-CM

## 2019-08-19 DIAGNOSIS — G89.3 CANCER RELATED PAIN: Primary | ICD-10-CM

## 2019-08-19 DIAGNOSIS — Z17.0 MALIGNANT NEOPLASM OF CENTRAL PORTION OF RIGHT BREAST IN FEMALE, ESTROGEN RECEPTOR POSITIVE: ICD-10-CM

## 2019-08-19 RX ORDER — MORPHINE SULFATE 60 MG/1
60 TABLET, FILM COATED, EXTENDED RELEASE ORAL 2 TIMES DAILY
Qty: 60 TABLET | Refills: 0 | Status: SHIPPED | OUTPATIENT
Start: 2019-08-19 | End: 2019-09-09 | Stop reason: SDUPTHER

## 2019-08-19 NOTE — TELEPHONE ENCOUNTER
Spoke with the [facility] about patient's medication. Pharmacist said patient's medication used to be MS Contin 60mg, but a different medication was chosen for this prescription, one that they do not carry, the morphine 60mg TR12. If the doctor can send in a new prescription with the correct medication, they can get it filled for the patient today. Informed would relay this information to Dr Schroeder and see what he can do.

## 2019-08-20 ENCOUNTER — TELEPHONE (OUTPATIENT)
Dept: PHARMACY | Facility: CLINIC | Age: 64
End: 2019-08-20

## 2019-08-22 DIAGNOSIS — Z51.11 ENCOUNTER FOR ANTINEOPLASTIC CHEMOTHERAPY: ICD-10-CM

## 2019-08-22 RX ORDER — LIDOCAINE AND PRILOCAINE 25; 25 MG/G; MG/G
CREAM TOPICAL
Qty: 30 G | Refills: 3 | Status: SHIPPED | OUTPATIENT
Start: 2019-08-22 | End: 2020-04-14 | Stop reason: SDUPTHER

## 2019-08-29 DIAGNOSIS — J20.2 ACUTE BRONCHITIS DUE TO STREPTOCOCCUS: ICD-10-CM

## 2019-08-30 RX ORDER — AZITHROMYCIN 250 MG/1
TABLET, FILM COATED ORAL
Qty: 6 TABLET | Refills: 0 | OUTPATIENT
Start: 2019-08-30

## 2019-09-03 ENCOUNTER — HOSPITAL ENCOUNTER (OUTPATIENT)
Dept: RADIOLOGY | Facility: HOSPITAL | Age: 64
Discharge: HOME | End: 2019-09-03
Attending: INTERNAL MEDICINE
Payer: MEDICARE

## 2019-09-03 ENCOUNTER — APPOINTMENT (OUTPATIENT)
Dept: RADIATION THERAPY | Facility: OTHER | Age: 64
End: 2019-09-03
Attending: RADIOLOGY
Payer: MEDICARE

## 2019-09-03 DIAGNOSIS — E06.3 HYPOTHYROIDISM DUE TO HASHIMOTO'S THYROIDITIS: ICD-10-CM

## 2019-09-03 DIAGNOSIS — E03.8 HYPOTHYROIDISM DUE TO HASHIMOTO'S THYROIDITIS: ICD-10-CM

## 2019-09-03 DIAGNOSIS — C79.51 BREAST CANCER METASTASIZED TO BONE: ICD-10-CM

## 2019-09-03 DIAGNOSIS — C50.919 BREAST CANCER METASTASIZED TO BONE: ICD-10-CM

## 2019-09-03 DIAGNOSIS — Z00.6 EXAMINATION OF PARTICIPANT IN CLINICAL TRIAL: ICD-10-CM

## 2019-09-03 PROCEDURE — 78306 BONE IMAGING WHOLE BODY: CPT | Mod: 26,,, | Performed by: RADIOLOGY

## 2019-09-03 PROCEDURE — 74176 CT ABD & PELVIS W/O CONTRAST: CPT | Mod: 26,HCNC,, | Performed by: RADIOLOGY

## 2019-09-03 PROCEDURE — A9503 TC99M MEDRONATE: HCPCS

## 2019-09-03 PROCEDURE — 74176 CT ABD & PELVIS W/O CONTRAST: CPT | Mod: TC,HCNC

## 2019-09-03 PROCEDURE — 78306 NM BONE SCAN WHOLE BODY: ICD-10-PCS | Mod: 26,,, | Performed by: RADIOLOGY

## 2019-09-03 PROCEDURE — 74176 CT CHEST ABDOMEN PELVIS WITHOUT CONTRAST(XPD): ICD-10-PCS | Mod: 26,HCNC,, | Performed by: RADIOLOGY

## 2019-09-03 PROCEDURE — 71250 CT CHEST ABDOMEN PELVIS WITHOUT CONTRAST(XPD): ICD-10-PCS | Mod: 26,HCNC,, | Performed by: RADIOLOGY

## 2019-09-03 PROCEDURE — 71250 CT THORAX DX C-: CPT | Mod: TC,HCNC

## 2019-09-03 PROCEDURE — 71250 CT THORAX DX C-: CPT | Mod: 26,HCNC,, | Performed by: RADIOLOGY

## 2019-09-03 RX ORDER — LEVOTHYROXINE SODIUM 137 UG/1
137 TABLET ORAL
Qty: 90 TABLET | Refills: 0 | Status: SHIPPED | OUTPATIENT
Start: 2019-09-03 | End: 2020-02-21 | Stop reason: SDUPTHER

## 2019-09-03 NOTE — PROGRESS NOTES
Subjective:       Patient ID: Rebecca Crain is a 63 y.o. female.    Chief Complaint: No chief complaint on file.    HPI Ms. Crain is here for followup of metastatic carcinoma of the right breast.     She is currently on clinical trial therapy with erdafitinib.  She began that therapy on May 8th.  That has been associated with some mucositis and hyperphosphatemia.    Today she reports that her left groin pain has continued to worsen.  It increases with activity but also sometimes wakes her at night.  She has been taking Aleve and occasionally some Dilaudid.  Three days ago she began to develop mucositis and now can only eat soft foods.  She is having significant alopecia.  She has tenderness and peeling of her fingertips and discoloration and lifting of her fingernails.  Bowel function is actually improved after taking some MiraLax.  She has no shortness of breath.    ECOG-1    Breast history: In 2013 she was diagnosed with stage IIB carcinoma of the right breast, ER positive with a low Oncotype score.  She was enrolled on protocol  and randomized to endocrine therapy alone.  She was tried on all 3 aromatase inhibitors and had itching and rash to all of them.  In August 2013 she was started on tamoxifen.   She was found to have  bone metastasis in May 2015, 2015.  A subsequent bone biopsy was performed on a lesion at the T8 vertebra.   The pathology from that was consistent with metastatic breast cancer, ER +80%, [place] +80%, and HER-2 negative.      She received letrozole plus palbociclib from July 2015 until May 2016.  She also received bone protective therapy with Xgeva.      Faslodex was started in June 2016.      Exemestane and Afinitor started in August 2017.  That was discontinued in February 2018 due to progression in the liver and bone.     Navelbine was started February 16, 2018.  She had 8 cycles for that.  Scans in November 2018 showed progression as follows     Capecitabine was started in December  2018.  She received 5 cycles of that therapy.    Follow-up scans in April 2019 showed progression with a new hepatic lesion and worsening bone disease.  Review of Systems   Constitutional: Negative for appetite change (Can only eat soft foods), fever and unexpected weight change.   HENT: Positive for mouth sores. Negative for trouble swallowing.    Eyes: Negative for visual disturbance.   Respiratory: Negative for cough and shortness of breath.    Cardiovascular: Negative for chest pain.   Gastrointestinal: Negative for abdominal pain and diarrhea.   Genitourinary: Negative for frequency.   Musculoskeletal: Positive for back pain.        Left groin pain   Skin: Positive for rash.        Peeling and erythema fingertips, finger nail discoloration and lifting   Neurological: Negative for headaches.   Hematological: Negative for adenopathy.   Psychiatric/Behavioral: The patient is not nervous/anxious.        Objective:      Physical Exam   Constitutional: She is oriented to person, place, and time. She appears well-developed and well-nourished. No distress.   HENT:   Mouth/Throat: Oropharynx is clear and moist. No oropharyngeal exudate.   Ulcers lower lip   Eyes: No scleral icterus.   Cardiovascular: Normal rate and regular rhythm.   Pulmonary/Chest: Effort normal and breath sounds normal.   Abdominal: Soft. She exhibits no mass. There is no tenderness.   Lymphadenopathy:     She has no cervical adenopathy.   Neurological: She is alert and oriented to person, place, and time.   Skin:   Peeling and erythema finger tips with diffuse nail thickening and discoloration   Psychiatric: She has a normal mood and affect. Her behavior is normal. Thought content normal.   Vitals reviewed.      Assessment:     bone scan shows stable disease  CT scan shows stable hepatic disease and stable bone disease  CMP shows hepatic and renal function, phosphorus 4.9, CBC is normal  1. Malignant neoplasm of central portion of right breast in  female, estrogen receptor positive    2. Bone metastasis    3. Metastasis to liver        Plan:     hold therapy 1 week.  MRI pelvis and L-spine  Steroid mouthwash.

## 2019-09-04 ENCOUNTER — OFFICE VISIT (OUTPATIENT)
Dept: HEMATOLOGY/ONCOLOGY | Facility: CLINIC | Age: 64
End: 2019-09-04
Payer: MEDICARE

## 2019-09-04 ENCOUNTER — INFUSION (OUTPATIENT)
Dept: INFUSION THERAPY | Facility: HOSPITAL | Age: 64
End: 2019-09-04
Attending: INTERNAL MEDICINE
Payer: MEDICARE

## 2019-09-04 ENCOUNTER — RESEARCH ENCOUNTER (OUTPATIENT)
Dept: RESEARCH | Facility: HOSPITAL | Age: 64
End: 2019-09-04

## 2019-09-04 VITALS
TEMPERATURE: 98 F | RESPIRATION RATE: 20 BRPM | SYSTOLIC BLOOD PRESSURE: 122 MMHG | OXYGEN SATURATION: 97 % | HEART RATE: 69 BPM | BODY MASS INDEX: 27.36 KG/M2 | DIASTOLIC BLOOD PRESSURE: 73 MMHG | WEIGHT: 159.38 LBS

## 2019-09-04 DIAGNOSIS — C50.111 MALIGNANT NEOPLASM OF CENTRAL PORTION OF RIGHT BREAST IN FEMALE, ESTROGEN RECEPTOR POSITIVE: Primary | ICD-10-CM

## 2019-09-04 DIAGNOSIS — C79.51 BONE METASTASIS: ICD-10-CM

## 2019-09-04 DIAGNOSIS — C79.51 BREAST CANCER METASTASIZED TO BONE: Primary | ICD-10-CM

## 2019-09-04 DIAGNOSIS — C50.919 BREAST CANCER METASTASIZED TO BONE: Primary | ICD-10-CM

## 2019-09-04 DIAGNOSIS — C78.7 METASTASIS TO LIVER: ICD-10-CM

## 2019-09-04 DIAGNOSIS — Z00.6 EXAMINATION OF PARTICIPANT IN CLINICAL TRIAL: ICD-10-CM

## 2019-09-04 DIAGNOSIS — Z17.0 MALIGNANT NEOPLASM OF CENTRAL PORTION OF RIGHT BREAST IN FEMALE, ESTROGEN RECEPTOR POSITIVE: Primary | ICD-10-CM

## 2019-09-04 DIAGNOSIS — Z51.11 ENCOUNTER FOR ANTINEOPLASTIC CHEMOTHERAPY: Primary | ICD-10-CM

## 2019-09-04 DIAGNOSIS — K12.31 MUCOSITIS DUE TO ANTINEOPLASTIC THERAPY: ICD-10-CM

## 2019-09-04 PROCEDURE — 99214 PR OFFICE/OUTPT VISIT, EST, LEVL IV, 30-39 MIN: ICD-10-PCS | Mod: HCNC,S$GLB,, | Performed by: INTERNAL MEDICINE

## 2019-09-04 PROCEDURE — 3078F DIAST BP <80 MM HG: CPT | Mod: HCNC,CPTII,S$GLB, | Performed by: INTERNAL MEDICINE

## 2019-09-04 PROCEDURE — A4216 STERILE WATER/SALINE, 10 ML: HCPCS | Mod: HCNC | Performed by: INTERNAL MEDICINE

## 2019-09-04 PROCEDURE — 99214 OFFICE O/P EST MOD 30 MIN: CPT | Mod: HCNC,S$GLB,, | Performed by: INTERNAL MEDICINE

## 2019-09-04 PROCEDURE — 3078F PR MOST RECENT DIASTOLIC BLOOD PRESSURE < 80 MM HG: ICD-10-PCS | Mod: HCNC,CPTII,S$GLB, | Performed by: INTERNAL MEDICINE

## 2019-09-04 PROCEDURE — 3074F SYST BP LT 130 MM HG: CPT | Mod: HCNC,CPTII,S$GLB, | Performed by: INTERNAL MEDICINE

## 2019-09-04 PROCEDURE — 25000003 PHARM REV CODE 250: Mod: HCNC | Performed by: INTERNAL MEDICINE

## 2019-09-04 PROCEDURE — 96523 IRRIG DRUG DELIVERY DEVICE: CPT | Mod: HCNC

## 2019-09-04 PROCEDURE — 63600175 PHARM REV CODE 636 W HCPCS: Mod: HCNC | Performed by: INTERNAL MEDICINE

## 2019-09-04 PROCEDURE — 3074F PR MOST RECENT SYSTOLIC BLOOD PRESSURE < 130 MM HG: ICD-10-PCS | Mod: HCNC,CPTII,S$GLB, | Performed by: INTERNAL MEDICINE

## 2019-09-04 PROCEDURE — 99999 PR PBB SHADOW E&M-EST. PATIENT-LVL V: CPT | Mod: PBBFAC,HCNC,, | Performed by: INTERNAL MEDICINE

## 2019-09-04 PROCEDURE — 99999 PR PBB SHADOW E&M-EST. PATIENT-LVL V: ICD-10-PCS | Mod: PBBFAC,HCNC,, | Performed by: INTERNAL MEDICINE

## 2019-09-04 PROCEDURE — 3008F BODY MASS INDEX DOCD: CPT | Mod: HCNC,CPTII,S$GLB, | Performed by: INTERNAL MEDICINE

## 2019-09-04 PROCEDURE — 3008F PR BODY MASS INDEX (BMI) DOCUMENTED: ICD-10-PCS | Mod: HCNC,CPTII,S$GLB, | Performed by: INTERNAL MEDICINE

## 2019-09-04 RX ORDER — SODIUM CHLORIDE 0.9 % (FLUSH) 0.9 %
10 SYRINGE (ML) INJECTION
Status: COMPLETED | OUTPATIENT
Start: 2019-09-04 | End: 2019-09-04

## 2019-09-04 RX ORDER — SODIUM CHLORIDE 0.9 % (FLUSH) 0.9 %
10 SYRINGE (ML) INJECTION
Status: CANCELLED | OUTPATIENT
Start: 2019-09-04

## 2019-09-04 RX ORDER — HEPARIN 100 UNIT/ML
500 SYRINGE INTRAVENOUS
Status: COMPLETED | OUTPATIENT
Start: 2019-09-04 | End: 2019-09-04

## 2019-09-04 RX ORDER — HEPARIN 100 UNIT/ML
500 SYRINGE INTRAVENOUS
Status: CANCELLED | OUTPATIENT
Start: 2019-09-04

## 2019-09-04 RX ORDER — DEXAMETHASONE 0.5 MG/5ML
0.5 ELIXIR ORAL 2 TIMES DAILY
Qty: 250 ML | Refills: 4 | Status: SHIPPED | OUTPATIENT
Start: 2019-09-04 | End: 2019-09-09 | Stop reason: SDUPTHER

## 2019-09-04 RX ADMIN — Medication 10 ML: at 08:09

## 2019-09-04 RX ADMIN — HEPARIN SODIUM (PORCINE) LOCK FLUSH IV SOLN 100 UNIT/ML 500 UNITS: 100 SOLUTION at 08:09

## 2019-09-04 NOTE — PROGRESS NOTES
Protocol: MATCH-Molecular Analysis for Therapy choice  Subprotocol K2: Phase 2 of [id number] (Erdafitinib) in Patients with Tumors with FGFR Mutations or Fusions  IRB# [id number]  Sponsor: [employer]  PI: Dr. High  Study #[id number]    Cycle 4 continued 9/4/19  Pt arrived this morning for potential cycle 5 of the above mentioned protocol. Pt is AAO x's 3 with appropriate and affect. Pt had local labs and scans done 9/3/19.  Pt expresses willingness to continue to participate in the above mentioned protocol. Pt was seen and examined by Dr. Schroeder. Labs also reviewed by Dr. Schroeder. Pt's scans continue to show stable disease. Pt continues to have increasing pain to her left groin. Pt states that this pain is limiting her ADL's. Dr. Schroeder to order MRI to get a better picture to see what is causing the pain, since her scans are stable.  Mouth was examined by Dr. Schroeder, and pt has Grade 2 mucositis. Pt states her dry mouth has worsened also this past month. Pt's fingernails are beginning to lift off the nail bed.  Pt also has some palmar-plantar erythrodysesthesia to her fingertips and feet which continues at a grade 2. Pt to be held x's 1 week and RTC in one week to see Dr. Schroeder. If symptoms begin to resolve, pt will either restart at 6mg or be dose reduced to 4mg. Dr. Schroeder to order steroid solution to help with dry mouth and mucositis. Pt returned used bottles of the 3mg tablets and [facility]  Pt is in agreement with this plan. Pt voiced understanding. All of her questions were answered to her satisfaction by myself and Dr. Schroeder. Instructed pt to call me or Dr. Schroeder with any questions, concerns and any unusual symptoms.      SEE SHADOW CHART FOR MOST UPDATED AE LIST    AE's  Dry Mouth, Grade 2:  Constipation, Grade 1: Pt taking Colace  Palmar-plantar erythrodysesthesia syndrome, Grade 2:  Alkaline phosphatase increased, Grade 1:   Skin and subcutaneous tissue disorders-Other (nail disorder),  Grade 2:  Pain, left groin, Grade 2:  Xray normal, pt to try advil for a few days.  Dyspepsia, Grade 1  Fatigue, Grade 2:   Mucositis oral, Grade 2:   Platelet count decreased, Grade 1:  Hyperphosphatemia, Grade 1:       Baseline History  Hypertension, Grade 2 (pt denies this)  Peripheral neuropathy, Grade 1  Cancer Pain, Grade 2  Depression  Seasonal Allergies  Anxiety  Asthma R/T seasonal allergies  Hypercholesteremia  Lymphedema  Osteoporosis  Hasimoto Disease  Dry Eyes, Grade 1

## 2019-09-04 NOTE — NURSING
Patient here for port flush-left chest port accesses easily with good blood return-line flushed and needle removed.

## 2019-09-04 NOTE — TELEPHONE ENCOUNTER
----- Message from Rodrigo Schroeder MD sent at 9/4/2019  8:30 AM CDT -----  MRI pelvis and lumbar spine next available, see me in 1 week

## 2019-09-05 ENCOUNTER — PATIENT MESSAGE (OUTPATIENT)
Dept: HEMATOLOGY/ONCOLOGY | Facility: CLINIC | Age: 64
End: 2019-09-05

## 2019-09-05 DIAGNOSIS — R10.32 LEFT GROIN PAIN: Primary | ICD-10-CM

## 2019-09-06 ENCOUNTER — PATIENT MESSAGE (OUTPATIENT)
Dept: HEMATOLOGY/ONCOLOGY | Facility: CLINIC | Age: 64
End: 2019-09-06

## 2019-09-09 ENCOUNTER — HOSPITAL ENCOUNTER (OUTPATIENT)
Dept: RADIOLOGY | Facility: HOSPITAL | Age: 64
Discharge: HOME | End: 2019-09-09
Attending: INTERNAL MEDICINE
Payer: MEDICARE

## 2019-09-09 DIAGNOSIS — G89.3 CANCER RELATED PAIN: ICD-10-CM

## 2019-09-09 DIAGNOSIS — Z17.0 MALIGNANT NEOPLASM OF CENTRAL PORTION OF RIGHT BREAST IN FEMALE, ESTROGEN RECEPTOR POSITIVE: ICD-10-CM

## 2019-09-09 DIAGNOSIS — C50.111 MALIGNANT NEOPLASM OF CENTRAL PORTION OF RIGHT BREAST IN FEMALE, ESTROGEN RECEPTOR POSITIVE: ICD-10-CM

## 2019-09-09 DIAGNOSIS — C79.51 BONE METASTASIS: ICD-10-CM

## 2019-09-09 DIAGNOSIS — K12.31 MUCOSITIS DUE TO ANTINEOPLASTIC THERAPY: ICD-10-CM

## 2019-09-09 DIAGNOSIS — K12.30 MUCOSITIS: ICD-10-CM

## 2019-09-09 PROCEDURE — 72158 MRI LUMBAR SPINE W WO CONTRAST: ICD-10-PCS | Mod: 26,HCNC,, | Performed by: RADIOLOGY

## 2019-09-09 PROCEDURE — 72197 MRI PELVIS W/O & W/DYE: CPT | Mod: TC,HCNC

## 2019-09-09 PROCEDURE — 72197 MRI PELVIS W WO CONTRAST: ICD-10-PCS | Mod: 26,HCNC,, | Performed by: RADIOLOGY

## 2019-09-09 PROCEDURE — A9585 GADOBUTROL INJECTION: HCPCS | Mod: HCNC | Performed by: INTERNAL MEDICINE

## 2019-09-09 PROCEDURE — 72158 MRI LUMBAR SPINE W/O & W/DYE: CPT | Mod: 26,HCNC,, | Performed by: RADIOLOGY

## 2019-09-09 PROCEDURE — 72158 MRI LUMBAR SPINE W/O & W/DYE: CPT | Mod: TC,HCNC

## 2019-09-09 PROCEDURE — 25500020 PHARM REV CODE 255: Mod: HCNC | Performed by: INTERNAL MEDICINE

## 2019-09-09 PROCEDURE — 72197 MRI PELVIS W/O & W/DYE: CPT | Mod: 26,HCNC,, | Performed by: RADIOLOGY

## 2019-09-09 RX ORDER — MORPHINE SULFATE 60 MG/1
60 TABLET, FILM COATED, EXTENDED RELEASE ORAL EVERY 8 HOURS
Qty: 90 TABLET | Refills: 0 | Status: SHIPPED | OUTPATIENT
Start: 2019-09-09 | End: 2019-09-09 | Stop reason: SDUPTHER

## 2019-09-09 RX ORDER — HYDROMORPHONE HYDROCHLORIDE 4 MG/1
4 TABLET ORAL EVERY 4 HOURS PRN
Qty: 180 TABLET | Refills: 0 | Status: SHIPPED | OUTPATIENT
Start: 2019-09-09 | End: 2019-09-09 | Stop reason: SDUPTHER

## 2019-09-09 RX ORDER — DEXAMETHASONE 0.5 MG/5ML
0.5 ELIXIR ORAL 2 TIMES DAILY
Qty: 250 ML | Refills: 4 | Status: SHIPPED | OUTPATIENT
Start: 2019-09-09 | End: 2019-09-09 | Stop reason: SDUPTHER

## 2019-09-09 RX ORDER — HYDROMORPHONE HYDROCHLORIDE 4 MG/1
4 TABLET ORAL EVERY 4 HOURS PRN
Qty: 180 TABLET | Refills: 0 | Status: SHIPPED | OUTPATIENT
Start: 2019-09-09 | End: 2019-10-18 | Stop reason: SDUPTHER

## 2019-09-09 RX ORDER — MORPHINE SULFATE 60 MG/1
60 TABLET, FILM COATED, EXTENDED RELEASE ORAL EVERY 8 HOURS
Qty: 90 TABLET | Refills: 0 | Status: SHIPPED | OUTPATIENT
Start: 2019-09-09 | End: 2019-10-11 | Stop reason: SDUPTHER

## 2019-09-09 RX ORDER — GADOBUTROL 604.72 MG/ML
8 INJECTION INTRAVENOUS
Status: COMPLETED | OUTPATIENT
Start: 2019-09-09 | End: 2019-09-09

## 2019-09-09 RX ORDER — DEXAMETHASONE 0.5 MG/5ML
0.5 ELIXIR ORAL 2 TIMES DAILY
Qty: 250 ML | Refills: 4 | Status: SHIPPED | OUTPATIENT
Start: 2019-09-09 | End: 2019-09-19

## 2019-09-09 RX ADMIN — GADOBUTROL 8 ML: 604.72 INJECTION INTRAVENOUS at 04:09

## 2019-09-11 ENCOUNTER — TELEPHONE (OUTPATIENT)
Dept: HEMATOLOGY/ONCOLOGY | Facility: CLINIC | Age: 64
End: 2019-09-11

## 2019-09-11 ENCOUNTER — INITIAL CONSULT (OUTPATIENT)
Dept: RADIATION ONCOLOGY | Facility: CLINIC | Age: 64
End: 2019-09-11
Payer: MEDICARE

## 2019-09-11 ENCOUNTER — RESEARCH ENCOUNTER (OUTPATIENT)
Dept: RESEARCH | Facility: HOSPITAL | Age: 64
End: 2019-09-11

## 2019-09-11 ENCOUNTER — LAB VISIT (OUTPATIENT)
Dept: LAB | Facility: HOSPITAL | Age: 64
End: 2019-09-11
Payer: MEDICARE

## 2019-09-11 ENCOUNTER — OFFICE VISIT (OUTPATIENT)
Dept: HEMATOLOGY/ONCOLOGY | Facility: CLINIC | Age: 64
End: 2019-09-11
Payer: MEDICARE

## 2019-09-11 VITALS
HEART RATE: 80 BPM | DIASTOLIC BLOOD PRESSURE: 112 MMHG | SYSTOLIC BLOOD PRESSURE: 171 MMHG | TEMPERATURE: 98 F | OXYGEN SATURATION: 99 % | RESPIRATION RATE: 20 BRPM | WEIGHT: 159.38 LBS | BODY MASS INDEX: 27.21 KG/M2 | HEIGHT: 64 IN

## 2019-09-11 VITALS
DIASTOLIC BLOOD PRESSURE: 77 MMHG | BODY MASS INDEX: 27.29 KG/M2 | RESPIRATION RATE: 20 BRPM | WEIGHT: 159 LBS | TEMPERATURE: 98 F | SYSTOLIC BLOOD PRESSURE: 139 MMHG | HEART RATE: 77 BPM

## 2019-09-11 DIAGNOSIS — C50.111 MALIGNANT NEOPLASM OF CENTRAL PORTION OF RIGHT BREAST IN FEMALE, ESTROGEN RECEPTOR POSITIVE: ICD-10-CM

## 2019-09-11 DIAGNOSIS — C50.111 MALIGNANT NEOPLASM OF CENTRAL PORTION OF RIGHT BREAST IN FEMALE, ESTROGEN RECEPTOR POSITIVE: Primary | ICD-10-CM

## 2019-09-11 DIAGNOSIS — C50.919 BREAST CANCER: ICD-10-CM

## 2019-09-11 DIAGNOSIS — C79.51 BONE METASTASIS: Primary | ICD-10-CM

## 2019-09-11 DIAGNOSIS — Z17.0 MALIGNANT NEOPLASM OF CENTRAL PORTION OF RIGHT BREAST IN FEMALE, ESTROGEN RECEPTOR POSITIVE: Primary | ICD-10-CM

## 2019-09-11 DIAGNOSIS — C79.51 BREAST CANCER METASTASIZED TO BONE: ICD-10-CM

## 2019-09-11 DIAGNOSIS — C78.7 METASTASIS TO LIVER: ICD-10-CM

## 2019-09-11 DIAGNOSIS — C79.51 METASTATIC CANCER TO SPINE: ICD-10-CM

## 2019-09-11 DIAGNOSIS — C50.919 BREAST CANCER METASTASIZED TO BONE: ICD-10-CM

## 2019-09-11 DIAGNOSIS — Z17.0 MALIGNANT NEOPLASM OF CENTRAL PORTION OF RIGHT BREAST IN FEMALE, ESTROGEN RECEPTOR POSITIVE: ICD-10-CM

## 2019-09-11 DIAGNOSIS — C79.51 BONE METASTASIS: ICD-10-CM

## 2019-09-11 DIAGNOSIS — Z00.6 EXAMINATION OF PARTICIPANT IN CLINICAL TRIAL: ICD-10-CM

## 2019-09-11 DIAGNOSIS — G89.3 CANCER RELATED PAIN: ICD-10-CM

## 2019-09-11 LAB
ALBUMIN SERPL BCP-MCNC: 3.6 G/DL (ref 3.5–5.2)
ALP SERPL-CCNC: 160 U/L (ref 55–135)
ALT SERPL W/O P-5'-P-CCNC: 11 U/L (ref 10–44)
ANION GAP SERPL CALC-SCNC: 9 MMOL/L (ref 8–16)
AST SERPL-CCNC: 19 U/L (ref 10–40)
BASOPHILS # BLD AUTO: 0.01 K/UL (ref 0–0.2)
BASOPHILS NFR BLD: 0.2 % (ref 0–1.9)
BILIRUB DIRECT SERPL-MCNC: 0.3 MG/DL (ref 0.1–0.3)
BILIRUB SERPL-MCNC: 0.6 MG/DL (ref 0.1–1)
BUN SERPL-MCNC: 15 MG/DL (ref 8–23)
CALCIUM SERPL-MCNC: 9.5 MG/DL (ref 8.7–10.5)
CHLORIDE SERPL-SCNC: 104 MMOL/L (ref 95–110)
CO2 SERPL-SCNC: 27 MMOL/L (ref 23–29)
CREAT SERPL-MCNC: 0.8 MG/DL (ref 0.5–1.4)
DIFFERENTIAL METHOD: ABNORMAL
EOSINOPHIL # BLD AUTO: 0.2 K/UL (ref 0–0.5)
EOSINOPHIL NFR BLD: 3.1 % (ref 0–8)
ERYTHROCYTE [DISTWIDTH] IN BLOOD BY AUTOMATED COUNT: 13 % (ref 11.5–14.5)
EST. GFR  (AFRICAN AMERICAN): >60 ML/MIN/1.73 M^2
EST. GFR  (NON AFRICAN AMERICAN): >60 ML/MIN/1.73 M^2
GLUCOSE SERPL-MCNC: 110 MG/DL (ref 70–110)
HCT VFR BLD AUTO: 40.8 % (ref 37–48.5)
HGB BLD-MCNC: 12.9 G/DL (ref 12–16)
IMM GRANULOCYTES # BLD AUTO: 0.01 K/UL (ref 0–0.04)
IMM GRANULOCYTES NFR BLD AUTO: 0.2 % (ref 0–0.5)
LDH SERPL L TO P-CCNC: 182 U/L (ref 110–260)
LYMPHOCYTES # BLD AUTO: 1.1 K/UL (ref 1–4.8)
LYMPHOCYTES NFR BLD: 22.8 % (ref 18–48)
MAGNESIUM SERPL-MCNC: 1.9 MG/DL (ref 1.6–2.6)
MCH RBC QN AUTO: 28.7 PG (ref 27–31)
MCHC RBC AUTO-ENTMCNC: 31.6 G/DL (ref 32–36)
MCV RBC AUTO: 91 FL (ref 82–98)
MONOCYTES # BLD AUTO: 0.5 K/UL (ref 0.3–1)
MONOCYTES NFR BLD: 10.7 % (ref 4–15)
NEUTROPHILS # BLD AUTO: 3 K/UL (ref 1.8–7.7)
NEUTROPHILS NFR BLD: 63 % (ref 38–73)
NRBC BLD-RTO: 0 /100 WBC
PHOSPHATE SERPL-MCNC: 3.2 MG/DL (ref 2.7–4.5)
PLATELET # BLD AUTO: 159 K/UL (ref 150–350)
PMV BLD AUTO: 9.4 FL (ref 9.2–12.9)
POTASSIUM SERPL-SCNC: 4.3 MMOL/L (ref 3.5–5.1)
PROT SERPL-MCNC: 6.9 G/DL (ref 6–8.4)
RBC # BLD AUTO: 4.49 M/UL (ref 4–5.4)
SODIUM SERPL-SCNC: 140 MMOL/L (ref 136–145)
WBC # BLD AUTO: 4.78 K/UL (ref 3.9–12.7)

## 2019-09-11 PROCEDURE — 99213 PR OFFICE/OUTPT VISIT, EST, LEVL III, 20-29 MIN: ICD-10-PCS | Mod: HCNC,S$GLB,, | Performed by: INTERNAL MEDICINE

## 2019-09-11 PROCEDURE — 80053 COMPREHEN METABOLIC PANEL: CPT | Mod: HCNC

## 2019-09-11 PROCEDURE — 82248 BILIRUBIN DIRECT: CPT | Mod: HCNC

## 2019-09-11 PROCEDURE — 3080F PR MOST RECENT DIASTOLIC BLOOD PRESSURE >= 90 MM HG: ICD-10-PCS | Mod: HCNC,CPTII,S$GLB, | Performed by: INTERNAL MEDICINE

## 2019-09-11 PROCEDURE — 99999 PR PBB SHADOW E&M-EST. PATIENT-LVL V: ICD-10-PCS | Mod: PBBFAC,HCNC,, | Performed by: INTERNAL MEDICINE

## 2019-09-11 PROCEDURE — 84100 ASSAY OF PHOSPHORUS: CPT | Mod: HCNC

## 2019-09-11 PROCEDURE — 3078F DIAST BP <80 MM HG: CPT | Mod: HCNC,CPTII,S$GLB, | Performed by: RADIOLOGY

## 2019-09-11 PROCEDURE — 85025 COMPLETE CBC W/AUTO DIFF WBC: CPT | Mod: HCNC

## 2019-09-11 PROCEDURE — 3008F PR BODY MASS INDEX (BMI) DOCUMENTED: ICD-10-PCS | Mod: HCNC,CPTII,S$GLB, | Performed by: INTERNAL MEDICINE

## 2019-09-11 PROCEDURE — 99999 PR PBB SHADOW E&M-EST. PATIENT-LVL V: ICD-10-PCS | Mod: PBBFAC,HCNC,, | Performed by: RADIOLOGY

## 2019-09-11 PROCEDURE — 99999 PR PBB SHADOW E&M-EST. PATIENT-LVL V: CPT | Mod: PBBFAC,HCNC,, | Performed by: RADIOLOGY

## 2019-09-11 PROCEDURE — 99499 UNLISTED E&M SERVICE: CPT | Mod: HCNC,S$GLB,, | Performed by: INTERNAL MEDICINE

## 2019-09-11 PROCEDURE — 99499 RISK ADDL DX/OHS AUDIT: ICD-10-PCS | Mod: HCNC,S$GLB,, | Performed by: INTERNAL MEDICINE

## 2019-09-11 PROCEDURE — 99204 OFFICE O/P NEW MOD 45 MIN: CPT | Mod: HCNC,S$GLB,, | Performed by: RADIOLOGY

## 2019-09-11 PROCEDURE — 83735 ASSAY OF MAGNESIUM: CPT | Mod: HCNC

## 2019-09-11 PROCEDURE — 3008F BODY MASS INDEX DOCD: CPT | Mod: HCNC,CPTII,S$GLB, | Performed by: RADIOLOGY

## 2019-09-11 PROCEDURE — 3075F PR MOST RECENT SYSTOLIC BLOOD PRESS GE 130-139MM HG: ICD-10-PCS | Mod: HCNC,CPTII,S$GLB, | Performed by: RADIOLOGY

## 2019-09-11 PROCEDURE — 99204 PR OFFICE/OUTPT VISIT, NEW, LEVL IV, 45-59 MIN: ICD-10-PCS | Mod: HCNC,S$GLB,, | Performed by: RADIOLOGY

## 2019-09-11 PROCEDURE — 3078F PR MOST RECENT DIASTOLIC BLOOD PRESSURE < 80 MM HG: ICD-10-PCS | Mod: HCNC,CPTII,S$GLB, | Performed by: RADIOLOGY

## 2019-09-11 PROCEDURE — 3008F PR BODY MASS INDEX (BMI) DOCUMENTED: ICD-10-PCS | Mod: HCNC,CPTII,S$GLB, | Performed by: RADIOLOGY

## 2019-09-11 PROCEDURE — 3077F SYST BP >= 140 MM HG: CPT | Mod: HCNC,CPTII,S$GLB, | Performed by: INTERNAL MEDICINE

## 2019-09-11 PROCEDURE — 3008F BODY MASS INDEX DOCD: CPT | Mod: HCNC,CPTII,S$GLB, | Performed by: INTERNAL MEDICINE

## 2019-09-11 PROCEDURE — 3075F SYST BP GE 130 - 139MM HG: CPT | Mod: HCNC,CPTII,S$GLB, | Performed by: RADIOLOGY

## 2019-09-11 PROCEDURE — 36415 COLL VENOUS BLD VENIPUNCTURE: CPT | Mod: HCNC

## 2019-09-11 PROCEDURE — 3080F DIAST BP >= 90 MM HG: CPT | Mod: HCNC,CPTII,S$GLB, | Performed by: INTERNAL MEDICINE

## 2019-09-11 PROCEDURE — 99999 PR PBB SHADOW E&M-EST. PATIENT-LVL V: CPT | Mod: PBBFAC,HCNC,, | Performed by: INTERNAL MEDICINE

## 2019-09-11 PROCEDURE — 83615 LACTATE (LD) (LDH) ENZYME: CPT | Mod: HCNC

## 2019-09-11 PROCEDURE — 3077F PR MOST RECENT SYSTOLIC BLOOD PRESSURE >= 140 MM HG: ICD-10-PCS | Mod: HCNC,CPTII,S$GLB, | Performed by: INTERNAL MEDICINE

## 2019-09-11 PROCEDURE — 99213 OFFICE O/P EST LOW 20 MIN: CPT | Mod: HCNC,S$GLB,, | Performed by: INTERNAL MEDICINE

## 2019-09-11 RX ORDER — MORPHINE SULFATE 30 MG/1
30 TABLET, FILM COATED, EXTENDED RELEASE ORAL 2 TIMES DAILY
Qty: 90 TABLET | Refills: 0 | Status: SHIPPED | OUTPATIENT
Start: 2019-09-11 | End: 2019-10-11 | Stop reason: SDUPTHER

## 2019-09-11 NOTE — PATIENT INSTRUCTIONS
Return for ct/sim as scheduled  Radiation Therapy Treatment  Radiation therapy can help you in your fight against cancer. It begins with a session to discuss treatment with your doctor. If you and your doctor decide on radiation, you will return for a simulation. The simulation is a planning session that helps the doctor target your cancer. He or she will design a radiation plan to protect normal tissues. When the simulation and plan are completed, you will begin your daily treatments. Treatment is usually once daily Monday to Friday. It takes less than a half an hour. Sometimes you may need radiation twice a day, with usually 6 hours between treatments. After the course of radiation is complete, you will be scheduled for follow-up appointments. This is to make sure the cancer is under control. The follow-ups will also make sure that any side effects from the treatment are taken care of.  Radiation therapy uses high-energy X-rays to kill cancer cells.   Your treatment planning visit: The simulation  Your radiation therapy team uses a special machine called a simulator to map out your treatment. The simulator is usually an X-ray machine (fluoroscopy), CT scanner, MRI scanner, or PET-CT scanner machine. Laser lights act as guides to help position your body accurately. During this visit:  · The team figures out the best position for your body. They make notes in your chart so youll be placed the same way each time.  · You may use special devices to keep your body correctly positioned and still during treatment. These may include molds, masks, rests, and blocks.  · The team makes ink marks on your skin. These will help you get in the same position for each treatment. Tiny permanent tattoos may also be used.   · Markers such as metal balls or wires may be put on or in your body. Sometime these are taped to the skin to help with the imaging process. These work with the X-rays to position your body. The markers are  removed when the visit is over.  After the team has the imaging and data, the information is sent into the computer planning system. Your doctor and the team of physicists and dosimetrists design a treatment field. The field will best target your cancer and how it might spread. It will also help limit radiation to nearby normal tissues.  Your treatments  Each treatment usually takes 10 to 30 minutes. You may need to change into a hospital gown. The radiation therapist puts you in the correct position on the treatment table, then leaves the room. Sometimes you may need more imaging before each treatment. The machine may take digital X-rays or a CT scan to help make sure you are lined up correctly. During treatment, lie as still as you can and breathe normally. You will hear noises coming from the machine. You can talk to the radiation therapist, who watches you from the control room on a TV monitor. After treatment, the therapist will help you off the table. You can then get dressed and go back to your normal activities.  Date Last Reviewed: 1/14/2016 © 2000-2017 [employer]. [address] [place] [address]. All rights reserved. This information is not intended as a substitute for professional medical care. Always follow your healthcare professional's instructions.        Discharge Instructions for Radiation Therapy  Radiation therapy uses high-energy X-rays to kill cancer cells and help you in your fight against cancer. Radiation destroys cancer cells gradually, over time. The goal of therapy is to focus on and kill as many cancer cells as possible. Radiation can also damage or kill some of the normal cells that are closest to the tumor. Damaged normal cells can repair themselves, often within a few days.  Caring for your skin  You should ask your healthcare provider for specific products that he or she recommends for washing and bathing. In general, use a mild nondetergent soap and warm  "(not hot) water to clean the area receiving radiation. Pat the region dry rather than rubbing.  Your healthcare provider may give you products to moisturize the skin and prevent infection. The goal is to prevent cracks or breaks in the skin that may be sensitive from treatment:   · Dont be surprised if your treatment causes skin redness, and a type of "sunburn" over time. Some radiation treatments can cause this.   · Ask your therapy team what lotion to use. Also ask for directions about when and how to apply it.  · Avoid prolonged or direct sunlight on the treated area. Ask your therapy team about using a sunscreen. You do not have to avoid going outside altogether, but must take appropriate precautions.  · Dont remove ink marks unless your radiation therapist says its OK. Dont scrub or use soap on the marks when you wash. Let water run over them and pat them dry.  · Protect your skin from heat or cold. Avoid hot tubs, saunas, heating pads, or ice packs.  · Avoid clothing that causes friction or rubbing on the skin.  Fighting fatigue  Radiation therapy may cause you to feel tired. Your body is working hard to heal and repair itself. To feel better, try these things:  · Do light exercise each day. Take short walks.  · Plan tasks for the times when you tend to have the most energy. Ask for help when you need it.  · Relax before you go to bed. This will help you sleep better. Try reading or listening to soothing music.  Coping with appetite changes  Here are ways to cope:  · Tell your therapy team if you find it hard to eat or you have no appetite. You may be referred to a nutritionist to help you with meal planning.  · Radiation to certain internal sites can cause nausea, depending on the location of treatment. This can affect your appetite. Think of healthy eating as part of your treatment. Try these tips:  ¨ Eat slowly.  ¨ Eat small meals several times a day.  ¨ Eat more food when youre feeling better.  ¨ Ask " others to keep you company when you eat.  ¨ Stock up on easy-to-prepare foods.  ¨ Eat foods high in protein and calories. Your healthcare provider may recommend liquid meal supplements.  ¨ Drink plenty of water and other fluids.  ¨ Ask your healthcare provider before taking any vitamins or over-the-counter supplements. Such products are not regulated by the FDA and can sometimes interfere with your treatments.   Dealing with other side effects  Here are suggestions to deal with other side effects:   · Be prepared for hair loss in the area being treated. The hair loss may be permanent. Be sure to discuss this with your healthcare provider.  · Sip cool water if your mouth or throat becomes dry or sore. Ice chips may also help.  · Tell your healthcare provider if you have diarrhea or constipation. You may be given a special diet.  · If you have trouble swallowing liquids, tell your healthcare provider.  Follow-up  Make a follow-up appointment as directed by your healthcare provider.     When to call your healthcare provider  Call your healthcare provider right away if you have any of the following:  · Unexpected headaches  · Trouble concentrating  · Ongoing fatigue  · Wheezing, shortness of breath, or trouble breathing  · Pain that doesnt go away, especially if its always in the same place  · New or unusual lumps, bumps, or swelling  · Dizziness or lightheadedness  · Unusual rashes, bruises, or bleeding  · Fever of 100.4°F (38°C) or higher, or chills  · Nausea and vomiting  · Diarrhea that doesnt improve with time  · Skin breakdown; significant pain due to skin irritation   Date Last Reviewed: 1/13/2016  © 4918-5998 [employer]. [address] [place] [address], [place] [address]. All rights reserved. This information is not intended as a substitute for professional medical care. Always follow your healthcare professional's instructions.

## 2019-09-11 NOTE — PROGRESS NOTES
Protocol: MATCH-Molecular Analysis for Therapy choice  Subprotocol K2: Phase 2 of [id number] (Erdafitinib) in Patients with Tumors with FGFR Mutations or Fusions  IRB# [id number]  Sponsor: [employer]  PI: Dr. High  Study #[id number]    EOT     9/11/19  Pt arrived this morning for potential cycle 5 of the above mentioned protocol. Pt is AAO x's 3 with appropriate and affect. Pt had MRI done this week which showed progression of her disease in her spine and pelvic area. Pt will come off trial and be placed on follow up.   Pt expresses willingness to continue to participate in the follow up portion of the above mentioned trial. Pt was seen and examined by Dr. Schroeder. Pt will be seen by radiation oncology for the spots in her pelvis. All of her questions were answered by myself and Dr. Schroeder.  Pt is in agreement with this plan.  Pt forgot pill diary for Cycle 4. Pt to bring with next appt with Dr. Schroeder and will call me.    SEE SHADOW CHART FOR MOST UPDATED AE LIST    AE's  Dry Mouth, Grade 1:  Constipation, Grade 1: Pt taking Colace  Palmar-plantar erythrodysesthesia syndrome, Grade 1:  Alkaline phosphatase increased, Grade 1:   Skin and subcutaneous tissue disorders-Other (nail disorder), Grade 1:  Pain, left groin, Grade 3:    Dyspepsia, Grade 1  Fatigue, Grade 2:            Baseline History  Hypertension, Grade 2 (pt denies this)  Peripheral neuropathy, Grade 1  Cancer Pain, Grade 2  Depression  Seasonal Allergies  Anxiety  Asthma R/T seasonal allergies  Hypercholesteremia  Lymphedema  Osteoporosis  Hasimoto Disease  Dry Eyes, Grade 1

## 2019-09-11 NOTE — PROGRESS NOTES
Subjective:       Patient ID: Rebecca Crain is a 63 y.o. female.    Chief Complaint: Malignant neoplasm of central portion of right breast in fem    HPI Ms. Crain is here for followup of metastatic carcinoma of the right breast.     She is currently on clinical trial therapy with erdafitinib.  She began that therapy on May 8th.    For the last several months she has had worsening left groin pain.  CTs and bone scans did not reveal any clear explanation for that.  Because of that I recommended that she have MRIs.  She returns for those results.    She reports that her pain is not been controlled with her MS Contin 60 mg every 8 hr and Dilaudid 4 mg every 4 hr.  The pain can wake her at night.  It limits her activities.        ECOG-2    Breast history: In 2013 she was diagnosed with stage IIB carcinoma of the right breast, ER positive with a low Oncotype score.  She was enrolled on protocol  and randomized to endocrine therapy alone.  She was tried on all 3 aromatase inhibitors and had itching and rash to all of them.  In August 2013 she was started on tamoxifen.   She was found to have  bone metastasis in May 2015, 2015.  A subsequent bone biopsy was performed on a lesion at the T8 vertebra.   The pathology from that was consistent with metastatic breast cancer, ER +80%, OK +80%, and HER-2 negative.      She received letrozole plus palbociclib from July 2015 until May 2016.  She also received bone protective therapy with Xgeva.      Faslodex was started in June 2016.      Exemestane and Afinitor started in August 2017.  That was discontinued in February 2018 due to progression in the liver and bone.     Navelbine was started February 16, 2018.  She had 8 cycles for that.  Scans in November 2018 showed progression as follows     Capecitabine was started in December 2018.  She received 5 cycles of that therapy.    Follow-up scans in April 2019 showed progression with a new hepatic lesion and worsening bone  disease.  Review of Systems   Constitutional: Negative for appetite change and unexpected weight change.   Eyes: Positive for visual disturbance.   Respiratory: Negative for cough and shortness of breath.    Cardiovascular: Negative for chest pain.   Gastrointestinal: Negative for abdominal pain and diarrhea.   Genitourinary: Negative for frequency.   Musculoskeletal: Negative for back pain.        Left groin pain   Skin: Negative for rash.   Neurological: Negative for headaches.   Hematological: Negative for adenopathy.   Psychiatric/Behavioral: The patient is not nervous/anxious.        Objective:      Physical Exam   Constitutional: She is oriented to person, place, and time. She appears well-developed and well-nourished. She appears distressed.   Neurological: She is alert and oriented to person, place, and time.   Psychiatric: Her behavior is normal.   Tearful   Vitals reviewed.      Assessment:     MRI of the lumbar spine shows severe compression fractures at T12 the mild L4 compression fracture.  There is left neural foraminal narrowing at T12-L1 with no significant spinal  canal stenosis    MRI of the pelvis shows multiple abnormal areas in the lower lumbar spine sec, iliac wings and femurs.  Is a linear signal abnormality in the mid aspect of the left superior pubic ramus concerning for nondisplaced pathological fracture.  There is muscular edema the insertion of the obturator external some musculature in the left superior pubic ramus.  1. Malignant neoplasm of central portion of right breast in female, estrogen receptor positive    2. 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 Bone metastasis    4. Metastasis to liver    5. Cancer related pain        Plan:       Her recommended that she have radiation therapy consultation for palliative radiation to the left pelvis.  I will see her again next week to discuss additional systemic therapy.  Increase MS Contin to 90 mg every 8 hr and Dilaudid to 8-12 mg every 4 hr as  needed.

## 2019-09-11 NOTE — PROGRESS NOTES
REFERRING PHYSICIAN:   Rodrigo Schroeder M.D.    DIAGNOSIS:  Stage IV right breast cancer with left groin pain    HISTORY OF PRESENT ILLNESS:   Ms. Crain is a 63-year-old female with history of postoperative radiation to the right breast in  who developed bone metastasis for which she received palliative radiation to the area between T10 and T12 in mid , now with progression of disease and worsening pain in the left groin region.  Imaging with CT and bone scans did not reveal any obvious lesions.  However, her pain worsened requiring 90 mg of MS Contin every 8 hr and Dilaudid 4 mg for breakthrough pain.  She underwent an MRI of the pelvis and lumbar spine on 2019 which reveals metastatic lesion in the left superior pubic ramus concerning for nondisplaced pathologic factor.  There is muscular edema noted.  There is also multiple focal areas of metastatic disease involving the lower lumbar spine, sacrum, iliac wings, and femurs.  She also has disease involving the liver with multiple metastatic lesions.  She is here today for recommendations regarding palliative radiation to the left groin region.    At present, she continues to have pain with any movement of the hip.  She walks with a walker.  She denies back pain or right hip pain.    REVIEW OF SYSTEMS:  As above.  In addition, patient denies headaches, visual problems, dizziness, chest pain, shortness of breath, cough, nausea, vomiting, or diarrhea. Patient also denies easy bruising, skin rashes, or numbness or tingling.    ECO-2    PAST MEDICAL HISTORY:  Past Medical History:   Diagnosis Date    Adjustment disorder with depressed mood 2017    Allergy     Anxiety     Asthma     seasonal    Blood transfusion     1981    Breast cancer     stage IIB carcinoma of the right breast, ER positive with a low Oncotype score    Chemotherapy-induced neuropathy 2016    Depression     Diverticulosis     Falls frequently 2014     Hepatic cyst 2014    Hx of psychiatric care     Hypercholesteremia     Hypertension     Lung nodule 2014    Lymphedema     S/P right breast mastectomy    Metastatic bone cancer     MVP (mitral valve prolapse)     Osteoporosis, unspecified 2014    Psychiatric problem     Therapy     Thyroid disease     Hasimoto disease       PAST SURGICAL HISTORY:  Past Surgical History:   Procedure Laterality Date    APPLICATION, ACELLULAR HUMAN DERMAL ALLOGRAFT Bilateral 7/15/2013    [facility]    APPLICATION, GRAFT Bilateral 7/15/2013    [facility]    BREAST LUMPECTOMY Right     X 2    BREAST RECONSTRUCTION  2013    X 2     SECTION      x2    COLONOSCOPY N/A 2016    [facility] (4TH FLR)    endometriosis      ESOPHAGOGASTRODUODENOSCOPY (EGD) N/A 2016    Performed [facility] Geoff Townsend [facility] at [facility] (2ND FLR)    EYE SURGERY      RK bilateral     GANGLION CYST EXCISION      right index finger    HEMORRHOID SURGERY      INJECTION, FOR SENTINEL NODE IDENTIFICATION Right 2012    [facility]    Dufsgafwk-Skxi-L-Cath Left 2018    [facility] Keith Daniels [facility] at [facility] OR 2ND FLR    KNEE SURGERY Bilateral     ortho    LUMPECTOMY-BREAST Right 11/15/2012    [facility]    LUMPECTOMY-BREAST Right 2012    [facility]    MASTECTOMY  2013    Bilateral     NEEDLE LOCALIZATION Right 2012    Per[facility]    REVISION, NIPPLE RECONSTRUCTION, USING BREAST FLAP Bilateral 7/15/2013    Performed by [facility] OR    REVISION, SCAR, ABDOMINAL DONOR SITE N/A 7/15/2013    [facility] OR    TONSILLECTOMY      TUBAL LIGATION         ALLERGIES:   Review of  patient's allergies indicates:   Allergen Reactions    Adhesive Rash     Tape, Paper tape is OK.    Codeine Itching     Itching very bad to upper body only.    Demerol [meperidine] Itching     To upper body only    Iodine and iodide containing products Anaphylaxis    Penicillins      Ulcers in mouth.    Arimidex [anastrozole] Hives    Aromasin [exemestane]     Clindamycin Itching and Rash       MEDICATIONS:  Current Outpatient Medications   Medication Sig    ascorbic acid, vitamin C, (VITAMIN C) 1000 MG tablet Take 1,000 mg by mouth once daily.    aspirin (ECOTRIN) 81 MG EC tablet Take 81 mg by mouth once daily.    biotin 1 mg tablet Take 1,000 mcg by mouth once daily.    cloNIDine (CATAPRES) 0.1 MG tablet TAKE 1 TABLET (0.1 MG TOTAL) BY MOUTH 3 (THREE) TIMES DAILY.    cyclobenzaprine (FLEXERIL) 5 MG tablet Take 5 mg by mouth.    dexAMETHasone (DECADRON) 0.5 mg/5 mL Elix Take 5 mLs (0.5 mg total) by mouth 2 (two) times daily. for 10 days    diphenoxylate-atropine 2.5-0.025 mg (LOMOTIL) 2.5-0.025 mg per tablet Take 1 tablet by mouth 4 (four) times daily as needed for Diarrhea.    duke's soln (benadryl 30 mL, mylanta 30 mL, lidocaine 30 mL, nystatin 30 mL) 120mL Take 10 mLs by mouth 4 (four) times daily.    fish oil/borage/flax/om3,6,9 1 (OMEGA 3-6-9 COMPLEX ORAL) Take 1 capsule by mouth once daily.    gabapentin (NEURONTIN) 300 MG capsule Take 1 capsule (300 mg total) by mouth 3 (three) times daily.    hydrocodone-homatropine 5-1.5 mg/5 ml (HYCODAN) 5-1.5 mg/5 mL Syrp Take 5 mLs by mouth every 4 (four) hours as needed.    HYDROmorphone (DILAUDID) 4 MG tablet Take 1 tablet (4 mg total) by mouth every 4 (four) hours as needed for Pain.    lidocaine-prilocaine (EMLA) cream APPLY TO AFFECTED AREA EVERY DAY AS NEEDED    loperamide (IMODIUM A-D) 2 mg Tab Take 2 mg by mouth 3 (three) times daily as needed.    morphine (MS CONTIN) 30 MG 12 hr tablet Take 1 tablet (30 mg total) by mouth 2 (two) times  daily.    morphine (MS CONTIN) 60 MG 12 hr tablet Take 1 tablet (60 mg total) by mouth every 8 (eight) hours.    MV-MINERALS/FA/OMEGA 3,6,9 #3 (CA-KA-OU-OMEGA 3,6,9 #3 ORAL) Take 1 tablet by mouth once daily.      ondansetron (ZOFRAN) 4 MG tablet Take 1 tablet (4 mg total) by mouth every 8 (eight) hours as needed for Nausea.    pantoprazole (PROTONIX) 40 MG tablet Take 1 tablet (40 mg total) by mouth once daily.    PREVIDENT 5000 DRY MOUTH 1.1 % Gel BRUSH AS DIRECTED    sertraline (ZOLOFT) 50 MG tablet Take 1 tablet (50 mg total) by mouth once daily.    sevelamer carbonate (RENVELA) 800 mg Tab Take 1 tablet (800 mg total) by mouth 3 (three) times daily with meals.    SYNTHROID 137 mcg Tab tablet Take 1 tablet (137 mcg total) by mouth before breakfast. Patient to be seen before anymore refills.     No current facility-administered medications for this visit.        SOCIAL HISTORY:  Social History     Socioeconomic History    Marital status:      Spouse name: Not on file    Number of children: 2    Years of education: Not on file    Highest education level: Not on file   Occupational History    Not on file   Social Needs    Financial resource strain: Not on file    Food insecurity:     Worry: Not on file     Inability: Not on file    Transportation needs:     Medical: Not on file     Non-medical: Not on file   Tobacco Use    Smoking status: Never Smoker    Smokeless tobacco: Never Used   Substance and Sexual Activity    Alcohol use: No     Alcohol/week: 0.0 oz    Drug use: No    Sexual activity: Not Currently     Birth control/protection: Post-menopausal   Lifestyle    Physical activity:     Days per week: Not on file     Minutes per session: Not on file    Stress: Not on file   Relationships    Social connections:     Talks on phone: Not on file     Gets together: Not on file     Attends Sikh service: Not on file     Active member of club or organization: Not on file     Attends  meetings of clubs or organizations: Not on file     Relationship status: Not on file   Other Topics Concern    Are you pregnant or think you may be? Not Asked    Breast-feeding Not Asked    Patient feels they ought to cut down on drinking/drug use Not Asked    Patient annoyed by others criticizing their drinking/drug use Not Asked    Patient has felt bad or guilty about drinking/drug use Not Asked    Patient has had a drink/used drugs as an eye opener in the AM Not Asked   Social History Narrative    2 children, , former , enjoys photography, travel       FAMILY HISTORY:  Family History   Problem Relation Age of Onset    Stroke Mother     Hypertension Mother     Cancer Mother         Skin    Melanoma Mother     Cancer Father         tonsil cancer    Physical abuse Father     Ovarian cancer Paternal Grandmother     Cancer Paternal Grandmother 94        ovarian    Heart disease Sister         MI    Depression Sister     Arthritis Brother     Depression Brother     COPD Paternal Grandfather     Depression Sister     Depression Sister     ADD / ADHD Daughter     Crohn's disease Son     Breast cancer Neg Hx         She says that 2nd/3rd cousins with hx breast CA    Colon cancer Neg Hx          PHYSICAL EXAMINATION:  Vitals:    09/11/19 1258   BP: 139/77   BP Location: Left arm   Pulse: 77   Resp: 20   Temp: 98 °F (36.7 °C)   TempSrc: Oral   Weight: 72.1 kg (159 lb)   Body mass index is 27.29 kg/m².  GENERAL: Patient is alert and oriented, in no acute distress.  HEENT:Extraocular muscles are intact.  Oropharynx is clear without lesions.  There is no cervical or supraclavicular lymphadenopathy palpated.  No thyromegaly noted.  HEART: Regular rate and rhythm.  LUNGS: Clear to auscultation bilaterally.  BREAST EXAM:  Deferred  ABDOMEN:Soft, nontender, nondistended, without hepatosplenomegaly.  Normoactive bowel sounds.   EXTREMITIES:  Tenderness to light palpation of the left  pubic ramus region.  No tenderness to palpation of the thoracic, lumbar, or sacral spine.  Area.  No pain in the right pelvic area.No clubbing, cyanosis, or edema.  NEUROLOGICAL: Cranial nerve II through XII grossly intact.  Sensation is intact.  Strength is 5 out of 5 in the upper and lower extremities bilaterally.     ASSESSMENT:   This is a 63-year-old female with stage IV right breast cancer with progression of disease and worsening pain due to non displaced fracture of the left pubic ramus due to metastatic disease.    PLAN:   After review of the images of the CT scan and MRI of the pelvis, Ms. Crain is noted to have multiple areas of bone metastasis.  Her worst pain is associated with the left pubic ramus.  Therefore, I recommend palliative radiation to this region.  I plan to deliver 2000 cGy in 5 fractions to the left pelvis and hip region.  I plan to include the left femoral region in the treatment field to avoid femoral neck fracture in the future.    The risks, benefits, and side effects of radiation were explained in detail to the patient.  All questions were answered and informed consent was signed.  I plan to see the patient back for radiation planning CT as soon as possible.    Psychosocial Distress screening score of 10 noted and reviewed.  Psychiatry consult already initiated by Dr. Schroeder.    I spent approximately 60 minutes reviewing the available records and evaluating the patient, out of which over 50% of the time was spent face to face with the patient in counseling and coordinating this patient's care.

## 2019-09-11 NOTE — PLAN OF CARE
START ON PATHWAY REGIMEN - Breast    [id number]        Paclitaxel (Taxol(R))     **Always confirm dose/schedule in your pharmacy ordering system**    Patient Characteristics:  Distant Metastases or Locoregional Recurrent Disease - Unresected, HER2   Negative/Unknown/Equivocal, ER Positive, Chemotherapy, First Line  Therapeutic Status: Distant Metastases  BRCA Mutation Status: Did Not Order Test  ER Status: Positive (+)  HER2 Status: Negative (-)  MS Status: Positive (+)  Line of Therapy: First Line  Intent of Therapy:  Non-Curative / Palliative Intent, Not Discussed with Patient

## 2019-09-11 NOTE — LETTER
September 11, 2019      Rodrigo Schroeder MD  [address]  [facility] [address]           [facility]  [address]  [facility] [address]  Phone: [phone]          Patient: Rebecca Crain   MR Number: [id number]   YOB: 1955   Date of Visit: 9/11/2019       Dear Dr. Rodrigo Schroeder:    Thank you for referring Rebecca Crain to me for evaluation. Attached you will find relevant portions of my assessment and plan of care.    If you have questions, please do not hesitate to call me. I look forward to following Rebecca Crain along with you.    Sincerely,    Martha Nathan MD    Enclosure  CC:  No Recipients    If you would like to receive this communication electronically, please contact [email or web address] or [phone] to request more information on [employer] access.    For providers and/or their staff who would like to refer a patient to [facility], please contact us through our one-stop-shop provider referral line, [facility], at [phone].    If you feel you have received this communication in error or would no longer like to receive these types of communications, please e-mail [email or web address]

## 2019-09-11 NOTE — TELEPHONE ENCOUNTER
"Spoke with pharmacy to clarify prescription.        ----- Message from Leny Pang sent at 9/11/2019  8:21 AM CDT -----  Contact: [facility]   Pharmacy Assistant     Rx:    Name of caller: [facility]   Provider/Physician?: Rodrigo Schroeder MD   Pharmacy name and phone number?:  [facility] #[id number] - [place], [place] - [address]  What do they need to clarify?:    - needs to verify the morphine (MS CONTIN) tabs. Are they filling 30 mg tabs or 60 ?  Pharmacy:  Contact Preference?:  [phone]    Additional Information:  "Thank you for all that you do for our patients'"      "

## 2019-09-12 ENCOUNTER — HOSPITAL ENCOUNTER (OUTPATIENT)
Dept: RADIATION THERAPY | Facility: HOSPITAL | Age: 64
Discharge: HOME | End: 2019-09-12
Attending: RADIOLOGY
Payer: MEDICARE

## 2019-09-12 PROCEDURE — 77290 PR  SET RADN THERAPY FIELD COMPLEX: ICD-10-PCS | Mod: 26,HCNC,, | Performed by: RADIOLOGY

## 2019-09-12 PROCEDURE — 77334 PR  RADN TREATMENT AID(S) COMPLX: ICD-10-PCS | Mod: 26,HCNC,, | Performed by: RADIOLOGY

## 2019-09-12 PROCEDURE — 77263 THER RADIOLOGY TX PLNG CPLX: CPT | Mod: HCNC,,, | Performed by: RADIOLOGY

## 2019-09-12 PROCEDURE — 77334 RADIATION TREATMENT AID(S): CPT | Mod: 26,HCNC,, | Performed by: RADIOLOGY

## 2019-09-12 PROCEDURE — 77334 RADIATION TREATMENT AID(S): CPT | Mod: TC,HCNC | Performed by: RADIOLOGY

## 2019-09-12 PROCEDURE — 77263 PR  RADIATION THERAPY PLAN COMPLEX: ICD-10-PCS | Mod: HCNC,,, | Performed by: RADIOLOGY

## 2019-09-12 PROCEDURE — 77014 HC CT GUIDANCE RADIATION THERAPY FLDS PLACEMENT: CPT | Mod: TC,HCNC | Performed by: RADIOLOGY

## 2019-09-12 PROCEDURE — 77290 THER RAD SIMULAJ FIELD CPLX: CPT | Mod: 26,HCNC,, | Performed by: RADIOLOGY

## 2019-09-12 PROCEDURE — 77290 THER RAD SIMULAJ FIELD CPLX: CPT | Mod: TC,HCNC | Performed by: RADIOLOGY

## 2019-09-13 PROCEDURE — 77334 RADIATION TREATMENT AID(S): CPT | Mod: TC,HCNC | Performed by: RADIOLOGY

## 2019-09-13 PROCEDURE — 77295 3-D RADIOTHERAPY PLAN: CPT | Mod: 26,HCNC,, | Performed by: RADIOLOGY

## 2019-09-13 PROCEDURE — 77295 3-D RADIOTHERAPY PLAN: CPT | Mod: TC,HCNC | Performed by: RADIOLOGY

## 2019-09-13 PROCEDURE — 77334 RADIATION TREATMENT AID(S): CPT | Mod: 26,HCNC,, | Performed by: RADIOLOGY

## 2019-09-13 PROCEDURE — 77300 PR RADIATION THERAPY,DOSIMETRY PLAN: ICD-10-PCS | Mod: 26,HCNC,, | Performed by: RADIOLOGY

## 2019-09-13 PROCEDURE — 77334 PR  RADN TREATMENT AID(S) COMPLX: ICD-10-PCS | Mod: 26,HCNC,, | Performed by: RADIOLOGY

## 2019-09-13 PROCEDURE — 77295 PR 3D RADIOTHERAPY PLAN: ICD-10-PCS | Mod: 26,HCNC,, | Performed by: RADIOLOGY

## 2019-09-13 PROCEDURE — 77300 RADIATION THERAPY DOSE PLAN: CPT | Mod: TC,HCNC | Performed by: RADIOLOGY

## 2019-09-13 PROCEDURE — 77300 RADIATION THERAPY DOSE PLAN: CPT | Mod: 26,HCNC,, | Performed by: RADIOLOGY

## 2019-09-17 ENCOUNTER — PATIENT MESSAGE (OUTPATIENT)
Dept: HEMATOLOGY/ONCOLOGY | Facility: CLINIC | Age: 64
End: 2019-09-17

## 2019-09-17 ENCOUNTER — DOCUMENTATION ONLY (OUTPATIENT)
Dept: RADIATION ONCOLOGY | Facility: CLINIC | Age: 64
End: 2019-09-17

## 2019-09-17 DIAGNOSIS — L03.012 PARONYCHIA OF FINGERS OF BOTH HANDS: Primary | ICD-10-CM

## 2019-09-17 DIAGNOSIS — L03.011 PARONYCHIA OF FINGERS OF BOTH HANDS: Primary | ICD-10-CM

## 2019-09-17 PROCEDURE — G6002 PR STEREOSCOPIC XRAY GUIDE FOR RADIATION TX DELIV: ICD-10-PCS | Mod: 26,HCNC,, | Performed by: RADIOLOGY

## 2019-09-17 PROCEDURE — G6002 STEREOSCOPIC X-RAY GUIDANCE: HCPCS | Mod: 26,HCNC,, | Performed by: RADIOLOGY

## 2019-09-17 PROCEDURE — 77412 RADIATION TX DELIVERY LVL 3: CPT | Mod: HCNC | Performed by: RADIOLOGY

## 2019-09-17 PROCEDURE — 77417 THER RADIOLOGY PORT IMAGE(S): CPT | Mod: HCNC | Performed by: RADIOLOGY

## 2019-09-17 PROCEDURE — 77387 GUIDANCE FOR RADJ TX DLVR: CPT | Mod: TC,HCNC | Performed by: RADIOLOGY

## 2019-09-17 RX ORDER — SULFAMETHOXAZOLE AND TRIMETHOPRIM 800; 160 MG/1; MG/1
1 TABLET ORAL 2 TIMES DAILY
Qty: 20 TABLET | Refills: 0 | Status: SHIPPED | OUTPATIENT
Start: 2019-09-17 | End: 2019-10-31

## 2019-09-17 NOTE — PLAN OF CARE
Problem: Adult Inpatient Plan of Care  Goal: Plan of Care Review  Outcome: Ongoing (interventions implemented as appropriate)  Day 1 of outpatient XRT to the left groin. Nursing education done. No distress noted at this time.

## 2019-09-18 PROCEDURE — 77387 GUIDANCE FOR RADJ TX DLVR: CPT | Mod: TC,HCNC | Performed by: RADIOLOGY

## 2019-09-18 PROCEDURE — 77412 RADIATION TX DELIVERY LVL 3: CPT | Mod: HCNC | Performed by: RADIOLOGY

## 2019-09-18 PROCEDURE — G6002 PR STEREOSCOPIC XRAY GUIDE FOR RADIATION TX DELIV: ICD-10-PCS | Mod: 26,HCNC,, | Performed by: RADIOLOGY

## 2019-09-18 PROCEDURE — 77417 THER RADIOLOGY PORT IMAGE(S): CPT | Mod: HCNC | Performed by: RADIOLOGY

## 2019-09-18 PROCEDURE — G6002 STEREOSCOPIC X-RAY GUIDANCE: HCPCS | Mod: 26,HCNC,, | Performed by: RADIOLOGY

## 2019-09-18 NOTE — PROGRESS NOTES
Subjective:       Patient ID: Rebecca Crain is a 63 y.o. female.    Chief Complaint: No chief complaint on file.    HPI Ms. Crain is here for followup of metastatic carcinoma of the right breast.    Most recently she had been on clinical trial therapy with erdafitinib.  She began that therapy on May 8th.    Biggest recent problems worsening left groin pain.  She had extensive evaluation for that and ultimately she was found to have a pathological fracture of her left pubis.  Was felt that her pain was related to progression of her bone disease even though her CT scan and bone scan did not show any definite progression.  At the time of her last visit I increased her MS Contin to 90 mg every 8 hr.  She is continuing Dilaudid 4-8 mg every 4 hr.    She reports that her pain is much better and she is rarely using her breakthrough pain medication.  She completes her radiation therapy on September 23rd.      ECOG-2    Breast history: In 2013 she was diagnosed with stage IIB carcinoma of the right breast, ER positive with a low Oncotype score.  She was enrolled on protocol  and randomized to endocrine therapy alone.  She was tried on all 3 aromatase inhibitors and had itching and rash to all of them.  In August 2013 she was started on tamoxifen.   She was found to have  bone metastasis in May 2015, 2015.  A subsequent bone biopsy was performed on a lesion at the T8 vertebra.   The pathology from that was consistent with metastatic breast cancer, ER +80%, SD +80%, and HER-2 negative.      She received letrozole plus palbociclib from July 2015 until May 2016.  She also received bone protective therapy with Xgeva.      Faslodex was started in June 2016.      Exemestane and Afinitor started in August 2017.  That was discontinued in February 2018 due to progression in the liver and bone.     Navelbine was started February 16, 2018.  She had 8 cycles for that.  Scans in November 2018 showed progression as  follows     Capecitabine was started in December 2018.  She received 5 cycles of that therapy.    Follow-up scans in April 2019 showed progression with a new hepatic lesion and worsening bone disease.    She began clinical trial therapy with erdafitinib on May 8, 2019.  That was discontinued in September 2019 due to progression in the bone.  Review of Systems   Constitutional: Negative for appetite change and unexpected weight change.   Eyes: Negative for visual disturbance.   Respiratory: Negative for cough and shortness of breath.    Cardiovascular: Negative for chest pain.   Gastrointestinal: Negative for abdominal pain and diarrhea.   Genitourinary: Negative for frequency.   Musculoskeletal: Negative for back pain.   Skin: Negative for rash.   Neurological: Negative for headaches.   Hematological: Negative for adenopathy.   Psychiatric/Behavioral: The patient is not nervous/anxious.        Objective:      Physical Exam   Constitutional: She is oriented to person, place, and time. She appears well-developed and well-nourished. No distress.   Neurological: She is alert and oriented to person, place, and time.   Psychiatric: She has a normal mood and affect. Her behavior is normal. Thought content normal.   Vitals reviewed.      Assessment:       1. Malignant neoplasm of central portion of right breast in female, estrogen receptor positive    2. Bone metastasis    3. Metastasis to liver    4. Cancer related pain        Plan:       She will complete her radiation therapy.  I discussed additional systemic therapy with Taxol and provided her with written information.  Will plan to start that on October 2nd.  She will get an orthopedic consultation regarding her femoral bone lesions  .

## 2019-09-19 ENCOUNTER — OFFICE VISIT (OUTPATIENT)
Dept: HEMATOLOGY/ONCOLOGY | Facility: CLINIC | Age: 64
End: 2019-09-19
Payer: MEDICARE

## 2019-09-19 VITALS
DIASTOLIC BLOOD PRESSURE: 59 MMHG | BODY MASS INDEX: 27.09 KG/M2 | SYSTOLIC BLOOD PRESSURE: 121 MMHG | WEIGHT: 157.88 LBS

## 2019-09-19 DIAGNOSIS — Z17.0 MALIGNANT NEOPLASM OF CENTRAL PORTION OF RIGHT BREAST IN FEMALE, ESTROGEN RECEPTOR POSITIVE: Primary | ICD-10-CM

## 2019-09-19 DIAGNOSIS — G89.3 CANCER RELATED PAIN: ICD-10-CM

## 2019-09-19 DIAGNOSIS — C78.7 METASTASIS TO LIVER: ICD-10-CM

## 2019-09-19 DIAGNOSIS — C50.111 MALIGNANT NEOPLASM OF CENTRAL PORTION OF RIGHT BREAST IN FEMALE, ESTROGEN RECEPTOR POSITIVE: Primary | ICD-10-CM

## 2019-09-19 DIAGNOSIS — C79.51 BONE METASTASIS: ICD-10-CM

## 2019-09-19 PROCEDURE — 3074F SYST BP LT 130 MM HG: CPT | Mod: HCNC,CPTII,S$GLB, | Performed by: INTERNAL MEDICINE

## 2019-09-19 PROCEDURE — 3008F BODY MASS INDEX DOCD: CPT | Mod: HCNC,CPTII,S$GLB, | Performed by: INTERNAL MEDICINE

## 2019-09-19 PROCEDURE — 3078F DIAST BP <80 MM HG: CPT | Mod: HCNC,CPTII,S$GLB, | Performed by: INTERNAL MEDICINE

## 2019-09-19 PROCEDURE — 99999 PR PBB SHADOW E&M-EST. PATIENT-LVL III: ICD-10-PCS | Mod: PBBFAC,HCNC,, | Performed by: INTERNAL MEDICINE

## 2019-09-19 PROCEDURE — 3078F PR MOST RECENT DIASTOLIC BLOOD PRESSURE < 80 MM HG: ICD-10-PCS | Mod: HCNC,CPTII,S$GLB, | Performed by: INTERNAL MEDICINE

## 2019-09-19 PROCEDURE — G6002 STEREOSCOPIC X-RAY GUIDANCE: HCPCS | Mod: 26,HCNC,, | Performed by: RADIOLOGY

## 2019-09-19 PROCEDURE — 77402 RADIATION TX DELIVERY LVL 1: CPT | Mod: HCNC | Performed by: RADIOLOGY

## 2019-09-19 PROCEDURE — 99999 PR PBB SHADOW E&M-EST. PATIENT-LVL III: CPT | Mod: PBBFAC,HCNC,, | Performed by: INTERNAL MEDICINE

## 2019-09-19 PROCEDURE — 77387 GUIDANCE FOR RADJ TX DLVR: CPT | Mod: TC,HCNC | Performed by: RADIOLOGY

## 2019-09-19 PROCEDURE — 3074F PR MOST RECENT SYSTOLIC BLOOD PRESSURE < 130 MM HG: ICD-10-PCS | Mod: HCNC,CPTII,S$GLB, | Performed by: INTERNAL MEDICINE

## 2019-09-19 PROCEDURE — 99213 PR OFFICE/OUTPT VISIT, EST, LEVL III, 20-29 MIN: ICD-10-PCS | Mod: HCNC,S$GLB,, | Performed by: INTERNAL MEDICINE

## 2019-09-19 PROCEDURE — 99213 OFFICE O/P EST LOW 20 MIN: CPT | Mod: HCNC,S$GLB,, | Performed by: INTERNAL MEDICINE

## 2019-09-19 PROCEDURE — 3008F PR BODY MASS INDEX (BMI) DOCUMENTED: ICD-10-PCS | Mod: HCNC,CPTII,S$GLB, | Performed by: INTERNAL MEDICINE

## 2019-09-19 PROCEDURE — G6002 PR STEREOSCOPIC XRAY GUIDE FOR RADIATION TX DELIV: ICD-10-PCS | Mod: 26,HCNC,, | Performed by: RADIOLOGY

## 2019-09-20 PROCEDURE — 77387 GUIDANCE FOR RADJ TX DLVR: CPT | Mod: TC,HCNC | Performed by: RADIOLOGY

## 2019-09-20 PROCEDURE — G6002 STEREOSCOPIC X-RAY GUIDANCE: HCPCS | Mod: 26,HCNC,, | Performed by: RADIOLOGY

## 2019-09-20 PROCEDURE — 77412 RADIATION TX DELIVERY LVL 3: CPT | Mod: HCNC | Performed by: RADIOLOGY

## 2019-09-20 PROCEDURE — G6002 PR STEREOSCOPIC XRAY GUIDE FOR RADIATION TX DELIV: ICD-10-PCS | Mod: 26,HCNC,, | Performed by: RADIOLOGY

## 2019-09-23 ENCOUNTER — TELEPHONE (OUTPATIENT)
Dept: HEMATOLOGY/ONCOLOGY | Facility: CLINIC | Age: 64
End: 2019-09-23

## 2019-09-23 PROCEDURE — 77387 GUIDANCE FOR RADJ TX DLVR: CPT | Mod: TC,HCNC | Performed by: RADIOLOGY

## 2019-09-23 PROCEDURE — G6002 PR STEREOSCOPIC XRAY GUIDE FOR RADIATION TX DELIV: ICD-10-PCS | Mod: 26,HCNC,, | Performed by: RADIOLOGY

## 2019-09-23 PROCEDURE — G6002 STEREOSCOPIC X-RAY GUIDANCE: HCPCS | Mod: 26,HCNC,, | Performed by: RADIOLOGY

## 2019-09-23 PROCEDURE — 77412 RADIATION TX DELIVERY LVL 3: CPT | Mod: HCNC | Performed by: RADIOLOGY

## 2019-09-24 PROCEDURE — 77336 RADIATION PHYSICS CONSULT: CPT | Mod: HCNC | Performed by: RADIOLOGY

## 2019-09-27 ENCOUNTER — PATIENT MESSAGE (OUTPATIENT)
Dept: HEMATOLOGY/ONCOLOGY | Facility: CLINIC | Age: 64
End: 2019-09-27

## 2019-10-03 DIAGNOSIS — C79.51 METASTATIC CANCER TO SPINE: ICD-10-CM

## 2019-10-03 DIAGNOSIS — C78.7 METASTASIS TO LIVER: ICD-10-CM

## 2019-10-03 DIAGNOSIS — Z51.11 ENCOUNTER FOR ANTINEOPLASTIC CHEMOTHERAPY: ICD-10-CM

## 2019-10-03 DIAGNOSIS — C50.111 MALIGNANT NEOPLASM OF CENTRAL PORTION OF RIGHT BREAST IN FEMALE, ESTROGEN RECEPTOR POSITIVE: ICD-10-CM

## 2019-10-03 DIAGNOSIS — Z17.0 MALIGNANT NEOPLASM OF CENTRAL PORTION OF RIGHT BREAST IN FEMALE, ESTROGEN RECEPTOR POSITIVE: ICD-10-CM

## 2019-10-03 RX ORDER — EPINEPHRINE 0.3 MG/.3ML
0.3 INJECTION SUBCUTANEOUS ONCE AS NEEDED
Status: CANCELLED | OUTPATIENT
Start: 2019-10-03

## 2019-10-07 ENCOUNTER — PATIENT MESSAGE (OUTPATIENT)
Dept: HEMATOLOGY/ONCOLOGY | Facility: CLINIC | Age: 64
End: 2019-10-07

## 2019-10-11 ENCOUNTER — PATIENT MESSAGE (OUTPATIENT)
Dept: HEMATOLOGY/ONCOLOGY | Facility: CLINIC | Age: 64
End: 2019-10-11

## 2019-10-11 DIAGNOSIS — G89.3 CANCER RELATED PAIN: ICD-10-CM

## 2019-10-11 RX ORDER — MORPHINE SULFATE 60 MG/1
60 TABLET, FILM COATED, EXTENDED RELEASE ORAL EVERY 8 HOURS
Qty: 90 TABLET | Refills: 0 | Status: SHIPPED | OUTPATIENT
Start: 2019-10-11 | End: 2019-11-13 | Stop reason: SDUPTHER

## 2019-10-11 RX ORDER — MORPHINE SULFATE 30 MG/1
30 TABLET, FILM COATED, EXTENDED RELEASE ORAL 2 TIMES DAILY
Qty: 90 TABLET | Refills: 0 | Status: SHIPPED | OUTPATIENT
Start: 2019-10-11 | End: 2019-10-31

## 2019-10-11 NOTE — TELEPHONE ENCOUNTER
spoke with pt on today in regards to treatment being approved and schedule, pt is aware and has confirm.

## 2019-10-14 ENCOUNTER — INFUSION (OUTPATIENT)
Dept: INFUSION THERAPY | Facility: HOSPITAL | Age: 64
End: 2019-10-14
Attending: INTERNAL MEDICINE
Payer: MEDICARE

## 2019-10-14 VITALS
TEMPERATURE: 98 F | DIASTOLIC BLOOD PRESSURE: 56 MMHG | HEART RATE: 73 BPM | BODY MASS INDEX: 27.52 KG/M2 | RESPIRATION RATE: 18 BRPM | SYSTOLIC BLOOD PRESSURE: 108 MMHG | HEIGHT: 64 IN | WEIGHT: 161.19 LBS

## 2019-10-14 DIAGNOSIS — C79.51 METASTATIC CANCER TO SPINE: ICD-10-CM

## 2019-10-14 DIAGNOSIS — C78.7 METASTASIS TO LIVER: Primary | ICD-10-CM

## 2019-10-14 DIAGNOSIS — Z17.0 MALIGNANT NEOPLASM OF CENTRAL PORTION OF RIGHT BREAST IN FEMALE, ESTROGEN RECEPTOR POSITIVE: ICD-10-CM

## 2019-10-14 DIAGNOSIS — C50.111 MALIGNANT NEOPLASM OF CENTRAL PORTION OF RIGHT BREAST IN FEMALE, ESTROGEN RECEPTOR POSITIVE: ICD-10-CM

## 2019-10-14 PROCEDURE — 25000003 PHARM REV CODE 250: Mod: HCNC | Performed by: INTERNAL MEDICINE

## 2019-10-14 PROCEDURE — 96413 CHEMO IV INFUSION 1 HR: CPT | Mod: HCNC

## 2019-10-14 PROCEDURE — 96367 TX/PROPH/DG ADDL SEQ IV INF: CPT | Mod: HCNC

## 2019-10-14 PROCEDURE — S0028 INJECTION, FAMOTIDINE, 20 MG: HCPCS | Mod: HCNC | Performed by: INTERNAL MEDICINE

## 2019-10-14 PROCEDURE — 96375 TX/PRO/DX INJ NEW DRUG ADDON: CPT | Mod: HCNC

## 2019-10-14 PROCEDURE — 63600175 PHARM REV CODE 636 W HCPCS: Mod: HCNC | Performed by: INTERNAL MEDICINE

## 2019-10-14 RX ORDER — DEXAMETHASONE SODIUM PHOSPHATE 4 MG/ML
20 INJECTION, SOLUTION INTRA-ARTICULAR; INTRALESIONAL; INTRAMUSCULAR; INTRAVENOUS; SOFT TISSUE
Status: CANCELLED
Start: 2019-10-14

## 2019-10-14 RX ORDER — DEXAMETHASONE SODIUM PHOSPHATE 4 MG/ML
20 INJECTION, SOLUTION INTRA-ARTICULAR; INTRALESIONAL; INTRAMUSCULAR; INTRAVENOUS; SOFT TISSUE
Status: CANCELLED
Start: 2019-10-22

## 2019-10-14 RX ORDER — HEPARIN 100 UNIT/ML
500 SYRINGE INTRAVENOUS
Status: CANCELLED | OUTPATIENT
Start: 2019-10-22

## 2019-10-14 RX ORDER — SODIUM CHLORIDE 0.9 % (FLUSH) 0.9 %
10 SYRINGE (ML) INJECTION
Status: DISCONTINUED | OUTPATIENT
Start: 2019-10-14 | End: 2019-10-14 | Stop reason: HOSPADM

## 2019-10-14 RX ORDER — EPINEPHRINE 0.3 MG/.3ML
0.3 INJECTION SUBCUTANEOUS ONCE AS NEEDED
Status: CANCELLED | OUTPATIENT
Start: 2019-10-14

## 2019-10-14 RX ORDER — HEPARIN 100 UNIT/ML
500 SYRINGE INTRAVENOUS
Status: CANCELLED | OUTPATIENT
Start: 2019-10-14

## 2019-10-14 RX ORDER — EPINEPHRINE 0.3 MG/.3ML
0.3 INJECTION SUBCUTANEOUS ONCE AS NEEDED
Status: CANCELLED | OUTPATIENT
Start: 2019-10-15

## 2019-10-14 RX ORDER — EPINEPHRINE 0.3 MG/.3ML
0.3 INJECTION SUBCUTANEOUS ONCE AS NEEDED
Status: CANCELLED | OUTPATIENT
Start: 2019-10-22

## 2019-10-14 RX ORDER — FAMOTIDINE 10 MG/ML
20 INJECTION INTRAVENOUS
Status: COMPLETED | OUTPATIENT
Start: 2019-10-14 | End: 2019-10-14

## 2019-10-14 RX ORDER — ONDANSETRON HCL IN 0.9 % NACL 8 MG/50 ML
8 INTRAVENOUS SOLUTION, PIGGYBACK (ML) INTRAVENOUS
Status: COMPLETED | OUTPATIENT
Start: 2019-10-14 | End: 2019-10-14

## 2019-10-14 RX ORDER — SODIUM CHLORIDE 0.9 % (FLUSH) 0.9 %
10 SYRINGE (ML) INJECTION
Status: CANCELLED | OUTPATIENT
Start: 2019-10-15

## 2019-10-14 RX ORDER — DIPHENHYDRAMINE HYDROCHLORIDE 50 MG/ML
50 INJECTION INTRAMUSCULAR; INTRAVENOUS ONCE AS NEEDED
Status: CANCELLED | OUTPATIENT
Start: 2019-10-14

## 2019-10-14 RX ORDER — DIPHENHYDRAMINE HYDROCHLORIDE 50 MG/ML
50 INJECTION INTRAMUSCULAR; INTRAVENOUS ONCE AS NEEDED
Status: CANCELLED | OUTPATIENT
Start: 2019-10-22

## 2019-10-14 RX ORDER — SODIUM CHLORIDE 0.9 % (FLUSH) 0.9 %
10 SYRINGE (ML) INJECTION
Status: CANCELLED | OUTPATIENT
Start: 2019-10-14

## 2019-10-14 RX ORDER — FAMOTIDINE 10 MG/ML
20 INJECTION INTRAVENOUS
Status: CANCELLED | OUTPATIENT
Start: 2019-10-22

## 2019-10-14 RX ORDER — DIPHENHYDRAMINE HYDROCHLORIDE 50 MG/ML
50 INJECTION INTRAMUSCULAR; INTRAVENOUS ONCE AS NEEDED
Status: DISCONTINUED | OUTPATIENT
Start: 2019-10-14 | End: 2019-10-14 | Stop reason: HOSPADM

## 2019-10-14 RX ORDER — FAMOTIDINE 10 MG/ML
20 INJECTION INTRAVENOUS
Status: CANCELLED | OUTPATIENT
Start: 2019-10-14

## 2019-10-14 RX ORDER — FAMOTIDINE 10 MG/ML
20 INJECTION INTRAVENOUS
Status: CANCELLED | OUTPATIENT
Start: 2019-10-15

## 2019-10-14 RX ORDER — DEXAMETHASONE SODIUM PHOSPHATE 4 MG/ML
20 INJECTION, SOLUTION INTRA-ARTICULAR; INTRALESIONAL; INTRAMUSCULAR; INTRAVENOUS; SOFT TISSUE
Status: CANCELLED
Start: 2019-10-15

## 2019-10-14 RX ORDER — DIPHENHYDRAMINE HYDROCHLORIDE 50 MG/ML
50 INJECTION INTRAMUSCULAR; INTRAVENOUS ONCE AS NEEDED
Status: CANCELLED | OUTPATIENT
Start: 2019-10-15

## 2019-10-14 RX ORDER — HEPARIN 100 UNIT/ML
500 SYRINGE INTRAVENOUS
Status: DISCONTINUED | OUTPATIENT
Start: 2019-10-14 | End: 2019-10-14 | Stop reason: HOSPADM

## 2019-10-14 RX ORDER — EPINEPHRINE 0.3 MG/.3ML
0.3 INJECTION SUBCUTANEOUS ONCE AS NEEDED
Status: DISCONTINUED | OUTPATIENT
Start: 2019-10-14 | End: 2019-10-14 | Stop reason: HOSPADM

## 2019-10-14 RX ORDER — HEPARIN 100 UNIT/ML
500 SYRINGE INTRAVENOUS
Status: CANCELLED | OUTPATIENT
Start: 2019-10-15

## 2019-10-14 RX ORDER — DEXAMETHASONE SODIUM PHOSPHATE 4 MG/ML
20 INJECTION, SOLUTION INTRA-ARTICULAR; INTRALESIONAL; INTRAMUSCULAR; INTRAVENOUS; SOFT TISSUE
Status: DISCONTINUED | OUTPATIENT
Start: 2019-10-14 | End: 2019-10-14

## 2019-10-14 RX ORDER — SODIUM CHLORIDE 0.9 % (FLUSH) 0.9 %
10 SYRINGE (ML) INJECTION
Status: CANCELLED | OUTPATIENT
Start: 2019-10-22

## 2019-10-14 RX ADMIN — PACLITAXEL 144 MG: 6 INJECTION, SOLUTION INTRAVENOUS at 04:10

## 2019-10-14 RX ADMIN — HEPARIN SODIUM (PORCINE) LOCK FLUSH IV SOLN 100 UNIT/ML 500 UNITS: 100 SOLUTION at 05:10

## 2019-10-14 RX ADMIN — Medication 20 MG: at 03:10

## 2019-10-14 RX ADMIN — FAMOTIDINE 20 MG: 10 INJECTION INTRAVENOUS at 03:10

## 2019-10-14 RX ADMIN — Medication 50 MG: at 03:10

## 2019-10-14 RX ADMIN — ONDANSETRON 8 MG: 2 INJECTION INTRAMUSCULAR; INTRAVENOUS at 03:10

## 2019-10-14 NOTE — PLAN OF CARE
Patient tolerated Taxol infusion with no complications. VSS. NAD. Pt instructed to call MD with any problems. Pt discharged home independently.

## 2019-10-18 ENCOUNTER — PATIENT MESSAGE (OUTPATIENT)
Dept: HEMATOLOGY/ONCOLOGY | Facility: CLINIC | Age: 64
End: 2019-10-18

## 2019-10-18 DIAGNOSIS — C50.111 MALIGNANT NEOPLASM OF CENTRAL PORTION OF RIGHT BREAST IN FEMALE, ESTROGEN RECEPTOR POSITIVE: ICD-10-CM

## 2019-10-18 DIAGNOSIS — Z17.0 MALIGNANT NEOPLASM OF CENTRAL PORTION OF RIGHT BREAST IN FEMALE, ESTROGEN RECEPTOR POSITIVE: ICD-10-CM

## 2019-10-18 RX ORDER — HYDROMORPHONE HYDROCHLORIDE 4 MG/1
4 TABLET ORAL EVERY 4 HOURS PRN
Qty: 180 TABLET | Refills: 0 | Status: SHIPPED | OUTPATIENT
Start: 2019-10-18 | End: 2019-11-13 | Stop reason: SDUPTHER

## 2019-10-21 ENCOUNTER — INFUSION (OUTPATIENT)
Dept: INFUSION THERAPY | Facility: HOSPITAL | Age: 64
End: 2019-10-21
Attending: INTERNAL MEDICINE
Payer: MEDICARE

## 2019-10-21 VITALS
RESPIRATION RATE: 18 BRPM | TEMPERATURE: 98 F | HEART RATE: 83 BPM | DIASTOLIC BLOOD PRESSURE: 50 MMHG | SYSTOLIC BLOOD PRESSURE: 90 MMHG

## 2019-10-21 DIAGNOSIS — C50.111 MALIGNANT NEOPLASM OF CENTRAL PORTION OF RIGHT BREAST IN FEMALE, ESTROGEN RECEPTOR POSITIVE: ICD-10-CM

## 2019-10-21 DIAGNOSIS — Z17.0 MALIGNANT NEOPLASM OF CENTRAL PORTION OF RIGHT BREAST IN FEMALE, ESTROGEN RECEPTOR POSITIVE: ICD-10-CM

## 2019-10-21 DIAGNOSIS — C78.7 METASTASIS TO LIVER: Primary | ICD-10-CM

## 2019-10-21 DIAGNOSIS — C79.51 METASTATIC CANCER TO SPINE: ICD-10-CM

## 2019-10-21 PROCEDURE — 25000003 PHARM REV CODE 250: Mod: HCNC | Performed by: INTERNAL MEDICINE

## 2019-10-21 PROCEDURE — 63600175 PHARM REV CODE 636 W HCPCS: Mod: HCNC | Performed by: INTERNAL MEDICINE

## 2019-10-21 PROCEDURE — 96413 CHEMO IV INFUSION 1 HR: CPT | Mod: HCNC

## 2019-10-21 PROCEDURE — S0028 INJECTION, FAMOTIDINE, 20 MG: HCPCS | Mod: HCNC | Performed by: INTERNAL MEDICINE

## 2019-10-21 PROCEDURE — 96375 TX/PRO/DX INJ NEW DRUG ADDON: CPT | Mod: HCNC

## 2019-10-21 PROCEDURE — A4216 STERILE WATER/SALINE, 10 ML: HCPCS | Mod: HCNC | Performed by: INTERNAL MEDICINE

## 2019-10-21 PROCEDURE — 96367 TX/PROPH/DG ADDL SEQ IV INF: CPT | Mod: HCNC

## 2019-10-21 RX ORDER — DIPHENHYDRAMINE HYDROCHLORIDE 50 MG/ML
50 INJECTION INTRAMUSCULAR; INTRAVENOUS ONCE AS NEEDED
Status: DISCONTINUED | OUTPATIENT
Start: 2019-10-21 | End: 2019-10-21 | Stop reason: HOSPADM

## 2019-10-21 RX ORDER — EPINEPHRINE 0.3 MG/.3ML
0.3 INJECTION SUBCUTANEOUS ONCE AS NEEDED
Status: DISCONTINUED | OUTPATIENT
Start: 2019-10-21 | End: 2019-10-21 | Stop reason: HOSPADM

## 2019-10-21 RX ORDER — SODIUM CHLORIDE 0.9 % (FLUSH) 0.9 %
10 SYRINGE (ML) INJECTION
Status: DISCONTINUED | OUTPATIENT
Start: 2019-10-21 | End: 2019-10-21 | Stop reason: HOSPADM

## 2019-10-21 RX ORDER — ONDANSETRON HCL IN 0.9 % NACL 8 MG/50 ML
8 INTRAVENOUS SOLUTION, PIGGYBACK (ML) INTRAVENOUS
Status: COMPLETED | OUTPATIENT
Start: 2019-10-21 | End: 2019-10-21

## 2019-10-21 RX ORDER — HEPARIN 100 UNIT/ML
500 SYRINGE INTRAVENOUS
Status: DISCONTINUED | OUTPATIENT
Start: 2019-10-21 | End: 2019-10-21 | Stop reason: HOSPADM

## 2019-10-21 RX ORDER — FAMOTIDINE 10 MG/ML
20 INJECTION INTRAVENOUS
Status: COMPLETED | OUTPATIENT
Start: 2019-10-21 | End: 2019-10-21

## 2019-10-21 RX ORDER — DEXAMETHASONE SODIUM PHOSPHATE 4 MG/ML
20 INJECTION, SOLUTION INTRA-ARTICULAR; INTRALESIONAL; INTRAMUSCULAR; INTRAVENOUS; SOFT TISSUE
Status: DISCONTINUED | OUTPATIENT
Start: 2019-10-21 | End: 2019-10-21

## 2019-10-21 RX ADMIN — DEXAMETHASONE SODIUM PHOSPHATE 20 MG: 4 INJECTION, SOLUTION INTRA-ARTICULAR; INTRALESIONAL; INTRAMUSCULAR; INTRAVENOUS; SOFT TISSUE at 08:10

## 2019-10-21 RX ADMIN — PACLITAXEL 144 MG: 6 INJECTION, SOLUTION INTRAVENOUS at 09:10

## 2019-10-21 RX ADMIN — DIPHENHYDRAMINE HYDROCHLORIDE 50 MG: 50 INJECTION, SOLUTION INTRAMUSCULAR; INTRAVENOUS at 08:10

## 2019-10-21 RX ADMIN — HEPARIN SODIUM (PORCINE) LOCK FLUSH IV SOLN 100 UNIT/ML 500 UNITS: 100 SOLUTION at 10:10

## 2019-10-21 RX ADMIN — FAMOTIDINE 20 MG: 10 INJECTION INTRAVENOUS at 08:10

## 2019-10-21 RX ADMIN — Medication 10 ML: at 10:10

## 2019-10-21 RX ADMIN — ONDANSETRON 8 MG: 2 INJECTION, SOLUTION INTRAMUSCULAR; INTRAVENOUS at 08:10

## 2019-10-21 NOTE — PLAN OF CARE
Problem: Adult Inpatient Plan of Care  Goal: Optimal Comfort and Wellbeing  Intervention: Provide Person-Centered Care  Flowsheets (Taken 10/21/2019 3154)  Trust Relationship/Rapport: care explained; choices provided; thoughts/feelings acknowledged; emotional support provided; empathic listening provided; questions answered; questions encouraged; reassurance provided

## 2019-10-21 NOTE — PLAN OF CARE
Pt tolerated Taxol today. NAD. Port flushed + blood return present, flushed. Hep lock and deaccesed. declined AVS. Uses my Ochnser. Discharged home. Ambulated independently with walker.

## 2019-10-26 DIAGNOSIS — L03.012 CELLULITIS OF FINGER OF LEFT HAND: Primary | ICD-10-CM

## 2019-10-26 RX ORDER — CIPROFLOXACIN 500 MG/1
500 TABLET ORAL 2 TIMES DAILY
Qty: 20 TABLET | Refills: 0 | Status: SHIPPED | OUTPATIENT
Start: 2019-10-26 | End: 2019-11-05

## 2019-10-28 ENCOUNTER — INFUSION (OUTPATIENT)
Dept: INFUSION THERAPY | Facility: HOSPITAL | Age: 64
End: 2019-10-28
Attending: INTERNAL MEDICINE
Payer: MEDICARE

## 2019-10-28 VITALS
WEIGHT: 161 LBS | BODY MASS INDEX: 27.49 KG/M2 | DIASTOLIC BLOOD PRESSURE: 68 MMHG | HEIGHT: 64 IN | SYSTOLIC BLOOD PRESSURE: 120 MMHG | TEMPERATURE: 98 F | HEART RATE: 84 BPM | RESPIRATION RATE: 18 BRPM

## 2019-10-28 DIAGNOSIS — Z17.0 MALIGNANT NEOPLASM OF CENTRAL PORTION OF RIGHT BREAST IN FEMALE, ESTROGEN RECEPTOR POSITIVE: ICD-10-CM

## 2019-10-28 DIAGNOSIS — C79.51 METASTATIC CANCER TO SPINE: ICD-10-CM

## 2019-10-28 DIAGNOSIS — C50.111 MALIGNANT NEOPLASM OF CENTRAL PORTION OF RIGHT BREAST IN FEMALE, ESTROGEN RECEPTOR POSITIVE: ICD-10-CM

## 2019-10-28 DIAGNOSIS — C78.7 METASTASIS TO LIVER: Primary | ICD-10-CM

## 2019-10-28 PROCEDURE — 96413 CHEMO IV INFUSION 1 HR: CPT | Mod: HCNC

## 2019-10-28 PROCEDURE — 25000003 PHARM REV CODE 250: Mod: HCNC | Performed by: INTERNAL MEDICINE

## 2019-10-28 PROCEDURE — 63600175 PHARM REV CODE 636 W HCPCS: Mod: HCNC | Performed by: INTERNAL MEDICINE

## 2019-10-28 PROCEDURE — 96367 TX/PROPH/DG ADDL SEQ IV INF: CPT | Mod: HCNC

## 2019-10-28 PROCEDURE — S0028 INJECTION, FAMOTIDINE, 20 MG: HCPCS | Mod: HCNC | Performed by: INTERNAL MEDICINE

## 2019-10-28 PROCEDURE — 96375 TX/PRO/DX INJ NEW DRUG ADDON: CPT | Mod: HCNC

## 2019-10-28 RX ORDER — DEXAMETHASONE SODIUM PHOSPHATE 4 MG/ML
20 INJECTION, SOLUTION INTRA-ARTICULAR; INTRALESIONAL; INTRAMUSCULAR; INTRAVENOUS; SOFT TISSUE
Status: COMPLETED | OUTPATIENT
Start: 2019-10-28 | End: 2019-10-28

## 2019-10-28 RX ORDER — DIPHENHYDRAMINE HYDROCHLORIDE 50 MG/ML
50 INJECTION INTRAMUSCULAR; INTRAVENOUS ONCE AS NEEDED
Status: DISCONTINUED | OUTPATIENT
Start: 2019-10-28 | End: 2019-10-28 | Stop reason: HOSPADM

## 2019-10-28 RX ORDER — ONDANSETRON HCL IN 0.9 % NACL 8 MG/50 ML
8 INTRAVENOUS SOLUTION, PIGGYBACK (ML) INTRAVENOUS
Status: COMPLETED | OUTPATIENT
Start: 2019-10-28 | End: 2019-10-28

## 2019-10-28 RX ORDER — HEPARIN 100 UNIT/ML
500 SYRINGE INTRAVENOUS
Status: COMPLETED | OUTPATIENT
Start: 2019-10-28 | End: 2019-10-28

## 2019-10-28 RX ORDER — FAMOTIDINE 10 MG/ML
20 INJECTION INTRAVENOUS
Status: COMPLETED | OUTPATIENT
Start: 2019-10-28 | End: 2019-10-28

## 2019-10-28 RX ORDER — EPINEPHRINE 0.3 MG/.3ML
0.3 INJECTION SUBCUTANEOUS ONCE AS NEEDED
Status: DISCONTINUED | OUTPATIENT
Start: 2019-10-28 | End: 2019-10-28 | Stop reason: HOSPADM

## 2019-10-28 RX ADMIN — ONDANSETRON 8 MG: 2 INJECTION, SOLUTION INTRAMUSCULAR; INTRAVENOUS at 09:10

## 2019-10-28 RX ADMIN — DEXAMETHASONE SODIUM PHOSPHATE 20 MG: 4 INJECTION, SOLUTION INTRA-ARTICULAR; INTRALESIONAL; INTRAMUSCULAR; INTRAVENOUS; SOFT TISSUE at 09:10

## 2019-10-28 RX ADMIN — PACLITAXEL 144 MG: 6 INJECTION, SOLUTION INTRAVENOUS at 10:10

## 2019-10-28 RX ADMIN — FAMOTIDINE 20 MG: 10 INJECTION, SOLUTION INTRAVENOUS at 09:10

## 2019-10-28 RX ADMIN — HEPARIN SODIUM (PORCINE) LOCK FLUSH IV SOLN 100 UNIT/ML 500 UNITS: 100 SOLUTION at 11:10

## 2019-10-28 RX ADMIN — DIPHENHYDRAMINE HYDROCHLORIDE 50 MG: 50 INJECTION, SOLUTION INTRAMUSCULAR; INTRAVENOUS at 09:10

## 2019-10-28 NOTE — PLAN OF CARE
0900 pt here for Taxol infusion D15C1, labs, hx, meds, allergies reviewed, pt with c/o back pain scale=5 (took med for it), pt uses walker has fx left hip, reclined in chair, warm blanket provided, continue to monitor

## 2019-10-29 ENCOUNTER — PATIENT MESSAGE (OUTPATIENT)
Dept: HEMATOLOGY/ONCOLOGY | Facility: CLINIC | Age: 64
End: 2019-10-29

## 2019-10-29 RX ORDER — EPINEPHRINE 0.3 MG/.3ML
0.3 INJECTION SUBCUTANEOUS ONCE AS NEEDED
Status: CANCELLED | OUTPATIENT
Start: 2019-11-11

## 2019-10-29 RX ORDER — SODIUM CHLORIDE 0.9 % (FLUSH) 0.9 %
10 SYRINGE (ML) INJECTION
Status: CANCELLED | OUTPATIENT
Start: 2019-11-11

## 2019-10-29 RX ORDER — FAMOTIDINE 10 MG/ML
20 INJECTION INTRAVENOUS
Status: CANCELLED | OUTPATIENT
Start: 2019-11-05

## 2019-10-29 RX ORDER — FAMOTIDINE 10 MG/ML
20 INJECTION INTRAVENOUS
Status: CANCELLED | OUTPATIENT
Start: 2019-11-18

## 2019-10-29 RX ORDER — DIPHENHYDRAMINE HYDROCHLORIDE 50 MG/ML
50 INJECTION INTRAMUSCULAR; INTRAVENOUS ONCE AS NEEDED
Status: CANCELLED | OUTPATIENT
Start: 2019-11-18

## 2019-10-29 RX ORDER — HEPARIN 100 UNIT/ML
500 SYRINGE INTRAVENOUS
Status: CANCELLED | OUTPATIENT
Start: 2019-11-18

## 2019-10-29 RX ORDER — HEPARIN 100 UNIT/ML
500 SYRINGE INTRAVENOUS
Status: CANCELLED | OUTPATIENT
Start: 2019-11-11

## 2019-10-29 RX ORDER — DEXAMETHASONE SODIUM PHOSPHATE 4 MG/ML
20 INJECTION, SOLUTION INTRA-ARTICULAR; INTRALESIONAL; INTRAMUSCULAR; INTRAVENOUS; SOFT TISSUE
Status: CANCELLED
Start: 2019-11-05

## 2019-10-29 RX ORDER — DIPHENHYDRAMINE HYDROCHLORIDE 50 MG/ML
50 INJECTION INTRAMUSCULAR; INTRAVENOUS ONCE AS NEEDED
Status: CANCELLED | OUTPATIENT
Start: 2019-11-05

## 2019-10-29 RX ORDER — EPINEPHRINE 0.3 MG/.3ML
0.3 INJECTION SUBCUTANEOUS ONCE AS NEEDED
Status: CANCELLED | OUTPATIENT
Start: 2019-11-05

## 2019-10-29 RX ORDER — DEXAMETHASONE SODIUM PHOSPHATE 4 MG/ML
20 INJECTION, SOLUTION INTRA-ARTICULAR; INTRALESIONAL; INTRAMUSCULAR; INTRAVENOUS; SOFT TISSUE
Status: CANCELLED
Start: 2019-11-18

## 2019-10-29 RX ORDER — SODIUM CHLORIDE 0.9 % (FLUSH) 0.9 %
10 SYRINGE (ML) INJECTION
Status: CANCELLED | OUTPATIENT
Start: 2019-11-05

## 2019-10-29 RX ORDER — DEXAMETHASONE SODIUM PHOSPHATE 4 MG/ML
20 INJECTION, SOLUTION INTRA-ARTICULAR; INTRALESIONAL; INTRAMUSCULAR; INTRAVENOUS; SOFT TISSUE
Status: CANCELLED
Start: 2019-11-11

## 2019-10-29 RX ORDER — EPINEPHRINE 0.3 MG/.3ML
0.3 INJECTION SUBCUTANEOUS ONCE AS NEEDED
Status: CANCELLED | OUTPATIENT
Start: 2019-11-18

## 2019-10-29 RX ORDER — FAMOTIDINE 10 MG/ML
20 INJECTION INTRAVENOUS
Status: CANCELLED | OUTPATIENT
Start: 2019-11-11

## 2019-10-29 RX ORDER — SODIUM CHLORIDE 0.9 % (FLUSH) 0.9 %
10 SYRINGE (ML) INJECTION
Status: CANCELLED | OUTPATIENT
Start: 2019-11-18

## 2019-10-29 RX ORDER — HEPARIN 100 UNIT/ML
500 SYRINGE INTRAVENOUS
Status: CANCELLED | OUTPATIENT
Start: 2019-11-05

## 2019-10-29 RX ORDER — DIPHENHYDRAMINE HYDROCHLORIDE 50 MG/ML
50 INJECTION INTRAMUSCULAR; INTRAVENOUS ONCE AS NEEDED
Status: CANCELLED | OUTPATIENT
Start: 2019-11-11

## 2019-10-30 ENCOUNTER — PATIENT MESSAGE (OUTPATIENT)
Dept: HEMATOLOGY/ONCOLOGY | Facility: CLINIC | Age: 64
End: 2019-10-30

## 2019-10-30 DIAGNOSIS — R23.2 HOT FLASHES RELATED TO AROMATASE INHIBITOR THERAPY: ICD-10-CM

## 2019-10-30 DIAGNOSIS — T45.1X5A HOT FLASHES RELATED TO AROMATASE INHIBITOR THERAPY: ICD-10-CM

## 2019-10-30 PROBLEM — L60.9 NAIL ABNORMALITIES: Status: ACTIVE | Noted: 2019-10-30

## 2019-10-30 RX ORDER — CLONIDINE HYDROCHLORIDE 0.1 MG/1
0.1 TABLET ORAL 3 TIMES DAILY
Qty: 270 TABLET | Refills: 3 | Status: SHIPPED | OUTPATIENT
Start: 2019-10-30 | End: 2019-11-04 | Stop reason: SDUPTHER

## 2019-10-30 NOTE — PROGRESS NOTES
Subjective:       Patient ID: Rebecca Crain is a 64 y.o. female.    Chief Complaint: No chief complaint on file.    HPI Ms. Crain is here for an URGENT CARE VISIT due to finger nail discomfort. Patient comes in today with several pain complaints, she states she had pain in the mid to lower back, pain in the groin from the fractured pelvic bone, pain in the right ribs yesterday; not bilateral. Notes pain in her tongue x 1 week. Big toe left foot - dropped a jar of peanut butter on toe - losing nail on that toe. Both the back and fib pain are new - they started roughly 1 month ago. Right earache started a few days ago.     Per Dr. Schroeder's note her last visit states that he increased her MS Contin to 90 mg every 8 hr.  She is continuing Dilaudid 4-8 mg every 4 hr. Patient states this visit she is taking Ms Contin 60 mg  Every 8 hours (as the pharmacy has been out of 30 mg tablets) and she is still continuing to use Dilaudid 4-8 mg three times a day.      Biggest recent problems worsening left groin pain.  She had extensive evaluation for that and ultimately she was found to have a pathological fracture of her left pubis.  Was felt that her pain was related to progression of her bone disease even though her CT scan and bone scan did not show any definite progression.      ECOG-2    Breast history: In 2013 she was diagnosed with stage IIB carcinoma of the right breast, ER positive with a low Oncotype score.  She was enrolled on protocol  and randomized to endocrine therapy alone. She was tried on all 3 aromatase inhibitors and had itching and rash to all of them.  In August 2013 she was started on tamoxifen. She was found to have  bone metastasis in May 2015, 2015.  A subsequent bone biopsy was performed on a lesion at the T8 vertebra. The pathology from that was consistent with metastatic breast cancer, ER +80%, [place] +80%, and HER-2 negative.      She received letrozole plus palbociclib from July 2015 until May  2016.  She also received bone protective therapy with Xgeva.      Faslodex was started in June 2016.      Exemestane and Afinitor started in August 2017.  That was discontinued in February 2018 due to progression in the liver and bone.     Navelbine was started February 16, 2018.  She had 8 cycles for that.  Scans in November 2018 showed progression as follows     Capecitabine was started in December 2018.  She received 5 cycles of that therapy.    Follow-up scans in April 2019 showed progression with a new hepatic lesion and worsening bone disease.    She began clinical trial therapy with erdafitinib on May 8, 2019.  That was discontinued in September 2019 due to progression in the bone.  She began Taxol on 10/2/19.     Review of Systems   Constitutional: Negative for appetite change and unexpected weight change.        Presents alone  Walks with walker   Eyes: Negative for visual disturbance.   Respiratory: Negative for cough and shortness of breath.    Cardiovascular: Negative for chest pain.   Gastrointestinal: Negative for abdominal pain, constipation, diarrhea, nausea and vomiting.   Genitourinary: Negative for frequency.   Musculoskeletal: Positive for back pain.   Skin: Negative for rash.        Nail bed pain  Great left toe pain   Neurological: Positive for headaches (slight headaches).   Hematological: Negative for adenopathy.   Psychiatric/Behavioral: The patient is not nervous/anxious.        Objective:      Physical Exam   Constitutional: She is oriented to person, place, and time. She appears well-developed and well-nourished. No distress.   Alopecia   HENT:   Head: Normocephalic.   Right Ear: Hearing and external ear normal. Tympanic membrane is injected and erythematous. A middle ear effusion is present.   Left Ear: Hearing, tympanic membrane and external ear normal.   Ears:    Mouth/Throat: Oropharynx is clear and moist. No oropharyngeal exudate.   Eyes: Pupils are equal, round, and reactive to  light.   Neck: Normal range of motion.   Cardiovascular: Normal rate, regular rhythm and normal heart sounds.   Pulmonary/Chest: Effort normal and breath sounds normal. No respiratory distress.   Abdominal: Soft. Normal appearance and bowel sounds are normal. She exhibits no distension. There is no tenderness.   Musculoskeletal: She exhibits no edema.   Neurological: She is alert and oriented to person, place, and time.   Skin: Skin is warm and dry. No rash noted.   All finger nails appear to be detaching. Appear as taxol nails.   Psychiatric: She has a normal mood and affect. Her behavior is normal. Thought content normal.   Nursing note and vitals reviewed.      Assessment:       1. Malignant neoplasm of central portion of right breast in female, estrogen receptor positive    2. Metastasis to liver    3. 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 Bone metastasis    5. Nail abnormalities    6. Chemotherapy-induced neuropathy    7. Adjustment disorder with depressed mood    8. Benign essential HTN    9. Cancer related pain        Plan:       1-4. Started that on Taxol on October 2nd. Next dose due 11/4/18.    5. Previously treated with ciprofloxacin. Appears to be taxol nails - recommended lotion and increase pain regimen    6. Continue gabapentin and monitor   7. Continue zoloft and follow up with PCP  8. Continue clonidine and follow up with PCP  9. Increase pain regimen to MS contin 90 mg every 8 hours with dilaudid every 4 hours 4-8 mg. Will touch base with patient on Monday to determine if the increase has helped.   10. Right otitis media - cefdinir given 300 mg every 12 hours x 7 days    Sent MS Contin 30 mg prescription to [facility]     Patient is in agreement with the proposed treatment plan. All questions were answered to the patient's satisfaction. Patient knows to call clinic for any new or worsening symptoms and if anything is needed before the next clinic visit.          Teressa Rodriguez,  FNP-C  Hematology & Medical Oncology   [address] [place] [address]  ph. [phone]  Fax. [phone]    Patient dicussed with collaborating physician, Dr. Schroeder.

## 2019-10-31 ENCOUNTER — OFFICE VISIT (OUTPATIENT)
Dept: HEMATOLOGY/ONCOLOGY | Facility: CLINIC | Age: 64
End: 2019-10-31
Payer: MEDICARE

## 2019-10-31 VITALS
TEMPERATURE: 98 F | HEIGHT: 64 IN | SYSTOLIC BLOOD PRESSURE: 129 MMHG | DIASTOLIC BLOOD PRESSURE: 59 MMHG | HEART RATE: 91 BPM | RESPIRATION RATE: 18 BRPM | OXYGEN SATURATION: 97 % | BODY MASS INDEX: 27.43 KG/M2 | WEIGHT: 160.69 LBS

## 2019-10-31 DIAGNOSIS — C79.51 BONE METASTASIS: ICD-10-CM

## 2019-10-31 DIAGNOSIS — C50.111 MALIGNANT NEOPLASM OF CENTRAL PORTION OF RIGHT BREAST IN FEMALE, ESTROGEN RECEPTOR POSITIVE: Primary | ICD-10-CM

## 2019-10-31 DIAGNOSIS — F43.21 ADJUSTMENT DISORDER WITH DEPRESSED MOOD: ICD-10-CM

## 2019-10-31 DIAGNOSIS — H65.111 NON-RECURRENT ACUTE ALLERGIC OTITIS MEDIA OF RIGHT EAR: ICD-10-CM

## 2019-10-31 DIAGNOSIS — L60.9 NAIL ABNORMALITIES: ICD-10-CM

## 2019-10-31 DIAGNOSIS — Z17.0 MALIGNANT NEOPLASM OF CENTRAL PORTION OF RIGHT BREAST IN FEMALE, ESTROGEN RECEPTOR POSITIVE: Primary | ICD-10-CM

## 2019-10-31 DIAGNOSIS — C78.7 METASTASIS TO LIVER: ICD-10-CM

## 2019-10-31 DIAGNOSIS — G62.0 CHEMOTHERAPY-INDUCED NEUROPATHY: ICD-10-CM

## 2019-10-31 DIAGNOSIS — I10 BENIGN ESSENTIAL HTN: ICD-10-CM

## 2019-10-31 DIAGNOSIS — G89.3 CANCER RELATED PAIN: ICD-10-CM

## 2019-10-31 DIAGNOSIS — T45.1X5A CHEMOTHERAPY-INDUCED NEUROPATHY: ICD-10-CM

## 2019-10-31 DIAGNOSIS — C79.51 METASTATIC CANCER TO SPINE: ICD-10-CM

## 2019-10-31 PROCEDURE — 99499 UNLISTED E&M SERVICE: CPT | Mod: HCNC,S$GLB,, | Performed by: NURSE PRACTITIONER

## 2019-10-31 PROCEDURE — 99214 OFFICE O/P EST MOD 30 MIN: CPT | Mod: HCNC,S$GLB,, | Performed by: NURSE PRACTITIONER

## 2019-10-31 PROCEDURE — 99499 RISK ADDL DX/OHS AUDIT: ICD-10-PCS | Mod: HCNC,S$GLB,, | Performed by: NURSE PRACTITIONER

## 2019-10-31 PROCEDURE — 99999 PR PBB SHADOW E&M-EST. PATIENT-LVL V: CPT | Mod: PBBFAC,HCNC,, | Performed by: NURSE PRACTITIONER

## 2019-10-31 PROCEDURE — 99999 PR PBB SHADOW E&M-EST. PATIENT-LVL V: ICD-10-PCS | Mod: PBBFAC,HCNC,, | Performed by: NURSE PRACTITIONER

## 2019-10-31 PROCEDURE — 99214 PR OFFICE/OUTPT VISIT, EST, LEVL IV, 30-39 MIN: ICD-10-PCS | Mod: HCNC,S$GLB,, | Performed by: NURSE PRACTITIONER

## 2019-10-31 RX ORDER — MORPHINE SULFATE 30 MG/1
30 TABLET, FILM COATED, EXTENDED RELEASE ORAL EVERY 8 HOURS PRN
Qty: 90 TABLET | Refills: 0 | Status: SHIPPED | OUTPATIENT
Start: 2019-10-31 | End: 2019-11-27 | Stop reason: SDUPTHER

## 2019-10-31 RX ORDER — CEFDINIR 300 MG/1
300 CAPSULE ORAL 2 TIMES DAILY
Qty: 14 CAPSULE | Refills: 0 | Status: SHIPPED | OUTPATIENT
Start: 2019-10-31 | End: 2019-11-07

## 2019-10-31 NOTE — Clinical Note
Patient needs labs and chemo appointment on 11/18. CBC, CMP taxol infusion.Needs to see Dr. Schroeder on 12/2/19 with labs prior CBC, CMP and taxol infusion following. She request appointments between 7-9 AM

## 2019-11-04 ENCOUNTER — INFUSION (OUTPATIENT)
Dept: INFUSION THERAPY | Facility: HOSPITAL | Age: 64
End: 2019-11-04
Attending: INTERNAL MEDICINE
Payer: MEDICARE

## 2019-11-04 ENCOUNTER — PATIENT MESSAGE (OUTPATIENT)
Dept: HEMATOLOGY/ONCOLOGY | Facility: CLINIC | Age: 64
End: 2019-11-04

## 2019-11-04 VITALS
SYSTOLIC BLOOD PRESSURE: 97 MMHG | RESPIRATION RATE: 18 BRPM | HEART RATE: 73 BPM | HEIGHT: 64 IN | DIASTOLIC BLOOD PRESSURE: 54 MMHG | BODY MASS INDEX: 27.1 KG/M2 | WEIGHT: 158.75 LBS | TEMPERATURE: 98 F

## 2019-11-04 DIAGNOSIS — C79.51 METASTATIC CANCER TO SPINE: ICD-10-CM

## 2019-11-04 DIAGNOSIS — T45.1X5A HOT FLASHES RELATED TO AROMATASE INHIBITOR THERAPY: ICD-10-CM

## 2019-11-04 DIAGNOSIS — C78.7 METASTASIS TO LIVER: Primary | ICD-10-CM

## 2019-11-04 DIAGNOSIS — R23.2 HOT FLASHES RELATED TO AROMATASE INHIBITOR THERAPY: ICD-10-CM

## 2019-11-04 DIAGNOSIS — C50.111 MALIGNANT NEOPLASM OF CENTRAL PORTION OF RIGHT BREAST IN FEMALE, ESTROGEN RECEPTOR POSITIVE: ICD-10-CM

## 2019-11-04 DIAGNOSIS — Z17.0 MALIGNANT NEOPLASM OF CENTRAL PORTION OF RIGHT BREAST IN FEMALE, ESTROGEN RECEPTOR POSITIVE: ICD-10-CM

## 2019-11-04 PROCEDURE — 96376 TX/PRO/DX INJ SAME DRUG ADON: CPT | Mod: HCNC

## 2019-11-04 PROCEDURE — 63600175 PHARM REV CODE 636 W HCPCS: Mod: HCNC | Performed by: INTERNAL MEDICINE

## 2019-11-04 PROCEDURE — 25000003 PHARM REV CODE 250: Mod: HCNC | Performed by: INTERNAL MEDICINE

## 2019-11-04 PROCEDURE — S0028 INJECTION, FAMOTIDINE, 20 MG: HCPCS | Mod: HCNC | Performed by: INTERNAL MEDICINE

## 2019-11-04 PROCEDURE — A4216 STERILE WATER/SALINE, 10 ML: HCPCS | Mod: HCNC | Performed by: INTERNAL MEDICINE

## 2019-11-04 PROCEDURE — 96367 TX/PROPH/DG ADDL SEQ IV INF: CPT | Mod: HCNC

## 2019-11-04 PROCEDURE — 96413 CHEMO IV INFUSION 1 HR: CPT | Mod: HCNC

## 2019-11-04 RX ORDER — CLONIDINE HYDROCHLORIDE 0.1 MG/1
0.1 TABLET ORAL 3 TIMES DAILY
Qty: 270 TABLET | Refills: 3 | Status: ON HOLD | OUTPATIENT
Start: 2019-11-04 | End: 2020-02-02 | Stop reason: HOSPADM

## 2019-11-04 RX ORDER — SODIUM CHLORIDE 0.9 % (FLUSH) 0.9 %
10 SYRINGE (ML) INJECTION
Status: DISCONTINUED | OUTPATIENT
Start: 2019-11-04 | End: 2019-11-04 | Stop reason: HOSPADM

## 2019-11-04 RX ORDER — DEXAMETHASONE SODIUM PHOSPHATE 4 MG/ML
20 INJECTION, SOLUTION INTRA-ARTICULAR; INTRALESIONAL; INTRAMUSCULAR; INTRAVENOUS; SOFT TISSUE
Status: DISCONTINUED | OUTPATIENT
Start: 2019-11-04 | End: 2019-11-04

## 2019-11-04 RX ORDER — HEPARIN 100 UNIT/ML
500 SYRINGE INTRAVENOUS
Status: DISCONTINUED | OUTPATIENT
Start: 2019-11-04 | End: 2019-11-04 | Stop reason: HOSPADM

## 2019-11-04 RX ORDER — FAMOTIDINE 10 MG/ML
20 INJECTION INTRAVENOUS
Status: COMPLETED | OUTPATIENT
Start: 2019-11-04 | End: 2019-11-04

## 2019-11-04 RX ORDER — DIPHENHYDRAMINE HYDROCHLORIDE 50 MG/ML
50 INJECTION INTRAMUSCULAR; INTRAVENOUS ONCE AS NEEDED
Status: DISCONTINUED | OUTPATIENT
Start: 2019-11-04 | End: 2019-11-04 | Stop reason: HOSPADM

## 2019-11-04 RX ORDER — ONDANSETRON HCL IN 0.9 % NACL 8 MG/50 ML
8 INTRAVENOUS SOLUTION, PIGGYBACK (ML) INTRAVENOUS
Status: COMPLETED | OUTPATIENT
Start: 2019-11-04 | End: 2019-11-04

## 2019-11-04 RX ORDER — EPINEPHRINE 0.3 MG/.3ML
0.3 INJECTION SUBCUTANEOUS ONCE AS NEEDED
Status: DISCONTINUED | OUTPATIENT
Start: 2019-11-04 | End: 2019-11-04 | Stop reason: HOSPADM

## 2019-11-04 RX ADMIN — PACLITAXEL 144 MG: 6 INJECTION, SOLUTION INTRAVENOUS at 11:11

## 2019-11-04 RX ADMIN — SODIUM CHLORIDE: 0.9 INJECTION, SOLUTION INTRAVENOUS at 09:11

## 2019-11-04 RX ADMIN — HEPARIN 500 UNITS: 100 SYRINGE at 12:11

## 2019-11-04 RX ADMIN — Medication 10 ML: at 12:11

## 2019-11-04 RX ADMIN — DEXAMETHASONE SODIUM PHOSPHATE 20 MG: 4 INJECTION, SOLUTION INTRA-ARTICULAR; INTRALESIONAL; INTRAMUSCULAR; INTRAVENOUS; SOFT TISSUE at 10:11

## 2019-11-04 RX ADMIN — ONDANSETRON 8 MG: 2 INJECTION INTRAMUSCULAR; INTRAVENOUS at 10:11

## 2019-11-04 RX ADMIN — DIPHENHYDRAMINE HYDROCHLORIDE 50 MG: 50 INJECTION, SOLUTION INTRAMUSCULAR; INTRAVENOUS at 09:11

## 2019-11-04 RX ADMIN — FAMOTIDINE 20 MG: 10 INJECTION, SOLUTION INTRAVENOUS at 10:11

## 2019-11-04 NOTE — PLAN OF CARE
122 patient completed and tolerated infusion well. NAD noted. Patient to RTC 11/11/19, MARLEN Patiño RN to make MD appt for 11/11/19 0247, patient aware. Patient instructed when to notify MD of any issues, patient verbalized understanding. Patient d/c home

## 2019-11-04 NOTE — NURSING
0925 patient arrived for taxol infusion. Patient voiced no new complaints today. NAD noted. Treatment plan reviewed and discussed with pt, pt verbalized understanding and agrees to proceed with treatment. Patient resting in chair.

## 2019-11-06 ENCOUNTER — PATIENT MESSAGE (OUTPATIENT)
Dept: HEMATOLOGY/ONCOLOGY | Facility: CLINIC | Age: 64
End: 2019-11-06

## 2019-11-07 NOTE — PROGRESS NOTES
Subjective:       Patient ID: Rebecca Crain is a 64 y.o. female.    Chief Complaint: No chief complaint on file.    HPI Ms. Crain is here for followup of metastatic carcinoma of the right breast.    She is on weekly Taxol and has had 4 treatments thus far.    Today she reports that she has ongoing pain in her back and groin.  She also has pain in her thumbs and her great toes.  She is currently taking MS Contin 90 mg every 8 hr and Dilaudid 4 mg every 4 hr on a fairly regular basis.  Overall her pain averages approximately 5/10.    She has had number of different side effects from chemotherapy.  Among those has been some intermittent diarrhea up to 3 or 4 per day but sometimes only once a day.  She has also had some occasional mouth sores.  Appetite been variable and she generally eats twice a day.  She has had no shortness of breath.      ECOG-2    Breast history: In 2013 she was diagnosed with stage IIB carcinoma of the right breast, ER positive with a low Oncotype score.  She was enrolled on protocol  and randomized to endocrine therapy alone.  She was tried on all 3 aromatase inhibitors and had itching and rash to all of them.  In August 2013 she was started on tamoxifen.   She was found to have  bone metastasis in May 2015, 2015.  A subsequent bone biopsy was performed on a lesion at the T8 vertebra.   The pathology from that was consistent with metastatic breast cancer, ER +80%, [place] +80%, and HER-2 negative.      She received letrozole plus palbociclib from July 2015 until May 2016.  She also received bone protective therapy with Xgeva.      Faslodex was started in June 2016.      Exemestane and Afinitor started in August 2017.  That was discontinued in February 2018 due to progression in the liver and bone.     Navelbine was started February 16, 2018.  She had 8 cycles for that.  Scans in November 2018 showed progression as follows     Capecitabine was started in December 2018.  She received 5 cycles  of that therapy.    Follow-up scans in April 2019 showed progression with a new hepatic lesion and worsening bone disease.    She began clinical trial therapy with erdafitinib on May 8, 2019.  That was discontinued in September 2019 due to progression in the bone.    She then received a course of radiation therapy to her pelvis.  She began weekly Taxol in October 2019.  Review of Systems   Constitutional: Negative for appetite change and unexpected weight change.   HENT: Positive for mouth sores.    Eyes: Positive for visual disturbance.   Respiratory: Negative for cough and shortness of breath.    Cardiovascular: Negative for chest pain.   Gastrointestinal: Positive for diarrhea. Negative for abdominal pain, constipation and nausea.   Genitourinary: Negative for frequency.   Musculoskeletal: Positive for back pain.        Left Groin pain   Skin: Negative for rash.   Neurological: Negative for headaches.   Hematological: Negative for adenopathy.   Psychiatric/Behavioral: Negative for dysphoric mood. The patient is not nervous/anxious.        Objective:      Physical Exam   Constitutional: She is oriented to person, place, and time. She appears well-developed and well-nourished. No distress.   Eyes: No scleral icterus.   Cardiovascular: Normal rate and regular rhythm.   Pulmonary/Chest: Effort normal and breath sounds normal.   Abdominal: Soft. She exhibits no mass. There is no tenderness.   Lymphadenopathy:     She has no cervical adenopathy.     She has no axillary adenopathy.        Right: No supraclavicular adenopathy present.        Left: No supraclavicular adenopathy present.   Neurological: She is alert and oriented to person, place, and time.   Psychiatric: She has a normal mood and affect. Her behavior is normal. Thought content normal.   Vitals reviewed.      Assessment:     CBC today is unremarkable.  1. Malignant neoplasm of central portion of right breast in female, estrogen receptor positive    2. Bone  metastasis    3. Metastasis to liver    4. Cancer related pain        Plan:       Continue current therapy.  Will get a cancer antigen next week and then decide with to scan after 6 cycles.

## 2019-11-08 ENCOUNTER — TELEPHONE (OUTPATIENT)
Dept: HEMATOLOGY/ONCOLOGY | Facility: CLINIC | Age: 64
End: 2019-11-08

## 2019-11-11 ENCOUNTER — OFFICE VISIT (OUTPATIENT)
Dept: HEMATOLOGY/ONCOLOGY | Facility: CLINIC | Age: 64
End: 2019-11-11
Payer: MEDICARE

## 2019-11-11 ENCOUNTER — INFUSION (OUTPATIENT)
Dept: INFUSION THERAPY | Facility: HOSPITAL | Age: 64
End: 2019-11-11
Attending: INTERNAL MEDICINE
Payer: MEDICARE

## 2019-11-11 VITALS
WEIGHT: 161 LBS | OXYGEN SATURATION: 98 % | TEMPERATURE: 98 F | HEIGHT: 64 IN | BODY MASS INDEX: 27.49 KG/M2 | HEART RATE: 81 BPM | RESPIRATION RATE: 18 BRPM | SYSTOLIC BLOOD PRESSURE: 130 MMHG | DIASTOLIC BLOOD PRESSURE: 60 MMHG

## 2019-11-11 VITALS
DIASTOLIC BLOOD PRESSURE: 51 MMHG | SYSTOLIC BLOOD PRESSURE: 94 MMHG | HEART RATE: 72 BPM | RESPIRATION RATE: 18 BRPM | TEMPERATURE: 98 F | OXYGEN SATURATION: 98 %

## 2019-11-11 DIAGNOSIS — G89.3 CANCER RELATED PAIN: ICD-10-CM

## 2019-11-11 DIAGNOSIS — C78.7 METASTASIS TO LIVER: ICD-10-CM

## 2019-11-11 DIAGNOSIS — C79.51 METASTATIC CANCER TO SPINE: ICD-10-CM

## 2019-11-11 DIAGNOSIS — Z17.0 MALIGNANT NEOPLASM OF CENTRAL PORTION OF RIGHT BREAST IN FEMALE, ESTROGEN RECEPTOR POSITIVE: ICD-10-CM

## 2019-11-11 DIAGNOSIS — C79.51 BONE METASTASIS: ICD-10-CM

## 2019-11-11 DIAGNOSIS — C78.7 METASTASIS TO LIVER: Primary | ICD-10-CM

## 2019-11-11 DIAGNOSIS — C50.111 MALIGNANT NEOPLASM OF CENTRAL PORTION OF RIGHT BREAST IN FEMALE, ESTROGEN RECEPTOR POSITIVE: ICD-10-CM

## 2019-11-11 DIAGNOSIS — C50.111 MALIGNANT NEOPLASM OF CENTRAL PORTION OF RIGHT BREAST IN FEMALE, ESTROGEN RECEPTOR POSITIVE: Primary | ICD-10-CM

## 2019-11-11 DIAGNOSIS — Z17.0 MALIGNANT NEOPLASM OF CENTRAL PORTION OF RIGHT BREAST IN FEMALE, ESTROGEN RECEPTOR POSITIVE: Primary | ICD-10-CM

## 2019-11-11 PROCEDURE — 96375 TX/PRO/DX INJ NEW DRUG ADDON: CPT | Mod: HCNC

## 2019-11-11 PROCEDURE — 99999 PR PBB SHADOW E&M-EST. PATIENT-LVL V: ICD-10-PCS | Mod: PBBFAC,HCNC,, | Performed by: INTERNAL MEDICINE

## 2019-11-11 PROCEDURE — 99214 PR OFFICE/OUTPT VISIT, EST, LEVL IV, 30-39 MIN: ICD-10-PCS | Mod: HCNC,S$GLB,, | Performed by: INTERNAL MEDICINE

## 2019-11-11 PROCEDURE — A4216 STERILE WATER/SALINE, 10 ML: HCPCS | Mod: HCNC | Performed by: INTERNAL MEDICINE

## 2019-11-11 PROCEDURE — 63600175 PHARM REV CODE 636 W HCPCS: Mod: HCNC | Performed by: INTERNAL MEDICINE

## 2019-11-11 PROCEDURE — 99214 OFFICE O/P EST MOD 30 MIN: CPT | Mod: HCNC,S$GLB,, | Performed by: INTERNAL MEDICINE

## 2019-11-11 PROCEDURE — 3008F PR BODY MASS INDEX (BMI) DOCUMENTED: ICD-10-PCS | Mod: HCNC,CPTII,S$GLB, | Performed by: INTERNAL MEDICINE

## 2019-11-11 PROCEDURE — 3008F BODY MASS INDEX DOCD: CPT | Mod: HCNC,CPTII,S$GLB, | Performed by: INTERNAL MEDICINE

## 2019-11-11 PROCEDURE — 3078F DIAST BP <80 MM HG: CPT | Mod: HCNC,CPTII,S$GLB, | Performed by: INTERNAL MEDICINE

## 2019-11-11 PROCEDURE — 3075F PR MOST RECENT SYSTOLIC BLOOD PRESS GE 130-139MM HG: ICD-10-PCS | Mod: HCNC,CPTII,S$GLB, | Performed by: INTERNAL MEDICINE

## 2019-11-11 PROCEDURE — 25000003 PHARM REV CODE 250: Mod: HCNC | Performed by: INTERNAL MEDICINE

## 2019-11-11 PROCEDURE — 96413 CHEMO IV INFUSION 1 HR: CPT | Mod: HCNC

## 2019-11-11 PROCEDURE — S0028 INJECTION, FAMOTIDINE, 20 MG: HCPCS | Mod: HCNC | Performed by: INTERNAL MEDICINE

## 2019-11-11 PROCEDURE — 99999 PR PBB SHADOW E&M-EST. PATIENT-LVL V: CPT | Mod: PBBFAC,HCNC,, | Performed by: INTERNAL MEDICINE

## 2019-11-11 PROCEDURE — 96367 TX/PROPH/DG ADDL SEQ IV INF: CPT | Mod: HCNC

## 2019-11-11 PROCEDURE — 3078F PR MOST RECENT DIASTOLIC BLOOD PRESSURE < 80 MM HG: ICD-10-PCS | Mod: HCNC,CPTII,S$GLB, | Performed by: INTERNAL MEDICINE

## 2019-11-11 PROCEDURE — 3075F SYST BP GE 130 - 139MM HG: CPT | Mod: HCNC,CPTII,S$GLB, | Performed by: INTERNAL MEDICINE

## 2019-11-11 RX ORDER — DIPHENHYDRAMINE HYDROCHLORIDE 50 MG/ML
50 INJECTION INTRAMUSCULAR; INTRAVENOUS ONCE AS NEEDED
Status: DISCONTINUED | OUTPATIENT
Start: 2019-11-11 | End: 2019-11-11 | Stop reason: HOSPADM

## 2019-11-11 RX ORDER — ONDANSETRON HCL IN 0.9 % NACL 8 MG/50 ML
8 INTRAVENOUS SOLUTION, PIGGYBACK (ML) INTRAVENOUS
Status: COMPLETED | OUTPATIENT
Start: 2019-11-11 | End: 2019-11-11

## 2019-11-11 RX ORDER — EPINEPHRINE 0.3 MG/.3ML
0.3 INJECTION SUBCUTANEOUS ONCE AS NEEDED
Status: DISCONTINUED | OUTPATIENT
Start: 2019-11-11 | End: 2019-11-11 | Stop reason: HOSPADM

## 2019-11-11 RX ORDER — HEPARIN 100 UNIT/ML
500 SYRINGE INTRAVENOUS
Status: DISCONTINUED | OUTPATIENT
Start: 2019-11-11 | End: 2019-11-11 | Stop reason: HOSPADM

## 2019-11-11 RX ORDER — SODIUM CHLORIDE 0.9 % (FLUSH) 0.9 %
10 SYRINGE (ML) INJECTION
Status: DISCONTINUED | OUTPATIENT
Start: 2019-11-11 | End: 2019-11-11 | Stop reason: HOSPADM

## 2019-11-11 RX ORDER — FAMOTIDINE 10 MG/ML
20 INJECTION INTRAVENOUS
Status: COMPLETED | OUTPATIENT
Start: 2019-11-11 | End: 2019-11-11

## 2019-11-11 RX ADMIN — PACLITAXEL 144 MG: 6 INJECTION, SOLUTION INTRAVENOUS at 09:11

## 2019-11-11 RX ADMIN — HEPARIN 500 UNITS: 100 SYRINGE at 10:11

## 2019-11-11 RX ADMIN — ONDANSETRON 8 MG: 2 INJECTION INTRAMUSCULAR; INTRAVENOUS at 09:11

## 2019-11-11 RX ADMIN — DEXAMETHASONE SODIUM PHOSPHATE 20 MG: 4 INJECTION, SOLUTION INTRA-ARTICULAR; INTRALESIONAL; INTRAMUSCULAR; INTRAVENOUS; SOFT TISSUE at 08:11

## 2019-11-11 RX ADMIN — FAMOTIDINE 20 MG: 10 INJECTION, SOLUTION INTRAVENOUS at 08:11

## 2019-11-11 RX ADMIN — Medication 10 ML: at 10:11

## 2019-11-11 RX ADMIN — DIPHENHYDRAMINE HYDROCHLORIDE 50 MG: 50 INJECTION, SOLUTION INTRAMUSCULAR; INTRAVENOUS at 09:11

## 2019-11-11 NOTE — PLAN OF CARE
Problem: Adult Inpatient Plan of Care  Goal: Optimal Comfort and Wellbeing  Intervention: Provide Person-Centered Care  Flowsheets (Taken 11/11/2019 6050)  Trust Relationship/Rapport: care explained; thoughts/feelings acknowledged; choices provided; emotional support provided; empathic listening provided; questions answered; questions encouraged; reassurance provided

## 2019-11-11 NOTE — PLAN OF CARE
Pt tolerated Taxol today. NAD. Port flushed + blood return present, flushed. Hep lock and deaccesed. declined AVS. Uses my Ochn[employer]. Discharged home. Ambulated independently with a walker.

## 2019-11-13 DIAGNOSIS — G89.3 CANCER RELATED PAIN: ICD-10-CM

## 2019-11-13 DIAGNOSIS — Z17.0 MALIGNANT NEOPLASM OF CENTRAL PORTION OF RIGHT BREAST IN FEMALE, ESTROGEN RECEPTOR POSITIVE: ICD-10-CM

## 2019-11-13 DIAGNOSIS — C50.111 MALIGNANT NEOPLASM OF CENTRAL PORTION OF RIGHT BREAST IN FEMALE, ESTROGEN RECEPTOR POSITIVE: ICD-10-CM

## 2019-11-13 RX ORDER — HYDROMORPHONE HYDROCHLORIDE 4 MG/1
4 TABLET ORAL EVERY 4 HOURS PRN
Qty: 180 TABLET | Refills: 0 | Status: ON HOLD | OUTPATIENT
Start: 2019-11-13 | End: 2020-01-12 | Stop reason: HOSPADM

## 2019-11-13 RX ORDER — MORPHINE SULFATE 60 MG/1
60 TABLET, FILM COATED, EXTENDED RELEASE ORAL EVERY 8 HOURS
Qty: 90 TABLET | Refills: 0 | Status: SHIPPED | OUTPATIENT
Start: 2019-11-13 | End: 2019-12-12 | Stop reason: SDUPTHER

## 2019-11-18 ENCOUNTER — INFUSION (OUTPATIENT)
Dept: INFUSION THERAPY | Facility: HOSPITAL | Age: 64
End: 2019-11-18
Attending: INTERNAL MEDICINE
Payer: MEDICARE

## 2019-11-18 VITALS
WEIGHT: 162.94 LBS | BODY MASS INDEX: 27.82 KG/M2 | HEART RATE: 73 BPM | DIASTOLIC BLOOD PRESSURE: 50 MMHG | TEMPERATURE: 98 F | SYSTOLIC BLOOD PRESSURE: 95 MMHG | HEIGHT: 64 IN | RESPIRATION RATE: 18 BRPM

## 2019-11-18 DIAGNOSIS — C78.7 METASTASIS TO LIVER: Primary | ICD-10-CM

## 2019-11-18 DIAGNOSIS — C79.51 METASTATIC CANCER TO SPINE: ICD-10-CM

## 2019-11-18 DIAGNOSIS — Z17.0 MALIGNANT NEOPLASM OF CENTRAL PORTION OF RIGHT BREAST IN FEMALE, ESTROGEN RECEPTOR POSITIVE: ICD-10-CM

## 2019-11-18 DIAGNOSIS — C50.111 MALIGNANT NEOPLASM OF CENTRAL PORTION OF RIGHT BREAST IN FEMALE, ESTROGEN RECEPTOR POSITIVE: ICD-10-CM

## 2019-11-18 PROCEDURE — S0028 INJECTION, FAMOTIDINE, 20 MG: HCPCS | Mod: HCNC | Performed by: INTERNAL MEDICINE

## 2019-11-18 PROCEDURE — 96367 TX/PROPH/DG ADDL SEQ IV INF: CPT | Mod: HCNC

## 2019-11-18 PROCEDURE — 25000003 PHARM REV CODE 250: Mod: HCNC | Performed by: INTERNAL MEDICINE

## 2019-11-18 PROCEDURE — 96413 CHEMO IV INFUSION 1 HR: CPT | Mod: HCNC

## 2019-11-18 PROCEDURE — A4216 STERILE WATER/SALINE, 10 ML: HCPCS | Mod: HCNC | Performed by: INTERNAL MEDICINE

## 2019-11-18 PROCEDURE — 63600175 PHARM REV CODE 636 W HCPCS: Mod: HCNC | Performed by: INTERNAL MEDICINE

## 2019-11-18 PROCEDURE — 96375 TX/PRO/DX INJ NEW DRUG ADDON: CPT | Mod: HCNC

## 2019-11-18 RX ORDER — FAMOTIDINE 10 MG/ML
20 INJECTION INTRAVENOUS
Status: COMPLETED | OUTPATIENT
Start: 2019-11-18 | End: 2019-11-18

## 2019-11-18 RX ORDER — HEPARIN 100 UNIT/ML
500 SYRINGE INTRAVENOUS
Status: DISCONTINUED | OUTPATIENT
Start: 2019-11-18 | End: 2019-11-18 | Stop reason: HOSPADM

## 2019-11-18 RX ORDER — DIPHENHYDRAMINE HYDROCHLORIDE 50 MG/ML
50 INJECTION INTRAMUSCULAR; INTRAVENOUS ONCE AS NEEDED
Status: DISCONTINUED | OUTPATIENT
Start: 2019-11-18 | End: 2019-11-18 | Stop reason: HOSPADM

## 2019-11-18 RX ORDER — EPINEPHRINE 0.3 MG/.3ML
0.3 INJECTION SUBCUTANEOUS ONCE AS NEEDED
Status: DISCONTINUED | OUTPATIENT
Start: 2019-11-18 | End: 2019-11-18 | Stop reason: HOSPADM

## 2019-11-18 RX ORDER — ONDANSETRON HCL IN 0.9 % NACL 8 MG/50 ML
8 INTRAVENOUS SOLUTION, PIGGYBACK (ML) INTRAVENOUS
Status: COMPLETED | OUTPATIENT
Start: 2019-11-18 | End: 2019-11-18

## 2019-11-18 RX ORDER — SODIUM CHLORIDE 0.9 % (FLUSH) 0.9 %
10 SYRINGE (ML) INJECTION
Status: DISCONTINUED | OUTPATIENT
Start: 2019-11-18 | End: 2019-11-18 | Stop reason: HOSPADM

## 2019-11-18 RX ADMIN — ONDANSETRON 8 MG: 2 INJECTION INTRAMUSCULAR; INTRAVENOUS at 10:11

## 2019-11-18 RX ADMIN — FAMOTIDINE 20 MG: 10 INJECTION INTRAVENOUS at 09:11

## 2019-11-18 RX ADMIN — SODIUM CHLORIDE: 0.9 INJECTION, SOLUTION INTRAVENOUS at 09:11

## 2019-11-18 RX ADMIN — PACLITAXEL 144 MG: 6 INJECTION, SOLUTION INTRAVENOUS at 10:11

## 2019-11-18 RX ADMIN — DEXAMETHASONE SODIUM PHOSPHATE 20 MG: 4 INJECTION, SOLUTION INTRA-ARTICULAR; INTRALESIONAL; INTRAMUSCULAR; INTRAVENOUS; SOFT TISSUE at 09:11

## 2019-11-18 RX ADMIN — DIPHENHYDRAMINE HYDROCHLORIDE 50 MG: 50 INJECTION, SOLUTION INTRAMUSCULAR; INTRAVENOUS at 09:11

## 2019-11-18 RX ADMIN — Medication 10 ML: at 11:11

## 2019-11-18 RX ADMIN — HEPARIN 500 UNITS: 100 SYRINGE at 11:11

## 2019-11-18 NOTE — PLAN OF CARE
1132 patient completed and tolerated infusion well. Patient voiced no new complaints or concerns at this time. VSS. Derik AVS, RTC 12/2/19, patient verbalized understanding. Pt d/c home, NAD

## 2019-11-27 DIAGNOSIS — C79.51 BONE METASTASIS: ICD-10-CM

## 2019-11-27 DIAGNOSIS — Z17.0 MALIGNANT NEOPLASM OF CENTRAL PORTION OF RIGHT BREAST IN FEMALE, ESTROGEN RECEPTOR POSITIVE: ICD-10-CM

## 2019-11-27 DIAGNOSIS — C79.51 METASTATIC CANCER TO SPINE: ICD-10-CM

## 2019-11-27 DIAGNOSIS — C78.7 METASTASIS TO LIVER: ICD-10-CM

## 2019-11-27 DIAGNOSIS — C50.111 MALIGNANT NEOPLASM OF CENTRAL PORTION OF RIGHT BREAST IN FEMALE, ESTROGEN RECEPTOR POSITIVE: ICD-10-CM

## 2019-11-27 RX ORDER — MORPHINE SULFATE 30 MG/1
30 TABLET, FILM COATED, EXTENDED RELEASE ORAL EVERY 8 HOURS PRN
Qty: 90 TABLET | Refills: 0 | Status: SHIPPED | OUTPATIENT
Start: 2019-11-27 | End: 2019-12-31 | Stop reason: SDUPTHER

## 2019-12-01 NOTE — PROGRESS NOTES
Subjective:       Patient ID: Rebecca Crain is a 64 y.o. female.    Chief Complaint: No chief complaint on file.    HPI Ms. Crain is here for followup of metastatic carcinoma of the right breast.    She is on weekly Taxol and has had 6 treatments thus far.  Her last cancer antigen had declined from 216 to182.  She is currently taking MS Contin 90 mg every 8 hr and Dilaudid 4 mg every 4 hr on an as-needed basis.    She reports that pain is better and she is using her Dilaudid less frequently.  Pain is primarily in her hip and pelvis.  Appetite is fair.  She has chronic diarrhea but does not want to take anything for that for fear of becoming constipated.  She has some occasional shortness of breath which is mild and self-limited.        ECOG-1-2    Breast history: In 2013 she was diagnosed with stage IIB carcinoma of the right breast, ER positive with a low Oncotype score.  She was enrolled on protocol  and randomized to endocrine therapy alone.  She was tried on all 3 aromatase inhibitors and had itching and rash to all of them.  In August 2013 she was started on tamoxifen.   She was found to have  bone metastasis in May 2015, 2015.  A subsequent bone biopsy was performed on a lesion at the T8 vertebra.   The pathology from that was consistent with metastatic breast cancer, ER +80%, [place] +80%, and HER-2 negative.      She received letrozole plus palbociclib from July 2015 until May 2016.  She also received bone protective therapy with Xgeva.      Faslodex was started in June 2016.      Exemestane and Afinitor started in August 2017.  That was discontinued in February 2018 due to progression in the liver and bone.     Navelbine was started February 16, 2018.  She had 8 cycles for that.  Scans in November 2018 showed progression as follows     Capecitabine was started in December 2018.  She received 5 cycles of that therapy.    Follow-up scans in April 2019 showed progression with a new hepatic lesion and  worsening bone disease.    She began clinical trial therapy with erdafitinib on May 8, 2019.  That was discontinued in September 2019 due to progression in the bone.    She then received a course of radiation therapy to her pelvis.  She began weekly Taxol in October 2019.  Review of Systems   Constitutional: Negative for appetite change and unexpected weight change.   Respiratory: Positive for shortness of breath. Negative for cough.    Cardiovascular: Negative for chest pain.   Gastrointestinal: Positive for diarrhea. Negative for abdominal pain.   Genitourinary: Negative for frequency.   Musculoskeletal:        Hip and pelvic pain   Skin: Negative for rash.   Neurological: Negative for headaches.   Hematological: Negative for adenopathy.   Psychiatric/Behavioral: The patient is not nervous/anxious.        Objective:      Physical Exam   Constitutional: She is oriented to person, place, and time. She appears well-developed and well-nourished. No distress.   HENT:   Mouth/Throat: No oropharyngeal exudate.   Eyes: No scleral icterus.   Cardiovascular: Normal rate and regular rhythm.   Pulmonary/Chest: Effort normal and breath sounds normal. She has no wheezes. She has no rales.   Abdominal: Soft. She exhibits no mass. There is no tenderness.   Lymphadenopathy:     She has no cervical adenopathy.   Neurological: She is alert and oriented to person, place, and time.   Psychiatric: She has a normal mood and affect. Her behavior is normal. Thought content normal.   Vitals reviewed.      Assessment:       1. Malignant neoplasm of central portion of right breast in female, estrogen receptor positive    2. Bone metastasis    3. Metastasis to liver    4. Cancer related pain        Plan:         continue weekly Taxol and return in 3 weeks with scans and lab.

## 2019-12-02 ENCOUNTER — OFFICE VISIT (OUTPATIENT)
Dept: HEMATOLOGY/ONCOLOGY | Facility: CLINIC | Age: 64
End: 2019-12-02
Payer: MEDICARE

## 2019-12-02 ENCOUNTER — INFUSION (OUTPATIENT)
Dept: INFUSION THERAPY | Facility: HOSPITAL | Age: 64
End: 2019-12-02
Attending: INTERNAL MEDICINE
Payer: MEDICARE

## 2019-12-02 VITALS
RESPIRATION RATE: 16 BRPM | SYSTOLIC BLOOD PRESSURE: 129 MMHG | OXYGEN SATURATION: 96 % | HEIGHT: 64 IN | BODY MASS INDEX: 27.31 KG/M2 | WEIGHT: 160 LBS | DIASTOLIC BLOOD PRESSURE: 74 MMHG | TEMPERATURE: 98 F | HEART RATE: 76 BPM

## 2019-12-02 VITALS
HEART RATE: 59 BPM | TEMPERATURE: 98 F | RESPIRATION RATE: 16 BRPM | SYSTOLIC BLOOD PRESSURE: 104 MMHG | DIASTOLIC BLOOD PRESSURE: 56 MMHG

## 2019-12-02 DIAGNOSIS — Z17.0 MALIGNANT NEOPLASM OF CENTRAL PORTION OF RIGHT BREAST IN FEMALE, ESTROGEN RECEPTOR POSITIVE: Primary | ICD-10-CM

## 2019-12-02 DIAGNOSIS — G89.3 CANCER RELATED PAIN: ICD-10-CM

## 2019-12-02 DIAGNOSIS — C79.51 METASTATIC CANCER TO SPINE: ICD-10-CM

## 2019-12-02 DIAGNOSIS — C79.51 BONE METASTASIS: ICD-10-CM

## 2019-12-02 DIAGNOSIS — Z17.0 MALIGNANT NEOPLASM OF CENTRAL PORTION OF RIGHT BREAST IN FEMALE, ESTROGEN RECEPTOR POSITIVE: ICD-10-CM

## 2019-12-02 DIAGNOSIS — C78.7 METASTASIS TO LIVER: ICD-10-CM

## 2019-12-02 DIAGNOSIS — C78.7 METASTASIS TO LIVER: Primary | ICD-10-CM

## 2019-12-02 DIAGNOSIS — C50.111 MALIGNANT NEOPLASM OF CENTRAL PORTION OF RIGHT BREAST IN FEMALE, ESTROGEN RECEPTOR POSITIVE: ICD-10-CM

## 2019-12-02 DIAGNOSIS — C50.111 MALIGNANT NEOPLASM OF CENTRAL PORTION OF RIGHT BREAST IN FEMALE, ESTROGEN RECEPTOR POSITIVE: Primary | ICD-10-CM

## 2019-12-02 PROCEDURE — 3008F BODY MASS INDEX DOCD: CPT | Mod: HCNC,CPTII,S$GLB, | Performed by: INTERNAL MEDICINE

## 2019-12-02 PROCEDURE — 3078F PR MOST RECENT DIASTOLIC BLOOD PRESSURE < 80 MM HG: ICD-10-PCS | Mod: HCNC,CPTII,S$GLB, | Performed by: INTERNAL MEDICINE

## 2019-12-02 PROCEDURE — 96413 CHEMO IV INFUSION 1 HR: CPT | Mod: HCNC

## 2019-12-02 PROCEDURE — 3074F SYST BP LT 130 MM HG: CPT | Mod: HCNC,CPTII,S$GLB, | Performed by: INTERNAL MEDICINE

## 2019-12-02 PROCEDURE — 3074F PR MOST RECENT SYSTOLIC BLOOD PRESSURE < 130 MM HG: ICD-10-PCS | Mod: HCNC,CPTII,S$GLB, | Performed by: INTERNAL MEDICINE

## 2019-12-02 PROCEDURE — 25000003 PHARM REV CODE 250: Mod: HCNC | Performed by: INTERNAL MEDICINE

## 2019-12-02 PROCEDURE — 3008F PR BODY MASS INDEX (BMI) DOCUMENTED: ICD-10-PCS | Mod: HCNC,CPTII,S$GLB, | Performed by: INTERNAL MEDICINE

## 2019-12-02 PROCEDURE — 99999 PR PBB SHADOW E&M-EST. PATIENT-LVL V: ICD-10-PCS | Mod: PBBFAC,HCNC,, | Performed by: INTERNAL MEDICINE

## 2019-12-02 PROCEDURE — 63600175 PHARM REV CODE 636 W HCPCS: Mod: HCNC | Performed by: INTERNAL MEDICINE

## 2019-12-02 PROCEDURE — 96367 TX/PROPH/DG ADDL SEQ IV INF: CPT | Mod: HCNC

## 2019-12-02 PROCEDURE — 96375 TX/PRO/DX INJ NEW DRUG ADDON: CPT | Mod: HCNC

## 2019-12-02 PROCEDURE — 99214 PR OFFICE/OUTPT VISIT, EST, LEVL IV, 30-39 MIN: ICD-10-PCS | Mod: HCNC,S$GLB,, | Performed by: INTERNAL MEDICINE

## 2019-12-02 PROCEDURE — 99999 PR PBB SHADOW E&M-EST. PATIENT-LVL V: CPT | Mod: PBBFAC,HCNC,, | Performed by: INTERNAL MEDICINE

## 2019-12-02 PROCEDURE — A4216 STERILE WATER/SALINE, 10 ML: HCPCS | Mod: HCNC | Performed by: INTERNAL MEDICINE

## 2019-12-02 PROCEDURE — S0028 INJECTION, FAMOTIDINE, 20 MG: HCPCS | Mod: HCNC | Performed by: INTERNAL MEDICINE

## 2019-12-02 PROCEDURE — 3078F DIAST BP <80 MM HG: CPT | Mod: HCNC,CPTII,S$GLB, | Performed by: INTERNAL MEDICINE

## 2019-12-02 PROCEDURE — 99214 OFFICE O/P EST MOD 30 MIN: CPT | Mod: HCNC,S$GLB,, | Performed by: INTERNAL MEDICINE

## 2019-12-02 RX ORDER — FAMOTIDINE 10 MG/ML
20 INJECTION INTRAVENOUS
Status: CANCELLED | OUTPATIENT
Start: 2019-12-02

## 2019-12-02 RX ORDER — HEPARIN 100 UNIT/ML
500 SYRINGE INTRAVENOUS
Status: CANCELLED | OUTPATIENT
Start: 2019-12-16

## 2019-12-02 RX ORDER — SODIUM CHLORIDE 0.9 % (FLUSH) 0.9 %
10 SYRINGE (ML) INJECTION
Status: CANCELLED | OUTPATIENT
Start: 2019-12-09

## 2019-12-02 RX ORDER — FAMOTIDINE 10 MG/ML
20 INJECTION INTRAVENOUS
Status: CANCELLED | OUTPATIENT
Start: 2019-12-16

## 2019-12-02 RX ORDER — SODIUM CHLORIDE 0.9 % (FLUSH) 0.9 %
10 SYRINGE (ML) INJECTION
Status: DISCONTINUED | OUTPATIENT
Start: 2019-12-02 | End: 2019-12-02 | Stop reason: HOSPADM

## 2019-12-02 RX ORDER — HEPARIN 100 UNIT/ML
500 SYRINGE INTRAVENOUS
Status: DISCONTINUED | OUTPATIENT
Start: 2019-12-02 | End: 2019-12-02 | Stop reason: HOSPADM

## 2019-12-02 RX ORDER — DIPHENHYDRAMINE HYDROCHLORIDE 50 MG/ML
50 INJECTION INTRAMUSCULAR; INTRAVENOUS ONCE AS NEEDED
Status: CANCELLED | OUTPATIENT
Start: 2019-12-16

## 2019-12-02 RX ORDER — EPINEPHRINE 0.3 MG/.3ML
0.3 INJECTION SUBCUTANEOUS ONCE AS NEEDED
Status: CANCELLED | OUTPATIENT
Start: 2019-12-09

## 2019-12-02 RX ORDER — DEXAMETHASONE SODIUM PHOSPHATE 4 MG/ML
20 INJECTION, SOLUTION INTRA-ARTICULAR; INTRALESIONAL; INTRAMUSCULAR; INTRAVENOUS; SOFT TISSUE
Status: CANCELLED
Start: 2019-12-09

## 2019-12-02 RX ORDER — DIPHENHYDRAMINE HYDROCHLORIDE 50 MG/ML
50 INJECTION INTRAMUSCULAR; INTRAVENOUS ONCE AS NEEDED
Status: CANCELLED | OUTPATIENT
Start: 2019-12-02

## 2019-12-02 RX ORDER — DEXAMETHASONE SODIUM PHOSPHATE 4 MG/ML
20 INJECTION, SOLUTION INTRA-ARTICULAR; INTRALESIONAL; INTRAMUSCULAR; INTRAVENOUS; SOFT TISSUE
Status: CANCELLED
Start: 2019-12-02

## 2019-12-02 RX ORDER — FAMOTIDINE 10 MG/ML
20 INJECTION INTRAVENOUS
Status: CANCELLED | OUTPATIENT
Start: 2019-12-09

## 2019-12-02 RX ORDER — DEXAMETHASONE SODIUM PHOSPHATE 4 MG/ML
20 INJECTION, SOLUTION INTRA-ARTICULAR; INTRALESIONAL; INTRAMUSCULAR; INTRAVENOUS; SOFT TISSUE
Status: CANCELLED
Start: 2019-12-16

## 2019-12-02 RX ORDER — FAMOTIDINE 10 MG/ML
20 INJECTION INTRAVENOUS
Status: COMPLETED | OUTPATIENT
Start: 2019-12-02 | End: 2019-12-02

## 2019-12-02 RX ORDER — ONDANSETRON HCL IN 0.9 % NACL 8 MG/50 ML
8 INTRAVENOUS SOLUTION, PIGGYBACK (ML) INTRAVENOUS
Status: COMPLETED | OUTPATIENT
Start: 2019-12-02 | End: 2019-12-02

## 2019-12-02 RX ORDER — HEPARIN 100 UNIT/ML
500 SYRINGE INTRAVENOUS
Status: CANCELLED | OUTPATIENT
Start: 2019-12-02

## 2019-12-02 RX ORDER — EPINEPHRINE 0.3 MG/.3ML
0.3 INJECTION SUBCUTANEOUS ONCE AS NEEDED
Status: CANCELLED | OUTPATIENT
Start: 2019-12-02

## 2019-12-02 RX ORDER — DEXAMETHASONE SODIUM PHOSPHATE 4 MG/ML
INJECTION, SOLUTION INTRA-ARTICULAR; INTRALESIONAL; INTRAMUSCULAR; INTRAVENOUS; SOFT TISSUE
Status: DISCONTINUED
Start: 2019-12-02 | End: 2019-12-02 | Stop reason: HOSPADM

## 2019-12-02 RX ORDER — EPINEPHRINE 0.3 MG/.3ML
0.3 INJECTION SUBCUTANEOUS ONCE AS NEEDED
Status: DISCONTINUED | OUTPATIENT
Start: 2019-12-02 | End: 2019-12-02 | Stop reason: HOSPADM

## 2019-12-02 RX ORDER — DIPHENHYDRAMINE HYDROCHLORIDE 50 MG/ML
50 INJECTION INTRAMUSCULAR; INTRAVENOUS ONCE AS NEEDED
Status: CANCELLED | OUTPATIENT
Start: 2019-12-09

## 2019-12-02 RX ORDER — SODIUM CHLORIDE 0.9 % (FLUSH) 0.9 %
10 SYRINGE (ML) INJECTION
Status: CANCELLED | OUTPATIENT
Start: 2019-12-16

## 2019-12-02 RX ORDER — DIPHENHYDRAMINE HYDROCHLORIDE 50 MG/ML
50 INJECTION INTRAMUSCULAR; INTRAVENOUS ONCE AS NEEDED
Status: DISCONTINUED | OUTPATIENT
Start: 2019-12-02 | End: 2019-12-02 | Stop reason: HOSPADM

## 2019-12-02 RX ORDER — HEPARIN 100 UNIT/ML
500 SYRINGE INTRAVENOUS
Status: CANCELLED | OUTPATIENT
Start: 2019-12-09

## 2019-12-02 RX ORDER — SODIUM CHLORIDE 0.9 % (FLUSH) 0.9 %
10 SYRINGE (ML) INJECTION
Status: CANCELLED | OUTPATIENT
Start: 2019-12-02

## 2019-12-02 RX ORDER — EPINEPHRINE 0.3 MG/.3ML
0.3 INJECTION SUBCUTANEOUS ONCE AS NEEDED
Status: CANCELLED | OUTPATIENT
Start: 2019-12-16

## 2019-12-02 RX ORDER — DEXAMETHASONE SODIUM PHOSPHATE 4 MG/ML
20 INJECTION, SOLUTION INTRA-ARTICULAR; INTRALESIONAL; INTRAMUSCULAR; INTRAVENOUS; SOFT TISSUE
Status: COMPLETED | OUTPATIENT
Start: 2019-12-02 | End: 2019-12-02

## 2019-12-02 RX ADMIN — Medication 10 ML: at 12:12

## 2019-12-02 RX ADMIN — FAMOTIDINE 20 MG: 10 INJECTION, SOLUTION INTRAVENOUS at 09:12

## 2019-12-02 RX ADMIN — ONDANSETRON 8 MG: 2 INJECTION INTRAMUSCULAR; INTRAVENOUS at 09:12

## 2019-12-02 RX ADMIN — DIPHENHYDRAMINE HYDROCHLORIDE 50 MG: 50 INJECTION, SOLUTION INTRAMUSCULAR; INTRAVENOUS at 10:12

## 2019-12-02 RX ADMIN — HEPARIN 500 UNITS: 100 SYRINGE at 12:12

## 2019-12-02 RX ADMIN — PACLITAXEL 144 MG: 6 INJECTION, SOLUTION, CONCENTRATE INTRAVENOUS at 11:12

## 2019-12-02 RX ADMIN — DEXAMETHASONE SODIUM PHOSPHATE 20 MG: 4 INJECTION, SOLUTION INTRA-ARTICULAR; INTRALESIONAL; INTRAMUSCULAR; INTRAVENOUS; SOFT TISSUE at 10:12

## 2019-12-02 NOTE — Clinical Note
Weekly Taxol with CBC and CMP, return in 3 weeks with CT scan chest abdomen pelvis, bone scan, CBC, CMP, CA 27.29

## 2019-12-09 ENCOUNTER — INFUSION (OUTPATIENT)
Dept: INFUSION THERAPY | Facility: HOSPITAL | Age: 64
End: 2019-12-09
Attending: INTERNAL MEDICINE
Payer: MEDICARE

## 2019-12-09 ENCOUNTER — PATIENT MESSAGE (OUTPATIENT)
Dept: HEMATOLOGY/ONCOLOGY | Facility: CLINIC | Age: 64
End: 2019-12-09

## 2019-12-09 VITALS
HEIGHT: 64 IN | RESPIRATION RATE: 17 BRPM | HEART RATE: 72 BPM | TEMPERATURE: 98 F | DIASTOLIC BLOOD PRESSURE: 54 MMHG | WEIGHT: 160.5 LBS | SYSTOLIC BLOOD PRESSURE: 92 MMHG | BODY MASS INDEX: 27.4 KG/M2 | OXYGEN SATURATION: 97 %

## 2019-12-09 DIAGNOSIS — C78.7 METASTASIS TO LIVER: Primary | ICD-10-CM

## 2019-12-09 DIAGNOSIS — Z17.0 MALIGNANT NEOPLASM OF CENTRAL PORTION OF RIGHT BREAST IN FEMALE, ESTROGEN RECEPTOR POSITIVE: ICD-10-CM

## 2019-12-09 DIAGNOSIS — C79.51 METASTATIC CANCER TO SPINE: ICD-10-CM

## 2019-12-09 DIAGNOSIS — C50.111 MALIGNANT NEOPLASM OF CENTRAL PORTION OF RIGHT BREAST IN FEMALE, ESTROGEN RECEPTOR POSITIVE: ICD-10-CM

## 2019-12-09 PROCEDURE — S0028 INJECTION, FAMOTIDINE, 20 MG: HCPCS | Mod: HCNC | Performed by: INTERNAL MEDICINE

## 2019-12-09 PROCEDURE — 25000003 PHARM REV CODE 250: Mod: HCNC | Performed by: INTERNAL MEDICINE

## 2019-12-09 PROCEDURE — A4216 STERILE WATER/SALINE, 10 ML: HCPCS | Mod: HCNC | Performed by: INTERNAL MEDICINE

## 2019-12-09 PROCEDURE — 96413 CHEMO IV INFUSION 1 HR: CPT | Mod: HCNC

## 2019-12-09 PROCEDURE — 96367 TX/PROPH/DG ADDL SEQ IV INF: CPT | Mod: HCNC

## 2019-12-09 PROCEDURE — 96375 TX/PRO/DX INJ NEW DRUG ADDON: CPT | Mod: HCNC

## 2019-12-09 PROCEDURE — 63600175 PHARM REV CODE 636 W HCPCS: Mod: HCNC | Performed by: INTERNAL MEDICINE

## 2019-12-09 RX ORDER — ONDANSETRON HCL IN 0.9 % NACL 8 MG/50 ML
8 INTRAVENOUS SOLUTION, PIGGYBACK (ML) INTRAVENOUS
Status: COMPLETED | OUTPATIENT
Start: 2019-12-09 | End: 2019-12-09

## 2019-12-09 RX ORDER — EPINEPHRINE 0.3 MG/.3ML
0.3 INJECTION SUBCUTANEOUS ONCE AS NEEDED
Status: DISCONTINUED | OUTPATIENT
Start: 2019-12-09 | End: 2019-12-09 | Stop reason: HOSPADM

## 2019-12-09 RX ORDER — DIPHENHYDRAMINE HYDROCHLORIDE 50 MG/ML
50 INJECTION INTRAMUSCULAR; INTRAVENOUS ONCE AS NEEDED
Status: DISCONTINUED | OUTPATIENT
Start: 2019-12-09 | End: 2019-12-09 | Stop reason: HOSPADM

## 2019-12-09 RX ORDER — SODIUM CHLORIDE 0.9 % (FLUSH) 0.9 %
10 SYRINGE (ML) INJECTION
Status: DISCONTINUED | OUTPATIENT
Start: 2019-12-09 | End: 2019-12-09 | Stop reason: HOSPADM

## 2019-12-09 RX ORDER — HEPARIN 100 UNIT/ML
500 SYRINGE INTRAVENOUS
Status: DISCONTINUED | OUTPATIENT
Start: 2019-12-09 | End: 2019-12-09 | Stop reason: HOSPADM

## 2019-12-09 RX ORDER — FAMOTIDINE 10 MG/ML
20 INJECTION INTRAVENOUS
Status: COMPLETED | OUTPATIENT
Start: 2019-12-09 | End: 2019-12-09

## 2019-12-09 RX ADMIN — PACLITAXEL 144 MG: 6 INJECTION, SOLUTION INTRAVENOUS at 09:12

## 2019-12-09 RX ADMIN — DIPHENHYDRAMINE HYDROCHLORIDE 50 MG: 50 INJECTION, SOLUTION INTRAMUSCULAR; INTRAVENOUS at 08:12

## 2019-12-09 RX ADMIN — Medication 20 MG: at 08:12

## 2019-12-09 RX ADMIN — Medication 8 MG: at 08:12

## 2019-12-09 RX ADMIN — Medication 10 ML: at 10:12

## 2019-12-09 RX ADMIN — FAMOTIDINE 20 MG: 10 INJECTION, SOLUTION INTRAVENOUS at 09:12

## 2019-12-09 RX ADMIN — HEPARIN 500 UNITS: 100 SYRINGE at 10:12

## 2019-12-09 NOTE — PLAN OF CARE
Pt tolerated Taxol infusion well, with no complications or s/s of adverse reaction. VS stable throughout treatment. Left chest port positive for blood return, flushed with normal saline and heparin and de-accessed prior to discharge. Site dressed with band-aid. RTC 12/16/19, pt verbalized understanding. Pt discharged with no distress noted, ambulating independently with walker, accompanied by sister. Pt instructed to call MD if concerns arise.

## 2019-12-12 DIAGNOSIS — G89.3 CANCER RELATED PAIN: ICD-10-CM

## 2019-12-12 RX ORDER — MORPHINE SULFATE 60 MG/1
60 TABLET, FILM COATED, EXTENDED RELEASE ORAL EVERY 8 HOURS
Qty: 90 TABLET | Refills: 0 | Status: SHIPPED | OUTPATIENT
Start: 2019-12-12 | End: 2019-12-30 | Stop reason: SDUPTHER

## 2019-12-16 ENCOUNTER — INFUSION (OUTPATIENT)
Dept: INFUSION THERAPY | Facility: HOSPITAL | Age: 64
End: 2019-12-16
Attending: INTERNAL MEDICINE
Payer: MEDICARE

## 2019-12-16 VITALS
TEMPERATURE: 98 F | DIASTOLIC BLOOD PRESSURE: 56 MMHG | SYSTOLIC BLOOD PRESSURE: 110 MMHG | HEART RATE: 70 BPM | RESPIRATION RATE: 16 BRPM

## 2019-12-16 DIAGNOSIS — C78.7 METASTASIS TO LIVER: Primary | ICD-10-CM

## 2019-12-16 DIAGNOSIS — Z17.0 MALIGNANT NEOPLASM OF CENTRAL PORTION OF RIGHT BREAST IN FEMALE, ESTROGEN RECEPTOR POSITIVE: ICD-10-CM

## 2019-12-16 DIAGNOSIS — C79.51 METASTATIC CANCER TO SPINE: ICD-10-CM

## 2019-12-16 DIAGNOSIS — C50.111 MALIGNANT NEOPLASM OF CENTRAL PORTION OF RIGHT BREAST IN FEMALE, ESTROGEN RECEPTOR POSITIVE: ICD-10-CM

## 2019-12-16 PROCEDURE — 25000003 PHARM REV CODE 250: Mod: HCNC | Performed by: INTERNAL MEDICINE

## 2019-12-16 PROCEDURE — 96413 CHEMO IV INFUSION 1 HR: CPT | Mod: HCNC

## 2019-12-16 PROCEDURE — 96375 TX/PRO/DX INJ NEW DRUG ADDON: CPT | Mod: HCNC

## 2019-12-16 PROCEDURE — A4216 STERILE WATER/SALINE, 10 ML: HCPCS | Mod: HCNC | Performed by: INTERNAL MEDICINE

## 2019-12-16 PROCEDURE — 63600175 PHARM REV CODE 636 W HCPCS: Mod: HCNC | Performed by: INTERNAL MEDICINE

## 2019-12-16 PROCEDURE — 96367 TX/PROPH/DG ADDL SEQ IV INF: CPT | Mod: HCNC

## 2019-12-16 PROCEDURE — S0028 INJECTION, FAMOTIDINE, 20 MG: HCPCS | Mod: HCNC | Performed by: INTERNAL MEDICINE

## 2019-12-16 RX ORDER — HEPARIN 100 UNIT/ML
500 SYRINGE INTRAVENOUS
Status: DISCONTINUED | OUTPATIENT
Start: 2019-12-16 | End: 2019-12-16 | Stop reason: HOSPADM

## 2019-12-16 RX ORDER — SODIUM CHLORIDE 0.9 % (FLUSH) 0.9 %
10 SYRINGE (ML) INJECTION
Status: DISCONTINUED | OUTPATIENT
Start: 2019-12-16 | End: 2019-12-16 | Stop reason: HOSPADM

## 2019-12-16 RX ORDER — FAMOTIDINE 10 MG/ML
20 INJECTION INTRAVENOUS
Status: COMPLETED | OUTPATIENT
Start: 2019-12-16 | End: 2019-12-16

## 2019-12-16 RX ORDER — DIPHENHYDRAMINE HYDROCHLORIDE 50 MG/ML
50 INJECTION INTRAMUSCULAR; INTRAVENOUS ONCE AS NEEDED
Status: DISCONTINUED | OUTPATIENT
Start: 2019-12-16 | End: 2019-12-16 | Stop reason: HOSPADM

## 2019-12-16 RX ORDER — EPINEPHRINE 0.3 MG/.3ML
0.3 INJECTION SUBCUTANEOUS ONCE AS NEEDED
Status: DISCONTINUED | OUTPATIENT
Start: 2019-12-16 | End: 2019-12-16 | Stop reason: HOSPADM

## 2019-12-16 RX ORDER — DEXAMETHASONE SODIUM PHOSPHATE 4 MG/ML
20 INJECTION, SOLUTION INTRA-ARTICULAR; INTRALESIONAL; INTRAMUSCULAR; INTRAVENOUS; SOFT TISSUE
Status: DISCONTINUED | OUTPATIENT
Start: 2019-12-16 | End: 2019-12-16

## 2019-12-16 RX ORDER — ONDANSETRON HCL IN 0.9 % NACL 8 MG/50 ML
8 INTRAVENOUS SOLUTION, PIGGYBACK (ML) INTRAVENOUS
Status: COMPLETED | OUTPATIENT
Start: 2019-12-16 | End: 2019-12-16

## 2019-12-16 RX ADMIN — DEXAMETHASONE SODIUM PHOSPHATE 20 MG: 4 INJECTION, SOLUTION INTRA-ARTICULAR; INTRALESIONAL; INTRAMUSCULAR; INTRAVENOUS; SOFT TISSUE at 09:12

## 2019-12-16 RX ADMIN — FAMOTIDINE 20 MG: 10 INJECTION, SOLUTION INTRAVENOUS at 08:12

## 2019-12-16 RX ADMIN — DIPHENHYDRAMINE HYDROCHLORIDE 50 MG: 50 INJECTION INTRAMUSCULAR; INTRAVENOUS at 09:12

## 2019-12-16 RX ADMIN — Medication 10 ML: at 11:12

## 2019-12-16 RX ADMIN — ONDANSETRON 8 MG: 2 INJECTION INTRAMUSCULAR; INTRAVENOUS at 08:12

## 2019-12-16 RX ADMIN — HEPARIN 500 UNITS: 100 SYRINGE at 11:12

## 2019-12-16 RX ADMIN — PACLITAXEL 144 MG: 6 INJECTION, SOLUTION INTRAVENOUS at 10:12

## 2019-12-20 ENCOUNTER — PATIENT MESSAGE (OUTPATIENT)
Dept: HEMATOLOGY/ONCOLOGY | Facility: CLINIC | Age: 64
End: 2019-12-20

## 2019-12-20 ENCOUNTER — HOSPITAL ENCOUNTER (OUTPATIENT)
Dept: RADIOLOGY | Facility: HOSPITAL | Age: 64
Discharge: HOME | End: 2019-12-20
Attending: INTERNAL MEDICINE
Payer: MEDICARE

## 2019-12-20 DIAGNOSIS — Z17.0 MALIGNANT NEOPLASM OF CENTRAL PORTION OF RIGHT BREAST IN FEMALE, ESTROGEN RECEPTOR POSITIVE: ICD-10-CM

## 2019-12-20 DIAGNOSIS — C50.111 MALIGNANT NEOPLASM OF CENTRAL PORTION OF RIGHT BREAST IN FEMALE, ESTROGEN RECEPTOR POSITIVE: ICD-10-CM

## 2019-12-20 DIAGNOSIS — C79.51 BONE METASTASIS: ICD-10-CM

## 2019-12-20 DIAGNOSIS — C78.7 METASTASIS TO LIVER: ICD-10-CM

## 2019-12-20 PROCEDURE — A9503 TC99M MEDRONATE: HCPCS | Mod: HCNC

## 2019-12-20 PROCEDURE — 78306 BONE IMAGING WHOLE BODY: CPT | Mod: 26,HCNC,, | Performed by: RADIOLOGY

## 2019-12-20 PROCEDURE — 71250 CT THORAX DX C-: CPT | Mod: TC,HCNC

## 2019-12-20 PROCEDURE — 71250 CT CHEST ABDOMEN PELVIS WITHOUT CONTRAST(XPD): ICD-10-PCS | Mod: 26,HCNC,, | Performed by: RADIOLOGY

## 2019-12-20 PROCEDURE — 74176 CT ABD & PELVIS W/O CONTRAST: CPT | Mod: 26,HCNC,, | Performed by: RADIOLOGY

## 2019-12-20 PROCEDURE — 74176 CT ABD & PELVIS W/O CONTRAST: CPT | Mod: TC,HCNC

## 2019-12-20 PROCEDURE — 78306 NM BONE SCAN WHOLE BODY: ICD-10-PCS | Mod: 26,HCNC,, | Performed by: RADIOLOGY

## 2019-12-20 PROCEDURE — 71250 CT THORAX DX C-: CPT | Mod: 26,HCNC,, | Performed by: RADIOLOGY

## 2019-12-20 PROCEDURE — 74176 CT CHEST ABDOMEN PELVIS WITHOUT CONTRAST(XPD): ICD-10-PCS | Mod: 26,HCNC,, | Performed by: RADIOLOGY

## 2019-12-23 ENCOUNTER — PATIENT MESSAGE (OUTPATIENT)
Dept: HEMATOLOGY/ONCOLOGY | Facility: CLINIC | Age: 64
End: 2019-12-23

## 2019-12-23 NOTE — PROGRESS NOTES
Subjective:       Patient ID: Rebecca Crain is a 64 y.o. female.    Chief Complaint: No chief complaint on file.    HPI Ms. Crain is here for followup of metastatic carcinoma of the right breast.    She is on weekly Taxol and has had 6 treatments thus far.  Her last cancer antigen had declined from 216 to182.    She is currently taking MS Contin 90 mg every 8 hr and Dilaudid 4 mg every 4 hr on an as-needed basis.    She has ongoing pain in her back and recent developed some left rib pain after straining for bowel movements.  She has occasional cough and also occasional shortness of breath.  Her bowel function recently has been stable.          ECOG-1-2    Breast history: In 2013 she was diagnosed with stage IIB carcinoma of the right breast, ER positive with a low Oncotype score.  She was enrolled on protocol  and randomized to endocrine therapy alone.  She was tried on all 3 aromatase inhibitors and had itching and rash to all of them.  In August 2013 she was started on tamoxifen.   She was found to have  bone metastasis in May 2015, 2015.  A subsequent bone biopsy was performed on a lesion at the T8 vertebra.   The pathology from that was consistent with metastatic breast cancer, ER +80%, [place] +80%, and HER-2 negative.      She received letrozole plus palbociclib from July 2015 until May 2016.  She also received bone protective therapy with Xgeva.      Faslodex was started in June 2016.      Exemestane and Afinitor started in August 2017.  That was discontinued in February 2018 due to progression in the liver and bone.     Navelbine was started February 16, 2018.  She had 8 cycles for that.  Scans in November 2018 showed progression as follows     Capecitabine was started in December 2018.  She received 5 cycles of that therapy.    Follow-up scans in April 2019 showed progression with a new hepatic lesion and worsening bone disease.    She began clinical trial therapy with erdafitinib on May 8, 2019.  That  was discontinued in September 2019 due to progression in the bone.    She then received a course of radiation therapy to her pelvis.  She began weekly Taxol in October 2019.  Review of Systems   Constitutional: Negative for appetite change and unexpected weight change.   Eyes: Negative for visual disturbance.   Respiratory: Positive for shortness of breath. Negative for cough.    Cardiovascular: Positive for chest pain.   Gastrointestinal: Negative for abdominal pain and diarrhea.   Genitourinary: Negative for frequency.   Musculoskeletal: Positive for back pain.   Skin: Negative for rash.   Neurological: Negative for headaches.   Hematological: Negative for adenopathy.   Psychiatric/Behavioral: The patient is not nervous/anxious.        Objective:      Physical Exam   Constitutional: She is oriented to person, place, and time. She appears well-developed and well-nourished. No distress.   HENT:   Mouth/Throat: No oropharyngeal exudate.   Eyes: No scleral icterus.   Cardiovascular: Normal rate and regular rhythm.   Pulmonary/Chest: Effort normal and breath sounds normal.   Abdominal: Soft. She exhibits no mass. There is no tenderness.   Lymphadenopathy:     She has no cervical adenopathy.   Neurological: She is alert and oriented to person, place, and time.   Psychiatric: Her behavior is normal. Thought content normal.   Vitals reviewed.      Assessment:     CT and bone scan show progression in the liver and bone.  1. Malignant neoplasm of central portion of right breast in female, estrogen receptor positive    2. Bone metastasis    3. Metastasis to liver        Plan:       I reviewed the scan findings with the patient.  Will discontinue her Taxol.  I recommended that she start therapy with eribulin.  Side effects were discussed and written information was provided to her.

## 2019-12-23 NOTE — PLAN OF CARE
DISCONTINUE ON PATHWAY REGIMEN - Breast    [id number]        Paclitaxel (Taxol(R))     **Always confirm dose/schedule in your pharmacy ordering system**    REASON: Disease Progression  PRIOR TREATMENT: [id number]: Paclitaxel 80 mg/m2 D1, 8, 15 q28 Days Until   Progression or Unacceptable Toxicity  TREATMENT RESPONSE: Stable Disease (SD)    START ON PATHWAY REGIMEN - Breast    [id number]        Eribulin mesylate ([place](R))           Additional Orders: Dosage modifications required for hepatic and renal   dysfunction.  Mild hepatic impairment (Child-Veliz A): 1.1 mg/m2. Moderate   impairment (Child-Veliz B): 0.7 mg/m2.  Moderate-severe renal impairment (CrCl   15-49 mL/min): 1.1 mg/m2    **Always confirm dose/schedule in your pharmacy ordering system**    Patient Characteristics:  Distant Metastases or Locoregional Recurrent Disease - Unresected or Locally   Advanced Unresectable Disease Progressing after Neoadjuvant and Local Therapies,   HER2 Negative/Unknown/Equivocal, ER Positive, Chemotherapy, Third Line and   Beyond, Prior Anthracycline or Anthracycline Contraindicated, Prior Taxane, AND   No Prior Eribulin  Therapeutic Status: Distant Metastases  BRCA Mutation Status: Did Not Order Test  ER Status: Positive (+)  HER2 Status: Negative (-)  [place] Status: Positive (+)  Line of Therapy: Third Line and Beyond  Intent of Therapy:  Non-Curative / Palliative Intent, Discussed with Patient

## 2019-12-24 ENCOUNTER — OFFICE VISIT (OUTPATIENT)
Dept: HEMATOLOGY/ONCOLOGY | Facility: CLINIC | Age: 64
End: 2019-12-24
Payer: MEDICARE

## 2019-12-24 VITALS
RESPIRATION RATE: 16 BRPM | HEART RATE: 91 BPM | TEMPERATURE: 98 F | HEIGHT: 64 IN | SYSTOLIC BLOOD PRESSURE: 135 MMHG | DIASTOLIC BLOOD PRESSURE: 82 MMHG | OXYGEN SATURATION: 99 % | BODY MASS INDEX: 26.95 KG/M2 | WEIGHT: 157.88 LBS

## 2019-12-24 DIAGNOSIS — C79.51 BONE METASTASIS: ICD-10-CM

## 2019-12-24 DIAGNOSIS — C78.7 METASTASIS TO LIVER: ICD-10-CM

## 2019-12-24 DIAGNOSIS — Z17.0 MALIGNANT NEOPLASM OF CENTRAL PORTION OF RIGHT BREAST IN FEMALE, ESTROGEN RECEPTOR POSITIVE: Primary | ICD-10-CM

## 2019-12-24 DIAGNOSIS — C50.111 MALIGNANT NEOPLASM OF CENTRAL PORTION OF RIGHT BREAST IN FEMALE, ESTROGEN RECEPTOR POSITIVE: Primary | ICD-10-CM

## 2019-12-24 PROCEDURE — 3008F BODY MASS INDEX DOCD: CPT | Mod: HCNC,CPTII,S$GLB, | Performed by: INTERNAL MEDICINE

## 2019-12-24 PROCEDURE — 99214 PR OFFICE/OUTPT VISIT, EST, LEVL IV, 30-39 MIN: ICD-10-PCS | Mod: HCNC,S$GLB,, | Performed by: INTERNAL MEDICINE

## 2019-12-24 PROCEDURE — 99999 PR PBB SHADOW E&M-EST. PATIENT-LVL V: ICD-10-PCS | Mod: PBBFAC,HCNC,, | Performed by: INTERNAL MEDICINE

## 2019-12-24 PROCEDURE — 3008F PR BODY MASS INDEX (BMI) DOCUMENTED: ICD-10-PCS | Mod: HCNC,CPTII,S$GLB, | Performed by: INTERNAL MEDICINE

## 2019-12-24 PROCEDURE — 99999 PR PBB SHADOW E&M-EST. PATIENT-LVL V: CPT | Mod: PBBFAC,HCNC,, | Performed by: INTERNAL MEDICINE

## 2019-12-24 PROCEDURE — 3079F PR MOST RECENT DIASTOLIC BLOOD PRESSURE 80-89 MM HG: ICD-10-PCS | Mod: HCNC,CPTII,S$GLB, | Performed by: INTERNAL MEDICINE

## 2019-12-24 PROCEDURE — 99214 OFFICE O/P EST MOD 30 MIN: CPT | Mod: HCNC,S$GLB,, | Performed by: INTERNAL MEDICINE

## 2019-12-24 PROCEDURE — 3075F SYST BP GE 130 - 139MM HG: CPT | Mod: HCNC,CPTII,S$GLB, | Performed by: INTERNAL MEDICINE

## 2019-12-24 PROCEDURE — 3079F DIAST BP 80-89 MM HG: CPT | Mod: HCNC,CPTII,S$GLB, | Performed by: INTERNAL MEDICINE

## 2019-12-24 PROCEDURE — 3075F PR MOST RECENT SYSTOLIC BLOOD PRESS GE 130-139MM HG: ICD-10-PCS | Mod: HCNC,CPTII,S$GLB, | Performed by: INTERNAL MEDICINE

## 2019-12-30 ENCOUNTER — PATIENT MESSAGE (OUTPATIENT)
Dept: HEMATOLOGY/ONCOLOGY | Facility: CLINIC | Age: 64
End: 2019-12-30

## 2019-12-30 DIAGNOSIS — G89.3 CANCER RELATED PAIN: ICD-10-CM

## 2019-12-30 RX ORDER — MORPHINE SULFATE 60 MG/1
60 TABLET, FILM COATED, EXTENDED RELEASE ORAL EVERY 8 HOURS
Qty: 90 TABLET | Refills: 0 | Status: ON HOLD | OUTPATIENT
Start: 2019-12-30 | End: 2020-01-12 | Stop reason: HOSPADM

## 2019-12-31 DIAGNOSIS — C79.51 METASTATIC CANCER TO SPINE: ICD-10-CM

## 2019-12-31 DIAGNOSIS — Z17.0 MALIGNANT NEOPLASM OF CENTRAL PORTION OF RIGHT BREAST IN FEMALE, ESTROGEN RECEPTOR POSITIVE: ICD-10-CM

## 2019-12-31 DIAGNOSIS — C50.111 MALIGNANT NEOPLASM OF CENTRAL PORTION OF RIGHT BREAST IN FEMALE, ESTROGEN RECEPTOR POSITIVE: ICD-10-CM

## 2019-12-31 DIAGNOSIS — C79.51 BONE METASTASIS: ICD-10-CM

## 2019-12-31 DIAGNOSIS — C78.7 METASTASIS TO LIVER: ICD-10-CM

## 2019-12-31 RX ORDER — MORPHINE SULFATE 30 MG/1
30 TABLET, FILM COATED, EXTENDED RELEASE ORAL EVERY 8 HOURS PRN
Qty: 90 TABLET | Refills: 0 | Status: ON HOLD | OUTPATIENT
Start: 2019-12-31 | End: 2020-01-12 | Stop reason: HOSPADM

## 2020-01-03 ENCOUNTER — PATIENT MESSAGE (OUTPATIENT)
Dept: HEMATOLOGY/ONCOLOGY | Facility: CLINIC | Age: 65
End: 2020-01-03

## 2020-01-06 ENCOUNTER — HOSPITAL ENCOUNTER (INPATIENT)
Facility: HOSPITAL | Age: 65
LOS: 6 days | Discharge: HOME | DRG: 948 | End: 2020-01-12
Attending: EMERGENCY MEDICINE | Admitting: EMERGENCY MEDICINE
Payer: MEDICARE

## 2020-01-06 DIAGNOSIS — R00.1 BRADYCARDIA: ICD-10-CM

## 2020-01-06 DIAGNOSIS — R07.9 CHEST PAIN: ICD-10-CM

## 2020-01-06 DIAGNOSIS — R52 INTRACTABLE PAIN: ICD-10-CM

## 2020-01-06 DIAGNOSIS — R91.1 LUNG NODULE: ICD-10-CM

## 2020-01-06 DIAGNOSIS — C79.51 BONE METASTASIS: Primary | ICD-10-CM

## 2020-01-06 DIAGNOSIS — G89.3 CANCER RELATED PAIN: ICD-10-CM

## 2020-01-06 PROCEDURE — 96375 TX/PRO/DX INJ NEW DRUG ADDON: CPT | Mod: 59,HCNC

## 2020-01-06 PROCEDURE — 99285 EMERGENCY DEPT VISIT HI MDM: CPT | Mod: 25,HCNC

## 2020-01-06 PROCEDURE — 12000002 HC ACUTE/MED SURGE SEMI-PRIVATE ROOM: Mod: HCNC

## 2020-01-06 PROCEDURE — 96376 TX/PRO/DX INJ SAME DRUG ADON: CPT | Mod: HCNC

## 2020-01-06 PROCEDURE — 99285 EMERGENCY DEPT VISIT HI MDM: CPT | Mod: HCNC,,, | Performed by: EMERGENCY MEDICINE

## 2020-01-06 PROCEDURE — 99285 PR EMERGENCY DEPT VISIT,LEVEL V: ICD-10-PCS | Mod: HCNC,,, | Performed by: EMERGENCY MEDICINE

## 2020-01-06 PROCEDURE — 96374 THER/PROPH/DIAG INJ IV PUSH: CPT | Mod: HCNC

## 2020-01-07 ENCOUNTER — TELEPHONE (OUTPATIENT)
Dept: HEMATOLOGY/ONCOLOGY | Facility: CLINIC | Age: 65
End: 2020-01-07

## 2020-01-07 PROBLEM — F43.21 ADJUSTMENT DISORDER WITH DEPRESSED MOOD: Chronic | Status: ACTIVE | Noted: 2017-02-07

## 2020-01-07 PROBLEM — E83.52 HYPERCALCEMIA OF MALIGNANCY: Status: ACTIVE | Noted: 2020-01-07

## 2020-01-07 PROBLEM — Z51.5 PALLIATIVE CARE ENCOUNTER: Status: ACTIVE | Noted: 2020-01-07

## 2020-01-07 LAB
ALBUMIN SERPL BCP-MCNC: 3.4 G/DL (ref 3.5–5.2)
ALP SERPL-CCNC: 486 U/L (ref 55–135)
ALT SERPL W/O P-5'-P-CCNC: 42 U/L (ref 10–44)
ANION GAP SERPL CALC-SCNC: 11 MMOL/L (ref 8–16)
AST SERPL-CCNC: 92 U/L (ref 10–40)
BILIRUB SERPL-MCNC: 0.9 MG/DL (ref 0.1–1)
BUN SERPL-MCNC: 13 MG/DL (ref 8–23)
CALCIUM SERPL-MCNC: 11.8 MG/DL (ref 8.7–10.5)
CHLORIDE SERPL-SCNC: 103 MMOL/L (ref 95–110)
CO2 SERPL-SCNC: 23 MMOL/L (ref 23–29)
CREAT SERPL-MCNC: 0.8 MG/DL (ref 0.5–1.4)
D DIMER PPP IA.FEU-MCNC: 1.63 MG/L FEU
EST. GFR  (AFRICAN AMERICAN): >60 ML/MIN/1.73 M^2
EST. GFR  (NON AFRICAN AMERICAN): >60 ML/MIN/1.73 M^2
GLUCOSE SERPL-MCNC: 123 MG/DL (ref 70–110)
POTASSIUM SERPL-SCNC: 4.4 MMOL/L (ref 3.5–5.1)
PROT SERPL-MCNC: 7.1 G/DL (ref 6–8.4)
SODIUM SERPL-SCNC: 137 MMOL/L (ref 136–145)
TROPONIN I SERPL DL<=0.01 NG/ML-MCNC: <0.006 NG/ML (ref 0–0.03)

## 2020-01-07 PROCEDURE — 99223 PR INITIAL HOSPITAL CARE,LEVL III: ICD-10-PCS | Mod: HCNC,,, | Performed by: EMERGENCY MEDICINE

## 2020-01-07 PROCEDURE — 99223 1ST HOSP IP/OBS HIGH 75: CPT | Mod: HCNC,AI,, | Performed by: INTERNAL MEDICINE

## 2020-01-07 PROCEDURE — 85379 FIBRIN DEGRADATION QUANT: CPT | Mod: HCNC

## 2020-01-07 PROCEDURE — 63600175 PHARM REV CODE 636 W HCPCS: Mod: HCNC | Performed by: STUDENT IN AN ORGANIZED HEALTH CARE EDUCATION/TRAINING PROGRAM

## 2020-01-07 PROCEDURE — 25000003 PHARM REV CODE 250: Mod: HCNC | Performed by: EMERGENCY MEDICINE

## 2020-01-07 PROCEDURE — 93010 EKG 12-LEAD: ICD-10-PCS | Mod: HCNC,,, | Performed by: INTERNAL MEDICINE

## 2020-01-07 PROCEDURE — 80053 COMPREHEN METABOLIC PANEL: CPT | Mod: HCNC

## 2020-01-07 PROCEDURE — 84484 ASSAY OF TROPONIN QUANT: CPT | Mod: HCNC

## 2020-01-07 PROCEDURE — 20600001 HC STEP DOWN PRIVATE ROOM: Mod: HCNC

## 2020-01-07 PROCEDURE — 93010 ELECTROCARDIOGRAM REPORT: CPT | Mod: HCNC,,, | Performed by: INTERNAL MEDICINE

## 2020-01-07 PROCEDURE — 63600175 PHARM REV CODE 636 W HCPCS: Mod: HCNC | Performed by: EMERGENCY MEDICINE

## 2020-01-07 PROCEDURE — 93005 ELECTROCARDIOGRAM TRACING: CPT | Mod: HCNC

## 2020-01-07 PROCEDURE — 25000003 PHARM REV CODE 250: Mod: HCNC | Performed by: STUDENT IN AN ORGANIZED HEALTH CARE EDUCATION/TRAINING PROGRAM

## 2020-01-07 PROCEDURE — 99223 PR INITIAL HOSPITAL CARE,LEVL III: ICD-10-PCS | Mod: HCNC,AI,, | Performed by: INTERNAL MEDICINE

## 2020-01-07 PROCEDURE — 99223 1ST HOSP IP/OBS HIGH 75: CPT | Mod: HCNC,,, | Performed by: EMERGENCY MEDICINE

## 2020-01-07 RX ORDER — ONDANSETRON 4 MG/1
4 TABLET, FILM COATED ORAL EVERY 8 HOURS PRN
Status: DISCONTINUED | OUTPATIENT
Start: 2020-01-07 | End: 2020-01-12 | Stop reason: HOSPADM

## 2020-01-07 RX ORDER — ACETAMINOPHEN 500 MG
1000 TABLET ORAL
Status: COMPLETED | OUTPATIENT
Start: 2020-01-07 | End: 2020-01-07

## 2020-01-07 RX ORDER — SODIUM CHLORIDE 0.9 % (FLUSH) 0.9 %
10 SYRINGE (ML) INJECTION
Status: DISCONTINUED | OUTPATIENT
Start: 2020-01-07 | End: 2020-01-12 | Stop reason: HOSPADM

## 2020-01-07 RX ORDER — ONDANSETRON 2 MG/ML
4 INJECTION INTRAMUSCULAR; INTRAVENOUS
Status: DISCONTINUED | OUTPATIENT
Start: 2020-01-07 | End: 2020-01-07

## 2020-01-07 RX ORDER — GABAPENTIN 300 MG/1
300 CAPSULE ORAL 3 TIMES DAILY
Status: DISCONTINUED | OUTPATIENT
Start: 2020-01-07 | End: 2020-01-12 | Stop reason: HOSPADM

## 2020-01-07 RX ORDER — NALOXONE HCL 0.4 MG/ML
0.02 VIAL (ML) INJECTION
Status: DISCONTINUED | OUTPATIENT
Start: 2020-01-07 | End: 2020-01-12 | Stop reason: HOSPADM

## 2020-01-07 RX ORDER — PANTOPRAZOLE SODIUM 40 MG/1
40 TABLET, DELAYED RELEASE ORAL DAILY
Status: DISCONTINUED | OUTPATIENT
Start: 2020-01-07 | End: 2020-01-12 | Stop reason: HOSPADM

## 2020-01-07 RX ORDER — HYDROMORPHONE HYDROCHLORIDE 1 MG/ML
1 INJECTION, SOLUTION INTRAMUSCULAR; INTRAVENOUS; SUBCUTANEOUS
Status: COMPLETED | OUTPATIENT
Start: 2020-01-07 | End: 2020-01-07

## 2020-01-07 RX ORDER — IBUPROFEN 200 MG
24 TABLET ORAL
Status: DISCONTINUED | OUTPATIENT
Start: 2020-01-07 | End: 2020-01-12 | Stop reason: HOSPADM

## 2020-01-07 RX ORDER — HYDROMORPHONE HYDROCHLORIDE 1 MG/ML
1 INJECTION, SOLUTION INTRAMUSCULAR; INTRAVENOUS; SUBCUTANEOUS
Status: DISCONTINUED | OUTPATIENT
Start: 2020-01-07 | End: 2020-01-07

## 2020-01-07 RX ORDER — HYDROMORPHONE HCL IN 0.9% NACL 6 MG/30 ML
PATIENT CONTROLLED ANALGESIA SYRINGE INTRAVENOUS CONTINUOUS
Status: DISCONTINUED | OUTPATIENT
Start: 2020-01-07 | End: 2020-01-07

## 2020-01-07 RX ORDER — IBUPROFEN 200 MG
16 TABLET ORAL
Status: DISCONTINUED | OUTPATIENT
Start: 2020-01-07 | End: 2020-01-12 | Stop reason: HOSPADM

## 2020-01-07 RX ORDER — ONDANSETRON 2 MG/ML
4 INJECTION INTRAMUSCULAR; INTRAVENOUS
Status: COMPLETED | OUTPATIENT
Start: 2020-01-07 | End: 2020-01-07

## 2020-01-07 RX ORDER — HYDROMORPHONE HCL IN 0.9% NACL 6 MG/30 ML
PATIENT CONTROLLED ANALGESIA SYRINGE INTRAVENOUS CONTINUOUS
Status: DISCONTINUED | OUTPATIENT
Start: 2020-01-07 | End: 2020-01-09

## 2020-01-07 RX ORDER — AMOXICILLIN 250 MG
1 CAPSULE ORAL 2 TIMES DAILY
Status: DISCONTINUED | OUTPATIENT
Start: 2020-01-07 | End: 2020-01-10

## 2020-01-07 RX ORDER — MORPHINE SULFATE 30 MG/1
90 TABLET, FILM COATED, EXTENDED RELEASE ORAL EVERY 8 HOURS
Status: DISCONTINUED | OUTPATIENT
Start: 2020-01-07 | End: 2020-01-07

## 2020-01-07 RX ORDER — SERTRALINE HYDROCHLORIDE 50 MG/1
50 TABLET, FILM COATED ORAL DAILY
Status: DISCONTINUED | OUTPATIENT
Start: 2020-01-07 | End: 2020-01-12 | Stop reason: HOSPADM

## 2020-01-07 RX ORDER — HYDROMORPHONE HYDROCHLORIDE 1 MG/ML
2 INJECTION, SOLUTION INTRAMUSCULAR; INTRAVENOUS; SUBCUTANEOUS ONCE
Status: DISCONTINUED | OUTPATIENT
Start: 2020-01-07 | End: 2020-01-07

## 2020-01-07 RX ORDER — HYDROMORPHONE HYDROCHLORIDE 1 MG/ML
1 INJECTION, SOLUTION INTRAMUSCULAR; INTRAVENOUS; SUBCUTANEOUS ONCE
Status: COMPLETED | OUTPATIENT
Start: 2020-01-07 | End: 2020-01-07

## 2020-01-07 RX ORDER — ENOXAPARIN SODIUM 100 MG/ML
40 INJECTION SUBCUTANEOUS EVERY 24 HOURS
Status: DISCONTINUED | OUTPATIENT
Start: 2020-01-07 | End: 2020-01-12 | Stop reason: HOSPADM

## 2020-01-07 RX ORDER — SODIUM CHLORIDE 9 MG/ML
INJECTION, SOLUTION INTRAVENOUS CONTINUOUS
Status: ACTIVE | OUTPATIENT
Start: 2020-01-07 | End: 2020-01-07

## 2020-01-07 RX ORDER — GLUCAGON 1 MG
1 KIT INJECTION
Status: DISCONTINUED | OUTPATIENT
Start: 2020-01-07 | End: 2020-01-12 | Stop reason: HOSPADM

## 2020-01-07 RX ORDER — BUTALBITAL, ACETAMINOPHEN AND CAFFEINE 50; 325; 40 MG/1; MG/1; MG/1
1 TABLET ORAL EVERY 6 HOURS PRN
Status: DISCONTINUED | OUTPATIENT
Start: 2020-01-07 | End: 2020-01-12 | Stop reason: HOSPADM

## 2020-01-07 RX ORDER — CLONIDINE HYDROCHLORIDE 0.1 MG/1
0.1 TABLET ORAL 3 TIMES DAILY
Status: DISCONTINUED | OUTPATIENT
Start: 2020-01-07 | End: 2020-01-12 | Stop reason: HOSPADM

## 2020-01-07 RX ADMIN — ONDANSETRON 4 MG: 2 INJECTION INTRAMUSCULAR; INTRAVENOUS at 03:01

## 2020-01-07 RX ADMIN — HYDROMORPHONE HYDROCHLORIDE 1 MG: 1 INJECTION, SOLUTION INTRAMUSCULAR; INTRAVENOUS; SUBCUTANEOUS at 02:01

## 2020-01-07 RX ADMIN — SODIUM CHLORIDE: 0.9 INJECTION, SOLUTION INTRAVENOUS at 05:01

## 2020-01-07 RX ADMIN — SODIUM CHLORIDE: 0.9 INJECTION, SOLUTION INTRAVENOUS at 10:01

## 2020-01-07 RX ADMIN — HYDROMORPHONE HYDROCHLORIDE 1 MG: 1 INJECTION, SOLUTION INTRAMUSCULAR; INTRAVENOUS; SUBCUTANEOUS at 06:01

## 2020-01-07 RX ADMIN — GABAPENTIN 300 MG: 300 CAPSULE ORAL at 09:01

## 2020-01-07 RX ADMIN — BUTALBITAL, ACETAMINOPHEN AND CAFFEINE 1 TABLET: 50; 325; 40 TABLET ORAL at 05:01

## 2020-01-07 RX ADMIN — SERTRALINE HYDROCHLORIDE 50 MG: 50 TABLET ORAL at 09:01

## 2020-01-07 RX ADMIN — ENOXAPARIN SODIUM 40 MG: 100 INJECTION SUBCUTANEOUS at 05:01

## 2020-01-07 RX ADMIN — HYDROMORPHONE HYDROCHLORIDE 1 MG: 1 INJECTION, SOLUTION INTRAMUSCULAR; INTRAVENOUS; SUBCUTANEOUS at 08:01

## 2020-01-07 RX ADMIN — HYDROMORPHONE HYDROCHLORIDE 1 MG: 1 INJECTION, SOLUTION INTRAMUSCULAR; INTRAVENOUS; SUBCUTANEOUS at 01:01

## 2020-01-07 RX ADMIN — Medication: at 06:01

## 2020-01-07 RX ADMIN — HYDROMORPHONE HYDROCHLORIDE 1 MG: 1 INJECTION, SOLUTION INTRAMUSCULAR; INTRAVENOUS; SUBCUTANEOUS at 04:01

## 2020-01-07 RX ADMIN — Medication: at 10:01

## 2020-01-07 RX ADMIN — ACETAMINOPHEN 1000 MG: 500 TABLET ORAL at 04:01

## 2020-01-07 RX ADMIN — PANTOPRAZOLE SODIUM 40 MG: 40 TABLET, DELAYED RELEASE ORAL at 09:01

## 2020-01-07 RX ADMIN — SENNOSIDES AND DOCUSATE SODIUM 1 TABLET: 8.6; 5 TABLET ORAL at 09:01

## 2020-01-07 RX ADMIN — GABAPENTIN 300 MG: 300 CAPSULE ORAL at 02:01

## 2020-01-07 RX ADMIN — Medication: at 11:01

## 2020-01-07 RX ADMIN — CLONIDINE HYDROCHLORIDE 0.1 MG: 0.1 TABLET ORAL at 09:01

## 2020-01-07 RX ADMIN — CLONIDINE HYDROCHLORIDE 0.1 MG: 0.1 TABLET ORAL at 02:01

## 2020-01-07 RX ADMIN — HYDROMORPHONE HYDROCHLORIDE 1 MG: 1 INJECTION, SOLUTION INTRAMUSCULAR; INTRAVENOUS; SUBCUTANEOUS at 12:01

## 2020-01-07 NOTE — ED PROVIDER NOTES
Source of History:  Patient    Chief complaint:  Generalized Pain (Pt to ER via EMS c/o generalized chronic body pain without relief of morphine at home. Hx of bone cancer. Last chemo 2 weeks ago. She has multiple rib fractures. )    HPI:  Rebecca Crain is a 64 y.o. female with history of breast cancer metastatic to many bones, liver presenting to emergency department with complaint of worsening pain.  The patient states that she has developed worsening pain in her right-sided ribs, and in her thoracic back.  The pain feels different than her previous pain in that it is worse and she does not typically have pain on the right side of her ribs.  Patient has been taking MS Contin 90 mg every 8 hr as directed.  In addition she has Dilaudid 4 mg every 4 hr as needed.  She took her last Dilaudid dose at 8:00 p.m..  It is not helping her.    The patient stop chemo 2 weeks ago because the regimen that she was on was not working, and the cancer seemed to be rapidly progressing.    She denies fevers or chills.  She is nauseous but has not had vomiting.  She does not have abdominal pain.  No shortness of breath, no new cough.  She does complain of pain in the right low thorax/chest area.  The pain is severe, constant, and worse with deep breathing.    ROS: As per HPI and below:  Review of Systems   Constitutional: Negative for fever.   HENT: Negative for sore throat.    Eyes: Negative for double vision.   Respiratory: Negative for cough and shortness of breath.    Cardiovascular: Positive for chest pain.   Gastrointestinal: Negative for abdominal pain and vomiting.   Genitourinary: Negative for dysuria.   Musculoskeletal: Positive for back pain and neck pain. Negative for falls.   Skin: Negative for rash.   Neurological: Negative for headaches.     Review of patient's allergies indicates:   Allergen Reactions    Adhesive Rash     Tape, Paper tape is OK.    Codeine Itching     Itching very bad to upper body only.     Demerol [meperidine] Itching     To upper body only    Iodine and iodide containing products Anaphylaxis    Penicillins      Ulcers in mouth.    Arimidex [anastrozole] Hives    Aromasin [exemestane]     Clindamycin Itching and Rash       No current facility-administered medications on file prior to encounter.      Current Outpatient Medications on File Prior to Encounter   Medication Sig Dispense Refill    AFLURIA QD 2019-20,3YR UP,,PF, 60 mcg (15 mcg x 4)/0.5 mL Syrg TO BE ADMINISTERED BY PHARMACIST FOR IMMUNIZATION  0    ascorbic acid, vitamin C, (VITAMIN C) 1000 MG tablet Take 1,000 mg by mouth once daily.      aspirin (ECOTRIN) 81 MG EC tablet Take 81 mg by mouth once daily.      biotin 1 mg tablet Take 1,000 mcg by mouth once daily.      cloNIDine (CATAPRES) 0.1 MG tablet Take 1 tablet (0.1 mg total) by mouth 3 (three) times daily. 270 tablet 3    cyclobenzaprine (FLEXERIL) 5 MG tablet Take 5 mg by mouth.  11    diphenoxylate-atropine 2.5-0.025 mg (LOMOTIL) 2.5-0.025 mg per tablet Take 1 tablet by mouth 4 (four) times daily as needed for Diarrhea. 120 tablet 3    duke's soln (benadryl 30 mL, mylanta 30 mL, lidocaine 30 mL, nystatin 30 mL) 120mL Take 10 mLs by mouth 4 (four) times daily. 480 mL 2    duke's soln (benadryl 30 mL, mylanta 30 mL, lidocaine 30 mL, nystatin 30 mL) 120mL TAKE 10 ML BY MOUTH 4 TIMES A DAY  2    duke's soln (benadryl 30 mL, mylanta 30 mL, lidocaine 30 mL, nystatin 30 mL) 120mL TAKE 10 ML BY MOUTH 4 TIMES A DAY  2    fish oil/borage/flax/om3,6,9 1 (OMEGA 3-6-9 COMPLEX ORAL) Take 1 capsule by mouth once daily.      gabapentin (NEURONTIN) 300 MG capsule Take 1 capsule (300 mg total) by mouth 3 (three) times daily. 270 capsule 6    hydrocodone-homatropine 5-1.5 mg/5 ml (HYCODAN) 5-1.5 mg/5 mL Syrp Take 5 mLs by mouth every 4 (four) hours as needed. 120 mL 0    HYDROmorphone (DILAUDID) 4 MG tablet Take 1 tablet (4 mg total) by mouth every 4 (four) hours as needed for Pain.  180 tablet 0    lidocaine-prilocaine (EMLA) cream APPLY TO AFFECTED AREA EVERY DAY AS NEEDED 30 g 3    loperamide (IMODIUM A-D) 2 mg Tab Take 2 mg by mouth 3 (three) times daily as needed.      morphine (MS CONTIN) 30 MG 12 hr tablet Take 1 tablet (30 mg total) by mouth every 8 (eight) hours as needed for Pain. 90 tablet 0    morphine (MS CONTIN) 60 MG 12 hr tablet Take 1 tablet (60 mg total) by mouth every 8 (eight) hours. 90 tablet 0    MV-MINERALS/FA/OMEGA 3,6,9 #3 (SS-FU-LK-OMEGA 3,6,9 #3 ORAL) Take 1 tablet by mouth once daily.        ondansetron (ZOFRAN) 4 MG tablet Take 1 tablet (4 mg total) by mouth every 8 (eight) hours as needed for Nausea. 30 tablet 11    pantoprazole (PROTONIX) 40 MG tablet Take 1 tablet (40 mg total) by mouth once daily. 90 tablet 1    PREVIDENT 5000 DRY MOUTH 1.1 % Gel BRUSH AS DIRECTED  3    sertraline (ZOLOFT) 50 MG tablet Take 1 tablet (50 mg total) by mouth once daily. 30 tablet 11    SYNTHROID 137 mcg Tab tablet Take 1 tablet (137 mcg total) by mouth before breakfast. Patient to be seen before anymore refills. 90 tablet 0       PMH:  As per HPI and below:  Past Medical History:   Diagnosis Date    Adjustment disorder with depressed mood 2/7/2017    Allergy     Anxiety     Asthma     seasonal    Blood transfusion     1981    Breast cancer 2013    stage IIB carcinoma of the right breast, ER positive with a low Oncotype score    Chemotherapy-induced neuropathy 7/26/2016    Depression     Diverticulosis     Falls frequently 6/18/2014    Hepatic cyst 1/30/2014    Hx of psychiatric care     Hypercholesteremia     Hypertension     Lung nodule 1/14 1/30/2014    Lymphedema     S/P right breast mastectomy    Metastatic bone cancer 2015    MVP (mitral valve prolapse)     Osteoporosis, unspecified 1/30/2014    Psychiatric problem     Therapy     Thyroid disease     Hasimoto disease     Past Surgical History:   Procedure Laterality Date    BREAST LUMPECTOMY  Right     X 2    BREAST RECONSTRUCTION  2013    X 2     SECTION      x2    COLONOSCOPY N/A 2016    Procedure: COLONOSCOPY;  Surgeon: Miguel Vu MD;  Location: [address]);  Service: Endoscopy;  Laterality: N/A;  MRSA-at time of scheduling pending results on 2016    endometriosis      EYE SURGERY      RK bilateral     GANGLION CYST EXCISION      right index finger    HEMORRHOID SURGERY      KNEE SURGERY Bilateral     ortho    MASTECTOMY  2013    Bilateral     TONSILLECTOMY  196    TUBAL LIGATION         Social History     Socioeconomic History    Marital status:      Spouse name: Not on file    Number of children: 2    Years of education: Not on file    Highest education level: Not on file   Occupational History    Not on file   Social Needs    Financial resource strain: Not on file    Food insecurity:     Worry: Not on file     Inability: Not on file    Transportation needs:     Medical: Not on file     Non-medical: Not on file   Tobacco Use    Smoking status: Never Smoker    Smokeless tobacco: Never Used   Substance and Sexual Activity    Alcohol use: No     Alcohol/week: 0.0 standard drinks    Drug use: No    Sexual activity: Not Currently     Birth control/protection: Post-menopausal   Lifestyle    Physical activity:     Days per week: Not on file     Minutes per session: Not on file    Stress: Not on file   Relationships    Social connections:     Talks on phone: Not on file     Gets together: Not on file     Attends Restoration service: Not on file     Active member of club or organization: Not on file     Attends meetings of clubs or organizations: Not on file     Relationship status: Not on file   Other Topics Concern    Are you pregnant or think you may be? Not Asked    Breast-feeding Not Asked    Patient feels they ought to cut down on drinking/drug use Not Asked    Patient annoyed by others criticizing their drinking/drug use Not Asked     Patient has felt bad or guilty about drinking/drug use Not Asked    Patient has had a drink/used drugs as an eye opener in the AM Not Asked   Social History Narrative    2 children, , former , enjoys photography, travel       Family History   Problem Relation Age of Onset    Stroke Mother     Hypertension Mother     Cancer Mother         Skin    Melanoma Mother     Cancer Father         tonsil cancer    Physical abuse Father     Ovarian cancer Paternal Grandmother     Cancer Paternal Grandmother 94        ovarian    Heart disease Sister         MI    Depression Sister     Arthritis Brother     Depression Brother     COPD Paternal Grandfather     Depression Sister     Depression Sister     ADD / ADHD Daughter     Crohn's disease Son     Breast cancer Neg Hx         She says that 2nd/3rd cousins with hx breast CA    Colon cancer Neg Hx        Physical Exam:    Vitals:    01/07/20 2107   BP:    Pulse:    Resp: 12   Temp:      Gen:  Uncomfortable.  Wincing in pain.  Mental Status:  Alert and oriented x 3.  Appropriate, conversant.  Skin: Warm, dry. No rashes seen.  Face is flushed.  Eyes: No conjunctival injection.  ENT: Oropharynx clear.    Pulm: Clear to auscultation bilaterally.  Good air movement.  No wheezes. No increased work of breathing.  CV: Regular rate. Regular rhythm. Normal peripheral perfusion. No lower extremity edema.  Abd: Soft.  Not distended.  Nontender.  No guarding.  No rebound.  MSK: Good range of motion all joints.  No deformities.    Neck supple.  No meningismus.  Tenderness to palpation over the right rib area.  Neuro: Awake. Speech normal. No focal neuro deficit observed.     Laboratory Studies:  Labs Reviewed   COMPREHENSIVE METABOLIC PANEL - Abnormal; Notable for the following components:       Result Value    Glucose 123 (*)     Calcium 11.8 (*)     Albumin 3.4 (*)     Alkaline Phosphatase 486 (*)     AST 92 (*)     All other components within  normal limits   D DIMER, QUANTITATIVE - Abnormal; Notable for the following components:    D-Dimer 1.63 (*)     All other components within normal limits   TROPONIN I   D DIMER, QUANTITATIVE     EKG (independently interpreted by me):  Normal sinus rhythm, rate 90.  No ST elevation or depression.  No T-wave inversion.    X-rays (independently interpreted by me):  No infiltrate noted. She has multiple thoracic compression fractures.    Chart reviewed.  Currently not on chemotherapy.    Imaging Results          CT Chest Without Contrast (Final result)  Result time 01/07/20 04:21:31    Final result by Dionisio Palacios MD (01/07/20 04:21:31)                 Impression:      No acute intrathoracic abnormality.    Heterogeneous liver with multiple hypodense lesions and osseous metastatic disease in this patient with history of breast cancer.    Additional findings as above.    Electronically signed by resident: Wing Sage  Date:    01/07/2020  Time:    03:46    Electronically signed by: Dionisio Palacios MD  Date:    01/07/2020  Time:    04:21             Narrative:    EXAMINATION:  CT CHEST WITHOUT CONTRAST    CLINICAL HISTORY:  Neoplasm: chest, metastatic, recurrence, suspected/known;R sided chest pain, worsening thoracic back pain;    TECHNIQUE:  Using low dose technique the chest was surveyed from above the pulmonary apices through the posterior costophrenic angles.  Data was reconstructed for multiplanar images in axial, sagittal and coronal planes and for maximal intensity projection images in the axial plane.    COMPARISON:  Chest radiograph 01/07/2020.  CT chest abdomen pelvis December 20, 2019.    FINDINGS:  Base of Neck: No significant abnormality. Left-sided chest port with catheter tip terminating in the distal SVC.    Aorta: Left-sided aortic arch with 3 arterial branches. The aorta maintains normal caliber, contour and course. There is no calcification of the thoracic aorta.  There is  no coronary artery  calcification.  Aortic annulus calcification.    Heart/pericardium: Normal size.  No pericardial effusion or calcification.    Pulmonary vasculature: Pulmonary arteries distribute normally.  There are four pulmonary veins.    Marimar/Mediastinum: No pathologic rufina enlargement.    Pleura: No pleural fluid or thickening.No pleural calcification.    Esophagus: Normal.    Upper Abdomen: No acute abnormality of the partially imaged upper abdomen.  The liver is heterogeneous with scattered hypodense lesions, not well evaluated on this noncontrast dedicated chest examination.  Grossly, the appearance is similar to CT chest abdomen and pelvis December 20, 2019.    Thoracic soft tissues: Status post bilateral mastectomies.  Left-sided chest port.  Mild elevation of the right hemidiaphragm.    Bones: Numerous mixed lytic and blastic lesions throughout the axial skeleton.  No new lesions identified.  No acute fracture.    Airways: Patent.    Lungs: Symmetrically expanded.  Subsegmental opacities within the bilateral lower lung zones suggestive of subsegmental atelectasis or scarring.  Cylindrical bronchiectasis noted within the bilateral lower lung zones.  No pulmonary mass or focal consolidation.  There is a subcentimeter calcification abutting the pleural margin within the anterior segment of the right upper lobe (axial series 3, image 121), likely sequela prior granulomatous disease.                               X-Ray Chest AP Portable (Final result)  Result time 01/07/20 01:12:47    Final result by Keaton Jung MD (01/07/20 01:12:47)                 Impression:      As above.      Electronically signed by: Keaton Jung MD  Date:    01/07/2020  Time:    01:12             Narrative:    EXAMINATION:  XR CHEST AP PORTABLE    CLINICAL HISTORY:  Chest Pain;    TECHNIQUE:  Single frontal view of the chest was performed.    COMPARISON:  Chest radiograph 10/11/2018, CT 12/20/2019    FINDINGS:  There is a left chest wall  MediPort catheter in place.  Surgical clips project over the bilateral axilla.  Cardiac monitoring leads overlie the chest.  The cardiomediastinal silhouette appears within normal limits.  There are low lung volumes bilaterally.  Linear opacity at the left lung base is suggestive of subsegmental atelectasis or scarring.  There are coarse interstitial markings bilaterally without confluent airspace consolidation or large pleural effusion.  There is no evidence of pneumothorax.  The patient's known osseous metastatic disease is better appreciated on prior cross-sectional imaging.  There are apparent multiple pathologic thoracic compression fracture deformities.                                Medications Given:  Medications   naloxone 0.4 mg/mL injection 0.02 mg (has no administration in time range)   HYDROmorphone PCA in 0.9 % NaCl 6 Mg/30 mL (0.2 mg/mL) ( Intravenous Handoff 1/7/20 1912)   gabapentin capsule 300 mg (300 mg Oral Given 1/7/20 2110)   ondansetron tablet 4 mg (has no administration in time range)   pantoprazole EC tablet 40 mg (40 mg Oral Given 1/7/20 0919)   sertraline tablet 50 mg (50 mg Oral Given 1/7/20 0919)   levothyroxine tablet 137 mcg (137 mcg Oral Not Given 1/7/20 0645)   sodium chloride 0.9% flush 10 mL (has no administration in time range)   glucose chewable tablet 16 g (has no administration in time range)   glucose chewable tablet 24 g (has no administration in time range)   glucagon (human recombinant) injection 1 mg (has no administration in time range)   enoxaparin injection 40 mg (40 mg Subcutaneous Given 1/7/20 1727)   senna-docusate 8.6-50 mg per tablet 1 tablet (1 tablet Oral Given 1/7/20 2110)   0.9%  NaCl infusion ( Intravenous Stopped 1/7/20 2109)   dextrose 10% (D10W) Bolus (has no administration in time range)   dextrose 10% (D10W) Bolus (has no administration in time range)   cloNIDine tablet 0.1 mg (0.1 mg Oral Given 1/7/20 2110)   butalbital-acetaminophen-caffeine -40  mg per tablet 1 tablet (1 tablet Oral Given 1/7/20 1714)   HYDROmorphone injection 1 mg (1 mg Intravenous Given 1/7/20 0023)   HYDROmorphone injection 1 mg (1 mg Intravenous Given 1/7/20 0107)   HYDROmorphone injection 1 mg (1 mg Intravenous Given 1/7/20 0235)   ondansetron injection 4 mg (4 mg Intravenous Given 1/7/20 0343)   acetaminophen tablet 1,000 mg (1,000 mg Oral Given 1/7/20 0441)   HYDROmorphone injection 1 mg (1 mg Intravenous Given 1/7/20 0824)   HYDROmorphone injection 1 mg (1 mg Intravenous Given 1/7/20 1801)     Discussed with: alejandro onc    MDM:    64 y.o. female with complaint of worsening right-sided chest pain, and worsening back pain.  She is afebrile, stable, nontoxic but extremely comfortable.  Patient was given 1 mg of Dilaudid at a time up to a total of 4 mg, still with difficulty controlling her pain.    Her EKG is nonischemic and her labs are unremarkable, aside from a mildly elevated D-dimer.  As she has an anaphylactic allergy to iodine, will hold off on a CT PE for now as my concern for a PE is quite low.    A CT of the chest demonstrates osseous metastasis throughout the right ribs, and throughout the thoracic spine with multiple thoracic compression fractures, which are known from previous.    As the patient's pain is not controlled at this time despite a serial doses of IV Dilaudid.  She will need to be brought into the hospital for pain control.  Therefore this case was discussed with the oncology group who accepts the patient to their service.    Diagnostic Impression:    1. Chest pain      Patient and/or family understands the plan and is in agreement, verbalized understanding, questions answered    Becki Stubbs MD  Emergency Medicine           Becki Stubbs MD  01/07/20 [id number]

## 2020-01-07 NOTE — PROGRESS NOTES
Nursing Transfer Note      1/7/2020     Transfer From: ED > [facility]88    Transfer via stretcher    Transfer with n/a    Transported by [facility], [facility]    Medicines sent: Yes    Chart send with patient: Yes    Notified: daughter, son    Patient reassessed at: 1620     Upon arrival to floor: patient oriented to room, call bell in reach and bed in lowest position, RN at bedside to assume care of patient

## 2020-01-07 NOTE — CONSULTS
Full consult note to follow.     Pt seen in ED, discussed with Dr High and ED team.      Reviewed meds and notes.     Recs:  -for acute on chronic pain:   -patient is opioid tolerant and averages 300 OME /day   -based on equilanalgesic dosing, start hydromorphone PCA at 0.5 mg/hr amd 0.5 mg q 6 minutes prn bolusing   -consider adjuvant steroid administration, dex 6 mg bid for potential worsening of pain due form bony mets vs capsular pain vs vertebral compression fracture    -may need additional imaging to r/o thoracolumbar compression fracture    Will continue to closely follow.     Kristian Carter MD  Palliative Medicine  [phone]

## 2020-01-07 NOTE — ED TRIAGE NOTES
Patient c/o pain in her right side that she originally thought was gas pain. Today the pain started traveling up to her shoulder, neck and to her spine. Also reports fractured ribs on left side. Pt states the pain is different from her past pain. Pain described as a constant stabbing.     Adult Physical Assessment  LOC: Rebecca Crain, 64 y.o. female verified via two identifiers.  The patient is awake, alert, oriented and speaking appropriately at this time.  APPEARANCE: Patient resting comfortably and appears to be in no acute distress at this time. Patient is clean and well groomed, patient's clothing is properly fastened.  SKIN:The skin is warm and dry, color consistent with ethnicity, patient has normal skin turgor and moist mucus membranes, skin intact, no breakdown or brusing noted.  MUSCULOSKELETAL: Patient moving all extremities well, no obvious swelling or deformities noted.  RESPIRATORY: Airway is open and patent, respirations are spontaneous, patient has a normal effort and rate, no accessory muscle use noted.  CARDIAC: Patient has a normal rate and rhythm, no periphreal edema noted in any extremity, capillary refill < 3 seconds in all extremities  ABDOMEN: Soft and non tender to palpation, no abdominal distention noted. Bowel sounds present in all four quadrants.  NEUROLOGIC: Eyes open spontaneously, behavior appropriate to situation, follows commands, facial expression symmetrical, bilateral hand grasp equal and even, purposeful motor response noted, normal sensation in all extremities when touched with a finger.

## 2020-01-07 NOTE — ASSESSMENT & PLAN NOTE
Ms Crain is a 65 yo woman with metastatic cancer of the right breast (completed 6 treatments Taxol) who presents with intractable pain in the setting of progressive lytic fractures and hypercalcemia of malignancy.     Assessment:  1. Intractable pain, due to lytic lesions  2. Hypercalcemia  3. Metastatic cancer of the right breast    Plan:  - Given several doses of 1mg IV dilaudid, without escalation in dose, and without improvement in pain. Will start on dilaudid pump for now with plans for transition to po once controlled. Also continue home gabapention to help with baseline neuropathy related to chemo.  - Supportive care, gentle fluids for hypercalcemia  - Senna doc

## 2020-01-07 NOTE — SUBJECTIVE & OBJECTIVE
Oncology Treatment Plan:   OP BREAST ERIBULIN Q3W    Medications:  Continuous Infusions:   hydromorphone in 0.9 % NaCl 6 mg/30 ml       Scheduled Meds:   enoxaparin  40 mg Subcutaneous Daily    gabapentin  300 mg Oral TID    levothyroxine  137 mcg Oral Before breakfast    pantoprazole  40 mg Oral Daily    senna-docusate 8.6-50 mg  1 tablet Oral BID    sertraline  50 mg Oral Daily     PRN Meds:dextrose 50%, dextrose 50%, glucagon (human recombinant), glucose, glucose, naloxone, ondansetron, sodium chloride 0.9%     Review of patient's allergies indicates:   Allergen Reactions    Adhesive Rash     Tape, Paper tape is OK.    Codeine Itching     Itching very bad to upper body only.    Demerol [meperidine] Itching     To upper body only    Iodine and iodide containing products Anaphylaxis    Penicillins      Ulcers in mouth.    Arimidex [anastrozole] Hives    Aromasin [exemestane]     Clindamycin Itching and Rash        Past Medical History:   Diagnosis Date    Adjustment disorder with depressed mood 2017    Allergy     Anxiety     Asthma     seasonal    Blood transfusion         Breast cancer     stage IIB carcinoma of the right breast, ER positive with a low Oncotype score    Chemotherapy-induced neuropathy 2016    Depression     Diverticulosis     Falls frequently 2014    Hepatic cyst 2014    Hx of psychiatric care     Hypercholesteremia     Hypertension     Lung nodule 2014    Lymphedema     S/P right breast mastectomy    Metastatic bone cancer     MVP (mitral valve prolapse)     Osteoporosis, unspecified 2014    Psychiatric problem     Therapy     Thyroid disease     Hasimoto disease     Past Surgical History:   Procedure Laterality Date    BREAST LUMPECTOMY Right     X 2    BREAST RECONSTRUCTION  2013    X 2     SECTION      x2    COLONOSCOPY N/A 2016    Procedure: COLONOSCOPY;  Surgeon: Miguel Vu MD;  Location:  [facility] (4TH FLR);  Service: Endoscopy;  Laterality: N/A;  MRSA-at time of scheduling pending results on 5/6/2016    endometriosis      EYE SURGERY      RK bilateral     GANGLION CYST EXCISION      right index finger    HEMORRHOID SURGERY  1995    KNEE SURGERY Bilateral     ortho    MASTECTOMY  1/2013    Bilateral     TONSILLECTOMY  1962    TUBAL LIGATION       Family History     Problem Relation (Age of Onset)    ADD / ADHD Daughter    Arthritis Brother    COPD Paternal Grandfather    Cancer Mother, Father, Paternal Grandmother (94)    Crohn's disease Son    Depression Sister, Brother, Sister, Sister    Heart disease Sister    Hypertension Mother    Melanoma Mother    Ovarian cancer Paternal Grandmother    Physical abuse Father    Stroke Mother        Tobacco Use    Smoking status: Never Smoker    Smokeless tobacco: Never Used   Substance and Sexual Activity    Alcohol use: No     Alcohol/week: 0.0 standard drinks    Drug use: No    Sexual activity: Not Currently     Birth control/protection: Post-menopausal       Review of Systems   Constitutional: Negative for fever.   Eyes: Negative for visual disturbance.   Respiratory: Negative for shortness of breath.    Cardiovascular: Negative for chest pain.   Gastrointestinal: Negative for abdominal pain.   Genitourinary: Negative for difficulty urinating.   Musculoskeletal: Negative for gait problem.        Rib, shoudler and back pain, severe   Skin: Negative for rash.   Allergic/Immunologic: Negative for immunocompromised state.   Neurological: Negative for syncope.   Hematological: Does not bruise/bleed easily.     Objective:     Vital Signs (Most Recent):  Temp: 98.4 °F (36.9 °C) (01/06/20 2303)  Pulse: 80 (01/07/20 0517)  Resp: 20 (01/07/20 0030)  BP: (!) 150/81 (01/07/20 0517)  SpO2: 98 % (01/07/20 0517) Vital Signs (24h Range):  Temp:  [98.4 °F (36.9 °C)] 98.4 °F (36.9 °C)  Pulse:  [75-94] 80  Resp:  [16-20] 20  SpO2:  [91 %-99 %] 98 %  BP:  (145-167)/(72-82) 150/81     Weight: 69.9 kg (154 lb)  Body mass index is 26.43 kg/m².  Body surface area is 1.78 meters squared.    No intake or output data in the 24 hours ending 01/07/20 0541    Physical Exam   Constitutional: She is oriented to person, place, and time. She appears well-developed. She appears distressed.   Uncomfortable appearing     HENT:   Head: Normocephalic.   Eyes: Pupils are equal, round, and reactive to light. No scleral icterus.   Neck: No JVD present.   Cardiovascular: Normal rate and regular rhythm.   Pulmonary/Chest: Effort normal and breath sounds normal. She has no wheezes.   Abdominal: Soft. Bowel sounds are normal. There is no tenderness.   Musculoskeletal: She exhibits no edema.   Neurological: She is alert and oriented to person, place, and time. She displays normal reflexes. No cranial nerve deficit or sensory deficit. Coordination normal.   Skin: Skin is warm and dry.   Psychiatric: She has a normal mood and affect.   Nursing note and vitals reviewed.      Significant Labs:   CBC: No results for input(s): WBC, HGB, HCT, PLT in the last 48 hours. and CMP:   Recent Labs   Lab 01/07/20  0010      K 4.4      CO2 23   *   BUN 13   CREATININE 0.8   CALCIUM 11.8*   PROT 7.1   ALBUMIN 3.4*   BILITOT 0.9   ALKPHOS 486*   AST 92*   ALT 42   ANIONGAP 11   EGFRNONAA >60.0       Diagnostic Results:  I have reviewed and interpreted all pertinent imaging results/findings within the past 24 hours.

## 2020-01-07 NOTE — HPI
Ms Crain is a 63 yo woman with metastatic cancer of the right breast (completed 6 treatments Taxol) who presents with intractable pain.     The patient reports the pain is worst on there right side of her ribs. The pain radiates to her right shoulder. She originally thought was gas pain. Today the pain started traveling up to her shoulder, neck and to her spine. She does have known fractures on the left side. Pain is described as a constant stabbing.     She see's Dr. Schroeder outpatient. Most recently on taxol, completed total 6 treatments with imrpovement of cancer antigen 216 to 182. Currently taking MS Contin 90 mg every 8 hr and Dilaudid 4 mg every 4 hr on an as-needed basis, but this was not controlling her pain. Plans are to stop taxol and start therapy with eribulin.     CT: Heterogeneous liver with multiple hypodense lesions and osseous metastatic disease in this patient with history of breast cancer.      ECOG-1-2     Breast history: In 2013 she was diagnosed with stage IIB carcinoma of the right breast, ER positive with a low Oncotype score.  She was enrolled on protocol  and randomized to endocrine therapy alone.  She was tried on all 3 aromatase inhibitors and had itching and rash to all of them.  In August 2013 she was started on tamoxifen.   She was found to have  bone metastasis in May 2015, 2015.  A subsequent bone biopsy was performed on a lesion at the T8 vertebra.   The pathology from that was consistent with metastatic breast cancer, ER +80%, AK +80%, and HER-2 negative.      She received letrozole plus palbociclib from July 2015 until May 2016.  She also received bone protective therapy with Xgeva.      Faslodex was started in June 2016.      Exemestane and Afinitor started in August 2017.  That was discontinued in February 2018 due to progression in the liver and bone.     Navelbine was started February 16, 2018.  She had 8 cycles for that.  Scans in November 2018 showed progression as  follows     Capecitabine was started in December 2018.  She received 5 cycles of that therapy.     Follow-up scans in April 2019 showed progression with a new hepatic lesion and worsening bone disease.     She began clinical trial therapy with erdafitinib on May 8, 2019.  That was discontinued in September 2019 due to progression in the bone.

## 2020-01-07 NOTE — H&P
[facility]  Hematology/Oncology  H&P    Patient Name: Rebecca Crain  MRN: [id number]  Admission Date: 1/6/2020  Code Status: Full Code   Attending Provider: Becki Stubbs MD  Primary Care Physician: Colleen Valero MD  Principal Problem:Intractable pain    Subjective:     HPI: Ms Crain is a 65 yo woman with metastatic cancer of the right breast (completed 6 treatments Taxol) who presents with intractable pain.     The patient reports the pain is worst on there right side of her ribs. The pain radiates to her right shoulder. She originally thought was gas pain. Today the pain started traveling up to her shoulder, neck and to her spine. She does have known fractures on the left side. Pain is described as a constant stabbing.     She see's Dr. Schroeder outpatient. Most recently on taxol, completed total 6 treatments with imrpovement of cancer antigen 216 to 182. Currently taking MS Contin 90 mg every 8 hr and Dilaudid 4 mg every 4 hr on an as-needed basis, but this was not controlling her pain. Plans are to stop taxol and start therapy with eribulin.     CT: Heterogeneous liver with multiple hypodense lesions and osseous metastatic disease in this patient with history of breast cancer.      ECOG-1-2     Breast history: In 2013 she was diagnosed with stage IIB carcinoma of the right breast, ER positive with a low Oncotype score.  She was enrolled on protocol  and randomized to endocrine therapy alone.  She was tried on all 3 aromatase inhibitors and had itching and rash to all of them.  In August 2013 she was started on tamoxifen.   She was found to have  bone metastasis in May 2015, 2015.  A subsequent bone biopsy was performed on a lesion at the T8 vertebra.   The pathology from that was consistent with metastatic breast cancer, ER +80%, VA +80%, and HER-2 negative.      She received letrozole plus palbociclib from July 2015 until May 2016.  She also received bone protective therapy with  Xgeva.      Faslodex was started in June 2016.      Exemestane and Afinitor started in August 2017.  That was discontinued in February 2018 due to progression in the liver and bone.     Navelbine was started February 16, 2018.  She had 8 cycles for that.  Scans in November 2018 showed progression as follows     Capecitabine was started in December 2018.  She received 5 cycles of that therapy.     Follow-up scans in April 2019 showed progression with a new hepatic lesion and worsening bone disease.     She began clinical trial therapy with erdafitinib on May 8, 2019.  That was discontinued in September 2019 due to progression in the bone.     Oncology Treatment Plan:   OP BREAST ERIBULIN Q3W    Medications:  Continuous Infusions:   hydromorphone in 0.9 % NaCl 6 mg/30 ml       Scheduled Meds:   enoxaparin  40 mg Subcutaneous Daily    gabapentin  300 mg Oral TID    levothyroxine  137 mcg Oral Before breakfast    pantoprazole  40 mg Oral Daily    senna-docusate 8.6-50 mg  1 tablet Oral BID    sertraline  50 mg Oral Daily     PRN Meds:dextrose 50%, dextrose 50%, glucagon (human recombinant), glucose, glucose, naloxone, ondansetron, sodium chloride 0.9%     Review of patient's allergies indicates:   Allergen Reactions    Adhesive Rash     Tape, Paper tape is OK.    Codeine Itching     Itching very bad to upper body only.    Demerol [meperidine] Itching     To upper body only    Iodine and iodide containing products Anaphylaxis    Penicillins      Ulcers in mouth.    Arimidex [anastrozole] Hives    Aromasin [exemestane]     Clindamycin Itching and Rash        Past Medical History:   Diagnosis Date    Adjustment disorder with depressed mood 2/7/2017    Allergy     Anxiety     Asthma     seasonal    Blood transfusion     1981    Breast cancer 2013    stage IIB carcinoma of the right breast, ER positive with a low Oncotype score    Chemotherapy-induced neuropathy 7/26/2016    Depression      Diverticulosis     Falls frequently 2014    Hepatic cyst 2014    Hx of psychiatric care     Hypercholesteremia     Hypertension     Lung nodule 2014    Lymphedema     S/P right breast mastectomy    Metastatic bone cancer     MVP (mitral valve prolapse)     Osteoporosis, unspecified 2014    Psychiatric problem     Therapy     Thyroid disease     Hasimoto disease     Past Surgical History:   Procedure Laterality Date    BREAST LUMPECTOMY Right     X 2    BREAST RECONSTRUCTION  2013    X 2     SECTION      x2    COLONOSCOPY N/A 2016    Procedure: COLONOSCOPY;  Surgeon: Miguel Vu MD;  Location: [facility] ([address] [place] [address]);  Service: Endoscopy;  Laterality: N/A;  MRSA-at time of scheduling pending results on 2016    endometriosis      EYE SURGERY      RK bilateral     GANGLION CYST EXCISION      right index finger    HEMORRHOID SURGERY      KNEE SURGERY Bilateral     ortho    MASTECTOMY  2013    Bilateral     TONSILLECTOMY      TUBAL LIGATION       Family History     Problem Relation (Age of Onset)    ADD / ADHD Daughter    Arthritis Brother    COPD Paternal Grandfather    Cancer Mother, Father, Paternal Grandmother (94)    Crohn's disease Son    Depression Sister, Brother, Sister, Sister    Heart disease Sister    Hypertension Mother    Melanoma Mother    Ovarian cancer Paternal Grandmother    Physical abuse Father    Stroke Mother        Tobacco Use    Smoking status: Never Smoker    Smokeless tobacco: Never Used   Substance and Sexual Activity    Alcohol use: No     Alcohol/week: 0.0 standard drinks    Drug use: No    Sexual activity: Not Currently     Birth control/protection: Post-menopausal       Review of Systems   Constitutional: Negative for fever.   Eyes: Negative for visual disturbance.   Respiratory: Negative for shortness of breath.    Cardiovascular: Negative for chest pain.   Gastrointestinal: Negative for abdominal pain.    Genitourinary: Negative for difficulty urinating.   Musculoskeletal: Negative for gait problem.        Rib, shoudler and back pain, severe   Skin: Negative for rash.   Allergic/Immunologic: Negative for immunocompromised state.   Neurological: Negative for syncope.   Hematological: Does not bruise/bleed easily.     Objective:     Vital Signs (Most Recent):  Temp: 98.4 °F (36.9 °C) (01/06/20 2303)  Pulse: 80 (01/07/20 0517)  Resp: 20 (01/07/20 0030)  BP: (!) 150/81 (01/07/20 0517)  SpO2: 98 % (01/07/20 0517) Vital Signs (24h Range):  Temp:  [98.4 °F (36.9 °C)] 98.4 °F (36.9 °C)  Pulse:  [75-94] 80  Resp:  [16-20] 20  SpO2:  [91 %-99 %] 98 %  BP: (145-167)/(72-82) 150/81     Weight: 69.9 kg (154 lb)  Body mass index is 26.43 kg/m².  Body surface area is 1.78 meters squared.    No intake or output data in the 24 hours ending 01/07/20 0541    Physical Exam   Constitutional: She is oriented to person, place, and time. She appears well-developed. She appears distressed.   Uncomfortable appearing     HENT:   Head: Normocephalic.   Eyes: Pupils are equal, round, and reactive to light. No scleral icterus.   Neck: No JVD present.   Cardiovascular: Normal rate and regular rhythm.   Pulmonary/Chest: Effort normal and breath sounds normal. She has no wheezes.   Abdominal: Soft. Bowel sounds are normal. There is no tenderness.   Musculoskeletal: She exhibits no edema.   Neurological: She is alert and oriented to person, place, and time. She displays normal reflexes. No cranial nerve deficit or sensory deficit. Coordination normal.   Skin: Skin is warm and dry.   Psychiatric: She has a normal mood and affect.   Nursing note and vitals reviewed.      Significant Labs:   CBC: No results for input(s): WBC, HGB, HCT, PLT in the last 48 hours. and CMP:   Recent Labs   Lab 01/07/20  0010      K 4.4      CO2 23   *   BUN 13   CREATININE 0.8   CALCIUM 11.8*   PROT 7.1   ALBUMIN 3.4*   BILITOT 0.9   ALKPHOS 486*   AST  92*   ALT 42   ANIONGAP 11   EGFRNONAA >60.0       Diagnostic Results:  I have reviewed and interpreted all pertinent imaging results/findings within the past 24 hours.    Assessment/Plan:     * Intractable pain  Ms Crain is a 65 yo woman with metastatic cancer of the right breast (completed 6 treatments Taxol) who presents with intractable pain in the setting of progressive lytic fractures and hypercalcemia of malignancy.     Assessment:  1. Intractable pain, due to lytic lesions  2. Hypercalcemia  3. Metastatic cancer of the right breast    Plan:  - Given several doses of 1mg IV dilaudid, without escalation in dose, and without improvement in pain. Will start on dilaudid pump for now with plans for transition to po once controlled. Also continue home gabapention to help with baseline neuropathy related to chemo.  - Supportive care, gentle fluids for hypercalcemia  - Senna doc     Hypercalcemia of malignancy  Fluids       Chemotherapy-induced neuropathy  Continue gabapentin    Adjustment disorder with depressed mood  Continue home meds, sertraline    Benign essential HTN  Continue home clonidine        Carlos Felder MD  Hematology/Oncology  [facility]        I have reviewed the notes, assessments, and/or procedures performed by the daytime housestaff, as above.  I have personally interviewed and examined the patient at the beside, and rounded with the housestaff. I concur with her/his assessment and plan and the documentation of Rebecca Crain.  I, Dr. Nestor High, personally spent more than  70 mins during this encounter, greater than 50% was spent in direct counseling and/or coordination of care.     Nestor High M.D., M.S., F.A.C.P.  Hematology/Oncology Attending  [facility]

## 2020-01-08 LAB
ALBUMIN SERPL BCP-MCNC: 2.7 G/DL (ref 3.5–5.2)
ALP SERPL-CCNC: 344 U/L (ref 55–135)
ALT SERPL W/O P-5'-P-CCNC: 35 U/L (ref 10–44)
ANION GAP SERPL CALC-SCNC: 6 MMOL/L (ref 8–16)
AST SERPL-CCNC: 63 U/L (ref 10–40)
BASOPHILS # BLD AUTO: 0.03 K/UL (ref 0–0.2)
BASOPHILS NFR BLD: 0.5 % (ref 0–1.9)
BILIRUB SERPL-MCNC: 0.7 MG/DL (ref 0.1–1)
BUN SERPL-MCNC: 16 MG/DL (ref 8–23)
CALCIUM SERPL-MCNC: 11 MG/DL (ref 8.7–10.5)
CHLORIDE SERPL-SCNC: 107 MMOL/L (ref 95–110)
CO2 SERPL-SCNC: 28 MMOL/L (ref 23–29)
CREAT SERPL-MCNC: 0.9 MG/DL (ref 0.5–1.4)
DIFFERENTIAL METHOD: ABNORMAL
EOSINOPHIL # BLD AUTO: 0.1 K/UL (ref 0–0.5)
EOSINOPHIL NFR BLD: 0.9 % (ref 0–8)
ERYTHROCYTE [DISTWIDTH] IN BLOOD BY AUTOMATED COUNT: 15.4 % (ref 11.5–14.5)
EST. GFR  (AFRICAN AMERICAN): >60 ML/MIN/1.73 M^2
EST. GFR  (NON AFRICAN AMERICAN): >60 ML/MIN/1.73 M^2
GLUCOSE SERPL-MCNC: 87 MG/DL (ref 70–110)
HCT VFR BLD AUTO: 34.7 % (ref 37–48.5)
HGB BLD-MCNC: 10.8 G/DL (ref 12–16)
IMM GRANULOCYTES # BLD AUTO: 0.03 K/UL (ref 0–0.04)
IMM GRANULOCYTES NFR BLD AUTO: 0.5 % (ref 0–0.5)
LYMPHOCYTES # BLD AUTO: 1.2 K/UL (ref 1–4.8)
LYMPHOCYTES NFR BLD: 18.9 % (ref 18–48)
MAGNESIUM SERPL-MCNC: 1.8 MG/DL (ref 1.6–2.6)
MCH RBC QN AUTO: 29.3 PG (ref 27–31)
MCHC RBC AUTO-ENTMCNC: 31.1 G/DL (ref 32–36)
MCV RBC AUTO: 94 FL (ref 82–98)
MONOCYTES # BLD AUTO: 0.8 K/UL (ref 0.3–1)
MONOCYTES NFR BLD: 12.9 % (ref 4–15)
NEUTROPHILS # BLD AUTO: 4.3 K/UL (ref 1.8–7.7)
NEUTROPHILS NFR BLD: 66.3 % (ref 38–73)
NRBC BLD-RTO: 0 /100 WBC
PHOSPHATE SERPL-MCNC: 2.7 MG/DL (ref 2.7–4.5)
PLATELET # BLD AUTO: 187 K/UL (ref 150–350)
PMV BLD AUTO: 10.4 FL (ref 9.2–12.9)
POTASSIUM SERPL-SCNC: 3.5 MMOL/L (ref 3.5–5.1)
PROT SERPL-MCNC: 5.8 G/DL (ref 6–8.4)
RBC # BLD AUTO: 3.68 M/UL (ref 4–5.4)
SODIUM SERPL-SCNC: 141 MMOL/L (ref 136–145)
WBC # BLD AUTO: 6.41 K/UL (ref 3.9–12.7)

## 2020-01-08 PROCEDURE — 63600175 PHARM REV CODE 636 W HCPCS: Mod: HCNC | Performed by: STUDENT IN AN ORGANIZED HEALTH CARE EDUCATION/TRAINING PROGRAM

## 2020-01-08 PROCEDURE — 25000003 PHARM REV CODE 250: Mod: HCNC | Performed by: STUDENT IN AN ORGANIZED HEALTH CARE EDUCATION/TRAINING PROGRAM

## 2020-01-08 PROCEDURE — 84100 ASSAY OF PHOSPHORUS: CPT | Mod: HCNC

## 2020-01-08 PROCEDURE — 85025 COMPLETE CBC W/AUTO DIFF WBC: CPT | Mod: HCNC

## 2020-01-08 PROCEDURE — 99223 1ST HOSP IP/OBS HIGH 75: CPT | Mod: HCNC,,, | Performed by: INTERNAL MEDICINE

## 2020-01-08 PROCEDURE — 80053 COMPREHEN METABOLIC PANEL: CPT | Mod: HCNC

## 2020-01-08 PROCEDURE — 99233 PR SUBSEQUENT HOSPITAL CARE,LEVL III: ICD-10-PCS | Mod: HCNC,,, | Performed by: EMERGENCY MEDICINE

## 2020-01-08 PROCEDURE — 63600175 PHARM REV CODE 636 W HCPCS: Mod: HCNC | Performed by: EMERGENCY MEDICINE

## 2020-01-08 PROCEDURE — 83735 ASSAY OF MAGNESIUM: CPT | Mod: HCNC

## 2020-01-08 PROCEDURE — 20600001 HC STEP DOWN PRIVATE ROOM: Mod: HCNC

## 2020-01-08 PROCEDURE — 99233 SBSQ HOSP IP/OBS HIGH 50: CPT | Mod: HCNC,,, | Performed by: EMERGENCY MEDICINE

## 2020-01-08 PROCEDURE — 99223 PR INITIAL HOSPITAL CARE,LEVL III: ICD-10-PCS | Mod: HCNC,,, | Performed by: INTERNAL MEDICINE

## 2020-01-08 RX ORDER — DEXAMETHASONE SODIUM PHOSPHATE 4 MG/ML
6 INJECTION, SOLUTION INTRA-ARTICULAR; INTRALESIONAL; INTRAMUSCULAR; INTRAVENOUS; SOFT TISSUE 2 TIMES DAILY
Status: DISCONTINUED | OUTPATIENT
Start: 2020-01-08 | End: 2020-01-10

## 2020-01-08 RX ADMIN — BUTALBITAL, ACETAMINOPHEN AND CAFFEINE 1 TABLET: 50; 325; 40 TABLET ORAL at 12:01

## 2020-01-08 RX ADMIN — Medication: at 11:01

## 2020-01-08 RX ADMIN — SERTRALINE HYDROCHLORIDE 50 MG: 50 TABLET ORAL at 10:01

## 2020-01-08 RX ADMIN — Medication: at 10:01

## 2020-01-08 RX ADMIN — SENNOSIDES AND DOCUSATE SODIUM 1 TABLET: 8.6; 5 TABLET ORAL at 10:01

## 2020-01-08 RX ADMIN — ENOXAPARIN SODIUM 40 MG: 100 INJECTION SUBCUTANEOUS at 05:01

## 2020-01-08 RX ADMIN — LEVOTHYROXINE SODIUM 137 MCG: 25 TABLET ORAL at 05:01

## 2020-01-08 RX ADMIN — CLONIDINE HYDROCHLORIDE 0.1 MG: 0.1 TABLET ORAL at 10:01

## 2020-01-08 RX ADMIN — Medication: at 05:01

## 2020-01-08 RX ADMIN — PANTOPRAZOLE SODIUM 40 MG: 40 TABLET, DELAYED RELEASE ORAL at 10:01

## 2020-01-08 RX ADMIN — GABAPENTIN 300 MG: 300 CAPSULE ORAL at 05:01

## 2020-01-08 RX ADMIN — DEXAMETHASONE SODIUM PHOSPHATE 6 MG: 4 INJECTION, SOLUTION INTRAMUSCULAR; INTRAVENOUS at 10:01

## 2020-01-08 RX ADMIN — GABAPENTIN 300 MG: 300 CAPSULE ORAL at 10:01

## 2020-01-08 RX ADMIN — Medication: at 06:01

## 2020-01-08 RX ADMIN — CLONIDINE HYDROCHLORIDE 0.1 MG: 0.1 TABLET ORAL at 05:01

## 2020-01-08 NOTE — SUBJECTIVE & OBJECTIVE
Interval History: NAEON.     Oncology Treatment Plan:   OP BREAST ERIBULIN Q3W    Medications:  Continuous Infusions:   hydromorphone in 0.9 % NaCl 6 mg/30 ml       Scheduled Meds:   cloNIDine  0.1 mg Oral TID    dexamethasone  6 mg Intravenous BID    enoxaparin  40 mg Subcutaneous Daily    gabapentin  300 mg Oral TID    levothyroxine  137 mcg Oral Before breakfast    pantoprazole  40 mg Oral Daily    senna-docusate 8.6-50 mg  1 tablet Oral BID    sertraline  50 mg Oral Daily     PRN Meds:butalbital-acetaminophen-caffeine -40 mg, Dextrose 10% Bolus, Dextrose 10% Bolus, glucagon (human recombinant), glucose, glucose, naloxone, ondansetron, sodium chloride 0.9%     Review of Systems   Constitutional: Positive for activity change and appetite change.   HENT: Negative for congestion.    Eyes: Negative for visual disturbance.   Respiratory: Negative for apnea and chest tightness.    Cardiovascular: Negative for chest pain.   Gastrointestinal: Negative for abdominal distention.   Endocrine: Negative for cold intolerance and heat intolerance.   Genitourinary: Negative for difficulty urinating.   Musculoskeletal: Positive for arthralgias, back pain and neck pain.   Skin: Negative for color change.   Neurological: Negative for dizziness.   Hematological: Negative for adenopathy.   Psychiatric/Behavioral: Negative for agitation.     Objective:     Vital Signs (Most Recent):  Temp: 98.5 °F (36.9 °C) (01/08/20 1130)  Pulse: 77 (01/08/20 1130)  Resp: 18 (01/08/20 1130)  BP: (!) 117/58 (01/08/20 1130)  SpO2: (!) 94 % (01/08/20 1130) Vital Signs (24h Range):  Temp:  [96.2 °F (35.7 °C)-98.5 °F (36.9 °C)] 98.5 °F (36.9 °C)  Pulse:  [65-79] 77  Resp:  [12-18] 18  SpO2:  [91 %-96 %] 94 %  BP: (107-127)/(56-68) 117/58     Weight: 79.4 kg (175 lb)  Body mass index is 30.04 kg/m².  Body surface area is 1.89 meters squared.      Intake/Output Summary (Last 24 hours) at 1/8/2020 1416  Last data filed at 1/8/2020 0523  Gross  per 24 hour   Intake 510 ml   Output --   Net 510 ml       Physical Exam   Constitutional: She is oriented to person, place, and time. She appears well-developed and well-nourished.   HENT:   Head: Normocephalic and atraumatic.   Eyes: No scleral icterus.   Neck: Normal range of motion.   Cardiovascular: Normal rate and regular rhythm.   Pulmonary/Chest: Effort normal. No respiratory distress.   Abdominal: Soft. Bowel sounds are normal.   Musculoskeletal: Normal range of motion.   Neurological: She is alert and oriented to person, place, and time.   Skin: Skin is warm.   Psychiatric: She has a normal mood and affect.       Significant Labs:   CBC:   Recent Labs   Lab 01/08/20  0513   WBC 6.41   HGB 10.8*   HCT 34.7*       and CMP:   Recent Labs   Lab 01/07/20  0010 01/08/20  0513    141   K 4.4 3.5    107   CO2 23 28   * 87   BUN 13 16   CREATININE 0.8 0.9   CALCIUM 11.8* 11.0*   PROT 7.1 5.8*   ALBUMIN 3.4* 2.7*   BILITOT 0.9 0.7   ALKPHOS 486* 344*   AST 92* 63*   ALT 42 35   ANIONGAP 11 6*   EGFRNONAA >60.0 >60.0       Diagnostic Results:  I have reviewed all pertinent imaging results/findings within the past 24 hours.

## 2020-01-08 NOTE — PLAN OF CARE
No acute changes overnight. Pt remains on continuous PCA pump. PRN Fioricet administered for headache with relief noted. Call light in reach. Bed alarm on. WCTM

## 2020-01-08 NOTE — SUBJECTIVE & OBJECTIVE
Interval History:  pain began as dull achy over the past 2 days she thought was gas; progressed to severe stabbing pain that radiated to right flank, right shoulder, and mid back and down to hips; movement, deep breaths, and coughing exacerbates and sitting still helps; increased oral pain meds at home but couldn;t keep it down; no cough or hemoptysis; worse pain she has had to date; BM yesterday    Past Medical History:   Diagnosis Date    Adjustment disorder with depressed mood 2017    Allergy     Anxiety     Asthma     seasonal    Blood transfusion     1981    Breast cancer     stage IIB carcinoma of the right breast, ER positive with a low Oncotype score    Chemotherapy-induced neuropathy 2016    Depression     Diverticulosis     Falls frequently 2014    Hepatic cyst 2014    Hx of psychiatric care     Hypercholesteremia     Hypertension     Lung nodule 2014    Lymphedema     S/P right breast mastectomy    Metastatic bone cancer     MVP (mitral valve prolapse)     Osteoporosis, unspecified 2014    Psychiatric problem     Therapy     Thyroid disease     Hasimoto disease       Past Surgical History:   Procedure Laterality Date    BREAST LUMPECTOMY Right     X 2    BREAST RECONSTRUCTION  2013    X 2     SECTION      x2    COLONOSCOPY N/A 2016    Procedure: COLONOSCOPY;  Surgeon: Miguel Vu MD;  Location: [facility] ([address] [place] [address]);  Service: Endoscopy;  Laterality: N/A;  MRSA-at time of scheduling pending results on 2016    endometriosis      EYE SURGERY      RK bilateral     GANGLION CYST EXCISION      right index finger    HEMORRHOID SURGERY      KNEE SURGERY Bilateral     ortho    MASTECTOMY  2013    Bilateral     TONSILLECTOMY      TUBAL LIGATION         Review of patient's allergies indicates:   Allergen Reactions    Adhesive Rash     Tape, Paper tape is OK.    Codeine Itching     Itching very bad to upper body  only.    Demerol [meperidine] Itching     To upper body only    Iodine and iodide containing products Anaphylaxis    Penicillins      Ulcers in mouth.    Arimidex [anastrozole] Hives    Aromasin [exemestane]     Clindamycin Itching and Rash       Medications:  Continuous Infusions:   hydromorphone in 0.9 % NaCl 6 mg/30 ml       Scheduled Meds:   cloNIDine  0.1 mg Oral TID    enoxaparin  40 mg Subcutaneous Daily    gabapentin  300 mg Oral TID    levothyroxine  137 mcg Oral Before breakfast    pantoprazole  40 mg Oral Daily    senna-docusate 8.6-50 mg  1 tablet Oral BID    sertraline  50 mg Oral Daily     PRN Meds:butalbital-acetaminophen-caffeine -40 mg, Dextrose 10% Bolus, Dextrose 10% Bolus, glucagon (human recombinant), glucose, glucose, naloxone, ondansetron, sodium chloride 0.9%    Family History     Problem Relation (Age of Onset)    ADD / ADHD Daughter    Arthritis Brother    COPD Paternal Grandfather    Cancer Mother, Father, Paternal Grandmother (94)    Crohn's disease Son    Depression Sister, Brother, Sister, Sister    Heart disease Sister    Hypertension Mother    Melanoma Mother    Ovarian cancer Paternal Grandmother    Physical abuse Father    Stroke Mother        Tobacco Use    Smoking status: Never Smoker    Smokeless tobacco: Never Used   Substance and Sexual Activity    Alcohol use: No     Alcohol/week: 0.0 standard drinks    Drug use: No    Sexual activity: Not Currently     Birth control/protection: Post-menopausal       Review of Systems  Objective:     Vital Signs (Most Recent):  Temp: 97.9 °F (36.6 °C) (01/07/20 2003)  Pulse: 77 (01/07/20 2003)  Resp: 12 (01/07/20 2107)  BP: 118/61 (01/07/20 2003)  SpO2: (!) 93 % (01/07/20 2003) Vital Signs (24h Range):  Temp:  [96.2 °F (35.7 °C)-98.4 °F (36.9 °C)] 97.9 °F (36.6 °C)  Pulse:  [70-94] 77  Resp:  [12-20] 12  SpO2:  [91 %-99 %] 93 %  BP: (112-167)/(59-82) 118/61     Weight: 79.4 kg (175 lb)  Body mass index is 30.04  kg/m².    Review of Symptoms  Symptom Assessment (ESAS 0-10 scale)   ESAS 0 1 2 3 4 5 6 7 8 9 10   Pain           x   Dyspnea     x         Anxiety     x         Nausea    x          Depression  x             Anorexia     x         Fatigue      x        Insomnia x             Restlessness  x             Agitation x             CAM / Delirium _x_ --  ___+   Constipation     x__ --  ___+   Diarrhea           x__ --  ___+  Bowel Management Plan (BMP): Yes    Comments: has BM q 1-2 days at home    Pain Assessment: pain began as dull achy over the past 2 days she thought was gas; progressed to severe stabbing pain that radiated to right flank, right shoulder, and mid back and down to hips; movement, deep breaths, and coughing exacerbates and sitting still helps; increased oral pain meds at home but couldn;t keep it down; no cough or hemoptysis; worse pain she has had to date; BM yesterday    OME in 24 hours: 270    ECOG Performance Status Grade: 2 - Ambulates, capable of self care only    Physical Exam  Constitutional: She is oriented to person, place, and time. She appears well-developed. She appears distressed and uncomfortable appearing  HENT:   Head: Normocephalic.   Eyes: Pupils are equal, round, and reactive to light. No scleral icterus.   Neck: No JVD present.   Cardiovascular: Normal rate and regular rhythm.   Pulmonary/Chest: Effort normal and breath sounds normal. She has no wheezes.   Abdominal: Soft. Bowel sounds are normal. Mild TTP RUQ and hepatomegaly  Musculoskeletal: She exhibits no edema.   Neurological: She is alert and oriented to person, place, and time. She displays normal reflexes. No cranial nerve deficit or sensory deficit. Coordination normal.   Skin: Skin is warm and dry.   Psychiatric: She has a normal mood and affect.   Nursing note and vitals reviewed.   Significant Labs:   CBC: No results for input(s): WBC, HGB, HCT, PLT in the last 48 hours.  CMP:   Recent Labs   Lab 01/07/20  0010   NA  137   K 4.4      CO2 23   *   BUN 13   CREATININE 0.8   CALCIUM 11.8*   PROT 7.1   ALBUMIN 3.4*   BILITOT 0.9   ALKPHOS 486*   AST 92*   ALT 42   ANIONGAP 11   EGFRNONAA >60.0     Lactic acid: No results for input(s): LACTATE in the last 48 hours.  LFT:   Recent Labs   Lab 01/07/20  0010   AST 92*   ALKPHOS 486*   BILITOT 0.9     CBC:   No results for input(s): WBC, HGB, HCT, MCV, PLT in the last 168 hours.  BMP:  Recent Labs   Lab 01/07/20  0010   *      K 4.4      CO2 23   BUN 13   CREATININE 0.8   CALCIUM 11.8*     LFT:  Lab Results   Component Value Date    AST 92 (H) 01/07/2020    ALKPHOS 486 (H) 01/07/2020    BILITOT 0.9 01/07/2020     Albumin:   Albumin   Date Value Ref Range Status   01/07/2020 3.4 (L) 3.5 - 5.2 g/dL Final     Protein:   Total Protein   Date Value Ref Range Status   01/07/2020 7.1 6.0 - 8.4 g/dL Final     Lactic acid:   Lab Results   Component Value Date    LACTATE 1.2 01/18/2014       Significant Imaging: I have reviewed all pertinent imaging results/findings within the past 24 hours.   CT  Chest noncontrast:  CT: Heterogeneous liver with multiple hypodense lesions and osseous metastatic disease in this patient with history of breast cancer.    Bone scan 12/19  There are more numerous foci of abnormal tracer uptake in keeping with progression of osseous metastases best observed in the posterior left ribs and thoracolumbar spine.  New or more prominent uptake at approximately L3 may indicate a compression fracture has observed elsewhere on recent MRI.    MRI T/L spine 9/19  Severe T12 and mild L4 vertebral body pathologic compression fractures.  Severe left neural foraminal narrowing at T12-L1.  No significant spinal canal stenosis.    Diffuse heterogeneous enhancement of the visualized osseous structures consistent with patient's history of metastatic disease.    Advance Care Planning   Advanced Directives::  Living Will: Yes. Copy on chart: Yes  LaPOST:  No  Do Not Resuscitate Status: No  Medical Power of : Yes. Agent's Name: Perfecto Cruz. Agent's Contact Number: [phone]  Secondary agent [email or web address] Crain [phone]    Decision-Making Capacity: Patient answered questions       Living Arrangements: Lives with family    Psychosocial/Cultural:  Did not discuss due to severity of pt's symptoms today    Spiritual: did not discuss today

## 2020-01-08 NOTE — PLAN OF CARE
Assumed care of patient at 1630, pt AAOx4, VS stable. Pain managed well, safety maintained through remainder of shift, no acute changes. Daughter at bedside. WCTM

## 2020-01-08 NOTE — PROGRESS NOTES
[facility]  Hematology/Oncology  H&P    Patient Name: Rebecca Crain  MRN: [id number]  Admission Date: 1/6/2020  Code Status: Full Code   Attending Provider: Nestor High MD  Primary Care Physician: Colleen Valero MD  Principal Problem:Intractable pain    Subjective:     Interval History:   No acute events overnight  Reports that pain is much better controlled today.     Oncology Treatment Plan:   OP BREAST ERIBULIN Q3W    Medications:  Continuous Infusions:   hydromorphone in 0.9 % NaCl 6 mg/30 ml       Scheduled Meds:   cloNIDine  0.1 mg Oral TID    dexamethasone  6 mg Intravenous BID    enoxaparin  40 mg Subcutaneous Daily    gabapentin  300 mg Oral TID    levothyroxine  137 mcg Oral Before breakfast    pantoprazole  40 mg Oral Daily    senna-docusate 8.6-50 mg  1 tablet Oral BID    sertraline  50 mg Oral Daily     PRN Meds:butalbital-acetaminophen-caffeine -40 mg, Dextrose 10% Bolus, Dextrose 10% Bolus, glucagon (human recombinant), glucose, glucose, naloxone, ondansetron, sodium chloride 0.9%     Review of Systems   Constitutional: Positive for activity change and appetite change.   HENT: Negative for congestion.    Eyes: Negative for visual disturbance.   Respiratory: Negative for apnea and chest tightness.    Cardiovascular: Negative for chest pain.   Gastrointestinal: Negative for abdominal distention.   Endocrine: Negative for cold intolerance and heat intolerance.   Genitourinary: Negative for difficulty urinating.   Musculoskeletal: Positive for arthralgias, back pain and neck pain.   Skin: Negative for color change.   Neurological: Negative for dizziness.   Hematological: Negative for adenopathy.   Psychiatric/Behavioral: Negative for agitation.     Objective:     Vital Signs (Most Recent):  Temp: 98.5 °F (36.9 °C) (01/08/20 1130)  Pulse: 77 (01/08/20 1130)  Resp: 18 (01/08/20 1130)  BP: (!) 117/58 (01/08/20 1130)  SpO2: (!) 94 % (01/08/20 1130) Vital Signs (24h  Range):  Temp:  [96.2 °F (35.7 °C)-98.5 °F (36.9 °C)] 98.5 °F (36.9 °C)  Pulse:  [65-79] 77  Resp:  [12-18] 18  SpO2:  [91 %-96 %] 94 %  BP: (107-127)/(56-68) 117/58     Weight: 79.4 kg (175 lb)  Body mass index is 30.04 kg/m².  Body surface area is 1.89 meters squared.      Intake/Output Summary (Last 24 hours) at 1/8/2020 1416  Last data filed at 1/8/2020 0523  Gross per 24 hour   Intake 510 ml   Output --   Net 510 ml       Physical Exam   Constitutional: She is oriented to person, place, and time. She appears well-developed and well-nourished.   HENT:   Head: Normocephalic and atraumatic.   Eyes: No scleral icterus.   Neck: Normal range of motion.   Cardiovascular: Normal rate and regular rhythm.   Pulmonary/Chest: Effort normal. No respiratory distress.   Abdominal: Soft. Bowel sounds are normal.   Musculoskeletal: Normal range of motion.   Neurological: She is alert and oriented to person, place, and time.   Skin: Skin is warm.   Psychiatric: She has a normal mood and affect.       Significant Labs:   CBC:   Recent Labs   Lab 01/08/20  0513   WBC 6.41   HGB 10.8*   HCT 34.7*       and CMP:   Recent Labs   Lab 01/07/20  0010 01/08/20  0513    141   K 4.4 3.5    107   CO2 23 28   * 87   BUN 13 16   CREATININE 0.8 0.9   CALCIUM 11.8* 11.0*   PROT 7.1 5.8*   ALBUMIN 3.4* 2.7*   BILITOT 0.9 0.7   ALKPHOS 486* 344*   AST 92* 63*   ALT 42 35   ANIONGAP 11 6*   EGFRNONAA >60.0 >60.0       Diagnostic Results:  I have reviewed all pertinent imaging results/findings within the past 24 hours.    Assessment/Plan:     * Intractable pain  Improved   Patient has acute on chronic pain: Per PM note patient is opioid tolerant and averages 300 OME /day  PCA set at 0.5mg/hr and 0.5 mg q 6 minutes PRN bolusing  Per PM reccs started Dex 6mg BID for  potential worsening of pain due form bony mets vs capsular pain vs vertebral compression fracture   Will get MRI of the spine once pain has improved. If pt has  vertebral fractures we will consider IR consult for vertebroplasty.   Bowel regimen ordered.       Hypercalcemia of malignancy  Improved, corrected ca 12 today.   Continue IVF and check daily CMP.      Adjustment disorder with depressed mood  Continue home meds, sertraline    Chemotherapy-induced neuropathy  Continue gabapentin    Benign essential HTN  Continue home clonidine      Rhea Castaneda MD  Hematology/Oncology  [facility]      I have reviewed the notes, assessments, and/or procedures performed by the housestaff, as above.  I have personally interviewed and examined the patient at the beside, and rounded with the housestaff. I concur with her/his assessment and plan and the documentation of Rebecca Crain.  I, Dr. Nestor High, personally spent more than  70 mins during this encounter, greater than 50% was spent in direct counseling and/or coordination of care.     Nestor High M.D., M.S., F.A.C.P.  Hematology/Oncology Attending  [facility]

## 2020-01-08 NOTE — CONSULTS
[facility]  Palliative Medicine  Consult Note    Patient Name: Rebecca Crain  MRN: [id number]  Admission Date: 1/6/2020  Hospital Length of Stay: 0 days  Code Status: Full Code   Attending Provider: Nestor High MD  Consulting Provider: Kristian Carter MD  Primary Care Physician: Colleen Valero MD  Principal Problem:Intractable pain    Patient information was obtained from patient, relative(s), past medical records and ER records.      Consults  Assessment/Plan:     Palliative care encounter  Ms Crain is a 63 yo woman with metastatic cancer of the right breast (s/p several rounds of treatment who presents with intractable pain in the setting of progressive lytic fractures, h/o multiple vertebral compression fxs, mets to liver with significant bulky disease, and hypercalcemia of malignancy.     Reviewed meds and notes.      Recs:  -for acute on chronic pain:              -patient is opioid tolerant and averages 300 OME /day              -based on acute pain and equilanalgesic dosing, start hydromorphone PCA at 0.5 mg/hr and 0.5 mg q 6 minutes prn bolusing              -consider adjuvant steroid administration, dex 6 mg bid for potential worsening of pain due form bony mets vs capsular pain vs vertebral compression fracture               -may need additional imaging to r/o thoracolumbar compression fracture possible vertebroplasy and/or XRT     Will continue to closely follow.      Kristian Carter MD  Palliative Medicine  [phone]        Thank you for your consult. I will follow-up with patient. Please contact us if you have any additional questions.    Subjective:     HPI:   HPI: Ms Crain is a 63 yo woman with metastatic cancer of the right breast (completed 6 treatments Taxol) who presents with intractable pain.      The patient reports the pain is worst on there right side of her ribs. The pain radiates to her right shoulder. She originally thought was gas pain. Today the pain started  traveling up to her shoulder, neck and to her spine. She does have known fractures on the left side. Pain is described as a constant stabbing.      She see's Dr. Schroeder outpatient. Most recently on taxol, completed total 6 treatments with imrpovement of cancer antigen 216 to 182. Currently taking MS Contin 90 mg every 8 hr and Dilaudid 4 mg every 4 hr on an as-needed basis, but this was not controlling her pain. Plans are to stop taxol and start therapy with eribulin.      CT: Heterogeneous liver with multiple hypodense lesions and osseous metastatic disease in this patient with history of breast cancer.        ECOG-1-2     Breast history: In 2013 she was diagnosed with stage IIB carcinoma of the right breast, ER positive with a low Oncotype score.  She was enrolled on protocol  and randomized to endocrine therapy alone.  She was tried on all 3 aromatase inhibitors and had itching and rash to all of them.  In August 2013 she was started on tamoxifen.   She was found to have  bone metastasis in May 2015, 2015.  A subsequent bone biopsy was performed on a lesion at the T8 vertebra.   The pathology from that was consistent with metastatic breast cancer, ER +80%, [place] +80%, and HER-2 negative.      She received letrozole plus palbociclib from July 2015 until May 2016.  She also received bone protective therapy with Xgeva.      Faslodex was started in June 2016.      Exemestane and Afinitor started in August 2017.  That was discontinued in February 2018 due to progression in the liver and bone.     Navelbine was started February 16, 2018.  She had 8 cycles for that.  Scans in November 2018 showed progression as follows     Capecitabine was started in December 2018.  She received 5 cycles of that therapy.     Follow-up scans in April 2019 showed progression with a new hepatic lesion and worsening bone disease.     She began clinical trial therapy with erdafitinib on May 8, 2019.  That was discontinued in September 2019  due to progression in the bone.      Oncology Treatment Plan:   OP BREAST ERIBULIN Q3W    In this setting, palliative medicine was consulted to help with symptom mgmt and intractable pain.       Hospital Course:  No notes on file    Interval History:  pain began as dull achy over the past 2 days she thought was gas; progressed to severe stabbing pain that radiated to right flank, right shoulder, and mid back and down to hips; movement, deep breaths, and coughing exacerbates and sitting still helps; increased oral pain meds at home but couldn;t keep it down; no cough or hemoptysis; worse pain she has had to date; BM yesterday    Past Medical History:   Diagnosis Date    Adjustment disorder with depressed mood 2017    Allergy     Anxiety     Asthma     seasonal    Blood transfusion     1981    Breast cancer     stage IIB carcinoma of the right breast, ER positive with a low Oncotype score    Chemotherapy-induced neuropathy 2016    Depression     Diverticulosis     Falls frequently 2014    Hepatic cyst 2014    Hx of psychiatric care     Hypercholesteremia     Hypertension     Lung nodule 2014    Lymphedema     S/P right breast mastectomy    Metastatic bone cancer     MVP (mitral valve prolapse)     Osteoporosis, unspecified 2014    Psychiatric problem     Therapy     Thyroid disease     Hasimoto disease       Past Surgical History:   Procedure Laterality Date    BREAST LUMPECTOMY Right     X 2    BREAST RECONSTRUCTION  2013    X 2     SECTION      x2    COLONOSCOPY N/A 2016    Procedure: COLONOSCOPY;  Surgeon: Miguel Vu MD;  Location: [facility] ([address] [place] [address]);  Service: Endoscopy;  Laterality: N/A;  MRSA-at time of scheduling pending results on 2016    endometriosis      EYE SURGERY      RK bilateral     GANGLION CYST EXCISION      right index finger    HEMORRHOID SURGERY      KNEE SURGERY Bilateral     ortho    MASTECTOMY   1/2013    Bilateral     TONSILLECTOMY  1962    TUBAL LIGATION         Review of patient's allergies indicates:   Allergen Reactions    Adhesive Rash     Tape, Paper tape is OK.    Codeine Itching     Itching very bad to upper body only.    Demerol [meperidine] Itching     To upper body only    Iodine and iodide containing products Anaphylaxis    Penicillins      Ulcers in mouth.    Arimidex [anastrozole] Hives    Aromasin [exemestane]     Clindamycin Itching and Rash       Medications:  Continuous Infusions:   hydromorphone in 0.9 % NaCl 6 mg/30 ml       Scheduled Meds:   cloNIDine  0.1 mg Oral TID    enoxaparin  40 mg Subcutaneous Daily    gabapentin  300 mg Oral TID    levothyroxine  137 mcg Oral Before breakfast    pantoprazole  40 mg Oral Daily    senna-docusate 8.6-50 mg  1 tablet Oral BID    sertraline  50 mg Oral Daily     PRN Meds:butalbital-acetaminophen-caffeine -40 mg, Dextrose 10% Bolus, Dextrose 10% Bolus, glucagon (human recombinant), glucose, glucose, naloxone, ondansetron, sodium chloride 0.9%    Family History     Problem Relation (Age of Onset)    ADD / ADHD Daughter    Arthritis Brother    COPD Paternal Grandfather    Cancer Mother, Father, Paternal Grandmother (94)    Crohn's disease Son    Depression Sister, Brother, Sister, Sister    Heart disease Sister    Hypertension Mother    Melanoma Mother    Ovarian cancer Paternal Grandmother    Physical abuse Father    Stroke Mother        Tobacco Use    Smoking status: Never Smoker    Smokeless tobacco: Never Used   Substance and Sexual Activity    Alcohol use: No     Alcohol/week: 0.0 standard drinks    Drug use: No    Sexual activity: Not Currently     Birth control/protection: Post-menopausal       Review of Systems  Objective:     Vital Signs (Most Recent):  Temp: 97.9 °F (36.6 °C) (01/07/20 2003)  Pulse: 77 (01/07/20 2003)  Resp: 12 (01/07/20 2107)  BP: 118/61 (01/07/20 2003)  SpO2: (!) 93 % (01/07/20 2003) Vital  Signs (24h Range):  Temp:  [96.2 °F (35.7 °C)-98.4 °F (36.9 °C)] 97.9 °F (36.6 °C)  Pulse:  [70-94] 77  Resp:  [12-20] 12  SpO2:  [91 %-99 %] 93 %  BP: (112-167)/(59-82) 118/61     Weight: 79.4 kg (175 lb)  Body mass index is 30.04 kg/m².    Review of Symptoms  Symptom Assessment (ESAS 0-10 scale)   ESAS 0 1 2 3 4 5 6 7 8 9 10   Pain           x   Dyspnea     x         Anxiety     x         Nausea    x          Depression  x             Anorexia     x         Fatigue      x        Insomnia x             Restlessness  x             Agitation x             CAM / Delirium _x_ --  ___+   Constipation     x__ --  ___+   Diarrhea           x__ --  ___+  Bowel Management Plan (BMP): Yes    Comments: has BM q 1-2 days at home    Pain Assessment: pain began as dull achy over the past 2 days she thought was gas; progressed to severe stabbing pain that radiated to right flank, right shoulder, and mid back and down to hips; movement, deep breaths, and coughing exacerbates and sitting still helps; increased oral pain meds at home but couldn;t keep it down; no cough or hemoptysis; worse pain she has had to date; BM yesterday    OME in 24 hours: 270    ECOG Performance Status Grade: 2 - Ambulates, capable of self care only    Physical Exam  Constitutional: She is oriented to person, place, and time. She appears well-developed. She appears distressed  and uncomfortable appearing  HENT:   Head: Normocephalic.   Eyes: Pupils are equal, round, and reactive to light. No scleral icterus.   Neck: No JVD present.   Cardiovascular: Normal rate and regular rhythm.   Pulmonary/Chest: Effort normal and breath sounds normal. She has no wheezes.   Abdominal: Soft. Bowel sounds are normal. Mild TTP RUQ and hepatomegaly  Musculoskeletal: She exhibits no edema.   Neurological: She is alert and oriented to person, place, and time. She displays normal reflexes. No cranial nerve deficit or sensory deficit. Coordination normal.   Skin: Skin is warm  and dry.   Psychiatric: She has a normal mood and affect.   Nursing note and vitals reviewed.   Significant Labs:   CBC: No results for input(s): WBC, HGB, HCT, PLT in the last 48 hours.  CMP:   Recent Labs   Lab 01/07/20  0010      K 4.4      CO2 23   *   BUN 13   CREATININE 0.8   CALCIUM 11.8*   PROT 7.1   ALBUMIN 3.4*   BILITOT 0.9   ALKPHOS 486*   AST 92*   ALT 42   ANIONGAP 11   EGFRNONAA >60.0     Lactic acid: No results for input(s): LACTATE in the last 48 hours.  LFT:   Recent Labs   Lab 01/07/20  0010   AST 92*   ALKPHOS 486*   BILITOT 0.9     CBC:   No results for input(s): WBC, HGB, HCT, MCV, PLT in the last 168 hours.  BMP:  Recent Labs   Lab 01/07/20  0010   *      K 4.4      CO2 23   BUN 13   CREATININE 0.8   CALCIUM 11.8*     LFT:  Lab Results   Component Value Date    AST 92 (H) 01/07/2020    ALKPHOS 486 (H) 01/07/2020    BILITOT 0.9 01/07/2020     Albumin:   Albumin   Date Value Ref Range Status   01/07/2020 3.4 (L) 3.5 - 5.2 g/dL Final     Protein:   Total Protein   Date Value Ref Range Status   01/07/2020 7.1 6.0 - 8.4 g/dL Final     Lactic acid:   Lab Results   Component Value Date    LACTATE 1.2 01/18/2014       Significant Imaging: I have reviewed all pertinent imaging results/findings within the past 24 hours.   CT  Chest noncontrast:  CT: Heterogeneous liver with multiple hypodense lesions and osseous metastatic disease in this patient with history of breast cancer.    Bone scan 12/19  There are more numerous foci of abnormal tracer uptake in keeping with progression of osseous metastases best observed in the posterior left ribs and thoracolumbar spine.  New or more prominent uptake at approximately L3 may indicate a compression fracture has observed elsewhere on recent MRI.    MRI T/L spine 9/19  Severe T12 and mild L4 vertebral body pathologic compression fractures.  Severe left neural foraminal narrowing at T12-L1.  No significant spinal canal  stenosis.    Diffuse heterogeneous enhancement of the visualized osseous structures consistent with patient's history of metastatic disease.    Advance Care Planning   Advanced Directives::  Living Will: Yes. Copy on chart: Yes  LaPOST: No  Do Not Resuscitate Status: No  Medical Power of : Yes. Agent's Name: Perfecto Cruz. Agent's Contact Number: [phone]  Secondary agent Becki Crain [phone]    Decision-Making Capacity: Patient answered questions       Living Arrangements: Lives with family    Psychosocial/Cultural:  Did not discuss due to severity of pt's symptoms today    Spiritual: did not discuss today        Kristian Carter MD  Palliative Medicine  [facility]

## 2020-01-08 NOTE — ASSESSMENT & PLAN NOTE
Ms Crain is a 63 yo woman with metastatic cancer of the right breast (s/p several rounds of treatment who presents with intractable pain in the setting of progressive lytic fractures, h/o multiple vertebral compression fxs, mets to liver with significant bulky disease, and hypercalcemia of malignancy.     Reviewed meds and notes.      Recs:  -for acute on chronic pain:              -patient is opioid tolerant and averages 300 OME /day              -based on acute pain and equilanalgesic dosing, start hydromorphone PCA at 0.5 mg/hr and 0.5 mg q 6 minutes prn bolusing              -consider adjuvant steroid administration, dex 6 mg bid for potential worsening of pain due form bony mets vs capsular pain vs vertebral compression fracture               -may need additional imaging to r/o thoracolumbar compression fracture possible vertebroplasy and/or XRT     Will continue to closely follow.      Kristian Carter MD  Palliative Medicine  [phone]

## 2020-01-08 NOTE — HPI
HPI: Ms Crain is a 63 yo woman with metastatic cancer of the right breast (completed 6 treatments Taxol) who presents with intractable pain.      The patient reports the pain is worst on there right side of her ribs. The pain radiates to her right shoulder. She originally thought was gas pain. Today the pain started traveling up to her shoulder, neck and to her spine. She does have known fractures on the left side. Pain is described as a constant stabbing.      She see's Dr. Schroeder outpatient. Most recently on taxol, completed total 6 treatments with imrpovement of cancer antigen 216 to 182. Currently taking MS Contin 90 mg every 8 hr and Dilaudid 4 mg every 4 hr on an as-needed basis, but this was not controlling her pain. Plans are to stop taxol and start therapy with eribulin.      CT: Heterogeneous liver with multiple hypodense lesions and osseous metastatic disease in this patient with history of breast cancer.        ECOG-1-2     Breast history: In 2013 she was diagnosed with stage IIB carcinoma of the right breast, ER positive with a low Oncotype score.  She was enrolled on protocol  and randomized to endocrine therapy alone.  She was tried on all 3 aromatase inhibitors and had itching and rash to all of them.  In August 2013 she was started on tamoxifen.   She was found to have  bone metastasis in May 2015, 2015.  A subsequent bone biopsy was performed on a lesion at the T8 vertebra.   The pathology from that was consistent with metastatic breast cancer, ER +80%, [place] +80%, and HER-2 negative.      She received letrozole plus palbociclib from July 2015 until May 2016.  She also received bone protective therapy with Xgeva.      Faslodex was started in June 2016.      Exemestane and Afinitor started in August 2017.  That was discontinued in February 2018 due to progression in the liver and bone.     Navelbine was started February 16, 2018.  She had 8 cycles for that.  Scans in November 2018 showed  progression as follows     Capecitabine was started in December 2018.  She received 5 cycles of that therapy.     Follow-up scans in April 2019 showed progression with a new hepatic lesion and worsening bone disease.     She began clinical trial therapy with erdafitinib on May 8, 2019.  That was discontinued in September 2019 due to progression in the bone.      Oncology Treatment Plan:   OP BREAST ERIBULIN Q3W    In this setting, palliative medicine was consulted to help with symptom mgmt and intractable pain.

## 2020-01-08 NOTE — PROGRESS NOTES
Admit Assessment    Patient Identification  Rebecca Crain   :  1955  Admit Date:  2020  Attending Provider:  Nestor High MD              Referral:   Pt has a diagnosis of metastatic Breast Cancer and was admitted to Medical Oncology with a diagnosis of Intractable pain, and Chest pain [R07.9].  Oncology social worker is involved for psychosocial services.  Patient presents as a 64 y.o. year old female.    Persons interviewed: Patient and her daughter, Becki Crain.()    Living Situation:     Lives with her daughter, Becki Crain and her daughter's best friend at [address] [place] [address] , phone: [phone] (home).      (RETIRED) Functional Status Prior  Ambulation Prior: 1-->assistive equipment  Transferrin-->assistive equipment  Toiletin-->assistive equipment    Current or Past Agencies and Description of Services/Supplies    DME  Agency Name: Unknown  (Walker, Wheelchair, Bedside Commode and Shower Chair)    Home Health  None    IV Infusion  None    Nutrition: Oral    Outpatient Pharmacy:     [facility]/pharmacy #[id number] [facility] [address]  [address] [facility] [address]  Phone: [phone] Fax: [phone]    [facility] [address] [address]  Phone: [phone] Fax: [phone]      Patient Preference of agencies include: As stated above    Patient/Caregiver informed of right to choose providers or agencies.  Patient provides permission to release any necessary information to [facility] and to [facility] agencies as needed to facilitate patient care, treatment planning, and patient discharge planning.  Written and verbal resources provided.      Coping          Adjustment to Diagnosis and Treatment  Appropriate - the patient has very good support from her daughter and her daughter's friend living with her as well as a son living in [place]. She also has 3  sisters and a brother as well as other family members who are very supportive. The patient stated that she was determined to push herself to go out with her family when she felt that she could. Her mother is in a nursing home and she attempts to visit her too. She stated that she had difficulty adjusting to the uncertainty of progression of disease. Explored ways that she was coping with it.             History/Current Symptoms of Anxiety/Depression: No  History/Current Substance Use:   Social History     Tobacco Use    Smoking status: Never Smoker    Smokeless tobacco: Never Used   Substance and Sexual Activity    Alcohol use: No     Alcohol/week: 0.0 standard drinks    Drug use: No    Sexual activity: Not Currently     Birth control/protection: Post-menopausal       Indications of Abuse/Neglect: No    Financial:  Payor/Plan Subscr  Sex Relation Sub. Ins. ID Effective Group Num   1. [employer] MANAGE* STAN SCHMIDT TRAY 1955 Female  [id number] 9/1/18 [id number]                                   [address] [address]   2. [employer]IG[employer] - CIGNA* ROXANASTAN FELIZ 1955 Female  [id number]                                   [id number]                            Other identified concerns/needs: None at present    Plan:    Interventions/Referrals: Offered services to the patient and her daughter and provided them with contact information. Will follow as needed for supportive counseling and discharge arrangements.   Patient/caregiver engaged in treatment planning process.     providing psychosocial and supportive counseling, resources, education, assistance and discharge planning as appropriate.  Patient/caregiver state understanding of  available resources,  following, remains available.

## 2020-01-09 LAB
ALBUMIN SERPL BCP-MCNC: 2.7 G/DL (ref 3.5–5.2)
ALP SERPL-CCNC: 397 U/L (ref 55–135)
ALT SERPL W/O P-5'-P-CCNC: 39 U/L (ref 10–44)
ANION GAP SERPL CALC-SCNC: 6 MMOL/L (ref 8–16)
AST SERPL-CCNC: 61 U/L (ref 10–40)
BASOPHILS # BLD AUTO: 0.01 K/UL (ref 0–0.2)
BASOPHILS NFR BLD: 0.1 % (ref 0–1.9)
BILIRUB SERPL-MCNC: 0.4 MG/DL (ref 0.1–1)
BUN SERPL-MCNC: 15 MG/DL (ref 8–23)
CALCIUM SERPL-MCNC: 11.3 MG/DL (ref 8.7–10.5)
CHLORIDE SERPL-SCNC: 102 MMOL/L (ref 95–110)
CO2 SERPL-SCNC: 29 MMOL/L (ref 23–29)
CREAT SERPL-MCNC: 0.8 MG/DL (ref 0.5–1.4)
DIFFERENTIAL METHOD: ABNORMAL
EOSINOPHIL # BLD AUTO: 0 K/UL (ref 0–0.5)
EOSINOPHIL NFR BLD: 0 % (ref 0–8)
ERYTHROCYTE [DISTWIDTH] IN BLOOD BY AUTOMATED COUNT: 14.8 % (ref 11.5–14.5)
EST. GFR  (AFRICAN AMERICAN): >60 ML/MIN/1.73 M^2
EST. GFR  (NON AFRICAN AMERICAN): >60 ML/MIN/1.73 M^2
GLUCOSE SERPL-MCNC: 149 MG/DL (ref 70–110)
HCT VFR BLD AUTO: 36.8 % (ref 37–48.5)
HGB BLD-MCNC: 11.1 G/DL (ref 12–16)
IMM GRANULOCYTES # BLD AUTO: 0.03 K/UL (ref 0–0.04)
IMM GRANULOCYTES NFR BLD AUTO: 0.4 % (ref 0–0.5)
LYMPHOCYTES # BLD AUTO: 0.9 K/UL (ref 1–4.8)
LYMPHOCYTES NFR BLD: 11.7 % (ref 18–48)
MAGNESIUM SERPL-MCNC: 1.8 MG/DL (ref 1.6–2.6)
MCH RBC QN AUTO: 28.5 PG (ref 27–31)
MCHC RBC AUTO-ENTMCNC: 30.2 G/DL (ref 32–36)
MCV RBC AUTO: 94 FL (ref 82–98)
MONOCYTES # BLD AUTO: 0.5 K/UL (ref 0.3–1)
MONOCYTES NFR BLD: 6.5 % (ref 4–15)
NEUTROPHILS # BLD AUTO: 6.1 K/UL (ref 1.8–7.7)
NEUTROPHILS NFR BLD: 81.3 % (ref 38–73)
NRBC BLD-RTO: 0 /100 WBC
PHOSPHATE SERPL-MCNC: 2.6 MG/DL (ref 2.7–4.5)
PLATELET # BLD AUTO: 197 K/UL (ref 150–350)
PMV BLD AUTO: 10.5 FL (ref 9.2–12.9)
POTASSIUM SERPL-SCNC: 4.1 MMOL/L (ref 3.5–5.1)
PROT SERPL-MCNC: 6.1 G/DL (ref 6–8.4)
RBC # BLD AUTO: 3.9 M/UL (ref 4–5.4)
SODIUM SERPL-SCNC: 137 MMOL/L (ref 136–145)
WBC # BLD AUTO: 7.44 K/UL (ref 3.9–12.7)

## 2020-01-09 PROCEDURE — 97161 PT EVAL LOW COMPLEX 20 MIN: CPT | Mod: HCNC

## 2020-01-09 PROCEDURE — 25000003 PHARM REV CODE 250: Mod: HCNC | Performed by: STUDENT IN AN ORGANIZED HEALTH CARE EDUCATION/TRAINING PROGRAM

## 2020-01-09 PROCEDURE — 83735 ASSAY OF MAGNESIUM: CPT | Mod: HCNC

## 2020-01-09 PROCEDURE — 94770 HC EXHALED C02 TEST: CPT | Mod: HCNC

## 2020-01-09 PROCEDURE — 99233 PR SUBSEQUENT HOSPITAL CARE,LEVL III: ICD-10-PCS | Mod: HCNC,,, | Performed by: INTERNAL MEDICINE

## 2020-01-09 PROCEDURE — 85025 COMPLETE CBC W/AUTO DIFF WBC: CPT | Mod: HCNC

## 2020-01-09 PROCEDURE — 94761 N-INVAS EAR/PLS OXIMETRY MLT: CPT | Mod: HCNC

## 2020-01-09 PROCEDURE — 99233 SBSQ HOSP IP/OBS HIGH 50: CPT | Mod: HCNC,,, | Performed by: INTERNAL MEDICINE

## 2020-01-09 PROCEDURE — 99900035 HC TECH TIME PER 15 MIN (STAT): Mod: HCNC

## 2020-01-09 PROCEDURE — 99233 PR SUBSEQUENT HOSPITAL CARE,LEVL III: ICD-10-PCS | Mod: HCNC,,, | Performed by: EMERGENCY MEDICINE

## 2020-01-09 PROCEDURE — 84100 ASSAY OF PHOSPHORUS: CPT | Mod: HCNC

## 2020-01-09 PROCEDURE — 99233 SBSQ HOSP IP/OBS HIGH 50: CPT | Mod: HCNC,,, | Performed by: EMERGENCY MEDICINE

## 2020-01-09 PROCEDURE — 63600175 PHARM REV CODE 636 W HCPCS: Mod: HCNC | Performed by: STUDENT IN AN ORGANIZED HEALTH CARE EDUCATION/TRAINING PROGRAM

## 2020-01-09 PROCEDURE — 80053 COMPREHEN METABOLIC PANEL: CPT | Mod: HCNC

## 2020-01-09 PROCEDURE — 20600001 HC STEP DOWN PRIVATE ROOM: Mod: HCNC

## 2020-01-09 RX ORDER — MORPHINE SULFATE 15 MG/1
60 TABLET ORAL
Status: DISCONTINUED | OUTPATIENT
Start: 2020-01-09 | End: 2020-01-12 | Stop reason: HOSPADM

## 2020-01-09 RX ORDER — MORPHINE SULFATE 100 MG/1
200 TABLET, FILM COATED, EXTENDED RELEASE ORAL EVERY 8 HOURS
Status: DISCONTINUED | OUTPATIENT
Start: 2020-01-09 | End: 2020-01-12 | Stop reason: HOSPADM

## 2020-01-09 RX ADMIN — Medication: at 11:01

## 2020-01-09 RX ADMIN — Medication: at 12:01

## 2020-01-09 RX ADMIN — Medication: at 03:01

## 2020-01-09 RX ADMIN — PANTOPRAZOLE SODIUM 40 MG: 40 TABLET, DELAYED RELEASE ORAL at 09:01

## 2020-01-09 RX ADMIN — MORPHINE SULFATE 200 MG: 100 TABLET, FILM COATED, EXTENDED RELEASE ORAL at 09:01

## 2020-01-09 RX ADMIN — Medication: at 08:01

## 2020-01-09 RX ADMIN — CLONIDINE HYDROCHLORIDE 0.1 MG: 0.1 TABLET ORAL at 09:01

## 2020-01-09 RX ADMIN — GABAPENTIN 300 MG: 300 CAPSULE ORAL at 03:01

## 2020-01-09 RX ADMIN — DEXAMETHASONE SODIUM PHOSPHATE 6 MG: 4 INJECTION, SOLUTION INTRAMUSCULAR; INTRAVENOUS at 09:01

## 2020-01-09 RX ADMIN — Medication: at 02:01

## 2020-01-09 RX ADMIN — LEVOTHYROXINE SODIUM 137 MCG: 25 TABLET ORAL at 05:01

## 2020-01-09 RX ADMIN — Medication: at 05:01

## 2020-01-09 RX ADMIN — MORPHINE SULFATE 200 MG: 100 TABLET, FILM COATED, EXTENDED RELEASE ORAL at 02:01

## 2020-01-09 RX ADMIN — GABAPENTIN 300 MG: 300 CAPSULE ORAL at 09:01

## 2020-01-09 RX ADMIN — SENNOSIDES AND DOCUSATE SODIUM 1 TABLET: 8.6; 5 TABLET ORAL at 09:01

## 2020-01-09 RX ADMIN — SERTRALINE HYDROCHLORIDE 50 MG: 50 TABLET ORAL at 09:01

## 2020-01-09 RX ADMIN — ENOXAPARIN SODIUM 40 MG: 100 INJECTION SUBCUTANEOUS at 05:01

## 2020-01-09 RX ADMIN — CLONIDINE HYDROCHLORIDE 0.1 MG: 0.1 TABLET ORAL at 03:01

## 2020-01-09 NOTE — PROGRESS NOTES
[facility]  Palliative Medicine  Progress Note    Patient Name: Rebecca Crain  MRN: [id number]  Admission Date: 1/6/2020  Hospital Length of Stay: 1 days  Code Status: Full Code   Attending Provider: Nestor High MD  Consulting Provider: Kristian Carter MD  Primary Care Physician: Colleen Valero MD  Principal Problem:Intractable pain    Patient information was obtained from patient, past medical records and ER records.      Assessment/Plan:     Palliative care encounter  Ms Crain is a 65 yo woman with metastatic cancer of the right breast (s/p several rounds of treatment who presents with intractable pain in the setting of progressive lytic fractures, h/o multiple vertebral compression fxs, mets to liver with significant bulky disease, and hypercalcemia of malignancy.     Reviewed meds and notes.      Recs:  -for acute on chronic pain:              -reviewed MAR: using 32 mg IV dilaudid via PCA bolusing and basal rate 0.5 mg q 6 minutes prn and 0.5mg/hr              -dex added for potential worsening of pain due form bony mets vs capsular pain/tumor necrosis/subcapsular hematoma vs vertebral compression fracture               -may need additional imaging of abd/pelv to eval liver and r/o thoracolumbar compression fracture    -pending diagnosis and treatment plan   -would leave PCA for now and continue to determine needs; current  mg (270 mg at home)     Will continue to closely follow.      Kristian Crater MD  Palliative Medicine  [phone]        I will follow-up with patient. Please contact us if you have any additional questions.    Subjective:     Chief Complaint:   Chief Complaint   Patient presents with    Generalized Pain     Pt to ER via EMS c/o generalized chronic body pain without relief of morphine at home. Hx of bone cancer. Last chemo 2 weeks ago. She has multiple rib fractures.        HPI:   HPI: Ms Crain is a 65 yo woman with metastatic cancer of the right breast  (completed 6 treatments Taxol) who presents with intractable pain.      The patient reports the pain is worst on there right side of her ribs. The pain radiates to her right shoulder. She originally thought was gas pain. Today the pain started traveling up to her shoulder, neck and to her spine. She does have known fractures on the left side. Pain is described as a constant stabbing.      She see's Dr. Schroeder outpatient. Most recently on taxol, completed total 6 treatments with imrpovement of cancer antigen 216 to 182. Currently taking MS Contin 90 mg every 8 hr and Dilaudid 4 mg every 4 hr on an as-needed basis, but this was not controlling her pain. Plans are to stop taxol and start therapy with eribulin.      CT: Heterogeneous liver with multiple hypodense lesions and osseous metastatic disease in this patient with history of breast cancer.        ECOG-1-2     Breast history: In 2013 she was diagnosed with stage IIB carcinoma of the right breast, ER positive with a low Oncotype score.  She was enrolled on protocol  and randomized to endocrine therapy alone.  She was tried on all 3 aromatase inhibitors and had itching and rash to all of them.  In August 2013 she was started on tamoxifen.   She was found to have  bone metastasis in May 2015, 2015.  A subsequent bone biopsy was performed on a lesion at the T8 vertebra.   The pathology from that was consistent with metastatic breast cancer, ER +80%, CT +80%, and HER-2 negative.      She received letrozole plus palbociclib from July 2015 until May 2016.  She also received bone protective therapy with Xgeva.      Faslodex was started in June 2016.      Exemestane and Afinitor started in August 2017.  That was discontinued in February 2018 due to progression in the liver and bone.     Navelbine was started February 16, 2018.  She had 8 cycles for that.  Scans in November 2018 showed progression as follows     Capecitabine was started in December 2018.  She received 5  cycles of that therapy.     Follow-up scans in 2019 showed progression with a new hepatic lesion and worsening bone disease.     She began clinical trial therapy with erdafitinib on May 8, 2019.  That was discontinued in 2019 due to progression in the bone.      Oncology Treatment Plan:   OP BREAST ERIBULIN Q3W    In this setting, palliative medicine was consulted to help with symptom mgmt and intractable pain.       Hospital Course:  No notes on file    Interval History:  Pain much improved today; tolerating PCA well without any dose limiting side effects of nausea, somnolence, confusion, or myoclonus    Per pt her pain is to a 5/10 down from a 10/10 yesterday.  5/10 is tolerable.  Pain is now mostly limited to RUQ and occasional referral to right shoulder.      Past Medical History:   Diagnosis Date    Adjustment disorder with depressed mood 2017    Allergy     Anxiety     Asthma     seasonal    Blood transfusion     1981    Breast cancer     stage IIB carcinoma of the right breast, ER positive with a low Oncotype score    Chemotherapy-induced neuropathy 2016    Depression     Diverticulosis     Falls frequently 2014    Hepatic cyst 2014    Hx of psychiatric care     Hypercholesteremia     Hypertension     Lung nodule 2014    Lymphedema     S/P right breast mastectomy    Metastatic bone cancer     MVP (mitral valve prolapse)     Osteoporosis, unspecified 2014    Psychiatric problem     Therapy     Thyroid disease     Hasimoto disease       Past Surgical History:   Procedure Laterality Date    BREAST LUMPECTOMY Right     X 2    BREAST RECONSTRUCTION  2013    X 2     SECTION      x2    COLONOSCOPY N/A 2016    Procedure: COLONOSCOPY;  Surgeon: Miguel Vu MD;  Location: [facility] ([address] [place] [address]);  Service: Endoscopy;  Laterality: N/A;  MRSA-at time of scheduling pending results on 2016    endometriosis      EYE SURGERY       RK bilateral     GANGLION CYST EXCISION      right index finger    HEMORRHOID SURGERY  1995    KNEE SURGERY Bilateral     ortho    MASTECTOMY  1/2013    Bilateral     TONSILLECTOMY  1962    TUBAL LIGATION         Review of patient's allergies indicates:   Allergen Reactions    Adhesive Rash     Tape, Paper tape is OK.    Codeine Itching     Itching very bad to upper body only.    Demerol [meperidine] Itching     To upper body only    Iodine and iodide containing products Anaphylaxis    Penicillins      Ulcers in mouth.    Arimidex [anastrozole] Hives    Aromasin [exemestane]     Clindamycin Itching and Rash       Medications:  Continuous Infusions:   hydromorphone in 0.9 % NaCl 6 mg/30 ml       Scheduled Meds:   cloNIDine  0.1 mg Oral TID    dexamethasone  6 mg Intravenous BID    enoxaparin  40 mg Subcutaneous Daily    gabapentin  300 mg Oral TID    levothyroxine  137 mcg Oral Before breakfast    pantoprazole  40 mg Oral Daily    senna-docusate 8.6-50 mg  1 tablet Oral BID    sertraline  50 mg Oral Daily     PRN Meds:butalbital-acetaminophen-caffeine -40 mg, Dextrose 10% Bolus, Dextrose 10% Bolus, glucagon (human recombinant), glucose, glucose, naloxone, ondansetron, sodium chloride 0.9%    Family History     Problem Relation (Age of Onset)    ADD / ADHD Daughter    Arthritis Brother    COPD Paternal Grandfather    Cancer Mother, Father, Paternal Grandmother (94)    Crohn's disease Son    Depression Sister, Brother, Sister, Sister    Heart disease Sister    Hypertension Mother    Melanoma Mother    Ovarian cancer Paternal Grandmother    Physical abuse Father    Stroke Mother        Tobacco Use    Smoking status: Never Smoker    Smokeless tobacco: Never Used   Substance and Sexual Activity    Alcohol use: No     Alcohol/week: 0.0 standard drinks    Drug use: No    Sexual activity: Not Currently     Birth control/protection: Post-menopausal       Review of Systems  Objective:      Vital Signs (Most Recent):  Temp: 98.2 °F (36.8 °C) (01/08/20 2110)  Pulse: 68 (01/08/20 2110)  Resp: 16 (01/08/20 2110)  BP: 133/64 (01/08/20 2110)  SpO2: 96 % (01/08/20 2110) Vital Signs (24h Range):  Temp:  [98 °F (36.7 °C)-98.5 °F (36.9 °C)] 98.2 °F (36.8 °C)  Pulse:  [65-79] 68  Resp:  [12-18] 16  SpO2:  [91 %-96 %] 96 %  BP: (107-133)/(56-68) 133/64     Weight: 79.4 kg (175 lb)  Body mass index is 30.04 kg/m².    Review of Symptoms  Symptom Assessment (ESAS 0-10 scale)   ESAS 0 1 2 3 4 5 6 7 8 9 10   Pain      x        Dyspnea  x            Anxiety  x            Nausea x             Depression  x             Anorexia     x         Fatigue      x        Insomnia x             Restlessness  x             Agitation x             CAM / Delirium _x_ --  ___+   Constipation     x__ --  ___+   Diarrhea           x__ --  ___+  Bowel Management Plan (BMP): Yes    Comments: has BM q 1-2 days at home    Pain Assessment: pain began as dull achy over the past 2 days she thought was gas; progressed to severe stabbing pain that radiated to right flank, right shoulder, and mid back and down to hips; movement, deep breaths, and coughing exacerbates and sitting still helps; increased oral pain meds at home but couldn;t keep it down; no cough or hemoptysis; worse pain she has had to date; BM yesterday    OME in 24 hours: 1/7: 270 1/8:480    ECOG Performance Status Grade: 2 - Ambulates, capable of self care only    Physical Exam  Constitutional: She is oriented to person, place, and time. She appears well-developed. She appears distressed and uncomfortable appearing  HENT:   Head: Normocephalic.   Eyes: Pupils are equal, round, and reactive to light. No scleral icterus.   Neck: No JVD present.   Cardiovascular: Normal rate and regular rhythm.   Pulmonary/Chest: Effort normal and breath sounds normal. She has no wheezes.   Abdominal: Soft. Bowel sounds are normal. Mild TTP RUQ and hepatomegaly  Musculoskeletal: She exhibits no  edema.   Neurological: She is alert and oriented to person, place, and time. She displays normal reflexes. No cranial nerve deficit or sensory deficit. Coordination normal.   Skin: Skin is warm and dry.   Psychiatric: She has a normal mood and affect.   Nursing note and vitals reviewed.   Significant Labs:   CBC:   Recent Labs   Lab 01/08/20  0513   WBC 6.41   HGB 10.8*   HCT 34.7*        CMP:   Recent Labs   Lab 01/07/20  0010 01/08/20  0513    141   K 4.4 3.5    107   CO2 23 28   * 87   BUN 13 16   CREATININE 0.8 0.9   CALCIUM 11.8* 11.0*   PROT 7.1 5.8*   ALBUMIN 3.4* 2.7*   BILITOT 0.9 0.7   ALKPHOS 486* 344*   AST 92* 63*   ALT 42 35   ANIONGAP 11 6*   EGFRNONAA >60.0 >60.0     Lactic acid: No results for input(s): LACTATE in the last 48 hours.  LFT:   Recent Labs   Lab 01/08/20  0513   AST 63*   ALKPHOS 344*   BILITOT 0.7     CBC:   Recent Labs   Lab 01/08/20 0513   WBC 6.41   HGB 10.8*   HCT 34.7*   MCV 94        BMP:  Recent Labs   Lab 01/08/20  0513   GLU 87      K 3.5      CO2 28   BUN 16   CREATININE 0.9   CALCIUM 11.0*   MG 1.8     LFT:  Lab Results   Component Value Date    AST 63 (H) 01/08/2020    ALKPHOS 344 (H) 01/08/2020    BILITOT 0.7 01/08/2020     Albumin:   Albumin   Date Value Ref Range Status   01/08/2020 2.7 (L) 3.5 - 5.2 g/dL Final     Protein:   Total Protein   Date Value Ref Range Status   01/08/2020 5.8 (L) 6.0 - 8.4 g/dL Final     Lactic acid:   Lab Results   Component Value Date    LACTATE 1.2 01/18/2014       Significant Imaging: I have reviewed all pertinent imaging results/findings within the past 24 hours.   CT  Chest noncontrast:  CT: Heterogeneous liver with multiple hypodense lesions and osseous metastatic disease in this patient with history of breast cancer.    Bone scan 12/19  There are more numerous foci of abnormal tracer uptake in keeping with progression of osseous metastases best observed in the posterior left ribs and  thoracolumbar spine.  New or more prominent uptake at approximately L3 may indicate a compression fracture has observed elsewhere on recent MRI.    MRI T/L spine 9/19  Severe T12 and mild L4 vertebral body pathologic compression fractures.  Severe left neural foraminal narrowing at T12-L1.  No significant spinal canal stenosis.    Diffuse heterogeneous enhancement of the visualized osseous structures consistent with patient's history of metastatic disease.    Advance Care Planning   Advanced Directives::  Living Will: Yes. Copy on chart: Yes  LaPOST: No  Do Not Resuscitate Status: No  Medical Power of : Yes. Agent's Name: Perfecto Cruz. Agent's Contact Number: [phone]  Secondary agent Becki Crain [phone]    Decision-Making Capacity: Patient answered questions       Living Arrangements: Lives with family    Psychosocial/Cultural:  Did not discuss due to severity of pt's symptoms today    Spiritual: did not discuss today          Kristian Carter MD  Palliative Medicine  [facility]

## 2020-01-09 NOTE — ASSESSMENT & PLAN NOTE
Ms Crain is a 63 yo woman with metastatic cancer of the right breast (completed 6 treatments Taxol) who presents with intractable pain in the setting of progressive lytic fractures and hypercalcemia of malignancy.   - Pain management assistance appreciated.   - On Hydromorphone PCA set at 0.5 mg q 6 minutes PRN bolusing    Plan:  - continue PCA  - Continue dexamethasone 6mg bid   - MRI spine once pain has improved.

## 2020-01-09 NOTE — PT/OT/SLP EVAL
Physical Therapy  Evaluation and Discharge    Rebecca Crain   [id number]    Time Tracking:     PT Received On: 01/09/20   PT Start Time: 1430   PT Stop Time: 1440   PT Total Time (min): 10 min    Billable Minutes: Evaluation 10      Recommendations:     Discharge recommendations: Home with family     Equipment recommendations: Pt requesting rollator due to increased fatigue with long distances.    Barriers to Discharge: None    Patient Information:     Recent Surgery: * No surgery found *     Diagnosis: Intractable pain    Length of Stay: 2 days    General Precautions: Standard, fall  Orthopedic Precautions: None    Assessment:     Rebecca Crain is a 64 y.o. female admitted to [facility] on 1/6/2020 for Intractable pain. Rebecca Crain tolerated evaluation well today. Pt found up ambulating with RW from restroom to bed independently. Able to ambulate ~200 ft with RW mod I with no complaints. Performed all bed mobility with independence. Pt left supine in bed with sister present. Pt asked about getting a rollator for ambulating long distances so she can sit when needed due to her decreased endurance at baseline. Discussed PT role, continued mobility and recommendations (Home with family) with patient and/or family; verbalized understanding. At this time, Rebecca Crain has no acute PT needs, will now d/c from acute PT services.    Problem List: decreased endurance and pain    Plan:     Discharge from acute PT services.    Plan of Care reviewed with: patient    Subjective:     Communicated with RN prior to evaluation, appropriate to see for evaluation.    Pt found up ambulating from bathroom to bed independently upon PT entry to room, agreeable to evaluation.    Does this patient have any cultural, spiritual, Church conflicts given the current situation? Patient has no barriers to learning. Patient verbalizes understanding of his/her program and goals and demonstrates them correctly. No cultural, spiritual, or  educational needs identified.    Past Medical History:   Diagnosis Date    Adjustment disorder with depressed mood 2017    Allergy     Anxiety     Asthma     seasonal    Blood transfusion         Breast cancer     stage IIB carcinoma of the right breast, ER positive with a low Oncotype score    Chemotherapy-induced neuropathy 2016    Depression     Diverticulosis     Falls frequently 2014    Hepatic cyst 2014    Hx of psychiatric care     Hypercholesteremia     Hypertension     Lung nodule 2014    Lymphedema     S/P right breast mastectomy    Metastatic bone cancer     MVP (mitral valve prolapse)     Osteoporosis, unspecified 2014    Psychiatric problem     Therapy     Thyroid disease     Hasimoto disease       Past Surgical History:   Procedure Laterality Date    BREAST LUMPECTOMY Right     X 2    BREAST RECONSTRUCTION  2013    X 2     SECTION      x2    COLONOSCOPY N/A 2016    Procedure: COLONOSCOPY;  Surgeon: Miguel Vu MD;  Location: [address]);  Service: Endoscopy;  Laterality: N/A;  MRSA-at time of scheduling pending results on 2016    endometriosis      EYE SURGERY      RK bilateral     GANGLION CYST EXCISION      right index finger    HEMORRHOID SURGERY      KNEE SURGERY Bilateral     ortho    MASTECTOMY  2013    Bilateral     TONSILLECTOMY      TUBAL LIGATION         Living Environment:  Pt lives with daughter and daughter's friend in [facility] with no JAKOB.     PLOF:  Prior to admission, patient ambulated with RW with decreased endurance.    DME:  Patient owns or has access to the following DME: Rolling Walker, Bedside Commode, Shower Chair and Wheelchair    Upon discharge, patient will have assistance from daughter.    Objective:     Patient found with: (none)    Pain:  Pain Rating 1: 5/10       Cognitive Exam:  Patient is oriented to Person, Place, Time and Situation.  Patient follows 100% of  single-step commands.    Sensation:   Intact    Lower Extremity Range of Motion:  Right Lower Extremity: WFL  Left Lower Extremity: WFL    Lower Extremity Strength:  Right Lower Extremity: WFL  Left Lower Extremity: WFL    Functional Mobility:    · Bed Mobility:  · Rolling to L: independent  · Sitting to Supine: independent    · Transfers:  · Sit to Stand: Stand from bed with independence  x 2 trial(s)  · Stand to Sit: Sit to bed with independence  x 2 trial(s)    · Gait:  · 200 feet with RW mod I with no complaints. Pt stated this was as far as she could go today.    · Assist level: Lauren  · Device: Rolling walker    · Balance:  · Static Sit: Independent at EOB    · Static Stand: Lauren with Rolling walker    Additional Therapeutic Activity/Exercises:     1. Discussed PT role, continued mobility, recommendations (Home with family), and plan for d/c from acute PT services with patient and/or family; verbalized understanding.      AM-PAC 6 CLICK MOBILITY  Turning over in bed (including adjusting bedclothes, sheets and blankets)?: 4  Sitting down on and standing up from a chair with arms (e.g., wheelchair, bedside commode, etc.): 4  Moving from lying on back to sitting on the side of the bed?: 4  Moving to and from a bed to a chair (including a wheelchair)?: 4  Need to walk in hospital room?: 4  Climbing 3-5 steps with a railing?: 4  Basic Mobility Total Score: 24    Patient was left supine in bed (HOB elevated) with all lines intact, call button in reach and sister present.    Clinical Decision Making for Evaluation Complexity:  1. Body System(s) Examination: 3  2. Clinical Presentation: Stable  3. Evaluation Complexity: Low    GOALS:   Multidisciplinary Problems     Physical Therapy Goals        Problem: Physical Therapy Goal    Goal Priority Disciplines Outcome Goal Variances Interventions   Physical Therapy Goal     PT, PT/OT      Description:  Pt has no acute PT needs, thus no goals created.                       Balbir Kurtz, PT  1/9/2020

## 2020-01-09 NOTE — CARE UPDATE
Rapid Response Respiratory Therapy ETCO2 Check     Time of visit: 1506     Code Status: Full Code   : 1955  Age: 64 y.o.  Weight:   Wt Readings from Last 1 Encounters:   20 79.4 kg (175 lb)     Sex: female  Race: White   Bed: 858/858 A:   MRN: [id number]    SITUATION     Evaluated patient for: ETCO2 Compliance     BACKGROUND     Patient has a past medical history of Adjustment disorder with depressed mood, Allergy, Anxiety, Asthma, Blood transfusion, Breast cancer, Chemotherapy-induced neuropathy, Depression, Diverticulosis, Falls frequently, Hepatic cyst, psychiatric care, Hypercholesteremia, Hypertension, Lung nodule , Lymphedema, Metastatic bone cancer, MVP (mitral valve prolapse), Osteoporosis, unspecified, Psychiatric problem, Therapy, and Thyroid disease.    ASSESSMENT     Is the ETCO2 monitor on? (Yes/No)  no   Is the patient wearing a cannula? (Yes/No) no  Are ETCO2 orders placed? (Yes/No) yes  Is the patient on a PCA pump? (Yes/No) no  ETCO2 monitored:not in use  O2 Device/Concentration: room air  Pulse: 60 Respiratory rate: 18 Temperature: Temp: 97.9 °F (36.6 °C) BP: BP: 113/71 SpO2: 96%  Level of Consciousness: Level of Consciousness (AVPU): alert  Respiratory Effort: Respiratory Effort: Normal, Unlabored  Expansion/Accessory Muscle Usage: Expansion/Accessory Muscles/Retractions: expansion symmetric, no retractions, no use of accessory muscles  All Lung Field Breath Sounds:    Most recent blood gas: No results for input(s): PH, PCO2, PO2, HCO3, POCSATURATED, BE in the last 72 hours.  Ambu at bedside: Ambu bag with the patient?: Yes, Adult Ambu    INTERVENTIONS/RECOMMENDATIONS     Pt is not on PCA pump at this time. Bedside nurse stated if they start back with PCA pump she will place the ETCO2 monitor on as well. I stressed the importance of wearing the monitor if pump is in use.    PHYSICIAN ESCALATION     Physician Escalation (Yes/No): no     Care discussed with: bedside  RN  Discussed plan of care primary RT, MILAN Appiah     FOLLOW-UP     Please call back the Rapid Response RT, Tiera Phillips, CRT at x [phone] for any questions or concerns.

## 2020-01-09 NOTE — PLAN OF CARE
Problem: Adult Inpatient Plan of Care  Goal: Plan of Care Review  Outcome: Ongoing, Progressing  Goal: Patient-Specific Goal (Individualization)  Outcome: Ongoing, Progressing  Goal: Absence of Hospital-Acquired Illness or Injury  Outcome: Ongoing, Progressing  Goal: Optimal Comfort and Wellbeing  Outcome: Ongoing, Progressing  Goal: Readiness for Transition of Care  Outcome: Ongoing, Progressing  Goal: Rounds/Family Conference  Outcome: Ongoing, Progressing     Problem: Coping Ineffective  Goal: Effective Coping  Outcome: Ongoing, Progressing     Problem: Infection  Goal: Infection Symptom Resolution  Outcome: Ongoing, Progressing     Problem: Fall Injury Risk  Goal: Absence of Fall and Fall-Related Injury  Outcome: Ongoing, Progressing

## 2020-01-09 NOTE — SUBJECTIVE & OBJECTIVE
Interval History:  Pain much improved today; continues to tolerate PCA well without any dose limiting side effects of nausea, somnolence, confusion, or myoclonus    Per pt her pain is to a 4/10 down from a 10/10 yesterday.  5/10 is tolerable.  Pain is now mostly limited to RUQ and occasional referral to right shoulder.      Continues tp have issues with PCA, alarms, frequent refills, etc    Imaging planned for today    Past Medical History:   Diagnosis Date    Adjustment disorder with depressed mood 2017    Allergy     Anxiety     Asthma     seasonal    Blood transfusion     1981    Breast cancer     stage IIB carcinoma of the right breast, ER positive with a low Oncotype score    Chemotherapy-induced neuropathy 2016    Depression     Diverticulosis     Falls frequently 2014    Hepatic cyst 2014    Hx of psychiatric care     Hypercholesteremia     Hypertension     Lung nodule 2014    Lymphedema     S/P right breast mastectomy    Metastatic bone cancer     MVP (mitral valve prolapse)     Osteoporosis, unspecified 2014    Psychiatric problem     Therapy     Thyroid disease     Hasimoto disease       Past Surgical History:   Procedure Laterality Date    BREAST LUMPECTOMY Right     X 2    BREAST RECONSTRUCTION  2013    X 2     SECTION      x2    COLONOSCOPY N/A 2016    Procedure: COLONOSCOPY;  Surgeon: Miguel Vu MD;  Location: [address]);  Service: Endoscopy;  Laterality: N/A;  MRSA-at time of scheduling pending results on 2016    endometriosis      EYE SURGERY      RK bilateral     GANGLION CYST EXCISION      right index finger    HEMORRHOID SURGERY      KNEE SURGERY Bilateral     ortho    MASTECTOMY  2013    Bilateral     TONSILLECTOMY      TUBAL LIGATION         Review of patient's allergies indicates:   Allergen Reactions    Adhesive Rash     Tape, Paper tape is OK.    Codeine Itching     Itching  very bad to upper body only.    Demerol [meperidine] Itching     To upper body only    Iodine and iodide containing products Anaphylaxis    Penicillins      Ulcers in mouth.    Arimidex [anastrozole] Hives    Aromasin [exemestane]     Clindamycin Itching and Rash       Medications:  Continuous Infusions:    Scheduled Meds:   cloNIDine  0.1 mg Oral TID    dexamethasone  6 mg Intravenous BID    enoxaparin  40 mg Subcutaneous Daily    gabapentin  300 mg Oral TID    levothyroxine  137 mcg Oral Before breakfast    morphine  200 mg Oral Q8H    pantoprazole  40 mg Oral Daily    senna-docusate 8.6-50 mg  1 tablet Oral BID    sertraline  50 mg Oral Daily     PRN Meds:butalbital-acetaminophen-caffeine -40 mg, Dextrose 10% Bolus, Dextrose 10% Bolus, glucagon (human recombinant), glucose, glucose, morphine, naloxone, ondansetron, sodium chloride 0.9%    Family History     Problem Relation (Age of Onset)    ADD / ADHD Daughter    Arthritis Brother    COPD Paternal Grandfather    Cancer Mother, Father, Paternal Grandmother (94)    Crohn's disease Son    Depression Sister, Brother, Sister, Sister    Heart disease Sister    Hypertension Mother    Melanoma Mother    Ovarian cancer Paternal Grandmother    Physical abuse Father    Stroke Mother        Tobacco Use    Smoking status: Never Smoker    Smokeless tobacco: Never Used   Substance and Sexual Activity    Alcohol use: No     Alcohol/week: 0.0 standard drinks    Drug use: No    Sexual activity: Not Currently     Birth control/protection: Post-menopausal       Review of Systems  Objective:     Vital Signs (Most Recent):  Temp: 97.9 °F (36.6 °C) (01/09/20 1224)  Pulse: (!) 57 (01/09/20 1224)  Resp: 17 (01/09/20 1224)  BP: 113/71 (01/09/20 1224)  SpO2: (!) 91 % (01/09/20 1224) Vital Signs (24h Range):  Temp:  [97.4 °F (36.3 °C)-98.4 °F (36.9 °C)] 97.9 °F (36.6 °C)  Pulse:  [56-73] 57  Resp:  [16-18] 17  SpO2:  [91 %-96 %] 91 %  BP: (113-134)/(58-84) 113/71      Weight: 79.4 kg (175 lb)  Body mass index is 30.04 kg/m².    Review of Symptoms  Symptom Assessment (ESAS 0-10 scale)   ESAS 0 1 2 3 4 5 6 7 8 9 10   Pain     x         Dyspnea  x            Anxiety  x            Nausea x             Depression  x             Anorexia     x         Fatigue      x        Insomnia x             Restlessness  x             Agitation x             CAM / Delirium _x_ --  ___+   Constipation     x__ --  ___+   Diarrhea           x__ --  ___+  Bowel Management Plan (BMP): Yes    Comments: has BM q 1-2 days at home    Pain Assessment: pain began as dull achy over the past 2 days she thought was gas; progressed to severe stabbing pain that radiated to right flank, right shoulder, and mid back and down to hips; movement, deep breaths, and coughing exacerbates and sitting still helps; increased oral pain meds at home but couldn;t keep it down; no cough or hemoptysis; worse pain she has had to date; BM yesterday    OME in 24 hours: 675      ECOG Performance Status Grade: 2 - Ambulates, capable of self care only    Physical Exam  Constitutional: She is oriented to person, place, and time. She appears well-developed. She appears comfortable   HENT:   Head: Normocephalic.   Eyes: Pupils are equal, round, and reactive to light. No scleral icterus.   Neck: No JVD present.   Cardiovascular: Normal rate and regular rhythm.   Pulmonary/Chest: Effort normal and breath sounds normal. She has no wheezes.   Abdominal: Soft. Bowel sounds are normal. Mild TTP RUQ and hepatomegaly  Musculoskeletal: She exhibits no edema.   Neurological: She is alert and oriented to person, place, and time. She displays normal reflexes. No cranial nerve deficit or sensory deficit. Coordination normal.   Skin: Skin is warm and dry.   Psychiatric: She has a normal mood and affect.   Nursing note and vitals reviewed.   Significant Labs:   CBC:   Recent Labs   Lab 01/08/20  0513 01/09/20  0441   WBC 6.41 7.44   HGB 10.8* 11.1*    HCT 34.7* 36.8*    197     CMP:   Recent Labs   Lab 01/08/20  0513 01/09/20  0441    137   K 3.5 4.1    102   CO2 28 29   GLU 87 149*   BUN 16 15   CREATININE 0.9 0.8   CALCIUM 11.0* 11.3*   PROT 5.8* 6.1   ALBUMIN 2.7* 2.7*   BILITOT 0.7 0.4   ALKPHOS 344* 397*   AST 63* 61*   ALT 35 39   ANIONGAP 6* 6*   EGFRNONAA >60.0 >60.0     Lactic acid: No results for input(s): LACTATE in the last 48 hours.  LFT:   Recent Labs   Lab 01/09/20  0441   AST 61*   ALKPHOS 397*   BILITOT 0.4     CBC:   Recent Labs   Lab 01/09/20  0441   WBC 7.44   HGB 11.1*   HCT 36.8*   MCV 94        BMP:  Recent Labs   Lab 01/09/20  0441   *      K 4.1      CO2 29   BUN 15   CREATININE 0.8   CALCIUM 11.3*   MG 1.8     LFT:  Lab Results   Component Value Date    AST 61 (H) 01/09/2020    ALKPHOS 397 (H) 01/09/2020    BILITOT 0.4 01/09/2020     Albumin:   Albumin   Date Value Ref Range Status   01/09/2020 2.7 (L) 3.5 - 5.2 g/dL Final     Protein:   Total Protein   Date Value Ref Range Status   01/09/2020 6.1 6.0 - 8.4 g/dL Final     Lactic acid:   Lab Results   Component Value Date    LACTATE 1.2 01/18/2014       Significant Imaging: I have reviewed all pertinent imaging results/findings within the past 24 hours.   CT  Chest noncontrast:  CT: Heterogeneous liver with multiple hypodense lesions and osseous metastatic disease in this patient with history of breast cancer.    Bone scan 12/19  There are more numerous foci of abnormal tracer uptake in keeping with progression of osseous metastases best observed in the posterior left ribs and thoracolumbar spine.  New or more prominent uptake at approximately L3 may indicate a compression fracture has observed elsewhere on recent MRI.    MRI T/L spine 9/19  Severe T12 and mild L4 vertebral body pathologic compression fractures.  Severe left neural foraminal narrowing at T12-L1.  No significant spinal canal stenosis.    Diffuse heterogeneous enhancement of  the visualized osseous structures consistent with patient's history of metastatic disease.    Advance Care Planning   Advanced Directives::  Living Will: Yes. Copy on chart: Yes  LaPOST: No  Do Not Resuscitate Status: No  Medical Power of : Yes. Agent's Name: Perfecto Cruz. Agent's Contact Number: [phone]  Secondary agent Becki Crain [phone]    Decision-Making Capacity: Patient answered questions       Living Arrangements: Lives with family    Psychosocial/Cultural:  Did not discuss due to severity of pt's symptoms today    Spiritual: did not discuss today

## 2020-01-09 NOTE — PROGRESS NOTES
[facility]  Hematology/Oncology  Progress Note    Patient Name: Rebecca Crain  Admission Date: 1/6/2020  Hospital Length of Stay: 2 days  Code Status: Full Code     Subjective:     HPI:  Ms Crain is a 63 yo woman with metastatic cancer of the right breast (completed 6 treatments Taxol) who presents with intractable pain.     The patient reports the pain is worst on there right side of her ribs. The pain radiates to her right shoulder. She originally thought was gas pain. Today the pain started traveling up to her shoulder, neck and to her spine. She does have known fractures on the left side. Pain is described as a constant stabbing.     She see's Dr. Schroeder outpatient. Most recently on taxol, completed total 6 treatments with imrpovement of cancer antigen 216 to 182. Currently taking MS Contin 90 mg every 8 hr and Dilaudid 4 mg every 4 hr on an as-needed basis, but this was not controlling her pain. Plans are to stop taxol and start therapy with eribulin.     CT: Heterogeneous liver with multiple hypodense lesions and osseous metastatic disease in this patient with history of breast cancer.      ECOG-1-2     Breast history: In 2013 she was diagnosed with stage IIB carcinoma of the right breast, ER positive with a low Oncotype score.  She was enrolled on protocol  and randomized to endocrine therapy alone.  She was tried on all 3 aromatase inhibitors and had itching and rash to all of them.  In August 2013 she was started on tamoxifen.   She was found to have  bone metastasis in May 2015, 2015.  A subsequent bone biopsy was performed on a lesion at the T8 vertebra.   The pathology from that was consistent with metastatic breast cancer, ER +80%, GA +80%, and HER-2 negative.      She received letrozole plus palbociclib from July 2015 until May 2016.  She also received bone protective therapy with Xgeva.      Faslodex was started in June 2016.      Exemestane and Afinitor started in August  2017.  That was discontinued in February 2018 due to progression in the liver and bone.     Navelbine was started February 16, 2018.  She had 8 cycles for that.  Scans in November 2018 showed progression as follows     Capecitabine was started in December 2018.  She received 5 cycles of that therapy.     Follow-up scans in April 2019 showed progression with a new hepatic lesion and worsening bone disease.     She began clinical trial therapy with erdafitinib on May 8, 2019.  That was discontinued in September 2019 due to progression in the bone.    Interval History: NAEON.     Oncology Treatment Plan:   OP BREAST ERIBULIN Q3W    Medications:  Continuous Infusions:   hydromorphone in 0.9 % NaCl 6 mg/30 ml       Scheduled Meds:   cloNIDine  0.1 mg Oral TID    dexamethasone  6 mg Intravenous BID    enoxaparin  40 mg Subcutaneous Daily    gabapentin  300 mg Oral TID    levothyroxine  137 mcg Oral Before breakfast    pantoprazole  40 mg Oral Daily    senna-docusate 8.6-50 mg  1 tablet Oral BID    sertraline  50 mg Oral Daily     PRN Meds:butalbital-acetaminophen-caffeine -40 mg, Dextrose 10% Bolus, Dextrose 10% Bolus, glucagon (human recombinant), glucose, glucose, naloxone, ondansetron, sodium chloride 0.9%     Review of Systems   Constitutional: Positive for activity change and appetite change.   HENT: Negative for congestion.    Eyes: Negative for visual disturbance.   Respiratory: Negative for apnea and chest tightness.    Cardiovascular: Negative for chest pain.   Gastrointestinal: Negative for abdominal distention.   Endocrine: Negative for cold intolerance and heat intolerance.   Genitourinary: Negative for difficulty urinating.   Musculoskeletal: Positive for arthralgias, back pain and neck pain.   Skin: Negative for color change.   Neurological: Negative for dizziness.   Hematological: Negative for adenopathy.   Psychiatric/Behavioral: Negative for agitation.     Objective:     Vital Signs (Most  Recent):  Temp: 98.5 °F (36.9 °C) (01/08/20 1130)  Pulse: 77 (01/08/20 1130)  Resp: 18 (01/08/20 1130)  BP: (!) 117/58 (01/08/20 1130)  SpO2: (!) 94 % (01/08/20 1130) Vital Signs (24h Range):  Temp:  [96.2 °F (35.7 °C)-98.5 °F (36.9 °C)] 98.5 °F (36.9 °C)  Pulse:  [65-79] 77  Resp:  [12-18] 18  SpO2:  [91 %-96 %] 94 %  BP: (107-127)/(56-68) 117/58     Weight: 79.4 kg (175 lb)  Body mass index is 30.04 kg/m².  Body surface area is 1.89 meters squared.      Intake/Output Summary (Last 24 hours) at 1/8/2020 1416  Last data filed at 1/8/2020 0523  Gross per 24 hour   Intake 510 ml   Output --   Net 510 ml       Physical Exam   Constitutional: She is oriented to person, place, and time. She appears well-developed and well-nourished.   HENT:   Head: Normocephalic and atraumatic.   Eyes: No scleral icterus.   Neck: Normal range of motion.   Cardiovascular: Normal rate and regular rhythm.   Pulmonary/Chest: Effort normal. No respiratory distress.   Abdominal: Soft. Bowel sounds are normal.   Musculoskeletal: Normal range of motion.   Neurological: She is alert and oriented to person, place, and time.   Skin: Skin is warm.   Psychiatric: She has a normal mood and affect.       Significant Labs:   CBC:   Recent Labs   Lab 01/08/20  0513   WBC 6.41   HGB 10.8*   HCT 34.7*       and CMP:   Recent Labs   Lab 01/07/20  0010 01/08/20  0513    141   K 4.4 3.5    107   CO2 23 28   * 87   BUN 13 16   CREATININE 0.8 0.9   CALCIUM 11.8* 11.0*   PROT 7.1 5.8*   ALBUMIN 3.4* 2.7*   BILITOT 0.9 0.7   ALKPHOS 486* 344*   AST 92* 63*   ALT 42 35   ANIONGAP 11 6*   EGFRNONAA >60.0 >60.0       Diagnostic Results:  I have reviewed all pertinent imaging results/findings within the past 24 hours.    Assessment/Plan:     * Intractable pain  Ms Crain is a 65 yo woman with metastatic cancer of the right breast (completed 6 treatments Taxol) who presents with intractable pain in the setting of progressive lytic fractures  and hypercalcemia of malignancy.   - Pain management assistance appreciated.   - On Hydromorphone PCA set at 0.5 mg q 6 minutes PRN bolusing    Plan:  - continue PCA  - Continue dexamethasone 6mg bid   - MRI spine once pain has improved.       Adjustment disorder with depressed mood  Continue home meds, sertraline    Chemotherapy-induced neuropathy  Continue gabapentin     Benign essential HTN  Continue home clonidine      Ty Fischer MD  Hematology/Oncology  [facility]      I have reviewed the notes, assessments, and/or procedures performed by the housestaff, as above.  I have personally interviewed and examined the patient at the beside, and rounded with the housestaff. I concur with her/his assessment and plan and the documentation of Rebecca Crain.  I, Dr. Nestor Hgih, personally spent more than  35 mins during this encounter, greater than 50% was spent in direct counseling and/or coordination of care.     Nestor High M.D., M.S., F.A.C.P.  Hematology/Oncology Attending  [facility]

## 2020-01-09 NOTE — PLAN OF CARE
Problem: Adult Inpatient Plan of Care  Goal: Plan of Care Review  1/9/2020 0618 by Stoney Paredes RN  Outcome: Ongoing, Progressing  1/9/2020 0558 by Stoney Paredes RN  Outcome: Ongoing, Progressing  Goal: Patient-Specific Goal (Individualization)  1/9/2020 0618 by Stoney Paredes RN  Outcome: Ongoing, Progressing  1/9/2020 0558 by Stoney Paredes RN  Outcome: Ongoing, Progressing  Goal: Absence of Hospital-Acquired Illness or Injury  1/9/2020 0618 by Stoney Paredes RN  Outcome: Ongoing, Progressing  1/9/2020 0558 by Stoney Paredes RN  Outcome: Ongoing, Progressing  Goal: Optimal Comfort and Wellbeing  1/9/2020 0618 by Stoney Paredes RN  Outcome: Ongoing, Progressing  1/9/2020 0558 by Stoney Paredes RN  Outcome: Ongoing, Progressing  Goal: Readiness for Transition of Care  1/9/2020 0618 by Stoney Paredes RN  Outcome: Ongoing, Progressing  1/9/2020 0558 by Stoney Paredes RN  Outcome: Ongoing, Progressing  Goal: Rounds/Family Conference  1/9/2020 0618 by Stoney Paredes RN  Outcome: Ongoing, Progressing  1/9/2020 0558 by Stoney Paredes RN  Outcome: Ongoing, Progressing     Problem: Coping Ineffective  Goal: Effective Coping  1/9/2020 0618 by Stoney Paredes RN  Outcome: Ongoing, Progressing  1/9/2020 0558 by Stoney Paredes RN  Outcome: Ongoing, Progressing     Problem: Infection  Goal: Infection Symptom Resolution  1/9/2020 0618 by Stoney Paredes RN  Outcome: Ongoing, Progressing  1/9/2020 0558 by Stoney Paredes RN  Outcome: Ongoing, Progressing     Problem: Fall Injury Risk  Goal: Absence of Fall and Fall-Related Injury  1/9/2020 0618 by Stoney Paredes RN  Outcome: Ongoing, Progressing  1/9/2020 0558 by Stoney Paredes RN  Outcome: Ongoing, Progressing

## 2020-01-09 NOTE — ASSESSMENT & PLAN NOTE
Ms Crain is a 63 yo woman with metastatic cancer of the right breast (completed 6 treatments Taxol) who presents with intractable pain in the setting of progressive lytic fractures and hypercalcemia of malignancy.   - Pain management assistance appreciated.   - Previously on Hydromorphone PCA, now transitioned to oral medications    Plan:  - Ms Contin 200mg tid, MSIR 60mg q3prn   - Continue dexamethasone 6mg bid for 5 more days   - MRI spine, abd/pelv (pt has an iodine contrast allergy)

## 2020-01-09 NOTE — PLAN OF CARE
Rebecca Crain is a 64 y.o. female admitted to [facility] on 1/6/2020 for Intractable pain. Rebecca Crain tolerated evaluation well today. Pt found up ambulating with RW from restroom to bed independently. Able to ambulate ~200 ft with RW mod I with no complaints. Performed all bed mobility with independence. Pt left supine in bed with sister present. Pt asked about getting a rollator for ambulating long distances so she can sit when needed due to her decreased endurance at baseline. Discussed PT role, continued mobility and recommendations (Home with family) with patient and/or family; verbalized understanding. At this time, Rebecca Crain has no acute PT needs, will now d/c from acute PT services.    Problem: Physical Therapy Goal  Goal: Physical Therapy Goal  Description  Pt has no acute PT needs, thus no goals created.     Outcome: Met    Balbir Kurtz, PT  1/9/2020

## 2020-01-09 NOTE — ASSESSMENT & PLAN NOTE
Ms Crain is a 63 yo woman with metastatic cancer of the right breast (s/p several rounds of treatment who presents with intractable pain in the setting of progressive lytic fractures, h/o multiple vertebral compression fxs, mets to liver with significant bulky disease, and hypercalcemia of malignancy.     Reviewed meds and notes.      Recs:  -for acute on chronic pain:              -reviewed MAR and interrogated pump: required 45 mg IV dilaudid via PCA bolusing and basal rate 0.5 mg q 6 minutes prn and 0.5mg/hr over 24 hour time period   -would rotate to oral meds based on 24 hour  OME.  Start MS Contin 200 mg tid and MS IR 60 mg q 3 hours prn. (previous home OME was 270 mg)              -dex for potential worsening of pain due form bony mets vs capsular pain/tumor necrosis/subcapsular hematoma vs vertebral compression fracture               -imaging planned for today    -pending diagnosis and treatment plan     Will continue to closely follow. Discussed with onc team and pharmacist.  Family agrees to plan     Kristian Carter MD  Palliative Medicine  [phone]

## 2020-01-09 NOTE — ASSESSMENT & PLAN NOTE
Ms Crain is a 63 yo woman with metastatic cancer of the right breast (s/p several rounds of treatment who presents with intractable pain in the setting of progressive lytic fractures, h/o multiple vertebral compression fxs, mets to liver with significant bulky disease, and hypercalcemia of malignancy.     Reviewed meds and notes.      Recs:  -for acute on chronic pain:              -reviewed MAR: using 32 mg IV dilaudid via PCA bolusing and basal rate 0.5 mg q 6 minutes prn and 0.5mg/hr              -dex added for potential worsening of pain due form bony mets vs capsular pain/tumor necrosis/subcapsular hematoma vs vertebral compression fracture               -may need additional imaging of abd/pelv to eval liver and r/o thoracolumbar compression fracture    -pending diagnosis and treatment plan   -would leave PCA for now and continue to determine needs; current  mg (270 mg at home)     Will continue to closely follow.      Kristian Carter MD  Palliative Medicine  [phone]

## 2020-01-09 NOTE — PROGRESS NOTES
[facility]  Palliative Medicine  Progress Note    Patient Name: Rebecca Crain  MRN: [id number]  Admission Date: 1/6/2020  Hospital Length of Stay: 2 days  Code Status: Full Code   Attending Provider: Nestor High MD  Consulting Provider: Kristian Carter MD  Primary Care Physician: Colleen Valero MD  Principal Problem:Intractable pain        Assessment/Plan:     Palliative care encounter  Ms Crain is a 63 yo woman with metastatic cancer of the right breast (s/p several rounds of treatment who presents with intractable pain in the setting of progressive lytic fractures, h/o multiple vertebral compression fxs, mets to liver with significant bulky disease, and hypercalcemia of malignancy.     Reviewed meds and notes.      Recs:  -for acute on chronic pain:              -reviewed MAR and interrogated pump: required 45 mg IV dilaudid via PCA bolusing and basal rate 0.5 mg q 6 minutes prn and 0.5mg/hr over 24 hour time period   -would rotate to oral meds based on 24 hour  OME.  Start MS Contin 200 mg tid and MS IR 60 mg q 3 hours prn. (previous home OME was 270 mg)              -dex for potential worsening of pain due form bony mets vs capsular pain/tumor necrosis/subcapsular hematoma vs vertebral compression fracture               -imaging planned for today    -pending diagnosis and treatment plan     Will continue to closely follow. Discussed with onc team and pharmacist.  Family agrees to plan     Kristian Carter MD  Palliative Medicine  [phone]        I will follow-up with patient. Please contact us if you have any additional questions.    Subjective:     Chief Complaint:   Chief Complaint   Patient presents with    Generalized Pain     Pt to ER via EMS c/o generalized chronic body pain without relief of morphine at home. Hx of bone cancer. Last chemo 2 weeks ago. She has multiple rib fractures.        HPI:   HPI: Ms Crain is a 63 yo woman with metastatic cancer of the right breast  (completed 6 treatments Taxol) who presents with intractable pain.      The patient reports the pain is worst on there right side of her ribs. The pain radiates to her right shoulder. She originally thought was gas pain. Today the pain started traveling up to her shoulder, neck and to her spine. She does have known fractures on the left side. Pain is described as a constant stabbing.      She see's Dr. Schroeder outpatient. Most recently on taxol, completed total 6 treatments with imrpovement of cancer antigen 216 to 182. Currently taking MS Contin 90 mg every 8 hr and Dilaudid 4 mg every 4 hr on an as-needed basis, but this was not controlling her pain. Plans are to stop taxol and start therapy with eribulin.      CT: Heterogeneous liver with multiple hypodense lesions and osseous metastatic disease in this patient with history of breast cancer.        ECOG-1-2     Breast history: In 2013 she was diagnosed with stage IIB carcinoma of the right breast, ER positive with a low Oncotype score.  She was enrolled on protocol  and randomized to endocrine therapy alone.  She was tried on all 3 aromatase inhibitors and had itching and rash to all of them.  In August 2013 she was started on tamoxifen.   She was found to have  bone metastasis in May 2015, 2015.  A subsequent bone biopsy was performed on a lesion at the T8 vertebra.   The pathology from that was consistent with metastatic breast cancer, ER +80%, WA +80%, and HER-2 negative.      She received letrozole plus palbociclib from July 2015 until May 2016.  She also received bone protective therapy with Xgeva.      Faslodex was started in June 2016.      Exemestane and Afinitor started in August 2017.  That was discontinued in February 2018 due to progression in the liver and bone.     Navelbine was started February 16, 2018.  She had 8 cycles for that.  Scans in November 2018 showed progression as follows     Capecitabine was started in December 2018.  She received 5  cycles of that therapy.     Follow-up scans in 2019 showed progression with a new hepatic lesion and worsening bone disease.     She began clinical trial therapy with erdafitinib on May 8, 2019.  That was discontinued in 2019 due to progression in the bone.      Oncology Treatment Plan:   OP BREAST ERIBULIN Q3W    In this setting, palliative medicine was consulted to help with symptom mgmt and intractable pain.       Hospital Course:  No notes on file    Interval History:  Pain much improved today; continues to tolerate PCA well without any dose limiting side effects of nausea, somnolence, confusion, or myoclonus    Per pt her pain is to a 4/10 down from a 10/10 yesterday.  5/10 is tolerable.  Pain is now mostly limited to RUQ and occasional referral to right shoulder.      Continues tp have issues with PCA, alarms, frequent refills, etc    Imaging planned for today    Past Medical History:   Diagnosis Date    Adjustment disorder with depressed mood 2017    Allergy     Anxiety     Asthma     seasonal    Blood transfusion     1981    Breast cancer     stage IIB carcinoma of the right breast, ER positive with a low Oncotype score    Chemotherapy-induced neuropathy 2016    Depression     Diverticulosis     Falls frequently 2014    Hepatic cyst 2014    Hx of psychiatric care     Hypercholesteremia     Hypertension     Lung nodule 2014    Lymphedema     S/P right breast mastectomy    Metastatic bone cancer     MVP (mitral valve prolapse)     Osteoporosis, unspecified 2014    Psychiatric problem     Therapy     Thyroid disease     Hasimoto disease       Past Surgical History:   Procedure Laterality Date    BREAST LUMPECTOMY Right     X 2    BREAST RECONSTRUCTION  2013    X 2     SECTION      x2    COLONOSCOPY N/A 2016    Procedure: COLONOSCOPY;  Surgeon: Miguel Vu MD;  Location: [facility] ([address] [place] [address]);  Service: Endoscopy;   Laterality: N/A;  MRSA-at time of scheduling pending results on 5/6/2016    endometriosis      EYE SURGERY      RK bilateral     GANGLION CYST EXCISION      right index finger    HEMORRHOID SURGERY  1995    KNEE SURGERY Bilateral     ortho    MASTECTOMY  1/2013    Bilateral     TONSILLECTOMY  1962    TUBAL LIGATION         Review of patient's allergies indicates:   Allergen Reactions    Adhesive Rash     Tape, Paper tape is OK.    Codeine Itching     Itching very bad to upper body only.    Demerol [meperidine] Itching     To upper body only    Iodine and iodide containing products Anaphylaxis    Penicillins      Ulcers in mouth.    Arimidex [anastrozole] Hives    Aromasin [exemestane]     Clindamycin Itching and Rash       Medications:  Continuous Infusions:    Scheduled Meds:   cloNIDine  0.1 mg Oral TID    dexamethasone  6 mg Intravenous BID    enoxaparin  40 mg Subcutaneous Daily    gabapentin  300 mg Oral TID    levothyroxine  137 mcg Oral Before breakfast    morphine  200 mg Oral Q8H    pantoprazole  40 mg Oral Daily    senna-docusate 8.6-50 mg  1 tablet Oral BID    sertraline  50 mg Oral Daily     PRN Meds:butalbital-acetaminophen-caffeine -40 mg, Dextrose 10% Bolus, Dextrose 10% Bolus, glucagon (human recombinant), glucose, glucose, morphine, naloxone, ondansetron, sodium chloride 0.9%    Family History     Problem Relation (Age of Onset)    ADD / ADHD Daughter    Arthritis Brother    COPD Paternal Grandfather    Cancer Mother, Father, Paternal Grandmother (94)    Crohn's disease Son    Depression Sister, Brother, Sister, Sister    Heart disease Sister    Hypertension Mother    Melanoma Mother    Ovarian cancer Paternal Grandmother    Physical abuse Father    Stroke Mother        Tobacco Use    Smoking status: Never Smoker    Smokeless tobacco: Never Used   Substance and Sexual Activity    Alcohol use: No     Alcohol/week: 0.0 standard drinks    Drug use: No    Sexual  activity: Not Currently     Birth control/protection: Post-menopausal       Review of Systems  Objective:     Vital Signs (Most Recent):  Temp: 97.9 °F (36.6 °C) (01/09/20 1224)  Pulse: (!) 57 (01/09/20 1224)  Resp: 17 (01/09/20 1224)  BP: 113/71 (01/09/20 1224)  SpO2: (!) 91 % (01/09/20 1224) Vital Signs (24h Range):  Temp:  [97.4 °F (36.3 °C)-98.4 °F (36.9 °C)] 97.9 °F (36.6 °C)  Pulse:  [56-73] 57  Resp:  [16-18] 17  SpO2:  [91 %-96 %] 91 %  BP: (113-134)/(58-84) 113/71     Weight: 79.4 kg (175 lb)  Body mass index is 30.04 kg/m².    Review of Symptoms  Symptom Assessment (ESAS 0-10 scale)   ESAS 0 1 2 3 4 5 6 7 8 9 10   Pain     x         Dyspnea  x            Anxiety  x            Nausea x             Depression  x             Anorexia     x         Fatigue      x        Insomnia x             Restlessness  x             Agitation x             CAM / Delirium _x_ --  ___+   Constipation     x__ --  ___+   Diarrhea           x__ --  ___+  Bowel Management Plan (BMP): Yes    Comments: has BM q 1-2 days at home    Pain Assessment: pain began as dull achy over the past 2 days she thought was gas; progressed to severe stabbing pain that radiated to right flank, right shoulder, and mid back and down to hips; movement, deep breaths, and coughing exacerbates and sitting still helps; increased oral pain meds at home but couldn;t keep it down; no cough or hemoptysis; worse pain she has had to date; BM yesterday    OME in 24 hours: 675      ECOG Performance Status Grade: 2 - Ambulates, capable of self care only    Physical Exam  Constitutional: She is oriented to person, place, and time. She appears well-developed. She appears comfortable   HENT:   Head: Normocephalic.   Eyes: Pupils are equal, round, and reactive to light. No scleral icterus.   Neck: No JVD present.   Cardiovascular: Normal rate and regular rhythm.   Pulmonary/Chest: Effort normal and breath sounds normal. She has no wheezes.   Abdominal: Soft. Bowel  sounds are normal. Mild TTP RUQ and hepatomegaly  Musculoskeletal: She exhibits no edema.   Neurological: She is alert and oriented to person, place, and time. She displays normal reflexes. No cranial nerve deficit or sensory deficit. Coordination normal.   Skin: Skin is warm and dry.   Psychiatric: She has a normal mood and affect.   Nursing note and vitals reviewed.   Significant Labs:   CBC:   Recent Labs   Lab 01/08/20  0513 01/09/20  0441   WBC 6.41 7.44   HGB 10.8* 11.1*   HCT 34.7* 36.8*    197     CMP:   Recent Labs   Lab 01/08/20  0513 01/09/20  0441    137   K 3.5 4.1    102   CO2 28 29   GLU 87 149*   BUN 16 15   CREATININE 0.9 0.8   CALCIUM 11.0* 11.3*   PROT 5.8* 6.1   ALBUMIN 2.7* 2.7*   BILITOT 0.7 0.4   ALKPHOS 344* 397*   AST 63* 61*   ALT 35 39   ANIONGAP 6* 6*   EGFRNONAA >60.0 >60.0     Lactic acid: No results for input(s): LACTATE in the last 48 hours.  LFT:   Recent Labs   Lab 01/09/20  0441   AST 61*   ALKPHOS 397*   BILITOT 0.4     CBC:   Recent Labs   Lab 01/09/20  0441   WBC 7.44   HGB 11.1*   HCT 36.8*   MCV 94        BMP:  Recent Labs   Lab 01/09/20  0441   *      K 4.1      CO2 29   BUN 15   CREATININE 0.8   CALCIUM 11.3*   MG 1.8     LFT:  Lab Results   Component Value Date    AST 61 (H) 01/09/2020    ALKPHOS 397 (H) 01/09/2020    BILITOT 0.4 01/09/2020     Albumin:   Albumin   Date Value Ref Range Status   01/09/2020 2.7 (L) 3.5 - 5.2 g/dL Final     Protein:   Total Protein   Date Value Ref Range Status   01/09/2020 6.1 6.0 - 8.4 g/dL Final     Lactic acid:   Lab Results   Component Value Date    LACTATE 1.2 01/18/2014       Significant Imaging: I have reviewed all pertinent imaging results/findings within the past 24 hours.   CT  Chest noncontrast:  CT: Heterogeneous liver with multiple hypodense lesions and osseous metastatic disease in this patient with history of breast cancer.    Bone scan 12/19  There are more numerous foci of abnormal  tracer uptake in keeping with progression of osseous metastases best observed in the posterior left ribs and thoracolumbar spine.  New or more prominent uptake at approximately L3 may indicate a compression fracture has observed elsewhere on recent MRI.    MRI T/L spine 9/19  Severe T12 and mild L4 vertebral body pathologic compression fractures.  Severe left neural foraminal narrowing at T12-L1.  No significant spinal canal stenosis.    Diffuse heterogeneous enhancement of the visualized osseous structures consistent with patient's history of metastatic disease.    Advance Care Planning   Advanced Directives::  Living Will: Yes. Copy on chart: Yes  LaPOST: No  Do Not Resuscitate Status: No  Medical Power of : Yes. Agent's Name: Perfecto Cruz. Agent's Contact Number: [phone]  Secondary agent Becki Crain [phone]    Decision-Making Capacity: Patient answered questions       Living Arrangements: Lives with family    Psychosocial/Cultural:  Did not discuss due to severity of pt's symptoms today    Spiritual: did not discuss today        Kristian Carter MD  Palliative Medicine  [facility]

## 2020-01-09 NOTE — SUBJECTIVE & OBJECTIVE
Interval History:  Pain much improved today; tolerating PCA well without any dose limiting side effects of nausea, somnolence, confusion, or myoclonus    Per pt her pain is to a 5/10 down from a 10/10 yesterday.  5/10 is tolerable.  Pain is now mostly limited to RUQ and occasional referral to right shoulder.      Past Medical History:   Diagnosis Date    Adjustment disorder with depressed mood 2017    Allergy     Anxiety     Asthma     seasonal    Blood transfusion     1981    Breast cancer     stage IIB carcinoma of the right breast, ER positive with a low Oncotype score    Chemotherapy-induced neuropathy 2016    Depression     Diverticulosis     Falls frequently 2014    Hepatic cyst 2014    Hx of psychiatric care     Hypercholesteremia     Hypertension     Lung nodule 2014    Lymphedema     S/P right breast mastectomy    Metastatic bone cancer     MVP (mitral valve prolapse)     Osteoporosis, unspecified 2014    Psychiatric problem     Therapy     Thyroid disease     Hasimoto disease       Past Surgical History:   Procedure Laterality Date    BREAST LUMPECTOMY Right     X 2    BREAST RECONSTRUCTION  2013    X 2     SECTION      x2    COLONOSCOPY N/A 2016    Procedure: COLONOSCOPY;  Surgeon: Miguel Vu MD;  Location: [address];  Service: Endoscopy;  Laterality: N/A;  MRSA-at time of scheduling pending results on 2016    endometriosis      EYE SURGERY      RK bilateral     GANGLION CYST EXCISION      right index finger    HEMORRHOID SURGERY      KNEE SURGERY Bilateral     ortho    MASTECTOMY  2013    Bilateral     TONSILLECTOMY      TUBAL LIGATION         Review of patient's allergies indicates:   Allergen Reactions    Adhesive Rash     Tape, Paper tape is OK.    Codeine Itching     Itching very bad to upper body only.    Demerol [meperidine] Itching     To upper body only    Iodine and iodide  containing products Anaphylaxis    Penicillins      Ulcers in mouth.    Arimidex [anastrozole] Hives    Aromasin [exemestane]     Clindamycin Itching and Rash       Medications:  Continuous Infusions:   hydromorphone in 0.9 % NaCl 6 mg/30 ml       Scheduled Meds:   cloNIDine  0.1 mg Oral TID    dexamethasone  6 mg Intravenous BID    enoxaparin  40 mg Subcutaneous Daily    gabapentin  300 mg Oral TID    levothyroxine  137 mcg Oral Before breakfast    pantoprazole  40 mg Oral Daily    senna-docusate 8.6-50 mg  1 tablet Oral BID    sertraline  50 mg Oral Daily     PRN Meds:butalbital-acetaminophen-caffeine -40 mg, Dextrose 10% Bolus, Dextrose 10% Bolus, glucagon (human recombinant), glucose, glucose, naloxone, ondansetron, sodium chloride 0.9%    Family History     Problem Relation (Age of Onset)    ADD / ADHD Daughter    Arthritis Brother    COPD Paternal Grandfather    Cancer Mother, Father, Paternal Grandmother (94)    Crohn's disease Son    Depression Sister, Brother, Sister, Sister    Heart disease Sister    Hypertension Mother    Melanoma Mother    Ovarian cancer Paternal Grandmother    Physical abuse Father    Stroke Mother        Tobacco Use    Smoking status: Never Smoker    Smokeless tobacco: Never Used   Substance and Sexual Activity    Alcohol use: No     Alcohol/week: 0.0 standard drinks    Drug use: No    Sexual activity: Not Currently     Birth control/protection: Post-menopausal       Review of Systems  Objective:     Vital Signs (Most Recent):  Temp: 98.2 °F (36.8 °C) (01/08/20 2110)  Pulse: 68 (01/08/20 2110)  Resp: 16 (01/08/20 2110)  BP: 133/64 (01/08/20 2110)  SpO2: 96 % (01/08/20 2110) Vital Signs (24h Range):  Temp:  [98 °F (36.7 °C)-98.5 °F (36.9 °C)] 98.2 °F (36.8 °C)  Pulse:  [65-79] 68  Resp:  [12-18] 16  SpO2:  [91 %-96 %] 96 %  BP: (107-133)/(56-68) 133/64     Weight: 79.4 kg (175 lb)  Body mass index is 30.04 kg/m².    Review of Symptoms  Symptom Assessment (ESAS  0-10 scale)   ESAS 0 1 2 3 4 5 6 7 8 9 10   Pain      x        Dyspnea  x            Anxiety  x            Nausea x             Depression  x             Anorexia     x         Fatigue      x        Insomnia x             Restlessness  x             Agitation x             CAM / Delirium _x_ --  ___+   Constipation     x__ --  ___+   Diarrhea           x__ --  ___+  Bowel Management Plan (BMP): Yes    Comments: has BM q 1-2 days at home    Pain Assessment: pain began as dull achy over the past 2 days she thought was gas; progressed to severe stabbing pain that radiated to right flank, right shoulder, and mid back and down to hips; movement, deep breaths, and coughing exacerbates and sitting still helps; increased oral pain meds at home but couldn;t keep it down; no cough or hemoptysis; worse pain she has had to date; BM yesterday    OME in 24 hours: 270    ECOG Performance Status Grade: 2 - Ambulates, capable of self care only    Physical Exam  Constitutional: She is oriented to person, place, and time. She appears well-developed. She appears distressed and uncomfortable appearing  HENT:   Head: Normocephalic.   Eyes: Pupils are equal, round, and reactive to light. No scleral icterus.   Neck: No JVD present.   Cardiovascular: Normal rate and regular rhythm.   Pulmonary/Chest: Effort normal and breath sounds normal. She has no wheezes.   Abdominal: Soft. Bowel sounds are normal. Mild TTP RUQ and hepatomegaly  Musculoskeletal: She exhibits no edema.   Neurological: She is alert and oriented to person, place, and time. She displays normal reflexes. No cranial nerve deficit or sensory deficit. Coordination normal.   Skin: Skin is warm and dry.   Psychiatric: She has a normal mood and affect.   Nursing note and vitals reviewed.   Significant Labs:   CBC:   Recent Labs   Lab 01/08/20  0513   WBC 6.41   HGB 10.8*   HCT 34.7*        CMP:   Recent Labs   Lab 01/07/20  0010 01/08/20  0513    141   K 4.4 3.5   CL  103 107   CO2 23 28   * 87   BUN 13 16   CREATININE 0.8 0.9   CALCIUM 11.8* 11.0*   PROT 7.1 5.8*   ALBUMIN 3.4* 2.7*   BILITOT 0.9 0.7   ALKPHOS 486* 344*   AST 92* 63*   ALT 42 35   ANIONGAP 11 6*   EGFRNONAA >60.0 >60.0     Lactic acid: No results for input(s): LACTATE in the last 48 hours.  LFT:   Recent Labs   Lab 01/08/20 0513   AST 63*   ALKPHOS 344*   BILITOT 0.7     CBC:   Recent Labs   Lab 01/08/20 0513   WBC 6.41   HGB 10.8*   HCT 34.7*   MCV 94        BMP:  Recent Labs   Lab 01/08/20 0513   GLU 87      K 3.5      CO2 28   BUN 16   CREATININE 0.9   CALCIUM 11.0*   MG 1.8     LFT:  Lab Results   Component Value Date    AST 63 (H) 01/08/2020    ALKPHOS 344 (H) 01/08/2020    BILITOT 0.7 01/08/2020     Albumin:   Albumin   Date Value Ref Range Status   01/08/2020 2.7 (L) 3.5 - 5.2 g/dL Final     Protein:   Total Protein   Date Value Ref Range Status   01/08/2020 5.8 (L) 6.0 - 8.4 g/dL Final     Lactic acid:   Lab Results   Component Value Date    LACTATE 1.2 01/18/2014       Significant Imaging: I have reviewed all pertinent imaging results/findings within the past 24 hours.   CT  Chest noncontrast:  CT: Heterogeneous liver with multiple hypodense lesions and osseous metastatic disease in this patient with history of breast cancer.    Bone scan 12/19  There are more numerous foci of abnormal tracer uptake in keeping with progression of osseous metastases best observed in the posterior left ribs and thoracolumbar spine.  New or more prominent uptake at approximately L3 may indicate a compression fracture has observed elsewhere on recent MRI.    MRI T/L spine 9/19  Severe T12 and mild L4 vertebral body pathologic compression fractures.  Severe left neural foraminal narrowing at T12-L1.  No significant spinal canal stenosis.    Diffuse heterogeneous enhancement of the visualized osseous structures consistent with patient's history of metastatic disease.    Advance Care Planning    Advanced Directives::  Living Will: Yes. Copy on chart: Yes  LaPOST: No  Do Not Resuscitate Status: No  Medical Power of : Yes. Agent's Name: Perfecto Cruz. Agent's Contact Number: [phone]  Secondary agent Becki Crain [phone]    Decision-Making Capacity: Patient answered questions       Living Arrangements: Lives with family    Psychosocial/Cultural:  Did not discuss due to severity of pt's symptoms today    Spiritual: did not discuss today

## 2020-01-09 NOTE — NURSING
Patient transferred by jazzmine jackson rn. On transfer patient's dilaudid PCA dosage was not correct. Patient was transferred with 2mg lockout dosage entered in the PCA pump. Dosage adjusted and per nurse to 5.5mg lockout as ordered. Spoke to charge nurse in regards doing a safety report for medication. Patient has mets to the bone and also a possible fracture. Patient in extreme pain. Per note patient has developed a tolerance to opiods

## 2020-01-09 NOTE — ASSESSMENT & PLAN NOTE
Ms Crain is a 63 yo woman with metastatic cancer of the right breast (completed 6 treatments Taxol) who presents with intractable pain in the setting of progressive lytic fractures and hypercalcemia of malignancy.     Assessment:  1. Intractable pain, due to lytic lesions  2. Hypercalcemia  3. Metastatic cancer of the right breast    Plan:  - On Hydromorphone PCA   - Palliative care following, appreciate assistance with pain control  - Senna doc

## 2020-01-10 LAB
ALBUMIN SERPL BCP-MCNC: 2.8 G/DL (ref 3.5–5.2)
ALP SERPL-CCNC: 388 U/L (ref 55–135)
ALT SERPL W/O P-5'-P-CCNC: 38 U/L (ref 10–44)
ANION GAP SERPL CALC-SCNC: 7 MMOL/L (ref 8–16)
AST SERPL-CCNC: 51 U/L (ref 10–40)
BASOPHILS # BLD AUTO: 0.01 K/UL (ref 0–0.2)
BASOPHILS NFR BLD: 0.1 % (ref 0–1.9)
BILIRUB SERPL-MCNC: 0.4 MG/DL (ref 0.1–1)
BUN SERPL-MCNC: 21 MG/DL (ref 8–23)
CALCIUM SERPL-MCNC: 11.5 MG/DL (ref 8.7–10.5)
CHLORIDE SERPL-SCNC: 105 MMOL/L (ref 95–110)
CO2 SERPL-SCNC: 29 MMOL/L (ref 23–29)
CREAT SERPL-MCNC: 0.7 MG/DL (ref 0.5–1.4)
DIFFERENTIAL METHOD: ABNORMAL
EOSINOPHIL # BLD AUTO: 0 K/UL (ref 0–0.5)
EOSINOPHIL NFR BLD: 0 % (ref 0–8)
ERYTHROCYTE [DISTWIDTH] IN BLOOD BY AUTOMATED COUNT: 15 % (ref 11.5–14.5)
EST. GFR  (AFRICAN AMERICAN): >60 ML/MIN/1.73 M^2
EST. GFR  (NON AFRICAN AMERICAN): >60 ML/MIN/1.73 M^2
GLUCOSE SERPL-MCNC: 128 MG/DL (ref 70–110)
HCT VFR BLD AUTO: 35.9 % (ref 37–48.5)
HGB BLD-MCNC: 11.2 G/DL (ref 12–16)
IMM GRANULOCYTES # BLD AUTO: 0.03 K/UL (ref 0–0.04)
IMM GRANULOCYTES NFR BLD AUTO: 0.4 % (ref 0–0.5)
LYMPHOCYTES # BLD AUTO: 1.1 K/UL (ref 1–4.8)
LYMPHOCYTES NFR BLD: 13.3 % (ref 18–48)
MAGNESIUM SERPL-MCNC: 1.8 MG/DL (ref 1.6–2.6)
MCH RBC QN AUTO: 29 PG (ref 27–31)
MCHC RBC AUTO-ENTMCNC: 31.2 G/DL (ref 32–36)
MCV RBC AUTO: 93 FL (ref 82–98)
MONOCYTES # BLD AUTO: 0.6 K/UL (ref 0.3–1)
MONOCYTES NFR BLD: 6.7 % (ref 4–15)
NEUTROPHILS # BLD AUTO: 6.6 K/UL (ref 1.8–7.7)
NEUTROPHILS NFR BLD: 79.5 % (ref 38–73)
NRBC BLD-RTO: 0 /100 WBC
PHOSPHATE SERPL-MCNC: 3.6 MG/DL (ref 2.7–4.5)
PLATELET # BLD AUTO: 204 K/UL (ref 150–350)
PMV BLD AUTO: 10.2 FL (ref 9.2–12.9)
POTASSIUM SERPL-SCNC: 3.8 MMOL/L (ref 3.5–5.1)
PROT SERPL-MCNC: 6.1 G/DL (ref 6–8.4)
RBC # BLD AUTO: 3.86 M/UL (ref 4–5.4)
SODIUM SERPL-SCNC: 141 MMOL/L (ref 136–145)
WBC # BLD AUTO: 8.32 K/UL (ref 3.9–12.7)

## 2020-01-10 PROCEDURE — 84100 ASSAY OF PHOSPHORUS: CPT | Mod: HCNC

## 2020-01-10 PROCEDURE — 25000003 PHARM REV CODE 250: Mod: HCNC | Performed by: EMERGENCY MEDICINE

## 2020-01-10 PROCEDURE — 93005 ELECTROCARDIOGRAM TRACING: CPT | Mod: HCNC

## 2020-01-10 PROCEDURE — 93010 ELECTROCARDIOGRAM REPORT: CPT | Mod: HCNC,,, | Performed by: INTERNAL MEDICINE

## 2020-01-10 PROCEDURE — 99232 SBSQ HOSP IP/OBS MODERATE 35: CPT | Mod: HCNC,GC,, | Performed by: INTERNAL MEDICINE

## 2020-01-10 PROCEDURE — 25000003 PHARM REV CODE 250: Mod: HCNC | Performed by: STUDENT IN AN ORGANIZED HEALTH CARE EDUCATION/TRAINING PROGRAM

## 2020-01-10 PROCEDURE — 99232 SBSQ HOSP IP/OBS MODERATE 35: CPT | Mod: HCNC,,, | Performed by: EMERGENCY MEDICINE

## 2020-01-10 PROCEDURE — 85025 COMPLETE CBC W/AUTO DIFF WBC: CPT | Mod: HCNC

## 2020-01-10 PROCEDURE — 99232 PR SUBSEQUENT HOSPITAL CARE,LEVL II: ICD-10-PCS | Mod: HCNC,,, | Performed by: EMERGENCY MEDICINE

## 2020-01-10 PROCEDURE — 80053 COMPREHEN METABOLIC PANEL: CPT | Mod: HCNC

## 2020-01-10 PROCEDURE — 93010 EKG 12-LEAD: ICD-10-PCS | Mod: HCNC,,, | Performed by: INTERNAL MEDICINE

## 2020-01-10 PROCEDURE — 63600175 PHARM REV CODE 636 W HCPCS: Mod: HCNC | Performed by: STUDENT IN AN ORGANIZED HEALTH CARE EDUCATION/TRAINING PROGRAM

## 2020-01-10 PROCEDURE — 99232 PR SUBSEQUENT HOSPITAL CARE,LEVL II: ICD-10-PCS | Mod: HCNC,GC,, | Performed by: INTERNAL MEDICINE

## 2020-01-10 PROCEDURE — 20600001 HC STEP DOWN PRIVATE ROOM: Mod: HCNC

## 2020-01-10 PROCEDURE — 83735 ASSAY OF MAGNESIUM: CPT | Mod: HCNC

## 2020-01-10 RX ORDER — AMOXICILLIN 250 MG
2 CAPSULE ORAL 2 TIMES DAILY
Status: DISCONTINUED | OUTPATIENT
Start: 2020-01-10 | End: 2020-01-12 | Stop reason: HOSPADM

## 2020-01-10 RX ORDER — SODIUM CHLORIDE 9 MG/ML
INJECTION, SOLUTION INTRAVENOUS CONTINUOUS
Status: DISCONTINUED | OUTPATIENT
Start: 2020-01-10 | End: 2020-01-11

## 2020-01-10 RX ADMIN — SENNOSIDES AND DOCUSATE SODIUM 1 TABLET: 8.6; 5 TABLET ORAL at 09:01

## 2020-01-10 RX ADMIN — PANTOPRAZOLE SODIUM 40 MG: 40 TABLET, DELAYED RELEASE ORAL at 09:01

## 2020-01-10 RX ADMIN — MORPHINE SULFATE 60 MG: 15 TABLET ORAL at 09:01

## 2020-01-10 RX ADMIN — GABAPENTIN 300 MG: 300 CAPSULE ORAL at 09:01

## 2020-01-10 RX ADMIN — CLONIDINE HYDROCHLORIDE 0.1 MG: 0.1 TABLET ORAL at 09:01

## 2020-01-10 RX ADMIN — ENOXAPARIN SODIUM 40 MG: 100 INJECTION SUBCUTANEOUS at 05:01

## 2020-01-10 RX ADMIN — MORPHINE SULFATE 200 MG: 100 TABLET, FILM COATED, EXTENDED RELEASE ORAL at 03:01

## 2020-01-10 RX ADMIN — LEVOTHYROXINE SODIUM 137 MCG: 25 TABLET ORAL at 07:01

## 2020-01-10 RX ADMIN — MORPHINE SULFATE 200 MG: 100 TABLET, FILM COATED, EXTENDED RELEASE ORAL at 07:01

## 2020-01-10 RX ADMIN — MORPHINE SULFATE 60 MG: 15 TABLET ORAL at 12:01

## 2020-01-10 RX ADMIN — BUTALBITAL, ACETAMINOPHEN AND CAFFEINE 1 TABLET: 50; 325; 40 TABLET ORAL at 09:01

## 2020-01-10 RX ADMIN — DEXAMETHASONE SODIUM PHOSPHATE 6 MG: 4 INJECTION, SOLUTION INTRAMUSCULAR; INTRAVENOUS at 09:01

## 2020-01-10 RX ADMIN — SENNOSIDES AND DOCUSATE SODIUM 2 TABLET: 8.6; 5 TABLET ORAL at 09:01

## 2020-01-10 RX ADMIN — ZOLEDRONIC ACID 4 MG: 4 INJECTION, SOLUTION, CONCENTRATE INTRAVENOUS at 12:01

## 2020-01-10 RX ADMIN — GABAPENTIN 300 MG: 300 CAPSULE ORAL at 02:01

## 2020-01-10 RX ADMIN — MORPHINE SULFATE 60 MG: 15 TABLET ORAL at 02:01

## 2020-01-10 RX ADMIN — SODIUM CHLORIDE: 0.9 INJECTION, SOLUTION INTRAVENOUS at 09:01

## 2020-01-10 RX ADMIN — MORPHINE SULFATE 200 MG: 100 TABLET, FILM COATED, EXTENDED RELEASE ORAL at 11:01

## 2020-01-10 RX ADMIN — CLONIDINE HYDROCHLORIDE 0.1 MG: 0.1 TABLET ORAL at 02:01

## 2020-01-10 RX ADMIN — SERTRALINE HYDROCHLORIDE 50 MG: 50 TABLET ORAL at 09:01

## 2020-01-10 NOTE — PROGRESS NOTES
[facility]  Hematology/Oncology  Progress Note    Patient Name: Rebecca Crain  Admission Date: 1/6/2020  Hospital Length of Stay: 3 days  Code Status: Full Code     Subjective:     HPI:  Ms Crain is a 65 yo woman with metastatic cancer of the right breast (completed 6 treatments Taxol) who presents with intractable pain.     The patient reports the pain is worst on there right side of her ribs. The pain radiates to her right shoulder. She originally thought was gas pain. Today the pain started traveling up to her shoulder, neck and to her spine. She does have known fractures on the left side. Pain is described as a constant stabbing.     She see's Dr. Schroeder outpatient. Most recently on taxol, completed total 6 treatments with imrpovement of cancer antigen 216 to 182. Currently taking MS Contin 90 mg every 8 hr and Dilaudid 4 mg every 4 hr on an as-needed basis, but this was not controlling her pain. Plans are to stop taxol and start therapy with eribulin.     CT: Heterogeneous liver with multiple hypodense lesions and osseous metastatic disease in this patient with history of breast cancer.      ECOG-1-2     Breast history: In 2013 she was diagnosed with stage IIB carcinoma of the right breast, ER positive with a low Oncotype score.  She was enrolled on protocol  and randomized to endocrine therapy alone.  She was tried on all 3 aromatase inhibitors and had itching and rash to all of them.  In August 2013 she was started on tamoxifen.   She was found to have  bone metastasis in May 2015, 2015.  A subsequent bone biopsy was performed on a lesion at the T8 vertebra.   The pathology from that was consistent with metastatic breast cancer, ER +80%, MO +80%, and HER-2 negative.      She received letrozole plus palbociclib from July 2015 until May 2016.  She also received bone protective therapy with Xgeva.      Faslodex was started in June 2016.      Exemestane and Afinitor started in August  2017.  That was discontinued in February 2018 due to progression in the liver and bone.     Navelbine was started February 16, 2018.  She had 8 cycles for that.  Scans in November 2018 showed progression as follows     Capecitabine was started in December 2018.  She received 5 cycles of that therapy.     Follow-up scans in April 2019 showed progression with a new hepatic lesion and worsening bone disease.     She began clinical trial therapy with erdafitinib on May 8, 2019.  That was discontinued in September 2019 due to progression in the bone.    Interval History: YOSHION. Transitioned off PCA to oral medications. Pt reports current pain medication regimen has been working well for her.     Oncology Treatment Plan:   OP BREAST ERIBULIN Q3W    Medications:  Continuous Infusions:   sodium chloride 0.9% 200 mL/hr at 01/10/20 0952     Scheduled Meds:   cloNIDine  0.1 mg Oral TID    [START ON 1/11/2020] dexAMETHasone  6 mg Oral Daily    enoxaparin  40 mg Subcutaneous Daily    gabapentin  300 mg Oral TID    levothyroxine  137 mcg Oral Before breakfast    morphine  200 mg Oral Q8H    pantoprazole  40 mg Oral Daily    senna-docusate 8.6-50 mg  1 tablet Oral BID    sertraline  50 mg Oral Daily    zoledronic acid (ZOMETA) IVPB  4 mg Intravenous Once     PRN Meds:butalbital-acetaminophen-caffeine -40 mg, Dextrose 10% Bolus, Dextrose 10% Bolus, glucagon (human recombinant), glucose, glucose, morphine, naloxone, ondansetron, sodium chloride 0.9%     Review of Systems   Constitutional: Positive for activity change and appetite change.   HENT: Negative for congestion.    Eyes: Negative for visual disturbance.   Respiratory: Negative for apnea and chest tightness.    Cardiovascular: Negative for chest pain.   Gastrointestinal: Positive for abdominal pain. Negative for abdominal distention.   Endocrine: Negative for cold intolerance and heat intolerance.   Genitourinary: Negative for difficulty urinating.    Musculoskeletal: Positive for back pain and neck pain.   Skin: Negative for color change.   Neurological: Negative for dizziness.   Hematological: Negative for adenopathy.   Psychiatric/Behavioral: Negative for agitation.     Objective:     Vital Signs (Most Recent):  Temp: 96.3 °F (35.7 °C) (01/10/20 0722)  Pulse: (!) 58 (01/10/20 0834)  Resp: 15 (01/10/20 0722)  BP: 121/66 (01/10/20 0944)  SpO2: 96 % (01/10/20 0722) Vital Signs (24h Range):  Temp:  [96.3 °F (35.7 °C)-98.2 °F (36.8 °C)] 96.3 °F (35.7 °C)  Pulse:  [52-60] 58  Resp:  [15-19] 15  SpO2:  [90 %-96 %] 96 %  BP: (111-125)/(56-71) 121/66     Weight: 79.4 kg (175 lb)  Body mass index is 30.04 kg/m².  Body surface area is 1.89 meters squared.    No intake or output data in the 24 hours ending 01/10/20 1037    Physical Exam   Constitutional: She is oriented to person, place, and time. She appears well-developed and well-nourished.   HENT:   Head: Normocephalic and atraumatic.   Eyes: No scleral icterus.   Neck: Normal range of motion.   Cardiovascular: Normal rate and regular rhythm.   Pulmonary/Chest: Effort normal. No respiratory distress.   Abdominal: Soft. Bowel sounds are normal.   Tenderness to palpation RUQ    Musculoskeletal: Normal range of motion.   Neurological: She is alert and oriented to person, place, and time.   Skin: Skin is warm.   Psychiatric: She has a normal mood and affect.       Significant Labs:   All pertinent labs from the last 24 hours have been reviewed.    Diagnostic Results:  I have reviewed and interpreted all pertinent imaging results/findings within the past 24 hours.    Assessment/Plan:     * Intractable pain  Ms Crain is a 63 yo woman with metastatic cancer of the right breast (completed 6 treatments Taxol) who presents with intractable pain in the setting of progressive lytic fractures and hypercalcemia of malignancy.   - Pain management assistance appreciated.   - Previously on Hydromorphone PCA, now transitioned to  oral medications    Plan:  - Ms Contin 200mg tid, MSIR 60mg q3prn   - Continue dexamethasone 6mg bid for 5 more days   - MRI spine, abd/pelv (pt has an iodine contrast allergy)      Hypercalcemia of malignancy  Corrected Ca 12.5 on 1/10  Plan:  - IVF  - Zometa 4mg x1 dose    Adjustment disorder with depressed mood  Continue home meds, sertraline    Chemotherapy-induced neuropathy  Continue gabapentin    Benign essential HTN  Continue home clonidine             Ty Fischer MD  Hematology/Oncology  [facility]

## 2020-01-10 NOTE — PLAN OF CARE
SABRINA sent a message to Dr. Schroeder's clinic scheduler (and cc'd Dr. Schroeder) regarding patient follow-up and chemo. CM awaiting answer.    Palak Hale, RN, BSN, CM [facility]

## 2020-01-10 NOTE — PROGRESS NOTES
[facility]  Palliative Medicine  Progress Note    Patient Name: Rebecca Crain  MRN: [id number]  Admission Date: 1/6/2020  Hospital Length of Stay: 3 days  Code Status: Full Code   Attending Provider: Rodrigo Schroeder MD  Consulting Provider: Kristian Carter MD  Primary Care Physician: Colleen Valero MD  Principal Problem:Intractable pain    .      Assessment/Plan:     Palliative care encounter  Ms Crain is a 65 yo woman with metastatic cancer of the right breast (s/p several rounds of treatment who presents with intractable pain in the setting of progressive lytic fractures, h/o multiple vertebral compression fxs, mets to liver with significant bulky disease, and hypercalcemia of malignancy.     Reviewed meds and notes.      Recs:  -for acute on chronic pain:              -continue MSCONTIN 200 mg tid and MSIR 60 q 4 hours prn              -cont dex               -imaging planned for today    -pending diagnosis and treatment plan     Will follow up on Monday if still here. Discussed with onc team and pharmacist.  Family agrees to plan.    Would be good to follow up in palliative care clinic    Please call if needed sooner     Kristian Carter MD  Palliative Medicine  [phone]        I will follow-up with patient. Please contact us if you have any additional questions.    Subjective:     Chief Complaint:   Chief Complaint   Patient presents with    Generalized Pain     Pt to ER via EMS c/o generalized chronic body pain without relief of morphine at home. Hx of bone cancer. Last chemo 2 weeks ago. She has multiple rib fractures.        HPI:   HPI: Ms Crain is a 65 yo woman with metastatic cancer of the right breast (completed 6 treatments Taxol) who presents with intractable pain.      The patient reports the pain is worst on there right side of her ribs. The pain radiates to her right shoulder. She originally thought was gas pain. Today the pain started traveling up to her shoulder, neck and  to her spine. She does have known fractures on the left side. Pain is described as a constant stabbing.      She see's Dr. Schroeder outpatient. Most recently on taxol, completed total 6 treatments with imrpovement of cancer antigen 216 to 182. Currently taking MS Contin 90 mg every 8 hr and Dilaudid 4 mg every 4 hr on an as-needed basis, but this was not controlling her pain. Plans are to stop taxol and start therapy with eribulin.      CT: Heterogeneous liver with multiple hypodense lesions and osseous metastatic disease in this patient with history of breast cancer.        ECOG-1-2     Breast history: In 2013 she was diagnosed with stage IIB carcinoma of the right breast, ER positive with a low Oncotype score.  She was enrolled on protocol  and randomized to endocrine therapy alone.  She was tried on all 3 aromatase inhibitors and had itching and rash to all of them.  In August 2013 she was started on tamoxifen.   She was found to have  bone metastasis in May 2015, 2015.  A subsequent bone biopsy was performed on a lesion at the T8 vertebra.   The pathology from that was consistent with metastatic breast cancer, ER +80%, CA +80%, and HER-2 negative.      She received letrozole plus palbociclib from July 2015 until May 2016.  She also received bone protective therapy with Xgeva.      Faslodex was started in June 2016.      Exemestane and Afinitor started in August 2017.  That was discontinued in February 2018 due to progression in the liver and bone.     Navelbine was started February 16, 2018.  She had 8 cycles for that.  Scans in November 2018 showed progression as follows     Capecitabine was started in December 2018.  She received 5 cycles of that therapy.     Follow-up scans in April 2019 showed progression with a new hepatic lesion and worsening bone disease.     She began clinical trial therapy with erdafitinib on May 8, 2019.  That was discontinued in September 2019 due to progression in the bone.       Oncology Treatment Plan:   OP BREAST ERIBULIN Q3W    In this setting, palliative medicine was consulted to help with symptom mgmt and intractable pain.       Hospital Course:  No notes on file    Interval History:  Pain much improved today; tolerating oral meds well. Pain well controlled at 3/10/   used 2 prn meds in last 24 hours.  Last BM 2 days ago. + Flatus      Imaging planned for today due to inability to perform yesterday    Past Medical History:   Diagnosis Date    Adjustment disorder with depressed mood 2017    Allergy     Anxiety     Asthma     seasonal    Blood transfusion     1981    Breast cancer     stage IIB carcinoma of the right breast, ER positive with a low Oncotype score    Chemotherapy-induced neuropathy 2016    Depression     Diverticulosis     Falls frequently 2014    Hepatic cyst 2014    Hx of psychiatric care     Hypercholesteremia     Hypertension     Lung nodule 2014    Lymphedema     S/P right breast mastectomy    Metastatic bone cancer     MVP (mitral valve prolapse)     Osteoporosis, unspecified 2014    Psychiatric problem     Therapy     Thyroid disease     Hasimoto disease       Past Surgical History:   Procedure Laterality Date    BREAST LUMPECTOMY Right     X 2    BREAST RECONSTRUCTION  2013    X 2     SECTION      x2    COLONOSCOPY N/A 2016    Procedure: COLONOSCOPY;  Surgeon: Miguel Vu MD;  Location: [facility] ([address] [place] [address]);  Service: Endoscopy;  Laterality: N/A;  MRSA-at time of scheduling pending results on 2016    endometriosis      EYE SURGERY      RK bilateral     GANGLION CYST EXCISION      right index finger    HEMORRHOID SURGERY      KNEE SURGERY Bilateral     ortho    MASTECTOMY  2013    Bilateral     TONSILLECTOMY      TUBAL LIGATION         Review of patient's allergies indicates:   Allergen Reactions    Adhesive Rash     Tape, Paper tape is OK.    Codeine  Itching     Itching very bad to upper body only.    Demerol [meperidine] Itching     To upper body only    Iodine and iodide containing products Anaphylaxis    Penicillins      Ulcers in mouth.    Arimidex [anastrozole] Hives    Aromasin [exemestane]     Clindamycin Itching and Rash       Medications:  Continuous Infusions:   sodium chloride 0.9% 200 mL/hr at 01/10/20 0952     Scheduled Meds:   cloNIDine  0.1 mg Oral TID    [START ON 1/11/2020] dexAMETHasone  6 mg Oral Daily    enoxaparin  40 mg Subcutaneous Daily    gabapentin  300 mg Oral TID    levothyroxine  137 mcg Oral Before breakfast    morphine  200 mg Oral Q8H    pantoprazole  40 mg Oral Daily    senna-docusate 8.6-50 mg  2 tablet Oral BID    sertraline  50 mg Oral Daily     PRN Meds:butalbital-acetaminophen-caffeine -40 mg, Dextrose 10% Bolus, Dextrose 10% Bolus, glucagon (human recombinant), glucose, glucose, morphine, naloxone, ondansetron, sodium chloride 0.9%    Family History     Problem Relation (Age of Onset)    ADD / ADHD Daughter    Arthritis Brother    COPD Paternal Grandfather    Cancer Mother, Father, Paternal Grandmother (94)    Crohn's disease Son    Depression Sister, Brother, Sister, Sister    Heart disease Sister    Hypertension Mother    Melanoma Mother    Ovarian cancer Paternal Grandmother    Physical abuse Father    Stroke Mother        Tobacco Use    Smoking status: Never Smoker    Smokeless tobacco: Never Used   Substance and Sexual Activity    Alcohol use: No     Alcohol/week: 0.0 standard drinks    Drug use: No    Sexual activity: Not Currently     Birth control/protection: Post-menopausal       Review of Systems  Objective:     Vital Signs (Most Recent):  Temp: 97.9 °F (36.6 °C) (01/10/20 1236)  Pulse: 66 (01/10/20 1236)  Resp: 17 (01/10/20 1236)  BP: 118/68 (01/10/20 1450)  SpO2: (!) 94 % (01/10/20 1236) Vital Signs (24h Range):  Temp:  [96.3 °F (35.7 °C)-98.2 °F (36.8 °C)] 97.9 °F (36.6 °C)  Pulse:   [52-66] 66  Resp:  [15-19] 17  SpO2:  [90 %-96 %] 94 %  BP: (111-125)/(56-68) 118/68     Weight: 79.4 kg (175 lb)  Body mass index is 30.04 kg/m².    Review of Symptoms  Symptom Assessment (ESAS 0-10 scale)   ESAS 0 1 2 3 4 5 6 7 8 9 10   Pain    x          Dyspnea  x            Anxiety  x            Nausea x             Depression  x             Anorexia   x           Fatigue      x        Insomnia x             Restlessness  x             Agitation x             CAM / Delirium _x_ --  ___+   Constipation     x__ --  ___+   Diarrhea           x__ --  ___+  Bowel Management Plan (BMP): Yes    Comments: has BM q 1-2 days at home    Pain Assessment: pain began as dull achy over the past 2 days she thought was gas; progressed to severe stabbing pain that radiated to right flank, right shoulder, and mid back and down to hips; movement, deep breaths, and coughing exacerbates and sitting still helps; increased oral pain meds at home but couldn;t keep it down; no cough or hemoptysis; worse pain she has had to date; BM yesterday    OME in 24 hours: 720      ECOG Performance Status Grade: 2 - Ambulates, capable of self care only    Physical Exam  Constitutional: She is oriented to person, place, and time. She appears well-developed. She appears comfortable   HENT:   Head: Normocephalic.   Eyes: Pupils are equal, round, and reactive to light. No scleral icterus.   Neck: No JVD present.   Cardiovascular: Normal rate and regular rhythm.   Pulmonary/Chest: Effort normal and breath sounds normal. She has no wheezes.   Abdominal: Soft. Bowel sounds are normal. Mild TTP RUQ and hepatomegaly  Musculoskeletal: She exhibits no edema.   Neurological: She is alert and oriented to person, place, and time. She displays normal reflexes. No cranial nerve deficit or sensory deficit. Coordination normal.   Skin: Skin is warm and dry.   Psychiatric: She has a normal mood and affect.   Nursing note and vitals reviewed.   Significant Labs:    CBC:   Recent Labs   Lab 01/09/20  0441 01/10/20  0405   WBC 7.44 8.32   HGB 11.1* 11.2*   HCT 36.8* 35.9*    204     CMP:   Recent Labs   Lab 01/09/20  0441 01/10/20  0405    141   K 4.1 3.8    105   CO2 29 29   * 128*   BUN 15 21   CREATININE 0.8 0.7   CALCIUM 11.3* 11.5*   PROT 6.1 6.1   ALBUMIN 2.7* 2.8*   BILITOT 0.4 0.4   ALKPHOS 397* 388*   AST 61* 51*   ALT 39 38   ANIONGAP 6* 7*   EGFRNONAA >60.0 >60.0     Lactic acid: No results for input(s): LACTATE in the last 48 hours.  LFT:   Recent Labs   Lab 01/10/20  0405   AST 51*   ALKPHOS 388*   BILITOT 0.4     CBC:   Recent Labs   Lab 01/10/20  0405   WBC 8.32   HGB 11.2*   HCT 35.9*   MCV 93        BMP:  Recent Labs   Lab 01/10/20  0405   *      K 3.8      CO2 29   BUN 21   CREATININE 0.7   CALCIUM 11.5*   MG 1.8     LFT:  Lab Results   Component Value Date    AST 51 (H) 01/10/2020    ALKPHOS 388 (H) 01/10/2020    BILITOT 0.4 01/10/2020     Albumin:   Albumin   Date Value Ref Range Status   01/10/2020 2.8 (L) 3.5 - 5.2 g/dL Final     Protein:   Total Protein   Date Value Ref Range Status   01/10/2020 6.1 6.0 - 8.4 g/dL Final     Lactic acid:   Lab Results   Component Value Date    LACTATE 1.2 01/18/2014       Significant Imaging: I have reviewed all pertinent imaging results/findings within the past 24 hours.   CT  Chest noncontrast:  CT: Heterogeneous liver with multiple hypodense lesions and osseous metastatic disease in this patient with history of breast cancer.    Bone scan 12/19  There are more numerous foci of abnormal tracer uptake in keeping with progression of osseous metastases best observed in the posterior left ribs and thoracolumbar spine.  New or more prominent uptake at approximately L3 may indicate a compression fracture has observed elsewhere on recent MRI.    MRI T/L spine 9/19  Severe T12 and mild L4 vertebral body pathologic compression fractures.  Severe left neural foraminal narrowing  at T12-L1.  No significant spinal canal stenosis.    Diffuse heterogeneous enhancement of the visualized osseous structures consistent with patient's history of metastatic disease.    Advance Care Planning   Advanced Directives::  Living Will: Yes. Copy on chart: Yes  LaPOST: No  Do Not Resuscitate Status: No  Medical Power of : Yes. Agent's Name: Perfecto Cruz. Agent's Contact Number: [phone]  Secondary agent Becki Crain [phone]    Decision-Making Capacity: Patient answered questions       Living Arrangements: Lives with family    Psychosocial/Cultural:  Did not discuss due to severity of pt's symptoms today    Spiritual: did not discuss today        Kristian Carter MD  Palliative Medicine  [facility]

## 2020-01-10 NOTE — PLAN OF CARE
POC reviewed with patient; understanding verbalized. Pt complained of abdominal pain this shift, PRN meds administered. Pt had no other complaints. Called MRI to see when pt would be seen, stated that pt wouldn't be seen until closer to 10 am Pt. with nonskid footwear on, bed in lowest position, and locked with bed rails up x2. Pt. has call light and personal items within reach. Patient ambulates in room independently, walker is at bedside. VSS and afebrile this shift. All questions and concerns addressed at this time. Will continue to monitor.

## 2020-01-10 NOTE — SUBJECTIVE & OBJECTIVE
Interval History:  Pain much improved today; tolerating oral meds well. Pain well controlled at 3/10/   used 2 prn meds in last 24 hours.  Last BM 2 days ago. + Flatus      Imaging planned for today due to inability to perform yesterday    Past Medical History:   Diagnosis Date    Adjustment disorder with depressed mood 2017    Allergy     Anxiety     Asthma     seasonal    Blood transfusion     1981    Breast cancer     stage IIB carcinoma of the right breast, ER positive with a low Oncotype score    Chemotherapy-induced neuropathy 2016    Depression     Diverticulosis     Falls frequently 2014    Hepatic cyst 2014    Hx of psychiatric care     Hypercholesteremia     Hypertension     Lung nodule 2014    Lymphedema     S/P right breast mastectomy    Metastatic bone cancer     MVP (mitral valve prolapse)     Osteoporosis, unspecified 2014    Psychiatric problem     Therapy     Thyroid disease     Hasimoto disease       Past Surgical History:   Procedure Laterality Date    BREAST LUMPECTOMY Right     X 2    BREAST RECONSTRUCTION  2013    X 2     SECTION      x2    COLONOSCOPY N/A 2016    Procedure: COLONOSCOPY;  Surgeon: Miguel Vu MD;  Location: [address]);  Service: Endoscopy;  Laterality: N/A;  MRSA-at time of scheduling pending results on 2016    endometriosis      EYE SURGERY      RK bilateral     GANGLION CYST EXCISION      right index finger    HEMORRHOID SURGERY      KNEE SURGERY Bilateral     ortho    MASTECTOMY  2013    Bilateral     TONSILLECTOMY      TUBAL LIGATION         Review of patient's allergies indicates:   Allergen Reactions    Adhesive Rash     Tape, Paper tape is OK.    Codeine Itching     Itching very bad to upper body only.    Demerol [meperidine] Itching     To upper body only    Iodine and iodide containing products Anaphylaxis    Penicillins      Ulcers in mouth.     Arimidex [anastrozole] Hives    Aromasin [exemestane]     Clindamycin Itching and Rash       Medications:  Continuous Infusions:   sodium chloride 0.9% 200 mL/hr at 01/10/20 0952     Scheduled Meds:   cloNIDine  0.1 mg Oral TID    [START ON 1/11/2020] dexAMETHasone  6 mg Oral Daily    enoxaparin  40 mg Subcutaneous Daily    gabapentin  300 mg Oral TID    levothyroxine  137 mcg Oral Before breakfast    morphine  200 mg Oral Q8H    pantoprazole  40 mg Oral Daily    senna-docusate 8.6-50 mg  2 tablet Oral BID    sertraline  50 mg Oral Daily     PRN Meds:butalbital-acetaminophen-caffeine -40 mg, Dextrose 10% Bolus, Dextrose 10% Bolus, glucagon (human recombinant), glucose, glucose, morphine, naloxone, ondansetron, sodium chloride 0.9%    Family History     Problem Relation (Age of Onset)    ADD / ADHD Daughter    Arthritis Brother    COPD Paternal Grandfather    Cancer Mother, Father, Paternal Grandmother (94)    Crohn's disease Son    Depression Sister, Brother, Sister, Sister    Heart disease Sister    Hypertension Mother    Melanoma Mother    Ovarian cancer Paternal Grandmother    Physical abuse Father    Stroke Mother        Tobacco Use    Smoking status: Never Smoker    Smokeless tobacco: Never Used   Substance and Sexual Activity    Alcohol use: No     Alcohol/week: 0.0 standard drinks    Drug use: No    Sexual activity: Not Currently     Birth control/protection: Post-menopausal       Review of Systems  Objective:     Vital Signs (Most Recent):  Temp: 97.9 °F (36.6 °C) (01/10/20 1236)  Pulse: 66 (01/10/20 1236)  Resp: 17 (01/10/20 1236)  BP: 118/68 (01/10/20 1450)  SpO2: (!) 94 % (01/10/20 1236) Vital Signs (24h Range):  Temp:  [96.3 °F (35.7 °C)-98.2 °F (36.8 °C)] 97.9 °F (36.6 °C)  Pulse:  [52-66] 66  Resp:  [15-19] 17  SpO2:  [90 %-96 %] 94 %  BP: (111-125)/(56-68) 118/68     Weight: 79.4 kg (175 lb)  Body mass index is 30.04 kg/m².    Review of Symptoms  Symptom Assessment (ESAS 0-10  scale)   ESAS 0 1 2 3 4 5 6 7 8 9 10   Pain    x          Dyspnea  x            Anxiety  x            Nausea x             Depression  x             Anorexia   x           Fatigue      x        Insomnia x             Restlessness  x             Agitation x             CAM / Delirium _x_ --  ___+   Constipation     x__ --  ___+   Diarrhea           x__ --  ___+  Bowel Management Plan (BMP): Yes    Comments: has BM q 1-2 days at home    Pain Assessment: pain began as dull achy over the past 2 days she thought was gas; progressed to severe stabbing pain that radiated to right flank, right shoulder, and mid back and down to hips; movement, deep breaths, and coughing exacerbates and sitting still helps; increased oral pain meds at home but couldn;t keep it down; no cough or hemoptysis; worse pain she has had to date; BM yesterday    OME in 24 hours: 720      ECOG Performance Status Grade: 2 - Ambulates, capable of self care only    Physical Exam  Constitutional: She is oriented to person, place, and time. She appears well-developed. She appears comfortable   HENT:   Head: Normocephalic.   Eyes: Pupils are equal, round, and reactive to light. No scleral icterus.   Neck: No JVD present.   Cardiovascular: Normal rate and regular rhythm.   Pulmonary/Chest: Effort normal and breath sounds normal. She has no wheezes.   Abdominal: Soft. Bowel sounds are normal. Mild TTP RUQ and hepatomegaly  Musculoskeletal: She exhibits no edema.   Neurological: She is alert and oriented to person, place, and time. She displays normal reflexes. No cranial nerve deficit or sensory deficit. Coordination normal.   Skin: Skin is warm and dry.   Psychiatric: She has a normal mood and affect.   Nursing note and vitals reviewed.   Significant Labs:   CBC:   Recent Labs   Lab 01/09/20  0441 01/10/20  0405   WBC 7.44 8.32   HGB 11.1* 11.2*   HCT 36.8* 35.9*    204     CMP:   Recent Labs   Lab 01/09/20  0441 01/10/20  0405    141   K 4.1  3.8    105   CO2 29 29   * 128*   BUN 15 21   CREATININE 0.8 0.7   CALCIUM 11.3* 11.5*   PROT 6.1 6.1   ALBUMIN 2.7* 2.8*   BILITOT 0.4 0.4   ALKPHOS 397* 388*   AST 61* 51*   ALT 39 38   ANIONGAP 6* 7*   EGFRNONAA >60.0 >60.0     Lactic acid: No results for input(s): LACTATE in the last 48 hours.  LFT:   Recent Labs   Lab 01/10/20  0405   AST 51*   ALKPHOS 388*   BILITOT 0.4     CBC:   Recent Labs   Lab 01/10/20  0405   WBC 8.32   HGB 11.2*   HCT 35.9*   MCV 93        BMP:  Recent Labs   Lab 01/10/20  0405   *      K 3.8      CO2 29   BUN 21   CREATININE 0.7   CALCIUM 11.5*   MG 1.8     LFT:  Lab Results   Component Value Date    AST 51 (H) 01/10/2020    ALKPHOS 388 (H) 01/10/2020    BILITOT 0.4 01/10/2020     Albumin:   Albumin   Date Value Ref Range Status   01/10/2020 2.8 (L) 3.5 - 5.2 g/dL Final     Protein:   Total Protein   Date Value Ref Range Status   01/10/2020 6.1 6.0 - 8.4 g/dL Final     Lactic acid:   Lab Results   Component Value Date    LACTATE 1.2 01/18/2014       Significant Imaging: I have reviewed all pertinent imaging results/findings within the past 24 hours.   CT  Chest noncontrast:  CT: Heterogeneous liver with multiple hypodense lesions and osseous metastatic disease in this patient with history of breast cancer.    Bone scan 12/19  There are more numerous foci of abnormal tracer uptake in keeping with progression of osseous metastases best observed in the posterior left ribs and thoracolumbar spine.  New or more prominent uptake at approximately L3 may indicate a compression fracture has observed elsewhere on recent MRI.    MRI T/L spine 9/19  Severe T12 and mild L4 vertebral body pathologic compression fractures.  Severe left neural foraminal narrowing at T12-L1.  No significant spinal canal stenosis.    Diffuse heterogeneous enhancement of the visualized osseous structures consistent with patient's history of metastatic disease.    Advance Care  Planning   Advanced Directives::  Living Will: Yes. Copy on chart: Yes  LaPOST: No  Do Not Resuscitate Status: No  Medical Power of : Yes. Agent's Name: Perfecto Cruz. Agent's Contact Number: [phone]  Secondary agent Becki Crain [phone]    Decision-Making Capacity: Patient answered questions       Living Arrangements: Lives with family    Psychosocial/Cultural:  Did not discuss due to severity of pt's symptoms today    Spiritual: did not discuss today

## 2020-01-10 NOTE — ASSESSMENT & PLAN NOTE
Ms Crain is a 63 yo woman with metastatic cancer of the right breast (s/p several rounds of treatment who presents with intractable pain in the setting of progressive lytic fractures, h/o multiple vertebral compression fxs, mets to liver with significant bulky disease, and hypercalcemia of malignancy.     Reviewed meds and notes.      Recs:  -for acute on chronic pain:              -continue MSCONTIN 200 mg tid and MSIR 60 q 4 hours prn              -cont dex               -imaging planned for today    -pending diagnosis and treatment plan     Will follow up on Monday if still here. Discussed with onc team and pharmacist.  Family agrees to plan.    Would be good to follow up in palliative care clinic    Please call if needed sooner     Kristian Carter MD  Palliative Medicine  [phone]

## 2020-01-10 NOTE — SUBJECTIVE & OBJECTIVE
Interval History: [place]. Transitioned off PCA to oral medications. Pt reports current pain medication regimen has been working well for her.     Oncology Treatment Plan:   OP BREAST ERIBULIN Q3W    Medications:  Continuous Infusions:   sodium chloride 0.9% 200 mL/hr at 01/10/20 0952     Scheduled Meds:   cloNIDine  0.1 mg Oral TID    [START ON 1/11/2020] dexAMETHasone  6 mg Oral Daily    enoxaparin  40 mg Subcutaneous Daily    gabapentin  300 mg Oral TID    levothyroxine  137 mcg Oral Before breakfast    morphine  200 mg Oral Q8H    pantoprazole  40 mg Oral Daily    senna-docusate 8.6-50 mg  1 tablet Oral BID    sertraline  50 mg Oral Daily    zoledronic acid (ZOMETA) IVPB  4 mg Intravenous Once     PRN Meds:butalbital-acetaminophen-caffeine -40 mg, Dextrose 10% Bolus, Dextrose 10% Bolus, glucagon (human recombinant), glucose, glucose, morphine, naloxone, ondansetron, sodium chloride 0.9%     Review of Systems   Constitutional: Positive for activity change and appetite change.   HENT: Negative for congestion.    Eyes: Negative for visual disturbance.   Respiratory: Negative for apnea and chest tightness.    Cardiovascular: Negative for chest pain.   Gastrointestinal: Positive for abdominal pain. Negative for abdominal distention.   Endocrine: Negative for cold intolerance and heat intolerance.   Genitourinary: Negative for difficulty urinating.   Musculoskeletal: Positive for back pain and neck pain.   Skin: Negative for color change.   Neurological: Negative for dizziness.   Hematological: Negative for adenopathy.   Psychiatric/Behavioral: Negative for agitation.     Objective:     Vital Signs (Most Recent):  Temp: 96.3 °F (35.7 °C) (01/10/20 0722)  Pulse: (!) 58 (01/10/20 0834)  Resp: 15 (01/10/20 0722)  BP: 121/66 (01/10/20 0944)  SpO2: 96 % (01/10/20 0722) Vital Signs (24h Range):  Temp:  [96.3 °F (35.7 °C)-98.2 °F (36.8 °C)] 96.3 °F (35.7 °C)  Pulse:  [52-60] 58  Resp:  [15-19] 15  SpO2:  [90  %-96 %] 96 %  BP: (111-125)/(56-71) 121/66     Weight: 79.4 kg (175 lb)  Body mass index is 30.04 kg/m².  Body surface area is 1.89 meters squared.    No intake or output data in the 24 hours ending 01/10/20 1037    Physical Exam   Constitutional: She is oriented to person, place, and time. She appears well-developed and well-nourished.   HENT:   Head: Normocephalic and atraumatic.   Eyes: No scleral icterus.   Neck: Normal range of motion.   Cardiovascular: Normal rate and regular rhythm.   Pulmonary/Chest: Effort normal. No respiratory distress.   Abdominal: Soft. Bowel sounds are normal.   Tenderness to palpation RUQ    Musculoskeletal: Normal range of motion.   Neurological: She is alert and oriented to person, place, and time.   Skin: Skin is warm.   Psychiatric: She has a normal mood and affect.       Significant Labs:   All pertinent labs from the last 24 hours have been reviewed.    Diagnostic Results:  I have reviewed and interpreted all pertinent imaging results/findings within the past 24 hours.

## 2020-01-11 LAB
ALBUMIN SERPL BCP-MCNC: 2.8 G/DL (ref 3.5–5.2)
ALP SERPL-CCNC: 406 U/L (ref 55–135)
ALT SERPL W/O P-5'-P-CCNC: 42 U/L (ref 10–44)
ANION GAP SERPL CALC-SCNC: 6 MMOL/L (ref 8–16)
AST SERPL-CCNC: 55 U/L (ref 10–40)
BASOPHILS # BLD AUTO: 0.02 K/UL (ref 0–0.2)
BASOPHILS NFR BLD: 0.2 % (ref 0–1.9)
BILIRUB SERPL-MCNC: 0.4 MG/DL (ref 0.1–1)
BUN SERPL-MCNC: 15 MG/DL (ref 8–23)
CALCIUM SERPL-MCNC: 9.9 MG/DL (ref 8.7–10.5)
CHLORIDE SERPL-SCNC: 106 MMOL/L (ref 95–110)
CO2 SERPL-SCNC: 27 MMOL/L (ref 23–29)
CREAT SERPL-MCNC: 0.7 MG/DL (ref 0.5–1.4)
DIFFERENTIAL METHOD: ABNORMAL
EOSINOPHIL # BLD AUTO: 0.1 K/UL (ref 0–0.5)
EOSINOPHIL NFR BLD: 0.9 % (ref 0–8)
ERYTHROCYTE [DISTWIDTH] IN BLOOD BY AUTOMATED COUNT: 15.5 % (ref 11.5–14.5)
EST. GFR  (AFRICAN AMERICAN): >60 ML/MIN/1.73 M^2
EST. GFR  (NON AFRICAN AMERICAN): >60 ML/MIN/1.73 M^2
GLUCOSE SERPL-MCNC: 95 MG/DL (ref 70–110)
HCT VFR BLD AUTO: 35.4 % (ref 37–48.5)
HGB BLD-MCNC: 10.8 G/DL (ref 12–16)
IMM GRANULOCYTES # BLD AUTO: 0.06 K/UL (ref 0–0.04)
IMM GRANULOCYTES NFR BLD AUTO: 0.6 % (ref 0–0.5)
INFLUENZA A, MOLECULAR: NEGATIVE
INFLUENZA B, MOLECULAR: NEGATIVE
LYMPHOCYTES # BLD AUTO: 1.5 K/UL (ref 1–4.8)
LYMPHOCYTES NFR BLD: 16.1 % (ref 18–48)
MAGNESIUM SERPL-MCNC: 1.7 MG/DL (ref 1.6–2.6)
MCH RBC QN AUTO: 28.9 PG (ref 27–31)
MCHC RBC AUTO-ENTMCNC: 30.5 G/DL (ref 32–36)
MCV RBC AUTO: 95 FL (ref 82–98)
MONOCYTES # BLD AUTO: 0.9 K/UL (ref 0.3–1)
MONOCYTES NFR BLD: 9.2 % (ref 4–15)
NEUTROPHILS # BLD AUTO: 6.7 K/UL (ref 1.8–7.7)
NEUTROPHILS NFR BLD: 73 % (ref 38–73)
NRBC BLD-RTO: 0 /100 WBC
PHOSPHATE SERPL-MCNC: 2.6 MG/DL (ref 2.7–4.5)
PLATELET # BLD AUTO: 213 K/UL (ref 150–350)
PMV BLD AUTO: 10.3 FL (ref 9.2–12.9)
POTASSIUM SERPL-SCNC: 3.4 MMOL/L (ref 3.5–5.1)
PROT SERPL-MCNC: 5.9 G/DL (ref 6–8.4)
RBC # BLD AUTO: 3.74 M/UL (ref 4–5.4)
SODIUM SERPL-SCNC: 139 MMOL/L (ref 136–145)
SPECIMEN SOURCE: NORMAL
WBC # BLD AUTO: 9.24 K/UL (ref 3.9–12.7)

## 2020-01-11 PROCEDURE — 99233 SBSQ HOSP IP/OBS HIGH 50: CPT | Mod: HCNC,,, | Performed by: INTERNAL MEDICINE

## 2020-01-11 PROCEDURE — 25000003 PHARM REV CODE 250: Mod: HCNC | Performed by: EMERGENCY MEDICINE

## 2020-01-11 PROCEDURE — 63600175 PHARM REV CODE 636 W HCPCS: Mod: HCNC | Performed by: STUDENT IN AN ORGANIZED HEALTH CARE EDUCATION/TRAINING PROGRAM

## 2020-01-11 PROCEDURE — 83735 ASSAY OF MAGNESIUM: CPT | Mod: HCNC

## 2020-01-11 PROCEDURE — 85025 COMPLETE CBC W/AUTO DIFF WBC: CPT | Mod: HCNC

## 2020-01-11 PROCEDURE — 87502 INFLUENZA DNA AMP PROBE: CPT | Mod: HCNC

## 2020-01-11 PROCEDURE — 20600001 HC STEP DOWN PRIVATE ROOM: Mod: HCNC

## 2020-01-11 PROCEDURE — 80053 COMPREHEN METABOLIC PANEL: CPT | Mod: HCNC

## 2020-01-11 PROCEDURE — 25000003 PHARM REV CODE 250: Mod: HCNC | Performed by: STUDENT IN AN ORGANIZED HEALTH CARE EDUCATION/TRAINING PROGRAM

## 2020-01-11 PROCEDURE — 25500020 PHARM REV CODE 255: Mod: HCNC | Performed by: INTERNAL MEDICINE

## 2020-01-11 PROCEDURE — A9585 GADOBUTROL INJECTION: HCPCS | Mod: HCNC | Performed by: INTERNAL MEDICINE

## 2020-01-11 PROCEDURE — 84100 ASSAY OF PHOSPHORUS: CPT | Mod: HCNC

## 2020-01-11 PROCEDURE — 99233 PR SUBSEQUENT HOSPITAL CARE,LEVL III: ICD-10-PCS | Mod: HCNC,,, | Performed by: INTERNAL MEDICINE

## 2020-01-11 PROCEDURE — 63600175 PHARM REV CODE 636 W HCPCS: Mod: HCNC | Performed by: SURGERY

## 2020-01-11 RX ORDER — POLYETHYLENE GLYCOL 3350 17 G/17G
17 POWDER, FOR SOLUTION ORAL DAILY PRN
Status: DISCONTINUED | OUTPATIENT
Start: 2020-01-11 | End: 2020-01-12 | Stop reason: HOSPADM

## 2020-01-11 RX ORDER — GADOBUTROL 604.72 MG/ML
10 INJECTION INTRAVENOUS
Status: COMPLETED | OUTPATIENT
Start: 2020-01-11 | End: 2020-01-11

## 2020-01-11 RX ORDER — KETOROLAC TROMETHAMINE 30 MG/ML
30 INJECTION, SOLUTION INTRAMUSCULAR; INTRAVENOUS ONCE
Status: COMPLETED | OUTPATIENT
Start: 2020-01-11 | End: 2020-01-11

## 2020-01-11 RX ORDER — KETOROLAC TROMETHAMINE 30 MG/ML
30 INJECTION, SOLUTION INTRAMUSCULAR; INTRAVENOUS ONCE
Status: DISCONTINUED | OUTPATIENT
Start: 2020-01-11 | End: 2020-01-11

## 2020-01-11 RX ADMIN — SERTRALINE HYDROCHLORIDE 50 MG: 50 TABLET ORAL at 10:01

## 2020-01-11 RX ADMIN — CLONIDINE HYDROCHLORIDE 0.1 MG: 0.1 TABLET ORAL at 10:01

## 2020-01-11 RX ADMIN — GABAPENTIN 300 MG: 300 CAPSULE ORAL at 03:01

## 2020-01-11 RX ADMIN — MORPHINE SULFATE 200 MG: 100 TABLET, FILM COATED, EXTENDED RELEASE ORAL at 07:01

## 2020-01-11 RX ADMIN — MORPHINE SULFATE 60 MG: 15 TABLET ORAL at 02:01

## 2020-01-11 RX ADMIN — MORPHINE SULFATE 200 MG: 100 TABLET, FILM COATED, EXTENDED RELEASE ORAL at 10:01

## 2020-01-11 RX ADMIN — KETOROLAC TROMETHAMINE 30 MG: 30 INJECTION, SOLUTION INTRAMUSCULAR at 08:01

## 2020-01-11 RX ADMIN — GADOBUTROL 10 ML: 604.72 INJECTION INTRAVENOUS at 02:01

## 2020-01-11 RX ADMIN — GABAPENTIN 300 MG: 300 CAPSULE ORAL at 10:01

## 2020-01-11 RX ADMIN — DEXAMETHASONE 6 MG: 4 TABLET ORAL at 10:01

## 2020-01-11 RX ADMIN — SENNOSIDES AND DOCUSATE SODIUM 2 TABLET: 8.6; 5 TABLET ORAL at 10:01

## 2020-01-11 RX ADMIN — MORPHINE SULFATE 60 MG: 15 TABLET ORAL at 05:01

## 2020-01-11 RX ADMIN — ENOXAPARIN SODIUM 40 MG: 100 INJECTION SUBCUTANEOUS at 05:01

## 2020-01-11 RX ADMIN — LEVOTHYROXINE SODIUM 137 MCG: 25 TABLET ORAL at 07:01

## 2020-01-11 RX ADMIN — PANTOPRAZOLE SODIUM 40 MG: 40 TABLET, DELAYED RELEASE ORAL at 10:01

## 2020-01-11 RX ADMIN — MORPHINE SULFATE 60 MG: 15 TABLET ORAL at 10:01

## 2020-01-11 RX ADMIN — POLYETHYLENE GLYCOL 3350 17 G: 17 POWDER, FOR SOLUTION ORAL at 11:01

## 2020-01-11 RX ADMIN — MORPHINE SULFATE 200 MG: 100 TABLET, FILM COATED, EXTENDED RELEASE ORAL at 03:01

## 2020-01-11 RX ADMIN — CLONIDINE HYDROCHLORIDE 0.1 MG: 0.1 TABLET ORAL at 03:01

## 2020-01-11 NOTE — PROGRESS NOTES
[facility]  Hematology/Oncology  Progress Note    Patient Name: Rebecca Crain  Admission Date: 1/6/2020  Hospital Length of Stay: 4 days  Code Status: Full Code     Subjective:     HPI:  Ms Crain is a 63 yo woman with metastatic cancer of the right breast (completed 6 treatments Taxol) who presents with intractable pain.     The patient reports the pain is worst on there right side of her ribs. The pain radiates to her right shoulder. She originally thought was gas pain. Today the pain started traveling up to her shoulder, neck and to her spine. She does have known fractures on the left side. Pain is described as a constant stabbing.     She see's Dr. Schroeder outpatient. Most recently on taxol, completed total 6 treatments with imrpovement of cancer antigen 216 to 182. Currently taking MS Contin 90 mg every 8 hr and Dilaudid 4 mg every 4 hr on an as-needed basis, but this was not controlling her pain. Plans are to stop taxol and start therapy with eribulin.     CT: Heterogeneous liver with multiple hypodense lesions and osseous metastatic disease in this patient with history of breast cancer.      ECOG-1-2     Breast history: In 2013 she was diagnosed with stage IIB carcinoma of the right breast, ER positive with a low Oncotype score.  She was enrolled on protocol  and randomized to endocrine therapy alone.  She was tried on all 3 aromatase inhibitors and had itching and rash to all of them.  In August 2013 she was started on tamoxifen.   She was found to have  bone metastasis in May 2015, 2015.  A subsequent bone biopsy was performed on a lesion at the T8 vertebra.   The pathology from that was consistent with metastatic breast cancer, ER +80%, FL +80%, and HER-2 negative.      She received letrozole plus palbociclib from July 2015 until May 2016.  She also received bone protective therapy with Xgeva.      Faslodex was started in June 2016.      Exemestane and Afinitor started in August  2017.  That was discontinued in February 2018 due to progression in the liver and bone.     Navelbine was started February 16, 2018.  She had 8 cycles for that.  Scans in November 2018 showed progression as follows     Capecitabine was started in December 2018.  She received 5 cycles of that therapy.     Follow-up scans in April 2019 showed progression with a new hepatic lesion and worsening bone disease.     She began clinical trial therapy with erdafitinib on May 8, 2019.  That was discontinued in September 2019 due to progression in the bone.    Interval History: Pain continues to be well controlled. Pt now with productive cough that began late yesterday.     Oncology Treatment Plan:   OP BREAST ERIBULIN Q3W    Medications:  Continuous Infusions:  Scheduled Meds:   cloNIDine  0.1 mg Oral TID    dexAMETHasone  6 mg Oral Daily    enoxaparin  40 mg Subcutaneous Daily    gabapentin  300 mg Oral TID    levothyroxine  137 mcg Oral Before breakfast    morphine  200 mg Oral Q8H    pantoprazole  40 mg Oral Daily    senna-docusate 8.6-50 mg  2 tablet Oral BID    sertraline  50 mg Oral Daily     PRN Meds:butalbital-acetaminophen-caffeine -40 mg, Dextrose 10% Bolus, Dextrose 10% Bolus, glucagon (human recombinant), glucose, glucose, morphine, naloxone, ondansetron, sodium chloride 0.9%     Review of Systems   Constitutional: Positive for activity change and appetite change.   HENT: Negative for congestion.    Eyes: Negative for visual disturbance.   Respiratory: Positive for cough. Negative for apnea and chest tightness.    Cardiovascular: Negative for chest pain.   Gastrointestinal: Positive for abdominal pain. Negative for abdominal distention.   Endocrine: Negative for cold intolerance and heat intolerance.   Genitourinary: Negative for difficulty urinating.   Musculoskeletal: Positive for back pain and neck pain.   Skin: Negative for color change.   Neurological: Negative for dizziness.   Hematological:  Negative for adenopathy.   Psychiatric/Behavioral: Negative for agitation.     Objective:     Vital Signs (Most Recent):  Temp: 97.8 °F (36.6 °C) (01/11/20 0759)  Pulse: 76 (01/11/20 0759)  Resp: 16 (01/11/20 0759)  BP: 139/62 (01/11/20 0759)  SpO2: 98 % (01/11/20 0759) Vital Signs (24h Range):  Temp:  [97.4 °F (36.3 °C)-99.3 °F (37.4 °C)] 97.8 °F (36.6 °C)  Pulse:  [50-76] 76  Resp:  [16-18] 16  SpO2:  [94 %-98 %] 98 %  BP: (112-139)/(62-68) 139/62     Weight: 79.4 kg (175 lb)  Body mass index is 30.04 kg/m².  Body surface area is 1.89 meters squared.    No intake or output data in the 24 hours ending 01/11/20 0821    Physical Exam   Constitutional: She is oriented to person, place, and time. She appears well-developed and well-nourished.   HENT:   Head: Normocephalic and atraumatic.   Eyes: No scleral icterus.   Neck: Normal range of motion.   Cardiovascular: Normal rate and regular rhythm.   Pulmonary/Chest: Effort normal. No respiratory distress.   Abdominal: Soft. Bowel sounds are normal.   Tenderness to palpation RUQ    Musculoskeletal: Normal range of motion.   Neurological: She is alert and oriented to person, place, and time.   Skin: Skin is warm.   Psychiatric: She has a normal mood and affect.       Significant Labs:   All pertinent labs from the last 24 hours have been reviewed.    Diagnostic Results:  I have reviewed and interpreted all pertinent imaging results/findings within the past 24 hours.    Assessment/Plan:     * Intractable pain  Ms Crain is a 63 yo woman with metastatic cancer of the right breast (completed 6 treatments Taxol) who presents with intractable pain in the setting of progressive lytic fractures and hypercalcemia of malignancy.   - Pain management assistance appreciated.   - Previously on Hydromorphone PCA, now transitioned to oral medications    Plan:  - Ms Contin 200mg tid, MSIR 60mg q3prn   - Continue dexamethasone 6mg bid for 5 more days   - MRI spine, abd/pelv pending (pt  has an iodine contrast allergy)      Hypercalcemia of malignancy  Corrected Ca 12.5 on 1/10, normalized on 1/11  Plan:  - Zometa 4mg x1 dose    Adjustment disorder with depressed mood  Continue home meds, sertraline    Chemotherapy-induced neuropathy  Continue gabapentin    Benign essential HTN  Continue home clonidine        Ty Fischer MD  Hematology/Oncology  [facility]        I have reviewed the notes, assessments, and/or procedures performed by the housestaff, as above.  I have personally interviewed and examined the patient at the beside, and rounded with the housestaff. I concur with her/his assessment and plan and the documentation of Rebecca Crain.  I, Dr. Nestor High, personally spent more than  35 mins during this encounter, greater than 50% was spent in direct counseling and/or coordination of care.     Nestor High M.D., M.S., F.A.C.P.  Hematology/Oncology Attending  [facility]

## 2020-01-11 NOTE — SUBJECTIVE & OBJECTIVE
Interval History: Pain continues to be well controlled. Pt now with productive cough that began late yesterday.     Oncology Treatment Plan:   OP BREAST ERIBULIN Q3W    Medications:  Continuous Infusions:  Scheduled Meds:   cloNIDine  0.1 mg Oral TID    dexAMETHasone  6 mg Oral Daily    enoxaparin  40 mg Subcutaneous Daily    gabapentin  300 mg Oral TID    levothyroxine  137 mcg Oral Before breakfast    morphine  200 mg Oral Q8H    pantoprazole  40 mg Oral Daily    senna-docusate 8.6-50 mg  2 tablet Oral BID    sertraline  50 mg Oral Daily     PRN Meds:butalbital-acetaminophen-caffeine -40 mg, Dextrose 10% Bolus, Dextrose 10% Bolus, glucagon (human recombinant), glucose, glucose, morphine, naloxone, ondansetron, sodium chloride 0.9%     Review of Systems   Constitutional: Positive for activity change and appetite change.   HENT: Negative for congestion.    Eyes: Negative for visual disturbance.   Respiratory: Positive for cough. Negative for apnea and chest tightness.    Cardiovascular: Negative for chest pain.   Gastrointestinal: Positive for abdominal pain. Negative for abdominal distention.   Endocrine: Negative for cold intolerance and heat intolerance.   Genitourinary: Negative for difficulty urinating.   Musculoskeletal: Positive for back pain and neck pain.   Skin: Negative for color change.   Neurological: Negative for dizziness.   Hematological: Negative for adenopathy.   Psychiatric/Behavioral: Negative for agitation.     Objective:     Vital Signs (Most Recent):  Temp: 97.8 °F (36.6 °C) (01/11/20 0759)  Pulse: 76 (01/11/20 0759)  Resp: 16 (01/11/20 0759)  BP: 139/62 (01/11/20 0759)  SpO2: 98 % (01/11/20 0759) Vital Signs (24h Range):  Temp:  [97.4 °F (36.3 °C)-99.3 °F (37.4 °C)] 97.8 °F (36.6 °C)  Pulse:  [50-76] 76  Resp:  [16-18] 16  SpO2:  [94 %-98 %] 98 %  BP: (112-139)/(62-68) 139/62     Weight: 79.4 kg (175 lb)  Body mass index is 30.04 kg/m².  Body surface area is 1.89 meters  squared.    No intake or output data in the 24 hours ending 01/11/20 0821    Physical Exam   Constitutional: She is oriented to person, place, and time. She appears well-developed and well-nourished.   HENT:   Head: Normocephalic and atraumatic.   Eyes: No scleral icterus.   Neck: Normal range of motion.   Cardiovascular: Normal rate and regular rhythm.   Pulmonary/Chest: Effort normal. No respiratory distress.   Abdominal: Soft. Bowel sounds are normal.   Tenderness to palpation RUQ    Musculoskeletal: Normal range of motion.   Neurological: She is alert and oriented to person, place, and time.   Skin: Skin is warm.   Psychiatric: She has a normal mood and affect.       Significant Labs:   All pertinent labs from the last 24 hours have been reviewed.    Diagnostic Results:  I have reviewed and interpreted all pertinent imaging results/findings within the past 24 hours.

## 2020-01-11 NOTE — PLAN OF CARE
POC reviewed with patient; understanding verbalized. Pt had MRI this morning. Pt complained of abdominal and back pain this shift, PRN meds administered. Pt had no other complaints. Pt. with nonskid footwear on, bed in lowest position, and locked with bed rails up x2. Pt. has call light and personal items within reach. Patient ambulates in room independently, walker is at bedside. VSS and afebrile this shift. All questions and concerns addressed at this time. Will continue to monitor.

## 2020-01-11 NOTE — PROGRESS NOTES
Pt MD notified at 2230 of HR of 47-53 noted. Pt showed no symptoms of acute distressed, HR was 50 when assessed manually. EKG ordered by MD. MD authorized scheduled morphine to be administered as long as b/p was stable and EKG was WNL.

## 2020-01-11 NOTE — ASSESSMENT & PLAN NOTE
Ms Crain is a 65 yo woman with metastatic cancer of the right breast (completed 6 treatments Taxol) who presents with intractable pain in the setting of progressive lytic fractures and hypercalcemia of malignancy.   - Pain management assistance appreciated.   - Previously on Hydromorphone PCA, now transitioned to oral medications    Plan:  - Ms Contin 200mg tid, MSIR 60mg q3prn   - Continue dexamethasone 6mg bid for 5 more days   - MRI spine, abd/pelv pending (pt has an iodine contrast allergy)

## 2020-01-12 VITALS
DIASTOLIC BLOOD PRESSURE: 65 MMHG | TEMPERATURE: 98 F | HEIGHT: 64 IN | WEIGHT: 175 LBS | SYSTOLIC BLOOD PRESSURE: 139 MMHG | OXYGEN SATURATION: 98 % | RESPIRATION RATE: 16 BRPM | HEART RATE: 65 BPM | BODY MASS INDEX: 29.88 KG/M2

## 2020-01-12 LAB
ALBUMIN SERPL BCP-MCNC: 2.6 G/DL (ref 3.5–5.2)
ALP SERPL-CCNC: 430 U/L (ref 55–135)
ALT SERPL W/O P-5'-P-CCNC: 57 U/L (ref 10–44)
ANION GAP SERPL CALC-SCNC: 6 MMOL/L (ref 8–16)
AST SERPL-CCNC: 67 U/L (ref 10–40)
BASOPHILS # BLD AUTO: 0.01 K/UL (ref 0–0.2)
BASOPHILS NFR BLD: 0.1 % (ref 0–1.9)
BILIRUB SERPL-MCNC: 0.5 MG/DL (ref 0.1–1)
BUN SERPL-MCNC: 10 MG/DL (ref 8–23)
CALCIUM SERPL-MCNC: 9.2 MG/DL (ref 8.7–10.5)
CHLORIDE SERPL-SCNC: 104 MMOL/L (ref 95–110)
CO2 SERPL-SCNC: 29 MMOL/L (ref 23–29)
CREAT SERPL-MCNC: 0.7 MG/DL (ref 0.5–1.4)
DIFFERENTIAL METHOD: ABNORMAL
EOSINOPHIL # BLD AUTO: 0.1 K/UL (ref 0–0.5)
EOSINOPHIL NFR BLD: 1 % (ref 0–8)
ERYTHROCYTE [DISTWIDTH] IN BLOOD BY AUTOMATED COUNT: 15.6 % (ref 11.5–14.5)
EST. GFR  (AFRICAN AMERICAN): >60 ML/MIN/1.73 M^2
EST. GFR  (NON AFRICAN AMERICAN): >60 ML/MIN/1.73 M^2
GLUCOSE SERPL-MCNC: 123 MG/DL (ref 70–110)
HCT VFR BLD AUTO: 36.6 % (ref 37–48.5)
HGB BLD-MCNC: 11 G/DL (ref 12–16)
IMM GRANULOCYTES # BLD AUTO: 0.05 K/UL (ref 0–0.04)
IMM GRANULOCYTES NFR BLD AUTO: 0.7 % (ref 0–0.5)
LYMPHOCYTES # BLD AUTO: 0.6 K/UL (ref 1–4.8)
LYMPHOCYTES NFR BLD: 8.5 % (ref 18–48)
MAGNESIUM SERPL-MCNC: 1.9 MG/DL (ref 1.6–2.6)
MCH RBC QN AUTO: 28.7 PG (ref 27–31)
MCHC RBC AUTO-ENTMCNC: 30.1 G/DL (ref 32–36)
MCV RBC AUTO: 96 FL (ref 82–98)
MONOCYTES # BLD AUTO: 0.6 K/UL (ref 0.3–1)
MONOCYTES NFR BLD: 8.8 % (ref 4–15)
NEUTROPHILS # BLD AUTO: 5.5 K/UL (ref 1.8–7.7)
NEUTROPHILS NFR BLD: 80.9 % (ref 38–73)
NRBC BLD-RTO: 0 /100 WBC
PHOSPHATE SERPL-MCNC: 3 MG/DL (ref 2.7–4.5)
PLATELET # BLD AUTO: 181 K/UL (ref 150–350)
PMV BLD AUTO: 10.5 FL (ref 9.2–12.9)
POTASSIUM SERPL-SCNC: 3.7 MMOL/L (ref 3.5–5.1)
PROT SERPL-MCNC: 5.7 G/DL (ref 6–8.4)
RBC # BLD AUTO: 3.83 M/UL (ref 4–5.4)
SODIUM SERPL-SCNC: 139 MMOL/L (ref 136–145)
WBC # BLD AUTO: 6.82 K/UL (ref 3.9–12.7)

## 2020-01-12 PROCEDURE — 80053 COMPREHEN METABOLIC PANEL: CPT | Mod: HCNC

## 2020-01-12 PROCEDURE — 25000003 PHARM REV CODE 250: Mod: HCNC | Performed by: STUDENT IN AN ORGANIZED HEALTH CARE EDUCATION/TRAINING PROGRAM

## 2020-01-12 PROCEDURE — 25000003 PHARM REV CODE 250: Mod: HCNC | Performed by: EMERGENCY MEDICINE

## 2020-01-12 PROCEDURE — 63600175 PHARM REV CODE 636 W HCPCS: Mod: HCNC | Performed by: STUDENT IN AN ORGANIZED HEALTH CARE EDUCATION/TRAINING PROGRAM

## 2020-01-12 PROCEDURE — 84100 ASSAY OF PHOSPHORUS: CPT | Mod: HCNC

## 2020-01-12 PROCEDURE — 83735 ASSAY OF MAGNESIUM: CPT | Mod: HCNC

## 2020-01-12 PROCEDURE — 85025 COMPLETE CBC W/AUTO DIFF WBC: CPT | Mod: HCNC

## 2020-01-12 RX ORDER — MORPHINE SULFATE 30 MG/1
60 TABLET ORAL
Qty: 120 TABLET | Refills: 0 | Status: SHIPPED | OUTPATIENT
Start: 2020-01-12 | End: 2020-01-12

## 2020-01-12 RX ORDER — HEPARIN SODIUM,PORCINE 10 UNIT/ML
10 VIAL (ML) INTRAVENOUS ONCE
Status: DISCONTINUED | OUTPATIENT
Start: 2020-01-12 | End: 2020-01-12 | Stop reason: HOSPADM

## 2020-01-12 RX ORDER — MORPHINE SULFATE 30 MG/1
60 TABLET ORAL EVERY 4 HOURS PRN
Qty: 120 TABLET | Refills: 0 | Status: SHIPPED | OUTPATIENT
Start: 2020-01-12 | End: 2020-02-12 | Stop reason: SDUPTHER

## 2020-01-12 RX ORDER — MORPHINE SULFATE 200 MG/1
200 TABLET, FILM COATED, EXTENDED RELEASE ORAL EVERY 8 HOURS
Qty: 90 TABLET | Refills: 0 | Status: SHIPPED | OUTPATIENT
Start: 2020-01-12 | End: 2020-01-12

## 2020-01-12 RX ORDER — MORPHINE SULFATE 30 MG/1
60 TABLET ORAL EVERY 4 HOURS PRN
Qty: 120 TABLET | Refills: 0 | Status: SHIPPED | OUTPATIENT
Start: 2020-01-12 | End: 2020-01-12

## 2020-01-12 RX ORDER — KETOROLAC TROMETHAMINE 10 MG/1
10 TABLET, FILM COATED ORAL EVERY 6 HOURS PRN
Qty: 12 TABLET | Refills: 0 | Status: SHIPPED | OUTPATIENT
Start: 2020-01-12 | End: 2020-01-15

## 2020-01-12 RX ORDER — MORPHINE SULFATE 200 MG/1
200 TABLET, FILM COATED, EXTENDED RELEASE ORAL EVERY 8 HOURS
Qty: 90 TABLET | Refills: 0 | Status: SHIPPED | OUTPATIENT
Start: 2020-01-12 | End: 2020-02-12 | Stop reason: SDUPTHER

## 2020-01-12 RX ORDER — HEPARIN 100 UNIT/ML
3 SYRINGE INTRAVENOUS ONCE
Status: DISCONTINUED | OUTPATIENT
Start: 2020-01-12 | End: 2020-01-12 | Stop reason: HOSPADM

## 2020-01-12 RX ADMIN — DEXAMETHASONE 6 MG: 4 TABLET ORAL at 08:01

## 2020-01-12 RX ADMIN — CLONIDINE HYDROCHLORIDE 0.1 MG: 0.1 TABLET ORAL at 02:01

## 2020-01-12 RX ADMIN — GABAPENTIN 300 MG: 300 CAPSULE ORAL at 02:01

## 2020-01-12 RX ADMIN — MORPHINE SULFATE 200 MG: 100 TABLET, FILM COATED, EXTENDED RELEASE ORAL at 02:01

## 2020-01-12 RX ADMIN — GABAPENTIN 300 MG: 300 CAPSULE ORAL at 08:01

## 2020-01-12 RX ADMIN — LEVOTHYROXINE SODIUM 137 MCG: 25 TABLET ORAL at 06:01

## 2020-01-12 RX ADMIN — SERTRALINE HYDROCHLORIDE 50 MG: 50 TABLET ORAL at 08:01

## 2020-01-12 RX ADMIN — CLONIDINE HYDROCHLORIDE 0.1 MG: 0.1 TABLET ORAL at 08:01

## 2020-01-12 RX ADMIN — MORPHINE SULFATE 200 MG: 100 TABLET, FILM COATED, EXTENDED RELEASE ORAL at 06:01

## 2020-01-12 RX ADMIN — SENNOSIDES AND DOCUSATE SODIUM 2 TABLET: 8.6; 5 TABLET ORAL at 08:01

## 2020-01-12 RX ADMIN — PANTOPRAZOLE SODIUM 40 MG: 40 TABLET, DELAYED RELEASE ORAL at 08:01

## 2020-01-12 RX ADMIN — MORPHINE SULFATE 60 MG: 15 TABLET ORAL at 05:01

## 2020-01-12 NOTE — DISCHARGE SUMMARY
[facility]  Hematology/Oncology  Discharge Summary      Patient Name: Rebecca Crain  MRN: [id number]  Admission Date: 1/6/2020  Hospital Length of Stay: 5 days  Discharge Date and Time:  01/12/2020  Attending Physician: Nestor High MD   Discharging Provider: Ty Fischer MD  Primary Care Provider: Colleen Valero MD    HPI: Ms Crain is a 63 yo woman with metastatic cancer of the right breast (completed 6 treatments Taxol) who presents with intractable pain.     The patient reports the pain is worst on there right side of her ribs. The pain radiates to her right shoulder. She originally thought was gas pain. Today the pain started traveling up to her shoulder, neck and to her spine. She does have known fractures on the left side. Pain is described as a constant stabbing.     She see's Dr. Schroeder outpatient. Most recently on taxol, completed total 6 treatments with imrpovement of cancer antigen 216 to 182. Currently taking MS Contin 90 mg every 8 hr and Dilaudid 4 mg every 4 hr on an as-needed basis, but this was not controlling her pain. Plans are to stop taxol and start therapy with eribulin.     CT: Heterogeneous liver with multiple hypodense lesions and osseous metastatic disease in this patient with history of breast cancer.      ECOG-1-2     Breast history: In 2013 she was diagnosed with stage IIB carcinoma of the right breast, ER positive with a low Oncotype score.  She was enrolled on protocol  and randomized to endocrine therapy alone.  She was tried on all 3 aromatase inhibitors and had itching and rash to all of them.  In August 2013 she was started on tamoxifen.   She was found to have  bone metastasis in May 2015, 2015.  A subsequent bone biopsy was performed on a lesion at the T8 vertebra.   The pathology from that was consistent with metastatic breast cancer, ER +80%, IA +80%, and HER-2 negative.      She received letrozole plus palbociclib from July 2015 until May  2016.  She also received bone protective therapy with Xgeva.      Faslodex was started in June 2016.      Exemestane and Afinitor started in August 2017.  That was discontinued in February 2018 due to progression in the liver and bone.     Navelbine was started February 16, 2018.  She had 8 cycles for that.  Scans in November 2018 showed progression as follows     Capecitabine was started in December 2018.  She received 5 cycles of that therapy.     Follow-up scans in April 2019 showed progression with a new hepatic lesion and worsening bone disease.     She began clinical trial therapy with erdafitinib on May 8, 2019.  That was discontinued in September 2019 due to progression in the bone.    Hospital Course: Ms. Crain was admitted for continued management of severe abdominal pain radiating to her shoulder on 1/06/2020. Her home medication regimen of MS Contin 90mg q8 and Dilaudid 4mg q4 prn was not sufficient so she was placed on a PCA pump. Pain management was consulted and based on her PCA use, recommended an increase in her pain medication regimen. She was discharged on a pain medication regimen of MS Contin 200mg tid and MSIR 60mg q3 prn.  Additionally, during her stay MRI of the spine on 1/11/2020 showed metastatic disease and a new pathologic fx of the right sacral ala. MRI of the abd/pelv showed relatively stable metastatic disease. No acute abnormalities noted on the imaging.   She was instructed to follow up with Dr. Schroeder after discharge.       Review of Systems   Constitutional: Negative for chills, diaphoresis and fever.   HENT: Positive for congestion.    Eyes: Negative for blurred vision and double vision.   Respiratory: Positive for cough. Negative for hemoptysis and shortness of breath.    Cardiovascular: Negative for chest pain, palpitations and leg swelling.   Gastrointestinal: Positive for abdominal pain and constipation. Negative for diarrhea, nausea and vomiting.   Genitourinary: Negative for  urgency.   Musculoskeletal: Negative for myalgias.   Neurological: Negative for dizziness and headaches.   Psychiatric/Behavioral: Negative for hallucinations.     Physical Exam   Constitutional: She is oriented to person, place, and time. She appears well-developed and well-nourished. No distress.   Eyes: EOM are normal. No scleral icterus.   Neck: Normal range of motion. Neck supple.   Cardiovascular: Normal rate, regular rhythm and intact distal pulses.   No murmur heard.  Pulmonary/Chest: Effort normal. No respiratory distress.   Abdominal: Soft. There is tenderness (mild RUQ).   Musculoskeletal: She exhibits no edema.   Neurological: She is alert and oriented to person, place, and time.   Skin: Skin is warm and dry. She is not diaphoretic.   Psychiatric: She has a normal mood and affect. Her behavior is normal.   Nursing note and vitals reviewed.      Significant Diagnostic Studies: Labs: All labs within the past 24 hours have been reviewed    Pending Diagnostic Studies:     None        Final Active Diagnoses:    Diagnosis Date Noted POA    PRINCIPAL PROBLEM:  Intractable pain [R52] 05/18/2016 Unknown    Benign essential HTN [I10] 06/03/2016 Yes     Chronic    Adjustment disorder with depressed mood [F43.21] 02/07/2017 Yes     Chronic    Chemotherapy-induced neuropathy [G62.0, T45.1X5A] 07/26/2016 Yes    Hypercalcemia of malignancy [E83.52] 01/07/2020 Unknown    Palliative care encounter [Z51.5] 01/07/2020 Not Applicable    Bone metastasis [C79.51] 09/14/2016 Yes    Cancer of central portion of right female breast [C50.111] 10/19/2015 Yes      Problems Resolved During this Admission:      Discharged Condition: stable    Disposition: Home or Self Care    Follow Up:  Follow-up Information     Rodrigo Schroeder MD.    Specialties:  Hematology and Oncology, Hematology  Why:  Follow-Up Appointment as scheduled by the clinic  Contact information:  [address]  [facility] [address] [phone]                  Patient Instructions:      Diet Adult Regular     Notify your health care provider if you experience any of the following:  temperature >100.4     Notify your health care provider if you experience any of the following:  persistent nausea and vomiting or diarrhea     Notify your health care provider if you experience any of the following:  severe uncontrolled pain     Notify your health care provider if you experience any of the following:  persistent dizziness, light-headedness, or visual disturbances     Notify your health care provider if you experience any of the following:  increased confusion or weakness     Activity as tolerated     Medications:  Reconciled Home Medications:      Medication List      START taking these medications    ketorolac 10 mg tablet  Commonly known as:  TORADOL  Take 1 tablet (10 mg total) by mouth every 6 (six) hours as needed for Pain.        CHANGE how you take these medications    * morphine 200 MG 12 hr tablet  Commonly known as:  MS CONTIN  Take 1 tablet (200 mg total) by mouth every 8 (eight) hours.  What changed:    · medication strength  · how much to take  · Another medication with the same name was removed. Continue taking this medication, and follow the directions you see here.     * morphine 30 MG tablet  Commonly known as:  MSIR  Take 2 tablets (60 mg total) by mouth every 3 (three) hours as needed for Pain.  What changed:  You were already taking a medication with the same name, and this prescription was added. Make sure you understand how and when to take each.  Replaces:  morphine 30 MG 12 hr tablet         * This list has 2 medication(s) that are the same as other medications prescribed for you. Read the directions carefully, and ask your doctor or other care provider to review them with you.            CONTINUE taking these medications    Afluria Qd 2019-20(3yr up)(PF) 60 mcg (15 mcg x 4)/0.5 mL Syrg  Generic drug:  flu vac [id number] 36mos up(PF)  TO BE  ADMINISTERED BY PHARMACIST FOR IMMUNIZATION     ascorbic acid (vitamin C) 1000 MG tablet  Commonly known as:  VITAMIN C  Take 1,000 mg by mouth once daily.     aspirin 81 MG EC tablet  Commonly known as:  ECOTRIN  Take 81 mg by mouth once daily.     biotin 1 mg tablet  Take 1,000 mcg by mouth once daily.     cloNIDine 0.1 MG tablet  Commonly known as:  CATAPRES  Take 1 tablet (0.1 mg total) by mouth 3 (three) times daily.     cyclobenzaprine 5 MG tablet  Commonly known as:  FLEXERIL  Take 5 mg by mouth.     diphenoxylate-atropine 2.5-0.025 mg 2.5-0.025 mg per tablet  Commonly known as:  LOMOTIL  Take 1 tablet by mouth 4 (four) times daily as needed for Diarrhea.     * DUKE'S SOLUTION (BENADRYL 30 ML, MYLANTA 30 ML, LIDOCAINE 30 ML, NYSTATIN 30 ML)  Take 10 mLs by mouth 4 (four) times daily.     * DUKE'S SOLUTION (BENADRYL 30 ML, MYLANTA 30 ML, LIDOCAINE 30 ML, NYSTATIN 30 ML)  TAKE 10 ML BY MOUTH 4 TIMES A DAY     * DUKE'S SOLUTION (BENADRYL 30 ML, MYLANTA 30 ML, LIDOCAINE 30 ML, NYSTATIN 30 ML)  TAKE 10 ML BY MOUTH 4 TIMES A DAY     gabapentin 300 MG capsule  Commonly known as:  NEURONTIN  Take 1 capsule (300 mg total) by mouth 3 (three) times daily.     lidocaine-prilocaine cream  Commonly known as:  EMLA  APPLY TO AFFECTED AREA EVERY DAY AS NEEDED     loperamide 2 mg Tab  Commonly known as:  IMODIUM A-D  Take 2 mg by mouth 3 (three) times daily as needed.     OMEGA 3-6-9 COMPLEX ORAL  Take 1 capsule by mouth once daily.     ondansetron 4 MG tablet  Commonly known as:  ZOFRAN  Take 1 tablet (4 mg total) by mouth every 8 (eight) hours as needed for Nausea.     pantoprazole 40 MG tablet  Commonly known as:  Protonix  Take 1 tablet (40 mg total) by mouth once daily.     PreviDent 5000 Dry Mouth 1.1 % Gel  Generic drug:  fluoride (sodium)  BRUSH AS DIRECTED     sertraline 50 MG tablet  Commonly known as:  ZOLOFT  Take 1 tablet (50 mg total) by mouth once daily.     Synthroid 137 MCG Tab tablet  Generic drug:   levothyroxine  Take 1 tablet (137 mcg total) by mouth before breakfast. Patient to be seen before anymore refills.         * This list has 3 medication(s) that are the same as other medications prescribed for you. Read the directions carefully, and ask your doctor or other care provider to review them with you.            STOP taking these medications    hydrocodone-homatropine 5-1.5 mg/5 ml 5-1.5 mg/5 mL Syrp  Commonly known as:  HYCODAN     HYDROmorphone 4 MG tablet  Commonly known as:  DILAUDID     morphine 30 MG 12 hr tablet  Commonly known as:  MS CONTIN  Replaced by:  morphine 30 MG tablet  You also have another medication with the same name that you need to continue taking as instructed.     TF-AF-XW-OMEGA 3,6,9 #3 ORAL          * Intractable pain  IMPROVED  Ms Crain is a 65 yo woman with metastatic cancer of the right breast (completed 6 treatments Taxol) who presents with intractable pain in the setting of progressive lytic fractures and hypercalcemia of malignancy.   - Pain management assistance appreciated.   - Previously on Hydromorphone PCA, now transitioned to oral medications    Plan:  - Ms Contin 200mg tid, MSIR 60mg q3prn   - Continue dexamethasone 6mg bid for 4more days for back pain in the setting of known metastatic disease      Benign essential HTN  Continue home clonidine    Adjustment disorder with depressed mood  Continue home meds, sertraline    Chemotherapy-induced neuropathy  Continue gabapentin    Hypercalcemia of malignancy  IMPROVED  Corrected Ca 12.5 on 1/10, normalized on 1/11  Plan:  - Zometa 4mg x1 dose      Ty Fischer MD  Hematology/Oncology  [facility]

## 2020-01-12 NOTE — NURSING
Discharge instructions and prescriptions given and explained to pt.  Pt. verbalized understanding with no further questions.  Port De-accessed. VS WDL.  Patient is awaiting ride home by son and grandson.    ROAD TEST  O=SpO2 96% on RA  A=Ambulating around room and hallway  D=Port De-Accessed  T=Tolerating regular diet  E=Voids independently  S=Performs self care independently  T=Teaching on meds completed

## 2020-01-12 NOTE — ASSESSMENT & PLAN NOTE
IMPROVED  Ms Crain is a 63 yo woman with metastatic cancer of the right breast (completed 6 treatments Taxol) who presents with intractable pain in the setting of progressive lytic fractures and hypercalcemia of malignancy.   - Pain management assistance appreciated.   - Previously on Hydromorphone PCA, now transitioned to oral medications    Plan:  - Ms Contin 200mg tid, MSIR 60mg q3prn   - Continue dexamethasone 6mg bid for 5 more days for back pain in the setting of known metastatic disease

## 2020-01-12 NOTE — HOSPITAL COURSE
Ms. Crain was admitted for continued management of severe abdominal pain radiating to her shoulder on 1/06/2020. Her home medication regimen of MS Contin 90mg q8 and Dilaudid 4mg q4 prn was not sufficient so she was placed on a PCA pump. Pain management was consulted and based on her PCA use, recommended an increase in her pain medication regimen. She was discharged on a pain medication regimen of MS Contin 200mg tid and MSIR 60mg q3 prn.  Additionally, during her stay MRI of the spine on 1/11/2020 showed metastatic disease and a new pathologic fx of the right sacral ala. MRI of the abd/pelv showed relatively stable metastatic disease. No acute abnormalities noted on the imaging.   She was instructed to follow up with Dr. Schroeder after discharge.

## 2020-01-12 NOTE — PLAN OF CARE
POC reviewed with patient; understanding verbalized. Pt complained of abdominal and back pain to back this shift, PRN meds administered. Pt had no other complaints. Pt. with nonskid footwear on, bed in lowest position, and locked with bed rails up x2. Pt. has call light and personal items within reach. Patient ambulates in room independently, walker is at bedside. VSS and afebrile this shift. All questions and concerns addressed at this time. Will continue to monitor.

## 2020-01-12 NOTE — PLAN OF CARE
Plan of care reviewed with patient at beginning of shift. Patient C/O of pain in her back and her prn morphine was given. She also said she had pain in her chest and throat The doctor ordered a one time dose of torodol. A flu test was done and they were negative.  believes chest pain is from the coughing and the sore throat is for sputum she is coughing up. VSS. Afebrile.   Bed locked in lowest position. Side rails up x2. Call bell within reach. BADL within reach. Rounding Q1H. Will continue to monitor.

## 2020-01-13 ENCOUNTER — TELEPHONE (OUTPATIENT)
Dept: HEMATOLOGY/ONCOLOGY | Facility: CLINIC | Age: 65
End: 2020-01-13

## 2020-01-13 ENCOUNTER — PATIENT MESSAGE (OUTPATIENT)
Dept: HEMATOLOGY/ONCOLOGY | Facility: CLINIC | Age: 65
End: 2020-01-13

## 2020-01-13 DIAGNOSIS — J20.2 ACUTE BRONCHITIS DUE TO STREPTOCOCCUS: Primary | ICD-10-CM

## 2020-01-13 RX ORDER — AZITHROMYCIN 250 MG/1
TABLET, FILM COATED ORAL
Qty: 2 TABLET | Refills: 0 | Status: SHIPPED | OUTPATIENT
Start: 2020-01-13 | End: 2020-01-18

## 2020-01-13 NOTE — TELEPHONE ENCOUNTER
----- Message from Renettasuad Hilliard sent at 1/13/2020  9:27 AM CST -----  Contact: Becki, daughter, [phone]  Requests to know if patient needs to see doctor first or can she start chemo because she'd like to bring her in today if possible. Please call.

## 2020-01-13 NOTE — TELEPHONE ENCOUNTER
----- Message from Malinda Martinez sent at 1/13/2020 11:19 AM CST -----  Contact: pt daughter Becki  Reason: Returning call from Suzi    Communication: [phone]

## 2020-01-13 NOTE — TELEPHONE ENCOUNTER
Reached back out to patient's daughter, Becki. Informed her that also have been communication with patient via the portal.  Discussed was already in touch with Dr. Schroeder regarding the plans and going forward and how he would like to proceed.  Understood patient is coming in to start next line treatment with halaven and that she would most likely need to see him prior to initiation for consent and to discuss side effects and prognosis.   Also discussed patient pain regimen with daughter.  She voiced understanding and appreciation.   Explained that this week most likely would have to schedule the chemo at [facility], given main campus at [facility].Going forward can get everything scheduled at the main location. Patient daughter in agreement to do this.  Working on times at this time. Will follow up with her regarding appointments.      Detail Level: Detailed Hide Additional Notes?: No Include Location In Plan?: Yes

## 2020-01-13 NOTE — TELEPHONE ENCOUNTER
Followed up with patient daughter regarding future appointments and locations/times.  No answer. Voicemail left requesting return call.

## 2020-01-14 NOTE — PROGRESS NOTES
Subjective:       Patient ID: Rebecca Crain is a 64 y.o. female.    Chief Complaint: No chief complaint on file.    HPI Ms. Crain is here for followup of metastatic carcinoma of the right breast.    She was hospitalized last week due to uncontrolled pain. She was discharged on MS Ciontin 200 mg every 8 hours and MSIR 60 mg every 4 hours as well as ketorelac.  Today she reports that her main pain is primarily on her left side from her left hip to her shoulder.  She has had some recent cough with some greenish sputum and is on azithromycin-day 3.  She has had no fever.   She also had a bit of shortness of breath.  Appetite is variable and bowel function is also variable.        ECOG-1-2    Breast history: In 2013 she was diagnosed with stage IIB carcinoma of the right breast, ER positive with a low Oncotype score.  She was enrolled on protocol  and randomized to endocrine therapy alone.  She was tried on all 3 aromatase inhibitors and had itching and rash to all of them.  In August 2013 she was started on tamoxifen.   She was found to have  bone metastasis in May 2015, 2015.  A subsequent bone biopsy was performed on a lesion at the T8 vertebra.   The pathology from that was consistent with metastatic breast cancer, ER +80%, ME +80%, and HER-2 negative.      She received letrozole plus palbociclib from July 2015 until May 2016.  She also received bone protective therapy with Xgeva.      Faslodex was started in June 2016.      Exemestane and Afinitor started in August 2017.  That was discontinued in February 2018 due to progression in the liver and bone.     Navelbine was started February 16, 2018.  She had 8 cycles for that.  Scans in November 2018 showed progression as follows     Capecitabine was started in December 2018.  She received 5 cycles of that therapy.    Follow-up scans in April 2019 showed progression with a new hepatic lesion and worsening bone disease.    She began clinical trial therapy with  erdafitinib on May 8, 2019.  That was discontinued in September 2019 due to progression in the bone.    She then received a course of radiation therapy to her pelvis.  She began weekly Taxol in October 2019.That was discontinued in December 2019 due to progression.  Review of Systems   Constitutional: Positive for activity change and fatigue. Negative for appetite change, fever and unexpected weight change.   Eyes: Negative for visual disturbance.   Respiratory: Positive for cough and shortness of breath.    Cardiovascular: Positive for chest pain.   Gastrointestinal: Negative for abdominal pain and diarrhea.   Genitourinary: Negative for frequency.   Musculoskeletal: Positive for back pain.   Skin: Negative for rash.   Neurological: Negative for headaches.   Hematological: Negative for adenopathy.   Psychiatric/Behavioral: The patient is not nervous/anxious.        Objective:      Physical Exam   Constitutional: She is oriented to person, place, and time. She appears well-developed and well-nourished. No distress.   HENT:   Mouth/Throat: No oropharyngeal exudate.   Eyes: No scleral icterus.   Cardiovascular: Normal rate and regular rhythm.   Pulmonary/Chest: Effort normal. She has no wheezes. She has rales (Scattered at left base).   Abdominal: Soft. She exhibits no mass. There is no tenderness.   Lymphadenopathy:     She has no cervical adenopathy.     She has no axillary adenopathy.        Right: No supraclavicular adenopathy present.        Left: No supraclavicular adenopathy present.   Neurological: She is alert and oriented to person, place, and time.   Skin: No rash noted.   Psychiatric: Her behavior is normal. Thought content normal.   Vitals reviewed.      Assessment:      1. Malignant neoplasm of central portion of right breast in female, estrogen receptor positive    2. Bone metastasis    3. Metastasis to liver    4. Cancer related pain      5.  Bronchitis  Plan:       She will start therapy with  eribulin.  Side effects were discussed and written informed consent executed.

## 2020-01-15 ENCOUNTER — PATIENT MESSAGE (OUTPATIENT)
Dept: HEMATOLOGY/ONCOLOGY | Facility: CLINIC | Age: 65
End: 2020-01-15

## 2020-01-15 ENCOUNTER — OFFICE VISIT (OUTPATIENT)
Dept: HEMATOLOGY/ONCOLOGY | Facility: CLINIC | Age: 65
End: 2020-01-15
Payer: MEDICARE

## 2020-01-15 ENCOUNTER — INFUSION (OUTPATIENT)
Dept: INFUSION THERAPY | Facility: HOSPITAL | Age: 65
End: 2020-01-15
Attending: INTERNAL MEDICINE
Payer: MEDICARE

## 2020-01-15 VITALS
SYSTOLIC BLOOD PRESSURE: 139 MMHG | BODY MASS INDEX: 30.04 KG/M2 | RESPIRATION RATE: 18 BRPM | TEMPERATURE: 98 F | DIASTOLIC BLOOD PRESSURE: 67 MMHG | OXYGEN SATURATION: 98 % | HEART RATE: 72 BPM | HEIGHT: 64 IN

## 2020-01-15 VITALS
SYSTOLIC BLOOD PRESSURE: 113 MMHG | HEART RATE: 72 BPM | HEIGHT: 64 IN | RESPIRATION RATE: 18 BRPM | BODY MASS INDEX: 29.88 KG/M2 | WEIGHT: 175 LBS | DIASTOLIC BLOOD PRESSURE: 57 MMHG | TEMPERATURE: 98 F

## 2020-01-15 DIAGNOSIS — G89.3 CANCER RELATED PAIN: ICD-10-CM

## 2020-01-15 DIAGNOSIS — C78.7 METASTASIS TO LIVER: ICD-10-CM

## 2020-01-15 DIAGNOSIS — C79.51 METASTATIC CANCER TO SPINE: ICD-10-CM

## 2020-01-15 DIAGNOSIS — C50.111 MALIGNANT NEOPLASM OF CENTRAL PORTION OF RIGHT BREAST IN FEMALE, ESTROGEN RECEPTOR POSITIVE: ICD-10-CM

## 2020-01-15 DIAGNOSIS — C78.7 METASTASIS TO LIVER: Primary | ICD-10-CM

## 2020-01-15 DIAGNOSIS — Z17.0 MALIGNANT NEOPLASM OF CENTRAL PORTION OF RIGHT BREAST IN FEMALE, ESTROGEN RECEPTOR POSITIVE: Primary | ICD-10-CM

## 2020-01-15 DIAGNOSIS — Z17.0 MALIGNANT NEOPLASM OF CENTRAL PORTION OF RIGHT BREAST IN FEMALE, ESTROGEN RECEPTOR POSITIVE: ICD-10-CM

## 2020-01-15 DIAGNOSIS — C79.51 BONE METASTASIS: ICD-10-CM

## 2020-01-15 DIAGNOSIS — C50.111 MALIGNANT NEOPLASM OF CENTRAL PORTION OF RIGHT BREAST IN FEMALE, ESTROGEN RECEPTOR POSITIVE: Primary | ICD-10-CM

## 2020-01-15 PROCEDURE — 3078F PR MOST RECENT DIASTOLIC BLOOD PRESSURE < 80 MM HG: ICD-10-PCS | Mod: HCNC,CPTII,S$GLB, | Performed by: INTERNAL MEDICINE

## 2020-01-15 PROCEDURE — 96409 CHEMO IV PUSH SNGL DRUG: CPT | Mod: HCNC

## 2020-01-15 PROCEDURE — 96372 THER/PROPH/DIAG INJ SC/IM: CPT | Mod: HCNC

## 2020-01-15 PROCEDURE — 96367 TX/PROPH/DG ADDL SEQ IV INF: CPT | Mod: HCNC

## 2020-01-15 PROCEDURE — A4216 STERILE WATER/SALINE, 10 ML: HCPCS | Mod: HCNC | Performed by: INTERNAL MEDICINE

## 2020-01-15 PROCEDURE — 63600175 PHARM REV CODE 636 W HCPCS: Mod: JG,HCNC | Performed by: INTERNAL MEDICINE

## 2020-01-15 PROCEDURE — 99214 OFFICE O/P EST MOD 30 MIN: CPT | Mod: HCNC,S$GLB,, | Performed by: INTERNAL MEDICINE

## 2020-01-15 PROCEDURE — 3008F BODY MASS INDEX DOCD: CPT | Mod: HCNC,CPTII,S$GLB, | Performed by: INTERNAL MEDICINE

## 2020-01-15 PROCEDURE — 3075F SYST BP GE 130 - 139MM HG: CPT | Mod: HCNC,CPTII,S$GLB, | Performed by: INTERNAL MEDICINE

## 2020-01-15 PROCEDURE — 3075F PR MOST RECENT SYSTOLIC BLOOD PRESS GE 130-139MM HG: ICD-10-PCS | Mod: HCNC,CPTII,S$GLB, | Performed by: INTERNAL MEDICINE

## 2020-01-15 PROCEDURE — 99999 PR PBB SHADOW E&M-EST. PATIENT-LVL III: ICD-10-PCS | Mod: PBBFAC,HCNC,, | Performed by: INTERNAL MEDICINE

## 2020-01-15 PROCEDURE — 99214 PR OFFICE/OUTPT VISIT, EST, LEVL IV, 30-39 MIN: ICD-10-PCS | Mod: HCNC,S$GLB,, | Performed by: INTERNAL MEDICINE

## 2020-01-15 PROCEDURE — 99999 PR PBB SHADOW E&M-EST. PATIENT-LVL III: CPT | Mod: PBBFAC,HCNC,, | Performed by: INTERNAL MEDICINE

## 2020-01-15 PROCEDURE — 3008F PR BODY MASS INDEX (BMI) DOCUMENTED: ICD-10-PCS | Mod: HCNC,CPTII,S$GLB, | Performed by: INTERNAL MEDICINE

## 2020-01-15 PROCEDURE — 3078F DIAST BP <80 MM HG: CPT | Mod: HCNC,CPTII,S$GLB, | Performed by: INTERNAL MEDICINE

## 2020-01-15 PROCEDURE — 25000003 PHARM REV CODE 250: Mod: HCNC | Performed by: INTERNAL MEDICINE

## 2020-01-15 RX ORDER — SODIUM CHLORIDE 0.9 % (FLUSH) 0.9 %
10 SYRINGE (ML) INJECTION
Status: CANCELLED | OUTPATIENT
Start: 2020-01-22

## 2020-01-15 RX ORDER — HEPARIN 100 UNIT/ML
500 SYRINGE INTRAVENOUS
Status: CANCELLED | OUTPATIENT
Start: 2020-01-15

## 2020-01-15 RX ORDER — SODIUM CHLORIDE 0.9 % (FLUSH) 0.9 %
10 SYRINGE (ML) INJECTION
Status: CANCELLED | OUTPATIENT
Start: 2020-01-15

## 2020-01-15 RX ORDER — HEPARIN 100 UNIT/ML
500 SYRINGE INTRAVENOUS
Status: DISCONTINUED | OUTPATIENT
Start: 2020-01-15 | End: 2020-01-15 | Stop reason: HOSPADM

## 2020-01-15 RX ORDER — SODIUM CHLORIDE 0.9 % (FLUSH) 0.9 %
10 SYRINGE (ML) INJECTION
Status: DISCONTINUED | OUTPATIENT
Start: 2020-01-15 | End: 2020-01-15 | Stop reason: HOSPADM

## 2020-01-15 RX ORDER — HEPARIN 100 UNIT/ML
500 SYRINGE INTRAVENOUS
Status: CANCELLED | OUTPATIENT
Start: 2020-01-22

## 2020-01-15 RX ADMIN — DENOSUMAB 120 MG: 120 INJECTION SUBCUTANEOUS at 12:01

## 2020-01-15 RX ADMIN — SODIUM CHLORIDE: 0.9 INJECTION, SOLUTION INTRAVENOUS at 01:01

## 2020-01-15 RX ADMIN — HEPARIN 500 UNITS: 100 SYRINGE at 01:01

## 2020-01-15 RX ADMIN — GRANISETRON HYDROCHLORIDE 1 MG: 0.1 INJECTION, SOLUTION INTRAVENOUS at 12:01

## 2020-01-15 RX ADMIN — Medication 10 ML: at 01:01

## 2020-01-15 RX ADMIN — ERIBULIN MESYLATE 2.55 MG: 0.5 INJECTION INTRAVENOUS at 12:01

## 2020-01-15 NOTE — PLAN OF CARE
1220 pt here for halaven infusion, and Xgeva injection, labs, hx, meds, allergies reviewed, pt with no complaints at this time, assisted from wheelchair to chair, continue to monitor

## 2020-01-15 NOTE — PLAN OF CARE
1310 pt tolerated halaven infusion without issue, xgeca givent o abdomen tolerated well, pt to rtc 1/22/20, no distress noted upon d/c to home

## 2020-01-17 ENCOUNTER — PATIENT MESSAGE (OUTPATIENT)
Dept: HEMATOLOGY/ONCOLOGY | Facility: CLINIC | Age: 65
End: 2020-01-17

## 2020-01-20 DIAGNOSIS — C79.51 BONE METASTASIS: Primary | ICD-10-CM

## 2020-01-20 RX ORDER — KETOROLAC TROMETHAMINE 10 MG/1
10 TABLET, FILM COATED ORAL EVERY 6 HOURS PRN
Qty: 90 TABLET | Refills: 3 | Status: ON HOLD | OUTPATIENT
Start: 2020-01-20 | End: 2020-02-02 | Stop reason: HOSPADM

## 2020-01-22 ENCOUNTER — INFUSION (OUTPATIENT)
Dept: INFUSION THERAPY | Facility: HOSPITAL | Age: 65
End: 2020-01-22
Attending: INTERNAL MEDICINE
Payer: MEDICARE

## 2020-01-22 VITALS
HEART RATE: 89 BPM | SYSTOLIC BLOOD PRESSURE: 107 MMHG | RESPIRATION RATE: 18 BRPM | TEMPERATURE: 98 F | DIASTOLIC BLOOD PRESSURE: 55 MMHG

## 2020-01-22 DIAGNOSIS — Z17.0 MALIGNANT NEOPLASM OF CENTRAL PORTION OF RIGHT BREAST IN FEMALE, ESTROGEN RECEPTOR POSITIVE: ICD-10-CM

## 2020-01-22 DIAGNOSIS — C79.51 METASTATIC CANCER TO SPINE: ICD-10-CM

## 2020-01-22 DIAGNOSIS — C78.7 METASTASIS TO LIVER: Primary | ICD-10-CM

## 2020-01-22 DIAGNOSIS — C50.111 MALIGNANT NEOPLASM OF CENTRAL PORTION OF RIGHT BREAST IN FEMALE, ESTROGEN RECEPTOR POSITIVE: ICD-10-CM

## 2020-01-22 PROCEDURE — A4216 STERILE WATER/SALINE, 10 ML: HCPCS | Mod: HCNC | Performed by: INTERNAL MEDICINE

## 2020-01-22 PROCEDURE — 25000003 PHARM REV CODE 250: Mod: HCNC | Performed by: INTERNAL MEDICINE

## 2020-01-22 PROCEDURE — 63600175 PHARM REV CODE 636 W HCPCS: Mod: HCNC | Performed by: INTERNAL MEDICINE

## 2020-01-22 PROCEDURE — 96409 CHEMO IV PUSH SNGL DRUG: CPT | Mod: HCNC

## 2020-01-22 PROCEDURE — 96367 TX/PROPH/DG ADDL SEQ IV INF: CPT | Mod: HCNC

## 2020-01-22 RX ORDER — SODIUM CHLORIDE 0.9 % (FLUSH) 0.9 %
10 SYRINGE (ML) INJECTION
Status: DISCONTINUED | OUTPATIENT
Start: 2020-01-22 | End: 2020-01-22 | Stop reason: HOSPADM

## 2020-01-22 RX ORDER — HEPARIN 100 UNIT/ML
500 SYRINGE INTRAVENOUS
Status: DISCONTINUED | OUTPATIENT
Start: 2020-01-22 | End: 2020-01-22 | Stop reason: HOSPADM

## 2020-01-22 RX ADMIN — Medication 10 ML: at 10:01

## 2020-01-22 RX ADMIN — ERIBULIN MESYLATE 2.55 MG: 0.5 INJECTION INTRAVENOUS at 09:01

## 2020-01-22 RX ADMIN — GRANISETRON HYDROCHLORIDE 1 MG: 0.1 INJECTION, SOLUTION INTRAVENOUS at 09:01

## 2020-01-22 RX ADMIN — HEPARIN 500 UNITS: 100 SYRINGE at 10:01

## 2020-01-22 RX ADMIN — SODIUM CHLORIDE: 9 INJECTION, SOLUTION INTRAVENOUS at 09:01

## 2020-01-22 NOTE — PLAN OF CARE
Pt tolerated Halaven today. NAD. Port flushed + blood return present, flushed. Hep lock and deaccesed. declined AVS. Uses my Ochnser. Discharged home. Ambulated independently with a walker.

## 2020-01-22 NOTE — PLAN OF CARE
Problem: Adult Inpatient Plan of Care  Goal: Optimal Comfort and Wellbeing  Intervention: Provide Person-Centered Care  Flowsheets (Taken 1/22/2020 0929)  Trust Relationship/Rapport: thoughts/feelings acknowledged; choices provided; questions answered; emotional support provided; reassurance provided; questions encouraged; care explained; empathic listening provided

## 2020-01-25 ENCOUNTER — NURSE TRIAGE (OUTPATIENT)
Dept: ADMINISTRATIVE | Facility: CLINIC | Age: 65
End: 2020-01-25

## 2020-01-25 ENCOUNTER — HOSPITAL ENCOUNTER (INPATIENT)
Facility: HOSPITAL | Age: 65
LOS: 8 days | Discharge: HOME HEALTH | DRG: 809 | End: 2020-02-02
Attending: EMERGENCY MEDICINE | Admitting: EMERGENCY MEDICINE
Payer: MEDICARE

## 2020-01-25 DIAGNOSIS — D70.1 CHEMOTHERAPY-INDUCED NEUTROPENIA: ICD-10-CM

## 2020-01-25 DIAGNOSIS — K59.03 DRUG-INDUCED CONSTIPATION: ICD-10-CM

## 2020-01-25 DIAGNOSIS — C79.51 METASTATIC CANCER TO SPINE: ICD-10-CM

## 2020-01-25 DIAGNOSIS — E06.3 HYPOTHYROIDISM DUE TO HASHIMOTO'S THYROIDITIS: ICD-10-CM

## 2020-01-25 DIAGNOSIS — E83.51 HYPOCALCEMIA: ICD-10-CM

## 2020-01-25 DIAGNOSIS — T45.1X5A CHEMOTHERAPY-INDUCED NEUTROPENIA: ICD-10-CM

## 2020-01-25 DIAGNOSIS — K12.30 ORAL MUCOSITIS: ICD-10-CM

## 2020-01-25 DIAGNOSIS — J10.1 INFLUENZA B: Primary | ICD-10-CM

## 2020-01-25 DIAGNOSIS — K59.00 CONSTIPATION, UNSPECIFIED CONSTIPATION TYPE: ICD-10-CM

## 2020-01-25 DIAGNOSIS — R50.81 NEUTROPENIC FEVER: ICD-10-CM

## 2020-01-25 DIAGNOSIS — E83.39 HYPOPHOSPHATEMIA: ICD-10-CM

## 2020-01-25 DIAGNOSIS — E03.8 HYPOTHYROIDISM DUE TO HASHIMOTO'S THYROIDITIS: ICD-10-CM

## 2020-01-25 DIAGNOSIS — E83.52 HYPERCALCEMIA OF MALIGNANCY: ICD-10-CM

## 2020-01-25 DIAGNOSIS — R00.0 TACHYCARDIA: ICD-10-CM

## 2020-01-25 DIAGNOSIS — D70.9 NEUTROPENIC FEVER: ICD-10-CM

## 2020-01-25 LAB
ALBUMIN SERPL BCP-MCNC: 2.4 G/DL (ref 3.5–5.2)
ALP SERPL-CCNC: 369 U/L (ref 55–135)
ALT SERPL W/O P-5'-P-CCNC: 50 U/L (ref 10–44)
ANION GAP SERPL CALC-SCNC: 8 MMOL/L (ref 8–16)
ANISOCYTOSIS BLD QL SMEAR: SLIGHT
AST SERPL-CCNC: 88 U/L (ref 10–40)
BACTERIA #/AREA URNS AUTO: ABNORMAL /HPF
BASOPHILS # BLD AUTO: ABNORMAL K/UL (ref 0–0.2)
BASOPHILS NFR BLD: 0 % (ref 0–1.9)
BILIRUB SERPL-MCNC: 1.3 MG/DL (ref 0.1–1)
BILIRUB UR QL STRIP: NEGATIVE
BUN SERPL-MCNC: 7 MG/DL (ref 8–23)
CALCIUM SERPL-MCNC: 8.2 MG/DL (ref 8.7–10.5)
CHLORIDE SERPL-SCNC: 101 MMOL/L (ref 95–110)
CLARITY UR REFRACT.AUTO: CLEAR
CO2 SERPL-SCNC: 25 MMOL/L (ref 23–29)
COLOR UR AUTO: YELLOW
CREAT SERPL-MCNC: 0.6 MG/DL (ref 0.5–1.4)
DEPRECATED S PYO AG THROAT QL EIA: NEGATIVE
DIFFERENTIAL METHOD: ABNORMAL
EOSINOPHIL # BLD AUTO: ABNORMAL K/UL (ref 0–0.5)
EOSINOPHIL NFR BLD: 0 % (ref 0–8)
ERYTHROCYTE [DISTWIDTH] IN BLOOD BY AUTOMATED COUNT: 15.5 % (ref 11.5–14.5)
EST. GFR  (AFRICAN AMERICAN): >60 ML/MIN/1.73 M^2
EST. GFR  (NON AFRICAN AMERICAN): >60 ML/MIN/1.73 M^2
GLUCOSE SERPL-MCNC: 196 MG/DL (ref 70–110)
GLUCOSE UR QL STRIP: ABNORMAL
HCT VFR BLD AUTO: 35.6 % (ref 37–48.5)
HGB BLD-MCNC: 11 G/DL (ref 12–16)
HGB UR QL STRIP: NEGATIVE
HYALINE CASTS UR QL AUTO: 3 /LPF
IMM GRANULOCYTES # BLD AUTO: ABNORMAL K/UL (ref 0–0.04)
IMM GRANULOCYTES NFR BLD AUTO: ABNORMAL % (ref 0–0.5)
INFLUENZA A, MOLECULAR: NEGATIVE
INFLUENZA B, MOLECULAR: POSITIVE
INR PPP: 1.1 (ref 0.8–1.2)
KETONES UR QL STRIP: NEGATIVE
LACTATE SERPL-SCNC: 2.3 MMOL/L (ref 0.5–2.2)
LACTATE SERPL-SCNC: 2.6 MMOL/L (ref 0.5–2.2)
LEUKOCYTE ESTERASE UR QL STRIP: NEGATIVE
LYMPHOCYTES # BLD AUTO: ABNORMAL K/UL (ref 1–4.8)
LYMPHOCYTES NFR BLD: 75 % (ref 18–48)
MCH RBC QN AUTO: 28.5 PG (ref 27–31)
MCHC RBC AUTO-ENTMCNC: 30.9 G/DL (ref 32–36)
MCV RBC AUTO: 92 FL (ref 82–98)
MICROSCOPIC COMMENT: ABNORMAL
MONOCYTES # BLD AUTO: ABNORMAL K/UL (ref 0.3–1)
MONOCYTES NFR BLD: 10 % (ref 4–15)
NEUTROPHILS NFR BLD: 15 % (ref 38–73)
NITRITE UR QL STRIP: NEGATIVE
NRBC BLD-RTO: 0 /100 WBC
PH UR STRIP: 6 [PH] (ref 5–8)
PLATELET # BLD AUTO: 151 K/UL (ref 150–350)
PLATELET BLD QL SMEAR: ABNORMAL
PMV BLD AUTO: 10.7 FL (ref 9.2–12.9)
POTASSIUM SERPL-SCNC: 3.7 MMOL/L (ref 3.5–5.1)
PROCALCITONIN SERPL IA-MCNC: 0.29 NG/ML
PROT SERPL-MCNC: 5.5 G/DL (ref 6–8.4)
PROT UR QL STRIP: NEGATIVE
PROTHROMBIN TIME: 11.1 SEC (ref 9–12.5)
RBC # BLD AUTO: 3.86 M/UL (ref 4–5.4)
RBC #/AREA URNS AUTO: 0 /HPF (ref 0–4)
SODIUM SERPL-SCNC: 134 MMOL/L (ref 136–145)
SP GR UR STRIP: 1.01 (ref 1–1.03)
SPECIMEN SOURCE: ABNORMAL
SQUAMOUS #/AREA URNS AUTO: 0 /HPF
URN SPEC COLLECT METH UR: ABNORMAL
WBC # BLD AUTO: 0.25 K/UL (ref 3.9–12.7)
WBC #/AREA URNS AUTO: 0 /HPF (ref 0–5)

## 2020-01-25 PROCEDURE — 99285 EMERGENCY DEPT VISIT HI MDM: CPT | Mod: 25,HCNC

## 2020-01-25 PROCEDURE — 96367 TX/PROPH/DG ADDL SEQ IV INF: CPT | Mod: HCNC

## 2020-01-25 PROCEDURE — 99285 PR EMERGENCY DEPT VISIT,LEVEL V: ICD-10-PCS | Mod: ,,, | Performed by: EMERGENCY MEDICINE

## 2020-01-25 PROCEDURE — 84145 PROCALCITONIN (PCT): CPT | Mod: HCNC

## 2020-01-25 PROCEDURE — 20600001 HC STEP DOWN PRIVATE ROOM: Mod: HCNC

## 2020-01-25 PROCEDURE — 85027 COMPLETE CBC AUTOMATED: CPT | Mod: HCNC

## 2020-01-25 PROCEDURE — 25000003 PHARM REV CODE 250: Mod: HCNC | Performed by: EMERGENCY MEDICINE

## 2020-01-25 PROCEDURE — 87880 STREP A ASSAY W/OPTIC: CPT | Mod: HCNC

## 2020-01-25 PROCEDURE — 85007 BL SMEAR W/DIFF WBC COUNT: CPT | Mod: HCNC

## 2020-01-25 PROCEDURE — 63600175 PHARM REV CODE 636 W HCPCS: Mod: JG,HCNC | Performed by: STUDENT IN AN ORGANIZED HEALTH CARE EDUCATION/TRAINING PROGRAM

## 2020-01-25 PROCEDURE — 80053 COMPREHEN METABOLIC PANEL: CPT | Mod: HCNC

## 2020-01-25 PROCEDURE — 99223 1ST HOSP IP/OBS HIGH 75: CPT | Mod: HCNC,AI,, | Performed by: INTERNAL MEDICINE

## 2020-01-25 PROCEDURE — 85610 PROTHROMBIN TIME: CPT | Mod: HCNC

## 2020-01-25 PROCEDURE — 25000003 PHARM REV CODE 250: Mod: HCNC | Performed by: STUDENT IN AN ORGANIZED HEALTH CARE EDUCATION/TRAINING PROGRAM

## 2020-01-25 PROCEDURE — 87040 BLOOD CULTURE FOR BACTERIA: CPT | Mod: HCNC

## 2020-01-25 PROCEDURE — 63600175 PHARM REV CODE 636 W HCPCS: Mod: HCNC | Performed by: STUDENT IN AN ORGANIZED HEALTH CARE EDUCATION/TRAINING PROGRAM

## 2020-01-25 PROCEDURE — 81001 URINALYSIS AUTO W/SCOPE: CPT | Mod: HCNC

## 2020-01-25 PROCEDURE — 63600175 PHARM REV CODE 636 W HCPCS: Mod: HCNC | Performed by: EMERGENCY MEDICINE

## 2020-01-25 PROCEDURE — 93010 EKG 12-LEAD: ICD-10-PCS | Mod: HCNC,,, | Performed by: INTERNAL MEDICINE

## 2020-01-25 PROCEDURE — 96365 THER/PROPH/DIAG IV INF INIT: CPT | Mod: HCNC

## 2020-01-25 PROCEDURE — 83605 ASSAY OF LACTIC ACID: CPT | Mod: 91,HCNC

## 2020-01-25 PROCEDURE — 99223 PR INITIAL HOSPITAL CARE,LEVL III: ICD-10-PCS | Mod: HCNC,AI,, | Performed by: INTERNAL MEDICINE

## 2020-01-25 PROCEDURE — 93005 ELECTROCARDIOGRAM TRACING: CPT | Mod: HCNC

## 2020-01-25 PROCEDURE — 99285 EMERGENCY DEPT VISIT HI MDM: CPT | Mod: ,,, | Performed by: EMERGENCY MEDICINE

## 2020-01-25 PROCEDURE — 87081 CULTURE SCREEN ONLY: CPT | Mod: HCNC

## 2020-01-25 PROCEDURE — 93010 ELECTROCARDIOGRAM REPORT: CPT | Mod: HCNC,,, | Performed by: INTERNAL MEDICINE

## 2020-01-25 PROCEDURE — 96361 HYDRATE IV INFUSION ADD-ON: CPT | Mod: HCNC

## 2020-01-25 PROCEDURE — 87502 INFLUENZA DNA AMP PROBE: CPT | Mod: HCNC

## 2020-01-25 RX ORDER — SODIUM CHLORIDE 0.9 % (FLUSH) 0.9 %
10 SYRINGE (ML) INJECTION
Status: DISCONTINUED | OUTPATIENT
Start: 2020-01-25 | End: 2020-02-02 | Stop reason: HOSPADM

## 2020-01-25 RX ORDER — SERTRALINE HYDROCHLORIDE 25 MG/1
50 TABLET, FILM COATED ORAL DAILY
Status: DISCONTINUED | OUTPATIENT
Start: 2020-01-25 | End: 2020-02-02 | Stop reason: HOSPADM

## 2020-01-25 RX ORDER — OSELTAMIVIR PHOSPHATE 75 MG/1
75 CAPSULE ORAL
Status: COMPLETED | OUTPATIENT
Start: 2020-01-25 | End: 2020-01-25

## 2020-01-25 RX ORDER — MORPHINE SULFATE 100 MG/1
200 TABLET, FILM COATED, EXTENDED RELEASE ORAL EVERY 8 HOURS
Status: DISCONTINUED | OUTPATIENT
Start: 2020-01-25 | End: 2020-02-02 | Stop reason: HOSPADM

## 2020-01-25 RX ORDER — AMOXICILLIN 250 MG
1 CAPSULE ORAL DAILY
Status: DISCONTINUED | OUTPATIENT
Start: 2020-01-26 | End: 2020-02-02 | Stop reason: HOSPADM

## 2020-01-25 RX ORDER — GLUCAGON 1 MG
1 KIT INJECTION
Status: DISCONTINUED | OUTPATIENT
Start: 2020-01-25 | End: 2020-02-02 | Stop reason: HOSPADM

## 2020-01-25 RX ORDER — AMOXICILLIN 250 MG
1 CAPSULE ORAL 2 TIMES DAILY
Status: DISCONTINUED | OUTPATIENT
Start: 2020-01-25 | End: 2020-01-25

## 2020-01-25 RX ORDER — INSULIN ASPART 100 [IU]/ML
0-5 INJECTION, SOLUTION INTRAVENOUS; SUBCUTANEOUS
Status: DISCONTINUED | OUTPATIENT
Start: 2020-01-25 | End: 2020-02-02 | Stop reason: HOSPADM

## 2020-01-25 RX ORDER — VANCOMYCIN 2 GRAM/500 ML IN 0.9 % SODIUM CHLORIDE INTRAVENOUS
2000
Status: COMPLETED | OUTPATIENT
Start: 2020-01-25 | End: 2020-01-25

## 2020-01-25 RX ORDER — PANTOPRAZOLE SODIUM 40 MG/1
40 TABLET, DELAYED RELEASE ORAL DAILY
Status: DISCONTINUED | OUTPATIENT
Start: 2020-01-25 | End: 2020-02-02 | Stop reason: HOSPADM

## 2020-01-25 RX ORDER — IBUPROFEN 200 MG
24 TABLET ORAL
Status: DISCONTINUED | OUTPATIENT
Start: 2020-01-25 | End: 2020-02-02 | Stop reason: HOSPADM

## 2020-01-25 RX ORDER — IBUPROFEN 200 MG
400 TABLET ORAL EVERY 6 HOURS PRN
Status: DISCONTINUED | OUTPATIENT
Start: 2020-01-25 | End: 2020-02-02 | Stop reason: HOSPADM

## 2020-01-25 RX ORDER — ONDANSETRON 8 MG/1
8 TABLET, ORALLY DISINTEGRATING ORAL EVERY 8 HOURS PRN
Status: DISCONTINUED | OUTPATIENT
Start: 2020-01-25 | End: 2020-02-02 | Stop reason: HOSPADM

## 2020-01-25 RX ORDER — CEFEPIME HYDROCHLORIDE 2 G/1
2 INJECTION, POWDER, FOR SOLUTION INTRAVENOUS
Status: DISCONTINUED | OUTPATIENT
Start: 2020-01-25 | End: 2020-01-30

## 2020-01-25 RX ORDER — ENOXAPARIN SODIUM 100 MG/ML
40 INJECTION SUBCUTANEOUS EVERY 24 HOURS
Status: DISCONTINUED | OUTPATIENT
Start: 2020-01-25 | End: 2020-02-02 | Stop reason: HOSPADM

## 2020-01-25 RX ORDER — SODIUM CHLORIDE 9 MG/ML
INJECTION, SOLUTION INTRAVENOUS CONTINUOUS
Status: ACTIVE | OUTPATIENT
Start: 2020-01-25 | End: 2020-01-30

## 2020-01-25 RX ORDER — ASPIRIN 81 MG/1
81 TABLET ORAL DAILY
Status: DISCONTINUED | OUTPATIENT
Start: 2020-01-25 | End: 2020-02-02 | Stop reason: HOSPADM

## 2020-01-25 RX ORDER — IBUPROFEN 200 MG
16 TABLET ORAL
Status: DISCONTINUED | OUTPATIENT
Start: 2020-01-25 | End: 2020-02-02 | Stop reason: HOSPADM

## 2020-01-25 RX ORDER — GABAPENTIN 300 MG/1
300 CAPSULE ORAL 3 TIMES DAILY
Status: DISCONTINUED | OUTPATIENT
Start: 2020-01-25 | End: 2020-02-02 | Stop reason: HOSPADM

## 2020-01-25 RX ORDER — OSELTAMIVIR PHOSPHATE 75 MG/1
75 CAPSULE ORAL DAILY
Status: DISCONTINUED | OUTPATIENT
Start: 2020-01-26 | End: 2020-01-26

## 2020-01-25 RX ORDER — MORPHINE SULFATE 15 MG/1
60 TABLET ORAL EVERY 4 HOURS PRN
Status: DISCONTINUED | OUTPATIENT
Start: 2020-01-25 | End: 2020-02-02 | Stop reason: HOSPADM

## 2020-01-25 RX ADMIN — VANCOMYCIN HYDROCHLORIDE 2000 MG: 100 INJECTION, POWDER, LYOPHILIZED, FOR SOLUTION INTRAVENOUS at 01:01

## 2020-01-25 RX ADMIN — ENOXAPARIN SODIUM 40 MG: 100 INJECTION SUBCUTANEOUS at 04:01

## 2020-01-25 RX ADMIN — ASPIRIN 81 MG: 81 TABLET, COATED ORAL at 03:01

## 2020-01-25 RX ADMIN — CEFEPIME 2 G: 2 INJECTION, POWDER, FOR SOLUTION INTRAVENOUS at 06:01

## 2020-01-25 RX ADMIN — Medication 10 ML: at 09:01

## 2020-01-25 RX ADMIN — IBUPROFEN 400 MG: 200 TABLET, FILM COATED ORAL at 07:01

## 2020-01-25 RX ADMIN — GABAPENTIN 300 MG: 300 CAPSULE ORAL at 09:01

## 2020-01-25 RX ADMIN — OSELTAMIVIR PHOSPHATE 75 MG: 75 CAPSULE ORAL at 01:01

## 2020-01-25 RX ADMIN — PANTOPRAZOLE SODIUM 40 MG: 40 TABLET, DELAYED RELEASE ORAL at 03:01

## 2020-01-25 RX ADMIN — SODIUM CHLORIDE: 0.9 INJECTION, SOLUTION INTRAVENOUS at 03:01

## 2020-01-25 RX ADMIN — SODIUM CHLORIDE 2136 ML: 0.9 INJECTION, SOLUTION INTRAVENOUS at 12:01

## 2020-01-25 RX ADMIN — MORPHINE SULFATE 60 MG: 15 TABLET ORAL at 03:01

## 2020-01-25 RX ADMIN — TBO-FILGRASTIM 300 MCG: 300 INJECTION, SOLUTION SUBCUTANEOUS at 04:01

## 2020-01-25 RX ADMIN — GABAPENTIN 300 MG: 300 CAPSULE ORAL at 03:01

## 2020-01-25 RX ADMIN — MORPHINE SULFATE 200 MG: 100 TABLET, FILM COATED, EXTENDED RELEASE ORAL at 09:01

## 2020-01-25 RX ADMIN — PIPERACILLIN AND TAZOBACTAM 4.5 G: 4; .5 INJECTION, POWDER, LYOPHILIZED, FOR SOLUTION INTRAVENOUS; PARENTERAL at 12:01

## 2020-01-25 NOTE — H&P
[facility]  Hematology/Oncology  H&P    Patient Name: Rebecca Crain  MRN: [id number]  Admission Date: 1/25/2020  Code Status: Full Code   Attending Provider: Lena Sanchez MD  Primary Care Physician: Colleen Valero MD  Principal Problem:Neutropenic fever    Subjective:     HPI: Rebecca Crain is a 64 year old F with metastatic right breast cancer s/p mastectomy, on (chemotherapy- Halaven, last dose 01/22), hypothyroidism presenting for generalized weakness, fever and productive cough. Patient stated she was recently admitted to [facility] for intractable pain (01/06-01/12), a day before discharge she developed productive cough which advanced to green-blood tinged sputum upon getting home. She received a Z-pack from Dr. Schroeder and her symptoms only temporarily improved. For the past few days, she has been experiencing generalized weakness, malaise, and a fever of 102 yesterday. Patient stated her daughter had recently contracted the flu. She denies headaches, nausea, vomiting, SOB, urinary changes, but endorses pain in her anterior chest wall, right lower quadrant pain, mouth sores and pain with swallowing.    In the ED, she was noted to have a T of 101.4, HR of 125. She received 30cc/kg of IV fluids. Influenza B was positive. WBC noted to be 0.25, so 1 dose of vancomycin, zosyn were administered.     Oncology history  Breast history: In 2013 she was diagnosed with stage IIB carcinoma of the right breast, ER positive with a low Oncotype score.  She was enrolled on protocol  and randomized to endocrine therapy alone.  She was tried on all 3 aromatase inhibitors and had itching and rash to all of them.  In August 2013 she was started on tamoxifen.   She was found to have  bone metastasis in May 2015, 2015.  A subsequent bone biopsy was performed on a lesion at the T8 vertebra.   The pathology from that was consistent with metastatic breast cancer, ER +80%, [place] +80%, and HER-2 negative.   She received  letrozole plus palbociclib from July 2015 until May 2016.  She also received bone protective therapy with Xgeva. Faslodex was started in June 2016.      Exemestane and Afinitor started in August 2017.  That was discontinued in February 2018 due to progression in the liver and bone.     Navelbine was started February 16, 2018.  She had 8 cycles for that.  Scans in November 2018 showed progression as follows     Capecitabine was started in December 2018.  She received 5 cycles of that therapy.     Follow-up scans in April 2019 showed progression with a new hepatic lesion and worsening bone disease.     She began clinical trial therapy with erdafitinib on May 8, 2019.  That was discontinued in September 2019 due to progression in the bone.     She then received a course of radiation therapy to her pelvis.  She began weekly Taxol in October 2019.That was discontinued in December 2019 due to progression.     Oncology Treatment Plan:   OP BREAST ERIBULIN Q3W    Medications:  Continuous Infusions:   sodium chloride 0.9%       Scheduled Meds:   aspirin  81 mg Oral Daily    ceFEPime (MAXIPIME) IVPB  2 g Intravenous Q8H    enoxaparin  40 mg Subcutaneous Daily    gabapentin  300 mg Oral TID    [START ON 1/26/2020] levothyroxine  137 mcg Oral Before breakfast    morphine  200 mg Oral Q8H    [START ON 1/26/2020] oseltamivir  75 mg Oral Daily    pantoprazole  40 mg Oral Daily    senna-docusate 8.6-50 mg  1 tablet Oral BID    sertraline  50 mg Oral Daily    vancomycin (VANCOCIN) IVPB  2,000 mg Intravenous ED 1 Time     PRN Meds:(Magic mouthwash) 1:1:1 Benadryl 12.5mg/5ml liq, aluminum & magnesium hydroxide-simehticone (Maalox), lidocaine viscous 2%, Dextrose 10% Bolus, Dextrose 10% Bolus, glucagon (human recombinant), glucose, glucose, ibuprofen, insulin aspart U-100, morphine, ondansetron, promethazine (PHENERGAN) IVPB, sodium chloride 0.9%     Review of patient's allergies indicates:   Allergen Reactions    Adhesive  Rash     Tape, Paper tape is OK.    Codeine Itching     Itching very bad to upper body only.    Demerol [meperidine] Itching     To upper body only    Iodine and iodide containing products Anaphylaxis    Penicillins      Ulcers in mouth.    Arimidex [anastrozole] Hives    Aromasin [exemestane]     Clindamycin Itching and Rash        Past Medical History:   Diagnosis Date    Adjustment disorder with depressed mood 2017    Allergy     Anxiety     Asthma     seasonal    Blood transfusion     1981    Breast cancer     stage IIB carcinoma of the right breast, ER positive with a low Oncotype score    Chemotherapy-induced neuropathy 2016    Depression     Diverticulosis     Falls frequently 2014    Hepatic cyst 2014    Hx of psychiatric care     Hypercholesteremia     Hypertension     Lung nodule 2014    Lymphedema     S/P right breast mastectomy    Metastatic bone cancer     MVP (mitral valve prolapse)     Osteoporosis, unspecified 2014    Psychiatric problem     Therapy     Thyroid disease     Hasimoto disease     Past Surgical History:   Procedure Laterality Date    BREAST LUMPECTOMY Right     X 2    BREAST RECONSTRUCTION  2013    X 2     SECTION      x2    COLONOSCOPY N/A 2016    Procedure: COLONOSCOPY;  Surgeon: Miguel Vu MD;  Location: [address]);  Service: Endoscopy;  Laterality: N/A;  MRSA-at time of scheduling pending results on 2016    endometriosis      EYE SURGERY      RK bilateral     GANGLION CYST EXCISION      right index finger    HEMORRHOID SURGERY      KNEE SURGERY Bilateral     ortho    MASTECTOMY  2013    Bilateral     TONSILLECTOMY      TUBAL LIGATION       Family History     Problem Relation (Age of Onset)    ADD / ADHD Daughter    Arthritis Brother    COPD Paternal Grandfather    Cancer Mother, Father, Paternal Grandmother (94)    Crohn's disease Son    Depression Sister,  Brother, Sister, Sister    Heart disease Sister    Hypertension Mother    Melanoma Mother    Ovarian cancer Paternal Grandmother    Physical abuse Father    Stroke Mother        Tobacco Use    Smoking status: Never Smoker    Smokeless tobacco: Never Used   Substance and Sexual Activity    Alcohol use: No     Alcohol/week: 0.0 standard drinks    Drug use: No    Sexual activity: Not Currently     Birth control/protection: Post-menopausal       Review of Systems   Constitutional: Positive for fever. Negative for chills.   HENT: Positive for mouth sores and trouble swallowing.    Respiratory: Positive for cough. Negative for shortness of breath and wheezing.    Cardiovascular: Positive for chest pain. Negative for palpitations.   Gastrointestinal: Positive for abdominal distention and abdominal pain. Negative for constipation, diarrhea, nausea and vomiting.   Genitourinary: Negative for difficulty urinating, dysuria and hematuria.   Musculoskeletal: Positive for arthralgias.   Skin: Negative for color change and pallor.   Neurological: Positive for weakness. Negative for light-headedness and headaches.   Psychiatric/Behavioral: Negative for agitation, behavioral problems and confusion.     Objective:     Vital Signs (Most Recent):  Temp: (!) 101.4 °F (38.6 °C) (01/25/20 1131)  Pulse: 102 (01/25/20 1332)  Resp: 20 (01/25/20 1131)  BP: (!) 144/63 (01/25/20 1200)  SpO2: 96 % (01/25/20 1332) Vital Signs (24h Range):  Temp:  [101.4 °F (38.6 °C)] 101.4 °F (38.6 °C)  Pulse:  [] 102  Resp:  [20] 20  SpO2:  [96 %-98 %] 96 %  BP: (128-144)/(59-63) 144/63     Weight: 71.2 kg (157 lb)  Body mass index is 26.95 kg/m².  Body surface area is 1.79 meters squared.      Intake/Output Summary (Last 24 hours) at 1/25/2020 1456  Last data filed at 1/25/2020 1326  Gross per 24 hour   Intake 100 ml   Output --   Net 100 ml       Physical Exam   Constitutional: She is oriented to person, place, and time. She appears well-developed  and well-nourished. No distress.   HENT:   Head: Normocephalic and atraumatic.   Eyes: Conjunctivae and EOM are normal.   Neck: Normal range of motion.   Cardiovascular: Regular rhythm, normal heart sounds and intact distal pulses. Tachycardia present. Exam reveals no friction rub.   No murmur heard.  Pulmonary/Chest: Effort normal and breath sounds normal. No respiratory distress. She has no wheezes. She has no rales.   Abdominal: Soft. Bowel sounds are normal. She exhibits distension (Mild distension). She exhibits no mass. There is tenderness (Mild LLQ tenderness).   Musculoskeletal: Normal range of motion. She exhibits no edema, tenderness or deformity.   Neurological: She is alert and oriented to person, place, and time. She exhibits normal muscle tone.   Skin: Skin is warm and dry. She is not diaphoretic.   Psychiatric: She has a normal mood and affect. Her behavior is normal. Thought content normal.       Significant Labs:   BMP:   Recent Labs   Lab 01/25/20  1233   *   *   K 3.7      CO2 25   BUN 7*   CREATININE 0.6   CALCIUM 8.2*   , CBC:   Recent Labs   Lab 01/25/20  1233   WBC 0.25*   HGB 11.0*   HCT 35.6*      , CMP:   Recent Labs   Lab 01/25/20  1233   *   K 3.7      CO2 25   *   BUN 7*   CREATININE 0.6   CALCIUM 8.2*   PROT 5.5*   ALBUMIN 2.4*   BILITOT 1.3*   ALKPHOS 369*   AST 88*   ALT 50*   ANIONGAP 8   EGFRNONAA >60.0    and Coagulation: No results for input(s): PT, INR, APTT in the last 48 hours.    Diagnostic Results:  Imaging Results          X-Ray Chest AP Portable (Final result)  Result time 01/25/20 13:49:53    Final result by Bakari Rob MD (01/25/20 13:49:53)                 Impression:      As above.      Electronically signed by: Bakari Rob  Date:    01/25/2020  Time:    13:49             Narrative:    EXAMINATION:  XR CHEST AP PORTABLE    CLINICAL HISTORY:  Sepsis;    TECHNIQUE:  Single frontal view of the chest was  performed.    COMPARISON:  Chest radiograph dated 01/07/2020    FINDINGS:  Questionable focal lucency in the left mid lung, could be projectional versus possible small intracavitary focus.  Cross-sectional assessment could be obtained for further evaluation.  Remainder of the lungs appear similar without lobar consolidation, pleural effusion, or pneumothorax.  Cardiomediastinal silhouette is similar.  Left sided MediPort catheter with tip overlying the distal SVC.  No acute osseous abnormality.  Similar surgical clips.                                  Assessment/Plan:     * Neutropenic fever  Patient with history of metastatic breast cancer on chemotherapy (Halaven, last dose 01/22) presenting with malaise, fever Tmax 102.4, productive cough, noted to be positive for influenza B.   On admission, WBC 0.25, ANC count 37.5 indicating severe neutropenia.   Lactate 2.3. CXR shows focal lucency in the left mid lung, could be projectional versus possible small intracavitary focus, otherwise unremarkeable.   Received 1 dose of oseltamivir, vancomycin and zosyn in the ED.     Plan  -Begin Filgrastim and monitor for improvement in ANC  -Continue with oseltamivir  -Continue with IV fluids 75cc/hr of NS  -IV Cefepime 2g q8hrs to cover for possible bacterial infection  -Daily CBC, repeat lactate, follow up with UA and blood cultures  -Neutropenic contact isolation      Influenza B  -Confirmed Influenza B. Received 30cc/kg of IV normal saline.     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  MRI of the thoracic/lumbar spine 01/09 suggestive of Numerous osseous metastases of the thoracic and lumbar spine, with multiple compression fractures as above, and a new pathologic fracture of the right sacral ala.  -Receives chemotherapy (Halaven)     Plan  -Continue  with home pain regimen - MS Contin 200mg q8hrs and morphine 60mg q4hrs      Hypothyroidism due to Hashimoto's thyroiditis  -Continue with home synthroid       Jb Silva MD  Hematology/Oncology  [facility]            ATTENDING NOTE, ONCOLOGY INPATIENT TEAM    As above; please refer to my note of same day for our assessment and plan    José Manuel Shore MD

## 2020-01-25 NOTE — SUBJECTIVE & OBJECTIVE
Oncology Treatment Plan:   OP BREAST ERIBULIN Q3W    Medications:  Continuous Infusions:   sodium chloride 0.9%       Scheduled Meds:   aspirin  81 mg Oral Daily    ceFEPime (MAXIPIME) IVPB  2 g Intravenous Q8H    enoxaparin  40 mg Subcutaneous Daily    gabapentin  300 mg Oral TID    [START ON 1/26/2020] levothyroxine  137 mcg Oral Before breakfast    morphine  200 mg Oral Q8H    [START ON 1/26/2020] oseltamivir  75 mg Oral Daily    pantoprazole  40 mg Oral Daily    senna-docusate 8.6-50 mg  1 tablet Oral BID    sertraline  50 mg Oral Daily    vancomycin (VANCOCIN) IVPB  2,000 mg Intravenous ED 1 Time     PRN Meds:(Magic mouthwash) 1:1:1 Benadryl 12.5mg/5ml liq, aluminum & magnesium hydroxide-simehticone (Maalox), lidocaine viscous 2%, Dextrose 10% Bolus, Dextrose 10% Bolus, glucagon (human recombinant), glucose, glucose, ibuprofen, insulin aspart U-100, morphine, ondansetron, promethazine (PHENERGAN) IVPB, sodium chloride 0.9%     Review of patient's allergies indicates:   Allergen Reactions    Adhesive Rash     Tape, Paper tape is OK.    Codeine Itching     Itching very bad to upper body only.    Demerol [meperidine] Itching     To upper body only    Iodine and iodide containing products Anaphylaxis    Penicillins      Ulcers in mouth.    Arimidex [anastrozole] Hives    Aromasin [exemestane]     Clindamycin Itching and Rash        Past Medical History:   Diagnosis Date    Adjustment disorder with depressed mood 2/7/2017    Allergy     Anxiety     Asthma     seasonal    Blood transfusion     1981    Breast cancer 2013    stage IIB carcinoma of the right breast, ER positive with a low Oncotype score    Chemotherapy-induced neuropathy 7/26/2016    Depression     Diverticulosis     Falls frequently 6/18/2014    Hepatic cyst 1/30/2014    Hx of psychiatric care     Hypercholesteremia     Hypertension     Lung nodule 1/14 1/30/2014    Lymphedema     S/P right breast mastectomy     Metastatic bone cancer     MVP (mitral valve prolapse)     Osteoporosis, unspecified 2014    Psychiatric problem     Therapy     Thyroid disease     Hasimoto disease     Past Surgical History:   Procedure Laterality Date    BREAST LUMPECTOMY Right     X 2    BREAST RECONSTRUCTION  2013    X 2     SECTION      x2    COLONOSCOPY N/A 2016    Procedure: COLONOSCOPY;  Surgeon: Miguel Vu MD;  Location: [facility] ([address] [place] [address]);  Service: Endoscopy;  Laterality: N/A;  MRSA-at time of scheduling pending results on 2016    endometriosis      EYE SURGERY      RK bilateral     GANGLION CYST EXCISION      right index finger    HEMORRHOID SURGERY      KNEE SURGERY Bilateral     ortho    MASTECTOMY  2013    Bilateral     TONSILLECTOMY      TUBAL LIGATION       Family History     Problem Relation (Age of Onset)    ADD / ADHD Daughter    Arthritis Brother    COPD Paternal Grandfather    Cancer Mother, Father, Paternal Grandmother (94)    Crohn's disease Son    Depression Sister, Brother, Sister, Sister    Heart disease Sister    Hypertension Mother    Melanoma Mother    Ovarian cancer Paternal Grandmother    Physical abuse Father    Stroke Mother        Tobacco Use    Smoking status: Never Smoker    Smokeless tobacco: Never Used   Substance and Sexual Activity    Alcohol use: No     Alcohol/week: 0.0 standard drinks    Drug use: No    Sexual activity: Not Currently     Birth control/protection: Post-menopausal       Review of Systems   Constitutional: Positive for fever. Negative for chills.   HENT: Positive for mouth sores and trouble swallowing.    Respiratory: Positive for cough. Negative for shortness of breath and wheezing.    Cardiovascular: Positive for chest pain. Negative for palpitations.   Gastrointestinal: Positive for abdominal distention and abdominal pain. Negative for constipation, diarrhea, nausea and vomiting.   Genitourinary: Negative for difficulty  urinating, dysuria and hematuria.   Musculoskeletal: Positive for arthralgias.   Skin: Negative for color change and pallor.   Neurological: Positive for weakness. Negative for light-headedness and headaches.   Psychiatric/Behavioral: Negative for agitation, behavioral problems and confusion.     Objective:     Vital Signs (Most Recent):  Temp: (!) 101.4 °F (38.6 °C) (01/25/20 1131)  Pulse: 102 (01/25/20 1332)  Resp: 20 (01/25/20 1131)  BP: (!) 144/63 (01/25/20 1200)  SpO2: 96 % (01/25/20 1332) Vital Signs (24h Range):  Temp:  [101.4 °F (38.6 °C)] 101.4 °F (38.6 °C)  Pulse:  [] 102  Resp:  [20] 20  SpO2:  [96 %-98 %] 96 %  BP: (128-144)/(59-63) 144/63     Weight: 71.2 kg (157 lb)  Body mass index is 26.95 kg/m².  Body surface area is 1.79 meters squared.      Intake/Output Summary (Last 24 hours) at 1/25/2020 1456  Last data filed at 1/25/2020 1326  Gross per 24 hour   Intake 100 ml   Output --   Net 100 ml       Physical Exam   Constitutional: She is oriented to person, place, and time. She appears well-developed and well-nourished. No distress.   HENT:   Head: Normocephalic and atraumatic.   Eyes: Conjunctivae and EOM are normal.   Neck: Normal range of motion.   Cardiovascular: Regular rhythm, normal heart sounds and intact distal pulses. Tachycardia present. Exam reveals no friction rub.   No murmur heard.  Pulmonary/Chest: Effort normal and breath sounds normal. No respiratory distress. She has no wheezes. She has no rales.   Abdominal: Soft. Bowel sounds are normal. She exhibits distension (Mild distension). She exhibits no mass. There is tenderness (Mild LLQ tenderness).   Musculoskeletal: Normal range of motion. She exhibits no edema, tenderness or deformity.   Neurological: She is alert and oriented to person, place, and time. She exhibits normal muscle tone.   Skin: Skin is warm and dry. She is not diaphoretic.   Psychiatric: She has a normal mood and affect. Her behavior is normal. Thought content  normal.       Significant Labs:   BMP:   Recent Labs   Lab 01/25/20  1233   *   *   K 3.7      CO2 25   BUN 7*   CREATININE 0.6   CALCIUM 8.2*   , CBC:   Recent Labs   Lab 01/25/20  1233   WBC 0.25*   HGB 11.0*   HCT 35.6*      , CMP:   Recent Labs   Lab 01/25/20  1233   *   K 3.7      CO2 25   *   BUN 7*   CREATININE 0.6   CALCIUM 8.2*   PROT 5.5*   ALBUMIN 2.4*   BILITOT 1.3*   ALKPHOS 369*   AST 88*   ALT 50*   ANIONGAP 8   EGFRNONAA >60.0    and Coagulation: No results for input(s): PT, INR, APTT in the last 48 hours.    Diagnostic Results:  Imaging Results          X-Ray Chest AP Portable (Final result)  Result time 01/25/20 13:49:53    Final result by Bakari Rob MD (01/25/20 13:49:53)                 Impression:      As above.      Electronically signed by: Bakari Rob  Date:    01/25/2020  Time:    13:49             Narrative:    EXAMINATION:  XR CHEST AP PORTABLE    CLINICAL HISTORY:  Sepsis;    TECHNIQUE:  Single frontal view of the chest was performed.    COMPARISON:  Chest radiograph dated 01/07/2020    FINDINGS:  Questionable focal lucency in the left mid lung, could be projectional versus possible small intracavitary focus.  Cross-sectional assessment could be obtained for further evaluation.  Remainder of the lungs appear similar without lobar consolidation, pleural effusion, or pneumothorax.  Cardiomediastinal silhouette is similar.  Left sided MediPort catheter with tip overlying the distal SVC.  No acute osseous abnormality.  Similar surgical clips.

## 2020-01-25 NOTE — HPI
Rebecca Crain is a 64 year old F with metastatic right breast cancer s/p mastectomy, on (chemotherapy- Halaven, last dose 01/22), hypothyroidism presenting for generalized weakness, fever and productive cough. Patient stated she was recently admitted to [facility] [place] intractable pain (01/06-01/12), a day before discharge she developed productive cough which advanced to green-blood tinged sputum upon getting home. She received a Z-pack from Dr. Schroeder and her symptoms only temporarily improved. For the past few days, she has been experiencing generalized weakness, malaise, and a fever of 102 yesterday. Patient stated her daughter had recently contracted the flu. She denies headaches, nausea, vomiting, SOB, urinary changes, but endorses pain in her anterior chest wall, right lower quadrant pain, mouth sores and pain with swallowing.    In the ED, she was noted to have a T of 101.4, HR of 125. She received 30cc/kg of IV fluids. Influenza B was positive. WBC noted to be 0.25, so 1 dose of vancomycin, zosyn were administered.     Oncology history  Breast history: In 2013 she was diagnosed with stage IIB carcinoma of the right breast, ER positive with a low Oncotype score.  She was enrolled on protocol  and randomized to endocrine therapy alone.  She was tried on all 3 aromatase inhibitors and had itching and rash to all of them.  In August 2013 she was started on tamoxifen.   She was found to have  bone metastasis in May 2015, 2015.  A subsequent bone biopsy was performed on a lesion at the T8 vertebra.   The pathology from that was consistent with metastatic breast cancer, ER +80%, [place] +80%, and HER-2 negative.   She received letrozole plus palbociclib from July 2015 until May 2016.  She also received bone protective therapy with Xgeva. Faslodex was started in June 2016.      Exemestane and Afinitor started in August 2017.  That was discontinued in February 2018 due to progression in the liver and bone.     Navelbine was  started February 16, 2018.  She had 8 cycles for that.  Scans in November 2018 showed progression as follows     Capecitabine was started in December 2018.  She received 5 cycles of that therapy.     Follow-up scans in April 2019 showed progression with a new hepatic lesion and worsening bone disease.     She began clinical trial therapy with erdafitinib on May 8, 2019.  That was discontinued in September 2019 due to progression in the bone.     She then received a course of radiation therapy to her pelvis.  She began weekly Taxol in October 2019.That was discontinued in December 2019 due to progression.

## 2020-01-25 NOTE — PROGRESS NOTES
ATTENDING NOTE, ONCOLOGY INPATIENT TEAM    .  Patient seen and examined, chart reviewed.  She had received her day 8 eribulin last week.  She has not received any Neupogen or Neulasta.  Appears slightly uncomfortable and listless..  Lungs are clear to auscultation.  Abdomen is soft, nontender.  Labs reviewed.  Nasal swab is positive for influenza.  WBCs are 250 per cubic mm.    PLAN  Admit for further evaluation.  Follow labs daily.  Start oseltamivir.  Given her profound neutropenia she will be empirically covered for bacterial Gram-negative infections.  She will be started on Neupogen 480 mcg daily.  First dose now.  We will follow.      José Manuel Shore MD

## 2020-01-25 NOTE — ASSESSMENT & PLAN NOTE
Breast history: In 2013 she was diagnosed with stage IIB carcinoma of the right breast, ER positive with a low Oncotype score.  MRI of the thoracic/lumbar spine 01/09 suggestive of Numerous osseous metastases of the thoracic and lumbar spine, with multiple compression fractures as above, and a new pathologic fracture of the right sacral ala.  -Receives chemotherapy (Halaven)     Plan  -Continue with home pain regimen - MS Contin 200mg q8hrs and morphine 60mg q4hrs

## 2020-01-25 NOTE — TELEPHONE ENCOUNTER
"Pt reports she is a cancer patient , had chemo last week and has a sore throat and coughing up green mucous and an earache. Patient reports ulcers around her tongue. Patient was advised per protocol and was advised to call back with any questions/concerns or worsening symptoms. Patient verbalized understanding.     Reason for Disposition   Large blisters in mouth (i.e., fluid filled bubbles or sacs)   Unable to open mouth completely    Additional Information   Negative: [1] Looks like fever blisters ("cold sore") AND [2] only on outer lip   Negative: Generalized skin rash from Chickenpox   Negative: Chemical in the mouth suspected cause of ulcers   Negative: [1] Drinking very little AND [2] dehydration suspected (e.g., no urine > 12 hours, very dry mouth, very lightheaded)   Negative: Generalized rash on body   Negative: Patient sounds very sick or weak to the triager   Negative: Severe difficulty breathing (e.g., struggling for each breath, speaks in single words, stridor)   Negative: Sounds like a life-threatening emergency to the triager   Negative: [1] Drooling or spitting out saliva (because can't swallow) AND [2] normal breathing    Protocols used: MOUTH ULCERS-A-AH, SORE THROAT-A-AH      "

## 2020-01-25 NOTE — ED NOTES
Patient identifiers verified and correct for Rebecca FELIZ Paras.    LOC: The patient is awake, alert and aware of environment,  the patient is oriented x 4 and speaking appropriately.      APPEARANCE: Patient looks visibly fatigued.     SKIN: The skin is warm and dry, color consistent with ethnicity, patient has tenting skin turgor and moist mucus membranes, skin abrasions to the posterior neck and some oral sublingual blisters     MUSCULOSKELETAL: Patient moving all extremities spontaneously, no obvious swelling or deformities noted.     RESPIRATORY: Airway is open and patent, respirations are spontaneous, patient has a normal effort and rate, no accessory muscle use noted,       CARDIAC: Tachycardia,  no periphreal edema noted, capillary refill < 3 seconds.    ABDOMEN: Soft and non tender to palpation, no distention noted, normoactive bowel sounds present in all four quadrants.    NEUROLOGIC: PERRLA, 3 mm bilaterally, eyes open spontaneously, behavior appropriate to situation, follows commands, facial expression symmetrical, bilateral hand grasp equal and even, purposeful motor response noted, normal sensation in all extremities when touched with a finger.

## 2020-01-25 NOTE — ASSESSMENT & PLAN NOTE
-Confirmed Influenza B. Received 30cc/kg of IV normal saline.     -Continue with supportive therapy  -Continue with oseltamivir
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
## 2020-01-25 NOTE — ED TRIAGE NOTES
Rebecca Crain, a 64 y.o. female presents to the ED w/ complaint of fever and sore throat which has been ongoing since last night. T-max 102.  Patient did not take any antiinflammatories or antipyretics for the fever.     Patient also complains sore throat, ear ache, oral blisters- sublingual. Patient has had a productive cough( green mucus)    Patient endorses chest pain with cough and some shortness of breath     Patient denies abdominal pain, back pain,. Nausea and vomiting         Triage note:  Chief Complaint   Patient presents with    Fever     sore throat, earache, blisters under tongue, coughing up green mucus (sometimes with blood in it) x 2 weeks. Flu neg 2 weeks ago. Breast CA. pt is wearing a mask. symptoms came back after taking Z pat. Next chemo due on 5th     Tachycardia    Flu Symptoms     Review of patient's allergies indicates:   Allergen Reactions    Adhesive Rash     Tape, Paper tape is OK.    Codeine Itching     Itching very bad to upper body only.    Demerol [meperidine] Itching     To upper body only    Iodine and iodide containing products Anaphylaxis    Penicillins      Ulcers in mouth.    Arimidex [anastrozole] Hives    Aromasin [exemestane]     Clindamycin Itching and Rash     Past Medical History:   Diagnosis Date    Adjustment disorder with depressed mood 2/7/2017    Allergy     Anxiety     Asthma     seasonal    Blood transfusion     1981    Breast cancer 2013    stage IIB carcinoma of the right breast, ER positive with a low Oncotype score    Chemotherapy-induced neuropathy 7/26/2016    Depression     Diverticulosis     Falls frequently 6/18/2014    Hepatic cyst 1/30/2014    Hx of psychiatric care     Hypercholesteremia     Hypertension     Lung nodule 1/14 1/30/2014    Lymphedema     S/P right breast mastectomy    Metastatic bone cancer 2015    MVP (mitral valve prolapse)     Osteoporosis, unspecified 1/30/2014    Psychiatric problem     Therapy      Thyroid disease     Hasimoto disease

## 2020-01-26 PROBLEM — E83.39 HYPOPHOSPHATEMIA: Status: ACTIVE | Noted: 2020-01-26

## 2020-01-26 PROBLEM — K12.30 ORAL MUCOSITIS: Status: ACTIVE | Noted: 2020-01-26

## 2020-01-26 LAB
ALBUMIN SERPL BCP-MCNC: 2.1 G/DL (ref 3.5–5.2)
ALP SERPL-CCNC: 296 U/L (ref 55–135)
ALT SERPL W/O P-5'-P-CCNC: 43 U/L (ref 10–44)
ANION GAP SERPL CALC-SCNC: 4 MMOL/L (ref 8–16)
ANISOCYTOSIS BLD QL SMEAR: SLIGHT
AST SERPL-CCNC: 77 U/L (ref 10–40)
BASOPHILS # BLD AUTO: 0.01 K/UL (ref 0–0.2)
BASOPHILS NFR BLD: 2.2 % (ref 0–1.9)
BILIRUB SERPL-MCNC: 1.4 MG/DL (ref 0.1–1)
BUN SERPL-MCNC: 6 MG/DL (ref 8–23)
CALCIUM SERPL-MCNC: 7.3 MG/DL (ref 8.7–10.5)
CHLORIDE SERPL-SCNC: 109 MMOL/L (ref 95–110)
CO2 SERPL-SCNC: 25 MMOL/L (ref 23–29)
CREAT SERPL-MCNC: 0.6 MG/DL (ref 0.5–1.4)
DACRYOCYTES BLD QL SMEAR: ABNORMAL
DIFFERENTIAL METHOD: ABNORMAL
EOSINOPHIL # BLD AUTO: 0 K/UL (ref 0–0.5)
EOSINOPHIL NFR BLD: 0 % (ref 0–8)
ERYTHROCYTE [DISTWIDTH] IN BLOOD BY AUTOMATED COUNT: 16 % (ref 11.5–14.5)
EST. GFR  (AFRICAN AMERICAN): >60 ML/MIN/1.73 M^2
EST. GFR  (NON AFRICAN AMERICAN): >60 ML/MIN/1.73 M^2
GLUCOSE SERPL-MCNC: 152 MG/DL (ref 70–110)
HCT VFR BLD AUTO: 31.6 % (ref 37–48.5)
HGB BLD-MCNC: 9.7 G/DL (ref 12–16)
HYPOCHROMIA BLD QL SMEAR: ABNORMAL
IMM GRANULOCYTES # BLD AUTO: 0.01 K/UL (ref 0–0.04)
IMM GRANULOCYTES NFR BLD AUTO: 2.2 % (ref 0–0.5)
LACTATE SERPL-SCNC: 1 MMOL/L (ref 0.5–2.2)
LYMPHOCYTES # BLD AUTO: 0.4 K/UL (ref 1–4.8)
LYMPHOCYTES NFR BLD: 82.6 % (ref 18–48)
MAGNESIUM SERPL-MCNC: 2 MG/DL (ref 1.6–2.6)
MCH RBC QN AUTO: 28.2 PG (ref 27–31)
MCHC RBC AUTO-ENTMCNC: 30.7 G/DL (ref 32–36)
MCV RBC AUTO: 92 FL (ref 82–98)
MONOCYTES # BLD AUTO: 0 K/UL (ref 0.3–1)
MONOCYTES NFR BLD: 8.7 % (ref 4–15)
NEUTROPHILS # BLD AUTO: 0 K/UL (ref 1.8–7.7)
NEUTROPHILS NFR BLD: 4.3 % (ref 38–73)
NRBC BLD-RTO: 0 /100 WBC
OVALOCYTES BLD QL SMEAR: ABNORMAL
PHOSPHATE SERPL-MCNC: <1 MG/DL (ref 2.7–4.5)
PLATELET # BLD AUTO: 147 K/UL (ref 150–350)
PLATELET BLD QL SMEAR: ABNORMAL
PMV BLD AUTO: 10.9 FL (ref 9.2–12.9)
POIKILOCYTOSIS BLD QL SMEAR: SLIGHT
POLYCHROMASIA BLD QL SMEAR: ABNORMAL
POTASSIUM SERPL-SCNC: 3.3 MMOL/L (ref 3.5–5.1)
PROT SERPL-MCNC: 4.8 G/DL (ref 6–8.4)
RBC # BLD AUTO: 3.44 M/UL (ref 4–5.4)
SODIUM SERPL-SCNC: 138 MMOL/L (ref 136–145)
WBC # BLD AUTO: 0.46 K/UL (ref 3.9–12.7)

## 2020-01-26 PROCEDURE — 25000003 PHARM REV CODE 250: Mod: HCNC | Performed by: STUDENT IN AN ORGANIZED HEALTH CARE EDUCATION/TRAINING PROGRAM

## 2020-01-26 PROCEDURE — 97165 OT EVAL LOW COMPLEX 30 MIN: CPT | Mod: HCNC

## 2020-01-26 PROCEDURE — 83735 ASSAY OF MAGNESIUM: CPT | Mod: HCNC

## 2020-01-26 PROCEDURE — 84100 ASSAY OF PHOSPHORUS: CPT | Mod: HCNC

## 2020-01-26 PROCEDURE — 20600001 HC STEP DOWN PRIVATE ROOM: Mod: HCNC

## 2020-01-26 PROCEDURE — 25000003 PHARM REV CODE 250: Mod: HCNC | Performed by: INTERNAL MEDICINE

## 2020-01-26 PROCEDURE — 36415 COLL VENOUS BLD VENIPUNCTURE: CPT | Mod: HCNC

## 2020-01-26 PROCEDURE — 99233 PR SUBSEQUENT HOSPITAL CARE,LEVL III: ICD-10-PCS | Mod: HCNC,,, | Performed by: INTERNAL MEDICINE

## 2020-01-26 PROCEDURE — 85025 COMPLETE CBC W/AUTO DIFF WBC: CPT | Mod: HCNC

## 2020-01-26 PROCEDURE — 80053 COMPREHEN METABOLIC PANEL: CPT | Mod: HCNC

## 2020-01-26 PROCEDURE — 83605 ASSAY OF LACTIC ACID: CPT | Mod: HCNC

## 2020-01-26 PROCEDURE — 99233 SBSQ HOSP IP/OBS HIGH 50: CPT | Mod: HCNC,,, | Performed by: INTERNAL MEDICINE

## 2020-01-26 PROCEDURE — 63600175 PHARM REV CODE 636 W HCPCS: Mod: HCNC | Performed by: STUDENT IN AN ORGANIZED HEALTH CARE EDUCATION/TRAINING PROGRAM

## 2020-01-26 RX ORDER — OSELTAMIVIR PHOSPHATE 75 MG/1
75 CAPSULE ORAL 2 TIMES DAILY
Status: DISCONTINUED | OUTPATIENT
Start: 2020-01-26 | End: 2020-02-02 | Stop reason: HOSPADM

## 2020-01-26 RX ADMIN — CEFEPIME 2 G: 2 INJECTION, POWDER, FOR SOLUTION INTRAVENOUS at 05:01

## 2020-01-26 RX ADMIN — CEFEPIME 2 G: 2 INJECTION, POWDER, FOR SOLUTION INTRAVENOUS at 01:01

## 2020-01-26 RX ADMIN — SODIUM CHLORIDE: 0.9 INJECTION, SOLUTION INTRAVENOUS at 09:01

## 2020-01-26 RX ADMIN — TBO-FILGRASTIM 300 MCG: 300 INJECTION, SOLUTION SUBCUTANEOUS at 12:01

## 2020-01-26 RX ADMIN — IBUPROFEN 400 MG: 200 TABLET, FILM COATED ORAL at 05:01

## 2020-01-26 RX ADMIN — Medication 10 ML: at 05:01

## 2020-01-26 RX ADMIN — Medication 10 ML: at 12:01

## 2020-01-26 RX ADMIN — LEVOTHYROXINE SODIUM 137 MCG: 25 TABLET ORAL at 05:01

## 2020-01-26 RX ADMIN — MORPHINE SULFATE 200 MG: 100 TABLET, FILM COATED, EXTENDED RELEASE ORAL at 05:01

## 2020-01-26 RX ADMIN — OSELTAMIVIR PHOSPHATE 75 MG: 75 CAPSULE ORAL at 09:01

## 2020-01-26 RX ADMIN — POTASSIUM PHOSPHATE, MONOBASIC AND POTASSIUM PHOSPHATE, DIBASIC 30 MMOL: 224; 236 INJECTION, SOLUTION, CONCENTRATE INTRAVENOUS at 09:01

## 2020-01-26 RX ADMIN — SERTRALINE 50 MG: 25 TABLET, FILM COATED ORAL at 09:01

## 2020-01-26 RX ADMIN — GABAPENTIN 300 MG: 300 CAPSULE ORAL at 02:01

## 2020-01-26 RX ADMIN — ENOXAPARIN SODIUM 40 MG: 100 INJECTION SUBCUTANEOUS at 05:01

## 2020-01-26 RX ADMIN — CEFEPIME 2 G: 2 INJECTION, POWDER, FOR SOLUTION INTRAVENOUS at 09:01

## 2020-01-26 RX ADMIN — SENNOSIDES AND DOCUSATE SODIUM 1 TABLET: 8.6; 5 TABLET ORAL at 09:01

## 2020-01-26 RX ADMIN — GABAPENTIN 300 MG: 300 CAPSULE ORAL at 09:01

## 2020-01-26 RX ADMIN — MORPHINE SULFATE 60 MG: 15 TABLET ORAL at 05:01

## 2020-01-26 RX ADMIN — MORPHINE SULFATE 200 MG: 100 TABLET, FILM COATED, EXTENDED RELEASE ORAL at 02:01

## 2020-01-26 RX ADMIN — Medication 10 ML: at 09:01

## 2020-01-26 RX ADMIN — MORPHINE SULFATE 200 MG: 100 TABLET, FILM COATED, EXTENDED RELEASE ORAL at 09:01

## 2020-01-26 RX ADMIN — PANTOPRAZOLE SODIUM 40 MG: 40 TABLET, DELAYED RELEASE ORAL at 09:01

## 2020-01-26 RX ADMIN — ASPIRIN 81 MG: 81 TABLET, COATED ORAL at 09:01

## 2020-01-26 RX ADMIN — SODIUM CHLORIDE: 0.9 INJECTION, SOLUTION INTRAVENOUS at 01:01

## 2020-01-26 NOTE — SUBJECTIVE & OBJECTIVE
Interval History: NAEO. Patient continues to feel 'terrible'. Spiked low grade fevers yesterday. Patient continues to have mouth ulcers, temporarily relieved by magic mouthwash. Had a BM yesterday. Able to eat ~50% of her breakfast.     Oncology Treatment Plan:   OP BREAST ERIBULIN Q3W    Medications:  Continuous Infusions:   sodium chloride 0.9% 75 mL/hr at 01/26/20 0154     Scheduled Meds:   aspirin  81 mg Oral Daily    ceFEPime (MAXIPIME) IVPB  2 g Intravenous Q8H    enoxaparin  40 mg Subcutaneous Daily    gabapentin  300 mg Oral TID    levothyroxine  137 mcg Oral Before breakfast    morphine  200 mg Oral Q8H    oseltamivir  75 mg Oral Daily    pantoprazole  40 mg Oral Daily    potassium phosphate IVPB  30 mmol Intravenous Once    senna-docusate 8.6-50 mg  1 tablet Oral Daily    sertraline  50 mg Oral Daily     PRN Meds:(Magic mouthwash) 1:1:1 Benadryl 12.5mg/5ml liq, aluminum & magnesium hydroxide-simehticone (Maalox), lidocaine viscous 2%, Dextrose 10% Bolus, Dextrose 10% Bolus, glucagon (human recombinant), glucose, glucose, ibuprofen, insulin aspart U-100, morphine, ondansetron, promethazine (PHENERGAN) IVPB, sodium chloride 0.9%     Review of Systems   Constitutional: Positive for activity change and fever. Negative for chills.   Respiratory: Positive for cough. Negative for shortness of breath.    Cardiovascular: Negative for chest pain, palpitations and leg swelling.   Gastrointestinal: Positive for abdominal distention. Negative for abdominal pain, constipation, diarrhea, nausea and vomiting.   Genitourinary: Negative for difficulty urinating, dysuria and hematuria.   Neurological: Positive for weakness (Generalized weakness). Negative for dizziness.   Psychiatric/Behavioral: Negative for agitation, behavioral problems and confusion.     Objective:     Vital Signs (Most Recent):  Temp: (!) 100.7 °F (38.2 °C) (01/26/20 0737)  Pulse: (!) 116 (01/26/20 0737)  Resp: 16 (01/26/20 0737)  BP: (!)  121/59 (01/26/20 0737)  SpO2: (!) 92 % (01/26/20 0737) Vital Signs (24h Range):  Temp:  [98.8 °F (37.1 °C)-101.4 °F (38.6 °C)] 100.7 °F (38.2 °C)  Pulse:  [] 116  Resp:  [16-20] 16  SpO2:  [92 %-98 %] 92 %  BP: ()/(50-63) 121/59     Weight: 78.5 kg (173 lb 1 oz)  Body mass index is 29.71 kg/m².  Body surface area is 1.88 meters squared.      Intake/Output Summary (Last 24 hours) at 1/26/2020 0841  Last data filed at 1/26/2020 0600  Gross per 24 hour   Intake 3756 ml   Output --   Net 3756 ml       Physical Exam   Constitutional: She is oriented to person, place, and time. She appears well-developed and well-nourished. No distress.   HENT:   Head: Normocephalic and atraumatic.   Superficial tongue ulcer   Eyes: Conjunctivae and EOM are normal.   Neck: Normal range of motion.   Cardiovascular: Regular rhythm, normal heart sounds and intact distal pulses. Tachycardia present. Exam reveals no friction rub.   No murmur heard.  Pulmonary/Chest: Effort normal and breath sounds normal. No respiratory distress. She has no wheezes. She has no rales.   Abdominal: Soft. Bowel sounds are normal. She exhibits distension (Mild distension). She exhibits no mass. There is no tenderness.   Musculoskeletal: Normal range of motion. She exhibits no edema, tenderness or deformity.   Neurological: She is alert and oriented to person, place, and time. She exhibits normal muscle tone.   Skin: Skin is warm and dry. She is not diaphoretic.   Psychiatric: She has a normal mood and affect. Her behavior is normal. Thought content normal.       Significant Labs:   BMP:   Recent Labs   Lab 01/25/20  1233 01/26/20  0445   * 152*   * 138   K 3.7 3.3*    109   CO2 25 25   BUN 7* 6*   CREATININE 0.6 0.6   CALCIUM 8.2* 7.3*   MG  --  2.0   , CBC:   Recent Labs   Lab 01/25/20  1233 01/26/20 0445   WBC 0.25* 0.46*   HGB 11.0* 9.7*   HCT 35.6* 31.6*    147*   , CMP:   Recent Labs   Lab 01/25/20  1233 01/26/20  2679    * 138   K 3.7 3.3*    109   CO2 25 25   * 152*   BUN 7* 6*   CREATININE 0.6 0.6   CALCIUM 8.2* 7.3*   PROT 5.5* 4.8*   ALBUMIN 2.4* 2.1*   BILITOT 1.3* 1.4*   ALKPHOS 369* 296*   AST 88* 77*   ALT 50* 43   ANIONGAP 8 4*   EGFRNONAA >60.0 >60.0    and Urine Studies:   Recent Labs   Lab 01/25/20  1505   COLORU Yellow   APPEARANCEUA Clear   PHUR 6.0   SPECGRAV 1.010   PROTEINUA Negative   GLUCUA 1+*   KETONESU Negative   BILIRUBINUA Negative   OCCULTUA Negative   NITRITE Negative   LEUKOCYTESUR Negative   RBCUA 0   WBCUA 0   BACTERIA Occasional   SQUAMEPITHEL 0   HYALINECASTS 3*       Diagnostic Results:  None

## 2020-01-26 NOTE — ASSESSMENT & PLAN NOTE
- Patient takes Saavedra's solution at home.   - Received 1 dose of Magic mouthwash without relief.   - Saavedra's solution scheduled QID.

## 2020-01-26 NOTE — PLAN OF CARE
"VSS. A/Ox4.  C/o pain to lower back (4/10) and throat (10/10), scheduled morphine tabs given with moderate relief of back pain and prn magic mouthwash given for throat with full relief.  NS infusing at 75 mL/hr.  Patient had BM overnight but stated "it was hard."  Plan for stool softener with AM meds.  No acute events overnight. Safety maintained.  All questions answered. Patient updated on plan of care.  Will continue to monitor.    "

## 2020-01-26 NOTE — PROGRESS NOTES
[facility]  Hematology/Oncology  Progress Note    Patient Name: Rebecca Crain  Admission Date: 1/25/2020  Hospital Length of Stay: 1 days  Code Status: Full Code     Subjective:     HPI:  Rebecca Crain is a 64 year old F with metastatic right breast cancer s/p mastectomy, on (chemotherapy- Halaven, last dose 01/22), hypothyroidism presenting for generalized weakness, fever and productive cough. Patient stated she was recently admitted to [facility] for intractable pain (01/06-01/12), a day before discharge she developed productive cough which advanced to green-blood tinged sputum upon getting home. She received a Z-pack from Dr. Schroeder and her symptoms only temporarily improved. For the past few days, she has been experiencing generalized weakness, malaise, and a fever of 102 yesterday. Patient stated her daughter had recently contracted the flu. She denies headaches, nausea, vomiting, SOB, urinary changes, but endorses pain in her anterior chest wall, right lower quadrant pain, mouth sores and pain with swallowing.    In the ED, she was noted to have a T of 101.4, HR of 125. She received 30cc/kg of IV fluids. Influenza B was positive. WBC noted to be 0.25, so 1 dose of vancomycin, zosyn were administered.     Oncology history  Breast history: In 2013 she was diagnosed with stage IIB carcinoma of the right breast, ER positive with a low Oncotype score.  She was enrolled on protocol  and randomized to endocrine therapy alone.  She was tried on all 3 aromatase inhibitors and had itching and rash to all of them.  In August 2013 she was started on tamoxifen.   She was found to have  bone metastasis in May 2015, 2015.  A subsequent bone biopsy was performed on a lesion at the T8 vertebra.   The pathology from that was consistent with metastatic breast cancer, ER +80%, MN +80%, and HER-2 negative.   She received letrozole plus palbociclib from July 2015 until May 2016.  She also received bone  protective therapy with Xgeva. Faslodex was started in June 2016.      Exemestane and Afinitor started in August 2017.  That was discontinued in February 2018 due to progression in the liver and bone.     Navelbine was started February 16, 2018.  She had 8 cycles for that.  Scans in November 2018 showed progression as follows     Capecitabine was started in December 2018.  She received 5 cycles of that therapy.     Follow-up scans in April 2019 showed progression with a new hepatic lesion and worsening bone disease.     She began clinical trial therapy with erdafitinib on May 8, 2019.  That was discontinued in September 2019 due to progression in the bone.     She then received a course of radiation therapy to her pelvis.  She began weekly Taxol in October 2019.That was discontinued in December 2019 due to progression.    Interval History: NAEO. Patient continues to feel 'terrible'. Spiked low grade fevers yesterday. Patient continues to have mouth ulcers, temporarily relieved by magic mouthwash. Had a BM yesterday. Able to eat ~50% of her breakfast.     Oncology Treatment Plan:   OP BREAST ERIBULIN Q3W    Medications:  Continuous Infusions:   sodium chloride 0.9% 75 mL/hr at 01/26/20 0154     Scheduled Meds:   aspirin  81 mg Oral Daily    ceFEPime (MAXIPIME) IVPB  2 g Intravenous Q8H    enoxaparin  40 mg Subcutaneous Daily    gabapentin  300 mg Oral TID    levothyroxine  137 mcg Oral Before breakfast    morphine  200 mg Oral Q8H    oseltamivir  75 mg Oral Daily    pantoprazole  40 mg Oral Daily    potassium phosphate IVPB  30 mmol Intravenous Once    senna-docusate 8.6-50 mg  1 tablet Oral Daily    sertraline  50 mg Oral Daily     PRN Meds:(Magic mouthwash) 1:1:1 Benadryl 12.5mg/5ml liq, aluminum & magnesium hydroxide-simehticone (Maalox), lidocaine viscous 2%, Dextrose 10% Bolus, Dextrose 10% Bolus, glucagon (human recombinant), glucose, glucose, ibuprofen, insulin aspart U-100, morphine, ondansetron,  promethazine (PHENERGAN) IVPB, sodium chloride 0.9%     Review of Systems   Constitutional: Positive for activity change and fever. Negative for chills.   Respiratory: Positive for cough. Negative for shortness of breath.    Cardiovascular: Negative for chest pain, palpitations and leg swelling.   Gastrointestinal: Positive for abdominal distention. Negative for abdominal pain, constipation, diarrhea, nausea and vomiting.   Genitourinary: Negative for difficulty urinating, dysuria and hematuria.   Neurological: Positive for weakness (Generalized weakness). Negative for dizziness.   Psychiatric/Behavioral: Negative for agitation, behavioral problems and confusion.     Objective:     Vital Signs (Most Recent):  Temp: (!) 100.7 °F (38.2 °C) (01/26/20 0737)  Pulse: (!) 116 (01/26/20 0737)  Resp: 16 (01/26/20 0737)  BP: (!) 121/59 (01/26/20 0737)  SpO2: (!) 92 % (01/26/20 0737) Vital Signs (24h Range):  Temp:  [98.8 °F (37.1 °C)-101.4 °F (38.6 °C)] 100.7 °F (38.2 °C)  Pulse:  [] 116  Resp:  [16-20] 16  SpO2:  [92 %-98 %] 92 %  BP: ()/(50-63) 121/59     Weight: 78.5 kg (173 lb 1 oz)  Body mass index is 29.71 kg/m².  Body surface area is 1.88 meters squared.      Intake/Output Summary (Last 24 hours) at 1/26/2020 0841  Last data filed at 1/26/2020 0600  Gross per 24 hour   Intake 3756 ml   Output --   Net 3756 ml       Physical Exam   Constitutional: She is oriented to person, place, and time. She appears well-developed and well-nourished. No distress.   HENT:   Head: Normocephalic and atraumatic.   Superficial tongue ulcer   Eyes: Conjunctivae and EOM are normal.   Neck: Normal range of motion.   Cardiovascular: Regular rhythm, normal heart sounds and intact distal pulses. Tachycardia present. Exam reveals no friction rub.   No murmur heard.  Pulmonary/Chest: Effort normal and breath sounds normal. No respiratory distress. She has no wheezes. She has no rales.   Abdominal: Soft. Bowel sounds are normal. She  exhibits distension (Mild distension). She exhibits no mass. There is no tenderness.   Musculoskeletal: Normal range of motion. She exhibits no edema, tenderness or deformity.   Neurological: She is alert and oriented to person, place, and time. She exhibits normal muscle tone.   Skin: Skin is warm and dry. She is not diaphoretic.   Psychiatric: She has a normal mood and affect. Her behavior is normal. Thought content normal.       Significant Labs:   BMP:   Recent Labs   Lab 01/25/20  1233 01/26/20  0445   * 152*   * 138   K 3.7 3.3*    109   CO2 25 25   BUN 7* 6*   CREATININE 0.6 0.6   CALCIUM 8.2* 7.3*   MG  --  2.0   , CBC:   Recent Labs   Lab 01/25/20  1233 01/26/20  0445   WBC 0.25* 0.46*   HGB 11.0* 9.7*   HCT 35.6* 31.6*    147*   , CMP:   Recent Labs   Lab 01/25/20  1233 01/26/20  0445   * 138   K 3.7 3.3*    109   CO2 25 25   * 152*   BUN 7* 6*   CREATININE 0.6 0.6   CALCIUM 8.2* 7.3*   PROT 5.5* 4.8*   ALBUMIN 2.4* 2.1*   BILITOT 1.3* 1.4*   ALKPHOS 369* 296*   AST 88* 77*   ALT 50* 43   ANIONGAP 8 4*   EGFRNONAA >60.0 >60.0    and Urine Studies:   Recent Labs   Lab 01/25/20  1505   COLORU Yellow   APPEARANCEUA Clear   PHUR 6.0   SPECGRAV 1.010   PROTEINUA Negative   GLUCUA 1+*   KETONESU Negative   BILIRUBINUA Negative   OCCULTUA Negative   NITRITE Negative   LEUKOCYTESUR Negative   RBCUA 0   WBCUA 0   BACTERIA Occasional   SQUAMEPITHEL 0   HYALINECASTS 3*       Diagnostic Results:  None    Assessment/Plan:     * Neutropenic fever  Patient with history of metastatic breast cancer on chemotherapy (Halaven, last dose 01/22) presenting with malaise, fever Tmax 102.4, productive cough, noted to be positive for influenza B.   On admission, WBC 0.25, ANC count 37.5 indicating severe neutropenia.   Lactate 2.3. CXR shows focal lucency in the left mid lung, could be projectional versus possible small intracavitary focus, otherwise unremarkeable.   Received 1 dose of  oseltamivir, vancomycin and zosyn in the ED.   UA unremarkable, BC- NGTD    Plan  -s/p 1 dose of Filgrastim. Minimal improvement in WBC. 0.25->0.46. Awaiting differential result from lab.  -Continue with oseltamivir  -Continue with IV fluids, BP appears low, will increase to 125cc/hr of NS  -Lactate trended up from 2.3 ->2.6. Repeat lactate.  -IV Cefepime 2g q8hrs to cover for possible bacterial infection  -Daily CBC and monitor ANC levels.   -Neutropenic contact isolation      Oral mucositis  - Patient takes Saavedra's solution at home.   - Received 1 dose of Magic mouthwash without relief.   - Saavedra's solution scheduled QID.    Hypophosphatemia  Phos <1.0.   Repleting with IV phos     Influenza B  -Confirmed Influenza B. Received 30cc/kg of IV normal saline.     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  MRI of the thoracic/lumbar spine 01/09 suggestive of Numerous osseous metastases of the thoracic and lumbar spine, with multiple compression fractures as above, and a new pathologic fracture of the right sacral ala.  -Receives chemotherapy (Halaven)     Plan  -Continue with home pain regimen - MS Contin 200mg q8hrs and morphine 60mg q4hrs      Hypothyroidism due to Hashimoto's thyroiditis  -Continue with home synthroid            Jb Silva MD  Hematology/Oncology  [facility]        ATTENDING NOTE, ONCOLOGY INPATIENT TEAM    As above; events of last 24 hours noted.  Patient seen and examined, chart reviewed.  Appears uncomfortable, complaining of weakness, malaise, and sore throat..  Lungs are clear to auscultation.  Abdomen is soft, nontender.  No significant mucositis on oral exam.  Labs reviewed.  Her WBCs have increased to 470 cubic mm.    PLAN  We will follow her blood cultures.  Follow CBC and electrolytes  daily  Continue oseltamivir.  Continue daily Neupogen and cefepime.    Since she is not ambulatory, will continue enoxaparin for DVT prophylaxis.  We will follow.      José Manuel Shore MD

## 2020-01-26 NOTE — ASSESSMENT & PLAN NOTE
Patient with history of metastatic breast cancer on chemotherapy (Halaven, last dose 01/22) presenting with malaise, fever Tmax 102.4, productive cough, noted to be positive for influenza B.   On admission, WBC 0.25, ANC count 37.5 indicating severe neutropenia.   Lactate 2.3. CXR shows focal lucency in the left mid lung, could be projectional versus possible small intracavitary focus, otherwise unremarkeable.   Received 1 dose of oseltamivir, vancomycin and zosyn in the ED.   UA unremarkable, BC- NGTD    Plan  -s/p 1 dose of Filgrastim. Minimal improvement in WBC. 0.25->0.46. Awaiting differential result from lab.  -Continue with oseltamivir  -Continue with IV fluids, BP appears low, will increase to 125cc/hr of NS  -Lactate trended up from 2.3 ->2.6. Repeat lactate.  -IV Cefepime 2g q8hrs to cover for possible bacterial infection  -Daily CBC and monitor ANC levels.   -Neutropenic contact isolation

## 2020-01-26 NOTE — PLAN OF CARE
Problem: Occupational Therapy Goal  Goal: Occupational Therapy Goal  Description  Pt will perfrom ADLs indep.  Pt will be indep w/ HEP.    Outcome: Ongoing, Progressing    Paul Pham OTR/L  1/26/2020

## 2020-01-26 NOTE — PROGRESS NOTES
Pharmacist Renal Dose Adjustment Note    Rebecca Crain is a 64 y.o. female being treated with the medication Tamiflu    Patient Data:    Vital Signs (Most Recent):  Temp: (!) 100.7 °F (38.2 °C) (01/26/20 0737)  Pulse: (!) 111 (01/26/20 1129)  Resp: 16 (01/26/20 0737)  BP: (!) 121/59 (01/26/20 0737)  SpO2: (!) 92 % (01/26/20 0737)   Vital Signs (72h Range):  Temp:  [98.8 °F (37.1 °C)-101.4 °F (38.6 °C)]   Pulse:  []   Resp:  [16-20]   BP: ()/(50-63)   SpO2:  [92 %-98 %]      Recent Labs   Lab 01/22/20  0729 01/25/20  1233 01/26/20  0445   CREATININE 0.8 0.6 0.6     Serum creatinine: 0.6 mg/dL 01/26/20 0445  Estimated creatinine clearance: 97.3 mL/min    Medication:Tamiflu dose: 75 mg frequency daily will be changed to medication:Tamiflu dose:75 mg frequency:BID    Pharmacist's Name: Becki Petit  Pharmacist's Extension: [phone]

## 2020-01-26 NOTE — PT/OT/SLP EVAL
Occupational Therapy   Evaluation    Name: Rebecca Crain  MRN: [id number]  Admitting Diagnosis:  Neutropenic fever      Recommendations:     Discharge Recommendations: home with home health  Discharge Equipment Recommendations:  none  Barriers to discharge:  None    Assessment:     Rebecca Crain is a 64 y.o. female with a medical diagnosis of Neutropenic fever.  She presents with impairments listed below. Pt will be followed to prevent deconditioning. Pt displayed global deconditioning requiring increased assist for ADLs and mobility at this time. Pt would benefit from skilled OT services to improve independence and overall occupational functioning.     Performance deficits affecting function: weakness, impaired endurance, impaired functional mobilty, impaired cardiopulmonary response to activity.      Rehab Prognosis: Good; patient would benefit from acute skilled OT services to address these deficits and reach maximum level of function.       Plan:     Patient to be seen 3 x/week to address the above listed problems via self-care/home management, therapeutic activities, therapeutic exercises  · Plan of Care Expires: 02/26/20  · Plan of Care Reviewed with: patient    Subjective     Chief Complaint: fatigue w/ prolonged exertion   Patient/Family Comments/goals: return home    Occupational Profile:  Living Environment: Pt lives w/ dtr and dtrs friend in a [facility] w/ TH to enter.  Previous level of function: indep w/ ADLs and MOD I RW for mobility   Roles and Routines: N/A  Equipment Used at Home:  walker, rolling  Assistance upon Discharge: Pt has limited assistance.     Pain/Comfort:  · Pain Rating 1: 0/10  · Pain Rating Post-Intervention 1: 0/10    Patients cultural, spiritual, [employer] conflicts given the current situation:      Objective:     Communicated with: RN prior to session.  Patient found HOB elevated with peripheral IV upon OT entry to room.    General Precautions: Standard, fall   Orthopedic  Precautions:N/A   Braces: N/A     Occupational Performance:    Bed Mobility:    · Patient completed Scooting/Bridging with stand by assistance  · Patient completed Supine to Sit with stand by assistance  · Patient completed Sit to Supine with stand by assistance    Functional Mobility/Transfers:  · Patient completed Sit <> Stand Transfer with stand by assistance  with  rolling walker   · Patient completed Toilet Transfer Step Transfer technique with supervision with  rolling walker  · Functional Mobility: Pt ambulated ~20 ft at sba w/ RW.     Activities of Daily Living:  · Grooming: stand by assistance hand hygiene while standing at sink  · Toileting: supervision at toilet    Cognitive/Visual Perceptual:  Cognitive/Psychosocial Skills:     -       Oriented to: Person, Place, Time and Situation   -       Follows Commands/attention:Follows multistep  commands  -       Communication: clear/fluent  -       Memory: No Deficits noted  -       Safety awareness/insight to disability: intact   -       Mood/Affect/Coping skills/emotional control: Appropriate to situation  Visual/Perceptual:      -Intact      Physical Exam:  Balance:    -       abs for ambualtion   Postural examination/scapula alignment:    -       Rounded shoulders  Skin integrity: Visible skin intact  Upper Extremity Range of Motion:     -       Right Upper Extremity: WFL  -       Left Upper Extremity: WFL  Upper Extremity Strength:    -       Right Upper Extremity: WFL  -       Left Upper Extremity: WFL   Strength:    -       Right Upper Extremity: WFL  -       Left Upper Extremity: WFL  Fine Motor Coordination:    -       Intact  Gross motor coordination:   WFL    AMPAC 6 Click ADL:  AMPAC Total Score: 24    Treatment & Education:  Pt educated on POC.   Education:    Patient left HOB elevated with all lines intact and call button in reach    GOALS:   Multidisciplinary Problems     Occupational Therapy Goals        Problem: Occupational Therapy Goal     Goal Priority Disciplines Outcome Interventions   Occupational Therapy Goal     OT, PT/OT Ongoing, Progressing    Description:  Pt will perfrom ADLs indep.  Pt will be indep w/ HEP.                     History:     Past Medical History:   Diagnosis Date    Adjustment disorder with depressed mood 2017    Allergy     Anxiety     Asthma     seasonal    Blood transfusion     1981    Breast cancer     stage IIB carcinoma of the right breast, ER positive with a low Oncotype score    Chemotherapy-induced neuropathy 2016    Depression     Diverticulosis     Falls frequently 2014    Hepatic cyst 2014    Hx of psychiatric care     Hypercholesteremia     Hypertension     Lung nodule 2014    Lymphedema     S/P right breast mastectomy    Metastatic bone cancer     MVP (mitral valve prolapse)     Osteoporosis, unspecified 2014    Psychiatric problem     Therapy     Thyroid disease     Hasimoto disease       Past Surgical History:   Procedure Laterality Date    BREAST LUMPECTOMY Right     X 2    BREAST RECONSTRUCTION  2013    X 2     SECTION      x2    COLONOSCOPY N/A 2016    Procedure: COLONOSCOPY;  Surgeon: Miguel Vu MD;  Location: [address]);  Service: Endoscopy;  Laterality: N/A;  MRSA-at time of scheduling pending results on 2016    endometriosis      EYE SURGERY      RK bilateral     GANGLION CYST EXCISION      right index finger    HEMORRHOID SURGERY      KNEE SURGERY Bilateral     ortho    MASTECTOMY  2013    Bilateral     TONSILLECTOMY      TUBAL LIGATION         Time Tracking:     OT Date of Treatment: 20  OT Start Time: 1055  OT Stop Time: 1105  OT Total Time (min): 10 min    Billable Minutes:Evaluation 10 minuteds    Paul Pham, OT  2020

## 2020-01-27 ENCOUNTER — TELEPHONE (OUTPATIENT)
Dept: INTERNAL MEDICINE | Facility: CLINIC | Age: 65
End: 2020-01-27

## 2020-01-27 LAB
ALBUMIN SERPL BCP-MCNC: 2 G/DL (ref 3.5–5.2)
ALP SERPL-CCNC: 291 U/L (ref 55–135)
ALT SERPL W/O P-5'-P-CCNC: 40 U/L (ref 10–44)
ANION GAP SERPL CALC-SCNC: 6 MMOL/L (ref 8–16)
ANION GAP SERPL CALC-SCNC: 6 MMOL/L (ref 8–16)
ANISOCYTOSIS BLD QL SMEAR: SLIGHT
AST SERPL-CCNC: 67 U/L (ref 10–40)
BACTERIA THROAT CULT: NORMAL
BASOPHILS # BLD AUTO: 0 K/UL (ref 0–0.2)
BASOPHILS NFR BLD: 0 % (ref 0–1.9)
BILIRUB SERPL-MCNC: 1.2 MG/DL (ref 0.1–1)
BUN SERPL-MCNC: 3 MG/DL (ref 8–23)
BUN SERPL-MCNC: 4 MG/DL (ref 8–23)
CALCIUM SERPL-MCNC: 6.7 MG/DL (ref 8.7–10.5)
CALCIUM SERPL-MCNC: 6.9 MG/DL (ref 8.7–10.5)
CHLORIDE SERPL-SCNC: 105 MMOL/L (ref 95–110)
CHLORIDE SERPL-SCNC: 107 MMOL/L (ref 95–110)
CO2 SERPL-SCNC: 23 MMOL/L (ref 23–29)
CO2 SERPL-SCNC: 25 MMOL/L (ref 23–29)
CREAT SERPL-MCNC: 0.6 MG/DL (ref 0.5–1.4)
CREAT SERPL-MCNC: 0.6 MG/DL (ref 0.5–1.4)
DACRYOCYTES BLD QL SMEAR: ABNORMAL
DIFFERENTIAL METHOD: ABNORMAL
EOSINOPHIL # BLD AUTO: 0 K/UL (ref 0–0.5)
EOSINOPHIL NFR BLD: 0 % (ref 0–8)
ERYTHROCYTE [DISTWIDTH] IN BLOOD BY AUTOMATED COUNT: 16.4 % (ref 11.5–14.5)
EST. GFR  (AFRICAN AMERICAN): >60 ML/MIN/1.73 M^2
EST. GFR  (AFRICAN AMERICAN): >60 ML/MIN/1.73 M^2
EST. GFR  (NON AFRICAN AMERICAN): >60 ML/MIN/1.73 M^2
EST. GFR  (NON AFRICAN AMERICAN): >60 ML/MIN/1.73 M^2
GLUCOSE SERPL-MCNC: 158 MG/DL (ref 70–110)
GLUCOSE SERPL-MCNC: 197 MG/DL (ref 70–110)
HCT VFR BLD AUTO: 30.8 % (ref 37–48.5)
HGB BLD-MCNC: 9.6 G/DL (ref 12–16)
HYPOCHROMIA BLD QL SMEAR: ABNORMAL
IMM GRANULOCYTES # BLD AUTO: 0.01 K/UL (ref 0–0.04)
IMM GRANULOCYTES NFR BLD AUTO: 1.8 % (ref 0–0.5)
LYMPHOCYTES # BLD AUTO: 0.4 K/UL (ref 1–4.8)
LYMPHOCYTES NFR BLD: 74.5 % (ref 18–48)
MAGNESIUM SERPL-MCNC: 2 MG/DL (ref 1.6–2.6)
MCH RBC QN AUTO: 28.4 PG (ref 27–31)
MCHC RBC AUTO-ENTMCNC: 31.2 G/DL (ref 32–36)
MCV RBC AUTO: 91 FL (ref 82–98)
MONOCYTES # BLD AUTO: 0.1 K/UL (ref 0.3–1)
MONOCYTES NFR BLD: 16.4 % (ref 4–15)
NEUTROPHILS # BLD AUTO: 0 K/UL (ref 1.8–7.7)
NEUTROPHILS NFR BLD: 7.3 % (ref 38–73)
NRBC BLD-RTO: 0 /100 WBC
OVALOCYTES BLD QL SMEAR: ABNORMAL
PHOSPHATE SERPL-MCNC: 1.8 MG/DL (ref 2.7–4.5)
PHOSPHATE SERPL-MCNC: <1 MG/DL (ref 2.7–4.5)
PLATELET # BLD AUTO: 136 K/UL (ref 150–350)
PLATELET BLD QL SMEAR: ABNORMAL
PMV BLD AUTO: 11 FL (ref 9.2–12.9)
POIKILOCYTOSIS BLD QL SMEAR: SLIGHT
POLYCHROMASIA BLD QL SMEAR: ABNORMAL
POTASSIUM SERPL-SCNC: 3.1 MMOL/L (ref 3.5–5.1)
POTASSIUM SERPL-SCNC: 3.4 MMOL/L (ref 3.5–5.1)
PROT SERPL-MCNC: 4.9 G/DL (ref 6–8.4)
RBC # BLD AUTO: 3.38 M/UL (ref 4–5.4)
SODIUM SERPL-SCNC: 136 MMOL/L (ref 136–145)
SODIUM SERPL-SCNC: 136 MMOL/L (ref 136–145)
WBC # BLD AUTO: 0.55 K/UL (ref 3.9–12.7)

## 2020-01-27 PROCEDURE — 99233 SBSQ HOSP IP/OBS HIGH 50: CPT | Mod: HCNC,,, | Performed by: INTERNAL MEDICINE

## 2020-01-27 PROCEDURE — 83735 ASSAY OF MAGNESIUM: CPT | Mod: HCNC

## 2020-01-27 PROCEDURE — 20600001 HC STEP DOWN PRIVATE ROOM: Mod: HCNC

## 2020-01-27 PROCEDURE — 99233 PR SUBSEQUENT HOSPITAL CARE,LEVL III: ICD-10-PCS | Mod: HCNC,,, | Performed by: INTERNAL MEDICINE

## 2020-01-27 PROCEDURE — 84100 ASSAY OF PHOSPHORUS: CPT | Mod: HCNC

## 2020-01-27 PROCEDURE — 36415 COLL VENOUS BLD VENIPUNCTURE: CPT | Mod: HCNC

## 2020-01-27 PROCEDURE — 25000003 PHARM REV CODE 250: Mod: HCNC | Performed by: STUDENT IN AN ORGANIZED HEALTH CARE EDUCATION/TRAINING PROGRAM

## 2020-01-27 PROCEDURE — 85025 COMPLETE CBC W/AUTO DIFF WBC: CPT | Mod: HCNC

## 2020-01-27 PROCEDURE — 80048 BASIC METABOLIC PNL TOTAL CA: CPT | Mod: HCNC

## 2020-01-27 PROCEDURE — 63600175 PHARM REV CODE 636 W HCPCS: Mod: HCNC | Performed by: STUDENT IN AN ORGANIZED HEALTH CARE EDUCATION/TRAINING PROGRAM

## 2020-01-27 PROCEDURE — 97161 PT EVAL LOW COMPLEX 20 MIN: CPT | Mod: HCNC

## 2020-01-27 PROCEDURE — 84100 ASSAY OF PHOSPHORUS: CPT | Mod: 91,HCNC

## 2020-01-27 PROCEDURE — 97530 THERAPEUTIC ACTIVITIES: CPT | Mod: HCNC

## 2020-01-27 PROCEDURE — 25000003 PHARM REV CODE 250: Mod: HCNC | Performed by: INTERNAL MEDICINE

## 2020-01-27 PROCEDURE — 80053 COMPREHEN METABOLIC PANEL: CPT | Mod: HCNC

## 2020-01-27 RX ORDER — CALCIUM CARBONATE 500(1250)
500 TABLET ORAL ONCE
Status: COMPLETED | OUTPATIENT
Start: 2020-01-27 | End: 2020-01-27

## 2020-01-27 RX ORDER — CALCIUM CARBONATE 500(1250)
500 TABLET ORAL ONCE
Status: DISCONTINUED | OUTPATIENT
Start: 2020-01-27 | End: 2020-01-27

## 2020-01-27 RX ORDER — THIAMINE HCL 100 MG
100 TABLET ORAL DAILY
Status: DISCONTINUED | OUTPATIENT
Start: 2020-01-27 | End: 2020-02-02

## 2020-01-27 RX ADMIN — ONDANSETRON 8 MG: 8 TABLET, ORALLY DISINTEGRATING ORAL at 11:01

## 2020-01-27 RX ADMIN — MINERAL OIL, PETROLATUM, PHENYLEPHRINE HCL 1 APPLICATOR: 14; 74.9; .25 OINTMENT RECTAL at 09:01

## 2020-01-27 RX ADMIN — MORPHINE SULFATE 200 MG: 100 TABLET, FILM COATED, EXTENDED RELEASE ORAL at 05:01

## 2020-01-27 RX ADMIN — SODIUM CHLORIDE: 0.9 INJECTION, SOLUTION INTRAVENOUS at 04:01

## 2020-01-27 RX ADMIN — GABAPENTIN 300 MG: 300 CAPSULE ORAL at 08:01

## 2020-01-27 RX ADMIN — OSELTAMIVIR PHOSPHATE 75 MG: 75 CAPSULE ORAL at 08:01

## 2020-01-27 RX ADMIN — Medication 10 ML: at 08:01

## 2020-01-27 RX ADMIN — PANTOPRAZOLE SODIUM 40 MG: 40 TABLET, DELAYED RELEASE ORAL at 08:01

## 2020-01-27 RX ADMIN — MORPHINE SULFATE 200 MG: 100 TABLET, FILM COATED, EXTENDED RELEASE ORAL at 02:01

## 2020-01-27 RX ADMIN — Medication 10 ML: at 05:01

## 2020-01-27 RX ADMIN — MINERAL OIL, PETROLATUM, PHENYLEPHRINE HCL 1 APPLICATOR: 14; 74.9; .25 OINTMENT RECTAL at 06:01

## 2020-01-27 RX ADMIN — TBO-FILGRASTIM 300 MCG: 300 INJECTION, SOLUTION SUBCUTANEOUS at 08:01

## 2020-01-27 RX ADMIN — Medication 100 MG: at 12:01

## 2020-01-27 RX ADMIN — CALCIUM 500 MG: 500 TABLET ORAL at 06:01

## 2020-01-27 RX ADMIN — CEFEPIME 2 G: 2 INJECTION, POWDER, FOR SOLUTION INTRAVENOUS at 02:01

## 2020-01-27 RX ADMIN — CEFEPIME 2 G: 2 INJECTION, POWDER, FOR SOLUTION INTRAVENOUS at 09:01

## 2020-01-27 RX ADMIN — OSELTAMIVIR PHOSPHATE 75 MG: 75 CAPSULE ORAL at 09:01

## 2020-01-27 RX ADMIN — IBUPROFEN 400 MG: 200 TABLET, FILM COATED ORAL at 11:01

## 2020-01-27 RX ADMIN — CEFEPIME 2 G: 2 INJECTION, POWDER, FOR SOLUTION INTRAVENOUS at 06:01

## 2020-01-27 RX ADMIN — POTASSIUM PHOSPHATE, MONOBASIC AND POTASSIUM PHOSPHATE, DIBASIC 15 MMOL: 224; 236 INJECTION, SOLUTION, CONCENTRATE INTRAVENOUS at 06:01

## 2020-01-27 RX ADMIN — MORPHINE SULFATE 200 MG: 100 TABLET, FILM COATED, EXTENDED RELEASE ORAL at 09:01

## 2020-01-27 RX ADMIN — SENNOSIDES AND DOCUSATE SODIUM 1 TABLET: 8.6; 5 TABLET ORAL at 08:01

## 2020-01-27 RX ADMIN — Medication 10 ML: at 09:01

## 2020-01-27 RX ADMIN — ENOXAPARIN SODIUM 40 MG: 100 INJECTION SUBCUTANEOUS at 04:01

## 2020-01-27 RX ADMIN — ASPIRIN 81 MG: 81 TABLET, COATED ORAL at 08:01

## 2020-01-27 RX ADMIN — CALCIUM 500 MG: 500 TABLET ORAL at 08:01

## 2020-01-27 RX ADMIN — LEVOTHYROXINE SODIUM 137 MCG: 25 TABLET ORAL at 05:01

## 2020-01-27 RX ADMIN — SERTRALINE 50 MG: 25 TABLET, FILM COATED ORAL at 08:01

## 2020-01-27 RX ADMIN — Medication 10 ML: at 04:01

## 2020-01-27 RX ADMIN — Medication 10 ML: at 02:01

## 2020-01-27 RX ADMIN — SODIUM CHLORIDE: 0.9 INJECTION, SOLUTION INTRAVENOUS at 10:01

## 2020-01-27 RX ADMIN — Medication 10 ML: at 01:01

## 2020-01-27 RX ADMIN — GABAPENTIN 300 MG: 300 CAPSULE ORAL at 02:01

## 2020-01-27 RX ADMIN — GABAPENTIN 300 MG: 300 CAPSULE ORAL at 09:01

## 2020-01-27 RX ADMIN — MORPHINE SULFATE 60 MG: 15 TABLET ORAL at 02:01

## 2020-01-27 RX ADMIN — POTASSIUM PHOSPHATE, MONOBASIC AND POTASSIUM PHOSPHATE, DIBASIC 30 MMOL: 224; 236 INJECTION, SOLUTION, CONCENTRATE INTRAVENOUS at 08:01

## 2020-01-27 RX ADMIN — Medication 10 ML: at 06:01

## 2020-01-27 NOTE — TELEPHONE ENCOUNTER
----- Message from Nithya Christine sent at 1/25/2020  7:26 AM CST -----  Contact: Pt   Pt is requesting a same day appt for 01/25/2020   Pt is experiencing sore throat with chest congestion and an earache   Pt is an established pt of Dr. Colleen Valero but has not seen physician in a while     Pt can be reached at [phone]

## 2020-01-27 NOTE — ASSESSMENT & PLAN NOTE
Patient with history of metastatic breast cancer on chemotherapy (Halaven, last dose 01/22) presenting with malaise, fever Tmax 102.4, productive cough, noted to be positive for influenza B.   On admission, WBC 0.25, ANC count 37.5 indicating severe neutropenia.   Lactate 2.3. CXR shows focal lucency in the left mid lung, could be projectional versus possible small intracavitary focus, otherwise unremarkeable.   Received 1 dose of oseltamivir, vancomycin and zosyn in the ED.   UA unremarkable, BC- NGTD    Plan  -s/p 1 dose of Filgrastim. Minimal improvement in WBC. 0.25->0.55. Awaiting differential result from lab.  -Continue with oseltamivir  -Continue with IV fluids, BP appears low, will increase to 125cc/hr of NS  -Lactate trended up from 2.3 ->2.6. Lactate has normalized  -IV Cefepime 2g q8hrs to cover for possible bacterial infection  -Daily CBC and monitor ANC levels.   -Neutropenic contact isolation

## 2020-01-27 NOTE — PLAN OF CARE
Patient AAOx4, VS stable. POC reviewed with patient ans she verbalized understanding. Pain and fever managed well, safety maintained throughout shift, no acute changes. WCTM

## 2020-01-27 NOTE — PLAN OF CARE
"VSS. A/Ox4.  C/o throat pain 10/10 with scheduled duke's solution and prn magic mouthwash giving very little relief.  NS infusing at 125 mL/hr.  Patient states her cough is "getting a little bit better."  No acute events overnight. Safety maintained.  All questions answered. Patient updated on plan of care.  Will continue to monitor.    "

## 2020-01-27 NOTE — HOSPITAL COURSE
64 year old woman with breast cancer found to have the flu and thus a neutropenic fever. Sx consistent with flu. Tamiflu started and giving IVF. Also with distending abdomen: KUB suggestive of fecal compaction, treating. Patient otherwise improving clinically; night of 1/31 patient with facial swelling, suspected cellulitis - placed on doxycycline; on doxycycline for two days - patient without any more cellulitis. Aim to discharge home today with home health PT/OT and on Tamiflu

## 2020-01-27 NOTE — PLAN OF CARE
01/27/20 1437   Discharge Assessment   Assessment Type Discharge Planning Assessment   Confirmed/corrected address and phone number on facesheet? Yes   Assessment information obtained from? Patient   Expected Length of Stay (days) 3   Communicated expected length of stay with patient/caregiver yes   Prior to hospitilization cognitive status: Alert/Oriented   Prior to hospitalization functional status: Assistive Equipment   Current cognitive status: Alert/Oriented   Current Functional Status: Assistive Equipment   Lives With child(kimi), adult   Able to Return to Prior Arrangements yes   Is patient able to care for self after discharge? Unable to determine at this time (comments)   Who are your caregiver(s) and their phone number(s)? Becki Paras landt. [phone]   Patient's perception of discharge disposition home health   Readmission Within the Last 30 Days no previous admission in last 30 days   Patient currently being followed by outpatient case management? No   Patient currently receives any other outside agency services? No   Equipment Currently Used at Home walker, rolling;wheelchair;bedside commode;shower chair   Do you have any problems affording any of your prescribed medications? No   Is the patient taking medications as prescribed? yes   Does the patient have transportation home? Yes   Transportation Anticipated family or friend will provide   Dialysis Name and Scheduled days n/a   Does the patient receive services at the [facility]? No   Discharge Plan A Home Health;Home with family   Discharge Plan B Home with family   DME Needed Upon Discharge  rollator   Patient/Family in Agreement with Plan yes

## 2020-01-27 NOTE — PROGRESS NOTES
[facility]  Hematology/Oncology  Progress Note    Patient Name: Rebecca Crain  Admission Date: 1/25/2020  Hospital Length of Stay: 2 days  Code Status: Full Code     Subjective:     HPI:  Rebecca Crain is a 64 year old F with metastatic right breast cancer s/p mastectomy, on (chemotherapy- Halaven, last dose 01/22), hypothyroidism presenting for generalized weakness, fever and productive cough. Patient stated she was recently admitted to [facility] for intractable pain (01/06-01/12), a day before discharge she developed productive cough which advanced to green-blood tinged sputum upon getting home. She received a Z-pack from Dr. Schroeder and her symptoms only temporarily improved. For the past few days, she has been experiencing generalized weakness, malaise, and a fever of 102 yesterday. Patient stated her daughter had recently contracted the flu. She denies headaches, nausea, vomiting, SOB, urinary changes, but endorses pain in her anterior chest wall, right lower quadrant pain, mouth sores and pain with swallowing.    In the ED, she was noted to have a T of 101.4, HR of 125. She received 30cc/kg of IV fluids. Influenza B was positive. WBC noted to be 0.25, so 1 dose of vancomycin, zosyn were administered.     Oncology history  Breast history: In 2013 she was diagnosed with stage IIB carcinoma of the right breast, ER positive with a low Oncotype score.  She was enrolled on protocol  and randomized to endocrine therapy alone.  She was tried on all 3 aromatase inhibitors and had itching and rash to all of them.  In August 2013 she was started on tamoxifen.   She was found to have  bone metastasis in May 2015, 2015.  A subsequent bone biopsy was performed on a lesion at the T8 vertebra.   The pathology from that was consistent with metastatic breast cancer, ER +80%, OH +80%, and HER-2 negative.   She received letrozole plus palbociclib from July 2015 until May 2016.  She also received bone  protective therapy with Xgeva. Faslodex was started in June 2016.      Exemestane and Afinitor started in August 2017.  That was discontinued in February 2018 due to progression in the liver and bone.     Navelbine was started February 16, 2018.  She had 8 cycles for that.  Scans in November 2018 showed progression as follows     Capecitabine was started in December 2018.  She received 5 cycles of that therapy.     Follow-up scans in April 2019 showed progression with a new hepatic lesion and worsening bone disease.     She began clinical trial therapy with erdafitinib on May 8, 2019.  That was discontinued in September 2019 due to progression in the bone.     She then received a course of radiation therapy to her pelvis.  She began weekly Taxol in October 2019.That was discontinued in December 2019 due to progression.    Interval History: No acute events overnight, patient feeling the same.     Oncology Treatment Plan:   OP BREAST ERIBULIN Q3W    Medications:  Continuous Infusions:   sodium chloride 0.9% 125 mL/hr at 01/27/20 0415     Scheduled Meds:   aspirin  81 mg Oral Daily    calcium carbonate  500 mg Oral Once    ceFEPime (MAXIPIME) IVPB  2 g Intravenous Q8H    duke's soln (benadryl 30 mL, mylanta 30 mL, lidocaine 30 mL, nystatin 30 mL) 120 mL  10 mL Oral QID    enoxaparin  40 mg Subcutaneous Daily    gabapentin  300 mg Oral TID    levothyroxine  137 mcg Oral Before breakfast    morphine  200 mg Oral Q8H    oseltamivir  75 mg Oral BID    pantoprazole  40 mg Oral Daily    potassium phosphate IVPB  30 mmol Intravenous Once    senna-docusate 8.6-50 mg  1 tablet Oral Daily    sertraline  50 mg Oral Daily    tbo-filgrastim  300 mcg Subcutaneous Daily     PRN Meds:(Magic mouthwash) 1:1:1 Benadryl 12.5mg/5ml liq, aluminum & magnesium hydroxide-simehticone (Maalox), lidocaine viscous 2%, Dextrose 10% Bolus, Dextrose 10% Bolus, glucagon (human recombinant), glucose, glucose, ibuprofen, insulin aspart  U-100, morphine, ondansetron, promethazine (PHENERGAN) IVPB, sodium chloride 0.9%     Review of Systems   Constitutional: Positive for activity change and fever. Negative for chills.   Respiratory: Positive for cough. Negative for shortness of breath.    Cardiovascular: Negative for chest pain, palpitations and leg swelling.   Gastrointestinal: Positive for abdominal distention. Negative for abdominal pain, constipation, diarrhea, nausea and vomiting.   Genitourinary: Negative for difficulty urinating, dysuria and hematuria.   Neurological: Positive for weakness (Generalized weakness). Negative for dizziness.   Psychiatric/Behavioral: Negative for agitation, behavioral problems and confusion.     Objective:     Vital Signs (Most Recent):  Temp: 100.1 °F (37.8 °C) (01/27/20 0732)  Pulse: (!) 116 (01/27/20 0732)  Resp: 16 (01/27/20 0732)  BP: 137/65 (01/27/20 0732)  SpO2: 96 % (01/27/20 0732) Vital Signs (24h Range):  Temp:  [99 °F (37.2 °C)-101.9 °F (38.8 °C)] 100.1 °F (37.8 °C)  Pulse:  [102-120] 116  Resp:  [16-18] 16  SpO2:  [94 %-97 %] 96 %  BP: (105-137)/(56-79) 137/65     Weight: 78.5 kg (173 lb 1 oz)  Body mass index is 29.71 kg/m².  Body surface area is 1.88 meters squared.      Intake/Output Summary (Last 24 hours) at 1/27/2020 0836  Last data filed at 1/27/2020 0600  Gross per 24 hour   Intake 1740 ml   Output 602 ml   Net 1138 ml       Physical Exam   Constitutional: She is oriented to person, place, and time. She appears well-developed. No distress.   HENT:   Head: Normocephalic and atraumatic.   Mouth/Throat: No oropharyngeal exudate.   Eyes: Pupils are equal, round, and reactive to light. EOM are normal. Right eye exhibits no discharge. Left eye exhibits no discharge. No scleral icterus.   Neck: Normal range of motion. Neck supple. No JVD present.   Cardiovascular: Normal rate and regular rhythm.   No murmur heard.  Pulmonary/Chest: Effort normal and breath sounds normal. She has no wheezes. She has no  rales.   Abdominal: Soft. Bowel sounds are normal. She exhibits no distension. There is tenderness.   Musculoskeletal: Normal range of motion. She exhibits edema. She exhibits no tenderness.   Lymphadenopathy:     She has cervical adenopathy.   Neurological: She is alert and oriented to person, place, and time. No cranial nerve deficit.   Skin: Skin is warm. She is diaphoretic. No erythema. No pallor.       Significant Labs:   CBC:   Recent Labs   Lab 01/25/20  1233 01/26/20  0445 01/27/20  0452   WBC 0.25* 0.46* 0.55*   HGB 11.0* 9.7* 9.6*   HCT 35.6* 31.6* 30.8*    147* 136*    and CMP:   Recent Labs   Lab 01/25/20  1233 01/26/20  0445 01/27/20  0452   * 138 136   K 3.7 3.3* 3.1*    109 107   CO2 25 25 23   * 152* 158*   BUN 7* 6* 4*   CREATININE 0.6 0.6 0.6   CALCIUM 8.2* 7.3* 6.9*   PROT 5.5* 4.8* 4.9*   ALBUMIN 2.4* 2.1* 2.0*   BILITOT 1.3* 1.4* 1.2*   ALKPHOS 369* 296* 291*   AST 88* 77* 67*   ALT 50* 43 40   ANIONGAP 8 4* 6*   EGFRNONAA >60.0 >60.0 >60.0       Diagnostic Results:  Imaging Results          X-Ray Chest AP Portable (Final result)  Result time 01/25/20 13:49:53    Final result by Bakari Rob MD (01/25/20 13:49:53)                 Impression:      As above.      Electronically signed by: Bakari Rob  Date:    01/25/2020  Time:    13:49             Narrative:    EXAMINATION:  XR CHEST AP PORTABLE    CLINICAL HISTORY:  Sepsis;    TECHNIQUE:  Single frontal view of the chest was performed.    COMPARISON:  Chest radiograph dated 01/07/2020    FINDINGS:  Questionable focal lucency in the left mid lung, could be projectional versus possible small intracavitary focus.  Cross-sectional assessment could be obtained for further evaluation.  Remainder of the lungs appear similar without lobar consolidation, pleural effusion, or pneumothorax.  Cardiomediastinal silhouette is similar.  Left sided MediPort catheter with tip overlying the distal SVC.  No acute osseous  abnormality.  Similar surgical clips.                                  Assessment/Plan:     * Neutropenic fever  Patient with history of metastatic breast cancer on chemotherapy (Halaven, last dose 01/22) presenting with malaise, fever Tmax 102.4, productive cough, noted to be positive for influenza B.   On admission, WBC 0.25, ANC count 37.5 indicating severe neutropenia.   Lactate 2.3. CXR shows focal lucency in the left mid lung, could be projectional versus possible small intracavitary focus, otherwise unremarkeable.   Received 1 dose of oseltamivir, vancomycin and zosyn in the ED.   UA unremarkable, BC- NGTD    Plan  -s/p 1 dose of Filgrastim. Minimal improvement in WBC. 0.25->0.55. Awaiting differential result from lab.  -Continue with oseltamivir  -Continue with IV fluids, BP appears low, will increase to 125cc/hr of NS  -Lactate trended up from 2.3 ->2.6. Lactate has normalized  -IV Cefepime 2g q8hrs to cover for possible bacterial infection  -Daily CBC and monitor ANC levels.   -Neutropenic contact isolation      Oral mucositis  - Patient takes Saavedra's solution at home.   - Received 1 dose of Magic mouthwash without relief.   - Added salt/baking soda per nursing  - Duke's solution scheduled QID.    Hypophosphatemia  Phos <1.0.   -Repleting with IV phos   -restarted thiamine    Influenza B  -Confirmed Influenza B. Received 30cc/kg of IV normal saline.     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  MRI of the thoracic/lumbar spine 01/09 suggestive of Numerous osseous metastases of the thoracic and lumbar spine, with multiple compression fractures as above, and a new pathologic fracture of the right sacral ala.  -Receives chemotherapy (Halaven)     Plan  -Continue with home pain regimen - MS Contin 200mg q8hrs and morphine 60mg  q4hrs      Hypothyroidism due to Hashimoto's thyroiditis  -Continue with home synthroid              Wes Turner MD  Hematology/Oncology  [facility]      ATTENDING NOTE, ONCOLOGY INPATIENT TEAM    As above; events of last 24 hours noted.  Patient seen and examined, chart reviewed. T-max was 101.9° last night.  Appears  uncomfortable, complaining of malaise, arthralgias, and weakness.  Lungs are clear to auscultation.  Abdomen is soft, distended, nontender.  Extremities have 1+ pitting edema bilaterally.  Labs reviewed.  Her WBCs today are 550/mm3.    PLAN  Follow CBC and electrolytes daily.  Continue oseltamivir.  As continue cefepime for now until her WBCs recover.    Continue Neupogen.  Continue enoxaparin until she becomes ambulatory.  We will follow.    José Manuel Shore MD

## 2020-01-27 NOTE — PLAN OF CARE
Problem: Physical Therapy Goal  Goal: Physical Therapy Goal  Description  Goals to be met by: 20    Patient will increase functional independence with mobility by performin. Sit to stand transfer with Modified [place]  2. Gait  x 150 feet with Modified [place]ahan using Rolling Walker.   3. Stand for 3 minutes with Modified [place] using Rolling Walker  4. Lower extremity exercise program x20 reps per handout, with independence     Outcome: Ongoing, Progressing     PT evaluation completed- see note for details. Goals established and POC initiated.       Liss Sevilla PT, DPT   2020  Pager: [phone]

## 2020-01-27 NOTE — SUBJECTIVE & OBJECTIVE
Interval History: No acute events overnight, patient feeling the same.     Oncology Treatment Plan:   OP BREAST ERIBULIN Q3W    Medications:  Continuous Infusions:   sodium chloride 0.9% 125 mL/hr at 01/27/20 0415     Scheduled Meds:   aspirin  81 mg Oral Daily    calcium carbonate  500 mg Oral Once    ceFEPime (MAXIPIME) IVPB  2 g Intravenous Q8H    duke's soln (benadryl 30 mL, mylanta 30 mL, lidocaine 30 mL, nystatin 30 mL) 120 mL  10 mL Oral QID    enoxaparin  40 mg Subcutaneous Daily    gabapentin  300 mg Oral TID    levothyroxine  137 mcg Oral Before breakfast    morphine  200 mg Oral Q8H    oseltamivir  75 mg Oral BID    pantoprazole  40 mg Oral Daily    potassium phosphate IVPB  30 mmol Intravenous Once    senna-docusate 8.6-50 mg  1 tablet Oral Daily    sertraline  50 mg Oral Daily    tbo-filgrastim  300 mcg Subcutaneous Daily     PRN Meds:(Magic mouthwash) 1:1:1 Benadryl 12.5mg/5ml liq, aluminum & magnesium hydroxide-simehticone (Maalox), lidocaine viscous 2%, Dextrose 10% Bolus, Dextrose 10% Bolus, glucagon (human recombinant), glucose, glucose, ibuprofen, insulin aspart U-100, morphine, ondansetron, promethazine (PHENERGAN) IVPB, sodium chloride 0.9%     Review of Systems   Constitutional: Positive for activity change and fever. Negative for chills.   Respiratory: Positive for cough. Negative for shortness of breath.    Cardiovascular: Negative for chest pain, palpitations and leg swelling.   Gastrointestinal: Positive for abdominal distention. Negative for abdominal pain, constipation, diarrhea, nausea and vomiting.   Genitourinary: Negative for difficulty urinating, dysuria and hematuria.   Neurological: Positive for weakness (Generalized weakness). Negative for dizziness.   Psychiatric/Behavioral: Negative for agitation, behavioral problems and confusion.     Objective:     Vital Signs (Most Recent):  Temp: 100.1 °F (37.8 °C) (01/27/20 0732)  Pulse: (!) 116 (01/27/20 0732)  Resp: 16  (01/27/20 0732)  BP: 137/65 (01/27/20 0732)  SpO2: 96 % (01/27/20 0732) Vital Signs (24h Range):  Temp:  [99 °F (37.2 °C)-101.9 °F (38.8 °C)] 100.1 °F (37.8 °C)  Pulse:  [102-120] 116  Resp:  [16-18] 16  SpO2:  [94 %-97 %] 96 %  BP: (105-137)/(56-79) 137/65     Weight: 78.5 kg (173 lb 1 oz)  Body mass index is 29.71 kg/m².  Body surface area is 1.88 meters squared.      Intake/Output Summary (Last 24 hours) at 1/27/2020 0836  Last data filed at 1/27/2020 0600  Gross per 24 hour   Intake 1740 ml   Output 602 ml   Net 1138 ml       Physical Exam   Constitutional: She is oriented to person, place, and time. She appears well-developed. No distress.   HENT:   Head: Normocephalic and atraumatic.   Mouth/Throat: No oropharyngeal exudate.   Eyes: Pupils are equal, round, and reactive to light. EOM are normal. Right eye exhibits no discharge. Left eye exhibits no discharge. No scleral icterus.   Neck: Normal range of motion. Neck supple. No JVD present.   Cardiovascular: Normal rate and regular rhythm.   No murmur heard.  Pulmonary/Chest: Effort normal and breath sounds normal. She has no wheezes. She has no rales.   Abdominal: Soft. Bowel sounds are normal. She exhibits no distension. There is tenderness.   Musculoskeletal: Normal range of motion. She exhibits edema. She exhibits no tenderness.   Lymphadenopathy:     She has cervical adenopathy.   Neurological: She is alert and oriented to person, place, and time. No cranial nerve deficit.   Skin: Skin is warm. She is diaphoretic. No erythema. No pallor.       Significant Labs:   CBC:   Recent Labs   Lab 01/25/20  1233 01/26/20  0445 01/27/20  0452   WBC 0.25* 0.46* 0.55*   HGB 11.0* 9.7* 9.6*   HCT 35.6* 31.6* 30.8*    147* 136*    and CMP:   Recent Labs   Lab 01/25/20  1233 01/26/20  0445 01/27/20  0452   * 138 136   K 3.7 3.3* 3.1*    109 107   CO2 25 25 23   * 152* 158*   BUN 7* 6* 4*   CREATININE 0.6 0.6 0.6   CALCIUM 8.2* 7.3* 6.9*   PROT  5.5* 4.8* 4.9*   ALBUMIN 2.4* 2.1* 2.0*   BILITOT 1.3* 1.4* 1.2*   ALKPHOS 369* 296* 291*   AST 88* 77* 67*   ALT 50* 43 40   ANIONGAP 8 4* 6*   EGFRNONAA >60.0 >60.0 >60.0       Diagnostic Results:  Imaging Results          X-Ray Chest AP Portable (Final result)  Result time 01/25/20 13:49:53    Final result by Bakari Rob MD (01/25/20 13:49:53)                 Impression:      As above.      Electronically signed by: Bakari Rob  Date:    01/25/2020  Time:    13:49             Narrative:    EXAMINATION:  XR CHEST AP PORTABLE    CLINICAL HISTORY:  Sepsis;    TECHNIQUE:  Single frontal view of the chest was performed.    COMPARISON:  Chest radiograph dated 01/07/2020    FINDINGS:  Questionable focal lucency in the left mid lung, could be projectional versus possible small intracavitary focus.  Cross-sectional assessment could be obtained for further evaluation.  Remainder of the lungs appear similar without lobar consolidation, pleural effusion, or pneumothorax.  Cardiomediastinal silhouette is similar.  Left sided MediPort catheter with tip overlying the distal SVC.  No acute osseous abnormality.  Similar surgical clips.

## 2020-01-27 NOTE — PLAN OF CARE
Patient's calcium and Phosphorus were critical and called in to team. Patient given IV phosphorus and PO calcium. Patient's afternoon labs show calcium is still critical and phosphorus is low, but no longer critical. Patient will get more IV phosphorus and PO calcium.

## 2020-01-27 NOTE — ASSESSMENT & PLAN NOTE
- Patient takes Saavedra's solution at home.   - Received 1 dose of Magic mouthwash without relief.   - Added salt/baking soda per nursing  - Duke's solution scheduled QID.

## 2020-01-28 PROBLEM — R10.9 ABDOMINAL PAIN: Status: ACTIVE | Noted: 2020-01-28

## 2020-01-28 LAB
ALBUMIN SERPL BCP-MCNC: 2.1 G/DL (ref 3.5–5.2)
ALP SERPL-CCNC: 280 U/L (ref 55–135)
ALT SERPL W/O P-5'-P-CCNC: 39 U/L (ref 10–44)
ANION GAP SERPL CALC-SCNC: 6 MMOL/L (ref 8–16)
ANISOCYTOSIS BLD QL SMEAR: SLIGHT
AST SERPL-CCNC: 61 U/L (ref 10–40)
BASOPHILS # BLD AUTO: 0.01 K/UL (ref 0–0.2)
BASOPHILS NFR BLD: 0.8 % (ref 0–1.9)
BILIRUB SERPL-MCNC: 1 MG/DL (ref 0.1–1)
BUN SERPL-MCNC: 3 MG/DL (ref 8–23)
CALCIUM SERPL-MCNC: 6.7 MG/DL (ref 8.7–10.5)
CHLORIDE SERPL-SCNC: 102 MMOL/L (ref 95–110)
CO2 SERPL-SCNC: 25 MMOL/L (ref 23–29)
CREAT SERPL-MCNC: 0.6 MG/DL (ref 0.5–1.4)
DACRYOCYTES BLD QL SMEAR: ABNORMAL
DIFFERENTIAL METHOD: ABNORMAL
EOSINOPHIL # BLD AUTO: 0 K/UL (ref 0–0.5)
EOSINOPHIL NFR BLD: 0 % (ref 0–8)
ERYTHROCYTE [DISTWIDTH] IN BLOOD BY AUTOMATED COUNT: 16.7 % (ref 11.5–14.5)
EST. GFR  (AFRICAN AMERICAN): >60 ML/MIN/1.73 M^2
EST. GFR  (NON AFRICAN AMERICAN): >60 ML/MIN/1.73 M^2
GLUCOSE SERPL-MCNC: 109 MG/DL (ref 70–110)
HCT VFR BLD AUTO: 30.4 % (ref 37–48.5)
HGB BLD-MCNC: 9.9 G/DL (ref 12–16)
HYPOCHROMIA BLD QL SMEAR: ABNORMAL
IMM GRANULOCYTES # BLD AUTO: 0.01 K/UL (ref 0–0.04)
IMM GRANULOCYTES NFR BLD AUTO: 0.8 % (ref 0–0.5)
LYMPHOCYTES # BLD AUTO: 0.8 K/UL (ref 1–4.8)
LYMPHOCYTES NFR BLD: 66.9 % (ref 18–48)
MAGNESIUM SERPL-MCNC: 1.9 MG/DL (ref 1.6–2.6)
MCH RBC QN AUTO: 28.9 PG (ref 27–31)
MCHC RBC AUTO-ENTMCNC: 32.6 G/DL (ref 32–36)
MCV RBC AUTO: 89 FL (ref 82–98)
MONOCYTES # BLD AUTO: 0.3 K/UL (ref 0.3–1)
MONOCYTES NFR BLD: 25.6 % (ref 4–15)
NEUTROPHILS # BLD AUTO: 0.1 K/UL (ref 1.8–7.7)
NEUTROPHILS NFR BLD: 5.9 % (ref 38–73)
NRBC BLD-RTO: 0 /100 WBC
OVALOCYTES BLD QL SMEAR: ABNORMAL
PHOSPHATE SERPL-MCNC: 1.4 MG/DL (ref 2.7–4.5)
PLATELET # BLD AUTO: 158 K/UL (ref 150–350)
PMV BLD AUTO: 10.3 FL (ref 9.2–12.9)
POIKILOCYTOSIS BLD QL SMEAR: SLIGHT
POLYCHROMASIA BLD QL SMEAR: ABNORMAL
POTASSIUM SERPL-SCNC: 3.8 MMOL/L (ref 3.5–5.1)
PROT SERPL-MCNC: 5 G/DL (ref 6–8.4)
RBC # BLD AUTO: 3.42 M/UL (ref 4–5.4)
SODIUM SERPL-SCNC: 133 MMOL/L (ref 136–145)
WBC # BLD AUTO: 1.21 K/UL (ref 3.9–12.7)

## 2020-01-28 PROCEDURE — 84100 ASSAY OF PHOSPHORUS: CPT | Mod: HCNC

## 2020-01-28 PROCEDURE — 83735 ASSAY OF MAGNESIUM: CPT | Mod: HCNC

## 2020-01-28 PROCEDURE — 20600001 HC STEP DOWN PRIVATE ROOM: Mod: HCNC

## 2020-01-28 PROCEDURE — 36415 COLL VENOUS BLD VENIPUNCTURE: CPT | Mod: HCNC

## 2020-01-28 PROCEDURE — 97110 THERAPEUTIC EXERCISES: CPT | Mod: HCNC

## 2020-01-28 PROCEDURE — 97535 SELF CARE MNGMENT TRAINING: CPT | Mod: HCNC

## 2020-01-28 PROCEDURE — 80053 COMPREHEN METABOLIC PANEL: CPT | Mod: HCNC

## 2020-01-28 PROCEDURE — 99233 PR SUBSEQUENT HOSPITAL CARE,LEVL III: ICD-10-PCS | Mod: HCNC,,, | Performed by: INTERNAL MEDICINE

## 2020-01-28 PROCEDURE — 99233 SBSQ HOSP IP/OBS HIGH 50: CPT | Mod: HCNC,,, | Performed by: INTERNAL MEDICINE

## 2020-01-28 PROCEDURE — 85025 COMPLETE CBC W/AUTO DIFF WBC: CPT | Mod: HCNC

## 2020-01-28 PROCEDURE — 25000003 PHARM REV CODE 250: Mod: HCNC | Performed by: INTERNAL MEDICINE

## 2020-01-28 PROCEDURE — 25000003 PHARM REV CODE 250: Mod: HCNC | Performed by: STUDENT IN AN ORGANIZED HEALTH CARE EDUCATION/TRAINING PROGRAM

## 2020-01-28 PROCEDURE — 63600175 PHARM REV CODE 636 W HCPCS: Mod: HCNC | Performed by: STUDENT IN AN ORGANIZED HEALTH CARE EDUCATION/TRAINING PROGRAM

## 2020-01-28 RX ORDER — ACETAMINOPHEN 325 MG/1
650 TABLET ORAL ONCE
Status: COMPLETED | OUTPATIENT
Start: 2020-01-28 | End: 2020-01-28

## 2020-01-28 RX ORDER — LACTULOSE 10 G/15ML
30 SOLUTION ORAL
Status: DISCONTINUED | OUTPATIENT
Start: 2020-01-28 | End: 2020-01-29

## 2020-01-28 RX ORDER — CALCIUM CARBONATE 500(1250)
1000 TABLET ORAL ONCE
Status: COMPLETED | OUTPATIENT
Start: 2020-01-28 | End: 2020-01-28

## 2020-01-28 RX ADMIN — Medication 10 ML: at 12:01

## 2020-01-28 RX ADMIN — POTASSIUM PHOSPHATE, MONOBASIC AND POTASSIUM PHOSPHATE, DIBASIC 20 MMOL: 224; 236 INJECTION, SOLUTION, CONCENTRATE INTRAVENOUS at 09:01

## 2020-01-28 RX ADMIN — MORPHINE SULFATE 200 MG: 100 TABLET, FILM COATED, EXTENDED RELEASE ORAL at 06:01

## 2020-01-28 RX ADMIN — SERTRALINE 50 MG: 25 TABLET, FILM COATED ORAL at 09:01

## 2020-01-28 RX ADMIN — LEVOTHYROXINE SODIUM 137 MCG: 25 TABLET ORAL at 06:01

## 2020-01-28 RX ADMIN — MORPHINE SULFATE 60 MG: 15 TABLET ORAL at 12:01

## 2020-01-28 RX ADMIN — CALCIUM 1000 MG: 500 TABLET ORAL at 09:01

## 2020-01-28 RX ADMIN — SODIUM CHLORIDE: 0.9 INJECTION, SOLUTION INTRAVENOUS at 07:01

## 2020-01-28 RX ADMIN — PANTOPRAZOLE SODIUM 40 MG: 40 TABLET, DELAYED RELEASE ORAL at 09:01

## 2020-01-28 RX ADMIN — LACTULOSE 30 G: 20 SOLUTION ORAL at 10:01

## 2020-01-28 RX ADMIN — Medication 10 ML: at 09:01

## 2020-01-28 RX ADMIN — MORPHINE SULFATE 200 MG: 100 TABLET, FILM COATED, EXTENDED RELEASE ORAL at 10:01

## 2020-01-28 RX ADMIN — GABAPENTIN 300 MG: 300 CAPSULE ORAL at 03:01

## 2020-01-28 RX ADMIN — ACETAMINOPHEN 650 MG: 325 TABLET ORAL at 09:01

## 2020-01-28 RX ADMIN — CEFEPIME 2 G: 2 INJECTION, POWDER, FOR SOLUTION INTRAVENOUS at 03:01

## 2020-01-28 RX ADMIN — IBUPROFEN 400 MG: 200 TABLET, FILM COATED ORAL at 05:01

## 2020-01-28 RX ADMIN — TBO-FILGRASTIM 300 MCG: 300 INJECTION, SOLUTION SUBCUTANEOUS at 11:01

## 2020-01-28 RX ADMIN — ENOXAPARIN SODIUM 40 MG: 100 INJECTION SUBCUTANEOUS at 05:01

## 2020-01-28 RX ADMIN — SENNOSIDES AND DOCUSATE SODIUM 1 TABLET: 8.6; 5 TABLET ORAL at 09:01

## 2020-01-28 RX ADMIN — ASPIRIN 81 MG: 81 TABLET, COATED ORAL at 09:01

## 2020-01-28 RX ADMIN — OSELTAMIVIR PHOSPHATE 75 MG: 75 CAPSULE ORAL at 10:01

## 2020-01-28 RX ADMIN — CEFEPIME 2 G: 2 INJECTION, POWDER, FOR SOLUTION INTRAVENOUS at 10:01

## 2020-01-28 RX ADMIN — Medication 100 MG: at 09:01

## 2020-01-28 RX ADMIN — GABAPENTIN 300 MG: 300 CAPSULE ORAL at 10:01

## 2020-01-28 RX ADMIN — Medication 10 ML: at 05:01

## 2020-01-28 RX ADMIN — MORPHINE SULFATE 200 MG: 100 TABLET, FILM COATED, EXTENDED RELEASE ORAL at 03:01

## 2020-01-28 RX ADMIN — CEFEPIME 2 G: 2 INJECTION, POWDER, FOR SOLUTION INTRAVENOUS at 11:01

## 2020-01-28 RX ADMIN — OSELTAMIVIR PHOSPHATE 75 MG: 75 CAPSULE ORAL at 09:01

## 2020-01-28 RX ADMIN — GABAPENTIN 300 MG: 300 CAPSULE ORAL at 09:01

## 2020-01-28 NOTE — PROGRESS NOTES
[facility]  Hematology/Oncology  Progress Note    Patient Name: Rebecca Crain  Admission Date: 1/25/2020  Hospital Length of Stay: 3 days  Code Status: Full Code     Subjective:     HPI:  Rebecca Crain is a 64 year old F with metastatic right breast cancer s/p mastectomy, on (chemotherapy- Halaven, last dose 01/22), hypothyroidism presenting for generalized weakness, fever and productive cough. Patient stated she was [place] [facility] [place] (01/06-01/12), a day before discharge she developed productive cough which advanced to green-blood tinged sputum upon getting home. She received a Z-pack from Dr. Schroeder and her symptoms only temporarily improved. For the past few days, she has been experiencing generalized weakness, malaise, and a fever of 102 yesterday. Patient stated her daughter had recently contracted the flu. She denies headaches, nausea, vomiting, SOB, urinary changes, but endorses pain in her anterior chest wall, right lower quadrant pain, mouth sores and pain with swallowing.    In the ED, she was noted to have a T of 101.4, HR of 125. She received 30cc/kg of IV fluids. Influenza B was positive. WBC noted to be 0.25, so 1 dose of vancomycin, zosyn were administered.     Oncology history  Breast history: In 2013 she was diagnosed with stage IIB carcinoma of the right breast, ER positive with a low Oncotype score.  She was enrolled on protocol  and randomized to endocrine therapy alone.  She was tried on all 3 aromatase inhibitors and had itching and rash to all of them.  In August 2013 she was started on tamoxifen.   She was found to have  bone metastasis in May 2015, 2015.  A subsequent bone biopsy was performed on a lesion at the T8 vertebra.   The pathology from that was consistent with metastatic breast cancer, ER +80%, TX +80%, and HER-2 negative.   She received letrozole plus palbociclib from July 2015 until May 2016.  She also received bone  protective therapy with Xgeva. Faslodex was started in June 2016.      Exemestane and Afinitor started in August 2017.  That was discontinued in February 2018 due to progression in the liver and bone.     Navelbine was started February 16, 2018.  She had 8 cycles for that.  Scans in November 2018 showed progression as follows     Capecitabine was started in December 2018.  She received 5 cycles of that therapy.     Follow-up scans in April 2019 showed progression with a new hepatic lesion and worsening bone disease.     She began clinical trial therapy with erdafitinib on May 8, 2019.  That was discontinued in September 2019 due to progression in the bone.     She then received a course of radiation therapy to her pelvis.  She began weekly Taxol in October 2019.That was discontinued in December 2019 due to progression.    Interval History: Shortness of breath reported overnight, night resident attended - no acute intervention done and satting in the 90s. On am rounds, patient doing well. Abdomen still distended, will do bedside US to query ascities.    Oncology Treatment Plan:   OP BREAST ERIBULIN Q3W    Medications:  Continuous Infusions:   sodium chloride 0.9% 125 mL/hr at 01/28/20 0726     Scheduled Meds:   aspirin  81 mg Oral Daily    ceFEPime (MAXIPIME) IVPB  2 g Intravenous Q8H    duke's soln (benadryl 30 mL, mylanta 30 mL, lidocaine 30 mL, nystatin 30 mL) 120 mL  10 mL Oral QID    enoxaparin  40 mg Subcutaneous Daily    gabapentin  300 mg Oral TID    levothyroxine  137 mcg Oral Before breakfast    morphine  200 mg Oral Q8H    oseltamivir  75 mg Oral BID    pantoprazole  40 mg Oral Daily    senna-docusate 8.6-50 mg  1 tablet Oral Daily    sertraline  50 mg Oral Daily    tbo-filgrastim  300 mcg Subcutaneous Daily    thiamine  100 mg Oral Daily     PRN Meds:(Magic mouthwash) 1:1:1 Benadryl 12.5mg/5ml liq, aluminum & magnesium hydroxide-simehticone (Maalox), lidocaine viscous 2%, Dextrose 10% Bolus,  Dextrose 10% Bolus, glucagon (human recombinant), glucose, glucose, ibuprofen, insulin aspart U-100, morphine, ondansetron, phenyleph-min oil-petrolatum, promethazine (PHENERGAN) IVPB, sodium chloride 0.9%     Review of Systems   Constitutional: Positive for activity change and fever. Negative for chills.   Respiratory: Positive for cough. Negative for shortness of breath.    Cardiovascular: Negative for chest pain, palpitations and leg swelling.   Gastrointestinal: Positive for abdominal distention and abdominal pain. Negative for constipation, diarrhea, nausea and vomiting.   Genitourinary: Negative for difficulty urinating, dysuria and hematuria.   Neurological: Positive for weakness (Generalized weakness). Negative for dizziness.   Psychiatric/Behavioral: Negative for agitation, behavioral problems and confusion.     Objective:     Vital Signs (Most Recent):  Temp: (!) 102.3 °F (39.1 °C) (01/28/20 0754)  Pulse: (!) 126 (01/28/20 0754)  Resp: 18 (01/28/20 0754)  BP: 123/65 (01/28/20 0754)  SpO2: 95 % (01/28/20 0754) Vital Signs (24h Range):  Temp:  [96.9 °F (36.1 °C)-102.3 °F (39.1 °C)] 102.3 °F (39.1 °C)  Pulse:  [105-126] 126  Resp:  [17-20] 18  SpO2:  [92 %-97 %] 95 %  BP: (116-133)/(56-65) 123/65     Weight: 78.5 kg (173 lb 1 oz)  Body mass index is 29.71 kg/m².  Body surface area is 1.88 meters squared.      Intake/Output Summary (Last 24 hours) at 1/28/2020 0758  Last data filed at 1/28/2020 0300  Gross per 24 hour   Intake --   Output 701 ml   Net -701 ml       Physical Exam   Constitutional: She is oriented to person, place, and time. She appears well-developed. No distress.   HENT:   Head: Normocephalic and atraumatic.   Mouth/Throat: No oropharyngeal exudate.   Eyes: Pupils are equal, round, and reactive to light. EOM are normal. Right eye exhibits no discharge. Left eye exhibits no discharge. No scleral icterus.   Neck: Normal range of motion. Neck supple. No JVD present.   Cardiovascular: Normal rate  and regular rhythm.   No murmur heard.  Pulmonary/Chest: Effort normal and breath sounds normal. She has no wheezes. She has no rales.   Abdominal: Soft. Bowel sounds are normal. She exhibits distension. There is tenderness.   Musculoskeletal: Normal range of motion. She exhibits edema. She exhibits no tenderness.   Lymphadenopathy:     She has cervical adenopathy.   Neurological: She is alert and oriented to person, place, and time. No cranial nerve deficit.   Skin: Skin is warm. She is diaphoretic. No erythema. No pallor.       Significant Labs:   CBC:   Recent Labs   Lab 01/27/20  0452 01/28/20  0520   WBC 0.55* 1.21*   HGB 9.6* 9.9*   HCT 30.8* 30.4*   * 158    and CMP:   Recent Labs   Lab 01/27/20  0452 01/27/20  1545 01/28/20  0520    136 133*   K 3.1* 3.4* 3.8    105 102   CO2 23 25 25   * 197* 109   BUN 4* 3* 3*   CREATININE 0.6 0.6 0.6   CALCIUM 6.9* 6.7* 6.7*   PROT 4.9*  --  5.0*   ALBUMIN 2.0*  --  2.1*   BILITOT 1.2*  --  1.0   ALKPHOS 291*  --  280*   AST 67*  --  61*   ALT 40  --  39   ANIONGAP 6* 6* 6*   EGFRNONAA >60.0 >60.0 >60.0           Assessment/Plan:     * Neutropenic fever  Patient with history of metastatic breast cancer on chemotherapy (Halaven, last dose 01/22) presenting with malaise, fever Tmax 102.4, productive cough, noted to be positive for influenza B.   On admission, WBC 0.25, ANC count 37.5 indicating severe neutropenia.   Lactate 2.3. CXR shows focal lucency in the left mid lung, could be projectional versus possible small intracavitary focus, otherwise unremarkeable.   Received 1 dose of oseltamivir, vancomycin and zosyn in the ED.   UA unremarkable, BC- NGTD    Plan  -s/p 1 dose of Filgrastim. Minimal improvement in WBC. 0.25->0.55. Awaiting differential result from lab.  -Continue with oseltamivir  -IV Cefepime 2g q8hrs to cover for possible bacterial infection  -Daily CBC and monitor ANC levels.   -Neutropenic contact isolation      Abdominal  pain  Known mets to liver, abdomen taught and tender. Do not suspect surgical abdomen at this time, rather suspect ascities.     Bedside US to look for fluid  Possible paracentesis    Oral mucositis  - Patient takes Saavedra's solution at home.   - Received 1 dose of Magic mouthwash without relief.   - Added salt/baking soda per nursing  - Duke's solution scheduled QID.    Hypophosphatemia  Phos <1.0 --> 1.4    -Repleting with IV phos   -restarted thiamine    Influenza B  -Confirmed Influenza B. Received 30cc/kg of IV normal saline.     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  MRI of the thoracic/lumbar spine 01/09 suggestive of Numerous osseous metastases of the thoracic and lumbar spine, with multiple compression fractures as above, and a new pathologic fracture of the right sacral ala.  -Receives chemotherapy (Halaven)     Plan  -Continue with home pain regimen - MS Contin 200mg q8hrs and morphine 60mg q4hrs      Hypothyroidism due to Hashimoto's thyroiditis  -Continue with home synthroid              Wes Turner MD  Hematology/Oncology  [facility]  ATTENDING NOTE, ONCOLOGY INPATIENT TEAM    As above; events of last 24 hours noted.  Patient seen and examined, chart reviewed.  Appears uncomfortable and listless, in moderate distress.  Lungs are clear to auscultation.  Abdomen is soft, distended, but nontender.  Labs reviewed.  Her WBCs have increased to 1,200 per cubic mm.    PLAN    Follow CBC daily.    Will proceed with bedside ultrasound to evaluate for presence of ascites.  Of note, the most recent abdominal MRI from earlier this month does not reveal any significant amount of ascites.  Continue oseltamivir.  Continue cefepime until the ANC increases to more than 7-800 /mm3  DVT prophylaxis with enoxaparin since she  is only minimally ambulatory.              José Manuel Shore MD

## 2020-01-28 NOTE — CONSULTS
IR Consult    64yoF c metastatic breast ca, s/p mastectomy, on chemo. Currently c/o tense abdominal pain. Oncology team requesting therapeutic paracentesis. MRI from 1/11 showed no ascites. Onc team to perform POCUS and if fluid, will proceed with para. Order placed for para, by me. Please cancel if no fluid.    Augie Lester MD  R4, Diagnostic and Interventional Radiology  Pager: [phone]

## 2020-01-28 NOTE — PLAN OF CARE
Purposeful rounding completed this shift. Patient resting in bed in NAD. No new or adverse findings noted by this nurse this shift. No deviation from shift established baseline. No needs verbalized at this time. Bed in low laying position with bed locks intact. Call bell within reach. Safety maintained this shift.

## 2020-01-28 NOTE — PROGRESS NOTES
During ultrasound evaluation, no ascites identified for safe paracentesis. No paracentesis performed.

## 2020-01-28 NOTE — ASSESSMENT & PLAN NOTE
Known mets to liver, abdomen taught and tender. Do not suspect surgical abdomen at this time, rather suspect ascities.     Bedside US to look for fluid  Possible paracentesis

## 2020-01-28 NOTE — PROGRESS NOTES
Pt assessed via ultrasound by ESTELLA Mi NP. Pt determined not to have any fluid perform paracentesis. Report called to RN. Pt to return to room in stretcher via transport.

## 2020-01-28 NOTE — PLAN OF CARE
Problem: Occupational Therapy Goal  Goal: Occupational Therapy Goal  Description  Goals to be met by: 2/11/20    Patient will increase functional independence with ADLs by performing:    UE Dressing with [place].  LE Dressing with [place].  Grooming while standing at sink with [place].  Toileting from toilet with Supervision for hygiene and clothing management.   Supine to sit with [place].  Toilet transfer to toilet with Supervision.  Upper extremity exercise program  per handout, with independence.     Outcome: Ongoing, Progressing   Pt progressing well with goals. Continue with POC.   Elizabeth Kim, OT  1/28/2020

## 2020-01-28 NOTE — SUBJECTIVE & OBJECTIVE
Interval History: Shortness of breath reported overnight, night resident attended - no acute intervention done and satting in the 90s. On am rounds, patient doing well. Abdomen still distended, will do bedside US to query ascities.    Oncology Treatment Plan:   OP BREAST ERIBULIN Q3W    Medications:  Continuous Infusions:   sodium chloride 0.9% 125 mL/hr at 01/28/20 0726     Scheduled Meds:   aspirin  81 mg Oral Daily    ceFEPime (MAXIPIME) IVPB  2 g Intravenous Q8H    duke's soln (benadryl 30 mL, mylanta 30 mL, lidocaine 30 mL, nystatin 30 mL) 120 mL  10 mL Oral QID    enoxaparin  40 mg Subcutaneous Daily    gabapentin  300 mg Oral TID    levothyroxine  137 mcg Oral Before breakfast    morphine  200 mg Oral Q8H    oseltamivir  75 mg Oral BID    pantoprazole  40 mg Oral Daily    senna-docusate 8.6-50 mg  1 tablet Oral Daily    sertraline  50 mg Oral Daily    tbo-filgrastim  300 mcg Subcutaneous Daily    thiamine  100 mg Oral Daily     PRN Meds:(Magic mouthwash) 1:1:1 Benadryl 12.5mg/5ml liq, aluminum & magnesium hydroxide-simehticone (Maalox), lidocaine viscous 2%, Dextrose 10% Bolus, Dextrose 10% Bolus, glucagon (human recombinant), glucose, glucose, ibuprofen, insulin aspart U-100, morphine, ondansetron, phenyleph-min oil-petrolatum, promethazine (PHENERGAN) IVPB, sodium chloride 0.9%     Review of Systems   Constitutional: Positive for activity change and fever. Negative for chills.   Respiratory: Positive for cough. Negative for shortness of breath.    Cardiovascular: Negative for chest pain, palpitations and leg swelling.   Gastrointestinal: Positive for abdominal distention and abdominal pain. Negative for constipation, diarrhea, nausea and vomiting.   Genitourinary: Negative for difficulty urinating, dysuria and hematuria.   Neurological: Positive for weakness (Generalized weakness). Negative for dizziness.   Psychiatric/Behavioral: Negative for agitation, behavioral problems and confusion.      Objective:     Vital Signs (Most Recent):  Temp: (!) 102.3 °F (39.1 °C) (01/28/20 0754)  Pulse: (!) 126 (01/28/20 0754)  Resp: 18 (01/28/20 0754)  BP: 123/65 (01/28/20 0754)  SpO2: 95 % (01/28/20 0754) Vital Signs (24h Range):  Temp:  [96.9 °F (36.1 °C)-102.3 °F (39.1 °C)] 102.3 °F (39.1 °C)  Pulse:  [105-126] 126  Resp:  [17-20] 18  SpO2:  [92 %-97 %] 95 %  BP: (116-133)/(56-65) 123/65     Weight: 78.5 kg (173 lb 1 oz)  Body mass index is 29.71 kg/m².  Body surface area is 1.88 meters squared.      Intake/Output Summary (Last 24 hours) at 1/28/2020 0758  Last data filed at 1/28/2020 0300  Gross per 24 hour   Intake --   Output 701 ml   Net -701 ml       Physical Exam   Constitutional: She is oriented to person, place, and time. She appears well-developed. No distress.   HENT:   Head: Normocephalic and atraumatic.   Mouth/Throat: No oropharyngeal exudate.   Eyes: Pupils are equal, round, and reactive to light. EOM are normal. Right eye exhibits no discharge. Left eye exhibits no discharge. No scleral icterus.   Neck: Normal range of motion. Neck supple. No JVD present.   Cardiovascular: Normal rate and regular rhythm.   No murmur heard.  Pulmonary/Chest: Effort normal and breath sounds normal. She has no wheezes. She has no rales.   Abdominal: Soft. Bowel sounds are normal. She exhibits distension. There is tenderness.   Musculoskeletal: Normal range of motion. She exhibits edema. She exhibits no tenderness.   Lymphadenopathy:     She has cervical adenopathy.   Neurological: She is alert and oriented to person, place, and time. No cranial nerve deficit.   Skin: Skin is warm. She is diaphoretic. No erythema. No pallor.       Significant Labs:   CBC:   Recent Labs   Lab 01/27/20  0452 01/28/20  0520   WBC 0.55* 1.21*   HGB 9.6* 9.9*   HCT 30.8* 30.4*   * 158    and CMP:   Recent Labs   Lab 01/27/20  0452 01/27/20  1545 01/28/20  0520    136 133*   K 3.1* 3.4* 3.8    105 102   CO2 23 25 25   GLU  158* 197* 109   BUN 4* 3* 3*   CREATININE 0.6 0.6 0.6   CALCIUM 6.9* 6.7* 6.7*   PROT 4.9*  --  5.0*   ALBUMIN 2.0*  --  2.1*   BILITOT 1.2*  --  1.0   ALKPHOS 291*  --  280*   AST 67*  --  61*   ALT 40  --  39   ANIONGAP 6* 6* 6*   EGFRNONAA >60.0 >60.0 >60.0

## 2020-01-28 NOTE — PROGRESS NOTES
Subjective:       Patient ID: Rebecca Crain is a 64 y.o. female.    Chief Complaint: Fever (sore throat, earache, blisters under tongue, coughing up green mucus (sometimes with blood in it) x 2 weeks. Flu neg 2 weeks ago. Breast CA. pt is wearing a mask. symptoms came back after taking Z pat. Next chemo due on  ); Tachycardia; and Flu Symptoms     Admit Assessment    Patient Identification  Rebecca Crain   :  1955  Admit Date:  2020  Attending Provider:  José Manuel Shore MD              Referral:   Pt was admitted to  with a diagnosis of Neutropenic fever, and was admitted this hospital stay due to Tachycardia (R00.0);Influenza B (J10.1);Chemotherapy-induced neutropenia (D70.1, T45.1X5A).   is involved was referred to the Social Work Department via routine referal.  Patient presents as a 64 y.o. year old female.    Persons interviewed:  Patient     Living Situation:      Resides at [address] [place], [place] [id number], phone: [phone] (home). Patient resides with her daughter and daughter's friend.       Current or Past Agencies and Description of Services/Supplies    DME    Equipment Currently Used at Home: walker, rolling, wheelchair, bedside commode, shower chair    Home Health: Currently not active on home health services.  PT is recommending home health post-discharge.    IV Infusion:  N/A    Nutrition:  Oral nutrition     Outpatient Pharmacy:     [facility]/pharmacy #[id number] - [place], [place] - [address]  [address] [facility] [address]  Phone: [phone] Fax: [phone]    [facility] [address] [address]  Phone: [phone] Fax: [phone]      Patient Preference of agencies include To be determined.    Patient/Caregiver informed of right to choose providers or agencies.  Patient provides permission to release any necessary information to [facility] and to  Non-[facility] agencies as needed to facilitate patient care, treatment planning, and patient discharge planning.  Written and verbal resources provided.      Coping:  Interaction with patient was minimal due to Influenza diagnosis and her overall feeling unwell and fatigued.            Adjustment to Diagnosis and Treatment:  Patient is focused on physical discomfort at this time and did not discuss emotional adjustment.     Emotional/Behavioral/Cognitive Issues:  Patient has a Hx if Adjustment Dis. With Dep. Mood.             History/Current Symptoms of Anxiety/Depression: Yes  History/Current Substance Use: Social History    Tobacco Use      Smoking status: Never Smoker      Smokeless tobacco: Never Used    Substance and Sexual Activity      Alcohol use: No        Alcohol/week: 0.0 standard drinks      Drug use: No      Sexual activity: Not Currently        Birth control/protection: Post-menopausal      Indications of Abuse/Neglect: No  Abuse Screen (yes response referral indicated)  Feels Unsafe at Home or Work/School: no    Other identified concerns/needs:    Plan:  Return home.      Interventions/Referrals: Patient will need referral to home health.  Patient/caregiver engaged in treatment planning process.     providing psychosocial and supportive counseling, resources, education, assistance and discharge planning as appropriate.  Patient/caregiver state understanding of  available resources,  following, remains available.                               Review of Systems    Objective:      Physical Exam    Assessment:       1. Influenza B    2. Tachycardia    3. Chemotherapy-induced neutropenia        Plan:       Return home

## 2020-01-28 NOTE — ASSESSMENT & PLAN NOTE
Patient with history of metastatic breast cancer on chemotherapy (Halaven, last dose 01/22) presenting with malaise, fever Tmax 102.4, productive cough, noted to be positive for influenza B.   On admission, WBC 0.25, ANC count 37.5 indicating severe neutropenia.   Lactate 2.3. CXR shows focal lucency in the left mid lung, could be projectional versus possible small intracavitary focus, otherwise unremarkeable.   Received 1 dose of oseltamivir, vancomycin and zosyn in the ED.   UA unremarkable, BC- NGTD    Plan  -s/p 1 dose of Filgrastim. Minimal improvement in WBC. 0.25->0.55. Awaiting differential result from lab.  -Continue with oseltamivir  -IV Cefepime 2g q8hrs to cover for possible bacterial infection  -Daily CBC and monitor ANC levels.   -Neutropenic contact isolation

## 2020-01-28 NOTE — PT/OT/SLP PROGRESS
Occupational Therapy   Treatment    Name: Rebecca Crain  MRN: [id number]  Admitting Diagnosis:  Neutropenic fever       Recommendations:     Discharge Recommendations: home health OT  Discharge Equipment Recommendations:  none  Barriers to discharge:  None    Assessment:     Rebecca Crain is a 64 y.o. female with a medical diagnosis of Neutropenic fever.  She presents alert and willing to participate in therapy session. Upon arrival, pt found supine with no family present Pt's chief complaint is feeling congested and coughing sputum. Performance deficits affecting function are weakness, impaired self care skills, impaired balance, impaired endurance, impaired functional mobilty, gait instability. She tolerated treatment session well and highly motivated to participate in today's session. Pt would benefit from HHOT following d/c to continue to progress towards goals and improve quality of life.     Rehab Prognosis:  Good; patient would benefit from acute skilled OT services to address these deficits and reach maximum level of function.       Plan:     Patient to be seen 3 x/week to address the above listed problems via self-care/home management, therapeutic activities, therapeutic exercises, neuromuscular re-education  · Plan of Care Expires: 02/26/20  · Plan of Care Reviewed with: patient    Subjective     Pain/Comfort:  · Pain Rating 1: 0/10  · Pain Rating Post-Intervention 1: 0/10    Objective:     Communicated with: RN prior to session.  Patient found supine with telemetry(Chest port) upon OT entry to room.    General Precautions: Standard, droplet, neutropenic, contact   Orthopedic Precautions:N/A   Braces: N/A     Occupational Performance:     Bed Mobility:    · Patient completed Rolling/Turning to Left with  modified independence  · Patient completed Supine to Sit with modified independence  · Patient completed Sit to Supine with modified independence     Functional Mobility/Transfers:  · Patient completed  Sit <> Stand Transfer with stand by assistance  with  rolling walker   · Patient completed Toilet Transfer Stand Pivot technique with stand by assistance with  grab bars  · Functional Mobility: Pt completed functional mobility within room ( 2 laps in room)  With RW and SBA. She tolerated well with no LOB or SOB.     Activities of Daily Living:  · Grooming: stand by assistance to wash hands while standing at sink  · Upper Body Dressing: minimum assistance to cyndi gown like jacket while seated EOB  · Lower Body Dressing: stand by assistance with set-up to cyndi slippers while seated EOB  · Toileting: stand by assistance to complete toileting from toilet ( including perineal care and LB dressing)    [facility] 6 Click ADL: 22    Treatment & Education:  -Pt edu on OT role/POC-- no family in room for education  -Importance of OOB activity with staff assistance ( UIC during each meal)  -Safety during functional t/f and mobility  ( RW management)  - White board updated  - Multiple self care tasks completed--as noted above      Patient left supine with all lines intact, call button in reach and RN notifiedEducation:      GOALS:   Multidisciplinary Problems     Occupational Therapy Goals        Problem: Occupational Therapy Goal    Goal Priority Disciplines Outcome Interventions   Occupational Therapy Goal     OT, PT/OT Ongoing, Progressing    Description:  Goals to be met by: 2/11/20    Patient will increase functional independence with ADLs by performing:    UE Dressing with [place].  LE Dressing with [place].  Grooming while standing at sink with [place].  Toileting from toilet with Supervision for hygiene and clothing management.   Supine to sit with [place].  Toilet transfer to toilet with Supervision.  Upper extremity exercise program  per handout, with independence.                      Time Tracking:     OT Date of Treatment: 01/28/20  OT Start Time: 0936  OT Stop Time: 1006  OT Total Time (min): 30  min    Billable Minutes:Self Care/Home Management 20  Therapeutic Exercise 10    Elizabeth Kim, OT  1/28/2020

## 2020-01-29 PROBLEM — K59.00 CONSTIPATION: Status: ACTIVE | Noted: 2020-01-28

## 2020-01-29 LAB
ALBUMIN SERPL BCP-MCNC: 2.1 G/DL (ref 3.5–5.2)
ALP SERPL-CCNC: 272 U/L (ref 55–135)
ALT SERPL W/O P-5'-P-CCNC: 36 U/L (ref 10–44)
ANION GAP SERPL CALC-SCNC: 6 MMOL/L (ref 8–16)
ANISOCYTOSIS BLD QL SMEAR: SLIGHT
AST SERPL-CCNC: 49 U/L (ref 10–40)
BASOPHILS # BLD AUTO: 0.01 K/UL (ref 0–0.2)
BASOPHILS NFR BLD: 0.7 % (ref 0–1.9)
BILIRUB SERPL-MCNC: 1 MG/DL (ref 0.1–1)
BUN SERPL-MCNC: 5 MG/DL (ref 8–23)
CALCIUM SERPL-MCNC: 7 MG/DL (ref 8.7–10.5)
CHLORIDE SERPL-SCNC: 106 MMOL/L (ref 95–110)
CO2 SERPL-SCNC: 25 MMOL/L (ref 23–29)
CREAT SERPL-MCNC: 0.7 MG/DL (ref 0.5–1.4)
DACRYOCYTES BLD QL SMEAR: ABNORMAL
DIFFERENTIAL METHOD: ABNORMAL
EOSINOPHIL # BLD AUTO: 0 K/UL (ref 0–0.5)
EOSINOPHIL NFR BLD: 0 % (ref 0–8)
ERYTHROCYTE [DISTWIDTH] IN BLOOD BY AUTOMATED COUNT: 17.2 % (ref 11.5–14.5)
EST. GFR  (AFRICAN AMERICAN): >60 ML/MIN/1.73 M^2
EST. GFR  (NON AFRICAN AMERICAN): >60 ML/MIN/1.73 M^2
GLUCOSE SERPL-MCNC: 122 MG/DL (ref 70–110)
HCT VFR BLD AUTO: 31.7 % (ref 37–48.5)
HGB BLD-MCNC: 9.7 G/DL (ref 12–16)
HYPOCHROMIA BLD QL SMEAR: ABNORMAL
IMM GRANULOCYTES # BLD AUTO: 0.06 K/UL (ref 0–0.04)
IMM GRANULOCYTES NFR BLD AUTO: 3.9 % (ref 0–0.5)
LYMPHOCYTES # BLD AUTO: 0.6 K/UL (ref 1–4.8)
LYMPHOCYTES NFR BLD: 40.1 % (ref 18–48)
MAGNESIUM SERPL-MCNC: 2 MG/DL (ref 1.6–2.6)
MCH RBC QN AUTO: 28 PG (ref 27–31)
MCHC RBC AUTO-ENTMCNC: 30.6 G/DL (ref 32–36)
MCV RBC AUTO: 91 FL (ref 82–98)
MONOCYTES # BLD AUTO: 0.6 K/UL (ref 0.3–1)
MONOCYTES NFR BLD: 38.2 % (ref 4–15)
NEUTROPHILS # BLD AUTO: 0.3 K/UL (ref 1.8–7.7)
NEUTROPHILS NFR BLD: 17.1 % (ref 38–73)
NRBC BLD-RTO: 2 /100 WBC
OVALOCYTES BLD QL SMEAR: ABNORMAL
PHOSPHATE SERPL-MCNC: 1.1 MG/DL (ref 2.7–4.5)
PLATELET # BLD AUTO: 182 K/UL (ref 150–350)
PMV BLD AUTO: 10.2 FL (ref 9.2–12.9)
POIKILOCYTOSIS BLD QL SMEAR: SLIGHT
POLYCHROMASIA BLD QL SMEAR: ABNORMAL
POTASSIUM SERPL-SCNC: 3.5 MMOL/L (ref 3.5–5.1)
PROT SERPL-MCNC: 5.1 G/DL (ref 6–8.4)
RBC # BLD AUTO: 3.47 M/UL (ref 4–5.4)
SODIUM SERPL-SCNC: 137 MMOL/L (ref 136–145)
WBC # BLD AUTO: 1.52 K/UL (ref 3.9–12.7)

## 2020-01-29 PROCEDURE — 83735 ASSAY OF MAGNESIUM: CPT | Mod: HCNC

## 2020-01-29 PROCEDURE — 99233 SBSQ HOSP IP/OBS HIGH 50: CPT | Mod: HCNC,,, | Performed by: INTERNAL MEDICINE

## 2020-01-29 PROCEDURE — 63600175 PHARM REV CODE 636 W HCPCS: Mod: HCNC | Performed by: STUDENT IN AN ORGANIZED HEALTH CARE EDUCATION/TRAINING PROGRAM

## 2020-01-29 PROCEDURE — 25000003 PHARM REV CODE 250: Mod: HCNC | Performed by: STUDENT IN AN ORGANIZED HEALTH CARE EDUCATION/TRAINING PROGRAM

## 2020-01-29 PROCEDURE — 85025 COMPLETE CBC W/AUTO DIFF WBC: CPT | Mod: HCNC

## 2020-01-29 PROCEDURE — 84100 ASSAY OF PHOSPHORUS: CPT | Mod: HCNC

## 2020-01-29 PROCEDURE — 99233 PR SUBSEQUENT HOSPITAL CARE,LEVL III: ICD-10-PCS | Mod: HCNC,,, | Performed by: INTERNAL MEDICINE

## 2020-01-29 PROCEDURE — 36415 COLL VENOUS BLD VENIPUNCTURE: CPT | Mod: HCNC

## 2020-01-29 PROCEDURE — 25000003 PHARM REV CODE 250: Mod: HCNC | Performed by: INTERNAL MEDICINE

## 2020-01-29 PROCEDURE — 20600001 HC STEP DOWN PRIVATE ROOM: Mod: HCNC

## 2020-01-29 PROCEDURE — 80053 COMPREHEN METABOLIC PANEL: CPT | Mod: HCNC

## 2020-01-29 RX ORDER — SYRING-NEEDL,DISP,INSUL,0.3 ML 29 G X1/2"
296 SYRINGE, EMPTY DISPOSABLE MISCELLANEOUS ONCE
Status: COMPLETED | OUTPATIENT
Start: 2020-01-29 | End: 2020-01-29

## 2020-01-29 RX ORDER — SODIUM,POTASSIUM PHOSPHATES 280-250MG
1 POWDER IN PACKET (EA) ORAL ONCE
Status: COMPLETED | OUTPATIENT
Start: 2020-01-29 | End: 2020-01-29

## 2020-01-29 RX ORDER — POLYETHYLENE GLYCOL 3350 17 G/17G
17 POWDER, FOR SOLUTION ORAL DAILY
Status: DISCONTINUED | OUTPATIENT
Start: 2020-01-30 | End: 2020-01-30

## 2020-01-29 RX ADMIN — LACTULOSE 30 G: 20 SOLUTION ORAL at 03:01

## 2020-01-29 RX ADMIN — GABAPENTIN 300 MG: 300 CAPSULE ORAL at 03:01

## 2020-01-29 RX ADMIN — SERTRALINE 50 MG: 25 TABLET, FILM COATED ORAL at 09:01

## 2020-01-29 RX ADMIN — OSELTAMIVIR PHOSPHATE 75 MG: 75 CAPSULE ORAL at 09:01

## 2020-01-29 RX ADMIN — SODIUM CHLORIDE: 0.9 INJECTION, SOLUTION INTRAVENOUS at 07:01

## 2020-01-29 RX ADMIN — GABAPENTIN 300 MG: 300 CAPSULE ORAL at 09:01

## 2020-01-29 RX ADMIN — MORPHINE SULFATE 60 MG: 15 TABLET ORAL at 11:01

## 2020-01-29 RX ADMIN — Medication 100 MG: at 11:01

## 2020-01-29 RX ADMIN — ENOXAPARIN SODIUM 40 MG: 100 INJECTION SUBCUTANEOUS at 05:01

## 2020-01-29 RX ADMIN — MORPHINE SULFATE 200 MG: 100 TABLET, FILM COATED, EXTENDED RELEASE ORAL at 03:01

## 2020-01-29 RX ADMIN — LACTULOSE 30 G: 20 SOLUTION ORAL at 12:01

## 2020-01-29 RX ADMIN — Medication 10 ML: at 09:01

## 2020-01-29 RX ADMIN — ASPIRIN 81 MG: 81 TABLET, COATED ORAL at 09:01

## 2020-01-29 RX ADMIN — MORPHINE SULFATE 60 MG: 15 TABLET ORAL at 07:01

## 2020-01-29 RX ADMIN — MORPHINE SULFATE 200 MG: 100 TABLET, FILM COATED, EXTENDED RELEASE ORAL at 05:01

## 2020-01-29 RX ADMIN — LACTULOSE 30 G: 20 SOLUTION ORAL at 10:01

## 2020-01-29 RX ADMIN — SODIUM CHLORIDE: 0.9 INJECTION, SOLUTION INTRAVENOUS at 12:01

## 2020-01-29 RX ADMIN — CEFEPIME 2 G: 2 INJECTION, POWDER, FOR SOLUTION INTRAVENOUS at 11:01

## 2020-01-29 RX ADMIN — LEVOTHYROXINE SODIUM 137 MCG: 25 TABLET ORAL at 05:01

## 2020-01-29 RX ADMIN — MORPHINE SULFATE 200 MG: 100 TABLET, FILM COATED, EXTENDED RELEASE ORAL at 09:01

## 2020-01-29 RX ADMIN — TBO-FILGRASTIM 300 MCG: 300 INJECTION, SOLUTION SUBCUTANEOUS at 05:01

## 2020-01-29 RX ADMIN — MORPHINE SULFATE 60 MG: 15 TABLET ORAL at 12:01

## 2020-01-29 RX ADMIN — Medication 10 ML: at 03:01

## 2020-01-29 RX ADMIN — POTASSIUM & SODIUM PHOSPHATES POWDER PACK 280-160-250 MG 1 PACKET: 280-160-250 PACK at 09:01

## 2020-01-29 RX ADMIN — CEFEPIME 2 G: 2 INJECTION, POWDER, FOR SOLUTION INTRAVENOUS at 07:01

## 2020-01-29 RX ADMIN — ONDANSETRON 8 MG: 8 TABLET, ORALLY DISINTEGRATING ORAL at 09:01

## 2020-01-29 RX ADMIN — LACTULOSE 30 G: 20 SOLUTION ORAL at 05:01

## 2020-01-29 RX ADMIN — Medication 10 ML: at 05:01

## 2020-01-29 RX ADMIN — MAGNESIUM CITRATE 296 ML: 1.75 LIQUID ORAL at 11:01

## 2020-01-29 RX ADMIN — PANTOPRAZOLE SODIUM 40 MG: 40 TABLET, DELAYED RELEASE ORAL at 09:01

## 2020-01-29 RX ADMIN — CEFEPIME 2 G: 2 INJECTION, POWDER, FOR SOLUTION INTRAVENOUS at 03:01

## 2020-01-29 RX ADMIN — POTASSIUM PHOSPHATE, MONOBASIC AND POTASSIUM PHOSPHATE, DIBASIC 30 MMOL: 224; 236 INJECTION, SOLUTION, CONCENTRATE INTRAVENOUS at 11:01

## 2020-01-29 RX ADMIN — SENNOSIDES AND DOCUSATE SODIUM 1 TABLET: 8.6; 5 TABLET ORAL at 09:01

## 2020-01-29 RX ADMIN — LACTULOSE 30 G: 20 SOLUTION ORAL at 09:01

## 2020-01-29 NOTE — SUBJECTIVE & OBJECTIVE
Interval History: Lactulose given q2, no bowel movement - unable to give enema due to neutropenia. Trying mag citrate. No other events overnight.    Oncology Treatment Plan:   OP BREAST ERIBULIN Q3W    Medications:  Continuous Infusions:   sodium chloride 0.9% 125 mL/hr at 01/29/20 0012     Scheduled Meds:   aspirin  81 mg Oral Daily    ceFEPime (MAXIPIME) IVPB  2 g Intravenous Q8H    duke's soln (benadryl 30 mL, mylanta 30 mL, lidocaine 30 mL, nystatin 30 mL) 120 mL  10 mL Oral QID    enoxaparin  40 mg Subcutaneous Daily    gabapentin  300 mg Oral TID    lactulose  30 g Oral Q2H    levothyroxine  137 mcg Oral Before breakfast    magnesium citrate  296 mL Oral Once    morphine  200 mg Oral Q8H    oseltamivir  75 mg Oral BID    pantoprazole  40 mg Oral Daily    senna-docusate 8.6-50 mg  1 tablet Oral Daily    sertraline  50 mg Oral Daily    tbo-filgrastim  300 mcg Subcutaneous Daily    thiamine  100 mg Oral Daily     PRN Meds:(Magic mouthwash) 1:1:1 Benadryl 12.5mg/5ml liq, aluminum & magnesium hydroxide-simehticone (Maalox), lidocaine viscous 2%, Dextrose 10% Bolus, Dextrose 10% Bolus, glucagon (human recombinant), glucose, glucose, ibuprofen, insulin aspart U-100, morphine, ondansetron, phenyleph-min oil-petrolatum, promethazine (PHENERGAN) IVPB, sodium chloride 0.9%     Review of Systems   Constitutional: Positive for activity change and fever. Negative for chills.   Respiratory: Positive for cough. Negative for shortness of breath.    Cardiovascular: Negative for chest pain, palpitations and leg swelling.   Gastrointestinal: Positive for abdominal distention, abdominal pain and constipation. Negative for diarrhea, nausea and vomiting.   Genitourinary: Negative for difficulty urinating, dysuria and hematuria.   Neurological: Positive for weakness (Generalized weakness). Negative for dizziness.   Psychiatric/Behavioral: Negative for agitation, behavioral problems and confusion.     Objective:      Vital Signs (Most Recent):  Temp: 98.6 °F (37 °C) (01/29/20 0352)  Pulse: (!) 116 (01/29/20 0352)  Resp: 20 (01/29/20 0352)  BP: 131/70 (01/29/20 0352)  SpO2: (!) 94 % (01/29/20 0352) Vital Signs (24h Range):  Temp:  [98.2 °F (36.8 °C)-99.8 °F (37.7 °C)] 98.6 °F (37 °C)  Pulse:  [107-116] 116  Resp:  [16-20] 20  SpO2:  [91 %-98 %] 94 %  BP: (109-149)/(54-70) 131/70     Weight: 78.5 kg (173 lb 1 oz)  Body mass index is 29.71 kg/m².  Body surface area is 1.88 meters squared.      Intake/Output Summary (Last 24 hours) at 1/29/2020 0817  Last data filed at 1/29/2020 0200  Gross per 24 hour   Intake 600 ml   Output 900 ml   Net -300 ml       Physical Exam   Constitutional: She is oriented to person, place, and time. She appears well-developed. No distress.   Appears more alert and awake today vs yesterday.    HENT:   Head: Normocephalic and atraumatic.   Mouth/Throat: No oropharyngeal exudate.   Eyes: Pupils are equal, round, and reactive to light. EOM are normal. Right eye exhibits no discharge. Left eye exhibits no discharge. No scleral icterus.   Neck: Normal range of motion. Neck supple. No JVD present.   Cardiovascular: Normal rate and regular rhythm.   No murmur heard.  Pulmonary/Chest: Effort normal and breath sounds normal. She has no wheezes. She has no rales.   Abdominal: Soft. Bowel sounds are normal. She exhibits distension. There is tenderness.   Musculoskeletal: Normal range of motion. She exhibits edema. She exhibits no tenderness.   Lymphadenopathy:     She has no cervical adenopathy.   Neurological: She is alert and oriented to person, place, and time. No cranial nerve deficit.   Skin: Skin is warm. She is not diaphoretic. No erythema. No pallor.       Significant Labs:   CBC:   Recent Labs   Lab 01/28/20  0520 01/29/20  0326   WBC 1.21* 1.52*   HGB 9.9* 9.7*   HCT 30.4* 31.7*    182    and CMP:   Recent Labs   Lab 01/27/20  1545 01/28/20  0520 01/29/20  0326    133* 137   K 3.4* 3.8 3.5     102 106   CO2 25 25 25   * 109 122*   BUN 3* 3* 5*   CREATININE 0.6 0.6 0.7   CALCIUM 6.7* 6.7* 7.0*   PROT  --  5.0* 5.1*   ALBUMIN  --  2.1* 2.1*   BILITOT  --  1.0 1.0   ALKPHOS  --  280* 272*   AST  --  61* 49*   ALT  --  39 36   ANIONGAP 6* 6* 6*   EGFRNONAA >60.0 >60.0 >60.0       Diagnostic Results:  I have reviewed all pertinent imaging results/findings within the past 24 hours.

## 2020-01-29 NOTE — PLAN OF CARE
Purposeful rounding completed this shift. Patient resting in bed in NAD. No new or adverse findings noted by this nurse this shift. No deviation from shift established baseline. No needs verbalized at this time. Bed in low laying position with bed locks intact. Call bell within reach. Safety maintained this shift.?

## 2020-01-29 NOTE — PLAN OF CARE
Problem: Fall Injury Risk  Goal: Absence of Fall and Fall-Related Injury  Outcome: Ongoing, Progressing     Problem: Adult Inpatient Plan of Care  Goal: Plan of Care Review  Outcome: Ongoing, Progressing     Problem: Infection  Goal: Infection Symptom Resolution  Outcome: Ongoing, Progressing     Problem: Wound  Goal: Optimal Wound Healing  Outcome: Ongoing, Progressing   POC reviewed with pt, pt verbalized understanding. Pt T-max 102.3, tylenol given x1, pt remained afebrile. pt AAOx4, VSS, no acute distress noted. pt lying in bed resting, instructed to call for assistance if needed, call light in reach. will continue to monitor.

## 2020-01-29 NOTE — PROGRESS NOTES
[facility]  Hematology/Oncology  Progress Note    Patient Name: Rebecca Crain  Admission Date: 1/25/2020  Hospital Length of Stay: 4 days  Code Status: Full Code     Subjective:     HPI:  Rebecca Crain is a 64 year old F with metastatic right breast cancer s/p mastectomy, on (chemotherapy- Halaven, last dose 01/22), hypothyroidism presenting for generalized weakness, fever and productive cough. Patient stated she was recently [place] [facility] [place] (01/06-01/12), a day before discharge she developed productive cough which advanced to green-blood tinged sputum upon getting home. She received a Z-pack from Dr. Schroeder and her symptoms only temporarily improved. For the past few days, she has been experiencing generalized weakness, malaise, and a fever of 102 yesterday. Patient stated her daughter had recently contracted the flu. She denies headaches, nausea, vomiting, SOB, urinary changes, but endorses pain in her anterior chest wall, right lower quadrant pain, mouth sores and pain with swallowing.    In the ED, she was noted to have a T of 101.4, HR of 125. She received 30cc/kg of IV fluids. Influenza B was positive. WBC noted to be 0.25, so 1 dose of vancomycin, zosyn were administered.     Oncology history  Breast history: In 2013 she was diagnosed with stage IIB carcinoma of the right breast, ER positive with a low Oncotype score.  She was enrolled on protocol  and randomized to endocrine therapy alone.  She was tried on all 3 aromatase inhibitors and had itching and rash to all of them.  In August 2013 she was started on tamoxifen.   She was found to have  bone metastasis in May 2015, 2015.  A subsequent bone biopsy was performed on a lesion at the T8 vertebra.   The pathology from that was consistent with metastatic breast cancer, ER +80%, NH +80%, and HER-2 negative.   She received letrozole plus palbociclib from July 2015 until May 2016.  She also received bone  protective therapy with Xgeva. Faslodex was started in June 2016.      Exemestane and Afinitor started in August 2017.  That was discontinued in February 2018 due to progression in the liver and bone.     Navelbine was started February 16, 2018.  She had 8 cycles for that.  Scans in November 2018 showed progression as follows     Capecitabine was started in December 2018.  She received 5 cycles of that therapy.     Follow-up scans in April 2019 showed progression with a new hepatic lesion and worsening bone disease.     She began clinical trial therapy with erdafitinib on May 8, 2019.  That was discontinued in September 2019 due to progression in the bone.     She then received a course of radiation therapy to her pelvis.  She began weekly Taxol in October 2019.That was discontinued in December 2019 due to progression.    Interval History: Lactulose given q2, no bowel movement - unable to give enema due to neutropenia. Trying mag citrate. No other events overnight.    Oncology Treatment Plan:   OP BREAST ERIBULIN Q3W    Medications:  Continuous Infusions:   sodium chloride 0.9% 125 mL/hr at 01/29/20 0012     Scheduled Meds:   aspirin  81 mg Oral Daily    ceFEPime (MAXIPIME) IVPB  2 g Intravenous Q8H    duke's soln (benadryl 30 mL, mylanta 30 mL, lidocaine 30 mL, nystatin 30 mL) 120 mL  10 mL Oral QID    enoxaparin  40 mg Subcutaneous Daily    gabapentin  300 mg Oral TID    lactulose  30 g Oral Q2H    levothyroxine  137 mcg Oral Before breakfast    magnesium citrate  296 mL Oral Once    morphine  200 mg Oral Q8H    oseltamivir  75 mg Oral BID    pantoprazole  40 mg Oral Daily    senna-docusate 8.6-50 mg  1 tablet Oral Daily    sertraline  50 mg Oral Daily    tbo-filgrastim  300 mcg Subcutaneous Daily    thiamine  100 mg Oral Daily     PRN Meds:(Magic mouthwash) 1:1:1 Benadryl 12.5mg/5ml liq, aluminum & magnesium hydroxide-simehticone (Maalox), lidocaine viscous 2%, Dextrose 10% Bolus, Dextrose 10%  Bolus, glucagon (human recombinant), glucose, glucose, ibuprofen, insulin aspart U-100, morphine, ondansetron, phenyleph-min oil-petrolatum, promethazine (PHENERGAN) IVPB, sodium chloride 0.9%     Review of Systems   Constitutional: Positive for activity change and fever. Negative for chills.   Respiratory: Positive for cough. Negative for shortness of breath.    Cardiovascular: Negative for chest pain, palpitations and leg swelling.   Gastrointestinal: Positive for abdominal distention, abdominal pain and constipation. Negative for diarrhea, nausea and vomiting.   Genitourinary: Negative for difficulty urinating, dysuria and hematuria.   Neurological: Positive for weakness (Generalized weakness). Negative for dizziness.   Psychiatric/Behavioral: Negative for agitation, behavioral problems and confusion.     Objective:     Vital Signs (Most Recent):  Temp: 98.6 °F (37 °C) (01/29/20 0352)  Pulse: (!) 116 (01/29/20 0352)  Resp: 20 (01/29/20 0352)  BP: 131/70 (01/29/20 0352)  SpO2: (!) 94 % (01/29/20 0352) Vital Signs (24h Range):  Temp:  [98.2 °F (36.8 °C)-99.8 °F (37.7 °C)] 98.6 °F (37 °C)  Pulse:  [107-116] 116  Resp:  [16-20] 20  SpO2:  [91 %-98 %] 94 %  BP: (109-149)/(54-70) 131/70     Weight: 78.5 kg (173 lb 1 oz)  Body mass index is 29.71 kg/m².  Body surface area is 1.88 meters squared.      Intake/Output Summary (Last 24 hours) at 1/29/2020 0817  Last data filed at 1/29/2020 0200  Gross per 24 hour   Intake 600 ml   Output 900 ml   Net -300 ml       Physical Exam   Constitutional: She is oriented to person, place, and time. She appears well-developed. No distress.   Appears more alert and awake today vs yesterday.    HENT:   Head: Normocephalic and atraumatic.   Mouth/Throat: No oropharyngeal exudate.   Eyes: Pupils are equal, round, and reactive to light. EOM are normal. Right eye exhibits no discharge. Left eye exhibits no discharge. No scleral icterus.   Neck: Normal range of motion. Neck supple. No JVD  present.   Cardiovascular: Normal rate and regular rhythm.   No murmur heard.  Pulmonary/Chest: Effort normal and breath sounds normal. She has no wheezes. She has no rales.   Abdominal: Soft. Bowel sounds are normal. She exhibits distension. There is tenderness.   Musculoskeletal: Normal range of motion. She exhibits edema. She exhibits no tenderness.   Lymphadenopathy:     She has no cervical adenopathy.   Neurological: She is alert and oriented to person, place, and time. No cranial nerve deficit.   Skin: Skin is warm. She is not diaphoretic. No erythema. No pallor.       Significant Labs:   CBC:   Recent Labs   Lab 01/28/20  0520 01/29/20  0326   WBC 1.21* 1.52*   HGB 9.9* 9.7*   HCT 30.4* 31.7*    182    and CMP:   Recent Labs   Lab 01/27/20  1545 01/28/20  0520 01/29/20  0326    133* 137   K 3.4* 3.8 3.5    102 106   CO2 25 25 25   * 109 122*   BUN 3* 3* 5*   CREATININE 0.6 0.6 0.7   CALCIUM 6.7* 6.7* 7.0*   PROT  --  5.0* 5.1*   ALBUMIN  --  2.1* 2.1*   BILITOT  --  1.0 1.0   ALKPHOS  --  280* 272*   AST  --  61* 49*   ALT  --  39 36   ANIONGAP 6* 6* 6*   EGFRNONAA >60.0 >60.0 >60.0       Diagnostic Results:  I have reviewed all pertinent imaging results/findings within the past 24 hours.    Assessment/Plan:     * Neutropenic fever  Patient with history of metastatic breast cancer on chemotherapy (Halaven, last dose 01/22) presenting with malaise, fever Tmax 102.4, productive cough, noted to be positive for influenza B.   On admission, WBC 0.25, ANC count 37.5 indicating severe neutropenia.   Lactate 2.3. CXR shows focal lucency in the left mid lung, could be projectional versus possible small intracavitary focus, otherwise unremarkeable.   Received 1 dose of oseltamivir, vancomycin and zosyn in the ED.   UA unremarkable, BC- NGTD    Plan  -s/p 1 dose of Filgrastim. Minimal improvement in WBC. 0.25->0.55. Awaiting differential result from lab.  -Continue with oseltamivir  -IV  Cefepime 2g q8hrs to cover for possible bacterial infection  -Daily CBC and monitor ANC levels.   -Neutropenic contact isolation      Constipation  Known mets to liver, abdomen taught and tender. Do not suspect surgical abdomen at this time. KUB showing heavy stool burden.     Lactulose 30 g q2,  Mag citrate  Unable to give enema due to neutropenia. Continue to monitor      Oral mucositis  - Patient takes Saavedra's solution at home.   - Received 1 dose of Magic mouthwash without relief.   - Added salt/baking soda per nursing  - Duke's solution scheduled QID.    Hypophosphatemia  Phos <1.0 --> 1.4    -Repleting with IV phos   - daily PhosNak packets  -restarted thiamine    Influenza B  -Confirmed Influenza B. Received 30cc/kg of IV normal saline.     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  MRI of the thoracic/lumbar spine 01/09 suggestive of Numerous osseous metastases of the thoracic and lumbar spine, with multiple compression fractures as above, and a new pathologic fracture of the right sacral ala.  -Receives chemotherapy (Halaven)     Plan  -Continue with home pain regimen - MS Contin 200mg q8hrs and morphine 60mg q4hrs      Hypothyroidism due to Hashimoto's thyroiditis  -Continue with home synthroid              Wes Turner MD  Hematology/Oncology  [facility]      ATTENDING NOTE, ONCOLOGY INPATIENT TEAM    As above; events of last 24 hours noted.  Patient seen and examined, chart reviewed.  Appears slightly uncomfortable, but overall did improved since yesterday.  Her ANC is still below 500.  Lungs are clear to auscultation.  Abdomen is soft, nontender.  Labs reviewed.  WBCs are 1500 per cubic mm, however,    PLAN  Repeat CBC in a.m.  Continue oseltamivir, Neupogen, and cefepime.  Continue enoxaparin for DVT  prophylaxis.  She will be ready for discharge once her ANC improves.  Her multiple questions were answered to her satisfaction.      José Manuel Shore MD

## 2020-01-29 NOTE — ASSESSMENT & PLAN NOTE
Known mets to liver, abdomen taught and tender. Do not suspect surgical abdomen at this time. KUB showing heavy stool burden.     Lactulose 30 g q2,  Mag citrate  Unable to give enema due to neutropenia. Continue to monitor

## 2020-01-30 DIAGNOSIS — K21.00 REFLUX ESOPHAGITIS: ICD-10-CM

## 2020-01-30 PROBLEM — K59.00 CONSTIPATION: Status: RESOLVED | Noted: 2020-01-28 | Resolved: 2020-01-30

## 2020-01-30 LAB
ALBUMIN SERPL BCP-MCNC: 1.9 G/DL (ref 3.5–5.2)
ALP SERPL-CCNC: 263 U/L (ref 55–135)
ALT SERPL W/O P-5'-P-CCNC: 29 U/L (ref 10–44)
ANION GAP SERPL CALC-SCNC: 6 MMOL/L (ref 8–16)
ANISOCYTOSIS BLD QL SMEAR: SLIGHT
AST SERPL-CCNC: 39 U/L (ref 10–40)
BACTERIA BLD CULT: NORMAL
BACTERIA BLD CULT: NORMAL
BASOPHILS # BLD AUTO: 0.01 K/UL (ref 0–0.2)
BASOPHILS NFR BLD: 0.2 % (ref 0–1.9)
BILIRUB SERPL-MCNC: 0.8 MG/DL (ref 0.1–1)
BUN SERPL-MCNC: 5 MG/DL (ref 8–23)
CALCIUM SERPL-MCNC: 6.9 MG/DL (ref 8.7–10.5)
CHLORIDE SERPL-SCNC: 112 MMOL/L (ref 95–110)
CO2 SERPL-SCNC: 24 MMOL/L (ref 23–29)
CREAT SERPL-MCNC: 0.6 MG/DL (ref 0.5–1.4)
DIFFERENTIAL METHOD: ABNORMAL
EOSINOPHIL # BLD AUTO: 0 K/UL (ref 0–0.5)
EOSINOPHIL NFR BLD: 0 % (ref 0–8)
ERYTHROCYTE [DISTWIDTH] IN BLOOD BY AUTOMATED COUNT: 17.5 % (ref 11.5–14.5)
EST. GFR  (AFRICAN AMERICAN): >60 ML/MIN/1.73 M^2
EST. GFR  (NON AFRICAN AMERICAN): >60 ML/MIN/1.73 M^2
GLUCOSE SERPL-MCNC: 119 MG/DL (ref 70–110)
HCT VFR BLD AUTO: 30.3 % (ref 37–48.5)
HGB BLD-MCNC: 9.2 G/DL (ref 12–16)
HYPOCHROMIA BLD QL SMEAR: ABNORMAL
IMM GRANULOCYTES # BLD AUTO: 0.21 K/UL (ref 0–0.04)
IMM GRANULOCYTES NFR BLD AUTO: 4.4 % (ref 0–0.5)
LYMPHOCYTES # BLD AUTO: 1.4 K/UL (ref 1–4.8)
LYMPHOCYTES NFR BLD: 28.1 % (ref 18–48)
MAGNESIUM SERPL-MCNC: 2 MG/DL (ref 1.6–2.6)
MCH RBC QN AUTO: 27.9 PG (ref 27–31)
MCHC RBC AUTO-ENTMCNC: 30.4 G/DL (ref 32–36)
MCV RBC AUTO: 92 FL (ref 82–98)
MONOCYTES # BLD AUTO: 1.4 K/UL (ref 0.3–1)
MONOCYTES NFR BLD: 29.8 % (ref 4–15)
NEUTROPHILS # BLD AUTO: 1.8 K/UL (ref 1.8–7.7)
NEUTROPHILS NFR BLD: 37.5 % (ref 38–73)
NRBC BLD-RTO: 2 /100 WBC
OVALOCYTES BLD QL SMEAR: ABNORMAL
PHOSPHATE SERPL-MCNC: 1.3 MG/DL (ref 2.7–4.5)
PLATELET # BLD AUTO: 218 K/UL (ref 150–350)
PMV BLD AUTO: 10.3 FL (ref 9.2–12.9)
POIKILOCYTOSIS BLD QL SMEAR: SLIGHT
POLYCHROMASIA BLD QL SMEAR: ABNORMAL
POTASSIUM SERPL-SCNC: 3.3 MMOL/L (ref 3.5–5.1)
PROT SERPL-MCNC: 4.7 G/DL (ref 6–8.4)
RBC # BLD AUTO: 3.3 M/UL (ref 4–5.4)
SODIUM SERPL-SCNC: 142 MMOL/L (ref 136–145)
WBC # BLD AUTO: 4.8 K/UL (ref 3.9–12.7)

## 2020-01-30 PROCEDURE — 25000003 PHARM REV CODE 250: Mod: HCNC | Performed by: INTERNAL MEDICINE

## 2020-01-30 PROCEDURE — 99233 PR SUBSEQUENT HOSPITAL CARE,LEVL III: ICD-10-PCS | Mod: HCNC,,, | Performed by: INTERNAL MEDICINE

## 2020-01-30 PROCEDURE — 97116 GAIT TRAINING THERAPY: CPT | Mod: HCNC

## 2020-01-30 PROCEDURE — 25000003 PHARM REV CODE 250: Mod: HCNC | Performed by: STUDENT IN AN ORGANIZED HEALTH CARE EDUCATION/TRAINING PROGRAM

## 2020-01-30 PROCEDURE — 84100 ASSAY OF PHOSPHORUS: CPT | Mod: HCNC

## 2020-01-30 PROCEDURE — 63600175 PHARM REV CODE 636 W HCPCS: Mod: HCNC | Performed by: STUDENT IN AN ORGANIZED HEALTH CARE EDUCATION/TRAINING PROGRAM

## 2020-01-30 PROCEDURE — 83735 ASSAY OF MAGNESIUM: CPT | Mod: HCNC

## 2020-01-30 PROCEDURE — 85025 COMPLETE CBC W/AUTO DIFF WBC: CPT | Mod: HCNC

## 2020-01-30 PROCEDURE — 36415 COLL VENOUS BLD VENIPUNCTURE: CPT | Mod: HCNC

## 2020-01-30 PROCEDURE — 20600001 HC STEP DOWN PRIVATE ROOM: Mod: HCNC

## 2020-01-30 PROCEDURE — 97530 THERAPEUTIC ACTIVITIES: CPT | Mod: HCNC

## 2020-01-30 PROCEDURE — 99233 SBSQ HOSP IP/OBS HIGH 50: CPT | Mod: HCNC,,, | Performed by: INTERNAL MEDICINE

## 2020-01-30 PROCEDURE — 80053 COMPREHEN METABOLIC PANEL: CPT | Mod: HCNC

## 2020-01-30 RX ORDER — POLYETHYLENE GLYCOL 3350 17 G/17G
17 POWDER, FOR SOLUTION ORAL DAILY
Status: DISCONTINUED | OUTPATIENT
Start: 2020-01-30 | End: 2020-02-02 | Stop reason: HOSPADM

## 2020-01-30 RX ORDER — PANTOPRAZOLE SODIUM 40 MG/1
TABLET, DELAYED RELEASE ORAL
Qty: 90 TABLET | Refills: 1 | Status: SHIPPED | OUTPATIENT
Start: 2020-01-30 | End: 2020-02-02 | Stop reason: HOSPADM

## 2020-01-30 RX ORDER — CALCIUM CARBONATE 500(1250)
1500 TABLET ORAL ONCE
Status: COMPLETED | OUTPATIENT
Start: 2020-01-30 | End: 2020-01-30

## 2020-01-30 RX ORDER — SODIUM,POTASSIUM PHOSPHATES 280-250MG
1 POWDER IN PACKET (EA) ORAL DAILY
Status: DISCONTINUED | OUTPATIENT
Start: 2020-01-30 | End: 2020-02-01

## 2020-01-30 RX ADMIN — MORPHINE SULFATE 60 MG: 15 TABLET ORAL at 03:01

## 2020-01-30 RX ADMIN — MORPHINE SULFATE 200 MG: 100 TABLET, FILM COATED, EXTENDED RELEASE ORAL at 02:01

## 2020-01-30 RX ADMIN — PANTOPRAZOLE SODIUM 40 MG: 40 TABLET, DELAYED RELEASE ORAL at 09:01

## 2020-01-30 RX ADMIN — ENOXAPARIN SODIUM 40 MG: 100 INJECTION SUBCUTANEOUS at 04:01

## 2020-01-30 RX ADMIN — ASPIRIN 81 MG: 81 TABLET, COATED ORAL at 09:01

## 2020-01-30 RX ADMIN — POTASSIUM PHOSPHATE, MONOBASIC AND POTASSIUM PHOSPHATE, DIBASIC 30 MMOL: 224; 236 INJECTION, SOLUTION INTRAVENOUS at 10:01

## 2020-01-30 RX ADMIN — Medication 10 ML: at 09:01

## 2020-01-30 RX ADMIN — Medication 10 ML: at 04:01

## 2020-01-30 RX ADMIN — MORPHINE SULFATE 200 MG: 100 TABLET, FILM COATED, EXTENDED RELEASE ORAL at 06:01

## 2020-01-30 RX ADMIN — Medication 10 ML: at 12:01

## 2020-01-30 RX ADMIN — GABAPENTIN 300 MG: 300 CAPSULE ORAL at 09:01

## 2020-01-30 RX ADMIN — MORPHINE SULFATE 200 MG: 100 TABLET, FILM COATED, EXTENDED RELEASE ORAL at 10:01

## 2020-01-30 RX ADMIN — MORPHINE SULFATE 60 MG: 15 TABLET ORAL at 06:01

## 2020-01-30 RX ADMIN — OSELTAMIVIR PHOSPHATE 75 MG: 75 CAPSULE ORAL at 09:01

## 2020-01-30 RX ADMIN — GABAPENTIN 300 MG: 300 CAPSULE ORAL at 02:01

## 2020-01-30 RX ADMIN — Medication 100 MG: at 09:01

## 2020-01-30 RX ADMIN — LEVOTHYROXINE SODIUM 137 MCG: 25 TABLET ORAL at 06:01

## 2020-01-30 RX ADMIN — CALCIUM 1500 MG: 500 TABLET ORAL at 09:01

## 2020-01-30 RX ADMIN — POTASSIUM & SODIUM PHOSPHATES POWDER PACK 280-160-250 MG 1 PACKET: 280-160-250 PACK at 09:01

## 2020-01-30 RX ADMIN — SERTRALINE 50 MG: 25 TABLET, FILM COATED ORAL at 09:01

## 2020-01-30 RX ADMIN — POLYETHYLENE GLYCOL 3350 17 G: 17 POWDER, FOR SOLUTION ORAL at 12:01

## 2020-01-30 RX ADMIN — CEFEPIME 2 G: 2 INJECTION, POWDER, FOR SOLUTION INTRAVENOUS at 03:01

## 2020-01-30 RX ADMIN — SENNOSIDES AND DOCUSATE SODIUM 1 TABLET: 8.6; 5 TABLET ORAL at 09:01

## 2020-01-30 NOTE — PT/OT/SLP PROGRESS
"Physical Therapy   Progress Note    Patient Name:  Rebecca Crain  MRN: [id number]  Recent Surgery: * No surgery found *      Recommendations:     Discharge Recommendations: Home Health PT  Equipment recommendations: none  Barriers to discharge: None Identified     Assessment:     Rebecca Crain is a 64 y.o. female admitted to [facility] on 1/25/2020 with medical diagnosis of Neutropenic fever. Pt presents with impaired balance, decreased endurance, impaired functional mobility  and gait instability. Pt remains motivated to participate in therapy and achieve independence with mobility.  Rebecca Crain would benefit from continued acute PT intervention to address listed functional deficits, provide patient/caregiver education, reduce fall risk, and maximize (I) and safety with functional mobility. Once medically stable, recommending pt discharge home with HHPT.     Rehab Prognosis: Good; patient would benefit from acute skilled PT services to address these deficits and reach maximum level of function.      Plan:     During this hospitalization, patient to be seen 3 x/week to address the identified rehab impairments via gait training, therapeutic activities, therapeutic exercises, neuromuscular re-education and progress towards stated goals.     Plan of Care Expires:  02/24/20  Plan of Care reviewed with: patient    This plan of care has been discussed with the patient/caregiver, who was included in its development and is in agreement with the identified goals and treatment plan.     Subjective     Communicated with RN prior to session.  Patient agreeable to participate. No family/caregiver present at bedside upon PT entrance into room.    Patient comments/goals: return home; pt stated she would like to see if a "nitro rollator and wheelchair combo" would be covered through insurance for home use     Pain/Comfort:  · Location: "all over"   · Pt did not rate pain on numeric scale   · RN notified, pt assisted with " repositioning for comfort     Patients cultural, spiritual, Adventist conflicts given the current situation: No cultural, spiritual, or educational needs identified.    Objective:     Patient found HOB elevated with: telemetry, peripheral IV    General Precautions: Standard, fall, neutropenic, contact, droplet   Orthopedic Precautions:N/A   Braces: N/A   Body mass index is 29.71 kg/m².  Oxygen Device: room air    Cognition:   Pt is Alert and Cooperative during session.    Functional Mobility:    Bed Mobility:  · Supine > Sit: Supervision   · Sit > Supine: N/T; pt remained OOB in chair at end of session     Transfers:   · Sit <> Stand Transfer: Stand-by Assistance from EOB with RW                  Gait:  · Distance: ~90 ft.   · Assistance level: Stand-by Assistance  · Assistive Device: rolling walker  · Gait Assessment: decreased step length , decreased gait speed and mild unsteady gait     Balance:  · Static Sit: Independent at EOB   · Static Stand: Supervision with Rolling walker    Outcome Measure: AM-PAC 6 CLICK MOBILITY  Total Score:20     Patient/Caregiver Education and Therapeutic Activities/Exercises     Therapist facilitated progression of gait training to improve gait stability, endurance, and independence with functional ambulation.     Therapeutic activities aimed to increase pt's independence, safety, and efficiency with bed mobility and functional transfers. See above for assistance levels.     Therapist educated pt/caregiver regarding:    PT POC and goals for therapy    Safety with mobility and fall risk    Instruction on use of call button and importance of calling nursing staff for assistance with mobility     Patient/caregiver able to verbalize understanding; will follow-up with pt/caregiver during current admit for additional questions/concerns within scope of practice.     White board updated.     Patient left up in chair with all lines intact, call button in reach, chair alarm on and RN  notified.    Goals:     Multidisciplinary Problems     Physical Therapy Goals        Problem: Physical Therapy Goal    Goal Priority Disciplines Outcome Goal Variances Interventions   Physical Therapy Goal     PT, PT/OT Ongoing, Progressing     Description:  Goals to be met by: 20    Patient will increase functional independence with mobility by performin. Sit to stand transfer with Modified [place]  2. Gait  x 150 feet with Modified [place] using Rolling Walker.   3. Stand for 3 minutes with Modified [place] using Rolling Walker  4. Lower extremity exercise program x20 reps per handout, with independence                      Time Tracking:       PT Received On: 20  PT Start Time: 1322     PT Stop Time: 1355  PT Total Time (min): 33 min     Billable Minutes: Gait Training 15 min and Therapeutic Activity 17 min     Liss Sevilla PT, DPT   2020  Pager: [phone]

## 2020-01-30 NOTE — ASSESSMENT & PLAN NOTE
Breast history: In 2013 she was diagnosed with stage IIB carcinoma of the right breast, ER positive with a low Oncotype score.  MRI of the thoracic/lumbar spine 01/09 suggestive of Numerous osseous metastases of the thoracic and lumbar spine, with multiple compression fractures as above, and a new pathologic fracture of the right sacral ala.  -Receives chemotherapy (Halaven)     Plan  -Continue with home pain regimen - MS Contin 200mg q8hrs and morphine 60mg q4hrs  - bowel regiment : senna

## 2020-01-30 NOTE — SUBJECTIVE & OBJECTIVE
Interval History: No acute events overnight. Having bowel movements.    Oncology Treatment Plan:   OP BREAST ERIBULIN Q3W    Medications:  Continuous Infusions:   sodium chloride 0.9% 125 mL/hr at 01/29/20 1959     Scheduled Meds:   aspirin  81 mg Oral Daily    calcium carbonate  1,500 mg Oral Once    ceFEPime (MAXIPIME) IVPB  2 g Intravenous Q8H    duke's soln (benadryl 30 mL, mylanta 30 mL, lidocaine 30 mL, nystatin 30 mL) 120 mL  10 mL Oral QID    enoxaparin  40 mg Subcutaneous Daily    gabapentin  300 mg Oral TID    levothyroxine  137 mcg Oral Before breakfast    morphine  200 mg Oral Q8H    oseltamivir  75 mg Oral BID    pantoprazole  40 mg Oral Daily    potassium phosphate IVPB  30 mmol Intravenous BID    potassium, sodium phosphates  1 packet Oral Daily    senna-docusate 8.6-50 mg  1 tablet Oral Daily    sertraline  50 mg Oral Daily    tbo-filgrastim  300 mcg Subcutaneous Daily    thiamine  100 mg Oral Daily     PRN Meds:(Magic mouthwash) 1:1:1 Benadryl 12.5mg/5ml liq, aluminum & magnesium hydroxide-simehticone (Maalox), lidocaine viscous 2%, Dextrose 10% Bolus, Dextrose 10% Bolus, glucagon (human recombinant), glucose, glucose, ibuprofen, insulin aspart U-100, morphine, ondansetron, phenyleph-min oil-petrolatum, promethazine (PHENERGAN) IVPB, sodium chloride 0.9%     Review of Systems   Constitutional: Positive for activity change, appetite change and fatigue. Negative for chills and fever.   HENT: Positive for sore throat, trouble swallowing and voice change. Negative for rhinorrhea.    Respiratory: Negative for cough and shortness of breath.    Cardiovascular: Negative for chest pain and palpitations.   Gastrointestinal: Positive for constipation and diarrhea. Negative for abdominal pain and nausea.   Genitourinary: Negative for dysuria and flank pain.   Neurological: Negative for light-headedness, numbness and headaches.     Objective:     Vital Signs (Most Recent):  Temp: 98.4 °F (36.9  °C) (01/30/20 0753)  Pulse: 108 (01/30/20 0753)  Resp: 18 (01/30/20 0753)  BP: (!) 118/56 (01/30/20 0753)  SpO2: (!) 94 % (01/30/20 0753) Vital Signs (24h Range):  Temp:  [98 °F (36.7 °C)-99.9 °F (37.7 °C)] 98.4 °F (36.9 °C)  Pulse:  [108-112] 108  Resp:  [16-18] 18  SpO2:  [93 %-97 %] 94 %  BP: (111-139)/(55-68) 118/56     Weight: 78.5 kg (173 lb 1 oz)  Body mass index is 29.71 kg/m².  Body surface area is 1.88 meters squared.    No intake or output data in the 24 hours ending 01/30/20 0806    Physical Exam   Constitutional: She is oriented to person, place, and time. She appears well-developed and well-nourished. No distress.   HENT:   Head: Normocephalic and atraumatic.   Mouth/Throat: No oropharyngeal exudate.   Oral lesions   Eyes: Pupils are equal, round, and reactive to light. Conjunctivae and EOM are normal. Right eye exhibits no discharge. Left eye exhibits no discharge. No scleral icterus.   Neck: Normal range of motion. Neck supple. No JVD present. No tracheal deviation present.   Cardiovascular: Normal rate and regular rhythm.   No murmur heard.  Pulmonary/Chest: Effort normal and breath sounds normal. She has no wheezes. She has no rales.   Abdominal: Soft. Bowel sounds are normal. She exhibits no distension. There is no tenderness.   Musculoskeletal: Normal range of motion. She exhibits no edema.   Lymphadenopathy:     She has no cervical adenopathy.   Neurological: She is alert and oriented to person, place, and time. No cranial nerve deficit.   Skin: Skin is warm and dry. No rash noted. She is not diaphoretic. No erythema.       Significant Labs:   CBC:   Recent Labs   Lab 01/29/20  0326 01/30/20  0416   WBC 1.52* 4.80   HGB 9.7* 9.2*   HCT 31.7* 30.3*    218    and CMP:   Recent Labs   Lab 01/29/20  0326 01/30/20  0416    142   K 3.5 3.3*    112*   CO2 25 24   * 119*   BUN 5* 5*   CREATININE 0.7 0.6   CALCIUM 7.0* 6.9*   PROT 5.1* 4.7*   ALBUMIN 2.1* 1.9*   BILITOT 1.0 0.8    ALKPHOS 272* 263*   AST 49* 39   ALT 36 29   ANIONGAP 6* 6*   EGFRNONAA >60.0 >60.0       Diagnostic Results:  I have reviewed all pertinent imaging results/findings within the past 24 hours.

## 2020-01-30 NOTE — PLAN OF CARE
Full assessment as documented. Pt reporting pain of 4 out of 10 consistently this shift. Minimal relief for prn pain medications and espana's solution. Primary source of pain mucositis. IV abx as ordered.     Critical calcium of 6.9 on 1/30/20. WBC of 4.8.

## 2020-01-30 NOTE — PLAN OF CARE
Problem: Physical Therapy Goal  Goal: Physical Therapy Goal  Description  Goals to be met by: 20    Patient will increase functional independence with mobility by performin. Sit to stand transfer with Modified [place]  2. Gait  x 150 feet with Modified [place] using Rolling Walker.   3. Stand for 3 minutes with Modified [place] using Rolling Walker  4. Lower extremity exercise program x20 reps per handout, with independence     Outcome: Ongoing, Progressing     Goals remain appropriate. Continue current POC, progressing as tolerated.     Liss Sevilla PT, DPT   2020  Pager: [phone]

## 2020-01-30 NOTE — ASSESSMENT & PLAN NOTE
Patient with history of metastatic breast cancer on chemotherapy (Halaven, last dose 01/22) presenting with malaise, fever Tmax 102.4, productive cough, noted to be positive for influenza B.   On admission, WBC 0.25, ANC count 37.5 indicating severe neutropenia.   Lactate 2.3. CXR shows focal lucency in the left mid lung, could be projectional versus possible small intracavitary focus, otherwise unremarkeable.   Received 1 dose of oseltamivir, vancomycin and zosyn in the ED.   UA unremarkable, BC- NGTD  1/30 ANC 1.8    Plan  -s/p 1 dose of Filgrastim. Minimal improvement in WBC. 0.25->0.55. Awaiting differential result from lab.  -Continue with oseltamivir  - dc cefepime as no longer neutropenic  -Daily CBC and monitor ANC levels.   -Neutropenic contact isolation

## 2020-01-30 NOTE — PROGRESS NOTES
[facility]  Hematology/Oncology  Progress Note    Patient Name: Rebecca Crain  Admission Date: 1/25/2020  Hospital Length of Stay: 5 days  Code Status: Full Code     Subjective:     HPI:  Rebecca Crain is a 64 year old F with metastatic right breast cancer s/p mastectomy, on (chemotherapy- Halaven, last dose 01/22), hypothyroidism presenting for generalized weakness, fever and productive cough. Patient stated she was recently admitted to [facility] for intractable pain (01/06-01/12), a day before discharge she developed productive cough which advanced to green-blood tinged sputum upon getting home. She received a Z-pack from Dr. Schroeder and her symptoms only temporarily improved. For the past few days, she has been experiencing generalized weakness, malaise, and a fever of 102 yesterday. Patient stated her daughter had recently contracted the flu. She denies headaches, nausea, vomiting, SOB, urinary changes, but endorses pain in her anterior chest wall, right lower quadrant pain, mouth sores and pain with swallowing.    In the ED, she was noted to have a T of 101.4, HR of 125. She received 30cc/kg of IV fluids. Influenza B was positive. WBC noted to be 0.25, so 1 dose of vancomycin, zosyn were administered.     Oncology history  Breast history: In 2013 she was diagnosed with stage IIB carcinoma of the right breast, ER positive with a low Oncotype score.  She was enrolled on protocol  and randomized to endocrine therapy alone.  She was tried on all 3 aromatase inhibitors and had itching and rash to all of them.  In August 2013 she was started on tamoxifen.   She was found to have  bone metastasis in May 2015, 2015.  A subsequent bone biopsy was performed on a lesion at the T8 vertebra.   The pathology from that was consistent with metastatic breast cancer, ER +80%, GA +80%, and HER-2 negative.   She received letrozole plus palbociclib from July 2015 until May 2016.  She also received bone  protective therapy with Xgeva. Faslodex was started in June 2016.      Exemestane and Afinitor started in August 2017.  That was discontinued in February 2018 due to progression in the liver and bone.     Navelbine was started February 16, 2018.  She had 8 cycles for that.  Scans in November 2018 showed progression as follows     Capecitabine was started in December 2018.  She received 5 cycles of that therapy.     Follow-up scans in April 2019 showed progression with a new hepatic lesion and worsening bone disease.     She began clinical trial therapy with erdafitinib on May 8, 2019.  That was discontinued in September 2019 due to progression in the bone.     She then received a course of radiation therapy to her pelvis.  She began weekly Taxol in October 2019.That was discontinued in December 2019 due to progression.    Interval History: No acute events overnight. Having bowel movements.    Oncology Treatment Plan:   OP BREAST ERIBULIN Q3W    Medications:  Continuous Infusions:   sodium chloride 0.9% 125 mL/hr at 01/29/20 1959     Scheduled Meds:   aspirin  81 mg Oral Daily    calcium carbonate  1,500 mg Oral Once    ceFEPime (MAXIPIME) IVPB  2 g Intravenous Q8H    duke's soln (benadryl 30 mL, mylanta 30 mL, lidocaine 30 mL, nystatin 30 mL) 120 mL  10 mL Oral QID    enoxaparin  40 mg Subcutaneous Daily    gabapentin  300 mg Oral TID    levothyroxine  137 mcg Oral Before breakfast    morphine  200 mg Oral Q8H    oseltamivir  75 mg Oral BID    pantoprazole  40 mg Oral Daily    potassium phosphate IVPB  30 mmol Intravenous BID    potassium, sodium phosphates  1 packet Oral Daily    senna-docusate 8.6-50 mg  1 tablet Oral Daily    sertraline  50 mg Oral Daily    tbo-filgrastim  300 mcg Subcutaneous Daily    thiamine  100 mg Oral Daily     PRN Meds:(Magic mouthwash) 1:1:1 Benadryl 12.5mg/5ml liq, aluminum & magnesium hydroxide-simehticone (Maalox), lidocaine viscous 2%, Dextrose 10% Bolus, Dextrose 10%  Bolus, glucagon (human recombinant), glucose, glucose, ibuprofen, insulin aspart U-100, morphine, ondansetron, phenyleph-min oil-petrolatum, promethazine (PHENERGAN) IVPB, sodium chloride 0.9%     Review of Systems   Constitutional: Positive for activity change, appetite change and fatigue. Negative for chills and fever.   HENT: Positive for sore throat, trouble swallowing and voice change. Negative for rhinorrhea.    Respiratory: Negative for cough and shortness of breath.    Cardiovascular: Negative for chest pain and palpitations.   Gastrointestinal: Positive for constipation and diarrhea. Negative for abdominal pain and nausea.   Genitourinary: Negative for dysuria and flank pain.   Neurological: Negative for light-headedness, numbness and headaches.     Objective:     Vital Signs (Most Recent):  Temp: 98.4 °F (36.9 °C) (01/30/20 0753)  Pulse: 108 (01/30/20 0753)  Resp: 18 (01/30/20 0753)  BP: (!) 118/56 (01/30/20 0753)  SpO2: (!) 94 % (01/30/20 0753) Vital Signs (24h Range):  Temp:  [98 °F (36.7 °C)-99.9 °F (37.7 °C)] 98.4 °F (36.9 °C)  Pulse:  [108-112] 108  Resp:  [16-18] 18  SpO2:  [93 %-97 %] 94 %  BP: (111-139)/(55-68) 118/56     Weight: 78.5 kg (173 lb 1 oz)  Body mass index is 29.71 kg/m².  Body surface area is 1.88 meters squared.    No intake or output data in the 24 hours ending 01/30/20 0806    Physical Exam   Constitutional: She is oriented to person, place, and time. She appears well-developed and well-nourished. No distress.   HENT:   Head: Normocephalic and atraumatic.   Mouth/Throat: No oropharyngeal exudate.   Oral lesions   Eyes: Pupils are equal, round, and reactive to light. Conjunctivae and EOM are normal. Right eye exhibits no discharge. Left eye exhibits no discharge. No scleral icterus.   Neck: Normal range of motion. Neck supple. No JVD present. No tracheal deviation present.   Cardiovascular: Normal rate and regular rhythm.   No murmur heard.  Pulmonary/Chest: Effort normal and breath  sounds normal. She has no wheezes. She has no rales.   Abdominal: Soft. Bowel sounds are normal. She exhibits no distension. There is no tenderness.   Musculoskeletal: Normal range of motion. She exhibits no edema.   Lymphadenopathy:     She has no cervical adenopathy.   Neurological: She is alert and oriented to person, place, and time. No cranial nerve deficit.   Skin: Skin is warm and dry. No rash noted. She is not diaphoretic. No erythema.       Significant Labs:   CBC:   Recent Labs   Lab 01/29/20  0326 01/30/20  0416   WBC 1.52* 4.80   HGB 9.7* 9.2*   HCT 31.7* 30.3*    218    and CMP:   Recent Labs   Lab 01/29/20  0326 01/30/20  0416    142   K 3.5 3.3*    112*   CO2 25 24   * 119*   BUN 5* 5*   CREATININE 0.7 0.6   CALCIUM 7.0* 6.9*   PROT 5.1* 4.7*   ALBUMIN 2.1* 1.9*   BILITOT 1.0 0.8   ALKPHOS 272* 263*   AST 49* 39   ALT 36 29   ANIONGAP 6* 6*   EGFRNONAA >60.0 >60.0       Diagnostic Results:  I have reviewed all pertinent imaging results/findings within the past 24 hours.    Assessment/Plan:     * Neutropenic fever  Patient with history of metastatic breast cancer on chemotherapy (Halaven, last dose 01/22) presenting with malaise, fever Tmax 102.4, productive cough, noted to be positive for influenza B.   On admission, WBC 0.25, ANC count 37.5 indicating severe neutropenia.   Lactate 2.3. CXR shows focal lucency in the left mid lung, could be projectional versus possible small intracavitary focus, otherwise unremarkeable.   Received 1 dose of oseltamivir, vancomycin and zosyn in the ED.   UA unremarkable, BC- NGTD  1/30 ANC 1.8    Plan  -s/p 1 dose of Filgrastim. Minimal improvement in WBC. 0.25->0.55. Awaiting differential result from lab.  -Continue with oseltamivir  - dc cefepime as no longer neutropenic  -Daily CBC and monitor ANC levels.   -Neutropenic contact isolation      Oral mucositis  - Patient takes Saavedra's solution at home.   - Received 1 dose of Magic mouthwash  without relief.   - Added salt/baking soda per nursing  - Duke's solution scheduled QID.    Hypophosphatemia  Phos <1.0 --> 1.4    -Repleting with IV phos   - daily PhosNak packets  -restarted thiamine    Influenza B  -Confirmed Influenza B. Received 30cc/kg of IV normal saline.     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  MRI of the thoracic/lumbar spine 01/09 suggestive of Numerous osseous metastases of the thoracic and lumbar spine, with multiple compression fractures as above, and a new pathologic fracture of the right sacral ala.  -Receives chemotherapy (Halaven)     Plan  -Continue with home pain regimen - MS Contin 200mg q8hrs and morphine 60mg q4hrs  - bowel regiment : senna    Hypothyroidism due to Hashimoto's thyroiditis  -Continue with home synthroid              Wes Turner MD  Hematology/Oncology  [facility]        ATTENDING NOTE, ONCOLOGY INPATIENT TEAM    As above; events of last 24 hours noted.  Patient seen and examined, chart reviewed.  Appears more comfortable today, but she still complains of malaise,  Lungs are clear to auscultation.  Abdomen is soft, nontender constipation, and generalized weakness., but distended..  Labs reviewed.      PLAN  Will repeat CBC in a.m..  Given her current ANC we will administer an enema for constipation.  She will also take MiraLax p.o..  Will discontinue cefepime and Granix.  Will continue enoxaparin since she is not very ambulatory.  Tentative discharge in a.m..    José Manuel Shore MD

## 2020-01-30 NOTE — ASSESSMENT & PLAN NOTE
Known mets to liver, abdomen taught and tender. Do not suspect surgical abdomen at this time. KUB showing heavy stool burden.     Lactulose 30 g q2,  Mag citrate  Continue to monitor

## 2020-01-31 LAB
ALBUMIN SERPL BCP-MCNC: 1.9 G/DL (ref 3.5–5.2)
ALP SERPL-CCNC: 296 U/L (ref 55–135)
ALT SERPL W/O P-5'-P-CCNC: 26 U/L (ref 10–44)
ANION GAP SERPL CALC-SCNC: 5 MMOL/L (ref 8–16)
ANISOCYTOSIS BLD QL SMEAR: SLIGHT
AST SERPL-CCNC: 41 U/L (ref 10–40)
BASOPHILS # BLD AUTO: ABNORMAL K/UL (ref 0–0.2)
BASOPHILS NFR BLD: 0 % (ref 0–1.9)
BILIRUB SERPL-MCNC: 0.8 MG/DL (ref 0.1–1)
BUN SERPL-MCNC: 2 MG/DL (ref 8–23)
CALCIUM SERPL-MCNC: 6.3 MG/DL (ref 8.7–10.5)
CHLORIDE SERPL-SCNC: 106 MMOL/L (ref 95–110)
CO2 SERPL-SCNC: 24 MMOL/L (ref 23–29)
CREAT SERPL-MCNC: 0.6 MG/DL (ref 0.5–1.4)
DACRYOCYTES BLD QL SMEAR: ABNORMAL
DIFFERENTIAL METHOD: ABNORMAL
DOHLE BOD BLD QL SMEAR: PRESENT
EOSINOPHIL # BLD AUTO: ABNORMAL K/UL (ref 0–0.5)
EOSINOPHIL NFR BLD: 3 % (ref 0–8)
ERYTHROCYTE [DISTWIDTH] IN BLOOD BY AUTOMATED COUNT: 18.1 % (ref 11.5–14.5)
EST. GFR  (AFRICAN AMERICAN): >60 ML/MIN/1.73 M^2
EST. GFR  (NON AFRICAN AMERICAN): >60 ML/MIN/1.73 M^2
GLUCOSE SERPL-MCNC: 106 MG/DL (ref 70–110)
HCT VFR BLD AUTO: 30.5 % (ref 37–48.5)
HGB BLD-MCNC: 9.3 G/DL (ref 12–16)
HYPOCHROMIA BLD QL SMEAR: ABNORMAL
IMM GRANULOCYTES # BLD AUTO: ABNORMAL K/UL (ref 0–0.04)
IMM GRANULOCYTES NFR BLD AUTO: ABNORMAL % (ref 0–0.5)
LYMPHOCYTES # BLD AUTO: ABNORMAL K/UL (ref 1–4.8)
LYMPHOCYTES NFR BLD: 20 % (ref 18–48)
MCH RBC QN AUTO: 28 PG (ref 27–31)
MCHC RBC AUTO-ENTMCNC: 30.5 G/DL (ref 32–36)
MCV RBC AUTO: 92 FL (ref 82–98)
METAMYELOCYTES NFR BLD MANUAL: 5 %
MONOCYTES # BLD AUTO: ABNORMAL K/UL (ref 0.3–1)
MONOCYTES NFR BLD: 16 % (ref 4–15)
MYELOCYTES NFR BLD MANUAL: 2 %
NEUTROPHILS NFR BLD: 47 % (ref 38–73)
NEUTS BAND NFR BLD MANUAL: 6 %
NRBC BLD-RTO: 1 /100 WBC
OVALOCYTES BLD QL SMEAR: ABNORMAL
PHOSPHATE SERPL-MCNC: 2.6 MG/DL (ref 2.7–4.5)
PLATELET # BLD AUTO: 214 K/UL (ref 150–350)
PLATELET BLD QL SMEAR: ABNORMAL
PMV BLD AUTO: 10.4 FL (ref 9.2–12.9)
POIKILOCYTOSIS BLD QL SMEAR: SLIGHT
POLYCHROMASIA BLD QL SMEAR: ABNORMAL
POTASSIUM SERPL-SCNC: 3.6 MMOL/L (ref 3.5–5.1)
PROMYELOCYTES NFR BLD MANUAL: 1 %
PROT SERPL-MCNC: 4.6 G/DL (ref 6–8.4)
RBC # BLD AUTO: 3.32 M/UL (ref 4–5.4)
SODIUM SERPL-SCNC: 135 MMOL/L (ref 136–145)
TOXIC GRANULES BLD QL SMEAR: PRESENT
WBC # BLD AUTO: 10.47 K/UL (ref 3.9–12.7)

## 2020-01-31 PROCEDURE — 87040 BLOOD CULTURE FOR BACTERIA: CPT | Mod: HCNC

## 2020-01-31 PROCEDURE — 20600001 HC STEP DOWN PRIVATE ROOM: Mod: HCNC

## 2020-01-31 PROCEDURE — 84100 ASSAY OF PHOSPHORUS: CPT | Mod: HCNC

## 2020-01-31 PROCEDURE — 25000003 PHARM REV CODE 250: Mod: HCNC | Performed by: STUDENT IN AN ORGANIZED HEALTH CARE EDUCATION/TRAINING PROGRAM

## 2020-01-31 PROCEDURE — 99233 SBSQ HOSP IP/OBS HIGH 50: CPT | Mod: HCNC,,, | Performed by: INTERNAL MEDICINE

## 2020-01-31 PROCEDURE — 85007 BL SMEAR W/DIFF WBC COUNT: CPT | Mod: HCNC

## 2020-01-31 PROCEDURE — 85027 COMPLETE CBC AUTOMATED: CPT | Mod: HCNC

## 2020-01-31 PROCEDURE — 25000003 PHARM REV CODE 250: Mod: HCNC | Performed by: INTERNAL MEDICINE

## 2020-01-31 PROCEDURE — 36415 COLL VENOUS BLD VENIPUNCTURE: CPT | Mod: HCNC

## 2020-01-31 PROCEDURE — 80053 COMPREHEN METABOLIC PANEL: CPT | Mod: HCNC

## 2020-01-31 PROCEDURE — 63600175 PHARM REV CODE 636 W HCPCS: Mod: HCNC | Performed by: STUDENT IN AN ORGANIZED HEALTH CARE EDUCATION/TRAINING PROGRAM

## 2020-01-31 PROCEDURE — 99233 PR SUBSEQUENT HOSPITAL CARE,LEVL III: ICD-10-PCS | Mod: HCNC,,, | Performed by: INTERNAL MEDICINE

## 2020-01-31 RX ORDER — DOXYCYCLINE HYCLATE 100 MG
100 TABLET ORAL EVERY 12 HOURS
Status: DISCONTINUED | OUTPATIENT
Start: 2020-01-31 | End: 2020-02-02

## 2020-01-31 RX ORDER — POTASSIUM CHLORIDE 20 MEQ/1
40 TABLET, EXTENDED RELEASE ORAL ONCE
Status: COMPLETED | OUTPATIENT
Start: 2020-01-31 | End: 2020-01-31

## 2020-01-31 RX ADMIN — POLYETHYLENE GLYCOL 3350 17 G: 17 POWDER, FOR SOLUTION ORAL at 09:01

## 2020-01-31 RX ADMIN — LEVOTHYROXINE SODIUM 137 MCG: 25 TABLET ORAL at 06:01

## 2020-01-31 RX ADMIN — PROMETHAZINE HYDROCHLORIDE 6.25 MG: 25 INJECTION INTRAMUSCULAR; INTRAVENOUS at 12:01

## 2020-01-31 RX ADMIN — Medication 100 MG: at 09:01

## 2020-01-31 RX ADMIN — OSELTAMIVIR PHOSPHATE 75 MG: 75 CAPSULE ORAL at 09:01

## 2020-01-31 RX ADMIN — DOXYCYCLINE HYCLATE 100 MG: 100 TABLET, COATED ORAL at 08:01

## 2020-01-31 RX ADMIN — MORPHINE SULFATE 60 MG: 15 TABLET ORAL at 04:01

## 2020-01-31 RX ADMIN — ENOXAPARIN SODIUM 40 MG: 100 INJECTION SUBCUTANEOUS at 05:01

## 2020-01-31 RX ADMIN — GABAPENTIN 300 MG: 300 CAPSULE ORAL at 08:01

## 2020-01-31 RX ADMIN — PANTOPRAZOLE SODIUM 40 MG: 40 TABLET, DELAYED RELEASE ORAL at 09:01

## 2020-01-31 RX ADMIN — GABAPENTIN 300 MG: 300 CAPSULE ORAL at 05:01

## 2020-01-31 RX ADMIN — Medication 10 ML: at 09:01

## 2020-01-31 RX ADMIN — ASPIRIN 81 MG: 81 TABLET, COATED ORAL at 09:01

## 2020-01-31 RX ADMIN — MORPHINE SULFATE 200 MG: 100 TABLET, FILM COATED, EXTENDED RELEASE ORAL at 09:01

## 2020-01-31 RX ADMIN — Medication 10 ML: at 08:01

## 2020-01-31 RX ADMIN — MORPHINE SULFATE 200 MG: 100 TABLET, FILM COATED, EXTENDED RELEASE ORAL at 05:01

## 2020-01-31 RX ADMIN — SENNOSIDES AND DOCUSATE SODIUM 1 TABLET: 8.6; 5 TABLET ORAL at 09:01

## 2020-01-31 RX ADMIN — POTASSIUM CHLORIDE 40 MEQ: 1500 TABLET, EXTENDED RELEASE ORAL at 09:01

## 2020-01-31 RX ADMIN — OSELTAMIVIR PHOSPHATE 75 MG: 75 CAPSULE ORAL at 08:01

## 2020-01-31 RX ADMIN — IBUPROFEN 400 MG: 200 TABLET, FILM COATED ORAL at 04:01

## 2020-01-31 RX ADMIN — POTASSIUM & SODIUM PHOSPHATES POWDER PACK 280-160-250 MG 1 PACKET: 280-160-250 PACK at 09:01

## 2020-01-31 RX ADMIN — ONDANSETRON 8 MG: 8 TABLET, ORALLY DISINTEGRATING ORAL at 11:01

## 2020-01-31 RX ADMIN — GABAPENTIN 300 MG: 300 CAPSULE ORAL at 09:01

## 2020-01-31 RX ADMIN — MORPHINE SULFATE 60 MG: 15 TABLET ORAL at 09:01

## 2020-01-31 RX ADMIN — DOXYCYCLINE HYCLATE 100 MG: 100 TABLET, COATED ORAL at 04:01

## 2020-01-31 RX ADMIN — SERTRALINE 50 MG: 25 TABLET, FILM COATED ORAL at 09:01

## 2020-01-31 NOTE — PT/OT/SLP PROGRESS
Physical Therapy      Patient Name:  Rebecca Crain   MRN:  [id number]    Patient not seen today secondary to patient sleeping and had difficulty maintaining alertness level to talk with PT. Per family member, patient was given medication for nausea and now the medication is making her sleep. Will follow-up again at next scheduled visit.    Luana Pollock, PT

## 2020-01-31 NOTE — PLAN OF CARE
01/30/20 1711   Discharge Reassessment   Assessment Type Discharge Planning Reassessment   Provided patient/caregiver education on the expected discharge date and the discharge plan Yes   Do you have any problems affording any of your prescribed medications? No   Discharge Plan A Home Health;Home with family   Discharge Plan B Home Health;Home with family   DME Needed Upon Discharge  none   Anticipated Discharge Disposition Home-Health   Post-Acute Status   Post-Acute Authorization Home Health/Hospice   Home Health/Hospice Status Awaiting Internal Medical Clearance

## 2020-01-31 NOTE — PROGRESS NOTES
[facility]  Hematology/Oncology  Progress Note    Patient Name: Rebecca Crain  Admission Date: 1/25/2020  Hospital Length of Stay: 6 days  Code Status: Full Code     Subjective:     HPI:  Rebecca Crain is a 64 year old F with metastatic right breast cancer s/p mastectomy, on (chemotherapy- Halaven, last dose 01/22), hypothyroidism presenting for generalized weakness, fever and productive cough. Patient stated [place] [facility] [place]), a day before discharge she developed productive cough which advanced to green-blood tinged sputum upon getting home. She received a Z-pack from Dr. Schroeder and her symptoms only temporarily improved. For the past few days, she has been experiencing generalized weakness, malaise, and a fever of 102 yesterday. Patient stated her daughter had recently contracted the flu. She denies headaches, nausea, vomiting, SOB, urinary changes, but endorses pain in her anterior chest wall, right lower quadrant pain, mouth sores and pain with swallowing.    In the ED, she was noted to have a T of 101.4, HR of 125. She received 30cc/kg of IV fluids. Influenza B was positive. WBC noted to be 0.25, so 1 dose of vancomycin, zosyn were administered.     Oncology history  Breast history: In 2013 she was diagnosed with stage IIB carcinoma of the right breast, ER positive with a low Oncotype score.  She was enrolled on protocol  and randomized to endocrine therapy alone.  She was tried on all 3 aromatase inhibitors and had itching and rash to all of them.  In August 2013 she was started on tamoxifen.   She was found to have  bone metastasis in May 2015, 2015.  A subsequent bone biopsy was performed on a lesion at the T8 vertebra.   The pathology from that was consistent with metastatic breast cancer, ER +80%, WI +80%, and HER-2 negative.   She received letrozole plus palbociclib from July 2015 until May 2016.  She also received bone  protective therapy with Xgeva. Faslodex was started in June 2016.      Exemestane and Afinitor started in August 2017.  That was discontinued in February 2018 due to progression in the liver and bone.     Navelbine was started February 16, 2018.  She had 8 cycles for that.  Scans in November 2018 showed progression as follows     Capecitabine was started in December 2018.  She received 5 cycles of that therapy.     Follow-up scans in April 2019 showed progression with a new hepatic lesion and worsening bone disease.     She began clinical trial therapy with erdafitinib on May 8, 2019.  That was discontinued in September 2019 due to progression in the bone.     She then received a course of radiation therapy to her pelvis.  She began weekly Taxol in October 2019.That was discontinued in December 2019 due to progression.    Interval History:   Overnight NF resident called to assess right side facial swelling and increased in warmth and pain. Patient's face mildly swollen with erythema. Doxycycline started, head and neck soft tissue ultrasound and blood cultures ordered.  Pt has been afebrile.    Oncology Treatment Plan:   OP BREAST ERIBULIN Q3W    Medications:  Continuous Infusions:    Scheduled Meds:   aspirin  81 mg Oral Daily    doxycycline  100 mg Oral Q12H    duke's soln (benadryl 30 mL, mylanta 30 mL, lidocaine 30 mL, nystatin 30 mL) 120 mL  10 mL Oral QID    enoxaparin  40 mg Subcutaneous Daily    gabapentin  300 mg Oral TID    levothyroxine  137 mcg Oral Before breakfast    morphine  200 mg Oral Q8H    oseltamivir  75 mg Oral BID    pantoprazole  40 mg Oral Daily    polyethylene glycol  17 g Oral Daily    potassium chloride  40 mEq Oral Once    potassium, sodium phosphates  1 packet Oral Daily    senna-docusate 8.6-50 mg  1 tablet Oral Daily    sertraline  50 mg Oral Daily    thiamine  100 mg Oral Daily     PRN Meds:(Magic mouthwash) 1:1:1 Benadryl 12.5mg/5ml liq, aluminum & magnesium  hydroxide-simehticone (Maalox), lidocaine viscous 2%, Dextrose 10% Bolus, Dextrose 10% Bolus, glucagon (human recombinant), glucose, glucose, ibuprofen, insulin aspart U-100, morphine, ondansetron, phenyleph-min oil-petrolatum, promethazine (PHENERGAN) IVPB, sodium chloride 0.9%     Review of Systems   Constitutional: Positive for activity change, appetite change and fatigue. Negative for chills and fever.   HENT: Positive for sore throat, trouble swallowing and voice change. Negative for rhinorrhea.    Respiratory: Negative for cough and shortness of breath.    Cardiovascular: Negative for chest pain and palpitations.   Gastrointestinal: Positive for constipation and diarrhea. Negative for abdominal pain and nausea.   Genitourinary: Negative for dysuria and flank pain.   Neurological: Negative for light-headedness, numbness and headaches.     Objective:     Vital Signs (Most Recent):  Temp: 98.6 °F (37 °C) (01/31/20 0350)  Pulse: 101 (01/31/20 0350)  Resp: 18 (01/31/20 0350)  BP: (!) 115/57 (01/31/20 0350)  SpO2: 98 % (01/31/20 0350) Vital Signs (24h Range):  Temp:  [98.2 °F (36.8 °C)-98.6 °F (37 °C)] 98.6 °F (37 °C)  Pulse:  [] 101  Resp:  [15-18] 18  SpO2:  [94 %-100 %] 98 %  BP: (106-130)/(55-61) 115/57     Weight: 78.5 kg (173 lb 1 oz)  Body mass index is 29.71 kg/m².  Body surface area is 1.88 meters squared.      Intake/Output Summary (Last 24 hours) at 1/31/2020 0816  Last data filed at 1/30/2020 1830  Gross per 24 hour   Intake 600 ml   Output --   Net 600 ml       Physical Exam   Constitutional: She is oriented to person, place, and time. She appears well-developed and well-nourished. No distress.   HENT:   Head: Normocephalic and atraumatic.   Mouth/Throat: No oropharyngeal exudate.   Oral lesions   Eyes: Pupils are equal, round, and reactive to light. Conjunctivae and EOM are normal. Right eye exhibits no discharge. Left eye exhibits no discharge. No scleral icterus.   Neck: Normal range of motion.  Neck supple. No JVD present. No tracheal deviation present.   Cardiovascular: Normal rate and regular rhythm.   No murmur heard.  Pulmonary/Chest: Effort normal and breath sounds normal. She has no wheezes. She has no rales.   Abdominal: Soft. Bowel sounds are normal. She exhibits no distension. There is no tenderness.   Musculoskeletal: Normal range of motion. She exhibits no edema.   Lymphadenopathy:     She has no cervical adenopathy.   Neurological: She is alert and oriented to person, place, and time. No cranial nerve deficit.   Skin: Skin is warm and dry. No rash noted. She is not diaphoretic. No erythema.       Significant Labs:   CBC:   Recent Labs   Lab 01/30/20 0416   WBC 4.80   HGB 9.2*   HCT 30.3*       and CMP:   Recent Labs   Lab 01/30/20 0416      K 3.3*   *   CO2 24   *   BUN 5*   CREATININE 0.6   CALCIUM 6.9*   PROT 4.7*   ALBUMIN 1.9*   BILITOT 0.8   ALKPHOS 263*   AST 39   ALT 29   ANIONGAP 6*   EGFRNONAA >60.0       Diagnostic Results:  I have reviewed all pertinent imaging results/findings within the past 24 hours.    Assessment/Plan:     * Neutropenic fever  Resolved.  On admission, WBC 0.25, ANC count 37.5 i    Plan  -s/p 1 dose of Filgrastim.  -Dc cefepime as no longer neutropenic  -Daily CBC and monitor ANC levels.       Oral mucositis  - Patient takes Saavedra's solution at home.   - Received 1 dose of Magic mouthwash without relief.   - Added salt/baking soda per nursing  - Duke's solution scheduled QID.    Hypophosphatemia  Phos <1.0 --> 1.4    -Repleting with IV phos   - daily PhosNak packets  -restarted thiamine    Influenza B  -Confirmed Influenza B. Received 30cc/kg of IV normal saline.     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  MRI of the thoracic/lumbar spine 01/09 suggestive of  Numerous osseous metastases of the thoracic and lumbar spine, with multiple compression fractures as above, and a new pathologic fracture of the right sacral ala.  -Receives chemotherapy (Halaven)     Plan  -Continue with home pain regimen - MS Contin 200mg q8hrs and morphine 60mg q4hrs  - bowel regiment : senna    Hypothyroidism due to Hashimoto's thyroiditis  -Continue with home synthroid     Facial Swelling     Follow US, may be able to dc doxycycline today.       Rhea Castaneda MD  Hematology/Oncology  [facility]      I have reviewed the notes, assessments, and/or procedures performed by the housestaff, as above.  I have personally interviewed and examined the patient at the beside, and rounded with the housestaff. I concur with her/his assessment and plan and the documentation of Rebecca Crain.  I, Dr. Nestor High, personally spent more than  35 mins during this encounter, greater than 50% was spent in direct counseling and/or coordination of care.     Nestor High M.D., M.S., F.A.C.P.  Hematology/Oncology Attending  [facility]

## 2020-01-31 NOTE — TREATMENT PLAN
Call to bedside 3AM 1/31/20 by nurse. Patient complaining about right side facial swelling and increased in warmth and pain.   At bedside, patient's face mildly swollen with erythema with telangiectasia on right cheek. Patient felt short of breath so placed on 2LNC and sating above 95%.   Patient also found to have new right hip pain which was very tender to mild palpation.   Patient has skin break down of right neck which did not look infected and did not find wound anywhere else.     Patient was recently stopped Cefepime for neutropenic fever. Cellulitis suspected and started patient on Doxycycline and ordered head and neck soft tissue ultrasound.   No fever at the time of the exam. Two set of blood culture was taken.  Will give PRN tylenol for hip pain which also maybe soft tissue related.     JERMAINE Ware M.D.  Internal Medicine PGY-2  [phone]

## 2020-01-31 NOTE — SUBJECTIVE & OBJECTIVE
Interval History:   No acute events overnight. Noted to have right arm tenderness and warmth. Will US right arm.     Oncology Treatment Plan:   OP BREAST ERIBULIN Q3W    Medications:  Continuous Infusions:    Scheduled Meds:   aspirin  81 mg Oral Daily    doxycycline  100 mg Oral Q12H    duke's soln (benadryl 30 mL, mylanta 30 mL, lidocaine 30 mL, nystatin 30 mL) 120 mL  10 mL Oral QID    enoxaparin  40 mg Subcutaneous Daily    gabapentin  300 mg Oral TID    levothyroxine  137 mcg Oral Before breakfast    morphine  200 mg Oral Q8H    oseltamivir  75 mg Oral BID    pantoprazole  40 mg Oral Daily    polyethylene glycol  17 g Oral Daily    potassium chloride  40 mEq Oral Once    potassium, sodium phosphates  1 packet Oral Daily    senna-docusate 8.6-50 mg  1 tablet Oral Daily    sertraline  50 mg Oral Daily    thiamine  100 mg Oral Daily     PRN Meds:(Magic mouthwash) 1:1:1 Benadryl 12.5mg/5ml liq, aluminum & magnesium hydroxide-simehticone (Maalox), lidocaine viscous 2%, Dextrose 10% Bolus, Dextrose 10% Bolus, glucagon (human recombinant), glucose, glucose, ibuprofen, insulin aspart U-100, morphine, ondansetron, phenyleph-min oil-petrolatum, promethazine (PHENERGAN) IVPB, sodium chloride 0.9%     Review of Systems   Constitutional: Positive for activity change, appetite change and fatigue. Negative for chills and fever.   HENT: Positive for sore throat, trouble swallowing and voice change. Negative for rhinorrhea.    Respiratory: Negative for cough and shortness of breath.    Cardiovascular: Negative for chest pain and palpitations.   Gastrointestinal: Negative for abdominal pain, constipation, diarrhea and nausea.   Genitourinary: Negative for dysuria and flank pain.   Musculoskeletal: Positive for arthralgias.   Neurological: Negative for light-headedness, numbness and headaches.     Objective:     Vital Signs (Most Recent):  Temp: 98.6 °F (37 °C) (01/31/20 0350)  Pulse: 101 (01/31/20 0350)  Resp: 18  (01/31/20 0350)  BP: (!) 115/57 (01/31/20 0350)  SpO2: 98 % (01/31/20 0350) Vital Signs (24h Range):  Temp:  [98.2 °F (36.8 °C)-98.6 °F (37 °C)] 98.6 °F (37 °C)  Pulse:  [] 101  Resp:  [15-18] 18  SpO2:  [94 %-100 %] 98 %  BP: (106-130)/(55-61) 115/57     Weight: 78.5 kg (173 lb 1 oz)  Body mass index is 29.71 kg/m².  Body surface area is 1.88 meters squared.      Intake/Output Summary (Last 24 hours) at 1/31/2020 0816  Last data filed at 1/30/2020 1830  Gross per 24 hour   Intake 600 ml   Output --   Net 600 ml       Physical Exam   Constitutional: She is oriented to person, place, and time. She appears well-developed and well-nourished. No distress.   HENT:   Head: Normocephalic and atraumatic.   Mouth/Throat: No oropharyngeal exudate.   Oral lesions   Eyes: Pupils are equal, round, and reactive to light. Conjunctivae and EOM are normal. Right eye exhibits no discharge. Left eye exhibits no discharge. No scleral icterus.   Neck: Normal range of motion. Neck supple. No JVD present. No tracheal deviation present.   Cardiovascular: Normal rate and regular rhythm.   No murmur heard.  Pulmonary/Chest: Effort normal and breath sounds normal. She has no wheezes. She has no rales.   Abdominal: Soft. Bowel sounds are normal. She exhibits no distension. There is no tenderness.   Musculoskeletal: Normal range of motion. She exhibits tenderness. She exhibits no edema.   Right arm diffusely tender and warm. No cords felt.    Lymphadenopathy:     She has no cervical adenopathy.   Neurological: She is alert and oriented to person, place, and time. No cranial nerve deficit.   Skin: Skin is warm and dry. No rash noted. She is not diaphoretic. No erythema.       Significant Labs:   CBC:   Recent Labs   Lab 01/30/20 0416   WBC 4.80   HGB 9.2*   HCT 30.3*       and CMP:   Recent Labs   Lab 01/30/20 0416      K 3.3*   *   CO2 24   *   BUN 5*   CREATININE 0.6   CALCIUM 6.9*   PROT 4.7*   ALBUMIN 1.9*    BILITOT 0.8   ALKPHOS 263*   AST 39   ALT 29   ANIONGAP 6*   EGFRNONAA >60.0       Diagnostic Results:  I have reviewed all pertinent imaging results/findings within the past 24 hours.

## 2020-02-01 PROBLEM — E83.51 HYPOCALCEMIA: Status: ACTIVE | Noted: 2020-02-01

## 2020-02-01 LAB
ALBUMIN SERPL BCP-MCNC: 1.9 G/DL (ref 3.5–5.2)
ALP SERPL-CCNC: 345 U/L (ref 55–135)
ALT SERPL W/O P-5'-P-CCNC: 23 U/L (ref 10–44)
ANION GAP SERPL CALC-SCNC: 7 MMOL/L (ref 8–16)
ANISOCYTOSIS BLD QL SMEAR: SLIGHT
AST SERPL-CCNC: 37 U/L (ref 10–40)
BASO STIPL BLD QL SMEAR: ABNORMAL
BASOPHILS NFR BLD: 0 % (ref 0–1.9)
BILIRUB SERPL-MCNC: 0.7 MG/DL (ref 0.1–1)
BUN SERPL-MCNC: 2 MG/DL (ref 8–23)
CALCIUM SERPL-MCNC: 6.7 MG/DL (ref 8.7–10.5)
CHLORIDE SERPL-SCNC: 108 MMOL/L (ref 95–110)
CO2 SERPL-SCNC: 25 MMOL/L (ref 23–29)
CREAT SERPL-MCNC: 0.6 MG/DL (ref 0.5–1.4)
DACRYOCYTES BLD QL SMEAR: ABNORMAL
DIFFERENTIAL METHOD: ABNORMAL
DOHLE BOD BLD QL SMEAR: PRESENT
EOSINOPHIL NFR BLD: 0 % (ref 0–8)
ERYTHROCYTE [DISTWIDTH] IN BLOOD BY AUTOMATED COUNT: 18.3 % (ref 11.5–14.5)
EST. GFR  (AFRICAN AMERICAN): >60 ML/MIN/1.73 M^2
EST. GFR  (NON AFRICAN AMERICAN): >60 ML/MIN/1.73 M^2
GIANT PLATELETS BLD QL SMEAR: PRESENT
GLUCOSE SERPL-MCNC: 96 MG/DL (ref 70–110)
HCT VFR BLD AUTO: 32.2 % (ref 37–48.5)
HGB BLD-MCNC: 10 G/DL (ref 12–16)
HYPOCHROMIA BLD QL SMEAR: ABNORMAL
IMM GRANULOCYTES # BLD AUTO: ABNORMAL K/UL (ref 0–0.04)
IMM GRANULOCYTES NFR BLD AUTO: ABNORMAL % (ref 0–0.5)
LYMPHOCYTES NFR BLD: 10 % (ref 18–48)
MCH RBC QN AUTO: 29 PG (ref 27–31)
MCHC RBC AUTO-ENTMCNC: 31.1 G/DL (ref 32–36)
MCV RBC AUTO: 93 FL (ref 82–98)
METAMYELOCYTES NFR BLD MANUAL: 1 %
MONOCYTES NFR BLD: 12 % (ref 4–15)
MYELOCYTES NFR BLD MANUAL: 1 %
NEUTROPHILS NFR BLD: 72 % (ref 38–73)
NEUTS BAND NFR BLD MANUAL: 2 %
NRBC BLD-RTO: 1 /100 WBC
OVALOCYTES BLD QL SMEAR: ABNORMAL
PHOSPHATE SERPL-MCNC: 1.2 MG/DL (ref 2.7–4.5)
PLATELET # BLD AUTO: 218 K/UL (ref 150–350)
PLATELET BLD QL SMEAR: ABNORMAL
PMV BLD AUTO: 10 FL (ref 9.2–12.9)
POIKILOCYTOSIS BLD QL SMEAR: SLIGHT
POLYCHROMASIA BLD QL SMEAR: ABNORMAL
POTASSIUM SERPL-SCNC: 4 MMOL/L (ref 3.5–5.1)
PROMYELOCYTES NFR BLD MANUAL: 2 %
PROT SERPL-MCNC: 4.7 G/DL (ref 6–8.4)
PTH-INTACT SERPL-MCNC: 262 PG/ML (ref 9–77)
RBC # BLD AUTO: 3.45 M/UL (ref 4–5.4)
SMUDGE CELLS BLD QL SMEAR: PRESENT
SODIUM SERPL-SCNC: 140 MMOL/L (ref 136–145)
TOXIC GRANULES BLD QL SMEAR: PRESENT
WBC # BLD AUTO: 12.33 K/UL (ref 3.9–12.7)

## 2020-02-01 PROCEDURE — 99232 PR SUBSEQUENT HOSPITAL CARE,LEVL II: ICD-10-PCS | Mod: HCNC,GC,, | Performed by: INTERNAL MEDICINE

## 2020-02-01 PROCEDURE — 84100 ASSAY OF PHOSPHORUS: CPT | Mod: HCNC

## 2020-02-01 PROCEDURE — 25000003 PHARM REV CODE 250: Mod: HCNC | Performed by: INTERNAL MEDICINE

## 2020-02-01 PROCEDURE — 36415 COLL VENOUS BLD VENIPUNCTURE: CPT | Mod: HCNC

## 2020-02-01 PROCEDURE — 25000003 PHARM REV CODE 250: Mod: HCNC | Performed by: STUDENT IN AN ORGANIZED HEALTH CARE EDUCATION/TRAINING PROGRAM

## 2020-02-01 PROCEDURE — 20600001 HC STEP DOWN PRIVATE ROOM: Mod: HCNC

## 2020-02-01 PROCEDURE — 85007 BL SMEAR W/DIFF WBC COUNT: CPT | Mod: HCNC

## 2020-02-01 PROCEDURE — 80053 COMPREHEN METABOLIC PANEL: CPT | Mod: HCNC

## 2020-02-01 PROCEDURE — 83970 ASSAY OF PARATHORMONE: CPT | Mod: HCNC

## 2020-02-01 PROCEDURE — 85027 COMPLETE CBC AUTOMATED: CPT | Mod: HCNC

## 2020-02-01 PROCEDURE — 99232 SBSQ HOSP IP/OBS MODERATE 35: CPT | Mod: HCNC,GC,, | Performed by: INTERNAL MEDICINE

## 2020-02-01 PROCEDURE — 63600175 PHARM REV CODE 636 W HCPCS: Mod: HCNC | Performed by: STUDENT IN AN ORGANIZED HEALTH CARE EDUCATION/TRAINING PROGRAM

## 2020-02-01 RX ORDER — LACTULOSE 10 G/15ML
30 SOLUTION ORAL
Status: DISCONTINUED | OUTPATIENT
Start: 2020-02-01 | End: 2020-02-02

## 2020-02-01 RX ORDER — CALCIUM CARBONATE 500(1250)
1000 TABLET ORAL DAILY
Status: DISCONTINUED | OUTPATIENT
Start: 2020-02-02 | End: 2020-02-02 | Stop reason: HOSPADM

## 2020-02-01 RX ORDER — SODIUM,POTASSIUM PHOSPHATES 280-250MG
2 POWDER IN PACKET (EA) ORAL DAILY
Status: DISCONTINUED | OUTPATIENT
Start: 2020-02-01 | End: 2020-02-02 | Stop reason: HOSPADM

## 2020-02-01 RX ORDER — CALCIUM CARBONATE 500(1250)
1500 TABLET ORAL ONCE
Status: COMPLETED | OUTPATIENT
Start: 2020-02-01 | End: 2020-02-01

## 2020-02-01 RX ADMIN — ENOXAPARIN SODIUM 40 MG: 100 INJECTION SUBCUTANEOUS at 04:02

## 2020-02-01 RX ADMIN — LEVOTHYROXINE SODIUM 137 MCG: 25 TABLET ORAL at 06:02

## 2020-02-01 RX ADMIN — Medication 10 ML: at 04:02

## 2020-02-01 RX ADMIN — Medication 100 MG: at 09:02

## 2020-02-01 RX ADMIN — MORPHINE SULFATE 200 MG: 100 TABLET, FILM COATED, EXTENDED RELEASE ORAL at 09:02

## 2020-02-01 RX ADMIN — IBUPROFEN 400 MG: 200 TABLET, FILM COATED ORAL at 09:02

## 2020-02-01 RX ADMIN — CALCIUM 1500 MG: 500 TABLET ORAL at 09:02

## 2020-02-01 RX ADMIN — MORPHINE SULFATE 60 MG: 15 TABLET ORAL at 01:02

## 2020-02-01 RX ADMIN — SENNOSIDES AND DOCUSATE SODIUM 1 TABLET: 8.6; 5 TABLET ORAL at 09:02

## 2020-02-01 RX ADMIN — DOXYCYCLINE HYCLATE 100 MG: 100 TABLET, COATED ORAL at 09:02

## 2020-02-01 RX ADMIN — POLYETHYLENE GLYCOL 3350 17 G: 17 POWDER, FOR SOLUTION ORAL at 09:02

## 2020-02-01 RX ADMIN — DOXYCYCLINE HYCLATE 100 MG: 100 TABLET, COATED ORAL at 08:02

## 2020-02-01 RX ADMIN — MORPHINE SULFATE 200 MG: 100 TABLET, FILM COATED, EXTENDED RELEASE ORAL at 10:02

## 2020-02-01 RX ADMIN — GABAPENTIN 300 MG: 300 CAPSULE ORAL at 09:02

## 2020-02-01 RX ADMIN — PROMETHAZINE HYDROCHLORIDE 6.25 MG: 25 INJECTION INTRAMUSCULAR; INTRAVENOUS at 12:02

## 2020-02-01 RX ADMIN — ASPIRIN 81 MG: 81 TABLET, COATED ORAL at 09:02

## 2020-02-01 RX ADMIN — PANTOPRAZOLE SODIUM 40 MG: 40 TABLET, DELAYED RELEASE ORAL at 09:02

## 2020-02-01 RX ADMIN — GABAPENTIN 300 MG: 300 CAPSULE ORAL at 02:02

## 2020-02-01 RX ADMIN — Medication 10 ML: at 12:02

## 2020-02-01 RX ADMIN — LACTULOSE 30 G: 20 SOLUTION ORAL at 04:02

## 2020-02-01 RX ADMIN — Medication 10 ML: at 08:02

## 2020-02-01 RX ADMIN — OSELTAMIVIR PHOSPHATE 75 MG: 75 CAPSULE ORAL at 08:02

## 2020-02-01 RX ADMIN — OSELTAMIVIR PHOSPHATE 75 MG: 75 CAPSULE ORAL at 09:02

## 2020-02-01 RX ADMIN — LACTULOSE 30 G: 20 SOLUTION ORAL at 12:02

## 2020-02-01 RX ADMIN — POTASSIUM & SODIUM PHOSPHATES POWDER PACK 280-160-250 MG 2 PACKET: 280-160-250 PACK at 09:02

## 2020-02-01 RX ADMIN — Medication 10 ML: at 09:02

## 2020-02-01 RX ADMIN — SERTRALINE 50 MG: 25 TABLET, FILM COATED ORAL at 09:02

## 2020-02-01 RX ADMIN — MORPHINE SULFATE 200 MG: 100 TABLET, FILM COATED, EXTENDED RELEASE ORAL at 02:02

## 2020-02-01 RX ADMIN — GABAPENTIN 300 MG: 300 CAPSULE ORAL at 08:02

## 2020-02-01 NOTE — ASSESSMENT & PLAN NOTE
Patient with history of metastatic breast cancer on chemotherapy (Halaven, last dose 01/22) presenting with malaise, fever Tmax 102.4, productive cough, noted to be positive for influenza B.   On admission, WBC 0.25, ANC count 37.5 indicating severe neutropenia.   Lactate 2.3. CXR shows focal lucency in the left mid lung, could be projectional versus possible small intracavitary focus, otherwise unremarkeable.   Received 1 dose of oseltamivir, vancomycin and zosyn in the ED.   UA unremarkable, BC- NGTD  1/30 ANC 1.8 and continuing to uptrend    Plan  -s/p 1 dose of Filgrastim. Minimal improvement in WBC. 0.25->0.55. Awaiting differential result from lab.  -Continue with oseltamivir  - dc cefepime as no longer neutropenic  -Daily CBC

## 2020-02-01 NOTE — PLAN OF CARE
Problem: Fall Injury Risk  Goal: Absence of Fall and Fall-Related Injury  Outcome: Ongoing, Progressing     Problem: Adult Inpatient Plan of Care  Goal: Plan of Care Review  Outcome: Ongoing, Progressing  Goal: Patient-Specific Goal (Individualization)  Outcome: Ongoing, Progressing  Goal: Absence of Hospital-Acquired Illness or Injury  Outcome: Ongoing, Progressing  Goal: Optimal Comfort and Wellbeing  Outcome: Ongoing, Progressing  Goal: Readiness for Transition of Care  Outcome: Ongoing, Progressing  Goal: Rounds/Family Conference  Outcome: Ongoing, Progressing     Problem: Infection  Goal: Infection Symptom Resolution  Outcome: Ongoing, Progressing     Problem: Wound  Goal: Optimal Wound Healing  Outcome: Ongoing, Progressing    Pt is Aox4 and c/o of generalized pain 4/10 and 8-9/10 pain for the ulcers in her mouth.  The ulcer pain abates with Camas, while morphine and gabapentin PO scheduled remedy her generalized pain.  Today Cassandra HOLLINGSWORTH and I changed the foam dressing on the burn on the back of her neck.  She continues to have slightly diminished lung sounds in the bases, likely because she is still recovering from the flu.    She has dependent mild edema and swelling to the L & R extremity.  Coughing is infrequent.  Her family visited and I reviewed the POC with them and the pt.  Safety checks performed.      Marlene Lay RN  02/01/2020  2:35 PM

## 2020-02-01 NOTE — ASSESSMENT & PLAN NOTE
Patient iniatially with hypercalcemia, likely related to malignancy, but also noted to have hypophosphatemia to <1. Replete phosphates, but unfortunately unable to keep up with calcium needs.    Replete prn

## 2020-02-01 NOTE — PROGRESS NOTES
[facility]  Hematology/Oncology  Progress Note    Patient Name: Rebecca Crain  Admission Date: 1/25/2020  Hospital Length of Stay: 7 days  Code Status: Full Code     Subjective:     HPI:  Rebecca Crain is a 64 year old F with metastatic right breast cancer s/p mastectomy, on (chemotherapy- Halaven, last dose 01/22), hypothyroidism presenting for generalized weakness, fever and productive cough. Patient stated she was recently admitted to [facility] for intractable pain (01/06-01/12), a day before discharge she developed productive cough which advanced to green-blood tinged sputum upon getting home. She received a Z-pack from Dr. Schroeder and her symptoms only temporarily improved. For the past few days, she has been experiencing generalized weakness, malaise, and a fever of 102 yesterday. Patient stated her daughter had recently contracted the flu. She denies headaches, nausea, vomiting, SOB, urinary changes, but endorses pain in her anterior chest wall, right lower quadrant pain, mouth sores and pain with swallowing.    In the ED, she was noted to have a T of 101.4, HR of 125. She received 30cc/kg of IV fluids. Influenza B was positive. WBC noted to be 0.25, so 1 dose of vancomycin, zosyn were administered.     Oncology history  Breast history: In 2013 she was diagnosed with stage IIB carcinoma of the right breast, ER positive with a low Oncotype score.  She was enrolled on protocol  and randomized to endocrine therapy alone.  She was tried on all 3 aromatase inhibitors and had itching and rash to all of them.  In August 2013 she was started on tamoxifen.   She was found to have  bone metastasis in May 2015, 2015.  A subsequent bone biopsy was performed on a lesion at the T8 vertebra.   The pathology from that was consistent with metastatic breast cancer, ER +80%, ND +80%, and HER-2 negative.   She received letrozole plus palbociclib from July 2015 until May 2016.  She also received bone  protective therapy with Xgeva. Faslodex was started in June 2016.      Exemestane and Afinitor started in August 2017.  That was discontinued in February 2018 due to progression in the liver and bone.     Navelbine was started February 16, 2018.  She had 8 cycles for that.  Scans in November 2018 showed progression as follows     Capecitabine was started in December 2018.  She received 5 cycles of that therapy.     Follow-up scans in April 2019 showed progression with a new hepatic lesion and worsening bone disease.     She began clinical trial therapy with erdafitinib on May 8, 2019.  That was discontinued in September 2019 due to progression in the bone.     She then received a course of radiation therapy to her pelvis.  She began weekly Taxol in October 2019.That was discontinued in December 2019 due to progression.    Interval History:   No acute events overnight. Noted to have right arm tenderness and warmth. Will US right arm.     Oncology Treatment Plan:   OP BREAST ERIBULIN Q3W    Medications:  Continuous Infusions:    Scheduled Meds:   aspirin  81 mg Oral Daily    doxycycline  100 mg Oral Q12H    duke's soln (benadryl 30 mL, mylanta 30 mL, lidocaine 30 mL, nystatin 30 mL) 120 mL  10 mL Oral QID    enoxaparin  40 mg Subcutaneous Daily    gabapentin  300 mg Oral TID    levothyroxine  137 mcg Oral Before breakfast    morphine  200 mg Oral Q8H    oseltamivir  75 mg Oral BID    pantoprazole  40 mg Oral Daily    polyethylene glycol  17 g Oral Daily    potassium chloride  40 mEq Oral Once    potassium, sodium phosphates  1 packet Oral Daily    senna-docusate 8.6-50 mg  1 tablet Oral Daily    sertraline  50 mg Oral Daily    thiamine  100 mg Oral Daily     PRN Meds:(Magic mouthwash) 1:1:1 Benadryl 12.5mg/5ml liq, aluminum & magnesium hydroxide-simehticone (Maalox), lidocaine viscous 2%, Dextrose 10% Bolus, Dextrose 10% Bolus, glucagon (human recombinant), glucose, glucose, ibuprofen, insulin aspart  U-100, morphine, ondansetron, phenyleph-min oil-petrolatum, promethazine (PHENERGAN) IVPB, sodium chloride 0.9%     Review of Systems   Constitutional: Positive for activity change, appetite change and fatigue. Negative for chills and fever.   HENT: Positive for sore throat, trouble swallowing and voice change. Negative for rhinorrhea.    Respiratory: Negative for cough and shortness of breath.    Cardiovascular: Negative for chest pain and palpitations.   Gastrointestinal: Negative for abdominal pain, constipation, diarrhea and nausea.   Genitourinary: Negative for dysuria and flank pain.   Musculoskeletal: Positive for arthralgias.   Neurological: Negative for light-headedness, numbness and headaches.     Objective:     Vital Signs (Most Recent):  Temp: 98.6 °F (37 °C) (01/31/20 0350)  Pulse: 101 (01/31/20 0350)  Resp: 18 (01/31/20 0350)  BP: (!) 115/57 (01/31/20 0350)  SpO2: 98 % (01/31/20 0350) Vital Signs (24h Range):  Temp:  [98.2 °F (36.8 °C)-98.6 °F (37 °C)] 98.6 °F (37 °C)  Pulse:  [] 101  Resp:  [15-18] 18  SpO2:  [94 %-100 %] 98 %  BP: (106-130)/(55-61) 115/57     Weight: 78.5 kg (173 lb 1 oz)  Body mass index is 29.71 kg/m².  Body surface area is 1.88 meters squared.      Intake/Output Summary (Last 24 hours) at 1/31/2020 0816  Last data filed at 1/30/2020 1830  Gross per 24 hour   Intake 600 ml   Output --   Net 600 ml       Physical Exam   Constitutional: She is oriented to person, place, and time. She appears well-developed and well-nourished. No distress.   HENT:   Head: Normocephalic and atraumatic.   Mouth/Throat: No oropharyngeal exudate.   Oral lesions   Eyes: Pupils are equal, round, and reactive to light. Conjunctivae and EOM are normal. Right eye exhibits no discharge. Left eye exhibits no discharge. No scleral icterus.   Neck: Normal range of motion. Neck supple. No JVD present. No tracheal deviation present.   Cardiovascular: Normal rate and regular rhythm.   No murmur  heard.  Pulmonary/Chest: Effort normal and breath sounds normal. She has no wheezes. She has no rales.   Abdominal: Soft. Bowel sounds are normal. She exhibits no distension. There is no tenderness.   Musculoskeletal: Normal range of motion. She exhibits tenderness. She exhibits no edema.   Right arm diffusely tender and warm. No cords felt.    Lymphadenopathy:     She has no cervical adenopathy.   Neurological: She is alert and oriented to person, place, and time. No cranial nerve deficit.   Skin: Skin is warm and dry. No rash noted. She is not diaphoretic. No erythema.       Significant Labs:   CBC:   Recent Labs   Lab 01/30/20  0416   WBC 4.80   HGB 9.2*   HCT 30.3*       and CMP:   Recent Labs   Lab 01/30/20 0416      K 3.3*   *   CO2 24   *   BUN 5*   CREATININE 0.6   CALCIUM 6.9*   PROT 4.7*   ALBUMIN 1.9*   BILITOT 0.8   ALKPHOS 263*   AST 39   ALT 29   ANIONGAP 6*   EGFRNONAA >60.0       Diagnostic Results:  I have reviewed all pertinent imaging results/findings within the past 24 hours.    Assessment/Plan:     * Neutropenic fever  Patient with history of metastatic breast cancer on chemotherapy (Halaven, last dose 01/22) presenting with malaise, fever Tmax 102.4, productive cough, noted to be positive for influenza B.   On admission, WBC 0.25, ANC count 37.5 indicating severe neutropenia.   Lactate 2.3. CXR shows focal lucency in the left mid lung, could be projectional versus possible small intracavitary focus, otherwise unremarkeable.   Received 1 dose of oseltamivir, vancomycin and zosyn in the ED.   UA unremarkable, BC- NGTD  1/30 ANC 1.8 and continuing to uptrend    Plan  -s/p 1 dose of Filgrastim. Minimal improvement in WBC. 0.25->0.55. Awaiting differential result from lab.  -Continue with oseltamivir  - dc cefepime as no longer neutropenic  -Daily CBC     Hypocalcemia  Patient iniatially with hypercalcemia, likely related to malignancy, but also noted to have  hypophosphatemia to <1. Replete phosphates, but unfortunately unable to keep up with calcium needs.    Replete prn      Oral mucositis  - Patient takes Saavedra's solution at home.   - Received 1 dose of Magic mouthwash without relief.   - Added salt/baking soda per nursing  - Duke's solution scheduled QID.    Hypophosphatemia  Phos <1.0 --> 1.4     - daily PhosNak packets x2  -restarted thiamine    Influenza B  -Confirmed Influenza B. Received 30cc/kg of IV normal saline.     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  MRI of the thoracic/lumbar spine 01/09 suggestive of Numerous osseous metastases of the thoracic and lumbar spine, with multiple compression fractures as above, and a new pathologic fracture of the right sacral ala.  -Receives chemotherapy (Halaven)     Plan  -Continue with home pain regimen - MS Contin 200mg q8hrs and morphine 60mg q4hrs  - bowel regiment : senna    Hypothyroidism due to Hashimoto's thyroiditis  -Continue with home synthroid              Wes Turner MD  Hematology/Oncology  [facility]

## 2020-02-01 NOTE — PLAN OF CARE
Patient is lying in bed, awake and alert. Pain management is effective as ordered. Pt c/o nausea earlier and was given zofran and then phenergan IV. No further c/o nausea. Ccall light within reach. Bed locked and in low position.

## 2020-02-02 VITALS
HEART RATE: 99 BPM | TEMPERATURE: 98 F | SYSTOLIC BLOOD PRESSURE: 110 MMHG | OXYGEN SATURATION: 96 % | DIASTOLIC BLOOD PRESSURE: 56 MMHG | WEIGHT: 173.06 LBS | HEIGHT: 64 IN | BODY MASS INDEX: 29.55 KG/M2 | RESPIRATION RATE: 18 BRPM

## 2020-02-02 LAB
ALBUMIN SERPL BCP-MCNC: 1.9 G/DL (ref 3.5–5.2)
ALP SERPL-CCNC: 359 U/L (ref 55–135)
ALT SERPL W/O P-5'-P-CCNC: 20 U/L (ref 10–44)
ANION GAP SERPL CALC-SCNC: 7 MMOL/L (ref 8–16)
ANISOCYTOSIS BLD QL SMEAR: SLIGHT
AST SERPL-CCNC: 36 U/L (ref 10–40)
BASO STIPL BLD QL SMEAR: ABNORMAL
BASOPHILS NFR BLD: 1 % (ref 0–1.9)
BILIRUB SERPL-MCNC: 0.7 MG/DL (ref 0.1–1)
BUN SERPL-MCNC: 2 MG/DL (ref 8–23)
CALCIUM SERPL-MCNC: 7 MG/DL (ref 8.7–10.5)
CHLORIDE SERPL-SCNC: 109 MMOL/L (ref 95–110)
CO2 SERPL-SCNC: 27 MMOL/L (ref 23–29)
CREAT SERPL-MCNC: 0.5 MG/DL (ref 0.5–1.4)
DIFFERENTIAL METHOD: ABNORMAL
DOHLE BOD BLD QL SMEAR: PRESENT
EOSINOPHIL NFR BLD: 0 % (ref 0–8)
ERYTHROCYTE [DISTWIDTH] IN BLOOD BY AUTOMATED COUNT: 18.4 % (ref 11.5–14.5)
EST. GFR  (AFRICAN AMERICAN): >60 ML/MIN/1.73 M^2
EST. GFR  (NON AFRICAN AMERICAN): >60 ML/MIN/1.73 M^2
GLUCOSE SERPL-MCNC: 84 MG/DL (ref 70–110)
HCT VFR BLD AUTO: 33.6 % (ref 37–48.5)
HGB BLD-MCNC: 9.9 G/DL (ref 12–16)
IMM GRANULOCYTES # BLD AUTO: ABNORMAL K/UL (ref 0–0.04)
IMM GRANULOCYTES NFR BLD AUTO: ABNORMAL % (ref 0–0.5)
LYMPHOCYTES NFR BLD: 12 % (ref 18–48)
MAGNESIUM SERPL-MCNC: 2.1 MG/DL (ref 1.6–2.6)
MCH RBC QN AUTO: 27.8 PG (ref 27–31)
MCHC RBC AUTO-ENTMCNC: 29.5 G/DL (ref 32–36)
MCV RBC AUTO: 94 FL (ref 82–98)
MONOCYTES NFR BLD: 16 % (ref 4–15)
NEUTROPHILS NFR BLD: 68 % (ref 38–73)
NEUTS BAND NFR BLD MANUAL: 1 %
NRBC BLD-RTO: 1 /100 WBC
OVALOCYTES BLD QL SMEAR: ABNORMAL
PHOSPHATE SERPL-MCNC: 1.3 MG/DL (ref 2.7–4.5)
PLATELET # BLD AUTO: 196 K/UL (ref 150–350)
PLATELET BLD QL SMEAR: ABNORMAL
PMV BLD AUTO: 10.1 FL (ref 9.2–12.9)
POIKILOCYTOSIS BLD QL SMEAR: SLIGHT
POLYCHROMASIA BLD QL SMEAR: ABNORMAL
POTASSIUM SERPL-SCNC: 3.9 MMOL/L (ref 3.5–5.1)
PROMYELOCYTES NFR BLD MANUAL: 2 %
PROT SERPL-MCNC: 4.7 G/DL (ref 6–8.4)
RBC # BLD AUTO: 3.56 M/UL (ref 4–5.4)
SMUDGE CELLS BLD QL SMEAR: PRESENT
SODIUM SERPL-SCNC: 143 MMOL/L (ref 136–145)
TOXIC GRANULES BLD QL SMEAR: PRESENT
WBC # BLD AUTO: 13.56 K/UL (ref 3.9–12.7)

## 2020-02-02 PROCEDURE — 85007 BL SMEAR W/DIFF WBC COUNT: CPT | Mod: HCNC

## 2020-02-02 PROCEDURE — 80053 COMPREHEN METABOLIC PANEL: CPT | Mod: HCNC

## 2020-02-02 PROCEDURE — 25000003 PHARM REV CODE 250: Mod: HCNC | Performed by: STUDENT IN AN ORGANIZED HEALTH CARE EDUCATION/TRAINING PROGRAM

## 2020-02-02 PROCEDURE — 83735 ASSAY OF MAGNESIUM: CPT | Mod: HCNC

## 2020-02-02 PROCEDURE — 99239 HOSP IP/OBS DSCHRG MGMT >30: CPT | Mod: HCNC,GC,, | Performed by: INTERNAL MEDICINE

## 2020-02-02 PROCEDURE — 63600175 PHARM REV CODE 636 W HCPCS: Mod: HCNC | Performed by: STUDENT IN AN ORGANIZED HEALTH CARE EDUCATION/TRAINING PROGRAM

## 2020-02-02 PROCEDURE — 36415 COLL VENOUS BLD VENIPUNCTURE: CPT | Mod: HCNC

## 2020-02-02 PROCEDURE — 84100 ASSAY OF PHOSPHORUS: CPT | Mod: HCNC

## 2020-02-02 PROCEDURE — 25000003 PHARM REV CODE 250: Mod: HCNC | Performed by: INTERNAL MEDICINE

## 2020-02-02 PROCEDURE — 85027 COMPLETE CBC AUTOMATED: CPT | Mod: HCNC

## 2020-02-02 PROCEDURE — 99239 PR HOSPITAL DISCHARGE DAY,>30 MIN: ICD-10-PCS | Mod: HCNC,GC,, | Performed by: INTERNAL MEDICINE

## 2020-02-02 RX ORDER — LACTULOSE 10 G/15ML
30 SOLUTION ORAL EVERY 6 HOURS PRN
Status: DISCONTINUED | OUTPATIENT
Start: 2020-02-02 | End: 2020-02-02 | Stop reason: HOSPADM

## 2020-02-02 RX ORDER — POLYETHYLENE GLYCOL 3350 17 G/17G
17 POWDER, FOR SOLUTION ORAL DAILY
Qty: 360 EACH | Refills: 0
Start: 2020-02-02 | End: 2021-02-01

## 2020-02-02 RX ORDER — OSELTAMIVIR PHOSPHATE 75 MG/1
75 CAPSULE ORAL 2 TIMES DAILY
Qty: 14 CAPSULE | Refills: 0
Start: 2020-02-02 | End: 2020-02-02 | Stop reason: HOSPADM

## 2020-02-02 RX ORDER — CALCIUM CARBONATE 500(1250)
1 TABLET ORAL DAILY
Refills: 0 | Status: ON HOLD | COMMUNITY
Start: 2020-02-02 | End: 2021-08-25 | Stop reason: HOSPADM

## 2020-02-02 RX ORDER — LACTULOSE 10 G/15ML
30 SOLUTION ORAL; RECTAL EVERY 6 HOURS PRN
Qty: 2520 ML | Refills: 1 | Status: SHIPPED | OUTPATIENT
Start: 2020-02-02 | End: 2020-02-16

## 2020-02-02 RX ORDER — OSELTAMIVIR PHOSPHATE 75 MG/1
75 CAPSULE ORAL 2 TIMES DAILY
Qty: 2 CAPSULE | Refills: 0 | Status: SHIPPED | OUTPATIENT
Start: 2020-02-02 | End: 2020-02-03

## 2020-02-02 RX ORDER — AMOXICILLIN 250 MG
1 CAPSULE ORAL DAILY
Qty: 30 TABLET | Refills: 11
Start: 2020-02-02 | End: 2021-02-01

## 2020-02-02 RX ORDER — PANTOPRAZOLE SODIUM 40 MG/1
40 TABLET, DELAYED RELEASE ORAL DAILY
Qty: 30 TABLET | Refills: 11 | Status: SHIPPED | OUTPATIENT
Start: 2020-02-02 | End: 2020-06-10 | Stop reason: SDUPTHER

## 2020-02-02 RX ORDER — SODIUM,POTASSIUM PHOSPHATES 280-250MG
2 POWDER IN PACKET (EA) ORAL DAILY
Qty: 10 PACKET | Refills: 0
Start: 2020-02-02 | End: 2020-02-07

## 2020-02-02 RX ORDER — SODIUM,POTASSIUM PHOSPHATES 280-250MG
2 POWDER IN PACKET (EA) ORAL DAILY
Qty: 60 PACKET | Refills: 0
Start: 2020-02-02 | End: 2020-02-02

## 2020-02-02 RX ADMIN — OSELTAMIVIR PHOSPHATE 75 MG: 75 CAPSULE ORAL at 10:02

## 2020-02-02 RX ADMIN — GABAPENTIN 300 MG: 300 CAPSULE ORAL at 10:02

## 2020-02-02 RX ADMIN — SERTRALINE 50 MG: 25 TABLET, FILM COATED ORAL at 10:02

## 2020-02-02 RX ADMIN — CALCIUM 1000 MG: 500 TABLET ORAL at 10:02

## 2020-02-02 RX ADMIN — IBUPROFEN 400 MG: 200 TABLET, FILM COATED ORAL at 07:02

## 2020-02-02 RX ADMIN — MORPHINE SULFATE 200 MG: 100 TABLET, FILM COATED, EXTENDED RELEASE ORAL at 01:02

## 2020-02-02 RX ADMIN — PANTOPRAZOLE SODIUM 40 MG: 40 TABLET, DELAYED RELEASE ORAL at 10:02

## 2020-02-02 RX ADMIN — MORPHINE SULFATE 200 MG: 100 TABLET, FILM COATED, EXTENDED RELEASE ORAL at 05:02

## 2020-02-02 RX ADMIN — LACTULOSE 30 G: 20 SOLUTION ORAL at 05:02

## 2020-02-02 RX ADMIN — ONDANSETRON 8 MG: 8 TABLET, ORALLY DISINTEGRATING ORAL at 07:02

## 2020-02-02 RX ADMIN — LEVOTHYROXINE SODIUM 137 MCG: 25 TABLET ORAL at 05:02

## 2020-02-02 RX ADMIN — GABAPENTIN 300 MG: 300 CAPSULE ORAL at 02:02

## 2020-02-02 RX ADMIN — MORPHINE SULFATE 60 MG: 15 TABLET ORAL at 10:02

## 2020-02-02 RX ADMIN — POTASSIUM & SODIUM PHOSPHATES POWDER PACK 280-160-250 MG 2 PACKET: 280-160-250 PACK at 10:02

## 2020-02-02 RX ADMIN — Medication 10 ML: at 10:02

## 2020-02-02 RX ADMIN — ASPIRIN 81 MG: 81 TABLET, COATED ORAL at 10:02

## 2020-02-02 RX ADMIN — PROMETHAZINE HYDROCHLORIDE 6.25 MG: 25 INJECTION INTRAMUSCULAR; INTRAVENOUS at 10:02

## 2020-02-02 NOTE — PLAN OF CARE
Problem: Adult Inpatient Plan of Care  Goal: Plan of Care Review  Outcome: Met  Goal: Patient-Specific Goal (Individualization)  Outcome: Met  Goal: Absence of Hospital-Acquired Illness or Injury  Outcome: Met   Pt discharged to home with home health via WC to personal car with her brother.  Discharge instructions given and patient and brother give verbal understanding.

## 2020-02-02 NOTE — ASSESSMENT & PLAN NOTE
-Confirmed Influenza B. Received 30cc/kg of IV normal saline.     -Continue with supportive therapy  -Continue with oseltamivir (received a total of 8 days)

## 2020-02-02 NOTE — ASSESSMENT & PLAN NOTE
Breast history: In 2013 she was diagnosed with stage IIB carcinoma of the right breast, ER positive with a low Oncotype score.  MRI of the thoracic/lumbar spine 01/09 suggestive of Numerous osseous metastases of the thoracic and lumbar spine, with multiple compression fractures as above, and a new pathologic fracture of the right sacral ala.  -Receives chemotherapy (Halaven)     Plan  -Continue with home pain regimen - MS Contin 200mg q8hrs and morphine 60mg q4hrs  - bowel regiment : senna and miralax

## 2020-02-02 NOTE — PROGRESS NOTES
On call referral for home health received from the Oncology team: Spoke to the patient about her preference for a home health agency - she did not have a preference. Referral made to Edie (on call for [facility]) and documentation sent to her via [facility]. Notified her that the patient informed that she was going home to her sister's address at [address], [place]. Discussed arrangements with the patient's nurse, Lynn.

## 2020-02-02 NOTE — ASSESSMENT & PLAN NOTE
Patient iniatially with hypercalcemia, likely related to malignancy, but also noted to have hypophosphatemia to <1. Replete phosphates, but unfortunately unable to keep up with calcium needs.    Replete prn PO

## 2020-02-02 NOTE — DISCHARGE SUMMARY
[facility]  Hematology/Oncology  Discharge Summary      Patient Name: Rebecca Crain  MRN: [id number]  Admission Date: 1/25/2020  Hospital Length of Stay: 8 days  Discharge Date and Time:  02/02/2020 8:57 AM  Attending Physician: Enedina Vargas MD   Discharging Provider: Wes Turner MD  Primary Care Provider: Colleen Valero MD    HPI: Rebecca Crain is a 64 year old F with metastatic right breast cancer s/p mastectomy, on (chemotherapy- Halaven, last dose 01/22), hypothyroidism presenting for generalized weakness, fever and productive cough. Patient stated she was recently admitted to [facility] for intractable pain (01/06-01/12), a day before discharge she developed productive cough which advanced to green-blood tinged sputum upon getting home. She received a Z-pack from Dr. Schroeder and her symptoms only temporarily improved. For the past few days, she has been experiencing generalized weakness, malaise, and a fever of 102 yesterday. Patient stated her daughter had recently contracted the flu. She denies headaches, nausea, vomiting, SOB, urinary changes, but endorses pain in her anterior chest wall, right lower quadrant pain, mouth sores and pain with swallowing.    In the ED, she was noted to have a T of 101.4, HR of 125. She received 30cc/kg of IV fluids. Influenza B was positive. WBC noted to be 0.25, so 1 dose of vancomycin, zosyn were administered.     Oncology history  Breast history: In 2013 she was diagnosed with stage IIB carcinoma of the right breast, ER positive with a low Oncotype score.  She was enrolled on protocol  and randomized to endocrine therapy alone.  She was tried on all 3 aromatase inhibitors and had itching and rash to all of them.  In August 2013 she was started on tamoxifen.   She was found to have  bone metastasis in May 2015, 2015.  A subsequent bone biopsy was performed on a lesion at the T8 vertebra.   The pathology from that was consistent with metastatic breast cancer,  ER +80%, UT +80%, and HER-2 negative.   She received letrozole plus palbociclib from July 2015 until May 2016.  She also received bone protective therapy with Xgeva. Faslodex was started in June 2016.      Exemestane and Afinitor started in August 2017.  That was discontinued in February 2018 due to progression in the liver and bone.     Navelbine was started February 16, 2018.  She had 8 cycles for that.  Scans in November 2018 showed progression as follows     Capecitabine was started in December 2018.  She received 5 cycles of that therapy.     Follow-up scans in April 2019 showed progression with a new hepatic lesion and worsening bone disease.     She began clinical trial therapy with erdafitinib on May 8, 2019.  That was discontinued in September 2019 due to progression in the bone.     She then received a course of radiation therapy to her pelvis.  She began weekly Taxol in October 2019.That was discontinued in December 2019 due to progression.    * No surgery found *     Hospital Course: 64 year old woman with breast cancer found to have the flu and thus a neutropenic fever. Sx consistent with flu. Tamiflu started and giving IVF. Also with distending abdomen: KUB suggestive of fecal compaction, treating. Patient otherwise improving clinically; night of 1/31 patient with facial swelling, suspected cellulitis - placed on doxycycline; on doxycycline for two days - patient without any more cellulitis. Aim to discharge home today with home health PT/OT and on Tamiflu    Consults:   Consults (From admission, onward)        Status Ordering Provider     Inpatient consult to Interventional Radiology  Once     Provider:  (Not yet assigned)    Completed RHEA OLIVER consult to case management  Once     Provider:  (Not yet assigned)    Completed SRIDEVI AKINS          Significant Diagnostic Studies: Labs:   CMP   Recent Labs   Lab 02/01/20  0537 02/02/20  0419    143   K 4.0 3.9    109   CO2 25 27    GLU 96 84   BUN 2* 2*   CREATININE 0.6 0.5   CALCIUM 6.7* 7.0*   PROT 4.7* 4.7*   ALBUMIN 1.9* 1.9*   BILITOT 0.7 0.7   ALKPHOS 345* 359*   AST 37 36   ALT 23 20   ANIONGAP 7* 7*   ESTGFRAFRICA >60.0 >60.0   EGFRNONAA >60.0 >60.0    and CBC   Recent Labs   Lab 02/01/20  0537 02/02/20  0419   WBC 12.33 13.56*   HGB 10.0* 9.9*   HCT 32.2* 33.6*    196       Pending Diagnostic Studies:     Procedure Component Value Units Date/Time    IR US Abdomen Limited [[id number]] Resulted:  01/28/20 1536    Order Status:  Sent Lab Status:  In process Updated:  01/28/20 1536        Final Active Diagnoses:    Diagnosis Date Noted POA    PRINCIPAL PROBLEM:  Neutropenic fever [D70.9, R50.81] 01/25/2020 Yes    Hypocalcemia [E83.51] 02/01/2020 Unknown    Hypophosphatemia [E83.39] 01/26/2020 Unknown    Oral mucositis [K12.30] 01/26/2020 Yes    Influenza B [J10.1] 01/25/2020 Yes    Metastatic cancer to spine [C79.51] 05/26/2016 Yes    Hypothyroidism due to Hashimoto's thyroiditis [E03.8, E06.3] 03/16/2016 Yes      Problems Resolved During this Admission:    Diagnosis Date Noted Date Resolved POA    Constipation [K59.00] 01/28/2020 01/30/2020 Unknown      Discharged Condition: stable    Disposition: Home-Health Care [facility]    Follow Up:  Follow-up Information     Colleen Valero MD In 1 week.    Specialty:  Internal Medicine  Why:  electrolytes  Contact information:  [address]  [place] [address] [phone]                 Patient Instructions:      COMPREHENSIVE METABOLIC PANEL   Standing Status: Future Standing Exp. Date: 04/02/21     CBC W/ AUTO DIFFERENTIAL   Standing Status: Future Standing Exp. Date: 04/02/21     Medications:  Reconciled Home Medications:      Medication List      START taking these medications    (MAGIC MOUTHWASH) 1:1:1 BENADRYL 12.5MG/5ML LIQ, ALUMINUM & MAGNESIUM  Swish and spit 10 mLs every 4 (four) hours as needed (mouth ulcers). for mouth sores     calcium carbonate 500 mg calcium  (1,250 mg) tablet  Commonly known as:  OS-UNA  Take 1 tablet (500 mg total) by mouth once daily. for 5 days     lactulose 20 gram/30 mL Soln  Commonly known as:  CHRONULAC  Take 45 mLs (30 g total) by mouth every 6 (six) hours as needed.     oseltamivir 75 MG capsule  Commonly known as:  TAMIFLU  Take 1 capsule (75 mg total) by mouth 2 (two) times daily. for 1 day     phenyleph-min oil-petrolatum 0.25-14-74.9 % Oint  Place 1 applicator rectally 4 (four) times daily as needed.     polyethylene glycol 17 gram Pwpk  Commonly known as:  GLYCOLAX  Take 17 g by mouth once daily.     potassium, sodium phosphates 280-160-250 mg Pwpk  Commonly known as:  PHOS-NAK  Take 2 packets by mouth once daily. for 5 days     senna-docusate 8.6-50 mg 8.6-50 mg per tablet  Commonly known as:  PERICOLACE  Take 1 tablet by mouth once daily.        CHANGE how you take these medications    DUKE'S SOLUTION (BENADRYL 30 ML, MYLANTA 30 ML, LIDOCAINE 30 ML, NYSTATIN 30 ML)  Take 10 mLs by mouth 4 (four) times daily.  What changed:  Another medication with the same name was removed. Continue taking this medication, and follow the directions you see here.        CONTINUE taking these medications    Afluria Qd 2019-20(3yr up)(PF) 60 mcg (15 mcg x 4)/0.5 mL Syrg  Generic drug:  flu vac [id number] 36mos up(PF)  TO BE ADMINISTERED BY PHARMACIST FOR IMMUNIZATION     ascorbic acid (vitamin C) 1000 MG tablet  Commonly known as:  VITAMIN C  Take 1,000 mg by mouth once daily.     aspirin 81 MG EC tablet  Commonly known as:  ECOTRIN  Take 81 mg by mouth once daily.     biotin 1 mg tablet  Take 1,000 mcg by mouth once daily.     cyclobenzaprine 5 MG tablet  Commonly known as:  FLEXERIL  Take 5 mg by mouth.     diphenoxylate-atropine 2.5-0.025 mg 2.5-0.025 mg per tablet  Commonly known as:  LOMOTIL  Take 1 tablet by mouth 4 (four) times daily as needed for Diarrhea.     gabapentin 300 MG capsule  Commonly known as:  NEURONTIN  Take 1 capsule (300 mg total) by  mouth 3 (three) times daily.     lidocaine-prilocaine cream  Commonly known as:  EMLA  APPLY TO AFFECTED AREA EVERY DAY AS NEEDED     loperamide 2 mg Tab  Commonly known as:  IMODIUM A-D  Take 2 mg by mouth 3 (three) times daily as needed.     * morphine 30 MG tablet  Commonly known as:  MSIR  Take 2 tablets (60 mg total) by mouth every 4 (four) hours as needed for Pain.     * morphine 200 MG 12 hr tablet  Commonly known as:  MS CONTIN  Take 1 tablet (200 mg total) by mouth every 8 (eight) hours.     OMEGA 3-6-9 COMPLEX ORAL  Take 1 capsule by mouth once daily.     ondansetron 4 MG tablet  Commonly known as:  ZOFRAN  Take 1 tablet (4 mg total) by mouth every 8 (eight) hours as needed for Nausea.     pantoprazole 40 MG tablet  Commonly known as:  Protonix  Take 1 tablet (40 mg total) by mouth once daily.     PreviDent 5000 Dry Mouth 1.1 % Gel  Generic drug:  fluoride (sodium)  BRUSH AS DIRECTED     sertraline 50 MG tablet  Commonly known as:  ZOLOFT  Take 1 tablet (50 mg total) by mouth once daily.     Synthroid 137 MCG Tab tablet  Generic drug:  levothyroxine  Take 1 tablet (137 mcg total) by mouth before breakfast. Patient to be seen before anymore refills.         * This list has 2 medication(s) that are the same as other medications prescribed for you. Read the directions carefully, and ask your doctor or other care provider to review them with you.            STOP taking these medications    cloNIDine 0.1 MG tablet  Commonly known as:  CATAPRES     ketorolac 10 mg tablet  Commonly known as:  TORADOL            Wes Turner MD  Hematology/Oncology  [facility]

## 2020-02-02 NOTE — PROGRESS NOTES
[facility]  Hematology/Oncology  Progress Note    Patient Name: Rebecca Crain  Admission Date: 1/25/2020  Hospital Length of Stay: 8 days  Code Status: Full Code     Subjective:     HPI:  Rebecca Crain is a 64 year old F with metastatic right breast cancer s/p mastectomy, on (chemotherapy- Halaven, last dose 01/22), hypothyroidism presenting for generalized weakness, fever and productive cough. Patient stated she was recently admitted to [facility] for intractable pain (01/06-01/12), a day before discharge she developed productive cough which advanced to green-blood tinged sputum upon getting home. She received a Z-pack from Dr. Schroeder and her symptoms only temporarily improved. For the past few days, she has been experiencing generalized weakness, malaise, and a fever of 102 yesterday. Patient stated her daughter had recently contracted the flu. She denies headaches, nausea, vomiting, SOB, urinary changes, but endorses pain in her anterior chest wall, right lower quadrant pain, mouth sores and pain with swallowing.    In the ED, she was noted to have a T of 101.4, HR of 125. She received 30cc/kg of IV fluids. Influenza B was positive. WBC noted to be 0.25, so 1 dose of vancomycin, zosyn were administered.     Oncology history  Breast history: In 2013 she was diagnosed with stage IIB carcinoma of the right breast, ER positive with a low Oncotype score.  She was enrolled on protocol  and randomized to endocrine therapy alone.  She was tried on all 3 aromatase inhibitors and had itching and rash to all of them.  In August 2013 she was started on tamoxifen.   She was found to have  bone metastasis in May 2015, 2015.  A subsequent bone biopsy was performed on a lesion at the T8 vertebra.   The pathology from that was consistent with metastatic breast cancer, ER +80%, OK +80%, and HER-2 negative.   She received letrozole plus palbociclib from July 2015 until May 2016.  She also received bone  protective therapy with Xgeva. Faslodex was started in June 2016.      Exemestane and Afinitor started in August 2017.  That was discontinued in February 2018 due to progression in the liver and bone.     Navelbine was started February 16, 2018.  She had 8 cycles for that.  Scans in November 2018 showed progression as follows     Capecitabine was started in December 2018.  She received 5 cycles of that therapy.     Follow-up scans in April 2019 showed progression with a new hepatic lesion and worsening bone disease.     She began clinical trial therapy with erdafitinib on May 8, 2019.  That was discontinued in September 2019 due to progression in the bone.     She then received a course of radiation therapy to her pelvis.  She began weekly Taxol in October 2019.That was discontinued in December 2019 due to progression.    Interval History: No acute events overnight. Plan to discharge patient today    Oncology Treatment Plan:   OP BREAST ERIBULIN Q3W    Medications:  Continuous Infusions:  Scheduled Meds:   aspirin  81 mg Oral Daily    calcium carbonate  1,000 mg Oral Daily    duke's soln (benadryl 30 mL, mylanta 30 mL, lidocaine 30 mL, nystatin 30 mL) 120 mL  10 mL Oral QID    enoxaparin  40 mg Subcutaneous Daily    gabapentin  300 mg Oral TID    levothyroxine  137 mcg Oral Before breakfast    morphine  200 mg Oral Q8H    oseltamivir  75 mg Oral BID    pantoprazole  40 mg Oral Daily    polyethylene glycol  17 g Oral Daily    potassium, sodium phosphates  2 packet Oral Daily    senna-docusate 8.6-50 mg  1 tablet Oral Daily    sertraline  50 mg Oral Daily     PRN Meds:(Magic mouthwash) 1:1:1 Benadryl 12.5mg/5ml liq, aluminum & magnesium hydroxide-simehticone (Maalox), lidocaine viscous 2%, Dextrose 10% Bolus, Dextrose 10% Bolus, glucagon (human recombinant), glucose, glucose, ibuprofen, insulin aspart U-100, morphine, ondansetron, phenyleph-min oil-petrolatum, promethazine (PHENERGAN) IVPB, sodium  chloride 0.9%     Review of Systems   Constitutional: Negative for chills and fever.   HENT: Negative for rhinorrhea and sore throat.    Respiratory: Negative for cough and shortness of breath.    Cardiovascular: Negative for chest pain and palpitations.   Gastrointestinal: Negative for abdominal pain, constipation, diarrhea and nausea.   Genitourinary: Negative for dysuria and flank pain.   Neurological: Negative for light-headedness, numbness and headaches.   Psychiatric/Behavioral:        Anxious about going home     Objective:     Vital Signs (Most Recent):  Temp: 98.7 °F (37.1 °C) (02/02/20 0743)  Pulse: 104 (02/02/20 0743)  Resp: 20 (02/02/20 0743)  BP: (!) 121/57 (02/02/20 0743)  SpO2: 98 % (02/02/20 0743) Vital Signs (24h Range):  Temp:  [98.3 °F (36.8 °C)-99 °F (37.2 °C)] 98.7 °F (37.1 °C)  Pulse:  [] 104  Resp:  [17-20] 20  SpO2:  [93 %-100 %] 98 %  BP: (100-141)/(57-73) 121/57     Weight: 78.5 kg (173 lb 1 oz)  Body mass index is 29.71 kg/m².  Body surface area is 1.88 meters squared.    No intake or output data in the 24 hours ending 02/02/20 0808    Physical Exam   Constitutional: She is oriented to person, place, and time. She appears well-developed and well-nourished. No distress.   HENT:   Head: Normocephalic and atraumatic.   Mouth/Throat: No oropharyngeal exudate.   Eyes: Pupils are equal, round, and reactive to light. EOM are normal. Right eye exhibits no discharge. Left eye exhibits no discharge. No scleral icterus.   Neck: Normal range of motion. Neck supple. No JVD present. No tracheal deviation present.   Cardiovascular: Regular rhythm.   No murmur heard.  Pulmonary/Chest: Effort normal and breath sounds normal. She has no wheezes. She has no rales.   Abdominal: Soft. Bowel sounds are normal. She exhibits no distension. There is tenderness.   Musculoskeletal: Normal range of motion. She exhibits edema and tenderness.   Neurological: She is alert and oriented to person, place, and time.  No cranial nerve deficit. Coordination normal.   Skin: Skin is warm and dry. She is not diaphoretic. No erythema. No pallor.       Significant Labs:   CBC:   Recent Labs   Lab 02/01/20  0537 02/02/20 0419   WBC 12.33 13.56*   HGB 10.0* 9.9*   HCT 32.2* 33.6*    196    and CMP:   Recent Labs   Lab 02/01/20  0537 02/02/20 0419    143   K 4.0 3.9    109   CO2 25 27   GLU 96 84   BUN 2* 2*   CREATININE 0.6 0.5   CALCIUM 6.7* 7.0*   PROT 4.7* 4.7*   ALBUMIN 1.9* 1.9*   BILITOT 0.7 0.7   ALKPHOS 345* 359*   AST 37 36   ALT 23 20   ANIONGAP 7* 7*   EGFRNONAA >60.0 >60.0       Diagnostic Results:  I have reviewed all pertinent imaging results/findings within the past 24 hours.    Assessment/Plan:     * Neutropenic fever  Patient with history of metastatic breast cancer on chemotherapy (Halaven, last dose 01/22) presenting with malaise, fever Tmax 102.4, productive cough, noted to be positive for influenza B.   On admission, WBC 0.25, ANC count 37.5 indicating severe neutropenia.   Lactate 2.3. CXR shows focal lucency in the left mid lung, could be projectional versus possible small intracavitary focus, otherwise unremarkeable.   Received 1 dose of oseltamivir, vancomycin and zosyn in the ED.   UA unremarkable, BC- NGTD  1/30 ANC 1.8 and continuing to uptrend    Plan  -s/p 1 dose of Filgrastim. Minimal improvement in WBC. 0.25->0.55. Awaiting differential result from lab.  -Continue with oseltamivir  - dc cefepime as no longer neutropenic  -Daily CBC   -Resolved    Hypocalcemia  Patient iniatially with hypercalcemia, likely related to malignancy, but also noted to have hypophosphatemia to <1. Replete phosphates, but unfortunately unable to keep up with calcium needs.    Replete prn PO      Oral mucositis  - Patient takes Saavedra's solution at home.   - Received 1 dose of Magic mouthwash without relief.   - Added salt/baking soda per nursing  - Duke's solution scheduled QID.    Hypophosphatemia  Phos <1.0 on  admission    - daily PhosNak packets x2      Influenza B  -Confirmed Influenza B. Received 30cc/kg of IV normal saline.     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  MRI of the thoracic/lumbar spine 01/09 suggestive of Numerous osseous metastases of the thoracic and lumbar spine, with multiple compression fractures as above, and a new pathologic fracture of the right sacral ala.  -Receives chemotherapy (Halaven)     Plan  -Continue with home pain regimen - MS Contin 200mg q8hrs and morphine 60mg q4hrs  - bowel regiment : senna and miralax    Hypothyroidism due to Hashimoto's thyroiditis  -Continue with home synthroid              Wes Turner MD  Hematology/Oncology  [facility]

## 2020-02-02 NOTE — PLAN OF CARE
POC reviewed with pt. VSS. A&Ox4. +2 edema in lower extremities. No acute complications during shift. Will continue to monitor.

## 2020-02-02 NOTE — PLAN OF CARE
[facility]    HOME HEALTH ORDERS  FACE TO FACE ENCOUNTER    Patient Name: Rebecca Crani  YOB: 1955    PCP: Colleen Valero MD   PCP Address: [address] / [place] [address]  PCP Phone Number: [phone]  PCP Fax: [phone]    Encounter Date: 02/02/2020    Admit to Home Health    Diagnoses:  Active Hospital Problems    Diagnosis  POA    *Neutropenic fever [D70.9, R50.81]  Yes    Hypocalcemia [E83.51]  Unknown    Hypophosphatemia [E83.39]  Unknown    Oral mucositis [K12.30]  Yes    Influenza B [J10.1]  Yes    Metastatic cancer to spine [C79.51]  Yes    Hypothyroidism due to Hashimoto's thyroiditis [E03.8, E06.3]  Yes      Resolved Hospital Problems    Diagnosis Date Resolved POA    Constipation [K59.00] 01/30/2020 Unknown       Future Appointments   Date Time Provider Department Center   2/5/2020  7:30 AM LAB, [facility] CANCER DG [facility]   2/5/2020  8:30 AM Rodrigo Schroeder MD [facility] HEM ONC [facility]   2/5/2020  9:00 AM CHEMO 1 [facility] CHEMO [facility]   2/12/2020  7:10 AM LAB, [facility] CANCER DG [facility]   2/12/2020  8:00 AM NURSE 5, [facility]     Follow-up Information     Colleen Valero MD In 1 week.    Specialty:  Internal Medicine  Why:  electrolytes  Contact information:  [address]  [facility] [id number] [phone]                     I have seen and examined this patient face to face today. My clinical findings that support the need for the home health skilled services and home bound status are the following:  Weakness/numbness causing balance and gait disturbance due to Weakness/Debility and Malignancy/Cancer making it taxing to leave home.  Patient with medication mismanagement issues requiring home bound status as evidenced by  need for daily help.    Allergies:  Review of patient's allergies indicates:   Allergen Reactions    Adhesive Rash     Tape, Paper tape is OK.     Codeine Itching     Itching very bad to upper body only.    Demerol [meperidine] Itching     To upper body only    Iodine and iodide containing products Anaphylaxis    Penicillins      Ulcers in mouth.    Arimidex [anastrozole] Hives    Aromasin [exemestane]     Clindamycin Itching and Rash       Diet: pureed diet dysphagia. Advance as tolerated    Activities: activity as tolerated    Nursing:   SN to complete comprehensive assessment including routine vital signs. Instruct on disease process and s/s of complications to report to MD. Review/verify medication list sent home with the patient at time of discharge  and instruct patient/caregiver as needed. Frequency may be adjusted depending on start of care date.    Notify MD if SBP > 160 or < 90; DBP > 90 or < 50; HR > 120 or < 50; Temp > 101; Other:         CONSULTS:    Physical Therapy to evaluate and treat. Evaluate for home safety and equipment needs; Establish/upgrade home exercise program. Perform / instruct on therapeutic exercises, gait training, transfer training, and Range of Motion.  Occupational Therapy to evaluate and treat. Evaluate home environment for safety and equipment needs. Perform/Instruct on transfers, ADL training, ROM, and therapeutic exercises.  Aide to provide assistance with personal care, ADLs, and vital signs.    MISCELLANEOUS CARE:  Wound Care Orders:  yes:  Pressure Ulcer(s) Stage I :   Location: neck  Apply Miconzazole:  2% cream                       Frequency:  Daily                             If incontinent of stool or urine, apply thin layer Barrier cream                   twice daily and PRN to wound         Pressure relief measure: bandage             WOUND CARE ORDERS  n/a      Medications: Review discharge medications with patient and family and provide education.      Current Discharge Medication List      START taking these medications    Details   (Magic mouthwash) 1:1:1 Benadryl 12.5mg/5ml liq, aluminum & magnesium  hydroxide-simehticone (Maalox), lidocaine viscous 2% Swish and spit 10 mLs every 4 (four) hours as needed (mouth ulcers). for mouth sores  Qty: 1 Bottle, Refills: 11      calcium carbonate (OS-UNA) 500 mg calcium (1,250 mg) tablet Take 1 tablet (500 mg total) by mouth once daily. for 5 days  Refills: 0      lactulose (CHRONULAC) 20 gram/30 mL Soln Take 45 mLs (30 g total) by mouth every 6 (six) hours as needed.  Qty: 30 mL, Refills: 1      oseltamivir (TAMIFLU) 75 MG capsule Take 1 capsule (75 mg total) by mouth 2 (two) times daily. for 1 day  Qty: 2 capsule, Refills: 0      phenyleph-min oil-petrolatum 0.25-14-74.9 % Oint Place 1 applicator rectally 4 (four) times daily as needed.      polyethylene glycol (GLYCOLAX) 17 gram PwPk Take 17 g by mouth once daily.  Qty: 360 each, Refills: 0      potassium, sodium phosphates (PHOS-NAK) 280-160-250 mg PwPk Take 2 packets by mouth once daily. for 5 days  Qty: 10 packet, Refills: 0      senna-docusate 8.6-50 mg (PERICOLACE) 8.6-50 mg per tablet Take 1 tablet by mouth once daily.  Qty: 30 tablet, Refills: 11         CONTINUE these medications which have CHANGED    Details   pantoprazole (PROTONIX) 40 MG tablet Take 1 tablet (40 mg total) by mouth once daily.  Qty: 30 tablet, Refills: 11         CONTINUE these medications which have NOT CHANGED    Details   gabapentin (NEURONTIN) 300 MG capsule Take 1 capsule (300 mg total) by mouth 3 (three) times daily.  Qty: 270 capsule, Refills: 6    Associated Diagnoses: Neuropathy      morphine (MS CONTIN) 200 MG 12 hr tablet Take 1 tablet (200 mg total) by mouth every 8 (eight) hours.  Qty: 90 tablet, Refills: 0    Comments: Quantity prescribed more than 7 day supply? Yes. Full month supply of medication for metastatic cancer related pain.      morphine (MSIR) 30 MG tablet Take 2 tablets (60 mg total) by mouth every 4 (four) hours as needed for Pain.  Qty: 120 tablet, Refills: 0    Comments: Quantity prescribed more than 7 day  supply?Yes. Full month supply of medication for metastatic cancer related pain.      ondansetron (ZOFRAN) 4 MG tablet Take 1 tablet (4 mg total) by mouth every 8 (eight) hours as needed for Nausea.  Qty: 30 tablet, Refills: 11    Associated Diagnoses: Cancer of central portion of right female breast      AFLURIA QD 2019-20,3YR UP,,PF, 60 mcg (15 mcg x 4)/0.5 mL Syrg TO BE ADMINISTERED BY PHARMACIST FOR IMMUNIZATION  Refills: 0      ascorbic acid, vitamin C, (VITAMIN C) 1000 MG tablet Take 1,000 mg by mouth once daily.      aspirin (ECOTRIN) 81 MG EC tablet Take 81 mg by mouth once daily.      biotin 1 mg tablet Take 1,000 mcg by mouth once daily.      cyclobenzaprine (FLEXERIL) 5 MG tablet Take 5 mg by mouth.  Refills: 11      diphenoxylate-atropine 2.5-0.025 mg (LOMOTIL) 2.5-0.025 mg per tablet Take 1 tablet by mouth 4 (four) times daily as needed for Diarrhea.  Qty: 120 tablet, Refills: 3    Associated Diagnoses: Encounter for antineoplastic chemotherapy      duke's soln (benadryl 30 mL, mylanta 30 mL, lidocaine 30 mL, nystatin 30 mL) 120mL Take 10 mLs by mouth 4 (four) times daily.  Qty: 480 mL, Refills: 2    Associated Diagnoses: Mucositis      fish oil/borage/flax/om3,6,9 1 (OMEGA 3-6-9 COMPLEX ORAL) Take 1 capsule by mouth once daily.      lidocaine-prilocaine (EMLA) cream APPLY TO AFFECTED AREA EVERY DAY AS NEEDED  Qty: 30 g, Refills: 3    Associated Diagnoses: Encounter for antineoplastic chemotherapy      loperamide (IMODIUM A-D) 2 mg Tab Take 2 mg by mouth 3 (three) times daily as needed.      PREVIDENT 5000 DRY MOUTH 1.1 % Gel BRUSH AS DIRECTED  Refills: 3      sertraline (ZOLOFT) 50 MG tablet Take 1 tablet (50 mg total) by mouth once daily.  Qty: 30 tablet, Refills: 11    Associated Diagnoses: Adjustment disorder with depressed mood      SYNTHROID 137 mcg Tab tablet Take 1 tablet (137 mcg total) by mouth before breakfast. Patient to be seen before anymore refills.  Qty: 90 tablet, Refills: 0     Associated Diagnoses: Hypothyroidism due to Hashimoto's thyroiditis         STOP taking these medications       cloNIDine (CATAPRES) 0.1 MG tablet Comments:   Reason for Stopping:         ketorolac (TORADOL) 10 mg tablet Comments:   Reason for Stopping:               I certify that this patient is confined to her home and needs intermittent skilled nursing care, physical therapy and occupational therapy.

## 2020-02-02 NOTE — SUBJECTIVE & OBJECTIVE
Interval History: No acute events overnight. Plan to discharge patient today    Oncology Treatment Plan:   OP BREAST ERIBULIN Q3W    Medications:  Continuous Infusions:  Scheduled Meds:   aspirin  81 mg Oral Daily    calcium carbonate  1,000 mg Oral Daily    duke's soln (benadryl 30 mL, mylanta 30 mL, lidocaine 30 mL, nystatin 30 mL) 120 mL  10 mL Oral QID    enoxaparin  40 mg Subcutaneous Daily    gabapentin  300 mg Oral TID    levothyroxine  137 mcg Oral Before breakfast    morphine  200 mg Oral Q8H    oseltamivir  75 mg Oral BID    pantoprazole  40 mg Oral Daily    polyethylene glycol  17 g Oral Daily    potassium, sodium phosphates  2 packet Oral Daily    senna-docusate 8.6-50 mg  1 tablet Oral Daily    sertraline  50 mg Oral Daily     PRN Meds:(Magic mouthwash) 1:1:1 Benadryl 12.5mg/5ml liq, aluminum & magnesium hydroxide-simehticone (Maalox), lidocaine viscous 2%, Dextrose 10% Bolus, Dextrose 10% Bolus, glucagon (human recombinant), glucose, glucose, ibuprofen, insulin aspart U-100, morphine, ondansetron, phenyleph-min oil-petrolatum, promethazine (PHENERGAN) IVPB, sodium chloride 0.9%     Review of Systems   Constitutional: Negative for chills and fever.   HENT: Negative for rhinorrhea and sore throat.    Respiratory: Negative for cough and shortness of breath.    Cardiovascular: Negative for chest pain and palpitations.   Gastrointestinal: Negative for abdominal pain, constipation, diarrhea and nausea.   Genitourinary: Negative for dysuria and flank pain.   Neurological: Negative for light-headedness, numbness and headaches.   Psychiatric/Behavioral:        Anxious about going home     Objective:     Vital Signs (Most Recent):  Temp: 98.7 °F (37.1 °C) (02/02/20 0743)  Pulse: 104 (02/02/20 0743)  Resp: 20 (02/02/20 0743)  BP: (!) 121/57 (02/02/20 0743)  SpO2: 98 % (02/02/20 0743) Vital Signs (24h Range):  Temp:  [98.3 °F (36.8 °C)-99 °F (37.2 °C)] 98.7 °F (37.1 °C)  Pulse:  [] 104  Resp:   [17-20] 20  SpO2:  [93 %-100 %] 98 %  BP: (100-141)/(57-73) 121/57     Weight: 78.5 kg (173 lb 1 oz)  Body mass index is 29.71 kg/m².  Body surface area is 1.88 meters squared.    No intake or output data in the 24 hours ending 02/02/20 0808    Physical Exam   Constitutional: She is oriented to person, place, and time. She appears well-developed and well-nourished. No distress.   HENT:   Head: Normocephalic and atraumatic.   Mouth/Throat: No oropharyngeal exudate.   Eyes: Pupils are equal, round, and reactive to light. EOM are normal. Right eye exhibits no discharge. Left eye exhibits no discharge. No scleral icterus.   Neck: Normal range of motion. Neck supple. No JVD present. No tracheal deviation present.   Cardiovascular: Regular rhythm.   No murmur heard.  Pulmonary/Chest: Effort normal and breath sounds normal. She has no wheezes. She has no rales.   Abdominal: Soft. Bowel sounds are normal. She exhibits no distension. There is tenderness.   Musculoskeletal: Normal range of motion. She exhibits edema and tenderness.   Neurological: She is alert and oriented to person, place, and time. No cranial nerve deficit. Coordination normal.   Skin: Skin is warm and dry. She is not diaphoretic. No erythema. No pallor.       Significant Labs:   CBC:   Recent Labs   Lab 02/01/20  0537 02/02/20  0419   WBC 12.33 13.56*   HGB 10.0* 9.9*   HCT 32.2* 33.6*    196    and CMP:   Recent Labs   Lab 02/01/20  0537 02/02/20  0419    143   K 4.0 3.9    109   CO2 25 27   GLU 96 84   BUN 2* 2*   CREATININE 0.6 0.5   CALCIUM 6.7* 7.0*   PROT 4.7* 4.7*   ALBUMIN 1.9* 1.9*   BILITOT 0.7 0.7   ALKPHOS 345* 359*   AST 37 36   ALT 23 20   ANIONGAP 7* 7*   EGFRNONAA >60.0 >60.0       Diagnostic Results:  I have reviewed all pertinent imaging results/findings within the past 24 hours.

## 2020-02-02 NOTE — ASSESSMENT & PLAN NOTE
Patient with history of metastatic breast cancer on chemotherapy (Halaven, last dose 01/22) presenting with malaise, fever Tmax 102.4, productive cough, noted to be positive for influenza B.   On admission, WBC 0.25, ANC count 37.5 indicating severe neutropenia.   Lactate 2.3. CXR shows focal lucency in the left mid lung, could be projectional versus possible small intracavitary focus, otherwise unremarkeable.   Received 1 dose of oseltamivir, vancomycin and zosyn in the ED.   UA unremarkable, BC- NGTD  1/30 ANC 1.8 and continuing to uptrend    Plan  -s/p 1 dose of Filgrastim. Minimal improvement in WBC. 0.25->0.55. Awaiting differential result from lab.  -Continue with oseltamivir (stop on discharge, received a total of 8 days)  - dc cefepime as no longer neutropenic  -Daily CBC   -Resolved

## 2020-02-02 NOTE — ASSESSMENT & PLAN NOTE
Patient with history of metastatic breast cancer on chemotherapy (Halaven, last dose 01/22) presenting with malaise, fever Tmax 102.4, productive cough, noted to be positive for influenza B.   On admission, WBC 0.25, ANC count 37.5 indicating severe neutropenia.   Lactate 2.3. CXR shows focal lucency in the left mid lung, could be projectional versus possible small intracavitary focus, otherwise unremarkeable.   Received 1 dose of oseltamivir, vancomycin and zosyn in the ED.   UA unremarkable, BC- NGTD  1/30 ANC 1.8 and continuing to uptrend    Plan  -s/p 1 dose of Filgrastim. Minimal improvement in WBC. 0.25->0.55. Awaiting differential result from lab.  -Continue with oseltamivir  - dc cefepime as no longer neutropenic  -Daily CBC   -Resolved

## 2020-02-02 NOTE — ASSESSMENT & PLAN NOTE
Patient iniatially with hypercalcemia, likely related to malignancy, but also noted to have hypophosphatemia to <1. Replete phosphates, but unfortunately unable to keep up with calcium needs.    Replete prn PO  Follow up cmp outpatient

## 2020-02-03 PROCEDURE — G0180 PR HOME HEALTH MD CERTIFICATION: ICD-10-PCS | Mod: ,,, | Performed by: INTERNAL MEDICINE

## 2020-02-03 PROCEDURE — G0180 MD CERTIFICATION HHA PATIENT: HCPCS | Mod: ,,, | Performed by: INTERNAL MEDICINE

## 2020-02-04 NOTE — PROGRESS NOTES
Subjective:       Patient ID: Rebecca Crain is a 64 y.o. female.    Chief Complaint: No chief complaint on file.    HPI Ms. Crain is here for followup of metastatic carcinoma of the right breast.  She is currently on eribulin therapy.  She has had 1 cycle thus far.    She was hospitalized last week with the flu as well as neutropenia.  That hospitalization was from January 25th to February 2nd.  She also had significant mucositis related to her chemotherapy.  She did receive Neupogen while in hospital after discharge white blood cell count was approximately 13,000.    Today she reports that she has had ongoing mouth and throat pain which continues to make it difficult to swallow.  She has been using [facility] solution for that.  She also has some ear pain.  Her bone pain has been stable.  She does report some exertional dyspnea and has an ongoing cough.  She remains weak but has gradually improved her activity and is ambulating her house with a walker.  She spends much of her day in a chair.      ECOG-1-2    Breast history: In 2013 she was diagnosed with stage IIB carcinoma of the right breast, ER positive with a low Oncotype score.  She was enrolled on protocol  and randomized to endocrine therapy alone.  She was tried on all 3 aromatase inhibitors and had itching and rash to all of them.  In August 2013 she was started on tamoxifen.   She was found to have  bone metastasis in May 2015, 2015.  A subsequent bone biopsy was performed on a lesion at the T8 vertebra.   The pathology from that was consistent with metastatic breast cancer, ER +80%, VA +80%, and HER-2 negative.      She received letrozole plus palbociclib from July 2015 until May 2016.  She also received bone protective therapy with Xgeva.      Faslodex was started in June 2016.      Exemestane and Afinitor started in August 2017.  That was discontinued in February 2018 due to progression in the liver and bone.     Navelbine was started February 16,  2018.  She had 8 cycles for that.  Scans in November 2018 showed progression as follows     Capecitabine was started in December 2018.  She received 5 cycles of that therapy.    Follow-up scans in April 2019 showed progression with a new hepatic lesion and worsening bone disease.    She began clinical trial therapy with erdafitinib on May 8, 2019.  That was discontinued in September 2019 due to progression in the bone.    She then received a course of radiation therapy to her pelvis.  She began weekly Taxol in October 2019.That was discontinued in December 2019 due to progression.  Review of Systems   Constitutional: Positive for activity change and fatigue. Negative for appetite change, fever and unexpected weight change.   HENT: Positive for ear pain, mouth sores, sore throat and trouble swallowing.    Eyes: Negative for visual disturbance.   Respiratory: Positive for cough and shortness of breath.    Cardiovascular: Negative for chest pain.   Gastrointestinal: Negative for abdominal pain and diarrhea.   Genitourinary: Negative for frequency.   Musculoskeletal: Positive for back pain.   Skin: Negative for rash.   Neurological: Positive for weakness. Negative for headaches.   Hematological: Negative for adenopathy.   Psychiatric/Behavioral: Negative for dysphoric mood. The patient is not nervous/anxious.        Objective:      Physical Exam   Constitutional: She is oriented to person, place, and time. She appears well-developed and well-nourished. No distress.   HENT:   Scattered ulcers on the undersurface of the tongue bilaterally   Eyes: No scleral icterus.   Cardiovascular: Normal rate and regular rhythm.   Pulmonary/Chest: Effort normal. She has no wheezes. She has no rales.   Abdominal: Soft. She exhibits no mass. There is no tenderness.   Lymphadenopathy:     She has no cervical adenopathy.     She has no axillary adenopathy.        Right: No supraclavicular adenopathy present.        Left: No supraclavicular  adenopathy present.   Neurological: She is alert and oriented to person, place, and time.   Skin: No rash noted.   Psychiatric: She has a normal mood and affect. Her behavior is normal. Thought content normal.   Vitals reviewed.      Assessment:     CBC shows a white count [place] 08[address] her 71, hemoglobin 11.5 platelet count [id number], metabolic profile shows normal hepatic and renal function, albumin 2.3 with a calcium of 8.1  1. Malignant neoplasm of central portion of right breast in female, estrogen receptor positive    2. Bone metastasis    3. Metastasis to liver      4.  Mucositis secondary to chemotherapy  Plan:       Delay her treatment for 1 week to offer additional recovery.  Will need some dose modification due to the mucositis as well as growth factor support.       no

## 2020-02-05 ENCOUNTER — LAB VISIT (OUTPATIENT)
Dept: LAB | Facility: HOSPITAL | Age: 65
End: 2020-02-05
Attending: INTERNAL MEDICINE
Payer: MEDICARE

## 2020-02-05 ENCOUNTER — OFFICE VISIT (OUTPATIENT)
Dept: HEMATOLOGY/ONCOLOGY | Facility: CLINIC | Age: 65
End: 2020-02-05
Payer: MEDICARE

## 2020-02-05 VITALS
TEMPERATURE: 99 F | SYSTOLIC BLOOD PRESSURE: 123 MMHG | RESPIRATION RATE: 16 BRPM | BODY MASS INDEX: 28.12 KG/M2 | HEIGHT: 64 IN | WEIGHT: 164.69 LBS | OXYGEN SATURATION: 96 % | HEART RATE: 99 BPM | DIASTOLIC BLOOD PRESSURE: 71 MMHG

## 2020-02-05 DIAGNOSIS — C78.7 METASTASIS TO LIVER: ICD-10-CM

## 2020-02-05 DIAGNOSIS — K12.30 MUCOSITIS: ICD-10-CM

## 2020-02-05 DIAGNOSIS — Z17.0 MALIGNANT NEOPLASM OF CENTRAL PORTION OF RIGHT BREAST IN FEMALE, ESTROGEN RECEPTOR POSITIVE: Primary | ICD-10-CM

## 2020-02-05 DIAGNOSIS — C50.111 MALIGNANT NEOPLASM OF CENTRAL PORTION OF RIGHT BREAST IN FEMALE, ESTROGEN RECEPTOR POSITIVE: ICD-10-CM

## 2020-02-05 DIAGNOSIS — Z17.0 MALIGNANT NEOPLASM OF CENTRAL PORTION OF RIGHT BREAST IN FEMALE, ESTROGEN RECEPTOR POSITIVE: ICD-10-CM

## 2020-02-05 DIAGNOSIS — C79.51 BONE METASTASIS: ICD-10-CM

## 2020-02-05 DIAGNOSIS — C50.111 MALIGNANT NEOPLASM OF CENTRAL PORTION OF RIGHT BREAST IN FEMALE, ESTROGEN RECEPTOR POSITIVE: Primary | ICD-10-CM

## 2020-02-05 LAB
ALBUMIN SERPL BCP-MCNC: 2.3 G/DL (ref 3.5–5.2)
ALP SERPL-CCNC: 431 U/L (ref 55–135)
ALT SERPL W/O P-5'-P-CCNC: 17 U/L (ref 10–44)
ANION GAP SERPL CALC-SCNC: 8 MMOL/L (ref 8–16)
ANISOCYTOSIS BLD QL SMEAR: SLIGHT
AST SERPL-CCNC: 34 U/L (ref 10–40)
BACTERIA BLD CULT: NORMAL
BACTERIA BLD CULT: NORMAL
BASOPHILS # BLD AUTO: ABNORMAL K/UL (ref 0–0.2)
BASOPHILS NFR BLD: 0 % (ref 0–1.9)
BILIRUB SERPL-MCNC: 0.8 MG/DL (ref 0.1–1)
BUN SERPL-MCNC: 4 MG/DL (ref 8–23)
CALCIUM SERPL-MCNC: 8.1 MG/DL (ref 8.7–10.5)
CHLORIDE SERPL-SCNC: 102 MMOL/L (ref 95–110)
CO2 SERPL-SCNC: 29 MMOL/L (ref 23–29)
CREAT SERPL-MCNC: 0.6 MG/DL (ref 0.5–1.4)
DACRYOCYTES BLD QL SMEAR: ABNORMAL
DIFFERENTIAL METHOD: ABNORMAL
DOHLE BOD BLD QL SMEAR: PRESENT
EOSINOPHIL # BLD AUTO: ABNORMAL K/UL (ref 0–0.5)
EOSINOPHIL NFR BLD: 0 % (ref 0–8)
ERYTHROCYTE [DISTWIDTH] IN BLOOD BY AUTOMATED COUNT: 19.4 % (ref 11.5–14.5)
EST. GFR  (AFRICAN AMERICAN): >60 ML/MIN/1.73 M^2
EST. GFR  (NON AFRICAN AMERICAN): >60 ML/MIN/1.73 M^2
GIANT PLATELETS BLD QL SMEAR: PRESENT
GLUCOSE SERPL-MCNC: 102 MG/DL (ref 70–110)
HCT VFR BLD AUTO: 36.4 % (ref 37–48.5)
HGB BLD-MCNC: 11.5 G/DL (ref 12–16)
IMM GRANULOCYTES # BLD AUTO: ABNORMAL K/UL (ref 0–0.04)
IMM GRANULOCYTES NFR BLD AUTO: ABNORMAL % (ref 0–0.5)
LYMPHOCYTES # BLD AUTO: ABNORMAL K/UL (ref 1–4.8)
LYMPHOCYTES NFR BLD: 15 % (ref 18–48)
MCH RBC QN AUTO: 29 PG (ref 27–31)
MCHC RBC AUTO-ENTMCNC: 31.6 G/DL (ref 32–36)
MCV RBC AUTO: 92 FL (ref 82–98)
METAMYELOCYTES NFR BLD MANUAL: 1 %
MONOCYTES # BLD AUTO: ABNORMAL K/UL (ref 0.3–1)
MONOCYTES NFR BLD: 5 % (ref 4–15)
MYELOCYTES NFR BLD MANUAL: 1 %
NEUTROPHILS NFR BLD: 73 % (ref 38–73)
NEUTS BAND NFR BLD MANUAL: 5 %
NRBC BLD-RTO: 1 /100 WBC
OVALOCYTES BLD QL SMEAR: ABNORMAL
PLATELET # BLD AUTO: 201 K/UL (ref 150–350)
PLATELET BLD QL SMEAR: ABNORMAL
PMV BLD AUTO: 10.8 FL (ref 9.2–12.9)
POIKILOCYTOSIS BLD QL SMEAR: SLIGHT
POLYCHROMASIA BLD QL SMEAR: ABNORMAL
POTASSIUM SERPL-SCNC: 4 MMOL/L (ref 3.5–5.1)
PROT SERPL-MCNC: 5.6 G/DL (ref 6–8.4)
RBC # BLD AUTO: 3.97 M/UL (ref 4–5.4)
SCHISTOCYTES BLD QL SMEAR: ABNORMAL
SMUDGE CELLS BLD QL SMEAR: PRESENT
SODIUM SERPL-SCNC: 139 MMOL/L (ref 136–145)
TOXIC GRANULES BLD QL SMEAR: PRESENT
WBC # BLD AUTO: 12.37 K/UL (ref 3.9–12.7)

## 2020-02-05 PROCEDURE — 80053 COMPREHEN METABOLIC PANEL: CPT | Mod: HCNC

## 2020-02-05 PROCEDURE — 99999 PR PBB SHADOW E&M-EST. PATIENT-LVL V: CPT | Mod: PBBFAC,HCNC,, | Performed by: INTERNAL MEDICINE

## 2020-02-05 PROCEDURE — 3008F PR BODY MASS INDEX (BMI) DOCUMENTED: ICD-10-PCS | Mod: HCNC,CPTII,S$GLB, | Performed by: INTERNAL MEDICINE

## 2020-02-05 PROCEDURE — 3078F PR MOST RECENT DIASTOLIC BLOOD PRESSURE < 80 MM HG: ICD-10-PCS | Mod: HCNC,CPTII,S$GLB, | Performed by: INTERNAL MEDICINE

## 2020-02-05 PROCEDURE — 3008F BODY MASS INDEX DOCD: CPT | Mod: HCNC,CPTII,S$GLB, | Performed by: INTERNAL MEDICINE

## 2020-02-05 PROCEDURE — 99499 RISK ADDL DX/OHS AUDIT: ICD-10-PCS | Mod: HCNC,S$GLB,, | Performed by: INTERNAL MEDICINE

## 2020-02-05 PROCEDURE — 99214 PR OFFICE/OUTPT VISIT, EST, LEVL IV, 30-39 MIN: ICD-10-PCS | Mod: HCNC,S$GLB,, | Performed by: INTERNAL MEDICINE

## 2020-02-05 PROCEDURE — 99999 PR PBB SHADOW E&M-EST. PATIENT-LVL V: ICD-10-PCS | Mod: PBBFAC,HCNC,, | Performed by: INTERNAL MEDICINE

## 2020-02-05 PROCEDURE — 3074F PR MOST RECENT SYSTOLIC BLOOD PRESSURE < 130 MM HG: ICD-10-PCS | Mod: HCNC,CPTII,S$GLB, | Performed by: INTERNAL MEDICINE

## 2020-02-05 PROCEDURE — 85007 BL SMEAR W/DIFF WBC COUNT: CPT | Mod: HCNC

## 2020-02-05 PROCEDURE — 3078F DIAST BP <80 MM HG: CPT | Mod: HCNC,CPTII,S$GLB, | Performed by: INTERNAL MEDICINE

## 2020-02-05 PROCEDURE — 36415 COLL VENOUS BLD VENIPUNCTURE: CPT | Mod: HCNC

## 2020-02-05 PROCEDURE — 99499 UNLISTED E&M SERVICE: CPT | Mod: HCNC,S$GLB,, | Performed by: INTERNAL MEDICINE

## 2020-02-05 PROCEDURE — 99214 OFFICE O/P EST MOD 30 MIN: CPT | Mod: HCNC,S$GLB,, | Performed by: INTERNAL MEDICINE

## 2020-02-05 PROCEDURE — 86300 IMMUNOASSAY TUMOR CA 15-3: CPT | Mod: HCNC

## 2020-02-05 PROCEDURE — 85027 COMPLETE CBC AUTOMATED: CPT | Mod: HCNC

## 2020-02-05 PROCEDURE — 3074F SYST BP LT 130 MM HG: CPT | Mod: HCNC,CPTII,S$GLB, | Performed by: INTERNAL MEDICINE

## 2020-02-05 RX ORDER — FLUCONAZOLE 100 MG/1
100 TABLET ORAL DAILY
Qty: 3 TABLET | Refills: 0 | Status: SHIPPED | OUTPATIENT
Start: 2020-02-05 | End: 2020-03-06

## 2020-02-05 RX ORDER — CLONIDINE HYDROCHLORIDE 0.1 MG/1
TABLET ORAL
COMMUNITY
Start: 2020-02-03 | End: 2020-10-21

## 2020-02-07 ENCOUNTER — PATIENT MESSAGE (OUTPATIENT)
Dept: HEMATOLOGY/ONCOLOGY | Facility: CLINIC | Age: 65
End: 2020-02-07

## 2020-02-07 LAB — CANCER AG27-29 SERPL-ACNC: 276 U/ML

## 2020-02-12 ENCOUNTER — PATIENT MESSAGE (OUTPATIENT)
Dept: HEMATOLOGY/ONCOLOGY | Facility: CLINIC | Age: 65
End: 2020-02-12

## 2020-02-12 ENCOUNTER — OFFICE VISIT (OUTPATIENT)
Dept: HEMATOLOGY/ONCOLOGY | Facility: CLINIC | Age: 65
End: 2020-02-12
Payer: MEDICARE

## 2020-02-12 ENCOUNTER — EXTERNAL HOME HEALTH (OUTPATIENT)
Dept: HOME HEALTH SERVICES | Facility: HOSPITAL | Age: 65
End: 2020-02-12
Payer: MEDICARE

## 2020-02-12 VITALS
OXYGEN SATURATION: 95 % | TEMPERATURE: 99 F | WEIGHT: 156.5 LBS | HEIGHT: 64 IN | BODY MASS INDEX: 26.72 KG/M2 | HEART RATE: 101 BPM | DIASTOLIC BLOOD PRESSURE: 54 MMHG | RESPIRATION RATE: 16 BRPM | SYSTOLIC BLOOD PRESSURE: 114 MMHG

## 2020-02-12 DIAGNOSIS — G89.3 CANCER ASSOCIATED PAIN: Primary | ICD-10-CM

## 2020-02-12 DIAGNOSIS — C79.51 METASTATIC CANCER TO SPINE: ICD-10-CM

## 2020-02-12 DIAGNOSIS — C79.51 BONE METASTASIS: ICD-10-CM

## 2020-02-12 DIAGNOSIS — Z17.0 MALIGNANT NEOPLASM OF CENTRAL PORTION OF RIGHT BREAST IN FEMALE, ESTROGEN RECEPTOR POSITIVE: Primary | ICD-10-CM

## 2020-02-12 DIAGNOSIS — C78.7 METASTASIS TO LIVER: ICD-10-CM

## 2020-02-12 DIAGNOSIS — C50.111 MALIGNANT NEOPLASM OF CENTRAL PORTION OF RIGHT BREAST IN FEMALE, ESTROGEN RECEPTOR POSITIVE: Primary | ICD-10-CM

## 2020-02-12 PROCEDURE — 99999 PR PBB SHADOW E&M-EST. PATIENT-LVL V: ICD-10-PCS | Mod: PBBFAC,HCNC,, | Performed by: INTERNAL MEDICINE

## 2020-02-12 PROCEDURE — 3008F BODY MASS INDEX DOCD: CPT | Mod: HCNC,CPTII,S$GLB, | Performed by: INTERNAL MEDICINE

## 2020-02-12 PROCEDURE — 99999 PR PBB SHADOW E&M-EST. PATIENT-LVL V: CPT | Mod: PBBFAC,HCNC,, | Performed by: INTERNAL MEDICINE

## 2020-02-12 PROCEDURE — 3078F DIAST BP <80 MM HG: CPT | Mod: HCNC,CPTII,S$GLB, | Performed by: INTERNAL MEDICINE

## 2020-02-12 PROCEDURE — 3078F PR MOST RECENT DIASTOLIC BLOOD PRESSURE < 80 MM HG: ICD-10-PCS | Mod: HCNC,CPTII,S$GLB, | Performed by: INTERNAL MEDICINE

## 2020-02-12 PROCEDURE — 3074F PR MOST RECENT SYSTOLIC BLOOD PRESSURE < 130 MM HG: ICD-10-PCS | Mod: HCNC,CPTII,S$GLB, | Performed by: INTERNAL MEDICINE

## 2020-02-12 PROCEDURE — 3008F PR BODY MASS INDEX (BMI) DOCUMENTED: ICD-10-PCS | Mod: HCNC,CPTII,S$GLB, | Performed by: INTERNAL MEDICINE

## 2020-02-12 PROCEDURE — 99214 PR OFFICE/OUTPT VISIT, EST, LEVL IV, 30-39 MIN: ICD-10-PCS | Mod: HCNC,S$GLB,, | Performed by: INTERNAL MEDICINE

## 2020-02-12 PROCEDURE — 3074F SYST BP LT 130 MM HG: CPT | Mod: HCNC,CPTII,S$GLB, | Performed by: INTERNAL MEDICINE

## 2020-02-12 PROCEDURE — 99499 RISK ADDL DX/OHS AUDIT: ICD-10-PCS | Mod: HCNC,S$GLB,, | Performed by: INTERNAL MEDICINE

## 2020-02-12 PROCEDURE — 99499 UNLISTED E&M SERVICE: CPT | Mod: HCNC,S$GLB,, | Performed by: INTERNAL MEDICINE

## 2020-02-12 PROCEDURE — 99214 OFFICE O/P EST MOD 30 MIN: CPT | Mod: HCNC,S$GLB,, | Performed by: INTERNAL MEDICINE

## 2020-02-12 RX ORDER — HEPARIN 100 UNIT/ML
500 SYRINGE INTRAVENOUS
Status: CANCELLED | OUTPATIENT
Start: 2020-02-20

## 2020-02-12 RX ORDER — HEPARIN 100 UNIT/ML
500 SYRINGE INTRAVENOUS
Status: CANCELLED | OUTPATIENT
Start: 2020-02-12

## 2020-02-12 RX ORDER — SODIUM CHLORIDE 0.9 % (FLUSH) 0.9 %
10 SYRINGE (ML) INJECTION
Status: CANCELLED | OUTPATIENT
Start: 2020-02-20

## 2020-02-12 RX ORDER — MORPHINE SULFATE 30 MG/1
60 TABLET ORAL EVERY 4 HOURS PRN
Qty: 120 TABLET | Refills: 0 | Status: SHIPPED | OUTPATIENT
Start: 2020-02-12 | End: 2020-02-14 | Stop reason: SDUPTHER

## 2020-02-12 RX ORDER — SODIUM CHLORIDE 0.9 % (FLUSH) 0.9 %
10 SYRINGE (ML) INJECTION
Status: CANCELLED | OUTPATIENT
Start: 2020-02-12

## 2020-02-12 RX ORDER — MORPHINE SULFATE 200 MG/1
200 TABLET, FILM COATED, EXTENDED RELEASE ORAL EVERY 8 HOURS
Qty: 90 TABLET | Refills: 0 | Status: SHIPPED | OUTPATIENT
Start: 2020-02-12 | End: 2020-02-14 | Stop reason: SDUPTHER

## 2020-02-12 NOTE — Clinical Note
Restart eribulin day 1 and 8 next week,  Neulasta day 8see me in 4 weeks with CBC, CMP and CA 27.29

## 2020-02-12 NOTE — PROGRESS NOTES
Subjective:       Patient ID: Rebecca Crain is a 64 y.o. female.    Chief Complaint: No chief complaint on file.    HPI Ms. Crain is here for followup of metastatic carcinoma of the right breast.  She is currently on eribulin therapy.  She has had 1 cycle thus far.  Her 1st course was complicated by neutropenic fever and also prolonged mucositis.  That required delaying her 2nd cycle which was due last week.      Today she reports that her mouth is doing much better although she continues to have some sores on her tongue.  Her swallowing is improved.    Her cough is much better but she continues to have mostly clear mucus production with occasional green mucus.  Her pain is much better down to approximately 1/10.          ECOG-1-2    Breast history: In 2013 she was diagnosed with stage IIB carcinoma of the right breast, ER positive with a low Oncotype score.  She was enrolled on protocol  and randomized to endocrine therapy alone.  She was tried on all 3 aromatase inhibitors and had itching and rash to all of them.  In August 2013 she was started on tamoxifen.   She was found to have  bone metastasis in May 2015, 2015.  A subsequent bone biopsy was performed on a lesion at the T8 vertebra.   The pathology from that was consistent with metastatic breast cancer, ER +80%, MO +80%, and HER-2 negative.      She received letrozole plus palbociclib from July 2015 until May 2016.  She also received bone protective therapy with Xgeva.      Faslodex was started in June 2016.      Exemestane and Afinitor started in August 2017.  That was discontinued in February 2018 due to progression in the liver and bone.     Navelbine was started February 16, 2018.  She had 8 cycles for that.  Scans in November 2018 showed progression as follows     Capecitabine was started in December 2018.  She received 5 cycles of that therapy.    Follow-up scans in April 2019 showed progression with a new hepatic lesion and worsening bone  disease.    She began clinical trial therapy with erdafitinib on May 8, 2019.  That was discontinued in September 2019 due to progression in the bone.    She then received a course of radiation therapy to her pelvis.  She began weekly Taxol in October 2019.That was discontinued in December 2019 due to progression.  Review of Systems   Constitutional: Negative for appetite change and unexpected weight change.   Eyes: Negative for visual disturbance.   Respiratory: Positive for cough and shortness of breath.    Cardiovascular: Positive for chest pain.   Gastrointestinal: Negative for abdominal pain and diarrhea.   Genitourinary: Positive for frequency.   Musculoskeletal: Negative for back pain.   Skin: Negative for rash.   Neurological: Negative for headaches.   Hematological: Positive for adenopathy.   Psychiatric/Behavioral: The patient is nervous/anxious.        Objective:      Physical Exam   Constitutional: She is oriented to person, place, and time. She appears well-developed and well-nourished.   HENT:   Linear ulcer right tongue single ulcer left tongue   Eyes: No scleral icterus.   Cardiovascular: Normal rate and regular rhythm.   Pulmonary/Chest: Effort normal and breath sounds normal. She has no wheezes. She has no rales.   Abdominal: Soft. She exhibits no mass. There is no tenderness.   Lymphadenopathy:     She has no cervical adenopathy.        Right: No supraclavicular adenopathy present.        Left: No supraclavicular adenopathy present.   Neurological: She is alert and oriented to person, place, and time.   Skin: No rash noted.   Psychiatric: She has a normal mood and affect. Her behavior is normal. Thought content normal.   Vitals reviewed.      Assessment:       1. Malignant neoplasm of central portion of right breast in female, estrogen receptor positive    2. 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 Bone metastasis    4. Metastasis to liver        Plan:         Delay chemotherapy 1 week.    Will dose  reduce and also plan for day 9 Neulasta.

## 2020-02-13 DIAGNOSIS — K12.30 ORAL MUCOSITIS: Primary | ICD-10-CM

## 2020-02-13 DIAGNOSIS — Z17.0 MALIGNANT NEOPLASM OF CENTRAL PORTION OF RIGHT BREAST IN FEMALE, ESTROGEN RECEPTOR POSITIVE: ICD-10-CM

## 2020-02-13 DIAGNOSIS — K12.31 MUCOSITIS DUE TO CHEMOTHERAPY: Primary | ICD-10-CM

## 2020-02-13 DIAGNOSIS — C50.111 MALIGNANT NEOPLASM OF CENTRAL PORTION OF RIGHT BREAST IN FEMALE, ESTROGEN RECEPTOR POSITIVE: ICD-10-CM

## 2020-02-13 PROBLEM — E83.39 HYPOPHOSPHATEMIA: Status: RESOLVED | Noted: 2020-01-26 | Resolved: 2020-02-13

## 2020-02-13 PROBLEM — E83.52 HYPERCALCEMIA OF MALIGNANCY: Status: RESOLVED | Noted: 2020-01-07 | Resolved: 2020-02-13

## 2020-02-13 PROBLEM — E83.51 HYPOCALCEMIA: Status: RESOLVED | Noted: 2020-02-01 | Resolved: 2020-02-13

## 2020-02-13 PROBLEM — R07.9 CHEST PAIN: Status: RESOLVED | Noted: 2017-09-21 | Resolved: 2020-02-13

## 2020-02-13 PROBLEM — R50.81 NEUTROPENIC FEVER: Status: RESOLVED | Noted: 2020-01-25 | Resolved: 2020-02-13

## 2020-02-13 PROBLEM — D70.9 NEUTROPENIC FEVER: Status: RESOLVED | Noted: 2020-01-25 | Resolved: 2020-02-13

## 2020-02-14 ENCOUNTER — DOCUMENTATION ONLY (OUTPATIENT)
Dept: HEMATOLOGY/ONCOLOGY | Facility: CLINIC | Age: 65
End: 2020-02-14

## 2020-02-14 ENCOUNTER — PATIENT MESSAGE (OUTPATIENT)
Dept: HEMATOLOGY/ONCOLOGY | Facility: CLINIC | Age: 65
End: 2020-02-14

## 2020-02-14 DIAGNOSIS — G89.3 CANCER ASSOCIATED PAIN: ICD-10-CM

## 2020-02-14 RX ORDER — MORPHINE SULFATE 200 MG/1
200 TABLET, FILM COATED, EXTENDED RELEASE ORAL EVERY 8 HOURS
Qty: 90 TABLET | Refills: 0 | Status: SHIPPED | OUTPATIENT
Start: 2020-02-14 | End: 2020-03-13 | Stop reason: SDUPTHER

## 2020-02-14 RX ORDER — MORPHINE SULFATE 30 MG/1
60 TABLET ORAL EVERY 4 HOURS PRN
Qty: 120 TABLET | Refills: 0 | Status: SHIPPED | OUTPATIENT
Start: 2020-02-14 | End: 2020-03-15

## 2020-02-14 NOTE — PROGRESS NOTES
SW called patient to ask if she had received oral suction. She said she had not received. Contacted [employer].  They have the orders and will address this today.  Patient given SW contact information and she will call SW if further assistance is needed.

## 2020-02-19 ENCOUNTER — PATIENT MESSAGE (OUTPATIENT)
Dept: HEMATOLOGY/ONCOLOGY | Facility: CLINIC | Age: 65
End: 2020-02-19

## 2020-02-19 ENCOUNTER — INFUSION (OUTPATIENT)
Dept: INFUSION THERAPY | Facility: HOSPITAL | Age: 65
End: 2020-02-19
Attending: INTERNAL MEDICINE
Payer: MEDICARE

## 2020-02-19 VITALS
TEMPERATURE: 98 F | DIASTOLIC BLOOD PRESSURE: 53 MMHG | SYSTOLIC BLOOD PRESSURE: 102 MMHG | HEART RATE: 73 BPM | RESPIRATION RATE: 18 BRPM

## 2020-02-19 DIAGNOSIS — Z17.0 MALIGNANT NEOPLASM OF CENTRAL PORTION OF RIGHT BREAST IN FEMALE, ESTROGEN RECEPTOR POSITIVE: ICD-10-CM

## 2020-02-19 DIAGNOSIS — C78.7 METASTASIS TO LIVER: Primary | ICD-10-CM

## 2020-02-19 DIAGNOSIS — C50.111 MALIGNANT NEOPLASM OF CENTRAL PORTION OF RIGHT BREAST IN FEMALE, ESTROGEN RECEPTOR POSITIVE: ICD-10-CM

## 2020-02-19 DIAGNOSIS — C79.51 METASTATIC CANCER TO SPINE: ICD-10-CM

## 2020-02-19 PROCEDURE — 96409 CHEMO IV PUSH SNGL DRUG: CPT | Mod: HCNC

## 2020-02-19 PROCEDURE — A4216 STERILE WATER/SALINE, 10 ML: HCPCS | Mod: HCNC | Performed by: INTERNAL MEDICINE

## 2020-02-19 PROCEDURE — 63600175 PHARM REV CODE 636 W HCPCS: Mod: HCNC | Performed by: INTERNAL MEDICINE

## 2020-02-19 PROCEDURE — 25000003 PHARM REV CODE 250: Mod: HCNC | Performed by: INTERNAL MEDICINE

## 2020-02-19 PROCEDURE — 96367 TX/PROPH/DG ADDL SEQ IV INF: CPT | Mod: HCNC

## 2020-02-19 RX ORDER — HEPARIN 100 UNIT/ML
500 SYRINGE INTRAVENOUS
Status: DISCONTINUED | OUTPATIENT
Start: 2020-02-19 | End: 2020-02-19 | Stop reason: HOSPADM

## 2020-02-19 RX ORDER — SODIUM CHLORIDE 0.9 % (FLUSH) 0.9 %
10 SYRINGE (ML) INJECTION
Status: DISCONTINUED | OUTPATIENT
Start: 2020-02-19 | End: 2020-02-19 | Stop reason: HOSPADM

## 2020-02-19 RX ADMIN — Medication 10 ML: at 08:02

## 2020-02-19 RX ADMIN — ERIBULIN MESYLATE 2 MG: 0.5 INJECTION INTRAVENOUS at 08:02

## 2020-02-19 RX ADMIN — HEPARIN 500 UNITS: 100 SYRINGE at 08:02

## 2020-02-19 RX ADMIN — SODIUM CHLORIDE 1 MG: 9 INJECTION, SOLUTION INTRAVENOUS at 08:02

## 2020-02-19 RX ADMIN — SODIUM CHLORIDE: 0.9 INJECTION, SOLUTION INTRAVENOUS at 08:02

## 2020-02-19 NOTE — PLAN OF CARE
Fior C2D1 no tolerated well vitals stable, PAC maintained positive blood rtn during treatment flushed hep locked and de accessed prior to d/c. Site covered. Assisted to w/c leaves unit accompanied by transport to car. No distress. Avs declined instructed to contact providers clinic with concerns.

## 2020-02-19 NOTE — PLAN OF CARE
Problem: Adult Inpatient Plan of Care  Goal: Optimal Comfort and Wellbeing  Intervention: Provide Person-Centered Care  Flowsheets (Taken 2/19/2020 0728)  Trust Relationship/Rapport: choices provided; care explained; emotional support provided; empathic listening provided; questions answered; questions encouraged; reassurance provided; thoughts/feelings acknowledged

## 2020-02-20 ENCOUNTER — PATIENT MESSAGE (OUTPATIENT)
Dept: HEMATOLOGY/ONCOLOGY | Facility: CLINIC | Age: 65
End: 2020-02-20

## 2020-02-21 ENCOUNTER — PATIENT MESSAGE (OUTPATIENT)
Dept: HEMATOLOGY/ONCOLOGY | Facility: CLINIC | Age: 65
End: 2020-02-21

## 2020-02-21 DIAGNOSIS — E03.8 HYPOTHYROIDISM DUE TO HASHIMOTO'S THYROIDITIS: ICD-10-CM

## 2020-02-21 DIAGNOSIS — K12.31 MUCOSITIS DUE TO CHEMOTHERAPY: ICD-10-CM

## 2020-02-21 DIAGNOSIS — E06.3 HYPOTHYROIDISM DUE TO HASHIMOTO'S THYROIDITIS: ICD-10-CM

## 2020-02-21 RX ORDER — LEVOTHYROXINE SODIUM 137 UG/1
137 TABLET ORAL
Qty: 90 TABLET | Refills: 0 | Status: SHIPPED | OUTPATIENT
Start: 2020-02-21 | End: 2020-03-11

## 2020-02-24 ENCOUNTER — PATIENT MESSAGE (OUTPATIENT)
Dept: HEMATOLOGY/ONCOLOGY | Facility: CLINIC | Age: 65
End: 2020-02-24

## 2020-02-26 ENCOUNTER — INFUSION (OUTPATIENT)
Dept: INFUSION THERAPY | Facility: HOSPITAL | Age: 65
End: 2020-02-26
Attending: INTERNAL MEDICINE
Payer: MEDICARE

## 2020-02-26 ENCOUNTER — TELEPHONE (OUTPATIENT)
Dept: HEMATOLOGY/ONCOLOGY | Facility: CLINIC | Age: 65
End: 2020-02-26

## 2020-02-26 VITALS
WEIGHT: 156.94 LBS | HEART RATE: 88 BPM | RESPIRATION RATE: 18 BRPM | DIASTOLIC BLOOD PRESSURE: 61 MMHG | SYSTOLIC BLOOD PRESSURE: 120 MMHG | HEIGHT: 64 IN | BODY MASS INDEX: 26.79 KG/M2

## 2020-02-26 DIAGNOSIS — C50.111 MALIGNANT NEOPLASM OF CENTRAL PORTION OF RIGHT BREAST IN FEMALE, ESTROGEN RECEPTOR POSITIVE: ICD-10-CM

## 2020-02-26 DIAGNOSIS — Z17.0 MALIGNANT NEOPLASM OF CENTRAL PORTION OF RIGHT BREAST IN FEMALE, ESTROGEN RECEPTOR POSITIVE: ICD-10-CM

## 2020-02-26 DIAGNOSIS — C79.51 METASTATIC CANCER TO SPINE: ICD-10-CM

## 2020-02-26 DIAGNOSIS — C78.7 METASTASIS TO LIVER: Primary | ICD-10-CM

## 2020-02-26 PROCEDURE — 63600175 PHARM REV CODE 636 W HCPCS: Mod: HCNC | Performed by: INTERNAL MEDICINE

## 2020-02-26 PROCEDURE — A4216 STERILE WATER/SALINE, 10 ML: HCPCS | Mod: HCNC | Performed by: INTERNAL MEDICINE

## 2020-02-26 PROCEDURE — 96367 TX/PROPH/DG ADDL SEQ IV INF: CPT | Mod: HCNC

## 2020-02-26 PROCEDURE — 25000003 PHARM REV CODE 250: Mod: HCNC | Performed by: INTERNAL MEDICINE

## 2020-02-26 PROCEDURE — 96409 CHEMO IV PUSH SNGL DRUG: CPT | Mod: HCNC

## 2020-02-26 RX ORDER — HEPARIN 100 UNIT/ML
500 SYRINGE INTRAVENOUS
Status: DISCONTINUED | OUTPATIENT
Start: 2020-02-26 | End: 2020-02-26 | Stop reason: HOSPADM

## 2020-02-26 RX ORDER — SODIUM CHLORIDE 0.9 % (FLUSH) 0.9 %
10 SYRINGE (ML) INJECTION
Status: DISCONTINUED | OUTPATIENT
Start: 2020-02-26 | End: 2020-02-26 | Stop reason: HOSPADM

## 2020-02-26 RX ADMIN — SODIUM CHLORIDE: 9 INJECTION, SOLUTION INTRAVENOUS at 03:02

## 2020-02-26 RX ADMIN — Medication 10 ML: at 04:02

## 2020-02-26 RX ADMIN — GRANISETRON HYDROCHLORIDE 1 MG: 0.1 INJECTION, SOLUTION INTRAVENOUS at 03:02

## 2020-02-26 RX ADMIN — ERIBULIN MESYLATE 2 MG: 0.5 INJECTION INTRAVENOUS at 03:02

## 2020-02-26 RX ADMIN — HEPARIN 500 UNITS: 100 SYRINGE at 04:02

## 2020-02-26 NOTE — TELEPHONE ENCOUNTER
Returned call to PT. Gabriela at [facility] can schedule injection there on 3/12 but no tomorrow. Told PT. She understands. Will get appointment rescheduled.

## 2020-02-26 NOTE — NURSING
1455  Pt here for Halaven infusion, no new complaints or concerns at present, reports tolerating treatment; discussed treatment plan for today, all questions answered and pt agrees to proceed

## 2020-02-26 NOTE — PLAN OF CARE
1608  Infusion completed, pt tolerated well; pt instructed to remain well hydrated; discussed when to contact MD, when to report to ER; pt declined AVS, verbalized understanding of all discussed and when to report next

## 2020-02-27 ENCOUNTER — INFUSION (OUTPATIENT)
Dept: INFUSION THERAPY | Facility: HOSPITAL | Age: 65
End: 2020-02-27
Attending: INTERNAL MEDICINE
Payer: MEDICARE

## 2020-02-27 ENCOUNTER — PATIENT MESSAGE (OUTPATIENT)
Dept: HEMATOLOGY/ONCOLOGY | Facility: CLINIC | Age: 65
End: 2020-02-27

## 2020-02-27 DIAGNOSIS — C78.7 METASTASIS TO LIVER: Primary | ICD-10-CM

## 2020-02-27 DIAGNOSIS — C79.51 METASTATIC CANCER TO SPINE: ICD-10-CM

## 2020-02-27 DIAGNOSIS — Z17.0 MALIGNANT NEOPLASM OF CENTRAL PORTION OF RIGHT BREAST IN FEMALE, ESTROGEN RECEPTOR POSITIVE: ICD-10-CM

## 2020-02-27 DIAGNOSIS — C50.111 MALIGNANT NEOPLASM OF CENTRAL PORTION OF RIGHT BREAST IN FEMALE, ESTROGEN RECEPTOR POSITIVE: ICD-10-CM

## 2020-02-27 PROCEDURE — 96372 THER/PROPH/DIAG INJ SC/IM: CPT | Mod: HCNC

## 2020-02-27 PROCEDURE — 63600175 PHARM REV CODE 636 W HCPCS: Mod: HCNC | Performed by: INTERNAL MEDICINE

## 2020-02-27 RX ADMIN — PEGFILGRASTIM-CBQV 6 MG: 6 INJECTION, SOLUTION SUBCUTANEOUS at 01:02

## 2020-02-29 ENCOUNTER — PATIENT MESSAGE (OUTPATIENT)
Dept: HEMATOLOGY/ONCOLOGY | Facility: CLINIC | Age: 65
End: 2020-02-29

## 2020-02-29 DIAGNOSIS — K12.31 MUCOSITIS DUE TO CHEMOTHERAPY: ICD-10-CM

## 2020-03-04 ENCOUNTER — PATIENT MESSAGE (OUTPATIENT)
Dept: HEMATOLOGY/ONCOLOGY | Facility: CLINIC | Age: 65
End: 2020-03-04

## 2020-03-06 ENCOUNTER — PATIENT MESSAGE (OUTPATIENT)
Dept: HEMATOLOGY/ONCOLOGY | Facility: CLINIC | Age: 65
End: 2020-03-06

## 2020-03-09 ENCOUNTER — PATIENT MESSAGE (OUTPATIENT)
Dept: ENDOCRINOLOGY | Facility: CLINIC | Age: 65
End: 2020-03-09

## 2020-03-09 NOTE — PROGRESS NOTES
Subjective:      Patient ID: Rebecca Crain is a 64 y.o. female.    Chief Complaint:  Hypothyroidism    History of Present Illness  Rebecca Crain is here for follow up of osteoporosis and hypothyroidism.  Previously seen by ESTELLA Mcdonald NP.  Last seen 12/6/17.  This is their first visit with me.      Onc History:  In 2013 she was diagnosed with stage IIB carcinoma of the right breast, ER positive with a low Oncotype score.  She was enrolled on protocol  and randomized to endocrine therapy alone.  She was tried on all 3 aromatase inhibitors and had itching and rash to all of them.  In August 2013 she was started on tamoxifen.   She was found to have  bone metastasis in May 2015, 2015.  A subsequent bone biopsy was performed on a lesion at the T8 vertebra.   The pathology from that was consistent with metastatic breast cancer, ER +80%, IA +80%, and HER-2 negative.   She received letrozole plus palbociclib from July 2015 until May 2016.  She also received bone protective therapy with Xgeva.   Faslodex was started in June 2016.   Exemestane and Afinitor started in August 2017.  That was discontinued in February 2018 due to progression in the liver and bone.  Navelbine was started February 16, 2018.  She had 8 cycles for that.  Scans in November 2018 showed progression as follows  Capecitabine was started in December 2018.  She received 5 cycles of that therapy.   Follow-up scans in April 2019 showed progression with a new hepatic lesion and worsening bone disease.  She began clinical trial therapy with erdafitinib on May 8, 2019.  That was discontinued in September 2019 due to progression in the bone.  She then received a course of radiation therapy to her pelvis.  She began weekly Taxol in October 2019.That was discontinued in December 2019 due to progression.    With regards to hypothyroidism:    Diagnosed ~ 1990s.    Lab Results   Component Value Date    TSH 21.308 (H) 03/11/2020    FREET4 0.96  "03/11/2020     Current Medication:  Synthroid 137mcg daily.     Takes thyroid medication properly without food first thing in the morning.    Current Symptoms:   - Weight gain  + Fatigue   + Constipation   - Brittle nails  - Mental fog   - cp, palpations   + sob  + Heat intolerance  + Cold intolerance    With regards to osteoporosis:   Follows with hem/onc for bone mets:    Medications:   Xgeva    Review of Systems   Constitutional: Positive for fatigue.   Eyes: Negative for visual disturbance.   Respiratory: Positive for shortness of breath (occasionally).    Cardiovascular: Negative for chest pain.   Gastrointestinal: Positive for constipation. Negative for abdominal pain.   Endocrine: Positive for cold intolerance and heat intolerance.   Musculoskeletal: Positive for gait problem (arrives in wheelchair). Negative for arthralgias.   Skin: Negative for wound.   Neurological: Negative for headaches.   Hematological: Does not bruise/bleed easily.   Psychiatric/Behavioral: Negative for sleep disturbance.     Objective:   Physical Exam   Cardiovascular: Normal rate.   No edema present   Pulmonary/Chest: Effort normal.   Abdominal: Soft.   Vitals reviewed.    Visit Vitals  /68 (BP Location: Right arm, Patient Position: Sitting, BP Method: Medium (Manual))   Resp 16   Ht 5' 4" (1.626 m)   Wt 70.5 kg (155 lb 6.8 oz)   LMP 11/08/2009 (Exact Date)   BMI 26.68 kg/m²     Body mass index is 26.68 kg/m².    Lab Review:   Lab Results   Component Value Date    HGBA1C 5.5 01/09/2018    HGBA1C 5.5 05/26/2015       No results found for: CHOL, HDL, LDLCALC, TRIG, CHOLHDL  Lab Results   Component Value Date     (L) 03/11/2020    K 4.2 03/11/2020     03/11/2020    CO2 21 (L) 03/11/2020     (H) 03/11/2020    BUN 10 03/11/2020    CREATININE 0.7 03/11/2020    CALCIUM 8.3 (L) 03/11/2020    PROT 7.0 03/11/2020    ALBUMIN 3.2 (L) 03/11/2020    BILITOT 0.8 03/11/2020    ALKPHOS 229 (H) 03/11/2020    AST 18 03/11/2020 "    ALT 7 (L) 03/11/2020    ANIONGAP 9 03/11/2020    ESTGFRAFRICA >60.0 03/11/2020    EGFRNONAA >60.0 03/11/2020    TSH 21.308 (H) 03/11/2020     Vit D, 25-Hydroxy   Date Value Ref Range Status   01/09/2018 48 30 - 96 ng/mL Final     Comment:     Vitamin D deficiency.........<10 ng/mL                              Vitamin D insufficiency......10-29 ng/mL       Vitamin D sufficiency........> or equal to 30 ng/mL  Vitamin D toxicity............>100 ng/mL       Assessment and Plan     1. Hypothyroidism due to Hashimoto's thyroiditis  TSH    SYNTHROID 137 mcg Tab tablet   2. Osteoporosis, unspecified osteoporosis type, unspecified pathological fracture presence     3. Malignant neoplasm of central portion of right breast in female, estrogen receptor positive     4. Metastasis to liver     5. Bone metastasis     6. Benign essential HTN       Hypothyroidism due to Hashimoto's thyroiditis  -- Labs in 6 weeks.  -- Medication Changes:   INCREASE: Synthroid 137mcg one tablet six days and two tablets one day a week.  -- Clinically and biochemically hypothyroid.  -- Goal is a normal TSH.  -- Avoid exogenous hyperthyroidism as this can accelerate bone loss and increase risk of CV complications.  -- Advised to take LT4 on an empty stomach with water and to wait 30-45 minutes before eating or taking other medications   -- Reviewed usual times of thyroid hormone changes  -- Reviewed that symptoms of hypothyroidism may not correlate with tsh, and a normal TSH is the goal of therapy. Symptoms are not a justification for over treatment.    Osteoporosis, unspecified  -- Follows with hem/onc for bone mets - on Xgeva.    Cancer of central portion of right female breast  -- Continue following with hem/onc.    Metastasis to liver  -- Continue following with hem/onc.    Bone metastasis  -- Continue following with hem/onc.    Benign essential HTN  -- Controlled.    Follow up in about 1 year (around 3/11/2021).

## 2020-03-10 ENCOUNTER — PATIENT MESSAGE (OUTPATIENT)
Dept: HEMATOLOGY/ONCOLOGY | Facility: CLINIC | Age: 65
End: 2020-03-10

## 2020-03-10 DIAGNOSIS — E06.3 HYPOTHYROIDISM DUE TO HASHIMOTO'S THYROIDITIS: Primary | ICD-10-CM

## 2020-03-10 DIAGNOSIS — E03.8 HYPOTHYROIDISM DUE TO HASHIMOTO'S THYROIDITIS: Primary | ICD-10-CM

## 2020-03-10 NOTE — PROGRESS NOTES
Subjective:       Patient ID: Rebecca Crain is a 64 y.o. female.    Chief Complaint: No chief complaint on file.    HPI Ms. Crain is here for followup of metastatic carcinoma of the right breast.  She is currently on eribulin therapy.  Her last cycle was tolerated much better.  She did not have any significant mucositis.  Current complaint is some right upper quadrant pain for the last several days which may increase with breathing.  She has not been taking any extra pain medication at this point in time.  She continues on MS Contin 200 mg 3 times per day as her primary pain control.  Appetite been down a bit but bowel function been stable with lactulose.  She does report that she feels a bit short of breath with limited activity.        ECOG-1-2    Breast history: In 2013 she was diagnosed with stage IIB carcinoma of the right breast, ER positive with a low Oncotype score.  She was enrolled on protocol  and randomized to endocrine therapy alone.  She was tried on all 3 aromatase inhibitors and had itching and rash to all of them.  In August 2013 she was started on tamoxifen.   She was found to have  bone metastasis in May 2015, 2015.  A subsequent bone biopsy was performed on a lesion at the T8 vertebra.   The pathology from that was consistent with metastatic breast cancer, ER +80%, CA +80%, and HER-2 negative.      She received letrozole plus palbociclib from July 2015 until May 2016.  She also received bone protective therapy with Xgeva.      Faslodex was started in June 2016.      Exemestane and Afinitor started in August 2017.  That was discontinued in February 2018 due to progression in the liver and bone.     Navelbine was started February 16, 2018.  She had 8 cycles for that.  Scans in November 2018 showed progression as follows     Capecitabine was started in December 2018.  She received 5 cycles of that therapy.    Follow-up scans in April 2019 showed progression with a new hepatic lesion and  worsening bone disease.    She began clinical trial therapy with erdafitinib on May 8, 2019.  That was discontinued in September 2019 due to progression in the bone.    She then received a course of radiation therapy to her pelvis.  She began weekly Taxol in October 2019.That was discontinued in December 2019 due to progression.  Review of Systems   Constitutional: Negative for appetite change and unexpected weight change.   HENT: Negative for mouth sores and trouble swallowing.    Eyes: Negative for visual disturbance.   Respiratory: Positive for shortness of breath. Negative for cough.    Cardiovascular: Negative for chest pain.   Gastrointestinal: Positive for abdominal pain. Negative for diarrhea.   Genitourinary: Negative for frequency.   Musculoskeletal: Negative for back pain.   Skin: Negative for rash.   Neurological: Negative for headaches.   Hematological: Negative for adenopathy.   Psychiatric/Behavioral: Negative for dysphoric mood. The patient is not nervous/anxious.        Objective:      Physical Exam   Constitutional: She is oriented to person, place, and time. She appears well-developed and well-nourished. No distress.   HENT:   Mouth/Throat: Oropharynx is clear and moist. No oropharyngeal exudate.   Cardiovascular: Normal rate and regular rhythm.   Pulmonary/Chest: Effort normal and breath sounds normal. She has no wheezes. She has no rales.   Abdominal: Soft. She exhibits no mass. There is no tenderness.   Lymphadenopathy:     She has no cervical adenopathy.     She has no axillary adenopathy.   Neurological: She is alert and oriented to person, place, and time.   Skin: No rash noted. No erythema.   Psychiatric: She has a normal mood and affect. Her behavior is normal. Thought content normal.   Vitals reviewed.      Assessment:     CBC is unremarkable  1. Malignant neoplasm of central portion of right breast in female, estrogen receptor positive    2. Bone metastasis    3. Metastasis to liver     4. Cancer related pain        Plan:       Cycle 3 of eribulin  Scans at next visit.

## 2020-03-11 ENCOUNTER — TELEPHONE (OUTPATIENT)
Dept: HEMATOLOGY/ONCOLOGY | Facility: CLINIC | Age: 65
End: 2020-03-11

## 2020-03-11 ENCOUNTER — OFFICE VISIT (OUTPATIENT)
Dept: HEMATOLOGY/ONCOLOGY | Facility: CLINIC | Age: 65
End: 2020-03-11
Payer: MEDICARE

## 2020-03-11 ENCOUNTER — OFFICE VISIT (OUTPATIENT)
Dept: ENDOCRINOLOGY | Facility: CLINIC | Age: 65
End: 2020-03-11
Payer: MEDICARE

## 2020-03-11 ENCOUNTER — INFUSION (OUTPATIENT)
Dept: INFUSION THERAPY | Facility: HOSPITAL | Age: 65
End: 2020-03-11
Attending: INTERNAL MEDICINE
Payer: MEDICARE

## 2020-03-11 VITALS
DIASTOLIC BLOOD PRESSURE: 68 MMHG | SYSTOLIC BLOOD PRESSURE: 114 MMHG | BODY MASS INDEX: 26.54 KG/M2 | WEIGHT: 155.44 LBS | RESPIRATION RATE: 16 BRPM | HEIGHT: 64 IN

## 2020-03-11 VITALS
BODY MASS INDEX: 26.54 KG/M2 | WEIGHT: 155.44 LBS | HEART RATE: 116 BPM | SYSTOLIC BLOOD PRESSURE: 122 MMHG | TEMPERATURE: 99 F | HEIGHT: 64 IN | DIASTOLIC BLOOD PRESSURE: 57 MMHG | RESPIRATION RATE: 16 BRPM | OXYGEN SATURATION: 95 %

## 2020-03-11 VITALS
TEMPERATURE: 98 F | OXYGEN SATURATION: 95 % | RESPIRATION RATE: 18 BRPM | HEART RATE: 107 BPM | SYSTOLIC BLOOD PRESSURE: 108 MMHG | DIASTOLIC BLOOD PRESSURE: 74 MMHG

## 2020-03-11 DIAGNOSIS — C78.7 METASTASIS TO LIVER: ICD-10-CM

## 2020-03-11 DIAGNOSIS — C79.51 BONE METASTASIS: ICD-10-CM

## 2020-03-11 DIAGNOSIS — C78.7 METASTASIS TO LIVER: Primary | ICD-10-CM

## 2020-03-11 DIAGNOSIS — C79.51 METASTATIC CANCER TO SPINE: ICD-10-CM

## 2020-03-11 DIAGNOSIS — E03.8 HYPOTHYROIDISM DUE TO HASHIMOTO'S THYROIDITIS: Primary | ICD-10-CM

## 2020-03-11 DIAGNOSIS — Z17.0 MALIGNANT NEOPLASM OF CENTRAL PORTION OF RIGHT BREAST IN FEMALE, ESTROGEN RECEPTOR POSITIVE: ICD-10-CM

## 2020-03-11 DIAGNOSIS — E06.3 HYPOTHYROIDISM DUE TO HASHIMOTO'S THYROIDITIS: Primary | ICD-10-CM

## 2020-03-11 DIAGNOSIS — Z17.0 MALIGNANT NEOPLASM OF CENTRAL PORTION OF RIGHT BREAST IN FEMALE, ESTROGEN RECEPTOR POSITIVE: Primary | ICD-10-CM

## 2020-03-11 DIAGNOSIS — G89.3 CANCER RELATED PAIN: ICD-10-CM

## 2020-03-11 DIAGNOSIS — C50.111 MALIGNANT NEOPLASM OF CENTRAL PORTION OF RIGHT BREAST IN FEMALE, ESTROGEN RECEPTOR POSITIVE: Primary | ICD-10-CM

## 2020-03-11 DIAGNOSIS — C50.111 MALIGNANT NEOPLASM OF CENTRAL PORTION OF RIGHT BREAST IN FEMALE, ESTROGEN RECEPTOR POSITIVE: ICD-10-CM

## 2020-03-11 DIAGNOSIS — M81.0 OSTEOPOROSIS, UNSPECIFIED OSTEOPOROSIS TYPE, UNSPECIFIED PATHOLOGICAL FRACTURE PRESENCE: ICD-10-CM

## 2020-03-11 DIAGNOSIS — I10 BENIGN ESSENTIAL HTN: Chronic | ICD-10-CM

## 2020-03-11 PROCEDURE — 3078F DIAST BP <80 MM HG: CPT | Mod: HCNC,CPTII,S$GLB, | Performed by: NURSE PRACTITIONER

## 2020-03-11 PROCEDURE — 99499 RISK ADDL DX/OHS AUDIT: ICD-10-PCS | Mod: HCNC,S$GLB,, | Performed by: INTERNAL MEDICINE

## 2020-03-11 PROCEDURE — 3074F PR MOST RECENT SYSTOLIC BLOOD PRESSURE < 130 MM HG: ICD-10-PCS | Mod: HCNC,CPTII,S$GLB, | Performed by: NURSE PRACTITIONER

## 2020-03-11 PROCEDURE — 99499 UNLISTED E&M SERVICE: CPT | Mod: HCNC,S$GLB,, | Performed by: INTERNAL MEDICINE

## 2020-03-11 PROCEDURE — 99214 OFFICE O/P EST MOD 30 MIN: CPT | Mod: HCNC,S$GLB,, | Performed by: NURSE PRACTITIONER

## 2020-03-11 PROCEDURE — 3008F BODY MASS INDEX DOCD: CPT | Mod: HCNC,CPTII,S$GLB, | Performed by: NURSE PRACTITIONER

## 2020-03-11 PROCEDURE — 63600175 PHARM REV CODE 636 W HCPCS: Mod: JG,HCNC | Performed by: INTERNAL MEDICINE

## 2020-03-11 PROCEDURE — 3074F SYST BP LT 130 MM HG: CPT | Mod: HCNC,CPTII,S$GLB, | Performed by: INTERNAL MEDICINE

## 2020-03-11 PROCEDURE — 99999 PR PBB SHADOW E&M-EST. PATIENT-LVL V: CPT | Mod: PBBFAC,HCNC,, | Performed by: INTERNAL MEDICINE

## 2020-03-11 PROCEDURE — 3078F PR MOST RECENT DIASTOLIC BLOOD PRESSURE < 80 MM HG: ICD-10-PCS | Mod: HCNC,CPTII,S$GLB, | Performed by: NURSE PRACTITIONER

## 2020-03-11 PROCEDURE — 99999 PR PBB SHADOW E&M-EST. PATIENT-LVL V: ICD-10-PCS | Mod: PBBFAC,HCNC,, | Performed by: INTERNAL MEDICINE

## 2020-03-11 PROCEDURE — 3078F DIAST BP <80 MM HG: CPT | Mod: HCNC,CPTII,S$GLB, | Performed by: INTERNAL MEDICINE

## 2020-03-11 PROCEDURE — 3008F PR BODY MASS INDEX (BMI) DOCUMENTED: ICD-10-PCS | Mod: HCNC,CPTII,S$GLB, | Performed by: NURSE PRACTITIONER

## 2020-03-11 PROCEDURE — 99214 PR OFFICE/OUTPT VISIT, EST, LEVL IV, 30-39 MIN: ICD-10-PCS | Mod: HCNC,S$GLB,, | Performed by: NURSE PRACTITIONER

## 2020-03-11 PROCEDURE — 99214 OFFICE O/P EST MOD 30 MIN: CPT | Mod: HCNC,S$GLB,, | Performed by: INTERNAL MEDICINE

## 2020-03-11 PROCEDURE — 99999 PR PBB SHADOW E&M-EST. PATIENT-LVL IV: CPT | Mod: PBBFAC,HCNC,, | Performed by: NURSE PRACTITIONER

## 2020-03-11 PROCEDURE — 3074F SYST BP LT 130 MM HG: CPT | Mod: HCNC,CPTII,S$GLB, | Performed by: NURSE PRACTITIONER

## 2020-03-11 PROCEDURE — 3008F BODY MASS INDEX DOCD: CPT | Mod: HCNC,CPTII,S$GLB, | Performed by: INTERNAL MEDICINE

## 2020-03-11 PROCEDURE — 3008F PR BODY MASS INDEX (BMI) DOCUMENTED: ICD-10-PCS | Mod: HCNC,CPTII,S$GLB, | Performed by: INTERNAL MEDICINE

## 2020-03-11 PROCEDURE — 3074F PR MOST RECENT SYSTOLIC BLOOD PRESSURE < 130 MM HG: ICD-10-PCS | Mod: HCNC,CPTII,S$GLB, | Performed by: INTERNAL MEDICINE

## 2020-03-11 PROCEDURE — 99999 PR PBB SHADOW E&M-EST. PATIENT-LVL IV: ICD-10-PCS | Mod: PBBFAC,HCNC,, | Performed by: NURSE PRACTITIONER

## 2020-03-11 PROCEDURE — 96367 TX/PROPH/DG ADDL SEQ IV INF: CPT | Mod: HCNC

## 2020-03-11 PROCEDURE — 96409 CHEMO IV PUSH SNGL DRUG: CPT | Mod: HCNC

## 2020-03-11 PROCEDURE — 99214 PR OFFICE/OUTPT VISIT, EST, LEVL IV, 30-39 MIN: ICD-10-PCS | Mod: HCNC,S$GLB,, | Performed by: INTERNAL MEDICINE

## 2020-03-11 PROCEDURE — 3078F PR MOST RECENT DIASTOLIC BLOOD PRESSURE < 80 MM HG: ICD-10-PCS | Mod: HCNC,CPTII,S$GLB, | Performed by: INTERNAL MEDICINE

## 2020-03-11 RX ORDER — SODIUM CHLORIDE 0.9 % (FLUSH) 0.9 %
10 SYRINGE (ML) INJECTION
Status: CANCELLED | OUTPATIENT
Start: 2020-03-18

## 2020-03-11 RX ORDER — LEVOTHYROXINE SODIUM 137 UG/1
TABLET ORAL
Qty: 90 TABLET | Refills: 0 | Status: SHIPPED | OUTPATIENT
Start: 2020-03-11 | End: 2020-04-22

## 2020-03-11 RX ORDER — HEPARIN 100 UNIT/ML
500 SYRINGE INTRAVENOUS
Status: DISCONTINUED | OUTPATIENT
Start: 2020-03-11 | End: 2020-03-11 | Stop reason: HOSPADM

## 2020-03-11 RX ORDER — HEPARIN 100 UNIT/ML
500 SYRINGE INTRAVENOUS
Status: CANCELLED | OUTPATIENT
Start: 2020-03-11

## 2020-03-11 RX ORDER — HEPARIN 100 UNIT/ML
500 SYRINGE INTRAVENOUS
Status: CANCELLED | OUTPATIENT
Start: 2020-03-18

## 2020-03-11 RX ORDER — SODIUM CHLORIDE 0.9 % (FLUSH) 0.9 %
10 SYRINGE (ML) INJECTION
Status: DISCONTINUED | OUTPATIENT
Start: 2020-03-11 | End: 2020-03-11 | Stop reason: HOSPADM

## 2020-03-11 RX ORDER — SODIUM CHLORIDE 0.9 % (FLUSH) 0.9 %
10 SYRINGE (ML) INJECTION
Status: CANCELLED | OUTPATIENT
Start: 2020-03-11

## 2020-03-11 RX ADMIN — SODIUM CHLORIDE: 9 INJECTION, SOLUTION INTRAVENOUS at 11:03

## 2020-03-11 RX ADMIN — GRANISETRON HYDROCHLORIDE 1 MG: 1 INJECTION INTRAVENOUS at 11:03

## 2020-03-11 RX ADMIN — HEPARIN SODIUM (PORCINE) LOCK FLUSH IV SOLN 100 UNIT/ML 500 UNITS: 100 SOLUTION at 12:03

## 2020-03-11 RX ADMIN — ERIBULIN MESYLATE 2 MG: 0.5 INJECTION INTRAVENOUS at 12:03

## 2020-03-11 NOTE — Clinical Note
Eribulin day 1 and 8 every 3 weeksReturn in 3 weeks with CBC CMP CA 27.29, CT chest abdomen and pelvis and bone scan

## 2020-03-11 NOTE — PROGRESS NOTES
Patient, Rebecca Crain (MRN #[id number]), presented with a recent Platelet count less than 150 K/uL consistent with the definition of thrombocytopenia (ICD10 - D69.6).    Platelets   Date Value Ref Range Status   03/11/2020 146 (L) 150 - 350 K/uL Final     The patient's thrombocytopenia was monitored, evaluated, addressed and/or treated. This addendum to the medical record is made on 03/11/2020.

## 2020-03-11 NOTE — TELEPHONE ENCOUNTER
Called patient to schedule the scans in 3 weeks also the next follow up and infusion. Scans on Monday 3/30 follow up on 4/1. Also scheduled the next cycle 4/22. Mailed appt slips.        ----- Message from Rodrigo Schroeder MD sent at 3/11/2020  8:25 AM CDT -----  Eribulin day 1 and 8 every 3 weeks  Return in 3 weeks with CBC CMP CA 27.29, CT chest abdomen and pelvis and bone scan

## 2020-03-11 NOTE — ASSESSMENT & PLAN NOTE
-- Labs in 6 weeks.  -- Medication Changes:   INCREASE: Synthroid 137mcg one tablet six days and two tablets one day a week.  -- Clinically and biochemically hypothyroid.  -- Goal is a normal TSH.  -- Avoid exogenous hyperthyroidism as this can accelerate bone loss and increase risk of CV complications.  -- Advised to take LT4 on an empty stomach with water and to wait 30-45 minutes before eating or taking other medications   -- Reviewed usual times of thyroid hormone changes  -- Reviewed that symptoms of hypothyroidism may not correlate with tsh, and a normal TSH is the goal of therapy. Symptoms are not a justification for over treatment.

## 2020-03-11 NOTE — PLAN OF CARE
Pt to clinic for Halaven infusion via [facility]. Pt c/o general feelings of tiredness and weakness. Transferred to recliner without much assistance. Port accessed without difficulty. Good blood return. Pt tolerated well. Halaven infused without difficulty. Pt had no signs of adverse reaction. Port deaccessed after heparin flush. Bandaid applied. Pushed pt to lobby where friend was waiting to pick her up. In NAD.

## 2020-03-13 ENCOUNTER — PATIENT MESSAGE (OUTPATIENT)
Dept: HEMATOLOGY/ONCOLOGY | Facility: CLINIC | Age: 65
End: 2020-03-13

## 2020-03-13 DIAGNOSIS — G89.3 CANCER ASSOCIATED PAIN: ICD-10-CM

## 2020-03-13 RX ORDER — HYDROMORPHONE HYDROCHLORIDE 4 MG/1
4 TABLET ORAL EVERY 4 HOURS PRN
Qty: 120 TABLET | Refills: 0 | Status: SHIPPED | OUTPATIENT
Start: 2020-03-13 | End: 2021-01-27 | Stop reason: SDUPTHER

## 2020-03-13 RX ORDER — MORPHINE SULFATE 200 MG/1
200 TABLET, FILM COATED, EXTENDED RELEASE ORAL EVERY 8 HOURS
Qty: 90 TABLET | Refills: 0 | Status: SHIPPED | OUTPATIENT
Start: 2020-03-13 | End: 2020-04-14 | Stop reason: SDUPTHER

## 2020-03-14 ENCOUNTER — PATIENT MESSAGE (OUTPATIENT)
Dept: HEMATOLOGY/ONCOLOGY | Facility: CLINIC | Age: 65
End: 2020-03-14

## 2020-03-18 ENCOUNTER — INFUSION (OUTPATIENT)
Dept: INFUSION THERAPY | Facility: HOSPITAL | Age: 65
End: 2020-03-18
Attending: INTERNAL MEDICINE
Payer: MEDICARE

## 2020-03-18 VITALS
WEIGHT: 158.75 LBS | HEART RATE: 102 BPM | RESPIRATION RATE: 18 BRPM | DIASTOLIC BLOOD PRESSURE: 64 MMHG | TEMPERATURE: 98 F | SYSTOLIC BLOOD PRESSURE: 120 MMHG | OXYGEN SATURATION: 94 % | BODY MASS INDEX: 27.1 KG/M2 | HEIGHT: 64 IN

## 2020-03-18 DIAGNOSIS — C78.7 METASTASIS TO LIVER: ICD-10-CM

## 2020-03-18 DIAGNOSIS — Z17.0 MALIGNANT NEOPLASM OF CENTRAL PORTION OF RIGHT BREAST IN FEMALE, ESTROGEN RECEPTOR POSITIVE: ICD-10-CM

## 2020-03-18 DIAGNOSIS — C79.51 METASTATIC CANCER TO SPINE: Primary | ICD-10-CM

## 2020-03-18 DIAGNOSIS — C50.111 MALIGNANT NEOPLASM OF CENTRAL PORTION OF RIGHT BREAST IN FEMALE, ESTROGEN RECEPTOR POSITIVE: ICD-10-CM

## 2020-03-18 PROCEDURE — 63600175 PHARM REV CODE 636 W HCPCS: Mod: JG,HCNC | Performed by: INTERNAL MEDICINE

## 2020-03-18 PROCEDURE — 96367 TX/PROPH/DG ADDL SEQ IV INF: CPT | Mod: HCNC

## 2020-03-18 PROCEDURE — 96409 CHEMO IV PUSH SNGL DRUG: CPT | Mod: HCNC

## 2020-03-18 PROCEDURE — 96372 THER/PROPH/DIAG INJ SC/IM: CPT | Mod: HCNC

## 2020-03-18 RX ORDER — SODIUM CHLORIDE 0.9 % (FLUSH) 0.9 %
10 SYRINGE (ML) INJECTION
Status: DISCONTINUED | OUTPATIENT
Start: 2020-03-18 | End: 2020-03-18 | Stop reason: HOSPADM

## 2020-03-18 RX ORDER — HEPARIN 100 UNIT/ML
500 SYRINGE INTRAVENOUS
Status: DISCONTINUED | OUTPATIENT
Start: 2020-03-18 | End: 2020-03-18 | Stop reason: HOSPADM

## 2020-03-18 RX ADMIN — DENOSUMAB 120 MG: 120 INJECTION SUBCUTANEOUS at 08:03

## 2020-03-18 RX ADMIN — HEPARIN 500 UNITS: 100 SYRINGE at 08:03

## 2020-03-18 RX ADMIN — GRANISETRON HYDROCHLORIDE 1 MG: 0.1 INJECTION, SOLUTION INTRAVENOUS at 08:03

## 2020-03-18 RX ADMIN — SODIUM CHLORIDE: 0.9 INJECTION, SOLUTION INTRAVENOUS at 07:03

## 2020-03-18 RX ADMIN — ERIBULIN MESYLATE 2 MG: 0.5 INJECTION INTRAVENOUS at 08:03

## 2020-03-18 NOTE — PLAN OF CARE
Pt received Halaven and xgeva; tolerated well. VSS and NAD. Pt instructed to call MD with any concerns. Pt discharged home independently.

## 2020-03-19 ENCOUNTER — INFUSION (OUTPATIENT)
Dept: INFUSION THERAPY | Facility: HOSPITAL | Age: 65
End: 2020-03-19
Attending: INTERNAL MEDICINE
Payer: MEDICARE

## 2020-03-19 VITALS
HEART RATE: 99 BPM | DIASTOLIC BLOOD PRESSURE: 57 MMHG | SYSTOLIC BLOOD PRESSURE: 114 MMHG | OXYGEN SATURATION: 95 % | TEMPERATURE: 99 F | RESPIRATION RATE: 17 BRPM

## 2020-03-19 DIAGNOSIS — C50.111 MALIGNANT NEOPLASM OF CENTRAL PORTION OF RIGHT BREAST IN FEMALE, ESTROGEN RECEPTOR POSITIVE: ICD-10-CM

## 2020-03-19 DIAGNOSIS — C78.7 METASTASIS TO LIVER: Primary | ICD-10-CM

## 2020-03-19 DIAGNOSIS — Z17.0 MALIGNANT NEOPLASM OF CENTRAL PORTION OF RIGHT BREAST IN FEMALE, ESTROGEN RECEPTOR POSITIVE: ICD-10-CM

## 2020-03-19 DIAGNOSIS — C79.51 METASTATIC CANCER TO SPINE: ICD-10-CM

## 2020-03-19 PROCEDURE — 96372 THER/PROPH/DIAG INJ SC/IM: CPT | Mod: HCNC

## 2020-03-19 PROCEDURE — 63600175 PHARM REV CODE 636 W HCPCS: Mod: HCNC | Performed by: INTERNAL MEDICINE

## 2020-03-19 RX ADMIN — PEGFILGRASTIM-CBQV 6 MG: 6 INJECTION, SOLUTION SUBCUTANEOUS at 01:03

## 2020-03-19 NOTE — PLAN OF CARE
Patient arrived to unit for C3D9 Udenyca. Plan of care reviewed, patient agreeable to plan. Patient tolerated injection well. VSS. Patient received discharge instructions and follow up appointments. Patient instructed to return 3/30/20 for bone scans then 4/1/20 for labs, Dr. Schroeder followup, and chemo. Verbalized understanding and was escorted off unit via w/c by MA. Patient in NAD at time of discharge.

## 2020-03-25 ENCOUNTER — PATIENT MESSAGE (OUTPATIENT)
Dept: HEMATOLOGY/ONCOLOGY | Facility: CLINIC | Age: 65
End: 2020-03-25

## 2020-03-25 DIAGNOSIS — T45.1X5A CHEMOTHERAPY-INDUCED NAUSEA: Primary | ICD-10-CM

## 2020-03-25 DIAGNOSIS — R11.0 CHEMOTHERAPY-INDUCED NAUSEA: Primary | ICD-10-CM

## 2020-03-25 RX ORDER — PROMETHAZINE HYDROCHLORIDE 12.5 MG/1
12.5 TABLET ORAL 4 TIMES DAILY
Qty: 90 TABLET | Refills: 3 | Status: SHIPPED | OUTPATIENT
Start: 2020-03-25 | End: 2021-03-23 | Stop reason: SDUPTHER

## 2020-03-27 ENCOUNTER — PATIENT MESSAGE (OUTPATIENT)
Dept: HEMATOLOGY/ONCOLOGY | Facility: CLINIC | Age: 65
End: 2020-03-27

## 2020-03-31 NOTE — PROGRESS NOTES
Subjective:       Patient ID: Rebecca Crain is a 64 y.o. female.    Chief Complaint: No chief complaint on file.    Bobo Crain is here for followup of metastatic carcinoma of the right breast.  She is currently on eribulin therapy.  She has had 3 cycles thus far.  Her last CA 27.29 in decreased from 276 to130.    The patient location is:  home.  The chief complaint leading to consultation is:  Breast cancer.  Visit type: Virtual visit with synchronous audio and video  Total time spent with patient: 20 minutes including documentation, lab review and orders.  Each patient to whom he or she provides medical services by telemedicine is:  (1) informed of the relationship between the physician and patient and the respective role of any other health care provider with respect to management of the patient; and (2) notified that he or she may decline to receive medical services by telemedicine and may withdraw from such care at any time.    Notes:  She reports that last week she had some nausea vomiting and diarrhea which has largely resolved.  She had no fever.  Breathing is improved although she has still not completely recovered from respiratory infection that she had in January.  Overall, her pain is doing well although she does have some worsening neuropathy in her hands and feet.  On March 11 her TSH was elevated and she had her supplemental thyroid dosage increased.     She continues on MS Contin 200 mg 3 times per day as her primary pain control.          ECOG-1-2    Breast history: In 2013 she was diagnosed with stage IIB carcinoma of the right breast, ER positive with a low Oncotype score.  She was enrolled on protocol  and randomized to endocrine therapy alone.  She was tried on all 3 aromatase inhibitors and had itching and rash to all of them.  In August 2013 she was started on tamoxifen.   She was found to have  bone metastasis in May 2015, 2015.  A subsequent bone biopsy was performed on a lesion  at the T8 vertebra.   The pathology from that was consistent with metastatic breast cancer, ER +80%, [place] +80%, and HER-2 negative.      She received letrozole plus palbociclib from July 2015 until May 2016.  She also received bone protective therapy with Xgeva.      Faslodex was started in June 2016.      Exemestane and Afinitor started in August 2017.  That was discontinued in February 2018 due to progression in the liver and bone.     Navelbine was started February 16, 2018.  She had 8 cycles for that.  Scans in November 2018 showed progression as follows     Capecitabine was started in December 2018.  She received 5 cycles of that therapy.    Follow-up scans in April 2019 showed progression with a new hepatic lesion and worsening bone disease.    She began clinical trial therapy with erdafitinib on May 8, 2019.  That was discontinued in September 2019 due to progression in the bone.    She then received a course of radiation therapy to her pelvis.  She began weekly Taxol in October 2019.That was discontinued in December 2019 due to progression.  Review of Systems   Constitutional: Negative for appetite change and unexpected weight change.   Eyes: Negative for visual disturbance.   Respiratory: Positive for cough and shortness of breath.    Cardiovascular: Negative for chest pain.   Gastrointestinal: Positive for diarrhea. Negative for abdominal pain.   Genitourinary: Negative for frequency.   Musculoskeletal: Negative for back pain.   Skin: Negative for rash.   Neurological: Negative for headaches.   Hematological: Negative for adenopathy.   Psychiatric/Behavioral: The patient is not nervous/anxious.        Objective:      Physical Exam    Assessment:     CBC is normal, metabolic profile shows normal hepatic and renal function, calcium 7.5 with an albumin of 3.3  1. Malignant neoplasm of central portion of right breast in female, estrogen receptor positive    2. Bone metastasis    3. Metastasis to liver         Plan:       Plan to continue with cycle 4 of Halaven.  Repeat CT scans at next visit.  Will do for the bone scan at this time.

## 2020-04-01 ENCOUNTER — OFFICE VISIT (OUTPATIENT)
Dept: HEMATOLOGY/ONCOLOGY | Facility: CLINIC | Age: 65
End: 2020-04-01
Payer: MEDICARE

## 2020-04-01 ENCOUNTER — INFUSION (OUTPATIENT)
Dept: INFUSION THERAPY | Facility: HOSPITAL | Age: 65
End: 2020-04-01
Attending: INTERNAL MEDICINE
Payer: MEDICARE

## 2020-04-01 VITALS
SYSTOLIC BLOOD PRESSURE: 112 MMHG | OXYGEN SATURATION: 96 % | HEART RATE: 92 BPM | TEMPERATURE: 98 F | DIASTOLIC BLOOD PRESSURE: 66 MMHG | RESPIRATION RATE: 18 BRPM

## 2020-04-01 DIAGNOSIS — C50.111 MALIGNANT NEOPLASM OF CENTRAL PORTION OF RIGHT BREAST IN FEMALE, ESTROGEN RECEPTOR POSITIVE: Primary | ICD-10-CM

## 2020-04-01 DIAGNOSIS — C50.111 MALIGNANT NEOPLASM OF CENTRAL PORTION OF RIGHT BREAST IN FEMALE, ESTROGEN RECEPTOR POSITIVE: ICD-10-CM

## 2020-04-01 DIAGNOSIS — Z17.0 MALIGNANT NEOPLASM OF CENTRAL PORTION OF RIGHT BREAST IN FEMALE, ESTROGEN RECEPTOR POSITIVE: ICD-10-CM

## 2020-04-01 DIAGNOSIS — C79.51 BONE METASTASIS: ICD-10-CM

## 2020-04-01 DIAGNOSIS — C78.7 METASTASIS TO LIVER: Primary | ICD-10-CM

## 2020-04-01 DIAGNOSIS — C79.51 METASTATIC CANCER TO SPINE: ICD-10-CM

## 2020-04-01 DIAGNOSIS — Z17.0 MALIGNANT NEOPLASM OF CENTRAL PORTION OF RIGHT BREAST IN FEMALE, ESTROGEN RECEPTOR POSITIVE: Primary | ICD-10-CM

## 2020-04-01 DIAGNOSIS — C78.7 METASTASIS TO LIVER: ICD-10-CM

## 2020-04-01 PROCEDURE — 99213 PR OFFICE/OUTPT VISIT, EST, LEVL III, 20-29 MIN: ICD-10-PCS | Mod: HCNC,95,, | Performed by: INTERNAL MEDICINE

## 2020-04-01 PROCEDURE — 25000003 PHARM REV CODE 250: Mod: HCNC | Performed by: INTERNAL MEDICINE

## 2020-04-01 PROCEDURE — 63600175 PHARM REV CODE 636 W HCPCS: Mod: HCNC | Performed by: INTERNAL MEDICINE

## 2020-04-01 PROCEDURE — 3074F SYST BP LT 130 MM HG: CPT | Mod: HCNC,CPTII,95, | Performed by: INTERNAL MEDICINE

## 2020-04-01 PROCEDURE — A4216 STERILE WATER/SALINE, 10 ML: HCPCS | Mod: HCNC | Performed by: INTERNAL MEDICINE

## 2020-04-01 PROCEDURE — 3078F PR MOST RECENT DIASTOLIC BLOOD PRESSURE < 80 MM HG: ICD-10-PCS | Mod: HCNC,CPTII,95, | Performed by: INTERNAL MEDICINE

## 2020-04-01 PROCEDURE — 96409 CHEMO IV PUSH SNGL DRUG: CPT | Mod: HCNC

## 2020-04-01 PROCEDURE — 3078F DIAST BP <80 MM HG: CPT | Mod: HCNC,CPTII,95, | Performed by: INTERNAL MEDICINE

## 2020-04-01 PROCEDURE — 99213 OFFICE O/P EST LOW 20 MIN: CPT | Mod: HCNC,95,, | Performed by: INTERNAL MEDICINE

## 2020-04-01 PROCEDURE — 96367 TX/PROPH/DG ADDL SEQ IV INF: CPT | Mod: HCNC

## 2020-04-01 PROCEDURE — 3074F PR MOST RECENT SYSTOLIC BLOOD PRESSURE < 130 MM HG: ICD-10-PCS | Mod: HCNC,CPTII,95, | Performed by: INTERNAL MEDICINE

## 2020-04-01 RX ORDER — HEPARIN 100 UNIT/ML
500 SYRINGE INTRAVENOUS
Status: DISCONTINUED | OUTPATIENT
Start: 2020-04-01 | End: 2020-04-01 | Stop reason: HOSPADM

## 2020-04-01 RX ORDER — SODIUM CHLORIDE 0.9 % (FLUSH) 0.9 %
10 SYRINGE (ML) INJECTION
Status: CANCELLED | OUTPATIENT
Start: 2020-04-01

## 2020-04-01 RX ORDER — HEPARIN 100 UNIT/ML
500 SYRINGE INTRAVENOUS
Status: CANCELLED | OUTPATIENT
Start: 2020-04-01

## 2020-04-01 RX ORDER — HEPARIN 100 UNIT/ML
500 SYRINGE INTRAVENOUS
Status: CANCELLED | OUTPATIENT
Start: 2020-04-08

## 2020-04-01 RX ORDER — SODIUM CHLORIDE 0.9 % (FLUSH) 0.9 %
10 SYRINGE (ML) INJECTION
Status: CANCELLED | OUTPATIENT
Start: 2020-04-08

## 2020-04-01 RX ORDER — SODIUM CHLORIDE 0.9 % (FLUSH) 0.9 %
10 SYRINGE (ML) INJECTION
Status: DISCONTINUED | OUTPATIENT
Start: 2020-04-01 | End: 2020-04-01 | Stop reason: HOSPADM

## 2020-04-01 RX ADMIN — Medication 10 ML: at 02:04

## 2020-04-01 RX ADMIN — ERIBULIN MESYLATE 2.55 MG: 0.5 INJECTION INTRAVENOUS at 01:04

## 2020-04-01 RX ADMIN — GRANISETRON HYDROCHLORIDE 1 MG: 1 INJECTION INTRAVENOUS at 01:04

## 2020-04-01 RX ADMIN — HEPARIN 500 UNITS: 100 SYRINGE at 02:04

## 2020-04-01 NOTE — Clinical Note
Halaven day 1 and 8 on the [facility] [place], [place], CBC day 8See me in 3 weeks with CBC, CMP, CA 27.29 and CT scans of the chest abdomen and pelvis.  Do not reschedule bone scan yet.Xgeva on in 3 weeks-needs to be delayed

## 2020-04-02 ENCOUNTER — PATIENT MESSAGE (OUTPATIENT)
Dept: HEMATOLOGY/ONCOLOGY | Facility: CLINIC | Age: 65
End: 2020-04-02

## 2020-04-05 ENCOUNTER — PATIENT MESSAGE (OUTPATIENT)
Dept: HEMATOLOGY/ONCOLOGY | Facility: CLINIC | Age: 65
End: 2020-04-05

## 2020-04-06 ENCOUNTER — PATIENT MESSAGE (OUTPATIENT)
Dept: HEMATOLOGY/ONCOLOGY | Facility: CLINIC | Age: 65
End: 2020-04-06

## 2020-04-07 ENCOUNTER — PATIENT MESSAGE (OUTPATIENT)
Dept: HEMATOLOGY/ONCOLOGY | Facility: CLINIC | Age: 65
End: 2020-04-07

## 2020-04-07 DIAGNOSIS — C79.51 METASTATIC CANCER TO SPINE: ICD-10-CM

## 2020-04-07 DIAGNOSIS — Z17.0 MALIGNANT NEOPLASM OF CENTRAL PORTION OF RIGHT BREAST IN FEMALE, ESTROGEN RECEPTOR POSITIVE: Primary | ICD-10-CM

## 2020-04-07 DIAGNOSIS — C79.51 BONE METASTASIS: ICD-10-CM

## 2020-04-07 DIAGNOSIS — D70.1 CHEMOTHERAPY INDUCED NEUTROPENIA: ICD-10-CM

## 2020-04-07 DIAGNOSIS — T45.1X5A CHEMOTHERAPY INDUCED NEUTROPENIA: ICD-10-CM

## 2020-04-07 DIAGNOSIS — C50.111 MALIGNANT NEOPLASM OF CENTRAL PORTION OF RIGHT BREAST IN FEMALE, ESTROGEN RECEPTOR POSITIVE: Primary | ICD-10-CM

## 2020-04-07 DIAGNOSIS — C78.7 METASTASIS TO LIVER: ICD-10-CM

## 2020-04-08 ENCOUNTER — INFUSION (OUTPATIENT)
Dept: INFUSION THERAPY | Facility: HOSPITAL | Age: 65
End: 2020-04-08
Attending: INTERNAL MEDICINE
Payer: MEDICARE

## 2020-04-08 VITALS
HEART RATE: 86 BPM | TEMPERATURE: 98 F | RESPIRATION RATE: 17 BRPM | DIASTOLIC BLOOD PRESSURE: 56 MMHG | SYSTOLIC BLOOD PRESSURE: 118 MMHG | OXYGEN SATURATION: 98 %

## 2020-04-08 DIAGNOSIS — D70.1 CHEMOTHERAPY INDUCED NEUTROPENIA: ICD-10-CM

## 2020-04-08 DIAGNOSIS — C78.7 METASTASIS TO LIVER: ICD-10-CM

## 2020-04-08 DIAGNOSIS — T45.1X5A CHEMOTHERAPY INDUCED NEUTROPENIA: ICD-10-CM

## 2020-04-08 DIAGNOSIS — Z17.0 MALIGNANT NEOPLASM OF CENTRAL PORTION OF RIGHT BREAST IN FEMALE, ESTROGEN RECEPTOR POSITIVE: ICD-10-CM

## 2020-04-08 DIAGNOSIS — C50.111 MALIGNANT NEOPLASM OF CENTRAL PORTION OF RIGHT BREAST IN FEMALE, ESTROGEN RECEPTOR POSITIVE: ICD-10-CM

## 2020-04-08 DIAGNOSIS — C79.51 BONE METASTASIS: ICD-10-CM

## 2020-04-08 DIAGNOSIS — C50.111 MALIGNANT NEOPLASM OF CENTRAL PORTION OF RIGHT BREAST IN FEMALE, ESTROGEN RECEPTOR POSITIVE: Primary | ICD-10-CM

## 2020-04-08 DIAGNOSIS — C50.011 MALIGNANT NEOPLASM OF NIPPLE OF RIGHT BREAST IN FEMALE, UNSPECIFIED ESTROGEN RECEPTOR STATUS: Primary | ICD-10-CM

## 2020-04-08 DIAGNOSIS — Z17.0 MALIGNANT NEOPLASM OF CENTRAL PORTION OF RIGHT BREAST IN FEMALE, ESTROGEN RECEPTOR POSITIVE: Primary | ICD-10-CM

## 2020-04-08 DIAGNOSIS — C79.51 METASTATIC CANCER TO SPINE: ICD-10-CM

## 2020-04-08 LAB
ALBUMIN SERPL BCP-MCNC: 3.1 G/DL (ref 3.5–5.2)
ALP SERPL-CCNC: 140 U/L (ref 55–135)
ALT SERPL W/O P-5'-P-CCNC: 10 U/L (ref 10–44)
ANION GAP SERPL CALC-SCNC: 7 MMOL/L (ref 8–16)
AST SERPL-CCNC: 22 U/L (ref 10–40)
BILIRUB SERPL-MCNC: 1 MG/DL (ref 0.1–1)
BUN SERPL-MCNC: 12 MG/DL (ref 8–23)
CALCIUM SERPL-MCNC: 8.1 MG/DL (ref 8.7–10.5)
CHLORIDE SERPL-SCNC: 106 MMOL/L (ref 95–110)
CO2 SERPL-SCNC: 26 MMOL/L (ref 23–29)
CREAT SERPL-MCNC: 0.7 MG/DL (ref 0.5–1.4)
ERYTHROCYTE [DISTWIDTH] IN BLOOD BY AUTOMATED COUNT: 18.8 % (ref 11.5–14.5)
EST. GFR  (AFRICAN AMERICAN): >60 ML/MIN/1.73 M^2
EST. GFR  (NON AFRICAN AMERICAN): >60 ML/MIN/1.73 M^2
GLUCOSE SERPL-MCNC: 107 MG/DL (ref 70–110)
HCT VFR BLD AUTO: 36.4 % (ref 37–48.5)
HGB BLD-MCNC: 11.3 G/DL (ref 12–16)
IMM GRANULOCYTES # BLD AUTO: 0.13 K/UL (ref 0–0.04)
MCH RBC QN AUTO: 28.5 PG (ref 27–31)
MCHC RBC AUTO-ENTMCNC: 31 G/DL (ref 32–36)
MCV RBC AUTO: 92 FL (ref 82–98)
NEUTROPHILS # BLD AUTO: 1.9 K/UL (ref 1.8–7.7)
PLATELET # BLD AUTO: 110 K/UL (ref 150–350)
PMV BLD AUTO: 11.2 FL (ref 9.2–12.9)
POTASSIUM SERPL-SCNC: 3.8 MMOL/L (ref 3.5–5.1)
PROT SERPL-MCNC: 6.4 G/DL (ref 6–8.4)
RBC # BLD AUTO: 3.97 M/UL (ref 4–5.4)
SODIUM SERPL-SCNC: 139 MMOL/L (ref 136–145)
WBC # BLD AUTO: 3.3 K/UL (ref 3.9–12.7)

## 2020-04-08 PROCEDURE — 96409 CHEMO IV PUSH SNGL DRUG: CPT | Mod: HCNC

## 2020-04-08 PROCEDURE — 63600175 PHARM REV CODE 636 W HCPCS: Mod: HCNC | Performed by: INTERNAL MEDICINE

## 2020-04-08 PROCEDURE — 36591 DRAW BLOOD OFF VENOUS DEVICE: CPT | Mod: HCNC

## 2020-04-08 PROCEDURE — 25000003 PHARM REV CODE 250: Mod: HCNC | Performed by: INTERNAL MEDICINE

## 2020-04-08 PROCEDURE — A4216 STERILE WATER/SALINE, 10 ML: HCPCS | Mod: HCNC | Performed by: INTERNAL MEDICINE

## 2020-04-08 PROCEDURE — 80053 COMPREHEN METABOLIC PANEL: CPT | Mod: HCNC

## 2020-04-08 PROCEDURE — 85027 COMPLETE CBC AUTOMATED: CPT | Mod: HCNC

## 2020-04-08 PROCEDURE — 96367 TX/PROPH/DG ADDL SEQ IV INF: CPT | Mod: HCNC

## 2020-04-08 RX ORDER — HEPARIN 100 UNIT/ML
500 SYRINGE INTRAVENOUS
Status: DISCONTINUED | OUTPATIENT
Start: 2020-04-08 | End: 2020-04-08 | Stop reason: HOSPADM

## 2020-04-08 RX ORDER — SODIUM CHLORIDE 0.9 % (FLUSH) 0.9 %
10 SYRINGE (ML) INJECTION
Status: DISCONTINUED | OUTPATIENT
Start: 2020-04-08 | End: 2020-04-08 | Stop reason: HOSPADM

## 2020-04-08 RX ADMIN — HEPARIN 500 UNITS: 100 SYRINGE at 12:04

## 2020-04-08 RX ADMIN — GRANISETRON HYDROCHLORIDE 1 MG: 1 INJECTION INTRAVENOUS at 11:04

## 2020-04-08 RX ADMIN — ERIBULIN MESYLATE 2.55 MG: 0.5 INJECTION INTRAVENOUS at 11:04

## 2020-04-08 RX ADMIN — Medication 10 ML: at 12:04

## 2020-04-08 NOTE — PLAN OF CARE
Patient arrived to unit for C4D8 Halaven. Labs drawn prior to treatment. She denies any new or worsening symptoms at this time. Patient pre-medicated with Kytril. Tolerated Halaven well. VSS. Received discharge instructions and follow up appointments. Patient to receive D9 Lexie tomorrow. Verbalized understanding and assisted off unit via wheelchair by MA. In NAD.

## 2020-04-09 ENCOUNTER — TELEPHONE (OUTPATIENT)
Dept: PHARMACY | Facility: CLINIC | Age: 65
End: 2020-04-09

## 2020-04-09 ENCOUNTER — INFUSION (OUTPATIENT)
Dept: INFUSION THERAPY | Facility: HOSPITAL | Age: 65
End: 2020-04-09
Attending: INTERNAL MEDICINE
Payer: MEDICARE

## 2020-04-09 ENCOUNTER — TELEPHONE (OUTPATIENT)
Dept: HEMATOLOGY/ONCOLOGY | Facility: CLINIC | Age: 65
End: 2020-04-09

## 2020-04-09 VITALS
RESPIRATION RATE: 18 BRPM | OXYGEN SATURATION: 96 % | DIASTOLIC BLOOD PRESSURE: 52 MMHG | HEART RATE: 109 BPM | SYSTOLIC BLOOD PRESSURE: 96 MMHG | TEMPERATURE: 98 F

## 2020-04-09 DIAGNOSIS — C50.111 MALIGNANT NEOPLASM OF CENTRAL PORTION OF RIGHT BREAST IN FEMALE, ESTROGEN RECEPTOR POSITIVE: ICD-10-CM

## 2020-04-09 DIAGNOSIS — Z17.0 MALIGNANT NEOPLASM OF CENTRAL PORTION OF RIGHT BREAST IN FEMALE, ESTROGEN RECEPTOR POSITIVE: ICD-10-CM

## 2020-04-09 DIAGNOSIS — C79.51 METASTATIC CANCER TO SPINE: ICD-10-CM

## 2020-04-09 DIAGNOSIS — C78.7 METASTASIS TO LIVER: Primary | ICD-10-CM

## 2020-04-09 PROCEDURE — 63600175 PHARM REV CODE 636 W HCPCS: Mod: TB,HCNC | Performed by: INTERNAL MEDICINE

## 2020-04-09 PROCEDURE — 96372 THER/PROPH/DIAG INJ SC/IM: CPT | Mod: HCNC

## 2020-04-09 RX ADMIN — PEGFILGRASTIM-CBQV 6 MG: 6 INJECTION, SOLUTION SUBCUTANEOUS at 11:04

## 2020-04-09 NOTE — TELEPHONE ENCOUNTER
DOCUMENTATION ONLY:  Prior authorization for Neulasta 6 mg/0.6 mL #0.6 mL/21 days approved from 04/09/2020 to 04/09/2021  Case ID: [id number]    Co-pay: $75.00    Patient Assistance IS required. Sending to the financial assistance team to investigate assistance options. - TIFFANY      FOR DOCUMENTATION ONLY:  Financial Assistance for Neulasta approved from 04/09/2020 to 04/09/2021  Source: Neulasta  BIN: [id number]  ID: [id number]  GRP: [id number]  Co-pay with co-pay card:$0.00

## 2020-04-09 NOTE — TELEPHONE ENCOUNTER
"Call returned to Danielle. Not able get call through. Call kept dropping after the first ring       ----- Message from Leny Pang sent at 4/9/2020 12:13 PM CDT -----  Contact: [facility]   Name of caller: Danielle   Provider name: Alexandre CANADA MD   Contact Preference: [phone]  Is this regarding current patient or new patient?: current   What is the nature of the call?    - pt needs Neulasta shot: was supposed to be admitted into    but they're being advised not to go until her next shot is   due. Please call and advise.     Additional Notes:   "Thank you for all that you do for our patients'"       "

## 2020-04-09 NOTE — TELEPHONE ENCOUNTER
Patient confirmed that she received her Neulasta injection already today and will be in need of her next injection for 4/30. Neulasta has been approved for $0.00. Patient would like a call back in 2 weeks for initial consultation at around 2pm. Patient will have a nurse that will be helping with administration of Neulasta injection as well.

## 2020-04-13 ENCOUNTER — PATIENT MESSAGE (OUTPATIENT)
Dept: ENDOCRINOLOGY | Facility: CLINIC | Age: 65
End: 2020-04-13

## 2020-04-13 DIAGNOSIS — Z51.11 ENCOUNTER FOR ANTINEOPLASTIC CHEMOTHERAPY: ICD-10-CM

## 2020-04-13 DIAGNOSIS — G89.3 CANCER ASSOCIATED PAIN: ICD-10-CM

## 2020-04-13 RX ORDER — LIDOCAINE AND PRILOCAINE 25; 25 MG/G; MG/G
CREAM TOPICAL DAILY PRN
OUTPATIENT
Start: 2020-04-13

## 2020-04-13 RX ORDER — MORPHINE SULFATE 200 MG/1
200 TABLET, FILM COATED, EXTENDED RELEASE ORAL EVERY 8 HOURS
Qty: 90 TABLET | Refills: 0 | OUTPATIENT
Start: 2020-04-13 | End: 2020-05-13

## 2020-04-14 ENCOUNTER — PATIENT MESSAGE (OUTPATIENT)
Dept: HEMATOLOGY/ONCOLOGY | Facility: CLINIC | Age: 65
End: 2020-04-14

## 2020-04-14 DIAGNOSIS — Z51.11 ENCOUNTER FOR ANTINEOPLASTIC CHEMOTHERAPY: ICD-10-CM

## 2020-04-14 DIAGNOSIS — G89.3 CANCER ASSOCIATED PAIN: ICD-10-CM

## 2020-04-14 RX ORDER — MORPHINE SULFATE 200 MG/1
200 TABLET, FILM COATED, EXTENDED RELEASE ORAL EVERY 8 HOURS
Qty: 90 TABLET | Refills: 0 | Status: SHIPPED | OUTPATIENT
Start: 2020-04-14 | End: 2020-05-13 | Stop reason: SDUPTHER

## 2020-04-14 RX ORDER — LIDOCAINE AND PRILOCAINE 25; 25 MG/G; MG/G
CREAM TOPICAL
Qty: 5 G | Refills: 4 | Status: ON HOLD | OUTPATIENT
Start: 2020-04-14 | End: 2021-08-25 | Stop reason: HOSPADM

## 2020-04-20 ENCOUNTER — PATIENT MESSAGE (OUTPATIENT)
Dept: HEMATOLOGY/ONCOLOGY | Facility: CLINIC | Age: 65
End: 2020-04-20

## 2020-04-20 ENCOUNTER — TELEPHONE (OUTPATIENT)
Dept: HEMATOLOGY/ONCOLOGY | Facility: CLINIC | Age: 65
End: 2020-04-20

## 2020-04-20 ENCOUNTER — LAB VISIT (OUTPATIENT)
Dept: FAMILY MEDICINE | Facility: CLINIC | Age: 65
End: 2020-04-20
Payer: MEDICARE

## 2020-04-20 DIAGNOSIS — Z13.9 SCREENING FOR CONDITION: ICD-10-CM

## 2020-04-20 DIAGNOSIS — Z13.9 SCREENING FOR CONDITION: Primary | ICD-10-CM

## 2020-04-20 PROCEDURE — U0002 COVID-19 LAB TEST NON-CDC: HCPCS | Mod: HCNC

## 2020-04-20 NOTE — TELEPHONE ENCOUNTER
Phoned pt re: the below message from FAVIO Flynn RN.    Pt stated she has discussed SOB and cough with Dr. Schroeder and that those symptoms are not worsening; the cough is productive, sometimes of clear mucus, and sometimes of thick, light-green mucus; the SOB is brought on with light activity such as repositioning, and it occurs every day.  Pt also stated she has discussed her nausea with Dr. Schroeder and that this occurs very intermittently (not daily) since starting her new chemotherapy and is not worsening.  Pt denies chest pain.  Pt endorses experiencing fevers of up to 100.7 between 24 and 36 hours after every (she has has 2) Neulasta injection.  Of note, pt also endorses that she was experiencing loss of taste for a couple of weeks, but that has been resolves for a couple of weeks now.  She denies experiencing other symptoms.  Advised pt to call [facility] on-call nurse line if needed and that I would pass this message along to Dr. Schroeder.    ----- Message from Janis Flynn sent at 4/20/2020  3:52 PM CDT -----  Contact: Yue Flynn  appt made  At [employer] at 4:30 today-- Patient gives history of  Shortness of breath and coughing  Since January and intermittent nausea

## 2020-04-20 NOTE — PROGRESS NOTES
04/20/2020      In an effort to protect our immunocompromised patients from potential exposure to COVID-19, [facility] will now require all patients receiving an infusion, an injection, and/or radiation therapy to be tested for COVID-19 prior to their appointment.  All patients currently under treatment will be tested immediately, and patients initiating new treatment cycles or with one-time appointments (injections, transfusions, etc.) must be tested within 72 hours of their appointment.     Placed COVID-19 test order for patient.  A member of our team is to contact the patient in the near future to explain this process and the rationale behind it, to ask the COVID-19 screening questions, and to get the patient scheduled for their COVID-19 test.     The above was completed in accordance with instructions and guidelines set forth by [facility].     Signed,    Tarik Mayorga, GILDA     Date:  04/20/2020

## 2020-04-21 ENCOUNTER — TELEPHONE (OUTPATIENT)
Dept: HEMATOLOGY/ONCOLOGY | Facility: CLINIC | Age: 65
End: 2020-04-21

## 2020-04-21 LAB — SARS-COV-2 RNA RESP QL NAA+PROBE: NOT DETECTED

## 2020-04-21 NOTE — PROGRESS NOTES
Subjective:       Patient ID: Rebecca Crain is a 64 y.o. female.    Chief Complaint: No chief complaint on file.    HPI Ms. Crain is here for followup of metastatic carcinoma of the right breast.  She is currently on eribulin therapy.  She has had 4 cycles thus far.  Her last CA 27.29 in decreased from 276 to133.    The patient location is:  [facility].  The chief complaint leading to consultation is:  Breast cancer.  Visit type: audiovisual  Total time spent with patient:  20 min including counseling and documentation and orders.  Each patient to whom he or she provides medical services by telemedicine is:  (1) informed of the relationship between the physician and patient and the respective role of any other health care provider with respect to management of the patient; and (2) notified that he or she may decline to receive medical services by telemedicine and may withdraw from such care at any time.    Notes:  Today she reports she has been doing fairly well overall.  She has had a chronic cough and occasionally has mucus production which is clear are occasionally greenish.  She has had no fever.  Her pain has been well controlled in general.  She does have some occasional episodes of right-sided abdominal pain for which she takes Dilaudid.  Those typically last about a week and then resolved.  Her last episode of that was 2 weeks ago.  Her appetite has been decreased somewhat.  She does report that she gets a bit of a fever after Neulasta.    She continues on MS Contin 200 mg 3 times per day as her primary pain control.          ECOG-1-2    Breast history: In 2013 she was diagnosed with stage IIB carcinoma of the right breast, ER positive with a low Oncotype score.  She was enrolled on protocol  and randomized to endocrine therapy alone.  She was tried on all 3 aromatase inhibitors and had itching and rash to all of them.  In August 2013 she was started on tamoxifen.   She was found to have  bone  metastasis in May 2015, 2015.  A subsequent bone biopsy was performed on a lesion at the T8 vertebra.   The pathology from that was consistent with metastatic breast cancer, ER +80%, [place] +80%, and HER-2 negative.      She received letrozole plus palbociclib from July 2015 until May 2016.  She also received bone protective therapy with Xgeva.      Faslodex was started in June 2016.      Exemestane and Afinitor started in August 2017.  That was discontinued in February 2018 due to progression in the liver and bone.     Navelbine was started February 16, 2018.  She had 8 cycles for that.  Scans in November 2018 showed progression as follows     Capecitabine was started in December 2018.  She received 5 cycles of that therapy.    Follow-up scans in April 2019 showed progression with a new hepatic lesion and worsening bone disease.    She began clinical trial therapy with erdafitinib on May 8, 2019.  That was discontinued in September 2019 due to progression in the bone.    She then received a course of radiation therapy to her pelvis.  She began weekly Taxol in October 2019.That was discontinued in December 2019 due to progression.    She began eribulin therapy on 1/15/2020.  Review of Systems   Constitutional: Positive for appetite change (Decreased). Negative for activity change, fever and unexpected weight change.   Eyes: Negative for visual disturbance.   Respiratory: Positive for cough and shortness of breath.    Cardiovascular: Negative for chest pain.   Gastrointestinal: Negative for abdominal pain and diarrhea.   Genitourinary: Negative for frequency.   Musculoskeletal: Negative for back pain.   Skin: Negative for rash.   Hematological: Negative for adenopathy.   Psychiatric/Behavioral: Negative for dysphoric mood. The patient is not nervous/anxious.        Objective:      Physical Exam   Constitutional: She is oriented to person, place, and time. She appears well-developed and well-nourished. No distress.    Neurological: She is alert and oriented to person, place, and time.   Psychiatric: She has a normal mood and affect. Her behavior is normal. Thought content normal.       Assessment:     CBC and metabolic profile showed no significant abnormalities  CT scan shows improvement in her hepatic metastatic disease and stable bone disease.  There is some airway opacity in the lungs consistent with inflammatory changes.  1. Malignant neoplasm of central portion of right breast in female, estrogen receptor positive    2. Bone metastasis    3. Metastasis to liver    4. Cancer related pain        Plan:       She will continue her current therapy.  I will see her again in 3 weeks time.  I recommended trial antibiotics for her persistent pulmonary symptoms and possible pneumonitis.

## 2020-04-21 NOTE — PROGRESS NOTES
To:  EDEL Blunt-  Please phone this patient with her negative COVID-19 test results following the scripting and protocol we have reviewed.  If my assistance is needed, don't hesitate to reach out.  Thanks!  -Tarik Mayorga, DNP, APRN, FNP-BC, AOCNP  [facility], 3rd [facility]  [facility] [address], [place], [place]  [id number] [phone]

## 2020-04-22 ENCOUNTER — INFUSION (OUTPATIENT)
Dept: INFUSION THERAPY | Facility: HOSPITAL | Age: 65
End: 2020-04-22
Attending: INTERNAL MEDICINE
Payer: MEDICARE

## 2020-04-22 ENCOUNTER — HOSPITAL ENCOUNTER (OUTPATIENT)
Dept: RADIOLOGY | Facility: HOSPITAL | Age: 65
Discharge: HOME | End: 2020-04-22
Attending: INTERNAL MEDICINE
Payer: MEDICARE

## 2020-04-22 ENCOUNTER — PATIENT MESSAGE (OUTPATIENT)
Dept: ENDOCRINOLOGY | Facility: CLINIC | Age: 65
End: 2020-04-22

## 2020-04-22 ENCOUNTER — OFFICE VISIT (OUTPATIENT)
Dept: HEMATOLOGY/ONCOLOGY | Facility: CLINIC | Age: 65
End: 2020-04-22
Payer: MEDICARE

## 2020-04-22 VITALS
DIASTOLIC BLOOD PRESSURE: 54 MMHG | RESPIRATION RATE: 17 BRPM | SYSTOLIC BLOOD PRESSURE: 118 MMHG | OXYGEN SATURATION: 97 % | HEART RATE: 96 BPM | WEIGHT: 150 LBS | HEIGHT: 64 IN | TEMPERATURE: 98 F | BODY MASS INDEX: 25.61 KG/M2

## 2020-04-22 DIAGNOSIS — Z17.0 MALIGNANT NEOPLASM OF CENTRAL PORTION OF RIGHT BREAST IN FEMALE, ESTROGEN RECEPTOR POSITIVE: ICD-10-CM

## 2020-04-22 DIAGNOSIS — C50.111 MALIGNANT NEOPLASM OF CENTRAL PORTION OF RIGHT BREAST IN FEMALE, ESTROGEN RECEPTOR POSITIVE: ICD-10-CM

## 2020-04-22 DIAGNOSIS — J41.1 MUCOPURULENT CHRONIC BRONCHITIS: ICD-10-CM

## 2020-04-22 DIAGNOSIS — C78.7 METASTASIS TO LIVER: ICD-10-CM

## 2020-04-22 DIAGNOSIS — C78.7 METASTASIS TO LIVER: Primary | ICD-10-CM

## 2020-04-22 DIAGNOSIS — C50.111 MALIGNANT NEOPLASM OF CENTRAL PORTION OF RIGHT BREAST IN FEMALE, ESTROGEN RECEPTOR POSITIVE: Primary | ICD-10-CM

## 2020-04-22 DIAGNOSIS — C79.51 BONE METASTASIS: ICD-10-CM

## 2020-04-22 DIAGNOSIS — G89.3 CANCER RELATED PAIN: ICD-10-CM

## 2020-04-22 DIAGNOSIS — C79.51 METASTATIC CANCER TO SPINE: ICD-10-CM

## 2020-04-22 DIAGNOSIS — E06.3 HYPOTHYROIDISM DUE TO HASHIMOTO'S THYROIDITIS: Primary | ICD-10-CM

## 2020-04-22 DIAGNOSIS — Z17.0 MALIGNANT NEOPLASM OF CENTRAL PORTION OF RIGHT BREAST IN FEMALE, ESTROGEN RECEPTOR POSITIVE: Primary | ICD-10-CM

## 2020-04-22 DIAGNOSIS — E03.8 HYPOTHYROIDISM DUE TO HASHIMOTO'S THYROIDITIS: Primary | ICD-10-CM

## 2020-04-22 PROCEDURE — 99214 PR OFFICE/OUTPT VISIT, EST, LEVL IV, 30-39 MIN: ICD-10-PCS | Mod: 95,,, | Performed by: INTERNAL MEDICINE

## 2020-04-22 PROCEDURE — 99214 OFFICE O/P EST MOD 30 MIN: CPT | Mod: 95,,, | Performed by: INTERNAL MEDICINE

## 2020-04-22 PROCEDURE — 96409 CHEMO IV PUSH SNGL DRUG: CPT | Mod: HCNC

## 2020-04-22 PROCEDURE — 25000003 PHARM REV CODE 250: Mod: HCNC | Performed by: INTERNAL MEDICINE

## 2020-04-22 PROCEDURE — 71250 CT THORAX DX C-: CPT | Mod: 26,HCNC,, | Performed by: INTERNAL MEDICINE

## 2020-04-22 PROCEDURE — A4216 STERILE WATER/SALINE, 10 ML: HCPCS | Mod: HCNC | Performed by: INTERNAL MEDICINE

## 2020-04-22 PROCEDURE — 71250 CT THORAX DX C-: CPT | Mod: TC,HCNC

## 2020-04-22 PROCEDURE — 96367 TX/PROPH/DG ADDL SEQ IV INF: CPT | Mod: HCNC

## 2020-04-22 PROCEDURE — 74176 CT CHEST ABDOMEN PELVIS WITHOUT CONTRAST(XPD): ICD-10-PCS | Mod: 26,HCNC,, | Performed by: INTERNAL MEDICINE

## 2020-04-22 PROCEDURE — 25500020 PHARM REV CODE 255: Mod: HCNC | Performed by: INTERNAL MEDICINE

## 2020-04-22 PROCEDURE — 74176 CT ABD & PELVIS W/O CONTRAST: CPT | Mod: 26,HCNC,, | Performed by: INTERNAL MEDICINE

## 2020-04-22 PROCEDURE — 74176 CT ABD & PELVIS W/O CONTRAST: CPT | Mod: TC,HCNC

## 2020-04-22 PROCEDURE — 71250 CT CHEST ABDOMEN PELVIS WITHOUT CONTRAST(XPD): ICD-10-PCS | Mod: 26,HCNC,, | Performed by: INTERNAL MEDICINE

## 2020-04-22 PROCEDURE — 63600175 PHARM REV CODE 636 W HCPCS: Mod: HCNC | Performed by: INTERNAL MEDICINE

## 2020-04-22 RX ORDER — HEPARIN 100 UNIT/ML
500 SYRINGE INTRAVENOUS
Status: DISCONTINUED | OUTPATIENT
Start: 2020-04-22 | End: 2020-04-22 | Stop reason: HOSPADM

## 2020-04-22 RX ORDER — LEVOTHYROXINE SODIUM 150 UG/1
TABLET ORAL
Qty: 32 TABLET | Refills: 11 | Status: SHIPPED | OUTPATIENT
Start: 2020-04-22 | End: 2020-07-06

## 2020-04-22 RX ORDER — HEPARIN 100 UNIT/ML
500 SYRINGE INTRAVENOUS
Status: CANCELLED | OUTPATIENT
Start: 2020-04-29

## 2020-04-22 RX ORDER — SODIUM CHLORIDE 0.9 % (FLUSH) 0.9 %
10 SYRINGE (ML) INJECTION
Status: CANCELLED | OUTPATIENT
Start: 2020-04-29

## 2020-04-22 RX ORDER — SODIUM CHLORIDE 0.9 % (FLUSH) 0.9 %
10 SYRINGE (ML) INJECTION
Status: DISCONTINUED | OUTPATIENT
Start: 2020-04-22 | End: 2020-04-22 | Stop reason: HOSPADM

## 2020-04-22 RX ORDER — AZITHROMYCIN 250 MG/1
TABLET, FILM COATED ORAL
Qty: 6 TABLET | Refills: 0 | Status: SHIPPED | OUTPATIENT
Start: 2020-04-22 | End: 2020-04-27

## 2020-04-22 RX ADMIN — GRANISETRON HYDROCHLORIDE 1 MG: 1 INJECTION, SOLUTION INTRAVENOUS at 11:04

## 2020-04-22 RX ADMIN — ERIBULIN MESYLATE 2.55 MG: 0.5 INJECTION INTRAVENOUS at 12:04

## 2020-04-22 RX ADMIN — HEPARIN 500 UNITS: 100 SYRINGE at 12:04

## 2020-04-22 RX ADMIN — IOHEXOL 15 ML: 300 INJECTION, SOLUTION INTRAVENOUS at 08:04

## 2020-04-22 RX ADMIN — Medication 10 ML: at 12:04

## 2020-04-22 NOTE — PLAN OF CARE
Patient completed C5D1 Halaven. Tolerated treatment well. VSS. Pre-medicated with Kytril IVPB. Denies any new or worsening symptoms at this time. Patient received discharge instructions and follow up appointments. Verbalized understanding and assisted off unit via wheelchair by MA. Patient in NAD at time of discharge.

## 2020-04-23 ENCOUNTER — TELEPHONE (OUTPATIENT)
Dept: ENDOCRINOLOGY | Facility: CLINIC | Age: 65
End: 2020-04-23

## 2020-04-23 NOTE — TELEPHONE ENCOUNTER
Full initial Neulasta consult declined on 2020. Neulasta will be shipped on 2020 to arrive at patient's home on 2020 via [employer]. $0.00 copay. Patient plans to start Neulasta on 2020. Address confirmed. Confirmed 2 patient identifiers - name and . Therapy Appropriate.    Patient declined to review administration - she will have a home health nurse to administer injection.    -Aware to inject 24 hours after chemotherapy infusion completed.    -Discussed she may wish to take out of the refrigerator 30-60 minutes prior to injection to minimize discomfort.  - Wash hands before and after injection.  - RX will come with gauze, bandaids, and alcohol swabs.  - Patient will use sharps container; once full, per [place] law, she/ he may lock the sharps container and place in her trash.     Patient declined to review side effects - reports tolerating very well.    DDIs: medication list reviewed, none expected with Neulasta.    Patient will report questions or concerns to myself or practitioner. Patient verbalizes understanding. Patient plans to start Neulasta on 2020. All questions answered and addressed to patients satisfaction.  I will f/u with patient 1 week from start, and [facility] will contact patient in 2 weeks to coordinate next refill.

## 2020-04-27 ENCOUNTER — PATIENT MESSAGE (OUTPATIENT)
Dept: HEMATOLOGY/ONCOLOGY | Facility: CLINIC | Age: 65
End: 2020-04-27

## 2020-04-29 ENCOUNTER — INFUSION (OUTPATIENT)
Dept: INFUSION THERAPY | Facility: HOSPITAL | Age: 65
End: 2020-04-29
Attending: INTERNAL MEDICINE
Payer: MEDICARE

## 2020-04-29 VITALS
HEART RATE: 100 BPM | RESPIRATION RATE: 17 BRPM | OXYGEN SATURATION: 97 % | TEMPERATURE: 98 F | SYSTOLIC BLOOD PRESSURE: 115 MMHG | DIASTOLIC BLOOD PRESSURE: 60 MMHG

## 2020-04-29 DIAGNOSIS — Z17.0 MALIGNANT NEOPLASM OF CENTRAL PORTION OF RIGHT BREAST IN FEMALE, ESTROGEN RECEPTOR POSITIVE: ICD-10-CM

## 2020-04-29 DIAGNOSIS — C50.011 MALIGNANT NEOPLASM OF NIPPLE OF RIGHT BREAST IN FEMALE, UNSPECIFIED ESTROGEN RECEPTOR STATUS: Primary | ICD-10-CM

## 2020-04-29 DIAGNOSIS — C78.7 METASTASIS TO LIVER: ICD-10-CM

## 2020-04-29 DIAGNOSIS — C50.111 MALIGNANT NEOPLASM OF CENTRAL PORTION OF RIGHT BREAST IN FEMALE, ESTROGEN RECEPTOR POSITIVE: ICD-10-CM

## 2020-04-29 DIAGNOSIS — C79.51 METASTATIC CANCER TO SPINE: ICD-10-CM

## 2020-04-29 LAB
ALBUMIN SERPL BCP-MCNC: 3.1 G/DL (ref 3.5–5.2)
ALP SERPL-CCNC: 138 U/L (ref 55–135)
ALT SERPL W/O P-5'-P-CCNC: 6 U/L (ref 10–44)
ANION GAP SERPL CALC-SCNC: 6 MMOL/L (ref 8–16)
AST SERPL-CCNC: 18 U/L (ref 10–40)
BILIRUB SERPL-MCNC: 0.9 MG/DL (ref 0.1–1)
BUN SERPL-MCNC: 8 MG/DL (ref 8–23)
CALCIUM SERPL-MCNC: 8.5 MG/DL (ref 8.7–10.5)
CHLORIDE SERPL-SCNC: 105 MMOL/L (ref 95–110)
CO2 SERPL-SCNC: 28 MMOL/L (ref 23–29)
CREAT SERPL-MCNC: 0.6 MG/DL (ref 0.5–1.4)
ERYTHROCYTE [DISTWIDTH] IN BLOOD BY AUTOMATED COUNT: 18.6 % (ref 11.5–14.5)
EST. GFR  (AFRICAN AMERICAN): >60 ML/MIN/1.73 M^2
EST. GFR  (NON AFRICAN AMERICAN): >60 ML/MIN/1.73 M^2
GLUCOSE SERPL-MCNC: 88 MG/DL (ref 70–110)
HCT VFR BLD AUTO: 34.7 % (ref 37–48.5)
HGB BLD-MCNC: 10.9 G/DL (ref 12–16)
IMM GRANULOCYTES # BLD AUTO: 0.09 K/UL (ref 0–0.04)
MCH RBC QN AUTO: 29.1 PG (ref 27–31)
MCHC RBC AUTO-ENTMCNC: 31.4 G/DL (ref 32–36)
MCV RBC AUTO: 93 FL (ref 82–98)
NEUTROPHILS # BLD AUTO: 1.2 K/UL (ref 1.8–7.7)
PLATELET # BLD AUTO: 99 K/UL (ref 150–350)
PMV BLD AUTO: 9.8 FL (ref 9.2–12.9)
POTASSIUM SERPL-SCNC: 3.8 MMOL/L (ref 3.5–5.1)
PROT SERPL-MCNC: 6.5 G/DL (ref 6–8.4)
RBC # BLD AUTO: 3.74 M/UL (ref 4–5.4)
SODIUM SERPL-SCNC: 139 MMOL/L (ref 136–145)
WBC # BLD AUTO: 2.73 K/UL (ref 3.9–12.7)

## 2020-04-29 PROCEDURE — 25000003 PHARM REV CODE 250: Mod: HCNC | Performed by: INTERNAL MEDICINE

## 2020-04-29 PROCEDURE — 96367 TX/PROPH/DG ADDL SEQ IV INF: CPT | Mod: HCNC

## 2020-04-29 PROCEDURE — 85027 COMPLETE CBC AUTOMATED: CPT | Mod: HCNC

## 2020-04-29 PROCEDURE — A4216 STERILE WATER/SALINE, 10 ML: HCPCS | Mod: HCNC | Performed by: INTERNAL MEDICINE

## 2020-04-29 PROCEDURE — 96409 CHEMO IV PUSH SNGL DRUG: CPT | Mod: HCNC

## 2020-04-29 PROCEDURE — 80053 COMPREHEN METABOLIC PANEL: CPT | Mod: HCNC

## 2020-04-29 PROCEDURE — 63600175 PHARM REV CODE 636 W HCPCS: Mod: HCNC | Performed by: INTERNAL MEDICINE

## 2020-04-29 RX ORDER — SODIUM CHLORIDE 0.9 % (FLUSH) 0.9 %
10 SYRINGE (ML) INJECTION
Status: DISCONTINUED | OUTPATIENT
Start: 2020-04-29 | End: 2020-04-29 | Stop reason: HOSPADM

## 2020-04-29 RX ORDER — HEPARIN 100 UNIT/ML
500 SYRINGE INTRAVENOUS
Status: DISCONTINUED | OUTPATIENT
Start: 2020-04-29 | End: 2020-04-29 | Stop reason: HOSPADM

## 2020-04-29 RX ADMIN — ERIBULIN MESYLATE 2.55 MG: 0.5 INJECTION INTRAVENOUS at 12:04

## 2020-04-29 RX ADMIN — Medication 10 ML: at 01:04

## 2020-04-29 RX ADMIN — GRANISETRON HYDROCHLORIDE 1 MG: 1 INJECTION, SOLUTION INTRAVENOUS at 12:04

## 2020-04-29 RX ADMIN — HEPARIN 500 UNITS: 100 SYRINGE at 01:04

## 2020-04-29 NOTE — PLAN OF CARE
Patient arrived to unit for C5D8 Halaven. Labs drawn. ANC 1.2, platelets 99, per Dr. Schroeder ok to proceed with chemo today. Patient reported that her appetite has been better this last week and that her cough finally went away. Plan of care reviewed, patient agreeable to plan. Patient tolerated treatment well. No sign of reaction noted. VSS. Patient stated she is going to be receiving her Udenyca at home starting tomorrow and for future treatments. Patient received discharge instructions and follow up appointments. Patient instructed to return 5/13/20 for Dr. Schroeder follow up and chemo. Verbalized understanding and was escorted off unit via w/c by MA. Patient in NAD at time of discharge.

## 2020-05-07 ENCOUNTER — TELEPHONE (OUTPATIENT)
Dept: PHARMACY | Facility: CLINIC | Age: 65
End: 2020-05-07

## 2020-05-07 DIAGNOSIS — Z17.0 MALIGNANT NEOPLASM OF CENTRAL PORTION OF RIGHT BREAST IN FEMALE, ESTROGEN RECEPTOR POSITIVE: ICD-10-CM

## 2020-05-07 DIAGNOSIS — D70.1 CHEMOTHERAPY INDUCED NEUTROPENIA: ICD-10-CM

## 2020-05-07 DIAGNOSIS — T45.1X5A CHEMOTHERAPY INDUCED NEUTROPENIA: ICD-10-CM

## 2020-05-07 DIAGNOSIS — C50.111 MALIGNANT NEOPLASM OF CENTRAL PORTION OF RIGHT BREAST IN FEMALE, ESTROGEN RECEPTOR POSITIVE: ICD-10-CM

## 2020-05-07 DIAGNOSIS — C79.51 BONE METASTASIS: ICD-10-CM

## 2020-05-07 NOTE — TELEPHONE ENCOUNTER
Confirmed that she started on: 4/30/20  Daughter gave the Neulasta to her and she denies any problems with administration or side effects. Confirmed correct storage and administration. Reports no issues at this time. No questions. Patient aware to reach out if she has any questions or concerns.     Will need her next injection on 5/21. Refill request sent.

## 2020-05-09 ENCOUNTER — PATIENT MESSAGE (OUTPATIENT)
Dept: HEMATOLOGY/ONCOLOGY | Facility: CLINIC | Age: 65
End: 2020-05-09

## 2020-05-11 ENCOUNTER — LAB VISIT (OUTPATIENT)
Dept: FAMILY MEDICINE | Facility: CLINIC | Age: 65
End: 2020-05-11
Payer: MEDICARE

## 2020-05-11 ENCOUNTER — TELEPHONE (OUTPATIENT)
Dept: HEMATOLOGY/ONCOLOGY | Facility: CLINIC | Age: 65
End: 2020-05-11

## 2020-05-11 DIAGNOSIS — Z17.0 MALIGNANT NEOPLASM OF CENTRAL PORTION OF RIGHT BREAST IN FEMALE, ESTROGEN RECEPTOR POSITIVE: ICD-10-CM

## 2020-05-11 DIAGNOSIS — Z17.0 MALIGNANT NEOPLASM OF CENTRAL PORTION OF RIGHT BREAST IN FEMALE, ESTROGEN RECEPTOR POSITIVE: Primary | ICD-10-CM

## 2020-05-11 DIAGNOSIS — C50.111 MALIGNANT NEOPLASM OF CENTRAL PORTION OF RIGHT BREAST IN FEMALE, ESTROGEN RECEPTOR POSITIVE: ICD-10-CM

## 2020-05-11 DIAGNOSIS — Z13.9 SCREENING FOR CONDITION: ICD-10-CM

## 2020-05-11 DIAGNOSIS — C50.111 MALIGNANT NEOPLASM OF CENTRAL PORTION OF RIGHT BREAST IN FEMALE, ESTROGEN RECEPTOR POSITIVE: Primary | ICD-10-CM

## 2020-05-11 DIAGNOSIS — U07.1 COVID-19: Primary | ICD-10-CM

## 2020-05-11 DIAGNOSIS — Z13.9 SCREENING FOR CONDITION: Primary | ICD-10-CM

## 2020-05-11 PROCEDURE — U0002 COVID-19 LAB TEST NON-CDC: HCPCS | Mod: HCNC

## 2020-05-11 NOTE — TELEPHONE ENCOUNTER
Call made to Hanny to give her the verbal to add PT to Ms Crain's current home health services.    ----- Message from Rodrigo Schroeder MD sent at 5/11/2020  3:13 PM CDT -----  Contact: Hanny salcido/ [facility]   celL:  [phone]   Ok to add PT  ----- Message -----  From: Noemi Patiño RN  Sent: 5/11/2020   9:28 AM CDT  To: Rodrigo Schroeder MD        ----- Message -----  From: Elena Dupree  Sent: 5/11/2020   9:24 AM CDT  To: Alexandre BLACK Staff    Had assessment on Friday, had fallen 4 times in the past week.  Wants to add Physical .Therapy to her services.   Call w/a verbal or fax order to office:  [phone] .

## 2020-05-11 NOTE — TELEPHONE ENCOUNTER
05/11/2020      Phoned the patient.      Discussed the following with the patient:  In an effort to protect our immunocompromised patients from potential exposure to COVID-19, [facility] will now require all patients receiving an infusion, an injection, and/or radiation therapy to be tested for COVID-19 prior to their appointment.  All patients currently under treatment will be tested immediately, and patients initiating new treatment cycles or with one-time appointments (injections, transfusions, etc.) must be tested within 72 hours of their appointment.      Symptom Patient's Response   Fever YES/NO: no   Cough YES/NO: yes   Shortness of breath  YES/NO: no   Difficulty breathing YES/NO: no   GI symptoms such as diarrhea or nausea YES/NO: no   Loss of taste YES/NO: no   Loss of smell YES/NO: no     Other Screening Patient's Response   Has the patient previously undergone COVID-19 testing? YES/NO: yes     If yes to the question directly above, what was the result? negative   Has the patient been in close contact with someone who has undergone COVID-19 testing? YES/NO: no     If yes to the question directly above, what was the result? not applicable      The patient's 24-hour COVID-19 test was ordered and scheduled.  Reviewed the appointment date, time, and location with the patient.      Advised the patient that she can expect the results to take approximately 24 hours.  Advised the patient that someone will reach out to her regarding her results if she tests positive, as her treatment appointment will be rescheduled if she tests positive for COVID-19.  Also advised the patient that if she does not hear back from our office or if she is told by our office she has tested negative for COVID-19, she can proceed with treatment as originally planned.     Questions were answered to the patient's satisfaction, and the patient verbalized understanding of information and agreement with the plan.       The above was completed  in accordance with instructions and guidelines set forth by [facility].     Signed,        Liana Cooper     Date:  05/11/2020

## 2020-05-11 NOTE — PROGRESS NOTES
Subjective:       Patient ID: Rbeecca Crain is a 64 y.o. female.    Chief Complaint: No chief complaint on file.    HPI Ms. Crain is here for followup of metastatic carcinoma of the right breast.  She is currently on eribulin therapy.  She has had 5 cycles thus far.  Her last CA 27.29 in decreased from 276 to133.  Scans after 4 cycles of therapy showed improvement in her hepatic metastasis and stable bone disease.    The patient location is:  home.  The chief complaint leading to consultation is:  Breast cancer.  Visit type: audiovisual  Total time spent with patient:  15 min.  Each patient to whom he or she provides medical services by telemedicine is:  (1) informed of the relationship between the physician and patient and the respective role of any other health care provider with respect to management of the patient; and (2) notified that he or she may decline to receive medical services by telemedicine and may withdraw from such care at any time.          Her major complaint at the present time has been some ongoing cough which is productive of some greenish mucus.  That did go away briefly after course of antibiotics and prednisone.  She has some occasional shortness of breath with activity.  She has had several recent falls and I have prescribed some physical therapy to see if that will help her.  That usually occurs when she is bending over.  The neuropathy in her hands.  She does have some difficulty with her handwriting and reports that her feet have numbness and strange sensations.  Appetite and bowel function has been good.  Her pain control has been excellent.    She continues on MS Contin 200 mg 3 times per day as her primary pain control.          ECOG-1-2    Breast history: In 2013 she was diagnosed with stage IIB carcinoma of the right breast, ER positive with a low Oncotype score.  She was enrolled on protocol  and randomized to endocrine therapy alone.  She was tried on all 3 aromatase  inhibitors and had itching and rash to all of them.  In August 2013 she was started on tamoxifen.   She was found to have  bone metastasis in May 2015, 2015.  A subsequent bone biopsy was performed on a lesion at the T8 vertebra.   The pathology from that was consistent with metastatic breast cancer, ER +80%, [place] +80%, and HER-2 negative.      She received letrozole plus palbociclib from July 2015 until May 2016.  She also received bone protective therapy with Xgeva.      Faslodex was started in June 2016.      Exemestane and Afinitor started in August 2017.  That was discontinued in February 2018 due to progression in the liver and bone.     Navelbine was started February 16, 2018.  She had 8 cycles for that.  Scans in November 2018 showed progression as follows     Capecitabine was started in December 2018.  She received 5 cycles of that therapy.    Follow-up scans in April 2019 showed progression with a new hepatic lesion and worsening bone disease.    She began clinical trial therapy with erdafitinib on May 8, 2019.  That was discontinued in September 2019 due to progression in the bone.    She then received a course of radiation therapy to her pelvis.  She began weekly Taxol in October 2019.That was discontinued in December 2019 due to progression.    She began eribulin therapy on 1/15/2020.  Review of Systems   Constitutional: Negative for appetite change and unexpected weight change.   Eyes: Negative for visual disturbance.   Respiratory: Positive for cough and shortness of breath.    Cardiovascular: Negative for chest pain.   Gastrointestinal: Negative for abdominal pain and diarrhea.   Genitourinary: Negative for frequency.   Musculoskeletal: Negative for back pain.   Skin: Negative for rash.   Neurological: Negative for headaches.   Hematological: Negative for adenopathy.   Psychiatric/Behavioral: The patient is not nervous/anxious.        Objective:      Physical Exam    Assessment:       1. Malignant  neoplasm of central portion of right breast in female, estrogen receptor positive    2. Bone metastasis    3. Metastasis to liver    4. Cancer related pain      5.  Chronic bronchitis  Plan:       Continue current therapy.  Trial of doxycycline for her chronic bronchitis

## 2020-05-11 NOTE — PROGRESS NOTES
05/11/2020      In an effort to protect our immunocompromised patients from potential exposure to COVID-19, [facility] will now require all patients receiving an infusion, an injection, and/or radiation therapy to be tested for COVID-19 prior to their appointment.  All patients currently under treatment will be tested immediately, and patients initiating new treatment cycles or with one-time appointments (injections, transfusions, etc.) must be tested within 72 hours of their appointment.     Placed COVID-19 test order for patient.  A member of our team is to contact the patient in the near future to explain this process and the rationale behind it, to ask the COVID-19 screening questions, and to get the patient scheduled for their COVID-19 test.     The above was completed in accordance with instructions and guidelines set forth by [facility].     Signed,    Tarik Mayorga, GILDA     Date:  05/11/2020

## 2020-05-12 DIAGNOSIS — F43.21 ADJUSTMENT DISORDER WITH DEPRESSED MOOD: ICD-10-CM

## 2020-05-12 LAB — SARS-COV-2 RNA RESP QL NAA+PROBE: NOT DETECTED

## 2020-05-12 RX ORDER — SERTRALINE HYDROCHLORIDE 50 MG/1
TABLET, FILM COATED ORAL
Qty: 90 TABLET | Refills: 3 | Status: SHIPPED | OUTPATIENT
Start: 2020-05-12 | End: 2021-03-23 | Stop reason: SDUPTHER

## 2020-05-13 ENCOUNTER — PATIENT MESSAGE (OUTPATIENT)
Dept: HEMATOLOGY/ONCOLOGY | Facility: CLINIC | Age: 65
End: 2020-05-13

## 2020-05-13 ENCOUNTER — INFUSION (OUTPATIENT)
Dept: INFUSION THERAPY | Facility: HOSPITAL | Age: 65
End: 2020-05-13
Attending: INTERNAL MEDICINE
Payer: MEDICARE

## 2020-05-13 ENCOUNTER — LAB VISIT (OUTPATIENT)
Dept: LAB | Facility: HOSPITAL | Age: 65
End: 2020-05-13
Attending: INTERNAL MEDICINE
Payer: MEDICARE

## 2020-05-13 ENCOUNTER — OFFICE VISIT (OUTPATIENT)
Dept: HEMATOLOGY/ONCOLOGY | Facility: CLINIC | Age: 65
End: 2020-05-13
Payer: MEDICARE

## 2020-05-13 VITALS
SYSTOLIC BLOOD PRESSURE: 101 MMHG | RESPIRATION RATE: 17 BRPM | HEIGHT: 64 IN | OXYGEN SATURATION: 96 % | TEMPERATURE: 97 F | HEART RATE: 83 BPM | WEIGHT: 150 LBS | BODY MASS INDEX: 25.61 KG/M2 | DIASTOLIC BLOOD PRESSURE: 54 MMHG

## 2020-05-13 DIAGNOSIS — C50.111 MALIGNANT NEOPLASM OF CENTRAL PORTION OF RIGHT BREAST IN FEMALE, ESTROGEN RECEPTOR POSITIVE: ICD-10-CM

## 2020-05-13 DIAGNOSIS — Z17.0 MALIGNANT NEOPLASM OF CENTRAL PORTION OF RIGHT BREAST IN FEMALE, ESTROGEN RECEPTOR POSITIVE: ICD-10-CM

## 2020-05-13 DIAGNOSIS — G89.3 CANCER RELATED PAIN: ICD-10-CM

## 2020-05-13 DIAGNOSIS — C50.111 MALIGNANT NEOPLASM OF CENTRAL PORTION OF RIGHT BREAST IN FEMALE, ESTROGEN RECEPTOR POSITIVE: Primary | ICD-10-CM

## 2020-05-13 DIAGNOSIS — C78.7 METASTASIS TO LIVER: ICD-10-CM

## 2020-05-13 DIAGNOSIS — C79.51 BONE METASTASIS: ICD-10-CM

## 2020-05-13 DIAGNOSIS — G89.3 CANCER ASSOCIATED PAIN: ICD-10-CM

## 2020-05-13 DIAGNOSIS — C79.51 METASTATIC CANCER TO SPINE: ICD-10-CM

## 2020-05-13 DIAGNOSIS — Z17.0 MALIGNANT NEOPLASM OF CENTRAL PORTION OF RIGHT BREAST IN FEMALE, ESTROGEN RECEPTOR POSITIVE: Primary | ICD-10-CM

## 2020-05-13 DIAGNOSIS — J41.1 MUCOPURULENT CHRONIC BRONCHITIS: ICD-10-CM

## 2020-05-13 DIAGNOSIS — U07.1 COVID-19: ICD-10-CM

## 2020-05-13 DIAGNOSIS — Z13.9 SCREENING FOR CONDITION: ICD-10-CM

## 2020-05-13 DIAGNOSIS — C78.7 METASTASIS TO LIVER: Primary | ICD-10-CM

## 2020-05-13 LAB
ALBUMIN SERPL BCP-MCNC: 3.4 G/DL (ref 3.5–5.2)
ALP SERPL-CCNC: 142 U/L (ref 55–135)
ALT SERPL W/O P-5'-P-CCNC: 6 U/L (ref 10–44)
ANION GAP SERPL CALC-SCNC: 8 MMOL/L (ref 8–16)
AST SERPL-CCNC: 19 U/L (ref 10–40)
BASOPHILS # BLD AUTO: 0.02 K/UL (ref 0–0.2)
BASOPHILS NFR BLD: 0.3 % (ref 0–1.9)
BILIRUB SERPL-MCNC: 0.7 MG/DL (ref 0.1–1)
BUN SERPL-MCNC: 18 MG/DL (ref 8–23)
CALCIUM SERPL-MCNC: 8.5 MG/DL (ref 8.7–10.5)
CHLORIDE SERPL-SCNC: 106 MMOL/L (ref 95–110)
CO2 SERPL-SCNC: 25 MMOL/L (ref 23–29)
CREAT SERPL-MCNC: 0.7 MG/DL (ref 0.5–1.4)
DIFFERENTIAL METHOD: ABNORMAL
EOSINOPHIL # BLD AUTO: 0 K/UL (ref 0–0.5)
EOSINOPHIL NFR BLD: 0 % (ref 0–8)
ERYTHROCYTE [DISTWIDTH] IN BLOOD BY AUTOMATED COUNT: 19.2 % (ref 11.5–14.5)
EST. GFR  (AFRICAN AMERICAN): >60 ML/MIN/1.73 M^2
EST. GFR  (NON AFRICAN AMERICAN): >60 ML/MIN/1.73 M^2
GLUCOSE SERPL-MCNC: 141 MG/DL (ref 70–110)
HCT VFR BLD AUTO: 38.6 % (ref 37–48.5)
HGB BLD-MCNC: 11.9 G/DL (ref 12–16)
IMM GRANULOCYTES # BLD AUTO: 0.02 K/UL (ref 0–0.04)
IMM GRANULOCYTES NFR BLD AUTO: 0.3 % (ref 0–0.5)
LYMPHOCYTES # BLD AUTO: 0.9 K/UL (ref 1–4.8)
LYMPHOCYTES NFR BLD: 16 % (ref 18–48)
MCH RBC QN AUTO: 29.3 PG (ref 27–31)
MCHC RBC AUTO-ENTMCNC: 30.8 G/DL (ref 32–36)
MCV RBC AUTO: 95 FL (ref 82–98)
MONOCYTES # BLD AUTO: 0.4 K/UL (ref 0.3–1)
MONOCYTES NFR BLD: 6.9 % (ref 4–15)
NEUTROPHILS # BLD AUTO: 4.5 K/UL (ref 1.8–7.7)
NEUTROPHILS NFR BLD: 76.5 % (ref 38–73)
NRBC BLD-RTO: 0 /100 WBC
PLATELET # BLD AUTO: 132 K/UL (ref 150–350)
PMV BLD AUTO: 10.6 FL (ref 9.2–12.9)
POTASSIUM SERPL-SCNC: 4.5 MMOL/L (ref 3.5–5.1)
PROT SERPL-MCNC: 7 G/DL (ref 6–8.4)
RBC # BLD AUTO: 4.06 M/UL (ref 4–5.4)
SARS-COV-2 IGG SERPLBLD QL IA.RAPID: POSITIVE
SODIUM SERPL-SCNC: 139 MMOL/L (ref 136–145)
WBC # BLD AUTO: 5.83 K/UL (ref 3.9–12.7)

## 2020-05-13 PROCEDURE — 86769 SARS-COV-2 COVID-19 ANTIBODY: CPT | Mod: HCNC

## 2020-05-13 PROCEDURE — 85025 COMPLETE CBC W/AUTO DIFF WBC: CPT | Mod: HCNC

## 2020-05-13 PROCEDURE — A4216 STERILE WATER/SALINE, 10 ML: HCPCS | Mod: HCNC | Performed by: INTERNAL MEDICINE

## 2020-05-13 PROCEDURE — 63600175 PHARM REV CODE 636 W HCPCS: Mod: HCNC | Performed by: INTERNAL MEDICINE

## 2020-05-13 PROCEDURE — 99499 RISK ADDL DX/OHS AUDIT: ICD-10-PCS | Mod: HCNC,95,, | Performed by: INTERNAL MEDICINE

## 2020-05-13 PROCEDURE — 96409 CHEMO IV PUSH SNGL DRUG: CPT | Mod: HCNC

## 2020-05-13 PROCEDURE — 99213 PR OFFICE/OUTPT VISIT, EST, LEVL III, 20-29 MIN: ICD-10-PCS | Mod: HCNC,95,, | Performed by: INTERNAL MEDICINE

## 2020-05-13 PROCEDURE — 36415 COLL VENOUS BLD VENIPUNCTURE: CPT | Mod: HCNC

## 2020-05-13 PROCEDURE — 25000003 PHARM REV CODE 250: Mod: HCNC | Performed by: INTERNAL MEDICINE

## 2020-05-13 PROCEDURE — 99499 UNLISTED E&M SERVICE: CPT | Mod: HCNC,95,, | Performed by: INTERNAL MEDICINE

## 2020-05-13 PROCEDURE — 96367 TX/PROPH/DG ADDL SEQ IV INF: CPT | Mod: HCNC

## 2020-05-13 PROCEDURE — 80053 COMPREHEN METABOLIC PANEL: CPT | Mod: HCNC

## 2020-05-13 PROCEDURE — 99213 OFFICE O/P EST LOW 20 MIN: CPT | Mod: HCNC,95,, | Performed by: INTERNAL MEDICINE

## 2020-05-13 RX ORDER — HEPARIN 100 UNIT/ML
500 SYRINGE INTRAVENOUS
Status: DISCONTINUED | OUTPATIENT
Start: 2020-05-13 | End: 2020-05-13 | Stop reason: HOSPADM

## 2020-05-13 RX ORDER — SODIUM CHLORIDE 0.9 % (FLUSH) 0.9 %
10 SYRINGE (ML) INJECTION
Status: CANCELLED | OUTPATIENT
Start: 2020-05-20

## 2020-05-13 RX ORDER — HEPARIN 100 UNIT/ML
500 SYRINGE INTRAVENOUS
Status: CANCELLED | OUTPATIENT
Start: 2020-05-13

## 2020-05-13 RX ORDER — SODIUM CHLORIDE 0.9 % (FLUSH) 0.9 %
10 SYRINGE (ML) INJECTION
Status: CANCELLED | OUTPATIENT
Start: 2020-05-13

## 2020-05-13 RX ORDER — HEPARIN 100 UNIT/ML
500 SYRINGE INTRAVENOUS
Status: CANCELLED | OUTPATIENT
Start: 2020-05-20

## 2020-05-13 RX ORDER — DOXYCYCLINE HYCLATE 100 MG
100 TABLET ORAL 2 TIMES DAILY
Qty: 20 TABLET | Refills: 0 | Status: SHIPPED | OUTPATIENT
Start: 2020-05-13 | End: 2020-10-21

## 2020-05-13 RX ORDER — MORPHINE SULFATE 200 MG/1
200 TABLET, FILM COATED, EXTENDED RELEASE ORAL EVERY 8 HOURS
Qty: 90 TABLET | Refills: 0 | Status: SHIPPED | OUTPATIENT
Start: 2020-05-13 | End: 2020-06-13

## 2020-05-13 RX ORDER — SODIUM CHLORIDE 0.9 % (FLUSH) 0.9 %
10 SYRINGE (ML) INJECTION
Status: DISCONTINUED | OUTPATIENT
Start: 2020-05-13 | End: 2020-05-13 | Stop reason: HOSPADM

## 2020-05-13 RX ADMIN — ERIBULIN MESYLATE 2.55 MG: 0.5 INJECTION INTRAVENOUS at 11:05

## 2020-05-13 RX ADMIN — GRANISETRON HYDROCHLORIDE 1 MG: 1 INJECTION INTRAVENOUS at 11:05

## 2020-05-13 RX ADMIN — Medication 10 ML: at 11:05

## 2020-05-13 RX ADMIN — HEPARIN 500 UNITS: 100 SYRINGE at 11:05

## 2020-05-13 NOTE — PLAN OF CARE
Patient arrived to unit for C6D1 Halaven. Patient reported falling 4 times recently. Patient stated she told Dr. Schroeder today during her visit and he is ordering PT. Patient also reported that Dr. Schroeder ordered an antibiotic for her chronic cough. Plan of care reviewed, patient agreeable to plan. Patient tolerated treatment well. No sign of reaction noted. VSS. Patient received discharge instructions and follow up appointments. Patient instructed to return 5/20/20 for D8. Verbalized understanding and was escorted off unit via w/c by MA. Patient in NAD at time of discharge.

## 2020-05-13 NOTE — TELEPHONE ENCOUNTER
Call made to Kasandra with  to clarify injection dates.     ----- Message from Elena Leia sent at 5/13/2020  2:02 PM CDT -----  Contact: Kasandra salcido/ [facility] [employer] [facility]:   [phone] x [phone]  Admitted this pt. On 5/8, got order for an injection.    Pt. Needs treatment on 5/21.   The daughter has been giving the treatment.   Ref: NEULASTA injections.   Calling to clarify the injection dates.

## 2020-05-14 ENCOUNTER — TELEPHONE (OUTPATIENT)
Dept: PHARMACY | Facility: CLINIC | Age: 65
End: 2020-05-14

## 2020-05-14 ENCOUNTER — TELEPHONE (OUTPATIENT)
Dept: HEMATOLOGY/ONCOLOGY | Facility: CLINIC | Age: 65
End: 2020-05-14

## 2020-05-14 DIAGNOSIS — Z17.0 MALIGNANT NEOPLASM OF CENTRAL PORTION OF RIGHT BREAST IN FEMALE, ESTROGEN RECEPTOR POSITIVE: Primary | ICD-10-CM

## 2020-05-14 DIAGNOSIS — C50.111 MALIGNANT NEOPLASM OF CENTRAL PORTION OF RIGHT BREAST IN FEMALE, ESTROGEN RECEPTOR POSITIVE: Primary | ICD-10-CM

## 2020-05-14 NOTE — TELEPHONE ENCOUNTER
05/14/2020    - COVID-19 testing Collection Date: 5/11/2020 Collection Time:   5:09 PM  - This result was communicated to the patient by Tarik Mayorga DNP on 05/14/2020 as below.     Phoned the patient.    We discussed the following:    Her COVID-19 antibody test came back positive.  Of note, she had never been diagnosed with COVID-19.    Symptom Patient's Response   Fever YES/NO: no   Cough YES/NO: yes, for about 3 months now, remaining stable, productive of green sputum   Shortness of breath  YES/NO: yes, for about 3 months now, remaining stable   Difficulty breathing YES/NO: none other than above   GI symptoms such as diarrhea or nausea YES/NO: yes, nausea secondary to chemotherapy possibly    Loss of taste YES/NO: yes, about 2 months ago   Loss of smell YES/NO: no     The patient notes that antibiotics prescribed by Dr. Schroeder alleviate her respiratory symptoms, but after completion of antibiotic course, the respiratory symptoms return in 2-3 days every time.    Other Screening Patient's Response   Has the patient previously undergone COVID-19 testing? YES/NO: yes     If yes to the question directly above, what was the result? negative on 04/20/2020, negative on 05/11/2020   Has the patient been in close contact with someone who has undergone COVID-19 testing? YES/NO: yes     If yes to the question directly above, what was the result? positive--patient's roommate's friend to whom patient was indirectly exposed via her roommate who had direct, brief exposure; also, patient's mother whom patient last saw 2 months ago tested positive 1 month ago      Questions were answered to patient's satisfaction, and patient verbalized understanding of information and agreement with the plan.     The above was completed in accordance with instructions and guidelines set forth by [facility].    Discussed her case with her medical oncologist, Dr. Schroeder.    Signed,        Tarik Mayorga DNP     Date:  05/14/2020        ----- Message from Noemi Patiño RN sent at 5/14/2020  7:26 AM CDT -----  Good morning Tarik,     Could you please call Ms Fernandez to go over her positive results and what the process is about quarantine and retesting?    Thanks,   Noemi  ----- Message -----  From: Rodrigo Schroeder MD  Sent: 5/14/2020   7:12 AM CDT  To: Noemi Patiño RN    Will have to cancel chemo - I think the policy is to repeat in 2 weeks

## 2020-05-14 NOTE — TELEPHONE ENCOUNTER
Contacted patient for Neulasta refill. Pateint has 0 injections on hand. Denies missed doses. Confirmed doses/directions. Will ship medication on 5/18 for the patient to receive on 5/19. $5 copay. [facility]. Next injection is scheduled for 5/21. No sharps container needed at this time. Patient reports starting doxycyline last night - only medication change. There are no DDIs expected with Neulasta. No changes in allergies or health conditions. Patient has no questions or concerns at this time. She is aware to contact [facility] if she needs anything.     Patient confirmed that she will continue this medication after this last refill and that her injections are every 3 weeks.     **Pending next refill a little early to send in refill request as script will be out of refills after this fill. Doing so to ensure new script is received with enough time before refill is needed. Do not want to request new script at this time as it could result in current script getting discontinued prior to shipment.**

## 2020-05-15 ENCOUNTER — DOCUMENT SCAN (OUTPATIENT)
Dept: HOME HEALTH SERVICES | Facility: HOSPITAL | Age: 65
End: 2020-05-15
Payer: MEDICARE

## 2020-05-18 ENCOUNTER — PATIENT MESSAGE (OUTPATIENT)
Dept: HEMATOLOGY/ONCOLOGY | Facility: CLINIC | Age: 65
End: 2020-05-18

## 2020-05-19 ENCOUNTER — DOCUMENT SCAN (OUTPATIENT)
Dept: HOME HEALTH SERVICES | Facility: HOSPITAL | Age: 65
End: 2020-05-19
Payer: MEDICARE

## 2020-05-20 ENCOUNTER — INFUSION (OUTPATIENT)
Dept: INFUSION THERAPY | Facility: HOSPITAL | Age: 65
End: 2020-05-20
Attending: INTERNAL MEDICINE
Payer: MEDICARE

## 2020-05-20 VITALS
HEART RATE: 99 BPM | DIASTOLIC BLOOD PRESSURE: 54 MMHG | TEMPERATURE: 98 F | RESPIRATION RATE: 16 BRPM | SYSTOLIC BLOOD PRESSURE: 112 MMHG | OXYGEN SATURATION: 95 %

## 2020-05-20 DIAGNOSIS — C50.111 MALIGNANT NEOPLASM OF CENTRAL PORTION OF RIGHT BREAST IN FEMALE, ESTROGEN RECEPTOR POSITIVE: ICD-10-CM

## 2020-05-20 DIAGNOSIS — Z17.0 MALIGNANT NEOPLASM OF CENTRAL PORTION OF RIGHT BREAST IN FEMALE, ESTROGEN RECEPTOR POSITIVE: ICD-10-CM

## 2020-05-20 DIAGNOSIS — C78.7 METASTASIS TO LIVER: ICD-10-CM

## 2020-05-20 DIAGNOSIS — C79.51 METASTATIC CANCER TO SPINE: ICD-10-CM

## 2020-05-20 DIAGNOSIS — C50.011 MALIGNANT NEOPLASM OF NIPPLE OF RIGHT BREAST IN FEMALE, UNSPECIFIED ESTROGEN RECEPTOR STATUS: Primary | ICD-10-CM

## 2020-05-20 LAB
ALBUMIN SERPL BCP-MCNC: 3.1 G/DL (ref 3.5–5.2)
ALP SERPL-CCNC: 128 U/L (ref 55–135)
ALT SERPL W/O P-5'-P-CCNC: 12 U/L (ref 10–44)
ANION GAP SERPL CALC-SCNC: 6 MMOL/L (ref 8–16)
AST SERPL-CCNC: 20 U/L (ref 10–40)
BILIRUB SERPL-MCNC: 0.8 MG/DL (ref 0.1–1)
BUN SERPL-MCNC: 12 MG/DL (ref 8–23)
CALCIUM SERPL-MCNC: 8.9 MG/DL (ref 8.7–10.5)
CHLORIDE SERPL-SCNC: 106 MMOL/L (ref 95–110)
CO2 SERPL-SCNC: 28 MMOL/L (ref 23–29)
CREAT SERPL-MCNC: 0.7 MG/DL (ref 0.5–1.4)
ERYTHROCYTE [DISTWIDTH] IN BLOOD BY AUTOMATED COUNT: 18.6 % (ref 11.5–14.5)
EST. GFR  (AFRICAN AMERICAN): >60 ML/MIN/1.73 M^2
EST. GFR  (NON AFRICAN AMERICAN): >60 ML/MIN/1.73 M^2
GLUCOSE SERPL-MCNC: 134 MG/DL (ref 70–110)
HCT VFR BLD AUTO: 34.2 % (ref 37–48.5)
HGB BLD-MCNC: 10.4 G/DL (ref 12–16)
IMM GRANULOCYTES # BLD AUTO: 0.07 K/UL (ref 0–0.04)
MCH RBC QN AUTO: 28.7 PG (ref 27–31)
MCHC RBC AUTO-ENTMCNC: 30.4 G/DL (ref 32–36)
MCV RBC AUTO: 94 FL (ref 82–98)
NEUTROPHILS # BLD AUTO: 1.8 K/UL (ref 1.8–7.7)
PLATELET # BLD AUTO: 94 K/UL (ref 150–350)
PMV BLD AUTO: 9.8 FL (ref 9.2–12.9)
POTASSIUM SERPL-SCNC: 3.6 MMOL/L (ref 3.5–5.1)
PROT SERPL-MCNC: 6.6 G/DL (ref 6–8.4)
RBC # BLD AUTO: 3.63 M/UL (ref 4–5.4)
SODIUM SERPL-SCNC: 140 MMOL/L (ref 136–145)
WBC # BLD AUTO: 3.16 K/UL (ref 3.9–12.7)

## 2020-05-20 PROCEDURE — 96409 CHEMO IV PUSH SNGL DRUG: CPT | Mod: HCNC

## 2020-05-20 PROCEDURE — 63600175 PHARM REV CODE 636 W HCPCS: Mod: HCNC | Performed by: INTERNAL MEDICINE

## 2020-05-20 PROCEDURE — 25000003 PHARM REV CODE 250: Mod: HCNC | Performed by: INTERNAL MEDICINE

## 2020-05-20 PROCEDURE — 96367 TX/PROPH/DG ADDL SEQ IV INF: CPT | Mod: HCNC

## 2020-05-20 PROCEDURE — 80053 COMPREHEN METABOLIC PANEL: CPT | Mod: HCNC

## 2020-05-20 PROCEDURE — 85027 COMPLETE CBC AUTOMATED: CPT | Mod: HCNC

## 2020-05-20 PROCEDURE — A4216 STERILE WATER/SALINE, 10 ML: HCPCS | Mod: HCNC | Performed by: INTERNAL MEDICINE

## 2020-05-20 RX ORDER — HEPARIN 100 UNIT/ML
500 SYRINGE INTRAVENOUS
Status: DISCONTINUED | OUTPATIENT
Start: 2020-05-20 | End: 2020-05-20 | Stop reason: HOSPADM

## 2020-05-20 RX ORDER — SODIUM CHLORIDE 0.9 % (FLUSH) 0.9 %
10 SYRINGE (ML) INJECTION
Status: DISCONTINUED | OUTPATIENT
Start: 2020-05-20 | End: 2020-05-20 | Stop reason: HOSPADM

## 2020-05-20 RX ADMIN — GRANISETRON HYDROCHLORIDE 1 MG: 1 INJECTION INTRAVENOUS at 11:05

## 2020-05-20 RX ADMIN — HEPARIN 500 UNITS: 100 SYRINGE at 12:05

## 2020-05-20 RX ADMIN — Medication 10 ML: at 12:05

## 2020-05-20 RX ADMIN — ERIBULIN MESYLATE 2 MG: 0.5 INJECTION INTRAVENOUS at 11:05

## 2020-05-20 NOTE — PLAN OF CARE
Patient arrived to unit for C6D8 Halaven. Labs drawn, platelets 94, per Dr. Schroeder ok to proceed with treatment today. Plan of care reviewed, patient agreeable to plan. Patient tolerated treatment well. No sign of reaction noted. VSS. Patient received discharge instructions and follow up appointments. Patient instructed to return 6/3/20 for labs, Dr. Schroeder follow up, and chemo. Verbalized understanding and was escorted off unit via w/c by MA. Patient in NAD at time of discharge.

## 2020-05-22 ENCOUNTER — PATIENT MESSAGE (OUTPATIENT)
Dept: HEMATOLOGY/ONCOLOGY | Facility: CLINIC | Age: 65
End: 2020-05-22

## 2020-05-22 ENCOUNTER — DOCUMENT SCAN (OUTPATIENT)
Dept: HOME HEALTH SERVICES | Facility: HOSPITAL | Age: 65
End: 2020-05-22
Payer: MEDICARE

## 2020-05-22 DIAGNOSIS — G62.9 NEUROPATHY: ICD-10-CM

## 2020-05-22 RX ORDER — GABAPENTIN 300 MG/1
CAPSULE ORAL
Qty: 270 CAPSULE | Refills: 6 | Status: SHIPPED | OUTPATIENT
Start: 2020-05-22 | End: 2021-04-29 | Stop reason: SDUPTHER

## 2020-05-25 ENCOUNTER — EXTERNAL HOME HEALTH (OUTPATIENT)
Dept: HOME HEALTH SERVICES | Facility: HOSPITAL | Age: 65
End: 2020-05-25

## 2020-05-26 ENCOUNTER — PATIENT MESSAGE (OUTPATIENT)
Dept: HEMATOLOGY/ONCOLOGY | Facility: CLINIC | Age: 65
End: 2020-05-26

## 2020-05-26 DIAGNOSIS — C50.111 MALIGNANT NEOPLASM OF CENTRAL PORTION OF RIGHT BREAST IN FEMALE, ESTROGEN RECEPTOR POSITIVE: Primary | ICD-10-CM

## 2020-05-26 DIAGNOSIS — Z17.0 MALIGNANT NEOPLASM OF CENTRAL PORTION OF RIGHT BREAST IN FEMALE, ESTROGEN RECEPTOR POSITIVE: Primary | ICD-10-CM

## 2020-05-27 PROBLEM — K12.30 ORAL MUCOSITIS: Status: RESOLVED | Noted: 2020-01-26 | Resolved: 2020-05-27

## 2020-05-27 PROBLEM — L60.9 NAIL ABNORMALITIES: Status: RESOLVED | Noted: 2019-10-30 | Resolved: 2020-05-27

## 2020-05-27 PROBLEM — J10.1 INFLUENZA B: Status: RESOLVED | Noted: 2020-01-25 | Resolved: 2020-05-27

## 2020-05-27 NOTE — PROGRESS NOTES
Subjective:       Patient ID: Rebecca Crain is a 64 y.o. female.    Chief Complaint: Breast Cancer and Follow-up    HPI Ms. Crain is here for followup of metastatic carcinoma of the right breast.  She is currently on eribulin therapy.  She has had 6 cycles thus far.  Her last CA 27.29 in decreased from 276 to133.  Scans after 4 cycles of therapy showed improvement in her hepatic metastasis and stable bone disease.    The patient location is:  home.  The chief complaint leading to consultation is:  Breast cancer.  Visit type: audiovisual  Total time spent with patient:  15 min. / 20 minutes total time on pre and post care  Each patient to whom he or she provides medical services by telemedicine is:  (1) informed of the relationship between the physician and patient and the respective role of any other health care provider with respect to management of the patient; and (2) notified that he or she may decline to receive medical services by telemedicine and may withdraw from such care at any time.      She notes she has been still having a cough that is off and on, but not as bad. Still taking Claritin D - still with productive cough with green mucous. Runny nose and watery eyes better. Her peripheral neuropathy is much better, still present, but better.  Eating and drinking well. States she vomited two days after neulasta shot, but no other episodes.   She has some occasional shortness of breath with activity.  Appetite and bowel function has been good.  Her pain control has been excellent.    She continues on MS Contin 200 mg 3 times per day as her primary pain control.          ECOG-1-2    Breast history: In 2013 she was diagnosed with stage IIB carcinoma of the right breast, ER positive with a low Oncotype score.  She was enrolled on protocol  and randomized to endocrine therapy alone.  She was tried on all 3 aromatase inhibitors and had itching and rash to all of them.  In August 2013 she was started on  tamoxifen.   She was found to have  bone metastasis in May 2015, 2015.  A subsequent bone biopsy was performed on a lesion at the T8 vertebra.   The pathology from that was consistent with metastatic breast cancer, ER +80%, IA +80%, and HER-2 negative.      She received letrozole plus palbociclib from July 2015 until May 2016.  She also received bone protective therapy with Xgeva.      Faslodex was started in June 2016.      Exemestane and Afinitor started in August 2017.  That was discontinued in February 2018 due to progression in the liver and bone.     Navelbine was started February 16, 2018.  She had 8 cycles for that.  Scans in November 2018 showed progression as follows     Capecitabine was started in December 2018.  She received 5 cycles of that therapy.    Follow-up scans in April 2019 showed progression with a new hepatic lesion and worsening bone disease.    She began clinical trial therapy with erdafitinib on May 8, 2019.  That was discontinued in September 2019 due to progression in the bone.    She then received a course of radiation therapy to her pelvis.  She began weekly Taxol in October 2019.That was discontinued in December 2019 due to progression.    She began eribulin therapy on 1/15/2020.    Review of Systems   Constitutional: Positive for fatigue (all the time; forcing herself to do activity). Negative for activity change, appetite change, chills, diaphoresis, fever and unexpected weight change.   HENT: Negative for mouth sores, nosebleeds, sore throat and trouble swallowing.    Eyes: Negative for visual disturbance.   Respiratory: Positive for cough and shortness of breath (with exertion).    Cardiovascular: Negative for chest pain, palpitations and leg swelling.   Gastrointestinal: Negative for abdominal distention, abdominal pain, blood in stool, constipation, diarrhea, nausea and vomiting.   Endocrine: Positive for cold intolerance (hands and feet are cold).   Genitourinary: Negative for  frequency, hematuria and vaginal bleeding.   Musculoskeletal: Negative for arthralgias, back pain and myalgias.   Skin: Negative for pallor and rash.   Allergic/Immunologic: Negative for immunocompromised state.   Neurological: Positive for numbness (peripheral neuropathy ). Negative for dizziness, weakness, light-headedness and headaches.   Hematological: Negative for adenopathy. Does not bruise/bleed easily.   Psychiatric/Behavioral: Negative for confusion. The patient is not nervous/anxious.        Objective:      Physical Exam   Constitutional: She is oriented to person, place, and time. She appears well-developed and well-nourished. No distress.   Pulmonary/Chest: Effort normal.   Neurological: She is alert and oriented to person, place, and time.   Psychiatric: She has a normal mood and affect. Her behavior is normal. Judgment and thought content normal.     Labs due to be later today.   Assessment:       1. Malignant neoplasm of central portion of right breast in female, estrogen receptor positive    2. Bone metastasis    3. Metastasis to liver    4. 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 Cancer related pain    6. Benign essential HTN    7. Hypercholesteremia       Plan:       1-4. Continue current therapy.  5. Continue current pain regiment  6. Monitored today; continue current medication and follow up with PCP   7. Continue current medication and follow up with PCP    Return to clinic in 1 week with labs.     Patient is in agreement with the proposed treatment plan. All questions were answered to the patient's satisfaction. Patient knows to call clinic for any new or worsening symptoms and if anything is needed before the next clinic visit.          Teressa Rodriguez, BENNY-C  Hematology & Medical Oncology   [facility] [place] [address]  ph. [phone]  Fax. [phone]    Patient dicussed with collaborating physician, Dr. Schroeder.

## 2020-05-29 ENCOUNTER — PATIENT MESSAGE (OUTPATIENT)
Dept: HEMATOLOGY/ONCOLOGY | Facility: CLINIC | Age: 65
End: 2020-05-29

## 2020-06-01 ENCOUNTER — TELEPHONE (OUTPATIENT)
Dept: PHARMACY | Facility: CLINIC | Age: 65
End: 2020-06-01

## 2020-06-01 DIAGNOSIS — C79.51 BONE METASTASIS: ICD-10-CM

## 2020-06-01 DIAGNOSIS — T45.1X5A CHEMOTHERAPY INDUCED NEUTROPENIA: ICD-10-CM

## 2020-06-01 DIAGNOSIS — D70.1 CHEMOTHERAPY INDUCED NEUTROPENIA: ICD-10-CM

## 2020-06-01 DIAGNOSIS — Z17.0 MALIGNANT NEOPLASM OF CENTRAL PORTION OF RIGHT BREAST IN FEMALE, ESTROGEN RECEPTOR POSITIVE: ICD-10-CM

## 2020-06-01 DIAGNOSIS — C50.111 MALIGNANT NEOPLASM OF CENTRAL PORTION OF RIGHT BREAST IN FEMALE, ESTROGEN RECEPTOR POSITIVE: ICD-10-CM

## 2020-06-01 NOTE — TELEPHONE ENCOUNTER
Refill: patient reports that she will be in need of her next injection of Neulasta on 6/18/20. Refill request sent today and will reach out early next week to set up delivery,

## 2020-06-04 DIAGNOSIS — C50.111 MALIGNANT NEOPLASM OF CENTRAL PORTION OF RIGHT FEMALE BREAST, UNSPECIFIED ESTROGEN RECEPTOR STATUS: Primary | ICD-10-CM

## 2020-06-08 NOTE — TELEPHONE ENCOUNTER
Refill: Patient confirmed that she will be in need of her next injection for Neulasta on 6/18. Will ship on 6/15 for patient to receive on 6/16. Address confirmed. $5.00 ([facility]). Medications, allergies and health conditions reviewed. Confirmed correct dose and administration. Notes no major side effects at this time.

## 2020-06-10 ENCOUNTER — INFUSION (OUTPATIENT)
Dept: INFUSION THERAPY | Facility: HOSPITAL | Age: 65
End: 2020-06-10
Attending: INTERNAL MEDICINE
Payer: MEDICARE

## 2020-06-10 ENCOUNTER — DOCUMENT SCAN (OUTPATIENT)
Dept: HOME HEALTH SERVICES | Facility: HOSPITAL | Age: 65
End: 2020-06-10
Payer: MEDICARE

## 2020-06-10 ENCOUNTER — OFFICE VISIT (OUTPATIENT)
Dept: HEMATOLOGY/ONCOLOGY | Facility: CLINIC | Age: 65
End: 2020-06-10
Payer: MEDICARE

## 2020-06-10 VITALS
TEMPERATURE: 98 F | SYSTOLIC BLOOD PRESSURE: 128 MMHG | HEART RATE: 101 BPM | OXYGEN SATURATION: 95 % | WEIGHT: 150.5 LBS | RESPIRATION RATE: 17 BRPM | BODY MASS INDEX: 25.83 KG/M2 | DIASTOLIC BLOOD PRESSURE: 60 MMHG

## 2020-06-10 DIAGNOSIS — I10 BENIGN ESSENTIAL HTN: Chronic | ICD-10-CM

## 2020-06-10 DIAGNOSIS — K21.9 GASTROESOPHAGEAL REFLUX DISEASE, ESOPHAGITIS PRESENCE NOT SPECIFIED: ICD-10-CM

## 2020-06-10 DIAGNOSIS — C79.51 METASTATIC CANCER TO SPINE: ICD-10-CM

## 2020-06-10 DIAGNOSIS — Z17.0 MALIGNANT NEOPLASM OF CENTRAL PORTION OF RIGHT BREAST IN FEMALE, ESTROGEN RECEPTOR POSITIVE: ICD-10-CM

## 2020-06-10 DIAGNOSIS — G89.3 CANCER RELATED PAIN: ICD-10-CM

## 2020-06-10 DIAGNOSIS — C78.7 METASTASIS TO LIVER: ICD-10-CM

## 2020-06-10 DIAGNOSIS — C50.011 MALIGNANT NEOPLASM OF NIPPLE OF RIGHT BREAST IN FEMALE, UNSPECIFIED ESTROGEN RECEPTOR STATUS: Primary | ICD-10-CM

## 2020-06-10 DIAGNOSIS — C50.111 MALIGNANT NEOPLASM OF CENTRAL PORTION OF RIGHT BREAST IN FEMALE, ESTROGEN RECEPTOR POSITIVE: Primary | ICD-10-CM

## 2020-06-10 DIAGNOSIS — C50.111 MALIGNANT NEOPLASM OF CENTRAL PORTION OF RIGHT BREAST IN FEMALE, ESTROGEN RECEPTOR POSITIVE: ICD-10-CM

## 2020-06-10 DIAGNOSIS — B37.9 YEAST INFECTION: ICD-10-CM

## 2020-06-10 DIAGNOSIS — E78.00 HYPERCHOLESTEREMIA: ICD-10-CM

## 2020-06-10 DIAGNOSIS — Z17.0 MALIGNANT NEOPLASM OF CENTRAL PORTION OF RIGHT BREAST IN FEMALE, ESTROGEN RECEPTOR POSITIVE: Primary | ICD-10-CM

## 2020-06-10 DIAGNOSIS — C79.51 BONE METASTASIS: ICD-10-CM

## 2020-06-10 LAB
ALBUMIN SERPL BCP-MCNC: 3.4 G/DL (ref 3.5–5.2)
ALP SERPL-CCNC: 119 U/L (ref 55–135)
ALT SERPL W/O P-5'-P-CCNC: 11 U/L (ref 10–44)
ANION GAP SERPL CALC-SCNC: 6 MMOL/L (ref 8–16)
AST SERPL-CCNC: 21 U/L (ref 10–40)
BILIRUB SERPL-MCNC: 0.8 MG/DL (ref 0.1–1)
BUN SERPL-MCNC: 14 MG/DL (ref 8–23)
CALCIUM SERPL-MCNC: 8.9 MG/DL (ref 8.7–10.5)
CHLORIDE SERPL-SCNC: 105 MMOL/L (ref 95–110)
CO2 SERPL-SCNC: 29 MMOL/L (ref 23–29)
CREAT SERPL-MCNC: 0.7 MG/DL (ref 0.5–1.4)
ERYTHROCYTE [DISTWIDTH] IN BLOOD BY AUTOMATED COUNT: 18.3 % (ref 11.5–14.5)
EST. GFR  (AFRICAN AMERICAN): >60 ML/MIN/1.73 M^2
EST. GFR  (NON AFRICAN AMERICAN): >60 ML/MIN/1.73 M^2
GLUCOSE SERPL-MCNC: 133 MG/DL (ref 70–110)
HCT VFR BLD AUTO: 37.4 % (ref 37–48.5)
HGB BLD-MCNC: 11.4 G/DL (ref 12–16)
IMM GRANULOCYTES # BLD AUTO: 0.01 K/UL (ref 0–0.04)
MCH RBC QN AUTO: 28.3 PG (ref 27–31)
MCHC RBC AUTO-ENTMCNC: 30.5 G/DL (ref 32–36)
MCV RBC AUTO: 93 FL (ref 82–98)
NEUTROPHILS # BLD AUTO: 2.5 K/UL (ref 1.8–7.7)
PLATELET # BLD AUTO: 132 K/UL (ref 150–350)
PMV BLD AUTO: 9.8 FL (ref 9.2–12.9)
POTASSIUM SERPL-SCNC: 4.1 MMOL/L (ref 3.5–5.1)
PROT SERPL-MCNC: 6.5 G/DL (ref 6–8.4)
RBC # BLD AUTO: 4.03 M/UL (ref 4–5.4)
SODIUM SERPL-SCNC: 140 MMOL/L (ref 136–145)
WBC # BLD AUTO: 3.83 K/UL (ref 3.9–12.7)

## 2020-06-10 PROCEDURE — 96367 TX/PROPH/DG ADDL SEQ IV INF: CPT

## 2020-06-10 PROCEDURE — 99214 PR OFFICE/OUTPT VISIT, EST, LEVL IV, 30-39 MIN: ICD-10-PCS | Mod: 95,,, | Performed by: NURSE PRACTITIONER

## 2020-06-10 PROCEDURE — 99214 OFFICE O/P EST MOD 30 MIN: CPT | Mod: 95,,, | Performed by: NURSE PRACTITIONER

## 2020-06-10 PROCEDURE — 85027 COMPLETE CBC AUTOMATED: CPT

## 2020-06-10 PROCEDURE — 96409 CHEMO IV PUSH SNGL DRUG: CPT

## 2020-06-10 PROCEDURE — 25000003 PHARM REV CODE 250: Performed by: INTERNAL MEDICINE

## 2020-06-10 PROCEDURE — 86300 IMMUNOASSAY TUMOR CA 15-3: CPT

## 2020-06-10 PROCEDURE — 63600175 PHARM REV CODE 636 W HCPCS: Mod: JG | Performed by: INTERNAL MEDICINE

## 2020-06-10 PROCEDURE — 80053 COMPREHEN METABOLIC PANEL: CPT

## 2020-06-10 PROCEDURE — 63600175 PHARM REV CODE 636 W HCPCS

## 2020-06-10 RX ORDER — SODIUM CHLORIDE 0.9 % (FLUSH) 0.9 %
10 SYRINGE (ML) INJECTION
Status: CANCELLED | OUTPATIENT
Start: 2020-06-17

## 2020-06-10 RX ORDER — PANTOPRAZOLE SODIUM 40 MG/1
40 TABLET, DELAYED RELEASE ORAL DAILY
Qty: 90 TABLET | Refills: 3 | Status: SHIPPED | OUTPATIENT
Start: 2020-06-10 | End: 2021-06-14

## 2020-06-10 RX ORDER — HEPARIN 100 UNIT/ML
500 SYRINGE INTRAVENOUS
Status: COMPLETED | OUTPATIENT
Start: 2020-06-10 | End: 2020-06-10

## 2020-06-10 RX ORDER — HEPARIN 100 UNIT/ML
SYRINGE INTRAVENOUS
Status: COMPLETED
Start: 2020-06-10 | End: 2020-06-10

## 2020-06-10 RX ORDER — HEPARIN 100 UNIT/ML
500 SYRINGE INTRAVENOUS
Status: CANCELLED | OUTPATIENT
Start: 2020-06-10

## 2020-06-10 RX ORDER — HEPARIN 100 UNIT/ML
500 SYRINGE INTRAVENOUS
Status: CANCELLED | OUTPATIENT
Start: 2020-06-17

## 2020-06-10 RX ORDER — SODIUM CHLORIDE 0.9 % (FLUSH) 0.9 %
10 SYRINGE (ML) INJECTION
Status: CANCELLED | OUTPATIENT
Start: 2020-06-10

## 2020-06-10 RX ORDER — FLUCONAZOLE 150 MG/1
150 TABLET ORAL ONCE
Qty: 1 TABLET | Refills: 0 | Status: SHIPPED | OUTPATIENT
Start: 2020-06-10 | End: 2020-06-10

## 2020-06-10 RX ADMIN — HEPARIN 500 UNITS: 100 SYRINGE at 12:06

## 2020-06-10 RX ADMIN — SODIUM CHLORIDE: 9 INJECTION, SOLUTION INTRAVENOUS at 11:06

## 2020-06-10 RX ADMIN — ERIBULIN MESYLATE 2.55 MG: 0.5 INJECTION INTRAVENOUS at 11:06

## 2020-06-10 RX ADMIN — GRANISETRON HYDROCHLORIDE 1 MG: 1 INJECTION INTRAVENOUS at 11:06

## 2020-06-10 NOTE — PLAN OF CARE
Patient arrived to unit afebrile in W/C  for stat labs and Halaven infusion. Plan of care reviewed, patient agreeable to plan. Pt reports neuropathy and pain in hands and feet are much better since she had a break in treatment. Pt reports pain currently 2/10. Continues to have problems walking. Stat labs were drawn and results reviewed. Pt cleared for chemo. Patient tolerated infusion well. VSS. Discharge instructions reviewed, patient instructed to return June 17. Patient left unit in w/c with MA escort.  Patient in NAD at time of discharge.

## 2020-06-10 NOTE — Clinical Note
Needs CBC and CMP in 1 week on [facility] [place] followed by halaven infusion.Needs CBC, CMP CA 27.29 and halaven on the [facility] [place]; with virtual visit with Dr. Schroeder prior on 6/24/2020

## 2020-06-12 LAB — CANCER AG27-29 SERPL-ACNC: 66.4 U/ML

## 2020-06-17 ENCOUNTER — INFUSION (OUTPATIENT)
Dept: INFUSION THERAPY | Facility: HOSPITAL | Age: 65
End: 2020-06-17
Attending: INTERNAL MEDICINE
Payer: MEDICARE

## 2020-06-17 VITALS
OXYGEN SATURATION: 96 % | DIASTOLIC BLOOD PRESSURE: 58 MMHG | TEMPERATURE: 97 F | RESPIRATION RATE: 17 BRPM | HEART RATE: 95 BPM | SYSTOLIC BLOOD PRESSURE: 108 MMHG

## 2020-06-17 DIAGNOSIS — E03.8 HYPOTHYROIDISM DUE TO HASHIMOTO'S THYROIDITIS: ICD-10-CM

## 2020-06-17 DIAGNOSIS — C79.51 METASTATIC CANCER TO SPINE: ICD-10-CM

## 2020-06-17 DIAGNOSIS — Z51.11 ENCOUNTER FOR ANTINEOPLASTIC CHEMOTHERAPY: Primary | ICD-10-CM

## 2020-06-17 DIAGNOSIS — C50.011 MALIGNANT NEOPLASM OF NIPPLE OF RIGHT BREAST IN FEMALE, UNSPECIFIED ESTROGEN RECEPTOR STATUS: ICD-10-CM

## 2020-06-17 DIAGNOSIS — E06.3 HYPOTHYROIDISM DUE TO HASHIMOTO'S THYROIDITIS: ICD-10-CM

## 2020-06-17 DIAGNOSIS — C78.7 METASTASIS TO LIVER: ICD-10-CM

## 2020-06-17 DIAGNOSIS — Z17.0 MALIGNANT NEOPLASM OF CENTRAL PORTION OF RIGHT BREAST IN FEMALE, ESTROGEN RECEPTOR POSITIVE: ICD-10-CM

## 2020-06-17 DIAGNOSIS — C50.111 MALIGNANT NEOPLASM OF CENTRAL PORTION OF RIGHT BREAST IN FEMALE, ESTROGEN RECEPTOR POSITIVE: ICD-10-CM

## 2020-06-17 LAB
ALBUMIN SERPL BCP-MCNC: 3.4 G/DL (ref 3.5–5.2)
ALP SERPL-CCNC: 128 U/L (ref 55–135)
ALT SERPL W/O P-5'-P-CCNC: 15 U/L (ref 10–44)
ANION GAP SERPL CALC-SCNC: 7 MMOL/L (ref 8–16)
AST SERPL-CCNC: 25 U/L (ref 10–40)
BILIRUB SERPL-MCNC: 0.9 MG/DL (ref 0.1–1)
BUN SERPL-MCNC: 14 MG/DL (ref 8–23)
CALCIUM SERPL-MCNC: 8.9 MG/DL (ref 8.7–10.5)
CHLORIDE SERPL-SCNC: 105 MMOL/L (ref 95–110)
CO2 SERPL-SCNC: 27 MMOL/L (ref 23–29)
CREAT SERPL-MCNC: 0.7 MG/DL (ref 0.5–1.4)
ERYTHROCYTE [DISTWIDTH] IN BLOOD BY AUTOMATED COUNT: 17.9 % (ref 11.5–14.5)
EST. GFR  (AFRICAN AMERICAN): >60 ML/MIN/1.73 M^2
EST. GFR  (NON AFRICAN AMERICAN): >60 ML/MIN/1.73 M^2
GLUCOSE SERPL-MCNC: 137 MG/DL (ref 70–110)
HCT VFR BLD AUTO: 34.2 % (ref 37–48.5)
HGB BLD-MCNC: 10.4 G/DL (ref 12–16)
IMM GRANULOCYTES # BLD AUTO: 0.04 K/UL (ref 0–0.04)
MCH RBC QN AUTO: 28.4 PG (ref 27–31)
MCHC RBC AUTO-ENTMCNC: 30.4 G/DL (ref 32–36)
MCV RBC AUTO: 93 FL (ref 82–98)
NEUTROPHILS # BLD AUTO: 1.3 K/UL (ref 1.8–7.7)
PLATELET # BLD AUTO: 103 K/UL (ref 150–350)
PMV BLD AUTO: 11.3 FL (ref 9.2–12.9)
POTASSIUM SERPL-SCNC: 4 MMOL/L (ref 3.5–5.1)
PROT SERPL-MCNC: 6.6 G/DL (ref 6–8.4)
RBC # BLD AUTO: 3.66 M/UL (ref 4–5.4)
SODIUM SERPL-SCNC: 139 MMOL/L (ref 136–145)
T4 FREE SERPL-MCNC: 0.92 NG/DL (ref 0.71–1.51)
TSH SERPL DL<=0.005 MIU/L-ACNC: 20.25 UIU/ML (ref 0.4–4)
WBC # BLD AUTO: 2.84 K/UL (ref 3.9–12.7)

## 2020-06-17 PROCEDURE — 85027 COMPLETE CBC AUTOMATED: CPT

## 2020-06-17 PROCEDURE — 36591 DRAW BLOOD OFF VENOUS DEVICE: CPT

## 2020-06-17 PROCEDURE — 84443 ASSAY THYROID STIM HORMONE: CPT

## 2020-06-17 PROCEDURE — 96409 CHEMO IV PUSH SNGL DRUG: CPT

## 2020-06-17 PROCEDURE — 80053 COMPREHEN METABOLIC PANEL: CPT

## 2020-06-17 PROCEDURE — 25000003 PHARM REV CODE 250: Performed by: INTERNAL MEDICINE

## 2020-06-17 PROCEDURE — A4216 STERILE WATER/SALINE, 10 ML: HCPCS | Performed by: INTERNAL MEDICINE

## 2020-06-17 PROCEDURE — 63600175 PHARM REV CODE 636 W HCPCS: Mod: JG | Performed by: INTERNAL MEDICINE

## 2020-06-17 PROCEDURE — 96367 TX/PROPH/DG ADDL SEQ IV INF: CPT

## 2020-06-17 PROCEDURE — 84439 ASSAY OF FREE THYROXINE: CPT

## 2020-06-17 RX ORDER — HEPARIN 100 UNIT/ML
500 SYRINGE INTRAVENOUS
Status: DISCONTINUED | OUTPATIENT
Start: 2020-06-17 | End: 2020-06-17 | Stop reason: HOSPADM

## 2020-06-17 RX ORDER — SODIUM CHLORIDE 0.9 % (FLUSH) 0.9 %
10 SYRINGE (ML) INJECTION
Status: DISCONTINUED | OUTPATIENT
Start: 2020-06-17 | End: 2020-06-17 | Stop reason: HOSPADM

## 2020-06-17 RX ADMIN — Medication 10 ML: at 12:06

## 2020-06-17 RX ADMIN — HEPARIN 500 UNITS: 100 SYRINGE at 12:06

## 2020-06-17 RX ADMIN — ERIBULIN MESYLATE 2.55 MG: 0.5 INJECTION INTRAVENOUS at 12:06

## 2020-06-17 RX ADMIN — GRANISETRON HYDROCHLORIDE 1 MG: 1 INJECTION, SOLUTION INTRAVENOUS at 11:06

## 2020-06-17 NOTE — PLAN OF CARE
Patient arrived afebrile in w/c with escort to unit for stat labs and Halaven infusion. Pt stated her neuropathy pain has decreased since she has been under pain management. 2/10 on pain scale. Labs reviewed and ANC 1300. Dr. Schroeder notified and cleared pt for chemo today.Plan of care reviewed, patient agreeable to plan. Patient tolerated infusion well. VSS. Pt states she will receive Neulasta injection with home health care tomorrow. Discharge instructions reviewed, patient instructed to return June 24. Patient left unit in W/C with MA. Patient in NAD at time of discharge.

## 2020-06-18 ENCOUNTER — PATIENT MESSAGE (OUTPATIENT)
Dept: ENDOCRINOLOGY | Facility: CLINIC | Age: 65
End: 2020-06-18

## 2020-06-23 ENCOUNTER — PATIENT MESSAGE (OUTPATIENT)
Dept: HEMATOLOGY/ONCOLOGY | Facility: CLINIC | Age: 65
End: 2020-06-23

## 2020-06-23 DIAGNOSIS — C79.51 METASTATIC CANCER TO SPINE: ICD-10-CM

## 2020-06-23 DIAGNOSIS — C79.51 BONE METASTASIS: ICD-10-CM

## 2020-06-23 DIAGNOSIS — C50.111 MALIGNANT NEOPLASM OF CENTRAL PORTION OF RIGHT FEMALE BREAST, UNSPECIFIED ESTROGEN RECEPTOR STATUS: ICD-10-CM

## 2020-06-23 DIAGNOSIS — C50.111 MALIGNANT NEOPLASM OF CENTRAL PORTION OF RIGHT FEMALE BREAST, UNSPECIFIED ESTROGEN RECEPTOR STATUS: Primary | ICD-10-CM

## 2020-06-23 RX ORDER — MORPHINE SULFATE 200 MG/1
200 TABLET, FILM COATED, EXTENDED RELEASE ORAL EVERY 8 HOURS
Qty: 90 TABLET | Refills: 0 | Status: SHIPPED | OUTPATIENT
Start: 2020-06-23 | End: 2020-06-23

## 2020-06-23 RX ORDER — MORPHINE SULFATE 200 MG/1
200 TABLET, FILM COATED, EXTENDED RELEASE ORAL EVERY 8 HOURS
Qty: 90 TABLET | Refills: 0 | Status: SHIPPED | OUTPATIENT
Start: 2020-06-23 | End: 2020-07-23 | Stop reason: SDUPTHER

## 2020-06-23 NOTE — PROGRESS NOTES
Subjective:       Patient ID: Rebecca Crain is a 64 y.o. female.    Chief Complaint: Follow-up and Breast Cancer    HPI Ms. Crain is here for followup of metastatic carcinoma of the right breast.  She is currently on eribulin therapy.  She has had 6 cycles thus far.  Her last CA 27.29 in decreased from 276 to133.  Scans after 4 cycles of therapy showed improvement in her hepatic metastasis and stable bone disease.  Currently on Cycle 8 Day 1     The patient location is:  home.  The chief complaint leading to consultation is:  Breast cancer.  Visit type: audiovisual  Total time spent with patient:  15 min. / 20 minutes total time on pre and post care  Each patient to whom he or she provides medical services by telemedicine is:  (1) informed of the relationship between the physician and patient and the respective role of any other health care provider with respect to management of the patient; and (2) notified that he or she may decline to receive medical services by telemedicine and may withdraw from such care at any time.      She notes she fell about 2 weeks ago and hurt her both knees; left knee is worse than the right. She treated it with ice and then started to use. Swelling is gone, the cut on the knee is healing, right knee still with bruise and elbow is healing as well.   Eating and drinking well. Mild nausea, denies vomiting. Denies diarrhea (soft stool).    Cough has returned with thicker and greener mucous, previously treated with antibiotics x 2, will hold off unless she has other signs of active infection. She has some occasional shortness of breath with activity.  Appetite and bowel function has been good.  Peripheral neuropathy a little worse since being back on chemo, but not as bad as before.       She continues on MS Contin 200 mg 3 times per day as her primary pain control. Back and neck are intermittent pain, knee giving her most of the issues now from the fall.           ECOG-1-2    Breast  history: In 2013 she was diagnosed with stage IIB carcinoma of the right breast, ER positive with a low Oncotype score.  She was enrolled on protocol  and randomized to endocrine therapy alone.  She was tried on all 3 aromatase inhibitors and had itching and rash to all of them.  In August 2013 she was started on tamoxifen.   She was found to have  bone metastasis in May 2015, 2015.  A subsequent bone biopsy was performed on a lesion at the T8 vertebra.   The pathology from that was consistent with metastatic breast cancer, ER +80%, ID +80%, and HER-2 negative.      She received letrozole plus palbociclib from July 2015 until May 2016.  She also received bone protective therapy with Xgeva.      Faslodex was started in June 2016.      Exemestane and Afinitor started in August 2017.  That was discontinued in February 2018 due to progression in the liver and bone.     Navelbine was started February 16, 2018.  She had 8 cycles for that.  Scans in November 2018 showed progression as follows     Capecitabine was started in December 2018.  She received 5 cycles of that therapy.    Follow-up scans in April 2019 showed progression with a new hepatic lesion and worsening bone disease.    She began clinical trial therapy with erdafitinib on May 8, 2019.  That was discontinued in September 2019 due to progression in the bone.    She then received a course of radiation therapy to her pelvis.  She began weekly Taxol in October 2019.That was discontinued in December 2019 due to progression.    She began eribulin therapy on 1/15/2020.    Review of Systems   Constitutional: Positive for fatigue (all the time; forcing herself to do activity). Negative for activity change, appetite change, chills, diaphoresis, fever and unexpected weight change.   HENT: Negative for mouth sores, nosebleeds, sore throat and trouble swallowing.    Eyes: Negative for visual disturbance.   Respiratory: Positive for cough (returning) and shortness of  breath (with exertion).         Mucous production - thicker and greener   Cardiovascular: Negative for chest pain, palpitations and leg swelling.   Gastrointestinal: Positive for nausea. Negative for abdominal distention, abdominal pain, blood in stool, constipation, diarrhea and vomiting.   Endocrine: Positive for cold intolerance (hands and feet are cold).   Genitourinary: Negative for frequency, hematuria and vaginal bleeding.   Musculoskeletal: Negative for arthralgias, back pain and myalgias.   Skin: Negative for pallor and rash.   Allergic/Immunologic: Negative for immunocompromised state.   Neurological: Positive for numbness (peripheral neuropathy ). Negative for dizziness, weakness, light-headedness and headaches.   Hematological: Negative for adenopathy. Does not bruise/bleed easily.   Psychiatric/Behavioral: Negative for confusion. The patient is not nervous/anxious.        Objective:      Physical Exam  Constitutional:       General: She is not in acute distress.     Appearance: She is well-developed.   Pulmonary:      Effort: Pulmonary effort is normal.   Neurological:      Mental Status: She is alert and oriented to person, place, and time.   Psychiatric:         Behavior: Behavior normal.         Thought Content: Thought content normal.         Judgment: Judgment normal.       Labs due to be later today.   Assessment:       1. Malignant neoplasm of central portion of right female breast, unspecified estrogen receptor status    2. Bone metastasis    3. Metastasis to liver    4. 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 Encounter for antineoplastic chemotherapy    6. Cancer related pain    7. Benign essential HTN    8. Hypercholesteremia    9. Secondary hyperparathyroidism, non-renal    10. Hypothyroidism due to Hashimoto's thyroiditis       Plan:       1-5. Continue current therapy. Eribulin, Xgeva  6. Continue current pain regimen   7. Monitored today; continue current medication and follow up with PCP   8.  Continue current medication and follow up with PCP  9. Monitored; continue to follow up  10. Continue current dose of synthroid and follow up with endocrinology       Return to clinic in 1 week with labs.     Patient is in agreement with the proposed treatment plan. All questions were answered to the patient's satisfaction. Patient knows to call clinic for any new or worsening symptoms and if anything is needed before the next clinic visit.          Teressa Rodriguez, NAVIP-C  Hematology & Medical Oncology   [facility] [place] [address]  ph. [phone]  Fax. [phone]    Patient dicussed with collaborating physician, Dr. Schroeder.

## 2020-06-29 ENCOUNTER — TELEPHONE (OUTPATIENT)
Dept: PHARMACY | Facility: CLINIC | Age: 65
End: 2020-06-29

## 2020-06-29 DIAGNOSIS — C79.51 BONE METASTASIS: ICD-10-CM

## 2020-06-29 DIAGNOSIS — D70.1 CHEMOTHERAPY INDUCED NEUTROPENIA: ICD-10-CM

## 2020-06-29 DIAGNOSIS — Z17.0 MALIGNANT NEOPLASM OF CENTRAL PORTION OF RIGHT BREAST IN FEMALE, ESTROGEN RECEPTOR POSITIVE: ICD-10-CM

## 2020-06-29 DIAGNOSIS — C50.111 MALIGNANT NEOPLASM OF CENTRAL PORTION OF RIGHT BREAST IN FEMALE, ESTROGEN RECEPTOR POSITIVE: ICD-10-CM

## 2020-06-29 DIAGNOSIS — T45.1X5A CHEMOTHERAPY INDUCED NEUTROPENIA: ICD-10-CM

## 2020-06-29 NOTE — TELEPHONE ENCOUNTER
Contacted patient for Neulasta refill. Current prescription on file is out of refills - refill request sent on 6/29. Patient states her next injection is due next Wednesday, 7/8. She states she injects every ~21 days. Patient has no questions or concerns at this time. She is aware that we will reach out to her once we receive the new script to set up her refill, bu tto contact us sooner if she needs anything.

## 2020-06-29 NOTE — TELEPHONE ENCOUNTER
Received new prescription for Neulasta. Contacted patient to set up refill. Patient has 0 syringes on hand. No missed doses. Confirmed dose/directions. Next injection is due Wednesday, 7/8. Patient would like medication shipped this week. Will ship Neulasta on 7/1 for the patient to receive on 7/2. $5 copay. [facility]. Address confirmed. Patient states she is not in need of a sharps container needed at this time. No changes in medications, allergies, or health conditions. She is aware to contact [facility] if she needs anything.

## 2020-06-30 ENCOUNTER — OFFICE VISIT (OUTPATIENT)
Dept: HEMATOLOGY/ONCOLOGY | Facility: CLINIC | Age: 65
End: 2020-06-30
Payer: MEDICARE

## 2020-06-30 ENCOUNTER — INFUSION (OUTPATIENT)
Dept: INFUSION THERAPY | Facility: HOSPITAL | Age: 65
End: 2020-06-30
Attending: INTERNAL MEDICINE
Payer: MEDICARE

## 2020-06-30 VITALS
HEART RATE: 80 BPM | DIASTOLIC BLOOD PRESSURE: 52 MMHG | SYSTOLIC BLOOD PRESSURE: 109 MMHG | WEIGHT: 149 LBS | RESPIRATION RATE: 17 BRPM | OXYGEN SATURATION: 97 % | HEIGHT: 64 IN | TEMPERATURE: 98 F | BODY MASS INDEX: 25.44 KG/M2

## 2020-06-30 DIAGNOSIS — E78.00 HYPERCHOLESTEREMIA: ICD-10-CM

## 2020-06-30 DIAGNOSIS — E03.8 HYPOTHYROIDISM DUE TO HASHIMOTO'S THYROIDITIS: ICD-10-CM

## 2020-06-30 DIAGNOSIS — C78.7 METASTASIS TO LIVER: ICD-10-CM

## 2020-06-30 DIAGNOSIS — E21.1 SECONDARY HYPERPARATHYROIDISM, NON-RENAL: ICD-10-CM

## 2020-06-30 DIAGNOSIS — C79.51 METASTATIC CANCER TO SPINE: ICD-10-CM

## 2020-06-30 DIAGNOSIS — E06.3 HYPOTHYROIDISM DUE TO HASHIMOTO'S THYROIDITIS: ICD-10-CM

## 2020-06-30 DIAGNOSIS — Z17.0 MALIGNANT NEOPLASM OF CENTRAL PORTION OF RIGHT BREAST IN FEMALE, ESTROGEN RECEPTOR POSITIVE: ICD-10-CM

## 2020-06-30 DIAGNOSIS — C50.111 MALIGNANT NEOPLASM OF CENTRAL PORTION OF RIGHT FEMALE BREAST, UNSPECIFIED ESTROGEN RECEPTOR STATUS: Primary | ICD-10-CM

## 2020-06-30 DIAGNOSIS — C50.011 MALIGNANT NEOPLASM OF NIPPLE OF RIGHT BREAST IN FEMALE, UNSPECIFIED ESTROGEN RECEPTOR STATUS: Primary | ICD-10-CM

## 2020-06-30 DIAGNOSIS — G89.3 CANCER RELATED PAIN: ICD-10-CM

## 2020-06-30 DIAGNOSIS — Z51.11 ENCOUNTER FOR ANTINEOPLASTIC CHEMOTHERAPY: ICD-10-CM

## 2020-06-30 DIAGNOSIS — C79.51 BONE METASTASIS: ICD-10-CM

## 2020-06-30 DIAGNOSIS — I10 BENIGN ESSENTIAL HTN: Chronic | ICD-10-CM

## 2020-06-30 DIAGNOSIS — C50.111 MALIGNANT NEOPLASM OF CENTRAL PORTION OF RIGHT BREAST IN FEMALE, ESTROGEN RECEPTOR POSITIVE: ICD-10-CM

## 2020-06-30 LAB
ALBUMIN SERPL BCP-MCNC: 3.5 G/DL (ref 3.5–5.2)
ALP SERPL-CCNC: 148 U/L (ref 55–135)
ALT SERPL W/O P-5'-P-CCNC: 10 U/L (ref 10–44)
ANION GAP SERPL CALC-SCNC: 5 MMOL/L (ref 8–16)
AST SERPL-CCNC: 18 U/L (ref 10–40)
BILIRUB SERPL-MCNC: 0.7 MG/DL (ref 0.1–1)
BUN SERPL-MCNC: 15 MG/DL (ref 8–23)
CALCIUM SERPL-MCNC: 8.6 MG/DL (ref 8.7–10.5)
CHLORIDE SERPL-SCNC: 103 MMOL/L (ref 95–110)
CO2 SERPL-SCNC: 31 MMOL/L (ref 23–29)
CREAT SERPL-MCNC: 0.7 MG/DL (ref 0.5–1.4)
ERYTHROCYTE [DISTWIDTH] IN BLOOD BY AUTOMATED COUNT: 18.6 % (ref 11.5–14.5)
EST. GFR  (AFRICAN AMERICAN): >60 ML/MIN/1.73 M^2
EST. GFR  (NON AFRICAN AMERICAN): >60 ML/MIN/1.73 M^2
GLUCOSE SERPL-MCNC: 100 MG/DL (ref 70–110)
HCT VFR BLD AUTO: 37.3 % (ref 37–48.5)
HGB BLD-MCNC: 11.5 G/DL (ref 12–16)
IMM GRANULOCYTES # BLD AUTO: 0.04 K/UL (ref 0–0.04)
MCH RBC QN AUTO: 28.5 PG (ref 27–31)
MCHC RBC AUTO-ENTMCNC: 30.8 G/DL (ref 32–36)
MCV RBC AUTO: 92 FL (ref 82–98)
NEUTROPHILS # BLD AUTO: 6.5 K/UL (ref 1.8–7.7)
PLATELET # BLD AUTO: 106 K/UL (ref 150–350)
PMV BLD AUTO: 11.2 FL (ref 9.2–12.9)
POTASSIUM SERPL-SCNC: 4.1 MMOL/L (ref 3.5–5.1)
PROT SERPL-MCNC: 6.8 G/DL (ref 6–8.4)
RBC # BLD AUTO: 4.04 M/UL (ref 4–5.4)
SODIUM SERPL-SCNC: 139 MMOL/L (ref 136–145)
WBC # BLD AUTO: 8.36 K/UL (ref 3.9–12.7)

## 2020-06-30 PROCEDURE — 96409 CHEMO IV PUSH SNGL DRUG: CPT

## 2020-06-30 PROCEDURE — A4216 STERILE WATER/SALINE, 10 ML: HCPCS | Performed by: INTERNAL MEDICINE

## 2020-06-30 PROCEDURE — 63600175 PHARM REV CODE 636 W HCPCS: Mod: JG | Performed by: INTERNAL MEDICINE

## 2020-06-30 PROCEDURE — 99499 RISK ADDL DX/OHS AUDIT: ICD-10-PCS | Mod: HCNC,95,, | Performed by: NURSE PRACTITIONER

## 2020-06-30 PROCEDURE — 99499 UNLISTED E&M SERVICE: CPT | Mod: HCNC,95,, | Performed by: NURSE PRACTITIONER

## 2020-06-30 PROCEDURE — 85027 COMPLETE CBC AUTOMATED: CPT

## 2020-06-30 PROCEDURE — 25000003 PHARM REV CODE 250: Performed by: INTERNAL MEDICINE

## 2020-06-30 PROCEDURE — 80053 COMPREHEN METABOLIC PANEL: CPT

## 2020-06-30 PROCEDURE — 96367 TX/PROPH/DG ADDL SEQ IV INF: CPT

## 2020-06-30 PROCEDURE — 96372 THER/PROPH/DIAG INJ SC/IM: CPT

## 2020-06-30 PROCEDURE — 99214 PR OFFICE/OUTPT VISIT, EST, LEVL IV, 30-39 MIN: ICD-10-PCS | Mod: 95,,, | Performed by: NURSE PRACTITIONER

## 2020-06-30 PROCEDURE — 99214 OFFICE O/P EST MOD 30 MIN: CPT | Mod: 95,,, | Performed by: NURSE PRACTITIONER

## 2020-06-30 RX ORDER — SODIUM CHLORIDE 0.9 % (FLUSH) 0.9 %
10 SYRINGE (ML) INJECTION
Status: CANCELLED | OUTPATIENT
Start: 2020-06-30

## 2020-06-30 RX ORDER — SODIUM CHLORIDE 0.9 % (FLUSH) 0.9 %
10 SYRINGE (ML) INJECTION
Status: DISCONTINUED | OUTPATIENT
Start: 2020-06-30 | End: 2020-06-30 | Stop reason: HOSPADM

## 2020-06-30 RX ORDER — HEPARIN 100 UNIT/ML
500 SYRINGE INTRAVENOUS
Status: CANCELLED | OUTPATIENT
Start: 2020-06-30

## 2020-06-30 RX ORDER — SODIUM CHLORIDE 0.9 % (FLUSH) 0.9 %
10 SYRINGE (ML) INJECTION
Status: CANCELLED | OUTPATIENT
Start: 2020-07-07

## 2020-06-30 RX ORDER — HEPARIN 100 UNIT/ML
500 SYRINGE INTRAVENOUS
Status: CANCELLED | OUTPATIENT
Start: 2020-07-07

## 2020-06-30 RX ORDER — HEPARIN 100 UNIT/ML
500 SYRINGE INTRAVENOUS
Status: DISCONTINUED | OUTPATIENT
Start: 2020-06-30 | End: 2020-06-30 | Stop reason: HOSPADM

## 2020-06-30 RX ADMIN — DENOSUMAB 120 MG: 120 INJECTION SUBCUTANEOUS at 02:06

## 2020-06-30 RX ADMIN — Medication 10 ML: at 03:06

## 2020-06-30 RX ADMIN — GRANISETRON HYDROCHLORIDE 1 MG: 1 INJECTION, SOLUTION INTRAVENOUS at 02:06

## 2020-06-30 RX ADMIN — ERIBULIN MESYLATE 2 MG: 0.5 INJECTION INTRAVENOUS at 03:06

## 2020-06-30 RX ADMIN — HEPARIN 500 UNITS: 100 SYRINGE at 03:06

## 2020-06-30 NOTE — Clinical Note
Needs CBC and CMP in 1 week with eribulin to follow.   In 3 weeks needs CBC and CMP, appointment with Dr. Schroeder and malissa   Gets labs and infusions on the [facility]

## 2020-06-30 NOTE — PLAN OF CARE
Patient arrived to unit for C8D1 Halaven. Labs drawn. Patient denies any new or worsening symptoms at this time. Plan of care reviewed, patient agreeable to plan. Patient tolerated treatment well. No sign of reaction noted. Xgeva given. Patient reports taking ca and vit d. Patient denies current dental issues or recent invasive dental work. VSS. Patient received discharge instructions and follow up appointments. Patient instructed to return 7/7/20. Verbalized understanding and was escorted off unit via w/c by MA. Patient in NAD at time of discharge.

## 2020-07-07 ENCOUNTER — INFUSION (OUTPATIENT)
Dept: INFUSION THERAPY | Facility: HOSPITAL | Age: 65
End: 2020-07-07
Attending: INTERNAL MEDICINE
Payer: MEDICARE

## 2020-07-07 VITALS
HEART RATE: 82 BPM | DIASTOLIC BLOOD PRESSURE: 53 MMHG | TEMPERATURE: 98 F | SYSTOLIC BLOOD PRESSURE: 101 MMHG | OXYGEN SATURATION: 95 % | RESPIRATION RATE: 17 BRPM

## 2020-07-07 DIAGNOSIS — C78.7 METASTASIS TO LIVER: ICD-10-CM

## 2020-07-07 DIAGNOSIS — Z17.0 MALIGNANT NEOPLASM OF CENTRAL PORTION OF RIGHT BREAST IN FEMALE, ESTROGEN RECEPTOR POSITIVE: ICD-10-CM

## 2020-07-07 DIAGNOSIS — C50.111 MALIGNANT NEOPLASM OF CENTRAL PORTION OF RIGHT BREAST IN FEMALE, ESTROGEN RECEPTOR POSITIVE: ICD-10-CM

## 2020-07-07 DIAGNOSIS — C50.011 MALIGNANT NEOPLASM OF NIPPLE OF RIGHT BREAST IN FEMALE, UNSPECIFIED ESTROGEN RECEPTOR STATUS: Primary | ICD-10-CM

## 2020-07-07 DIAGNOSIS — C79.51 METASTATIC CANCER TO SPINE: ICD-10-CM

## 2020-07-07 LAB
ALBUMIN SERPL BCP-MCNC: 3.4 G/DL (ref 3.5–5.2)
ALP SERPL-CCNC: 127 U/L (ref 55–135)
ALT SERPL W/O P-5'-P-CCNC: 8 U/L (ref 10–44)
ANION GAP SERPL CALC-SCNC: 5 MMOL/L (ref 8–16)
AST SERPL-CCNC: 22 U/L (ref 10–40)
BILIRUB SERPL-MCNC: 0.6 MG/DL (ref 0.1–1)
BUN SERPL-MCNC: 13 MG/DL (ref 8–23)
CALCIUM SERPL-MCNC: 8.3 MG/DL (ref 8.7–10.5)
CHLORIDE SERPL-SCNC: 107 MMOL/L (ref 95–110)
CO2 SERPL-SCNC: 28 MMOL/L (ref 23–29)
CREAT SERPL-MCNC: 0.6 MG/DL (ref 0.5–1.4)
ERYTHROCYTE [DISTWIDTH] IN BLOOD BY AUTOMATED COUNT: 18.5 % (ref 11.5–14.5)
EST. GFR  (AFRICAN AMERICAN): >60 ML/MIN/1.73 M^2
EST. GFR  (NON AFRICAN AMERICAN): >60 ML/MIN/1.73 M^2
GLUCOSE SERPL-MCNC: 83 MG/DL (ref 70–110)
HCT VFR BLD AUTO: 34 % (ref 37–48.5)
HGB BLD-MCNC: 10.3 G/DL (ref 12–16)
IMM GRANULOCYTES # BLD AUTO: 0.03 K/UL (ref 0–0.04)
MCH RBC QN AUTO: 28 PG (ref 27–31)
MCHC RBC AUTO-ENTMCNC: 30.3 G/DL (ref 32–36)
MCV RBC AUTO: 92 FL (ref 82–98)
NEUTROPHILS # BLD AUTO: 2.1 K/UL (ref 1.8–7.7)
PLATELET # BLD AUTO: 135 K/UL (ref 150–350)
PMV BLD AUTO: 10.3 FL (ref 9.2–12.9)
POTASSIUM SERPL-SCNC: 4 MMOL/L (ref 3.5–5.1)
PROT SERPL-MCNC: 6.5 G/DL (ref 6–8.4)
RBC # BLD AUTO: 3.68 M/UL (ref 4–5.4)
SODIUM SERPL-SCNC: 140 MMOL/L (ref 136–145)
WBC # BLD AUTO: 3.5 K/UL (ref 3.9–12.7)

## 2020-07-07 PROCEDURE — 25000003 PHARM REV CODE 250: Mod: HCNC | Performed by: INTERNAL MEDICINE

## 2020-07-07 PROCEDURE — 63600175 PHARM REV CODE 636 W HCPCS: Mod: HCNC | Performed by: INTERNAL MEDICINE

## 2020-07-07 PROCEDURE — 80053 COMPREHEN METABOLIC PANEL: CPT | Mod: HCNC

## 2020-07-07 PROCEDURE — 96409 CHEMO IV PUSH SNGL DRUG: CPT | Mod: HCNC

## 2020-07-07 PROCEDURE — 96367 TX/PROPH/DG ADDL SEQ IV INF: CPT | Mod: HCNC

## 2020-07-07 PROCEDURE — 85027 COMPLETE CBC AUTOMATED: CPT | Mod: HCNC

## 2020-07-07 PROCEDURE — A4216 STERILE WATER/SALINE, 10 ML: HCPCS | Mod: HCNC | Performed by: INTERNAL MEDICINE

## 2020-07-07 RX ORDER — SODIUM CHLORIDE 0.9 % (FLUSH) 0.9 %
10 SYRINGE (ML) INJECTION
Status: DISCONTINUED | OUTPATIENT
Start: 2020-07-07 | End: 2020-07-07 | Stop reason: HOSPADM

## 2020-07-07 RX ORDER — HEPARIN 100 UNIT/ML
500 SYRINGE INTRAVENOUS
Status: DISCONTINUED | OUTPATIENT
Start: 2020-07-07 | End: 2020-07-07 | Stop reason: HOSPADM

## 2020-07-07 RX ADMIN — HEPARIN 500 UNITS: 100 SYRINGE at 12:07

## 2020-07-07 RX ADMIN — Medication 10 ML: at 12:07

## 2020-07-07 RX ADMIN — ERIBULIN MESYLATE 2 MG: 0.5 INJECTION INTRAVENOUS at 12:07

## 2020-07-07 RX ADMIN — GRANISETRON HYDROCHLORIDE 1 MG: 1 INJECTION, SOLUTION INTRAVENOUS at 12:07

## 2020-07-07 NOTE — PLAN OF CARE
Pt arrived to unit. Blood collected for cbc, cmp. VSS. Pt afebrile. Teressa Rodriguez NP reviewed results and will proceed with chemo. Pt tolerated Kytril and Halaven. No reactions noted. Pt ate lunch in unit. Pt has next appts. Pt in wheelchair, discharged from unit accompanied by MA. No distress noted.

## 2020-07-20 NOTE — PROGRESS NOTES
Subjective:       Patient ID: Rebecca Crain is a 64 y.o. female.    Chief Complaint: No chief complaint on file.    HPI Ms. Crain is here for followup of metastatic carcinoma of the right breast.  She is currently on eribulin therapy.  She has had 8 cycles thus far.      The patient location is:  home.  The chief complaint leading to consultation is:  Breast cancer.    Visit type: audiovisual    Face to Face time with patient:  10 minutes  Fifteen minutes of total time spent on the encounter, which includes face to face time and non-face to face time preparing to see the patient (eg, review of tests), Obtaining and/or reviewing separately obtained history, Documenting clinical information in the electronic or other health record, Independently interpreting results (not separately reported) and communicating results to the patient/family/caregiver, or Care coordination (not separately reported).         Each patient to whom he or she provides medical services by telemedicine is:  (1) informed of the relationship between the physician and patient and the respective role of any other health care provider with respect to management of the patient; and (2) notified that he or she may decline to receive medical services by telemedicine and may withdraw from such care at any time.    Notes:  Today she reports that she has been doing well overall.  She continues to have a cough occasionally productive of some greenish phlegm but has had no fever.  Appetite has been excellent.  She has no shortness of breath.  Her pain has been well controlled and she is not requiring any breakthrough pain medication.  She continues on MS Contin 200 mg 3 times per day as her primary pain control.  Her activity level at home varies at times she is able to do some light housework.        ECOG-1-2    Breast history: In 2013 she was diagnosed with stage IIB carcinoma of the right breast, ER positive with a low Oncotype score.  She was  enrolled on protocol  and randomized to endocrine therapy alone.  She was tried on all 3 aromatase inhibitors and had itching and rash to all of them.  In August 2013 she was started on tamoxifen.   She was found to have  bone metastasis in May 2015, 2015.  A subsequent bone biopsy was performed on a lesion at the T8 vertebra.   The pathology from that was consistent with metastatic breast cancer, ER +80%, IA +80%, and HER-2 negative.     STRATA  -PIK3CA mutation, FGFR2 mutation, ESR1 mutation     She received letrozole plus palbociclib from July 2015 until May 2016.  She also received bone protective therapy with Xgeva.      Faslodex was started in June 2016.      Exemestane and Afinitor started in August 2017.  That was discontinued in February 2018 due to progression in the liver and bone.     Navelbine was started February 16, 2018.  She had 8 cycles for that.  Scans in November 2018 showed progression as follows     Capecitabine was started in December 2018.  She received 5 cycles of that therapy.    Follow-up scans in April 2019 showed progression with a new hepatic lesion and worsening bone disease.    She began clinical trial therapy with erdafitinib on May 8, 2019.  That was discontinued in September 2019 due to progression in the bone.    She then received a course of radiation therapy to her pelvis.  She began weekly Taxol in October 2019.That was discontinued in December 2019 due to progression.    She began eribulin therapy on 1/15/2020.  Review of Systems   Constitutional: Positive for fatigue. Negative for activity change, appetite change (Improved), fever and unexpected weight change.   Eyes: Negative for visual disturbance.   Respiratory: Positive for cough. Negative for shortness of breath.    Cardiovascular: Negative for chest pain.   Gastrointestinal: Negative for abdominal pain and diarrhea.   Genitourinary: Negative for frequency.   Musculoskeletal: Negative for back pain.   Skin: Negative  for rash.   Neurological: Negative for headaches.   Hematological: Negative for adenopathy.   Psychiatric/Behavioral: Positive for sleep disturbance. The patient is not nervous/anxious.        Objective:      Physical Exam  Constitutional:       General: She is not in acute distress.     Appearance: Normal appearance.   Neurological:      Mental Status: She is alert.   Psychiatric:         Mood and Affect: Mood normal.         Thought Content: Thought content normal.         Judgment: Judgment normal.         Assessment:       1. Malignant neoplasm of central portion of right breast in female, estrogen receptor positive    2. Bone metastasis    3. Metastasis to liver      5.  Chronic bronchitis  Plan:       Continue current therapy with cycle 9 of eribulin.  Return in 3 weeks with repeat scans and blood work.

## 2020-07-21 ENCOUNTER — TELEPHONE (OUTPATIENT)
Dept: HEMATOLOGY/ONCOLOGY | Facility: CLINIC | Age: 65
End: 2020-07-21

## 2020-07-21 ENCOUNTER — OFFICE VISIT (OUTPATIENT)
Dept: HEMATOLOGY/ONCOLOGY | Facility: CLINIC | Age: 65
End: 2020-07-21
Payer: MEDICARE

## 2020-07-21 DIAGNOSIS — C50.111 MALIGNANT NEOPLASM OF CENTRAL PORTION OF RIGHT BREAST IN FEMALE, ESTROGEN RECEPTOR POSITIVE: Primary | ICD-10-CM

## 2020-07-21 DIAGNOSIS — Z17.0 MALIGNANT NEOPLASM OF CENTRAL PORTION OF RIGHT BREAST IN FEMALE, ESTROGEN RECEPTOR POSITIVE: Primary | ICD-10-CM

## 2020-07-21 DIAGNOSIS — C78.7 METASTASIS TO LIVER: ICD-10-CM

## 2020-07-21 DIAGNOSIS — C79.51 BONE METASTASIS: ICD-10-CM

## 2020-07-21 DIAGNOSIS — T45.1X5A CHEMOTHERAPY INDUCED NEUTROPENIA: ICD-10-CM

## 2020-07-21 DIAGNOSIS — C50.111 MALIGNANT NEOPLASM OF CENTRAL PORTION OF RIGHT BREAST IN FEMALE, ESTROGEN RECEPTOR POSITIVE: ICD-10-CM

## 2020-07-21 DIAGNOSIS — D70.1 CHEMOTHERAPY INDUCED NEUTROPENIA: ICD-10-CM

## 2020-07-21 DIAGNOSIS — Z17.0 MALIGNANT NEOPLASM OF CENTRAL PORTION OF RIGHT BREAST IN FEMALE, ESTROGEN RECEPTOR POSITIVE: ICD-10-CM

## 2020-07-21 PROCEDURE — 99213 OFFICE O/P EST LOW 20 MIN: CPT | Mod: HCNC,95,, | Performed by: INTERNAL MEDICINE

## 2020-07-21 PROCEDURE — 99213 PR OFFICE/OUTPT VISIT, EST, LEVL III, 20-29 MIN: ICD-10-PCS | Mod: HCNC,95,, | Performed by: INTERNAL MEDICINE

## 2020-07-21 NOTE — TELEPHONE ENCOUNTER
----- Message from Rodrigo Schroeder MD sent at 7/21/2020  9:47 AM CDT -----  Day 1 in 8 eribulin with CBC and CMP-[facility] in 3 weeks with CT chest abdomen and pelvis, CBC, CMP, CA 27.29

## 2020-07-21 NOTE — Clinical Note
Day 1 in 8 eribulin with CBC and CMP-[facility] [place]  See me in 3 weeks with CT chest abdomen and pelvis, CBC, CMP, CA 27.29

## 2020-07-22 ENCOUNTER — INFUSION (OUTPATIENT)
Dept: INFUSION THERAPY | Facility: HOSPITAL | Age: 65
End: 2020-07-22
Attending: INTERNAL MEDICINE
Payer: MEDICARE

## 2020-07-22 VITALS
DIASTOLIC BLOOD PRESSURE: 55 MMHG | RESPIRATION RATE: 17 BRPM | HEART RATE: 77 BPM | TEMPERATURE: 98 F | OXYGEN SATURATION: 97 % | SYSTOLIC BLOOD PRESSURE: 106 MMHG

## 2020-07-22 DIAGNOSIS — C50.011 MALIGNANT NEOPLASM OF NIPPLE OF RIGHT BREAST IN FEMALE, UNSPECIFIED ESTROGEN RECEPTOR STATUS: Primary | ICD-10-CM

## 2020-07-22 DIAGNOSIS — Z17.0 MALIGNANT NEOPLASM OF CENTRAL PORTION OF RIGHT BREAST IN FEMALE, ESTROGEN RECEPTOR POSITIVE: ICD-10-CM

## 2020-07-22 DIAGNOSIS — C79.51 METASTATIC CANCER TO SPINE: ICD-10-CM

## 2020-07-22 DIAGNOSIS — C78.7 METASTASIS TO LIVER: ICD-10-CM

## 2020-07-22 DIAGNOSIS — C50.111 MALIGNANT NEOPLASM OF CENTRAL PORTION OF RIGHT BREAST IN FEMALE, ESTROGEN RECEPTOR POSITIVE: ICD-10-CM

## 2020-07-22 LAB
ALBUMIN SERPL BCP-MCNC: 3.3 G/DL (ref 3.5–5.2)
ALP SERPL-CCNC: 135 U/L (ref 55–135)
ALT SERPL W/O P-5'-P-CCNC: 6 U/L (ref 10–44)
ANION GAP SERPL CALC-SCNC: 4 MMOL/L (ref 8–16)
AST SERPL-CCNC: 17 U/L (ref 10–40)
BILIRUB SERPL-MCNC: 0.5 MG/DL (ref 0.1–1)
BUN SERPL-MCNC: 14 MG/DL (ref 8–23)
CALCIUM SERPL-MCNC: 8.1 MG/DL (ref 8.7–10.5)
CHLORIDE SERPL-SCNC: 104 MMOL/L (ref 95–110)
CO2 SERPL-SCNC: 28 MMOL/L (ref 23–29)
CREAT SERPL-MCNC: 0.7 MG/DL (ref 0.5–1.4)
ERYTHROCYTE [DISTWIDTH] IN BLOOD BY AUTOMATED COUNT: 18.8 % (ref 11.5–14.5)
EST. GFR  (AFRICAN AMERICAN): >60 ML/MIN/1.73 M^2
EST. GFR  (NON AFRICAN AMERICAN): >60 ML/MIN/1.73 M^2
GLUCOSE SERPL-MCNC: 114 MG/DL (ref 70–110)
HCT VFR BLD AUTO: 37.1 % (ref 37–48.5)
HGB BLD-MCNC: 11.3 G/DL (ref 12–16)
IMM GRANULOCYTES # BLD AUTO: 0.02 K/UL (ref 0–0.04)
MCH RBC QN AUTO: 28.3 PG (ref 27–31)
MCHC RBC AUTO-ENTMCNC: 30.5 G/DL (ref 32–36)
MCV RBC AUTO: 93 FL (ref 82–98)
NEUTROPHILS # BLD AUTO: 4.9 K/UL (ref 1.8–7.7)
PLATELET # BLD AUTO: 131 K/UL (ref 150–350)
PMV BLD AUTO: 10.3 FL (ref 9.2–12.9)
POTASSIUM SERPL-SCNC: 4 MMOL/L (ref 3.5–5.1)
PROT SERPL-MCNC: 6.6 G/DL (ref 6–8.4)
RBC # BLD AUTO: 4 M/UL (ref 4–5.4)
SODIUM SERPL-SCNC: 136 MMOL/L (ref 136–145)
WBC # BLD AUTO: 6.56 K/UL (ref 3.9–12.7)

## 2020-07-22 PROCEDURE — A4216 STERILE WATER/SALINE, 10 ML: HCPCS | Mod: HCNC | Performed by: INTERNAL MEDICINE

## 2020-07-22 PROCEDURE — 80053 COMPREHEN METABOLIC PANEL: CPT | Mod: HCNC

## 2020-07-22 PROCEDURE — 85027 COMPLETE CBC AUTOMATED: CPT | Mod: HCNC

## 2020-07-22 PROCEDURE — 25000003 PHARM REV CODE 250: Mod: HCNC | Performed by: INTERNAL MEDICINE

## 2020-07-22 PROCEDURE — 63600175 PHARM REV CODE 636 W HCPCS: Mod: HCNC | Performed by: INTERNAL MEDICINE

## 2020-07-22 PROCEDURE — 96367 TX/PROPH/DG ADDL SEQ IV INF: CPT | Mod: HCNC

## 2020-07-22 PROCEDURE — 96409 CHEMO IV PUSH SNGL DRUG: CPT | Mod: HCNC

## 2020-07-22 RX ORDER — SODIUM CHLORIDE 0.9 % (FLUSH) 0.9 %
10 SYRINGE (ML) INJECTION
Status: CANCELLED | OUTPATIENT
Start: 2020-07-29

## 2020-07-22 RX ORDER — SODIUM CHLORIDE 0.9 % (FLUSH) 0.9 %
10 SYRINGE (ML) INJECTION
Status: DISCONTINUED | OUTPATIENT
Start: 2020-07-22 | End: 2020-07-22 | Stop reason: HOSPADM

## 2020-07-22 RX ORDER — HEPARIN 100 UNIT/ML
500 SYRINGE INTRAVENOUS
Status: CANCELLED | OUTPATIENT
Start: 2020-07-29

## 2020-07-22 RX ORDER — HEPARIN 100 UNIT/ML
500 SYRINGE INTRAVENOUS
Status: CANCELLED | OUTPATIENT
Start: 2020-07-22

## 2020-07-22 RX ORDER — SODIUM CHLORIDE 0.9 % (FLUSH) 0.9 %
10 SYRINGE (ML) INJECTION
Status: CANCELLED | OUTPATIENT
Start: 2020-07-22

## 2020-07-22 RX ORDER — HEPARIN 100 UNIT/ML
500 SYRINGE INTRAVENOUS
Status: DISCONTINUED | OUTPATIENT
Start: 2020-07-22 | End: 2020-07-22 | Stop reason: HOSPADM

## 2020-07-22 RX ADMIN — HEPARIN 500 UNITS: 100 SYRINGE at 01:07

## 2020-07-22 RX ADMIN — Medication 10 ML: at 01:07

## 2020-07-22 RX ADMIN — ERIBULIN MESYLATE 2 MG: 0.5 INJECTION INTRAVENOUS at 01:07

## 2020-07-22 RX ADMIN — GRANISETRON HYDROCHLORIDE 1 MG: 1 INJECTION, SOLUTION INTRAVENOUS at 12:07

## 2020-07-22 NOTE — PLAN OF CARE
Patient arrived to unit for C9D1 Halaven pending stat labs. VSS. Labs drawn from [facility] upon arrival. Results within treatment parameters. Pre-medicated with kytril IVPB. Patient received Halaven. Tolerated infusion well. Received discharge instructions and follow up appointments. Verbalized understanding and assisted off unit via wheelchair by MA. Patient in NAD at time of dc.

## 2020-07-23 DIAGNOSIS — C79.51 BONE METASTASIS: ICD-10-CM

## 2020-07-23 DIAGNOSIS — C50.111 MALIGNANT NEOPLASM OF CENTRAL PORTION OF RIGHT FEMALE BREAST, UNSPECIFIED ESTROGEN RECEPTOR STATUS: ICD-10-CM

## 2020-07-23 DIAGNOSIS — C79.51 METASTATIC CANCER TO SPINE: ICD-10-CM

## 2020-07-23 RX ORDER — MORPHINE SULFATE 200 MG/1
200 TABLET, FILM COATED, EXTENDED RELEASE ORAL EVERY 8 HOURS
Qty: 90 TABLET | Refills: 0 | Status: SHIPPED | OUTPATIENT
Start: 2020-07-23 | End: 2020-07-23 | Stop reason: SDUPTHER

## 2020-07-23 RX ORDER — MORPHINE SULFATE 200 MG/1
200 TABLET, FILM COATED, EXTENDED RELEASE ORAL EVERY 8 HOURS
Qty: 90 TABLET | Refills: 0 | Status: SHIPPED | OUTPATIENT
Start: 2020-07-23 | End: 2020-08-31 | Stop reason: SDUPTHER

## 2020-07-29 ENCOUNTER — INFUSION (OUTPATIENT)
Dept: INFUSION THERAPY | Facility: HOSPITAL | Age: 65
End: 2020-07-29
Attending: INTERNAL MEDICINE
Payer: MEDICARE

## 2020-07-29 VITALS
TEMPERATURE: 98 F | SYSTOLIC BLOOD PRESSURE: 110 MMHG | DIASTOLIC BLOOD PRESSURE: 53 MMHG | OXYGEN SATURATION: 95 % | HEART RATE: 84 BPM | RESPIRATION RATE: 17 BRPM

## 2020-07-29 DIAGNOSIS — C50.111 MALIGNANT NEOPLASM OF CENTRAL PORTION OF RIGHT BREAST IN FEMALE, ESTROGEN RECEPTOR POSITIVE: ICD-10-CM

## 2020-07-29 DIAGNOSIS — C79.51 METASTATIC CANCER TO SPINE: ICD-10-CM

## 2020-07-29 DIAGNOSIS — C78.7 METASTASIS TO LIVER: ICD-10-CM

## 2020-07-29 DIAGNOSIS — Z17.0 MALIGNANT NEOPLASM OF CENTRAL PORTION OF RIGHT BREAST IN FEMALE, ESTROGEN RECEPTOR POSITIVE: ICD-10-CM

## 2020-07-29 DIAGNOSIS — C50.011 MALIGNANT NEOPLASM OF NIPPLE OF RIGHT BREAST IN FEMALE, UNSPECIFIED ESTROGEN RECEPTOR STATUS: Primary | ICD-10-CM

## 2020-07-29 LAB
ALBUMIN SERPL BCP-MCNC: 3.2 G/DL (ref 3.5–5.2)
ALP SERPL-CCNC: 156 U/L (ref 55–135)
ALT SERPL W/O P-5'-P-CCNC: 12 U/L (ref 10–44)
ANION GAP SERPL CALC-SCNC: 1 MMOL/L (ref 8–16)
AST SERPL-CCNC: 23 U/L (ref 10–40)
BILIRUB SERPL-MCNC: 0.7 MG/DL (ref 0.1–1)
BUN SERPL-MCNC: 12 MG/DL (ref 8–23)
CALCIUM SERPL-MCNC: 8.7 MG/DL (ref 8.7–10.5)
CHLORIDE SERPL-SCNC: 105 MMOL/L (ref 95–110)
CO2 SERPL-SCNC: 33 MMOL/L (ref 23–29)
CREAT SERPL-MCNC: 0.7 MG/DL (ref 0.5–1.4)
ERYTHROCYTE [DISTWIDTH] IN BLOOD BY AUTOMATED COUNT: 18.5 % (ref 11.5–14.5)
EST. GFR  (AFRICAN AMERICAN): >60 ML/MIN/1.73 M^2
EST. GFR  (NON AFRICAN AMERICAN): >60 ML/MIN/1.73 M^2
GLUCOSE SERPL-MCNC: 101 MG/DL (ref 70–110)
HCT VFR BLD AUTO: 34.1 % (ref 37–48.5)
HGB BLD-MCNC: 10.5 G/DL (ref 12–16)
IMM GRANULOCYTES # BLD AUTO: 0.03 K/UL (ref 0–0.04)
MCH RBC QN AUTO: 28.3 PG (ref 27–31)
MCHC RBC AUTO-ENTMCNC: 30.8 G/DL (ref 32–36)
MCV RBC AUTO: 92 FL (ref 82–98)
NEUTROPHILS # BLD AUTO: 3 K/UL (ref 1.8–7.7)
PLATELET # BLD AUTO: 104 K/UL (ref 150–350)
PMV BLD AUTO: 10 FL (ref 9.2–12.9)
POTASSIUM SERPL-SCNC: 4.1 MMOL/L (ref 3.5–5.1)
PROT SERPL-MCNC: 6.5 G/DL (ref 6–8.4)
RBC # BLD AUTO: 3.71 M/UL (ref 4–5.4)
SODIUM SERPL-SCNC: 139 MMOL/L (ref 136–145)
WBC # BLD AUTO: 4.51 K/UL (ref 3.9–12.7)

## 2020-07-29 PROCEDURE — 96372 THER/PROPH/DIAG INJ SC/IM: CPT | Mod: HCNC,59

## 2020-07-29 PROCEDURE — 25000003 PHARM REV CODE 250: Mod: HCNC | Performed by: INTERNAL MEDICINE

## 2020-07-29 PROCEDURE — 85027 COMPLETE CBC AUTOMATED: CPT | Mod: HCNC

## 2020-07-29 PROCEDURE — A4216 STERILE WATER/SALINE, 10 ML: HCPCS | Mod: HCNC | Performed by: INTERNAL MEDICINE

## 2020-07-29 PROCEDURE — 63600175 PHARM REV CODE 636 W HCPCS: Mod: JG,HCNC | Performed by: INTERNAL MEDICINE

## 2020-07-29 PROCEDURE — 80053 COMPREHEN METABOLIC PANEL: CPT | Mod: HCNC

## 2020-07-29 PROCEDURE — 96367 TX/PROPH/DG ADDL SEQ IV INF: CPT | Mod: HCNC

## 2020-07-29 PROCEDURE — 96409 CHEMO IV PUSH SNGL DRUG: CPT | Mod: HCNC

## 2020-07-29 RX ORDER — SODIUM CHLORIDE 0.9 % (FLUSH) 0.9 %
10 SYRINGE (ML) INJECTION
Status: DISCONTINUED | OUTPATIENT
Start: 2020-07-29 | End: 2020-07-29 | Stop reason: HOSPADM

## 2020-07-29 RX ORDER — HEPARIN 100 UNIT/ML
500 SYRINGE INTRAVENOUS
Status: DISCONTINUED | OUTPATIENT
Start: 2020-07-29 | End: 2020-07-29 | Stop reason: HOSPADM

## 2020-07-29 RX ADMIN — GRANISETRON HYDROCHLORIDE 1 MG: 1 INJECTION, SOLUTION INTRAVENOUS at 12:07

## 2020-07-29 RX ADMIN — Medication 10 ML: at 01:07

## 2020-07-29 RX ADMIN — ERIBULIN MESYLATE 2 MG: 0.5 INJECTION INTRAVENOUS at 12:07

## 2020-07-29 RX ADMIN — HEPARIN 500 UNITS: 100 SYRINGE at 01:07

## 2020-07-29 RX ADMIN — DENOSUMAB 120 MG: 120 INJECTION SUBCUTANEOUS at 01:07

## 2020-07-29 NOTE — PLAN OF CARE
Patient arrived to unit for C9D8 Halaven pending stat labs. VSS. Labs drawn from [facility] upon arrival. Results within treatment parameters. She denies any new or worsening symptoms at this time. Pre-medicated with kytril IVPB. Patient received Halaven. Tolerated infusion well. Xgeva administered to abdomen. VSS prior to dc. Patient received discharge instructions and follow up appointments. Verbalized understanding and assisted off unit via wheelchair by MA. Patient in NAD at time of dc.

## 2020-08-10 ENCOUNTER — HOSPITAL ENCOUNTER (OUTPATIENT)
Dept: RADIOLOGY | Facility: HOSPITAL | Age: 65
Discharge: HOME | End: 2020-08-10
Attending: INTERNAL MEDICINE
Payer: MEDICARE

## 2020-08-10 DIAGNOSIS — C78.7 METASTASIS TO LIVER: ICD-10-CM

## 2020-08-10 DIAGNOSIS — Z17.0 MALIGNANT NEOPLASM OF CENTRAL PORTION OF RIGHT BREAST IN FEMALE, ESTROGEN RECEPTOR POSITIVE: ICD-10-CM

## 2020-08-10 DIAGNOSIS — C50.111 MALIGNANT NEOPLASM OF CENTRAL PORTION OF RIGHT BREAST IN FEMALE, ESTROGEN RECEPTOR POSITIVE: ICD-10-CM

## 2020-08-10 PROCEDURE — 74176 CT ABD & PELVIS W/O CONTRAST: CPT | Mod: 26,HCNC,, | Performed by: RADIOLOGY

## 2020-08-10 PROCEDURE — 74176 CT ABD & PELVIS W/O CONTRAST: CPT | Mod: TC,HCNC

## 2020-08-10 PROCEDURE — 25500020 PHARM REV CODE 255: Mod: HCNC | Performed by: INTERNAL MEDICINE

## 2020-08-10 PROCEDURE — 74176 CT CHEST ABDOMEN PELVIS WITHOUT CONTRAST(XPD): ICD-10-PCS | Mod: 26,HCNC,, | Performed by: RADIOLOGY

## 2020-08-10 PROCEDURE — 71250 CT THORAX DX C-: CPT | Mod: TC,HCNC

## 2020-08-10 PROCEDURE — 71250 CT CHEST ABDOMEN PELVIS WITHOUT CONTRAST(XPD): ICD-10-PCS | Mod: 26,HCNC,, | Performed by: RADIOLOGY

## 2020-08-10 PROCEDURE — 71250 CT THORAX DX C-: CPT | Mod: 26,HCNC,, | Performed by: RADIOLOGY

## 2020-08-10 RX ADMIN — IOHEXOL 15 ML: 300 INJECTION, SOLUTION INTRAVENOUS at 02:08

## 2020-08-11 ENCOUNTER — TELEPHONE (OUTPATIENT)
Dept: PHARMACY | Facility: CLINIC | Age: 65
End: 2020-08-11

## 2020-08-11 DIAGNOSIS — Z17.0 MALIGNANT NEOPLASM OF CENTRAL PORTION OF RIGHT BREAST IN FEMALE, ESTROGEN RECEPTOR POSITIVE: ICD-10-CM

## 2020-08-11 DIAGNOSIS — C79.51 BONE METASTASIS: ICD-10-CM

## 2020-08-11 DIAGNOSIS — T45.1X5A CHEMOTHERAPY INDUCED NEUTROPENIA: ICD-10-CM

## 2020-08-11 DIAGNOSIS — C50.111 MALIGNANT NEOPLASM OF CENTRAL PORTION OF RIGHT BREAST IN FEMALE, ESTROGEN RECEPTOR POSITIVE: ICD-10-CM

## 2020-08-11 DIAGNOSIS — D70.1 CHEMOTHERAPY INDUCED NEUTROPENIA: ICD-10-CM

## 2020-08-11 NOTE — PROGRESS NOTES
Subjective:       Patient ID: Rebecca Crain is a 64 y.o. female.    Chief Complaint: No chief complaint on file.    HPI Ms. Crain returns for followup of metastatic carcinoma of the right breast.  She is currently on eribulin therapy.  She has had 9 cycles thus far.      The patient location is: home.  The chief complaint leading to consultation is: breast cancer.    Visit type: audiovisual    Face to Face time with patient: 10 min.  Twenty minutes of total time spent on the encounter, which includes face to face time and non-face to face time preparing to see the patient (eg, review of tests), Obtaining and/or reviewing separately obtained history, Documenting clinical information in the electronic or other health record, Independently interpreting results (not separately reported) and communicating results to the patient/family/caregiver, or Care coordination (not separately reported).         Each patient to whom he or she provides medical services by telemedicine is:  (1) informed of the relationship between the physician and patient and the respective role of any other health care provider with respect to management of the patient; and (2) notified that he or she may decline to receive medical services by telemedicine and may withdraw from such care at any time.    Notes:  Overall she is doing fairly well  She continues on MS Contin 200 mg 3 times per day as her primary pain control.  Her pain control has been very good and she sometimes only takes her MS Contin twice a day.  She is having no breakthrough pain.  Appetite and bowel function has been good.  Her cough is improved and she is taking Claritin daily.  Energy level has been variable.  She does have some numbness in her hands and feet which is slightly increased but manageable.          ECOG-1-2    Breast history: In 2013 she was diagnosed with stage IIB carcinoma of the right breast, ER positive with a low Oncotype score.  She was enrolled on  protocol  and randomized to endocrine therapy alone.  She was tried on all 3 aromatase inhibitors and had itching and rash to all of them.  In August 2013 she was started on tamoxifen.   She was found to have  bone metastasis in May 2015, 2015.  A subsequent bone biopsy was performed on a lesion at the T8 vertebra.   The pathology from that was consistent with metastatic breast cancer, ER +80%, HI +80%, and HER-2 negative.     STRATA  -PIK3CA mutation, FGFR2 mutation, ESR1 mutation     She received letrozole plus palbociclib from July 2015 until May 2016.  She also received bone protective therapy with Xgeva.      Faslodex was started in June 2016.      Exemestane and Afinitor started in August 2017.  That was discontinued in February 2018 due to progression in the liver and bone.     Navelbine was started February 16, 2018.  She had 8 cycles for that.  Scans in November 2018 showed progression as follows     Capecitabine was started in December 2018.  She received 5 cycles of that therapy.    Follow-up scans in April 2019 showed progression with a new hepatic lesion and worsening bone disease.    She began clinical trial therapy with erdafitinib on May 8, 2019.  That was discontinued in September 2019 due to progression in the bone.    She then received a course of radiation therapy to her pelvis.  She began weekly Taxol in October 2019.That was discontinued in December 2019 due to progression.    She began eribulin therapy on 1/15/2020.  Review of Systems   Constitutional: Positive for fatigue. Negative for activity change, appetite change (Improved), fever and unexpected weight change.   HENT: Negative for mouth sores and trouble swallowing.    Eyes: Negative for visual disturbance.   Respiratory: Positive for cough. Negative for shortness of breath.    Cardiovascular: Negative for chest pain.   Gastrointestinal: Negative for abdominal pain and diarrhea.   Genitourinary: Negative for frequency.    Musculoskeletal: Positive for back pain.   Skin: Negative for rash.   Neurological: Positive for numbness. Negative for headaches.   Hematological: Negative for adenopathy.   Psychiatric/Behavioral: Negative for dysphoric mood. The patient is not nervous/anxious.        Objective:      Physical Exam  Constitutional:       General: She is not in acute distress.     Appearance: Normal appearance.   Neurological:      Mental Status: She is alert.   Psychiatric:         Mood and Affect: Mood normal.         Thought Content: Thought content normal.         Judgment: Judgment normal.         Assessment:     CT results:Significant decrease in size of the sub pulmonic right loculated pleural fluid collection when compared to the recent study of 04/22/2020.  2. Patchy ground-glass opacities in the lungs peripherally remain without significant change.  3. Diffuse skeletal metastatic lesions throughout the dorsal spine and the lumbar spine without significant change.  There is however a enlarging lesion in the spinous process of T6 when compared to the previous study.  4. The liver lesions grossly stable when compared with the recent study of 04/22/2020      Labs:unremarkable  1. Malignant neoplasm of central portion of right breast in female, estrogen receptor positive    2. Bone metastasis    3. Metastasis to liver      5.  Chronic bronchitis  Plan:       Continue current therapy with cycle 10 of eribulin.  Return in 3 weeks with repeat scans and blood work.

## 2020-08-11 NOTE — TELEPHONE ENCOUNTER
Spoke with patient and confirmed Neulasta will be needed for administration after day 8 dose of Halaven - on/around 8/20.  Will reach back out to patient to set up refill when new prescription received.  Ms. Crain did not have any questions at the time of the call.    Refill request sent 8/11/2020.

## 2020-08-12 ENCOUNTER — TELEPHONE (OUTPATIENT)
Dept: HEMATOLOGY/ONCOLOGY | Facility: CLINIC | Age: 65
End: 2020-08-12

## 2020-08-12 ENCOUNTER — INFUSION (OUTPATIENT)
Dept: INFUSION THERAPY | Facility: HOSPITAL | Age: 65
End: 2020-08-12
Attending: INTERNAL MEDICINE
Payer: MEDICARE

## 2020-08-12 ENCOUNTER — OFFICE VISIT (OUTPATIENT)
Dept: HEMATOLOGY/ONCOLOGY | Facility: CLINIC | Age: 65
End: 2020-08-12
Payer: MEDICARE

## 2020-08-12 VITALS
OXYGEN SATURATION: 97 % | HEIGHT: 64 IN | WEIGHT: 144.63 LBS | SYSTOLIC BLOOD PRESSURE: 130 MMHG | BODY MASS INDEX: 24.69 KG/M2 | RESPIRATION RATE: 17 BRPM | HEART RATE: 110 BPM | DIASTOLIC BLOOD PRESSURE: 73 MMHG | TEMPERATURE: 97 F

## 2020-08-12 DIAGNOSIS — Z17.0 MALIGNANT NEOPLASM OF CENTRAL PORTION OF RIGHT BREAST IN FEMALE, ESTROGEN RECEPTOR POSITIVE: ICD-10-CM

## 2020-08-12 DIAGNOSIS — C79.51 METASTATIC CANCER TO SPINE: ICD-10-CM

## 2020-08-12 DIAGNOSIS — C78.7 METASTASIS TO LIVER: ICD-10-CM

## 2020-08-12 DIAGNOSIS — C50.111 MALIGNANT NEOPLASM OF CENTRAL PORTION OF RIGHT BREAST IN FEMALE, ESTROGEN RECEPTOR POSITIVE: ICD-10-CM

## 2020-08-12 DIAGNOSIS — C50.111 MALIGNANT NEOPLASM OF CENTRAL PORTION OF RIGHT BREAST IN FEMALE, ESTROGEN RECEPTOR POSITIVE: Primary | ICD-10-CM

## 2020-08-12 DIAGNOSIS — C79.51 BONE METASTASIS: ICD-10-CM

## 2020-08-12 DIAGNOSIS — Z17.0 MALIGNANT NEOPLASM OF CENTRAL PORTION OF RIGHT BREAST IN FEMALE, ESTROGEN RECEPTOR POSITIVE: Primary | ICD-10-CM

## 2020-08-12 DIAGNOSIS — C78.7 METASTASIS TO LIVER: Primary | ICD-10-CM

## 2020-08-12 PROCEDURE — 99213 OFFICE O/P EST LOW 20 MIN: CPT | Mod: HCNC,95,, | Performed by: INTERNAL MEDICINE

## 2020-08-12 PROCEDURE — A4216 STERILE WATER/SALINE, 10 ML: HCPCS | Mod: HCNC | Performed by: INTERNAL MEDICINE

## 2020-08-12 PROCEDURE — 63600175 PHARM REV CODE 636 W HCPCS: Mod: JG,HCNC | Performed by: INTERNAL MEDICINE

## 2020-08-12 PROCEDURE — 96409 CHEMO IV PUSH SNGL DRUG: CPT | Mod: HCNC

## 2020-08-12 PROCEDURE — 25000003 PHARM REV CODE 250: Mod: HCNC | Performed by: INTERNAL MEDICINE

## 2020-08-12 PROCEDURE — 99213 PR OFFICE/OUTPT VISIT, EST, LEVL III, 20-29 MIN: ICD-10-PCS | Mod: HCNC,95,, | Performed by: INTERNAL MEDICINE

## 2020-08-12 PROCEDURE — 96367 TX/PROPH/DG ADDL SEQ IV INF: CPT | Mod: HCNC

## 2020-08-12 RX ORDER — SODIUM CHLORIDE 0.9 % (FLUSH) 0.9 %
10 SYRINGE (ML) INJECTION
Status: DISCONTINUED | OUTPATIENT
Start: 2020-08-12 | End: 2020-08-12 | Stop reason: HOSPADM

## 2020-08-12 RX ORDER — HEPARIN 100 UNIT/ML
500 SYRINGE INTRAVENOUS
Status: DISCONTINUED | OUTPATIENT
Start: 2020-08-12 | End: 2020-08-12 | Stop reason: HOSPADM

## 2020-08-12 RX ORDER — SODIUM CHLORIDE 0.9 % (FLUSH) 0.9 %
10 SYRINGE (ML) INJECTION
Status: CANCELLED | OUTPATIENT
Start: 2020-08-12

## 2020-08-12 RX ORDER — HEPARIN 100 UNIT/ML
500 SYRINGE INTRAVENOUS
Status: CANCELLED | OUTPATIENT
Start: 2020-08-12

## 2020-08-12 RX ORDER — SODIUM CHLORIDE 0.9 % (FLUSH) 0.9 %
10 SYRINGE (ML) INJECTION
Status: CANCELLED | OUTPATIENT
Start: 2020-08-19

## 2020-08-12 RX ORDER — HEPARIN 100 UNIT/ML
500 SYRINGE INTRAVENOUS
Status: CANCELLED | OUTPATIENT
Start: 2020-08-19

## 2020-08-12 RX ADMIN — GRANISETRON HYDROCHLORIDE 1 MG: 1 INJECTION INTRAVENOUS at 11:08

## 2020-08-12 RX ADMIN — ERIBULIN MESYLATE 2 MG: 0.5 INJECTION INTRAVENOUS at 11:08

## 2020-08-12 RX ADMIN — Medication 10 ML: at 12:08

## 2020-08-12 RX ADMIN — HEPARIN 500 UNITS: 100 SYRINGE at 12:08

## 2020-08-12 NOTE — TELEPHONE ENCOUNTER
----- Message from Rodrigo Schroeder MD sent at 8/12/2020  8:01 AM CDT -----  eribulin day 1 and 8 with CBC and CMP, see me in 3 weeks with CBC, CMP, CA 27.29

## 2020-08-12 NOTE — PLAN OF CARE
Patient arrived to unit for C10D1 Halaven. Patient denies any new or worsening symptoms at this time. Plan of care reviewed, patient agreeable to plan. Patient tolerated treatment well. No sign of reaction noted. VSS. Patient received discharge instructions and follow up appointments. Patient instructed to return 8/19/20 for labs and chemo. Verbalized understanding and was escorted off unit via w/c by MA. Patient in NAD at time of discharge.

## 2020-08-13 NOTE — TELEPHONE ENCOUNTER
Refill: Confirmed that patient will inject next dose of Neulasta on 8/19. Medication will ship on 8/17 for patient to receive on 8/18. Address confirmed. $5.00 ([facility]). Meds, allergies and health conditions reviewed. Confirmed correct dose, storage, and administration. Denies any significant side effects.

## 2020-08-19 ENCOUNTER — INFUSION (OUTPATIENT)
Dept: INFUSION THERAPY | Facility: HOSPITAL | Age: 65
End: 2020-08-19
Attending: INTERNAL MEDICINE
Payer: MEDICARE

## 2020-08-19 VITALS
SYSTOLIC BLOOD PRESSURE: 116 MMHG | RESPIRATION RATE: 17 BRPM | DIASTOLIC BLOOD PRESSURE: 57 MMHG | OXYGEN SATURATION: 98 % | TEMPERATURE: 98 F | HEART RATE: 73 BPM

## 2020-08-19 DIAGNOSIS — C79.51 METASTATIC CANCER TO SPINE: ICD-10-CM

## 2020-08-19 DIAGNOSIS — C50.111 MALIGNANT NEOPLASM OF CENTRAL PORTION OF RIGHT BREAST IN FEMALE, ESTROGEN RECEPTOR POSITIVE: ICD-10-CM

## 2020-08-19 DIAGNOSIS — Z17.0 MALIGNANT NEOPLASM OF CENTRAL PORTION OF RIGHT BREAST IN FEMALE, ESTROGEN RECEPTOR POSITIVE: ICD-10-CM

## 2020-08-19 DIAGNOSIS — C50.011 MALIGNANT NEOPLASM OF NIPPLE OF RIGHT BREAST IN FEMALE, UNSPECIFIED ESTROGEN RECEPTOR STATUS: Primary | ICD-10-CM

## 2020-08-19 DIAGNOSIS — C78.7 METASTASIS TO LIVER: ICD-10-CM

## 2020-08-19 LAB
ALBUMIN SERPL BCP-MCNC: 3.4 G/DL (ref 3.5–5.2)
ALP SERPL-CCNC: 151 U/L (ref 55–135)
ALT SERPL W/O P-5'-P-CCNC: 15 U/L (ref 10–44)
ANION GAP SERPL CALC-SCNC: 5 MMOL/L (ref 8–16)
AST SERPL-CCNC: 25 U/L (ref 10–40)
BILIRUB SERPL-MCNC: 0.7 MG/DL (ref 0.1–1)
BUN SERPL-MCNC: 15 MG/DL (ref 8–23)
CALCIUM SERPL-MCNC: 9.1 MG/DL (ref 8.7–10.5)
CHLORIDE SERPL-SCNC: 104 MMOL/L (ref 95–110)
CO2 SERPL-SCNC: 30 MMOL/L (ref 23–29)
CREAT SERPL-MCNC: 0.7 MG/DL (ref 0.5–1.4)
ERYTHROCYTE [DISTWIDTH] IN BLOOD BY AUTOMATED COUNT: 18.3 % (ref 11.5–14.5)
EST. GFR  (AFRICAN AMERICAN): >60 ML/MIN/1.73 M^2
EST. GFR  (NON AFRICAN AMERICAN): >60 ML/MIN/1.73 M^2
GLUCOSE SERPL-MCNC: 114 MG/DL (ref 70–110)
HCT VFR BLD AUTO: 32.7 % (ref 37–48.5)
HGB BLD-MCNC: 10 G/DL (ref 12–16)
IMM GRANULOCYTES # BLD AUTO: 0.06 K/UL (ref 0–0.04)
MCH RBC QN AUTO: 27.9 PG (ref 27–31)
MCHC RBC AUTO-ENTMCNC: 30.6 G/DL (ref 32–36)
MCV RBC AUTO: 91 FL (ref 82–98)
NEUTROPHILS # BLD AUTO: 2.7 K/UL (ref 1.8–7.7)
PLATELET # BLD AUTO: 106 K/UL (ref 150–350)
PMV BLD AUTO: 11.3 FL (ref 9.2–12.9)
POTASSIUM SERPL-SCNC: 4.1 MMOL/L (ref 3.5–5.1)
PROT SERPL-MCNC: 6.2 G/DL (ref 6–8.4)
RBC # BLD AUTO: 3.59 M/UL (ref 4–5.4)
SODIUM SERPL-SCNC: 139 MMOL/L (ref 136–145)
WBC # BLD AUTO: 4.05 K/UL (ref 3.9–12.7)

## 2020-08-19 PROCEDURE — A4216 STERILE WATER/SALINE, 10 ML: HCPCS | Mod: HCNC | Performed by: INTERNAL MEDICINE

## 2020-08-19 PROCEDURE — 85027 COMPLETE CBC AUTOMATED: CPT | Mod: HCNC

## 2020-08-19 PROCEDURE — 63600175 PHARM REV CODE 636 W HCPCS: Mod: HCNC | Performed by: INTERNAL MEDICINE

## 2020-08-19 PROCEDURE — 25000003 PHARM REV CODE 250: Mod: HCNC | Performed by: INTERNAL MEDICINE

## 2020-08-19 PROCEDURE — 80053 COMPREHEN METABOLIC PANEL: CPT | Mod: HCNC

## 2020-08-19 PROCEDURE — 96409 CHEMO IV PUSH SNGL DRUG: CPT | Mod: HCNC

## 2020-08-19 PROCEDURE — 96367 TX/PROPH/DG ADDL SEQ IV INF: CPT | Mod: HCNC

## 2020-08-19 RX ORDER — SODIUM CHLORIDE 0.9 % (FLUSH) 0.9 %
10 SYRINGE (ML) INJECTION
Status: DISCONTINUED | OUTPATIENT
Start: 2020-08-19 | End: 2020-08-19 | Stop reason: HOSPADM

## 2020-08-19 RX ORDER — HEPARIN 100 UNIT/ML
500 SYRINGE INTRAVENOUS
Status: DISCONTINUED | OUTPATIENT
Start: 2020-08-19 | End: 2020-08-19 | Stop reason: HOSPADM

## 2020-08-19 RX ADMIN — ERIBULIN MESYLATE 2 MG: 0.5 INJECTION INTRAVENOUS at 01:08

## 2020-08-19 RX ADMIN — Medication 10 ML: at 01:08

## 2020-08-19 RX ADMIN — HEPARIN 500 UNITS: 100 SYRINGE at 01:08

## 2020-08-19 RX ADMIN — SODIUM CHLORIDE 1 MG: 9 INJECTION, SOLUTION INTRAVENOUS at 12:08

## 2020-08-19 NOTE — PLAN OF CARE
Patient arrived to unit for C10D8 Halaven. Labs drawn. Patient denies any new or worsening symptoms at this time. Plan of care reviewed, patient agreeable to plan. Patient tolerated treatment well. No sign of reaction noted. VSS. Patient received discharge instructions and follow up appointments. Patient instructed to return 9/2/20 for Dr. Schroeder follow up and chemo, Xgeva. Verbalized understanding and was escorted off unit via w/c by MA. Patient in NAD at time of discharge.

## 2020-08-19 NOTE — PROGRESS NOTES
Subjective:       Patient ID: Rebecca Crain is a 64 y.o. female.    Chief Complaint: Follow-up and Breast Cancer    HPI Ms. Crain returns for followup of metastatic carcinoma of the right breast.  She is currently on eribulin therapy.  She has had 10 cycles thus far.      The patient location is: home.  The chief complaint leading to consultation is: breast cancer.    Visit type: audiovisual    Face to Face time with patient: 10 min.  Twenty minutes of total time spent on the encounter, which includes face to face time and non-face to face time preparing to see the patient (eg, review of tests), Obtaining and/or reviewing separately obtained history, Documenting clinical information in the electronic or other health record, Independently interpreting results (not separately reported) and communicating results to the patient/family/caregiver, or Care coordination (not separately reported).         Each patient to whom he or she provides medical services by telemedicine is:  (1) informed of the relationship between the physician and patient and the respective role of any other health care provider with respect to management of the patient; and (2) notified that he or she may decline to receive medical services by telemedicine and may withdraw from such care at any time.    Notes:  She notes she has been feeling very fatigued. She notes she was taking her MS Contin 200 mg BID and now taking it TID due to headaches and neck aches. She is not sleeping well, occasionally sleeping all day and then not at night.   She might try melatonin. Eating and drinking well. Bowel function is good. Urinating well. Still getting a cough intermittently, takes Claritin PRN.     She does have some numbness in her hands and feet which is slightly increased but manageable.    ECOG-1-2    Breast history: In 2013 she was diagnosed with stage IIB carcinoma of the right breast, ER positive with a low Oncotype score.  She was enrolled on  protocol  and randomized to endocrine therapy alone.  She was tried on all 3 aromatase inhibitors and had itching and rash to all of them.  In August 2013 she was started on tamoxifen.   She was found to have  bone metastasis in May 2015, 2015.  A subsequent bone biopsy was performed on a lesion at the T8 vertebra.   The pathology from that was consistent with metastatic breast cancer, ER +80%, CA +80%, and HER-2 negative.     STRATA  -PIK3CA mutation, FGFR2 mutation, ESR1 mutation     She received letrozole plus palbociclib from July 2015 until May 2016.  She also received bone protective therapy with Xgeva.      Faslodex was started in June 2016.      Exemestane and Afinitor started in August 2017.  That was discontinued in February 2018 due to progression in the liver and bone.     Navelbine was started February 16, 2018.  She had 8 cycles for that.  Scans in November 2018 showed progression as follows     Capecitabine was started in December 2018.  She received 5 cycles of that therapy.    Follow-up scans in April 2019 showed progression with a new hepatic lesion and worsening bone disease.    She began clinical trial therapy with erdafitinib on May 8, 2019.  That was discontinued in September 2019 due to progression in the bone.    She then received a course of radiation therapy to her pelvis.  She began weekly Taxol in October 2019.That was discontinued in December 2019 due to progression.    She began eribulin therapy on 1/15/2020.    Review of Systems   Constitutional: Positive for fatigue. Negative for activity change, appetite change (Improved), chills, diaphoresis, fever and unexpected weight change.   HENT: Negative for mouth sores, nosebleeds, sore throat and trouble swallowing.    Eyes: Negative for visual disturbance.   Respiratory: Positive for cough. Negative for shortness of breath.    Cardiovascular: Negative for chest pain, palpitations and leg swelling.   Gastrointestinal: Negative for  abdominal distention, abdominal pain, blood in stool, constipation, diarrhea, nausea and vomiting.   Genitourinary: Negative for frequency, hematuria and vaginal bleeding.   Musculoskeletal: Positive for back pain (stable on MS contin). Negative for arthralgias and myalgias.   Skin: Negative for pallor and rash.   Allergic/Immunologic: Negative for immunocompromised state.   Neurological: Positive for numbness. Negative for dizziness, weakness, light-headedness and headaches.   Hematological: Negative for adenopathy. Does not bruise/bleed easily.   Psychiatric/Behavioral: Negative for confusion and dysphoric mood. The patient is not nervous/anxious.        Objective:      Physical Exam  Constitutional:       General: She is not in acute distress.     Appearance: Normal appearance.      Comments: Appears fatigued   Neurological:      Mental Status: She is alert.   Psychiatric:         Mood and Affect: Mood normal.         Thought Content: Thought content normal.         Judgment: Judgment normal.      Virtual Visit limited physical assessment    Assessment:     Labs:to be drawn before chemotherapy today  1. Malignant neoplasm of central portion of right breast in female, estrogen receptor positive    2. Bone metastasis    3. Metastasis to liver    4. 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 Benign essential HTN    6. Hypercholesteremia    7. Osteoporosis, unspecified osteoporosis type, unspecified pathological fracture presence       Plan:       1-4. Continue current therapy with cycle 11 of eribulin.  5. Monitored today; continue current medication and follow up with PCP   6. Continue current medication and follow up with PCP  7. On Xgeva due to bone metastasis     Return to clinic in 1 week with FLORIAN appointment and labs.     Patient is in agreement with the proposed treatment plan. All questions were answered to the patient's satisfaction. Patient knows to call clinic for any new or worsening symptoms and if anything is  needed before the next clinic visit.          Teressa Rodriguez, NAVIP-C  Hematology & Medical Oncology   [address] [place] [address]  ph. [phone]  Fax. [phone]    Patient dicussed with collaborating physician, Dr. Schroeder.

## 2020-08-31 DIAGNOSIS — C79.51 METASTATIC CANCER TO SPINE: ICD-10-CM

## 2020-08-31 DIAGNOSIS — C79.51 BONE METASTASIS: ICD-10-CM

## 2020-08-31 DIAGNOSIS — C50.111 MALIGNANT NEOPLASM OF CENTRAL PORTION OF RIGHT FEMALE BREAST, UNSPECIFIED ESTROGEN RECEPTOR STATUS: ICD-10-CM

## 2020-08-31 RX ORDER — MORPHINE SULFATE 200 MG/1
200 TABLET, FILM COATED, EXTENDED RELEASE ORAL EVERY 8 HOURS
Qty: 90 TABLET | Refills: 0 | Status: SHIPPED | OUTPATIENT
Start: 2020-08-31 | End: 2020-10-01 | Stop reason: SDUPTHER

## 2020-09-02 ENCOUNTER — INFUSION (OUTPATIENT)
Dept: INFUSION THERAPY | Facility: HOSPITAL | Age: 65
End: 2020-09-02
Attending: INTERNAL MEDICINE
Payer: MEDICARE

## 2020-09-02 ENCOUNTER — OFFICE VISIT (OUTPATIENT)
Dept: HEMATOLOGY/ONCOLOGY | Facility: CLINIC | Age: 65
End: 2020-09-02
Payer: MEDICARE

## 2020-09-02 ENCOUNTER — TELEPHONE (OUTPATIENT)
Dept: PHARMACY | Facility: CLINIC | Age: 65
End: 2020-09-02

## 2020-09-02 VITALS
RESPIRATION RATE: 18 BRPM | HEIGHT: 64 IN | OXYGEN SATURATION: 99 % | DIASTOLIC BLOOD PRESSURE: 70 MMHG | BODY MASS INDEX: 24.96 KG/M2 | SYSTOLIC BLOOD PRESSURE: 119 MMHG | WEIGHT: 146.19 LBS | TEMPERATURE: 98 F | HEART RATE: 85 BPM

## 2020-09-02 DIAGNOSIS — C79.51 BONE METASTASIS: ICD-10-CM

## 2020-09-02 DIAGNOSIS — C78.7 METASTASIS TO LIVER: ICD-10-CM

## 2020-09-02 DIAGNOSIS — T45.1X5A CHEMOTHERAPY INDUCED NEUTROPENIA: ICD-10-CM

## 2020-09-02 DIAGNOSIS — Z17.0 MALIGNANT NEOPLASM OF CENTRAL PORTION OF RIGHT BREAST IN FEMALE, ESTROGEN RECEPTOR POSITIVE: ICD-10-CM

## 2020-09-02 DIAGNOSIS — D70.1 CHEMOTHERAPY INDUCED NEUTROPENIA: ICD-10-CM

## 2020-09-02 DIAGNOSIS — E78.00 HYPERCHOLESTEREMIA: ICD-10-CM

## 2020-09-02 DIAGNOSIS — I10 BENIGN ESSENTIAL HTN: Chronic | ICD-10-CM

## 2020-09-02 DIAGNOSIS — M81.0 OSTEOPOROSIS, UNSPECIFIED OSTEOPOROSIS TYPE, UNSPECIFIED PATHOLOGICAL FRACTURE PRESENCE: ICD-10-CM

## 2020-09-02 DIAGNOSIS — C50.111 MALIGNANT NEOPLASM OF CENTRAL PORTION OF RIGHT BREAST IN FEMALE, ESTROGEN RECEPTOR POSITIVE: Primary | ICD-10-CM

## 2020-09-02 DIAGNOSIS — C50.011 MALIGNANT NEOPLASM OF NIPPLE OF RIGHT BREAST IN FEMALE, UNSPECIFIED ESTROGEN RECEPTOR STATUS: Primary | ICD-10-CM

## 2020-09-02 DIAGNOSIS — C79.51 METASTATIC CANCER TO SPINE: ICD-10-CM

## 2020-09-02 DIAGNOSIS — C50.111 MALIGNANT NEOPLASM OF CENTRAL PORTION OF RIGHT BREAST IN FEMALE, ESTROGEN RECEPTOR POSITIVE: ICD-10-CM

## 2020-09-02 DIAGNOSIS — Z17.0 MALIGNANT NEOPLASM OF CENTRAL PORTION OF RIGHT BREAST IN FEMALE, ESTROGEN RECEPTOR POSITIVE: Primary | ICD-10-CM

## 2020-09-02 LAB
ALBUMIN SERPL BCP-MCNC: 3.5 G/DL (ref 3.5–5.2)
ALP SERPL-CCNC: 156 U/L (ref 55–135)
ALT SERPL W/O P-5'-P-CCNC: 12 U/L (ref 10–44)
ANION GAP SERPL CALC-SCNC: 5 MMOL/L (ref 8–16)
AST SERPL-CCNC: 22 U/L (ref 10–40)
BILIRUB SERPL-MCNC: 0.8 MG/DL (ref 0.1–1)
BUN SERPL-MCNC: 19 MG/DL (ref 8–23)
CALCIUM SERPL-MCNC: 9.1 MG/DL (ref 8.7–10.5)
CHLORIDE SERPL-SCNC: 104 MMOL/L (ref 95–110)
CO2 SERPL-SCNC: 29 MMOL/L (ref 23–29)
CREAT SERPL-MCNC: 0.8 MG/DL (ref 0.5–1.4)
ERYTHROCYTE [DISTWIDTH] IN BLOOD BY AUTOMATED COUNT: 19.3 % (ref 11.5–14.5)
EST. GFR  (AFRICAN AMERICAN): >60 ML/MIN/1.73 M^2
EST. GFR  (NON AFRICAN AMERICAN): >60 ML/MIN/1.73 M^2
GLUCOSE SERPL-MCNC: 146 MG/DL (ref 70–110)
HCT VFR BLD AUTO: 38.3 % (ref 37–48.5)
HGB BLD-MCNC: 11.7 G/DL (ref 12–16)
IMM GRANULOCYTES # BLD AUTO: 0.02 K/UL (ref 0–0.04)
MCH RBC QN AUTO: 27.5 PG (ref 27–31)
MCHC RBC AUTO-ENTMCNC: 30.5 G/DL (ref 32–36)
MCV RBC AUTO: 90 FL (ref 82–98)
NEUTROPHILS # BLD AUTO: 3.5 K/UL (ref 1.8–7.7)
PLATELET # BLD AUTO: 122 K/UL (ref 150–350)
PMV BLD AUTO: 10.6 FL (ref 9.2–12.9)
POTASSIUM SERPL-SCNC: 4.2 MMOL/L (ref 3.5–5.1)
PROT SERPL-MCNC: 6.8 G/DL (ref 6–8.4)
RBC # BLD AUTO: 4.25 M/UL (ref 4–5.4)
SODIUM SERPL-SCNC: 138 MMOL/L (ref 136–145)
WBC # BLD AUTO: 5.19 K/UL (ref 3.9–12.7)

## 2020-09-02 PROCEDURE — 80053 COMPREHEN METABOLIC PANEL: CPT | Mod: HCNC

## 2020-09-02 PROCEDURE — 63600175 PHARM REV CODE 636 W HCPCS: Mod: HCNC | Performed by: INTERNAL MEDICINE

## 2020-09-02 PROCEDURE — 85027 COMPLETE CBC AUTOMATED: CPT | Mod: HCNC

## 2020-09-02 PROCEDURE — 96409 CHEMO IV PUSH SNGL DRUG: CPT | Mod: HCNC

## 2020-09-02 PROCEDURE — 99214 PR OFFICE/OUTPT VISIT, EST, LEVL IV, 30-39 MIN: ICD-10-PCS | Mod: HCNC,95,, | Performed by: NURSE PRACTITIONER

## 2020-09-02 PROCEDURE — 96367 TX/PROPH/DG ADDL SEQ IV INF: CPT | Mod: HCNC

## 2020-09-02 PROCEDURE — A4216 STERILE WATER/SALINE, 10 ML: HCPCS | Mod: HCNC | Performed by: INTERNAL MEDICINE

## 2020-09-02 PROCEDURE — 96372 THER/PROPH/DIAG INJ SC/IM: CPT | Mod: HCNC,59

## 2020-09-02 PROCEDURE — 25000003 PHARM REV CODE 250: Mod: HCNC | Performed by: INTERNAL MEDICINE

## 2020-09-02 PROCEDURE — 86300 IMMUNOASSAY TUMOR CA 15-3: CPT | Mod: HCNC

## 2020-09-02 PROCEDURE — 99214 OFFICE O/P EST MOD 30 MIN: CPT | Mod: HCNC,95,, | Performed by: NURSE PRACTITIONER

## 2020-09-02 RX ORDER — HEPARIN 100 UNIT/ML
500 SYRINGE INTRAVENOUS
Status: CANCELLED | OUTPATIENT
Start: 2020-09-09

## 2020-09-02 RX ORDER — SODIUM CHLORIDE 0.9 % (FLUSH) 0.9 %
10 SYRINGE (ML) INJECTION
Status: DISCONTINUED | OUTPATIENT
Start: 2020-09-02 | End: 2020-09-02 | Stop reason: HOSPADM

## 2020-09-02 RX ORDER — SODIUM CHLORIDE 0.9 % (FLUSH) 0.9 %
10 SYRINGE (ML) INJECTION
Status: CANCELLED | OUTPATIENT
Start: 2020-09-09

## 2020-09-02 RX ORDER — HEPARIN 100 UNIT/ML
500 SYRINGE INTRAVENOUS
Status: DISCONTINUED | OUTPATIENT
Start: 2020-09-02 | End: 2020-09-02 | Stop reason: HOSPADM

## 2020-09-02 RX ADMIN — ERIBULIN MESYLATE 2 MG: 0.5 INJECTION INTRAVENOUS at 01:09

## 2020-09-02 RX ADMIN — DENOSUMAB 120 MG: 120 INJECTION SUBCUTANEOUS at 12:09

## 2020-09-02 RX ADMIN — HEPARIN 500 UNITS: 100 SYRINGE at 01:09

## 2020-09-02 RX ADMIN — Medication 10 ML: at 01:09

## 2020-09-02 RX ADMIN — SODIUM CHLORIDE 1 MG: 9 INJECTION, SOLUTION INTRAVENOUS at 12:09

## 2020-09-02 NOTE — PLAN OF CARE
Patient arrived to unit for C11D1 Halaven. Patient denies any new or worsening symptoms at this time. Labs drawn. Plan of care reviewed, patient agreeable to plan. Patient tolerated treatment well. No sign of reaction noted. VSS. Patient received discharge instructions and follow up appointments. Patient instructed to return 9/9/20. Verbalized understanding and was escorted off unit via w/c by MA. Patient in NAD at time of discharge.

## 2020-09-02 NOTE — TELEPHONE ENCOUNTER
Neulasta Refill:  Confirmed need for Neulasta for administration after Halaven on day 8 of current chemotherapy cycle (2020).  Ms. Crain will inject Neulasta on day 9 (9/10/2020).  No new medications, allergies or health conditions.  No side effects from Neulasta.  Stores medication appropriately in refrigerator until administration.  No urgent care/ER visits.  No additional questions.  Neulasta to ship 9/3 for delivery on .  Copay $5.00 - ok to charge [facility].  Address confirmed.  Two patient identifiers confirmed - name and .  Therapy appropriate.

## 2020-09-02 NOTE — Clinical Note
Needs appointment with Dr. Schroeder or myself ( can be a virtual visit) on 9/22 followed by labs (CBC and CMP) and infusion at [facility] for eribulin. Needs labs and eribulin infusion on 9/29 as well on [facility].

## 2020-09-09 ENCOUNTER — INFUSION (OUTPATIENT)
Dept: INFUSION THERAPY | Facility: HOSPITAL | Age: 65
End: 2020-09-09
Attending: INTERNAL MEDICINE
Payer: MEDICARE

## 2020-09-09 VITALS
OXYGEN SATURATION: 96 % | RESPIRATION RATE: 17 BRPM | HEART RATE: 106 BPM | SYSTOLIC BLOOD PRESSURE: 106 MMHG | DIASTOLIC BLOOD PRESSURE: 55 MMHG | TEMPERATURE: 97 F

## 2020-09-09 DIAGNOSIS — Z17.0 MALIGNANT NEOPLASM OF CENTRAL PORTION OF RIGHT BREAST IN FEMALE, ESTROGEN RECEPTOR POSITIVE: ICD-10-CM

## 2020-09-09 DIAGNOSIS — C50.011 MALIGNANT NEOPLASM OF NIPPLE OF RIGHT BREAST IN FEMALE, UNSPECIFIED ESTROGEN RECEPTOR STATUS: Primary | ICD-10-CM

## 2020-09-09 DIAGNOSIS — C78.7 METASTASIS TO LIVER: ICD-10-CM

## 2020-09-09 DIAGNOSIS — C50.111 MALIGNANT NEOPLASM OF CENTRAL PORTION OF RIGHT BREAST IN FEMALE, ESTROGEN RECEPTOR POSITIVE: ICD-10-CM

## 2020-09-09 DIAGNOSIS — C79.51 METASTATIC CANCER TO SPINE: ICD-10-CM

## 2020-09-09 LAB
ALBUMIN SERPL BCP-MCNC: 3.5 G/DL (ref 3.5–5.2)
ALP SERPL-CCNC: 177 U/L (ref 55–135)
ALT SERPL W/O P-5'-P-CCNC: 14 U/L (ref 10–44)
ANION GAP SERPL CALC-SCNC: 5 MMOL/L (ref 8–16)
AST SERPL-CCNC: 26 U/L (ref 10–40)
BILIRUB SERPL-MCNC: 0.9 MG/DL (ref 0.1–1)
BUN SERPL-MCNC: 14 MG/DL (ref 8–23)
CALCIUM SERPL-MCNC: 8.6 MG/DL (ref 8.7–10.5)
CANCER AG27-29 SERPL-ACNC: 82.6 U/ML
CHLORIDE SERPL-SCNC: 105 MMOL/L (ref 95–110)
CO2 SERPL-SCNC: 29 MMOL/L (ref 23–29)
CREAT SERPL-MCNC: 0.8 MG/DL (ref 0.5–1.4)
ERYTHROCYTE [DISTWIDTH] IN BLOOD BY AUTOMATED COUNT: 19 % (ref 11.5–14.5)
EST. GFR  (AFRICAN AMERICAN): >60 ML/MIN/1.73 M^2
EST. GFR  (NON AFRICAN AMERICAN): >60 ML/MIN/1.73 M^2
GLUCOSE SERPL-MCNC: 148 MG/DL (ref 70–110)
HCT VFR BLD AUTO: 33.5 % (ref 37–48.5)
HGB BLD-MCNC: 10.3 G/DL (ref 12–16)
IMM GRANULOCYTES # BLD AUTO: 0.08 K/UL (ref 0–0.04)
MCH RBC QN AUTO: 28.4 PG (ref 27–31)
MCHC RBC AUTO-ENTMCNC: 30.7 G/DL (ref 32–36)
MCV RBC AUTO: 92 FL (ref 82–98)
NEUTROPHILS # BLD AUTO: 3 K/UL (ref 1.8–7.7)
PLATELET # BLD AUTO: 102 K/UL (ref 150–350)
PMV BLD AUTO: 10.7 FL (ref 9.2–12.9)
POTASSIUM SERPL-SCNC: 4.3 MMOL/L (ref 3.5–5.1)
PROT SERPL-MCNC: 6.7 G/DL (ref 6–8.4)
RBC # BLD AUTO: 3.63 M/UL (ref 4–5.4)
SODIUM SERPL-SCNC: 139 MMOL/L (ref 136–145)
WBC # BLD AUTO: 4.64 K/UL (ref 3.9–12.7)

## 2020-09-09 PROCEDURE — 85027 COMPLETE CBC AUTOMATED: CPT | Mod: HCNC

## 2020-09-09 PROCEDURE — 96409 CHEMO IV PUSH SNGL DRUG: CPT | Mod: HCNC

## 2020-09-09 PROCEDURE — 80053 COMPREHEN METABOLIC PANEL: CPT | Mod: HCNC

## 2020-09-09 PROCEDURE — 96367 TX/PROPH/DG ADDL SEQ IV INF: CPT | Mod: HCNC

## 2020-09-09 PROCEDURE — A4216 STERILE WATER/SALINE, 10 ML: HCPCS | Mod: HCNC | Performed by: INTERNAL MEDICINE

## 2020-09-09 PROCEDURE — 25000003 PHARM REV CODE 250: Mod: HCNC | Performed by: INTERNAL MEDICINE

## 2020-09-09 PROCEDURE — 63600175 PHARM REV CODE 636 W HCPCS: Mod: JG,HCNC | Performed by: INTERNAL MEDICINE

## 2020-09-09 RX ORDER — HEPARIN 100 UNIT/ML
500 SYRINGE INTRAVENOUS
Status: DISCONTINUED | OUTPATIENT
Start: 2020-09-09 | End: 2020-09-09 | Stop reason: HOSPADM

## 2020-09-09 RX ORDER — SODIUM CHLORIDE 0.9 % (FLUSH) 0.9 %
10 SYRINGE (ML) INJECTION
Status: DISCONTINUED | OUTPATIENT
Start: 2020-09-09 | End: 2020-09-09 | Stop reason: HOSPADM

## 2020-09-09 RX ADMIN — Medication 10 ML: at 12:09

## 2020-09-09 RX ADMIN — ERIBULIN MESYLATE 2 MG: 0.5 INJECTION INTRAVENOUS at 12:09

## 2020-09-09 RX ADMIN — HEPARIN 500 UNITS: 100 SYRINGE at 12:09

## 2020-09-09 RX ADMIN — SODIUM CHLORIDE 1 MG: 9 INJECTION, SOLUTION INTRAVENOUS at 12:09

## 2020-09-09 NOTE — PLAN OF CARE
Patient arrived to unit for C11D8 Halaven. Labs drawn. Plan of care reviewed, patient agreeable to plan. Patient tolerated treatment well. No sign of reaction noted. VSS. Patient received discharge instructions and follow up appointments. Patient instructed to return 9/22/20 for labs, Dr. Schroeder follow up, and next cycle. Verbalized understanding and was escorted off unit via w/c by MA. Patient in NAD at time of discharge.

## 2020-09-21 NOTE — PROGRESS NOTES
Subjective:       Patient ID: Rebecca Crain is a 64 y.o. female.    Chief Complaint: No chief complaint on file.    HPI Ms. Crain returns for followup of metastatic carcinoma of the right breast.  She is currently on eribulin therapy.  She has had 11 cycles thus far.      The patient location is:  home  The chief complaint leading to consultation is:  Breast cancer.    Visit type: audiovisual    Face to Face time with patient:  10 minutes.  Fifteen minutes of total time spent on the encounter, which includes face to face time and non-face to face time preparing to see the patient (eg, review of tests), Obtaining and/or reviewing separately obtained history, Documenting clinical information in the electronic or other health record, Independently interpreting results (not separately reported) and communicating results to the patient/family/caregiver, or Care coordination (not separately reported).         Each patient to whom he or she provides medical services by telemedicine is:  (1) informed of the relationship between the physician and patient and the respective role of any other health care provider with respect to management of the patient; and (2) notified that he or she may decline to receive medical services by telemedicine and may withdraw from such care at any time.    Notes:     Notes:  Overall she continues to do well overall.  Her pain control has been good and she really needs any breakthrough medication.  is doing fairly well  She continues on MS Contin 200 mg 3 times per day as her primary pain control.  Her pain control has been very good and she sometimes only takes her MS Contin twice a day.  She did trip on a dog been in fell on her hands and knees in had some pain related to that for which she took  Advil  Appetite and bowel function has been good.  Her cough has return is productive of green mucus.  She has been taking Claritin every other day.  Energy level has been variable.  She does  have some numbness in her hands and feet which is manageable.          ECOG-1-2    Breast history: In 2013 she was diagnosed with stage IIB carcinoma of the right breast, ER positive with a low Oncotype score.  She was enrolled on protocol  and randomized to endocrine therapy alone.  She was tried on all 3 aromatase inhibitors and had itching and rash to all of them.  In August 2013 she was started on tamoxifen.   She was found to have  bone metastasis in May 2015, 2015.  A subsequent bone biopsy was performed on a lesion at the T8 vertebra.   The pathology from that was consistent with metastatic breast cancer, ER +80%, ID +80%, and HER-2 negative.     STRATA  -PIK3CA mutation, FGFR2 mutation, ESR1 mutation     She received letrozole plus palbociclib from July 2015 until May 2016.  She also received bone protective therapy with Xgeva.      Faslodex was started in June 2016.      Exemestane and Afinitor started in August 2017.  That was discontinued in February 2018 due to progression in the liver and bone.     Navelbine was started February 16, 2018.  She had 8 cycles for that.  Scans in November 2018 showed progression as follows     Capecitabine was started in December 2018.  She received 5 cycles of that therapy.    Follow-up scans in April 2019 showed progression with a new hepatic lesion and worsening bone disease.    She began clinical trial therapy with erdafitinib on May 8, 2019.  That was discontinued in September 2019 due to progression in the bone.    She then received a course of radiation therapy to her pelvis.  She began weekly Taxol in October 2019.That was discontinued in December 2019 due to progression.    She began eribulin therapy on 1/15/2020.     CT results 8/10/20 :Significant decrease in size of the sub pulmonic right loculated pleural fluid collection when compared to the recent study of 04/22/2020.  2. Patchy ground-glass opacities in the lungs peripherally remain without significant  change.  3. Diffuse skeletal metastatic lesions throughout the dorsal spine and the lumbar spine without significant change.  There is however a enlarging lesion in the spinous process of T6 when compared to the previous study.  4. liver lesions grossly stable when compared with the study of 04/22/2020    Review of Systems   Constitutional: Positive for fatigue. Negative for activity change, appetite change (Improved), fever and unexpected weight change.   HENT: Negative for mouth sores and trouble swallowing.    Eyes: Negative for visual disturbance.   Respiratory: Positive for cough. Negative for shortness of breath.    Cardiovascular: Negative for chest pain.   Gastrointestinal: Negative for abdominal pain and diarrhea.   Genitourinary: Negative for frequency.   Musculoskeletal: Positive for back pain.   Skin: Negative for rash.   Neurological: Positive for numbness. Negative for headaches.   Hematological: Negative for adenopathy.   Psychiatric/Behavioral: Negative for dysphoric mood. The patient is not nervous/anxious.        Objective:      Physical Exam  Constitutional:       General: She is not in acute distress.     Appearance: Normal appearance.   Neurological:      Mental Status: She is alert.   Psychiatric:         Mood and Affect: Mood normal.         Thought Content: Thought content normal.         Judgment: Judgment normal.         Assessment:      1. Malignant neoplasm of central portion of right breast in female, estrogen receptor positive    2. Bone metastasis    3. Metastasis to liver      4.  Chronic bronchitis  Plan:       Continue current therapy with cycle 12 of eribulin.  Return in 3 weeks with repeat scans and blood work.     A Zithromax in for chronic bronchitis.

## 2020-09-22 ENCOUNTER — PATIENT MESSAGE (OUTPATIENT)
Dept: HEMATOLOGY/ONCOLOGY | Facility: CLINIC | Age: 65
End: 2020-09-22

## 2020-09-22 ENCOUNTER — OFFICE VISIT (OUTPATIENT)
Dept: HEMATOLOGY/ONCOLOGY | Facility: CLINIC | Age: 65
End: 2020-09-22
Payer: MEDICARE

## 2020-09-22 ENCOUNTER — INFUSION (OUTPATIENT)
Dept: INFUSION THERAPY | Facility: HOSPITAL | Age: 65
End: 2020-09-22
Attending: INTERNAL MEDICINE
Payer: MEDICARE

## 2020-09-22 VITALS
DIASTOLIC BLOOD PRESSURE: 52 MMHG | TEMPERATURE: 97 F | SYSTOLIC BLOOD PRESSURE: 107 MMHG | RESPIRATION RATE: 17 BRPM | OXYGEN SATURATION: 95 % | HEART RATE: 80 BPM

## 2020-09-22 DIAGNOSIS — Z17.0 MALIGNANT NEOPLASM OF CENTRAL PORTION OF RIGHT BREAST IN FEMALE, ESTROGEN RECEPTOR POSITIVE: ICD-10-CM

## 2020-09-22 DIAGNOSIS — C50.111 MALIGNANT NEOPLASM OF CENTRAL PORTION OF RIGHT BREAST IN FEMALE, ESTROGEN RECEPTOR POSITIVE: ICD-10-CM

## 2020-09-22 DIAGNOSIS — Z17.0 MALIGNANT NEOPLASM OF CENTRAL PORTION OF RIGHT BREAST IN FEMALE, ESTROGEN RECEPTOR POSITIVE: Primary | ICD-10-CM

## 2020-09-22 DIAGNOSIS — C78.7 METASTASIS TO LIVER: ICD-10-CM

## 2020-09-22 DIAGNOSIS — C79.51 BONE METASTASIS: ICD-10-CM

## 2020-09-22 DIAGNOSIS — C79.51 METASTATIC CANCER TO SPINE: ICD-10-CM

## 2020-09-22 DIAGNOSIS — R05.9 COUGH: ICD-10-CM

## 2020-09-22 DIAGNOSIS — C50.011 MALIGNANT NEOPLASM OF NIPPLE OF RIGHT BREAST IN FEMALE, UNSPECIFIED ESTROGEN RECEPTOR STATUS: Primary | ICD-10-CM

## 2020-09-22 DIAGNOSIS — C50.111 MALIGNANT NEOPLASM OF CENTRAL PORTION OF RIGHT BREAST IN FEMALE, ESTROGEN RECEPTOR POSITIVE: Primary | ICD-10-CM

## 2020-09-22 LAB
ALBUMIN SERPL BCP-MCNC: 3.3 G/DL (ref 3.5–5.2)
ALP SERPL-CCNC: 173 U/L (ref 55–135)
ALT SERPL W/O P-5'-P-CCNC: 10 U/L (ref 10–44)
ANION GAP SERPL CALC-SCNC: 5 MMOL/L (ref 8–16)
AST SERPL-CCNC: 21 U/L (ref 10–40)
BILIRUB SERPL-MCNC: 0.7 MG/DL (ref 0.1–1)
BUN SERPL-MCNC: 16 MG/DL (ref 8–23)
CALCIUM SERPL-MCNC: 8.4 MG/DL (ref 8.7–10.5)
CHLORIDE SERPL-SCNC: 104 MMOL/L (ref 95–110)
CO2 SERPL-SCNC: 30 MMOL/L (ref 23–29)
CREAT SERPL-MCNC: 0.7 MG/DL (ref 0.5–1.4)
ERYTHROCYTE [DISTWIDTH] IN BLOOD BY AUTOMATED COUNT: 20 % (ref 11.5–14.5)
EST. GFR  (AFRICAN AMERICAN): >60 ML/MIN/1.73 M^2
EST. GFR  (NON AFRICAN AMERICAN): >60 ML/MIN/1.73 M^2
GLUCOSE SERPL-MCNC: 103 MG/DL (ref 70–110)
HCT VFR BLD AUTO: 35.2 % (ref 37–48.5)
HGB BLD-MCNC: 10.9 G/DL (ref 12–16)
IMM GRANULOCYTES # BLD AUTO: 0.06 K/UL (ref 0–0.04)
MCH RBC QN AUTO: 28.2 PG (ref 27–31)
MCHC RBC AUTO-ENTMCNC: 31 G/DL (ref 32–36)
MCV RBC AUTO: 91 FL (ref 82–98)
NEUTROPHILS # BLD AUTO: 5.9 K/UL (ref 1.8–7.7)
PLATELET # BLD AUTO: 114 K/UL (ref 150–350)
PMV BLD AUTO: 9.9 FL (ref 9.2–12.9)
POTASSIUM SERPL-SCNC: 4.4 MMOL/L (ref 3.5–5.1)
PROT SERPL-MCNC: 6.5 G/DL (ref 6–8.4)
RBC # BLD AUTO: 3.87 M/UL (ref 4–5.4)
SODIUM SERPL-SCNC: 139 MMOL/L (ref 136–145)
WBC # BLD AUTO: 7.88 K/UL (ref 3.9–12.7)

## 2020-09-22 PROCEDURE — 96409 CHEMO IV PUSH SNGL DRUG: CPT | Mod: HCNC

## 2020-09-22 PROCEDURE — 25000003 PHARM REV CODE 250: Mod: HCNC | Performed by: INTERNAL MEDICINE

## 2020-09-22 PROCEDURE — 99213 PR OFFICE/OUTPT VISIT, EST, LEVL III, 20-29 MIN: ICD-10-PCS | Mod: HCNC,95,, | Performed by: INTERNAL MEDICINE

## 2020-09-22 PROCEDURE — 96365 THER/PROPH/DIAG IV INF INIT: CPT | Mod: HCNC

## 2020-09-22 PROCEDURE — 63600175 PHARM REV CODE 636 W HCPCS: Mod: HCNC | Performed by: INTERNAL MEDICINE

## 2020-09-22 PROCEDURE — 99213 OFFICE O/P EST LOW 20 MIN: CPT | Mod: HCNC,95,, | Performed by: INTERNAL MEDICINE

## 2020-09-22 PROCEDURE — 80053 COMPREHEN METABOLIC PANEL: CPT | Mod: HCNC

## 2020-09-22 PROCEDURE — 36591 DRAW BLOOD OFF VENOUS DEVICE: CPT | Mod: HCNC

## 2020-09-22 PROCEDURE — 85027 COMPLETE CBC AUTOMATED: CPT | Mod: HCNC

## 2020-09-22 PROCEDURE — A4216 STERILE WATER/SALINE, 10 ML: HCPCS | Mod: HCNC | Performed by: INTERNAL MEDICINE

## 2020-09-22 RX ORDER — SODIUM CHLORIDE 0.9 % (FLUSH) 0.9 %
10 SYRINGE (ML) INJECTION
Status: CANCELLED | OUTPATIENT
Start: 2020-09-22

## 2020-09-22 RX ORDER — SODIUM CHLORIDE 0.9 % (FLUSH) 0.9 %
10 SYRINGE (ML) INJECTION
Status: DISCONTINUED | OUTPATIENT
Start: 2020-09-22 | End: 2020-09-22 | Stop reason: HOSPADM

## 2020-09-22 RX ORDER — HEPARIN 100 UNIT/ML
500 SYRINGE INTRAVENOUS
Status: DISCONTINUED | OUTPATIENT
Start: 2020-09-22 | End: 2020-09-22 | Stop reason: HOSPADM

## 2020-09-22 RX ORDER — AZITHROMYCIN 250 MG/1
TABLET, FILM COATED ORAL
Qty: 2 TABLET | Refills: 0 | Status: SHIPPED | OUTPATIENT
Start: 2020-09-22 | End: 2020-09-27

## 2020-09-22 RX ORDER — SODIUM CHLORIDE 0.9 % (FLUSH) 0.9 %
10 SYRINGE (ML) INJECTION
Status: CANCELLED | OUTPATIENT
Start: 2020-09-29

## 2020-09-22 RX ORDER — HEPARIN 100 UNIT/ML
500 SYRINGE INTRAVENOUS
Status: CANCELLED | OUTPATIENT
Start: 2020-09-29

## 2020-09-22 RX ORDER — HEPARIN 100 UNIT/ML
500 SYRINGE INTRAVENOUS
Status: CANCELLED | OUTPATIENT
Start: 2020-09-22

## 2020-09-22 RX ADMIN — SODIUM CHLORIDE 1 MG: 9 INJECTION, SOLUTION INTRAVENOUS at 01:09

## 2020-09-22 RX ADMIN — Medication 10 ML: at 02:09

## 2020-09-22 RX ADMIN — ERIBULIN MESYLATE 2 MG: 0.5 INJECTION INTRAVENOUS at 02:09

## 2020-09-22 RX ADMIN — HEPARIN 500 UNITS: 100 SYRINGE at 02:09

## 2020-09-22 NOTE — PLAN OF CARE
Patient arrived to unit for C12D1 Halaven. Patient denies any new or worsening symptoms at this time. Labs drawn and results reviewed.Pt cleared for chemo by Dr. Schroeder. Plan of care reviewed, patient agreeable to plan. Patient tolerated treatment well. No sign of reaction noted. VSS. Patient received discharge instructions and follow up appointments. Patient instructed to return 9/29/20 for C12D8. Verbalized understanding and was escorted off unit via w/c by MA. Patient in NAD at time of discharge.

## 2020-09-23 DIAGNOSIS — C79.51 BONE METASTASIS: ICD-10-CM

## 2020-09-23 DIAGNOSIS — C50.111 MALIGNANT NEOPLASM OF CENTRAL PORTION OF RIGHT BREAST IN FEMALE, ESTROGEN RECEPTOR POSITIVE: ICD-10-CM

## 2020-09-23 DIAGNOSIS — Z17.0 MALIGNANT NEOPLASM OF CENTRAL PORTION OF RIGHT BREAST IN FEMALE, ESTROGEN RECEPTOR POSITIVE: ICD-10-CM

## 2020-09-23 DIAGNOSIS — T45.1X5A CHEMOTHERAPY INDUCED NEUTROPENIA: ICD-10-CM

## 2020-09-23 DIAGNOSIS — D70.1 CHEMOTHERAPY INDUCED NEUTROPENIA: ICD-10-CM

## 2020-09-24 ENCOUNTER — TELEPHONE (OUTPATIENT)
Dept: PHARMACY | Facility: CLINIC | Age: 65
End: 2020-09-24

## 2020-09-24 NOTE — TELEPHONE ENCOUNTER
Neulasta refill call attempt. LVM. Neulasta for administration after eribulin (Halaven) on day 8 of current chemotherapy cycle (9/29/2020).  Ms. Crain anticipated to inject Neulasta on day 9 (9/30/2020).

## 2020-09-24 NOTE — TELEPHONE ENCOUNTER
Spoke with Mrs. Crain. She reports 0 missed doses. She reports 0 doses left on hand. Next dose is due on 9/30. She reports no changes in allergies or medical conditions. She reports starting azithromycin and loratadine--no drug-drug interactions identified with pegfilgrastim. Prescription received from [facility]. Prescription unchanged. Will ship on 9/24 to be delivered on 9/25. Confirmed 2 patient identifiers and shipping address.

## 2020-09-29 ENCOUNTER — INFUSION (OUTPATIENT)
Dept: INFUSION THERAPY | Facility: HOSPITAL | Age: 65
End: 2020-09-29
Attending: INTERNAL MEDICINE
Payer: MEDICARE

## 2020-09-29 ENCOUNTER — PATIENT MESSAGE (OUTPATIENT)
Dept: OTHER | Facility: OTHER | Age: 65
End: 2020-09-29

## 2020-09-29 ENCOUNTER — PATIENT MESSAGE (OUTPATIENT)
Dept: HEMATOLOGY/ONCOLOGY | Facility: CLINIC | Age: 65
End: 2020-09-29

## 2020-09-29 VITALS
SYSTOLIC BLOOD PRESSURE: 106 MMHG | HEIGHT: 64 IN | TEMPERATURE: 98 F | BODY MASS INDEX: 25.38 KG/M2 | WEIGHT: 148.63 LBS | HEART RATE: 82 BPM | RESPIRATION RATE: 17 BRPM | DIASTOLIC BLOOD PRESSURE: 50 MMHG | OXYGEN SATURATION: 96 %

## 2020-09-29 DIAGNOSIS — Z17.0 MALIGNANT NEOPLASM OF CENTRAL PORTION OF RIGHT BREAST IN FEMALE, ESTROGEN RECEPTOR POSITIVE: ICD-10-CM

## 2020-09-29 DIAGNOSIS — C50.111 MALIGNANT NEOPLASM OF CENTRAL PORTION OF RIGHT BREAST IN FEMALE, ESTROGEN RECEPTOR POSITIVE: ICD-10-CM

## 2020-09-29 DIAGNOSIS — C50.011 MALIGNANT NEOPLASM OF NIPPLE OF RIGHT BREAST IN FEMALE, UNSPECIFIED ESTROGEN RECEPTOR STATUS: Primary | ICD-10-CM

## 2020-09-29 DIAGNOSIS — C79.51 METASTATIC CANCER TO SPINE: ICD-10-CM

## 2020-09-29 DIAGNOSIS — C78.7 METASTASIS TO LIVER: ICD-10-CM

## 2020-09-29 LAB
ALBUMIN SERPL BCP-MCNC: 3.3 G/DL (ref 3.5–5.2)
ALP SERPL-CCNC: 156 U/L (ref 55–135)
ALT SERPL W/O P-5'-P-CCNC: 13 U/L (ref 10–44)
ANION GAP SERPL CALC-SCNC: 10 MMOL/L (ref 8–16)
AST SERPL-CCNC: 24 U/L (ref 10–40)
BILIRUB SERPL-MCNC: 0.9 MG/DL (ref 0.1–1)
BUN SERPL-MCNC: 14 MG/DL (ref 8–23)
CALCIUM SERPL-MCNC: 8.1 MG/DL (ref 8.7–10.5)
CHLORIDE SERPL-SCNC: 107 MMOL/L (ref 95–110)
CO2 SERPL-SCNC: 22 MMOL/L (ref 23–29)
CREAT SERPL-MCNC: 0.7 MG/DL (ref 0.5–1.4)
ERYTHROCYTE [DISTWIDTH] IN BLOOD BY AUTOMATED COUNT: 19.4 % (ref 11.5–14.5)
EST. GFR  (AFRICAN AMERICAN): >60 ML/MIN/1.73 M^2
EST. GFR  (NON AFRICAN AMERICAN): >60 ML/MIN/1.73 M^2
GLUCOSE SERPL-MCNC: 90 MG/DL (ref 70–110)
HCT VFR BLD AUTO: 35.1 % (ref 37–48.5)
HGB BLD-MCNC: 10.9 G/DL (ref 12–16)
IMM GRANULOCYTES # BLD AUTO: 0.05 K/UL (ref 0–0.04)
MCH RBC QN AUTO: 28.4 PG (ref 27–31)
MCHC RBC AUTO-ENTMCNC: 31.1 G/DL (ref 32–36)
MCV RBC AUTO: 91 FL (ref 82–98)
NEUTROPHILS # BLD AUTO: 2.1 K/UL (ref 1.8–7.7)
PLATELET # BLD AUTO: 105 K/UL (ref 150–350)
PMV BLD AUTO: 10.7 FL (ref 9.2–12.9)
POTASSIUM SERPL-SCNC: 3.8 MMOL/L (ref 3.5–5.1)
PROT SERPL-MCNC: 6.3 G/DL (ref 6–8.4)
RBC # BLD AUTO: 3.84 M/UL (ref 4–5.4)
SODIUM SERPL-SCNC: 139 MMOL/L (ref 136–145)
WBC # BLD AUTO: 3.96 K/UL (ref 3.9–12.7)

## 2020-09-29 PROCEDURE — 63600175 PHARM REV CODE 636 W HCPCS: Mod: JG,HCNC | Performed by: INTERNAL MEDICINE

## 2020-09-29 PROCEDURE — 80053 COMPREHEN METABOLIC PANEL: CPT | Mod: HCNC

## 2020-09-29 PROCEDURE — 96372 THER/PROPH/DIAG INJ SC/IM: CPT | Mod: HCNC

## 2020-09-29 PROCEDURE — 25000003 PHARM REV CODE 250: Mod: HCNC | Performed by: INTERNAL MEDICINE

## 2020-09-29 PROCEDURE — 96367 TX/PROPH/DG ADDL SEQ IV INF: CPT | Mod: HCNC

## 2020-09-29 PROCEDURE — 96409 CHEMO IV PUSH SNGL DRUG: CPT | Mod: HCNC

## 2020-09-29 PROCEDURE — A4216 STERILE WATER/SALINE, 10 ML: HCPCS | Mod: HCNC | Performed by: INTERNAL MEDICINE

## 2020-09-29 PROCEDURE — 85027 COMPLETE CBC AUTOMATED: CPT | Mod: HCNC

## 2020-09-29 RX ORDER — SODIUM CHLORIDE 0.9 % (FLUSH) 0.9 %
10 SYRINGE (ML) INJECTION
Status: DISCONTINUED | OUTPATIENT
Start: 2020-09-29 | End: 2020-09-29 | Stop reason: HOSPADM

## 2020-09-29 RX ORDER — HEPARIN 100 UNIT/ML
500 SYRINGE INTRAVENOUS
Status: DISCONTINUED | OUTPATIENT
Start: 2020-09-29 | End: 2020-09-29 | Stop reason: HOSPADM

## 2020-09-29 RX ADMIN — SODIUM CHLORIDE 1 MG: 9 INJECTION, SOLUTION INTRAVENOUS at 12:09

## 2020-09-29 RX ADMIN — HEPARIN 500 UNITS: 100 SYRINGE at 01:09

## 2020-09-29 RX ADMIN — DENOSUMAB 120 MG: 120 INJECTION SUBCUTANEOUS at 01:09

## 2020-09-29 RX ADMIN — Medication 10 ML: at 01:09

## 2020-09-29 RX ADMIN — ERIBULIN MESYLATE 2 MG: 0.5 INJECTION INTRAVENOUS at 01:09

## 2020-09-29 NOTE — PLAN OF CARE
Patient arrived to unit for C12D8 Halaven. Plan of care reviewed, patient agreeable to plan. Patient tolerated treatment well. No sign of reaction noted. Corrected calcium 8.7. Xgeva given. Patient reports taking Calcium and Vitamin D supplements as instructed. VSS. Patient received discharge instructions and follow up appointments. Patient instructed to return 10/12/20 for labs and CT scans, 10/13/20 for Dr. Schroeder follow up, and 10/27/20 for Xgeva. Verbalized understanding and was escorted off unit via w/c by MA. Patient in NAD at time of discharge.

## 2020-10-02 DIAGNOSIS — C50.111 MALIGNANT NEOPLASM OF CENTRAL PORTION OF RIGHT FEMALE BREAST, UNSPECIFIED ESTROGEN RECEPTOR STATUS: ICD-10-CM

## 2020-10-02 DIAGNOSIS — C79.51 METASTATIC CANCER TO SPINE: ICD-10-CM

## 2020-10-02 DIAGNOSIS — C79.51 BONE METASTASIS: ICD-10-CM

## 2020-10-02 RX ORDER — MORPHINE SULFATE 200 MG/1
200 TABLET, FILM COATED, EXTENDED RELEASE ORAL EVERY 8 HOURS
Qty: 90 TABLET | Refills: 0 | Status: SHIPPED | OUTPATIENT
Start: 2020-10-02 | End: 2020-11-09 | Stop reason: SDUPTHER

## 2020-10-08 ENCOUNTER — OFFICE VISIT (OUTPATIENT)
Dept: PODIATRY | Facility: CLINIC | Age: 65
End: 2020-10-08
Payer: MEDICARE

## 2020-10-08 VITALS — WEIGHT: 148.56 LBS | BODY MASS INDEX: 25.36 KG/M2 | HEIGHT: 64 IN

## 2020-10-08 DIAGNOSIS — B35.1 ONYCHOMYCOSIS DUE TO DERMATOPHYTE: Primary | ICD-10-CM

## 2020-10-08 DIAGNOSIS — L60.0 INGROWN NAIL: ICD-10-CM

## 2020-10-08 PROCEDURE — 3008F PR BODY MASS INDEX (BMI) DOCUMENTED: ICD-10-PCS | Mod: HCNC,CPTII,S$GLB, | Performed by: PODIATRIST

## 2020-10-08 PROCEDURE — 99203 PR OFFICE/OUTPT VISIT, NEW, LEVL III, 30-44 MIN: ICD-10-PCS | Mod: HCNC,S$GLB,, | Performed by: PODIATRIST

## 2020-10-08 PROCEDURE — 3008F BODY MASS INDEX DOCD: CPT | Mod: HCNC,CPTII,S$GLB, | Performed by: PODIATRIST

## 2020-10-08 PROCEDURE — 99203 OFFICE O/P NEW LOW 30 MIN: CPT | Mod: HCNC,S$GLB,, | Performed by: PODIATRIST

## 2020-10-08 PROCEDURE — 99999 PR PBB SHADOW E&M-EST. PATIENT-LVL IV: ICD-10-PCS | Mod: PBBFAC,HCNC,, | Performed by: PODIATRIST

## 2020-10-08 PROCEDURE — 99999 PR PBB SHADOW E&M-EST. PATIENT-LVL IV: CPT | Mod: PBBFAC,HCNC,, | Performed by: PODIATRIST

## 2020-10-08 NOTE — LETTER
October 12, 2020      Rodrigo Schroeder MD  [address]  [place] [address]           [facility]  [address] [facility]  [place] [address]  Phone: [phone]          Patient: Rebecca Crain   MR Number: [id number]   YOB: 1955   Date of Visit: 10/8/2020       Dear Dr. Rodrigo Schroeder:    Thank you for referring Rebecca Crain to me for evaluation. Attached you will find relevant portions of my assessment and plan of care.    If you have questions, please do not hesitate to call me. I look forward to following Rebecca Crain along with you.    Sincerely,    Maliha Corado, BRIAN    Enclosure  CC:  No Recipients    If you would like to receive this communication electronically, please contact [email or web address] or [phone] to request more information on [facility] access.    For providers and/or their staff who would like to refer a patient to [facility], please contact us through our one-stop-shop provider referral line, [facility], at [phone].    If you feel you have received this communication in error or would no longer like to receive these types of communications, please e-mail [email or web address]

## 2020-10-08 NOTE — PATIENT INSTRUCTIONS
Kerasal for fungal nails apply daily to all toenails.  Can be found at [facility]

## 2020-10-12 ENCOUNTER — HOSPITAL ENCOUNTER (OUTPATIENT)
Dept: RADIOLOGY | Facility: HOSPITAL | Age: 65
Discharge: HOME | End: 2020-10-12
Attending: INTERNAL MEDICINE
Payer: MEDICARE

## 2020-10-12 DIAGNOSIS — Z17.0 MALIGNANT NEOPLASM OF CENTRAL PORTION OF RIGHT BREAST IN FEMALE, ESTROGEN RECEPTOR POSITIVE: ICD-10-CM

## 2020-10-12 DIAGNOSIS — C50.111 MALIGNANT NEOPLASM OF CENTRAL PORTION OF RIGHT BREAST IN FEMALE, ESTROGEN RECEPTOR POSITIVE: ICD-10-CM

## 2020-10-12 PROCEDURE — 71250 CT THORAX DX C-: CPT | Mod: 26,HCNC,, | Performed by: RADIOLOGY

## 2020-10-12 PROCEDURE — 74176 CT ABD & PELVIS W/O CONTRAST: CPT | Mod: TC,HCNC

## 2020-10-12 PROCEDURE — 71250 CT CHEST ABDOMEN PELVIS WITHOUT CONTRAST(XPD): ICD-10-PCS | Mod: 26,HCNC,, | Performed by: RADIOLOGY

## 2020-10-12 PROCEDURE — 71250 CT THORAX DX C-: CPT | Mod: TC,HCNC

## 2020-10-12 PROCEDURE — 74176 CT ABD & PELVIS W/O CONTRAST: CPT | Mod: 26,HCNC,, | Performed by: RADIOLOGY

## 2020-10-12 PROCEDURE — 74176 CT CHEST ABDOMEN PELVIS WITHOUT CONTRAST(XPD): ICD-10-PCS | Mod: 26,HCNC,, | Performed by: RADIOLOGY

## 2020-10-12 NOTE — PROGRESS NOTES
Subjective:      Patient ID: Rebecca Crain is a 65 y.o. female.    Chief Complaint: Nail Problem (discloored and red)    Rebecca is a 65 y.o. female who presents to the clinic complaining of thick and discolored toenails on the left foot. Rebecca is inquiring about treatment options.      Review of Systems   Constitution: Negative for chills.   Cardiovascular: Negative for chest pain and claudication.   Respiratory: Negative for cough.    Skin: Positive for color change, dry skin and nail changes.   Musculoskeletal: Positive for joint pain.   Gastrointestinal: Negative for nausea.   Neurological: Positive for paresthesias. Negative for numbness.   Psychiatric/Behavioral: The patient is not nervous/anxious.            Objective:      Physical Exam  Constitutional:       Appearance: She is well-developed.      Comments: Oriented to time, place, and person.   Cardiovascular:      Comments: DP and PT pulses are palpable bilaterally. 3 sec capillary refill time and toes and feet are warm to touch proximally .  There is  hair growth on the feet and toes b/l. There is no edema b/l. No spider veins or varicosities present b/l.     Musculoskeletal:      Comments: Equinus noted b/l ankles with < 10 deg DF noted. MMT 5/5 in DF/PF/Inv/Ev resistance with no reproduction of pain in any direction. Passive range of motion of ankle and pedal joints is painless b/l.     Feet:      Right foot:      Skin integrity: No callus or dry skin.      Left foot:      Skin integrity: No callus or dry skin.   Lymphadenopathy:      Comments: Negative lymphadenopathy bilateral popliteal fossa and tarsal tunnel.   Skin:     Comments: No open lesions, lacerations or wounds noted.Interdigital spaces clean, dry and intact b/l. No erythema noted to b/l foot.    Lateral nail margin left third toe   with ingrown nail plate. No purulent drainage noted.         Neurological:      Mental Status: She is alert.      Comments: Light touch, proprioception, and  sharp/dull sensation are all intact bilaterally. Protective threshold with the Kendalia-Wienstein monofilament is intact bilaterally.    Psychiatric:         Behavior: Behavior is cooperative.               Assessment:       Encounter Diagnoses   Name Primary?    Onychomycosis due to dermatophyte Yes    Ingrown nail          Plan:       Rebecca was seen today for nail problem.    Diagnoses and all orders for this visit:    Onychomycosis due to dermatophyte    Ingrown nail      I counseled the patient on her conditions, their implications and medical management.      Instructed patient on the importance of keeping feet dry. Patient instructed to use absorbent cotton socks and change them if they become sweaty; or wear an open-toe shoe or sandal. Wash the feet at least once a day with soap and water. Patient instructed to use lysol or over-the-counter antifungal powders or sprays to shoes daily and allow them to air dry, switching shoes from every other day would be optimal. Patient is to avoid barefoot walking in high-risk environments (public showers, gyms and locker rooms) may prevent future infections.     Discussed treatment options with patient. Options included soaking, avulsion and matrixectomy. Risks and benefits discussed and all questions were answered. The patient wishes to proceed with  Nail avulsion at a later date      In depth conversation on the treatment of ingrown nail; partial nail avulsion vs chemical matrixectomy vs conservative treatment of soaking and nail trimming    Utilizing sterile toenail clippers I aggressively trimmed  the offending Left third toenail lateral  border approximately 3 mm from its edge and carried the nail plate incision down at an angle in order to wedge out the offending cryptotic portion of the nail plate. The offending border was then removed in toto. The remaining nail was grinded down with an electric  down to nail bed.  No blood was drawn. Patient tolerated the  procedure well and related significant relief.    RTC PRN

## 2020-10-12 NOTE — PROGRESS NOTES
Subjective:       Patient ID: Rebecca Crain is a 65 y.o. female.    Chief Complaint: No chief complaint on file.    HPI Ms. Crain returns for followup of metastatic carcinoma of the right breast.  She is currently on eribulin therapy.  She has had 11 cycles thus far.        The patient location is:  home.  The chief complaint leading to consultation is:  Breast cancer.    Visit type: audiovisual    Face to Face time with patient:  15 minutes  Twenty minutes of total time spent on the encounter, which includes face to face time and non-face to face time preparing to see the patient (eg, review of tests), Obtaining and/or reviewing separately obtained history, Documenting clinical information in the electronic or other health record, Independently interpreting results (not separately reported) and communicating results to the patient/family/caregiver, or Care coordination (not separately reported).         Each patient to whom he or she provides medical services by telemedicine is:  (1) informed of the relationship between the physician and patient and the respective role of any other health care provider with respect to management of the patient; and (2) notified that he or she may decline to receive medical services by telemedicine and may withdraw from such care at any time.    Notes:  Today she reports that her neuropathy has been doing a bit better over the last week.  She has had multiple falls including 1 her shower.  Overall, her pain control has been good.  She does have recurring episodes of cough productive of greenish mucus.  She did not take antibiotics after her last cycle.  The symptoms are especially bothersome at night.        ECOG-1-2    Breast history: In 2013 she was diagnosed with stage IIB carcinoma of the right breast, ER positive with a low Oncotype score.  She was enrolled on protocol  and randomized to endocrine therapy alone.  She was tried on all 3 aromatase inhibitors and had  itching and rash to all of them.  In August 2013 she was started on tamoxifen.   She was found to have  bone metastasis in May 2015, 2015.  A subsequent bone biopsy was performed on a lesion at the T8 vertebra.   The pathology from that was consistent with metastatic breast cancer, ER +80%, VT +80%, and HER-2 negative.     STRATA  -PIK3CA mutation, FGFR2 mutation, ESR1 mutation     She received letrozole plus palbociclib from July 2015 until May 2016.  She also received bone protective therapy with Xgeva.      Faslodex was started in June 2016.      Exemestane and Afinitor started in August 2017.  That was discontinued in February 2018 due to progression in the liver and bone.     Navelbine was started February 16, 2018.  She had 8 cycles for that.  Scans in November 2018 showed progression as follows     Capecitabine was started in December 2018.  She received 5 cycles of that therapy.    Follow-up scans in April 2019 showed progression with a new hepatic lesion and worsening bone disease.    She began clinical trial therapy with erdafitinib on May 8, 2019.  That was discontinued in September 2019 due to progression in the bone.    She then received a course of radiation therapy to her pelvis.  She began weekly Taxol in October 2019.That was discontinued in December 2019 due to progression.    She began eribulin therapy on 1/15/2020.     CT results 8/10/20 :Significant decrease in size of the sub pulmonic right loculated pleural fluid collection when compared to the recent study of 04/22/2020.  2. Patchy ground-glass opacities in the lungs peripherally remain without significant change.  3. Diffuse skeletal metastatic lesions throughout the dorsal spine and the lumbar spine without significant change.  There is however a enlarging lesion in the spinous process of T6 when compared to the previous study.  4. liver lesions grossly stable when compared with the study of 04/22/2020    Review of Systems   Constitutional:  Positive for fatigue. Negative for activity change, appetite change, fever and unexpected weight change.   HENT: Negative for mouth sores and trouble swallowing.    Eyes: Negative for visual disturbance.   Respiratory: Positive for cough (nocturnal). Negative for shortness of breath.    Cardiovascular: Negative for chest pain.   Gastrointestinal: Negative for abdominal pain and diarrhea.   Genitourinary: Negative for frequency.   Musculoskeletal: Positive for back pain (controlled).   Skin: Negative for rash.   Neurological: Positive for numbness (improved). Negative for headaches.   Hematological: Negative for adenopathy.   Psychiatric/Behavioral: Negative for dysphoric mood. The patient is not nervous/anxious.        Objective:      Physical Exam  Constitutional:       General: She is not in acute distress.     Appearance: Normal appearance.   Neurological:      Mental Status: She is alert and oriented to person, place, and time.   Psychiatric:         Mood and Affect: Mood normal.         Behavior: Behavior normal.         Thought Content: Thought content normal.         Judgment: Judgment normal.         Assessment:    CT - The 2 index lesions, 1 in the dome of the right lobe of the liver measures 1.7 cm (1.5 cm on the recent study of 08/10/2020 ).               The 2nd lesion in the segment 5 measures 1.7 cm similar to the prior exam.             A third lesion in the left lobe near the dome of the diaphragm is not well defined measures 2.8 cm previously 2.7 on 08/10/2020.     Extensive metastatic disease throughout the spine, pelvic bones, femurs and ribs essentially appears stable.  1. Malignant neoplasm of central portion of right breast in female, estrogen receptor positive    2. Bone metastasis    3. Metastasis to liver      4.  Chronic bronchitis  Plan:       Continue current therapy with cycle 13 of eribulin.  Plan to start next week    Inhaler  for chronic bronchitis.    Return in 3 weeks.

## 2020-10-13 ENCOUNTER — OFFICE VISIT (OUTPATIENT)
Dept: HEMATOLOGY/ONCOLOGY | Facility: CLINIC | Age: 65
End: 2020-10-13
Payer: MEDICARE

## 2020-10-13 DIAGNOSIS — Z17.0 MALIGNANT NEOPLASM OF CENTRAL PORTION OF RIGHT BREAST IN FEMALE, ESTROGEN RECEPTOR POSITIVE: Primary | ICD-10-CM

## 2020-10-13 DIAGNOSIS — C50.111 MALIGNANT NEOPLASM OF CENTRAL PORTION OF RIGHT BREAST IN FEMALE, ESTROGEN RECEPTOR POSITIVE: Primary | ICD-10-CM

## 2020-10-13 DIAGNOSIS — C78.7 METASTASIS TO LIVER: ICD-10-CM

## 2020-10-13 DIAGNOSIS — J41.1 MUCOPURULENT CHRONIC BRONCHITIS: ICD-10-CM

## 2020-10-13 DIAGNOSIS — C79.51 BONE METASTASIS: ICD-10-CM

## 2020-10-13 PROCEDURE — 99213 PR OFFICE/OUTPT VISIT, EST, LEVL III, 20-29 MIN: ICD-10-PCS | Mod: HCNC,95,, | Performed by: INTERNAL MEDICINE

## 2020-10-13 PROCEDURE — 99213 OFFICE O/P EST LOW 20 MIN: CPT | Mod: HCNC,95,, | Performed by: INTERNAL MEDICINE

## 2020-10-13 RX ORDER — ALBUTEROL SULFATE 90 UG/1
2 AEROSOL, METERED RESPIRATORY (INHALATION) EVERY 6 HOURS PRN
Qty: 18 G | Refills: 5 | Status: SHIPPED | OUTPATIENT
Start: 2020-10-13 | End: 2021-10-13

## 2020-10-13 NOTE — Clinical Note
Eribulin day 1 and 8, start her next cycle next week  CBC and CMP with needs treatment, CBC, CMP, CA 27.29 at next visit

## 2020-10-15 ENCOUNTER — TELEPHONE (OUTPATIENT)
Dept: PHARMACY | Facility: CLINIC | Age: 65
End: 2020-10-15

## 2020-10-19 ENCOUNTER — PATIENT MESSAGE (OUTPATIENT)
Dept: HEMATOLOGY/ONCOLOGY | Facility: CLINIC | Age: 65
End: 2020-10-19

## 2020-10-19 ENCOUNTER — SPECIALTY PHARMACY (OUTPATIENT)
Dept: PHARMACY | Facility: CLINIC | Age: 65
End: 2020-10-19

## 2020-10-19 DIAGNOSIS — Z51.11 ENCOUNTER FOR ANTINEOPLASTIC CHEMOTHERAPY: Primary | ICD-10-CM

## 2020-10-20 ENCOUNTER — INFUSION (OUTPATIENT)
Dept: INFUSION THERAPY | Facility: HOSPITAL | Age: 65
End: 2020-10-20
Attending: INTERNAL MEDICINE
Payer: MEDICARE

## 2020-10-20 VITALS
DIASTOLIC BLOOD PRESSURE: 53 MMHG | OXYGEN SATURATION: 97 % | RESPIRATION RATE: 17 BRPM | TEMPERATURE: 98 F | SYSTOLIC BLOOD PRESSURE: 108 MMHG | HEART RATE: 83 BPM

## 2020-10-20 DIAGNOSIS — C79.51 METASTATIC CANCER TO SPINE: ICD-10-CM

## 2020-10-20 DIAGNOSIS — C78.7 METASTASIS TO LIVER: ICD-10-CM

## 2020-10-20 DIAGNOSIS — C50.111 MALIGNANT NEOPLASM OF CENTRAL PORTION OF RIGHT BREAST IN FEMALE, ESTROGEN RECEPTOR POSITIVE: ICD-10-CM

## 2020-10-20 DIAGNOSIS — Z17.0 MALIGNANT NEOPLASM OF CENTRAL PORTION OF RIGHT BREAST IN FEMALE, ESTROGEN RECEPTOR POSITIVE: ICD-10-CM

## 2020-10-20 DIAGNOSIS — C50.011 MALIGNANT NEOPLASM OF NIPPLE OF RIGHT BREAST IN FEMALE, UNSPECIFIED ESTROGEN RECEPTOR STATUS: Primary | ICD-10-CM

## 2020-10-20 LAB
ALBUMIN SERPL BCP-MCNC: 3.3 G/DL (ref 3.5–5.2)
ALP SERPL-CCNC: 177 U/L (ref 55–135)
ALT SERPL W/O P-5'-P-CCNC: 20 U/L (ref 10–44)
ANION GAP SERPL CALC-SCNC: 7 MMOL/L (ref 8–16)
AST SERPL-CCNC: 35 U/L (ref 10–40)
BILIRUB SERPL-MCNC: 0.7 MG/DL (ref 0.1–1)
BUN SERPL-MCNC: 14 MG/DL (ref 8–23)
CALCIUM SERPL-MCNC: 8.7 MG/DL (ref 8.7–10.5)
CHLORIDE SERPL-SCNC: 106 MMOL/L (ref 95–110)
CO2 SERPL-SCNC: 28 MMOL/L (ref 23–29)
CREAT SERPL-MCNC: 0.7 MG/DL (ref 0.5–1.4)
ERYTHROCYTE [DISTWIDTH] IN BLOOD BY AUTOMATED COUNT: 18.6 % (ref 11.5–14.5)
EST. GFR  (AFRICAN AMERICAN): >60 ML/MIN/1.73 M^2
EST. GFR  (NON AFRICAN AMERICAN): >60 ML/MIN/1.73 M^2
GLUCOSE SERPL-MCNC: 133 MG/DL (ref 70–110)
HCT VFR BLD AUTO: 35.1 % (ref 37–48.5)
HGB BLD-MCNC: 10.8 G/DL (ref 12–16)
IMM GRANULOCYTES # BLD AUTO: 0.01 K/UL (ref 0–0.04)
MCH RBC QN AUTO: 28.2 PG (ref 27–31)
MCHC RBC AUTO-ENTMCNC: 30.8 G/DL (ref 32–36)
MCV RBC AUTO: 92 FL (ref 82–98)
NEUTROPHILS # BLD AUTO: 2.9 K/UL (ref 1.8–7.7)
PLATELET # BLD AUTO: 110 K/UL (ref 150–350)
PMV BLD AUTO: 9.9 FL (ref 9.2–12.9)
POTASSIUM SERPL-SCNC: 4.1 MMOL/L (ref 3.5–5.1)
PROT SERPL-MCNC: 6.6 G/DL (ref 6–8.4)
RBC # BLD AUTO: 3.83 M/UL (ref 4–5.4)
SODIUM SERPL-SCNC: 141 MMOL/L (ref 136–145)
WBC # BLD AUTO: 4.31 K/UL (ref 3.9–12.7)

## 2020-10-20 PROCEDURE — 25000003 PHARM REV CODE 250: Mod: HCNC | Performed by: INTERNAL MEDICINE

## 2020-10-20 PROCEDURE — 85027 COMPLETE CBC AUTOMATED: CPT | Mod: HCNC

## 2020-10-20 PROCEDURE — 63600175 PHARM REV CODE 636 W HCPCS: Mod: JG,HCNC | Performed by: INTERNAL MEDICINE

## 2020-10-20 PROCEDURE — 96409 CHEMO IV PUSH SNGL DRUG: CPT | Mod: HCNC

## 2020-10-20 PROCEDURE — 63600175 PHARM REV CODE 636 W HCPCS: Mod: HCNC | Performed by: INTERNAL MEDICINE

## 2020-10-20 PROCEDURE — 96367 TX/PROPH/DG ADDL SEQ IV INF: CPT | Mod: HCNC

## 2020-10-20 PROCEDURE — A4216 STERILE WATER/SALINE, 10 ML: HCPCS | Mod: HCNC | Performed by: INTERNAL MEDICINE

## 2020-10-20 PROCEDURE — 80053 COMPREHEN METABOLIC PANEL: CPT | Mod: HCNC

## 2020-10-20 RX ORDER — SODIUM CHLORIDE 0.9 % (FLUSH) 0.9 %
10 SYRINGE (ML) INJECTION
Status: DISCONTINUED | OUTPATIENT
Start: 2020-10-20 | End: 2020-10-20 | Stop reason: HOSPADM

## 2020-10-20 RX ORDER — HEPARIN 100 UNIT/ML
500 SYRINGE INTRAVENOUS
Status: CANCELLED | OUTPATIENT
Start: 2020-10-27

## 2020-10-20 RX ORDER — HEPARIN 100 UNIT/ML
500 SYRINGE INTRAVENOUS
Status: DISCONTINUED | OUTPATIENT
Start: 2020-10-20 | End: 2020-10-20 | Stop reason: HOSPADM

## 2020-10-20 RX ORDER — SODIUM CHLORIDE 0.9 % (FLUSH) 0.9 %
10 SYRINGE (ML) INJECTION
Status: CANCELLED | OUTPATIENT
Start: 2020-10-27

## 2020-10-20 RX ORDER — SODIUM CHLORIDE 0.9 % (FLUSH) 0.9 %
10 SYRINGE (ML) INJECTION
Status: CANCELLED | OUTPATIENT
Start: 2020-10-20

## 2020-10-20 RX ORDER — HEPARIN 100 UNIT/ML
500 SYRINGE INTRAVENOUS
Status: CANCELLED | OUTPATIENT
Start: 2020-10-20

## 2020-10-20 RX ADMIN — HEPARIN 500 UNITS: 100 SYRINGE at 03:10

## 2020-10-20 RX ADMIN — ERIBULIN MESYLATE 2 MG: 0.5 INJECTION INTRAVENOUS at 02:10

## 2020-10-20 RX ADMIN — SODIUM CHLORIDE 1 MG: 9 INJECTION, SOLUTION INTRAVENOUS at 01:10

## 2020-10-20 RX ADMIN — Medication 10 ML: at 03:10

## 2020-10-20 NOTE — PLAN OF CARE
Patient arrived to unit for C13D1 Halaven pending stat labs. VSS. Labs drawn from [facility] upon arrival. Results within treatment parameters. Pre-medicated with kytril IVPB. Patient received Halaven. Tolerated infusion well. Received discharge instructions and follow up appointments. Verbalized understanding and assisted off unit via wheelchair by MA. Patient in NAD at time of dc.

## 2020-10-21 DIAGNOSIS — C79.51 BONE METASTASIS: ICD-10-CM

## 2020-10-21 DIAGNOSIS — C50.111 MALIGNANT NEOPLASM OF CENTRAL PORTION OF RIGHT BREAST IN FEMALE, ESTROGEN RECEPTOR POSITIVE: ICD-10-CM

## 2020-10-21 DIAGNOSIS — D70.1 CHEMOTHERAPY INDUCED NEUTROPENIA: ICD-10-CM

## 2020-10-21 DIAGNOSIS — Z17.0 MALIGNANT NEOPLASM OF CENTRAL PORTION OF RIGHT BREAST IN FEMALE, ESTROGEN RECEPTOR POSITIVE: ICD-10-CM

## 2020-10-21 DIAGNOSIS — T45.1X5A CHEMOTHERAPY INDUCED NEUTROPENIA: ICD-10-CM

## 2020-10-21 NOTE — TELEPHONE ENCOUNTER
Specialty Pharmacy - Clinical Reassessment  Specialty Pharmacy - Refill Coordination    Specialty Medication Orders Linked to Encounter      Most Recent Value   Medication #1  pegfilgrastim (NEULASTA) 6 mg/0.6 mL injection (Order#[id number], Rx#[id number])        Subjective    Rebecca Crain is a 65 y.o. female, who is followed by the specialty pharmacy service for management and education.    Encounters since last clinical assessment   No encounters found.   Clinical call attempts since last clinical assessment   No call attempts found.     Today she received follow up education for her specialty medication(s).    Current Outpatient Medications   Medication Sig    AFLURIA QD 2019-20,3YR UP,,PF, 60 mcg (15 mcg x 4)/0.5 mL Syrg TO BE ADMINISTERED BY PHARMACIST FOR IMMUNIZATION    albuterol (PROVENTIL HFA) 90 mcg/actuation inhaler Inhale 2 puffs into the lungs every 6 (six) hours as needed for Wheezing. Rescue    ascorbic acid, vitamin C, (VITAMIN C) 1000 MG tablet Take 1,000 mg by mouth once daily.    aspirin (ECOTRIN) 81 MG EC tablet Take 81 mg by mouth once daily.    biotin 1 mg tablet Take 1,000 mcg by mouth once daily.    calcium carbonate (OS-UNA) 500 mg calcium (1,250 mg) tablet Take 1 tablet (500 mg total) by mouth once daily. for 5 days    cloNIDine (CATAPRES) 0.1 MG tablet     cyclobenzaprine (FLEXERIL) 5 MG tablet Take 5 mg by mouth.    diphenoxylate-atropine 2.5-0.025 mg (LOMOTIL) 2.5-0.025 mg per tablet Take 1 tablet by mouth 4 (four) times daily as needed for Diarrhea.    doxycycline (VIBRA-TABS) 100 MG tablet Take 1 tablet (100 mg total) by mouth 2 (two) times daily.    duke's soln (benadryl 30 mL, mylanta 30 mL, lidocaine 30 mL, nystatin 30 mL) 120mL TAKE 10 ML BY MOUTH 4 TIMES A DAY    fish oil/borage/flax/om3,6,9 1 (OMEGA 3-6-9 COMPLEX ORAL) Take 1 capsule by mouth once daily.    gabapentin (NEURONTIN) 300 MG capsule TAKE 1 CAPSULE BY MOUTH THREE TIMES A DAY    HYDROmorphone  (DILAUDID) 4 MG tablet Take 1 tablet (4 mg total) by mouth every 4 (four) hours as needed for Pain.    lidocaine-prilocaine (EMLA) cream APPLY TO AFFECTED AREA EVERY DAY AS NEEDED    loperamide (IMODIUM A-D) 2 mg Tab Take 2 mg by mouth 3 (three) times daily as needed.    morphine (MS CONTIN) 200 MG 12 hr tablet Take 1 tablet (200 mg total) by mouth every 8 (eight) hours.    morphine (MS CONTIN) 200 MG 12 hr tablet Take 1 tablet (200 mg total) by mouth every 8 (eight) hours.    ondansetron (ZOFRAN) 4 MG tablet Take 1 tablet (4 mg total) by mouth every 8 (eight) hours as needed for Nausea.    pantoprazole (PROTONIX) 40 MG tablet Take 1 tablet (40 mg total) by mouth once daily.    phenyleph-min oil-petrolatum 0.25-14-74.9 % Oint Place 1 applicator rectally 4 (four) times daily as needed.    polyethylene glycol (GLYCOLAX) 17 gram PwPk Take 17 g by mouth once daily.    PREVIDENT 5000 DRY MOUTH 1.1 % Gel BRUSH AS DIRECTED    promethazine (PHENERGAN) 12.5 MG Tab Take 1 tablet (12.5 mg total) by mouth 4 (four) times daily.    senna-docusate 8.6-50 mg (PERICOLACE) 8.6-50 mg per tablet Take 1 tablet by mouth once daily.    sertraline (ZOLOFT) 50 MG tablet TAKE 1 TABLET BY MOUTH EVERY DAY    SYNTHROID 175 mcg tablet Take 1 tablet (175 mcg total) by mouth before breakfast.   Last reviewed on 10/21/2020 10:45 AM by Sarahy Coleman, PharmD    Review of patient's allergies indicates:   Allergen Reactions    Adhesive Rash     Tape, Paper tape is OK.    Codeine Itching     Itching very bad to upper body only.    Demerol [meperidine] Itching     To upper body only    Iodine and iodide containing products Anaphylaxis    Penicillins      Ulcers in mouth.    Arimidex [anastrozole] Hives    Aromasin [exemestane]     Clindamycin Itching and Rash   Last reviewed on  10/21/2020 10:45 AM by Sarahy Coleman      Medication Adherence    Patient reported X missed doses in the last month: 0  Any gaps in refill history greater than 2  "weeks in the last 3 months: no  Demonstrates understanding of importance of adherence: yes  Informant: patient  Reliability of informant: reliable  Provider-estimated medication adherence level: good  Reasons for non-adherence: no problems identified  Adherence tools used: calendar  Support network for adherence: family member  Confirmed plan for next specialty medication refill: delivery by pharmacy  Refills needed for supportive medications: yes, ordered or provider notified       Objective    She has a past medical history of Adjustment disorder with depressed mood (2/7/2017), Allergy, Anxiety, Asthma, Blood transfusion, Breast cancer (2013), Chemotherapy-induced neuropathy (7/26/2016), Depression, Diverticulosis, Falls frequently (6/18/2014), Hepatic cyst (1/30/2014), psychiatric care, Hypercholesteremia, Hypertension, Lung nodule 1/14 (1/30/2014), Lymphedema, Metastatic bone cancer (2015), MVP (mitral valve prolapse), Osteoporosis, unspecified (1/30/2014), Psychiatric problem, Therapy, and Thyroid disease.    Tried/failed medications: None for home administration, but has had Neupogen while in admitted in past.    BP Readings from Last 4 Encounters:   10/20/20 (!) 108/53   09/29/20 (!) 106/50   09/22/20 (!) 107/52   09/09/20 (!) 106/55     Ht Readings from Last 4 Encounters:   10/08/20 5' 4" (1.626 m)   09/29/20 5' 4" (1.626 m)   09/02/20 5' 4" (1.626 m)   08/12/20 5' 4" (1.626 m)     Wt Readings from Last 4 Encounters:   10/08/20 67.4 kg (148 lb 9.4 oz)   09/29/20 67.4 kg (148 lb 9.6 oz)   09/02/20 66.3 kg (146 lb 3.2 oz)   08/12/20 65.6 kg (144 lb 9.6 oz)     Recent Labs   Lab Result Units 10/20/20  1135 10/12/20  1221 09/29/20  1129 09/22/20  1144   RBC M/uL 3.83 L 3.86 L 3.84 L 3.87 L   Hemoglobin g/dL 10.8 L 10.9 L 10.9 L 10.9 L   Hematocrit % 35.1 L 35.0 L 35.1 L 35.2 L   WBC K/uL 4.31 7.11 3.96 7.88   Gran # (ANC) K/uL 2.9 5.2 2.1 5.9   Gran% %  --  73.7 H  --   --    Platelets K/uL 110 L 98 L 105 L 114 " L   Sodium mmol/L 141 137 139 139   Potassium mmol/L 4.1 4.6 3.8 4.4   Chloride mmol/L 106 104 107 104   Glucose mg/dL 133 H 101 90 103   BUN, Bld mg/dL 14 12 14 16   Creatinine mg/dL 0.7 0.7 0.7 0.7   Calcium mg/dL 8.7 8.7 8.1 L 8.4 L   Total Protein g/dL 6.6 6.3 6.3 6.5   Albumin g/dL 3.3 L 3.2 L 3.3 L 3.3 L   Total Bilirubin mg/dL 0.7 0.7 0.9 0.7   Alkaline Phosphatase U/L 177 H 159 H 156 H 173 H   AST U/L 35 21 24 21   ALT U/L 20 13 13 10     The goals of cancer treatment include:  · Achieving remission of cancer, if possible  · Reducing tumor size and spread of cancer, if remission is not possible  · Minimizing pain and symptoms of the cancer  · Preventing infection and other complications of treatment  · Promoting adequate nutrition  · Encouraging proper hydration  · Improving or maintaining quality of life  · Maintaining optimal therapy adherence  · Minimizing and managing side effects       Assessment/Plan  Patient plans to continue therapy without changes    Indication, dosage, appropriateness, effectiveness, safety and convenience of her specialty medication(s) were reviewed today.     Ms. Crain continues Neulasta therapy during each cycle of chemotherapy for her metastatic breast cancer. She currently receives Eribulin IV on days 1 and 8 of each 21 day cycle. Neulasta is injected on day 9. During today's call, the following was reviewed with Ms. Crain in detail.    Dosing, how taking: Injects Neulasta 6mg (0.6 mL) into the skin once on day 9 of each chemotherapy cycle - next dose needed for 10/27. Aware to inject at least 24 hours after completion of last Eribulin infusion. She is aware to rotate injection sites.  Storage: Keeps refrigerated, but removes from refrigerator about 1 hour prior to each dose.   Side effects: Denies any side effects such as injection site reaction, fever, bone pain, headache, etc. She tolerates well. Declined to review full list of side effects or more serious side  "effects/warnings today.   Recent infections: No recent fever, infection, chills, flu-like symptoms, etc. No unusual bruising or bleeding. No urgent care/ER visits.  Pain: Neuropathy pain 3/10 - takes gabapentin for relief. Also uses morphine IR for chronic back pain. She denies back pain today. Feels her pain is relatively well controlled. On occasion, her neuropathy, that is induced by Eribulin, will get severe and she will need to delay her cycles, allowing time for her neuropathy to subside before proceeding.   Appetite: Eats twice daily. Sometimes not as hungry at night, but makes sure she maintains proper nutrition. Weight is stable. Drinks water "all day long".   Energy, fatigue: Tired all the time but forces herself to remain active since she knows how important activity is to improving fatigue. Today she has been doing laundry, dishes, etc.  She looks forward to seeing her grandson play football and seeing her mother this weekend at the nursing home. Appropriate COVID procedures to be followed.  Health, mood, QOL: 4-5/10 (0 being best)- mostly due to fatigue, neuropathy. She tries not to let the neuropathy stop her, but "when I'm that tired, it doesn't matter what I do, I will go to sleep". Overall, her cancer is stable, she had scans a few weeks ago. Imaging showed lesions on liver grew very slightly, but all other spots stable. Which is the best its been in years. Patient states Dr. Schroeder was very pleased with the results.  Next clinical follow up: 90 days.  Medication list reviewed. No new allergies or health conditions.     Labs reviewed from: 10/20/2020 - WBC, ANC normal. Platelets slightly low but patient denies any difficulty or increased bleeding events. Renal function normal.  Liver function stable. No dose adjustment required. Neulasta therapy remains appropriate. Overall her chemotherapy treatment seems effective based on relative stability of recent scans. Therapy effective.             Tasks " added this encounter   No tasks added.   Tasks due within next 3 months   10/21/2020 - Clinical - Follow Up Assesement (90 day)  10/21/2020 - Refill Call     Sraahy Coleman PharmD  [facility]  [address] [place] [id number]  Phone: [phone]  Fax: [phone]

## 2020-10-23 ENCOUNTER — SPECIALTY PHARMACY (OUTPATIENT)
Dept: PHARMACY | Facility: CLINIC | Age: 65
End: 2020-10-23

## 2020-10-23 DIAGNOSIS — Z17.0 MALIGNANT NEOPLASM OF CENTRAL PORTION OF RIGHT BREAST IN FEMALE, ESTROGEN RECEPTOR POSITIVE: ICD-10-CM

## 2020-10-23 DIAGNOSIS — T45.1X5A CHEMOTHERAPY INDUCED NEUTROPENIA: ICD-10-CM

## 2020-10-23 DIAGNOSIS — C79.51 BONE METASTASIS: ICD-10-CM

## 2020-10-23 DIAGNOSIS — C50.111 MALIGNANT NEOPLASM OF CENTRAL PORTION OF RIGHT BREAST IN FEMALE, ESTROGEN RECEPTOR POSITIVE: ICD-10-CM

## 2020-10-23 DIAGNOSIS — D70.1 CHEMOTHERAPY INDUCED NEUTROPENIA: ICD-10-CM

## 2020-10-23 NOTE — TELEPHONE ENCOUNTER
Specialty Pharmacy - Clinical Reassessment    Specialty Medication Orders Linked to Encounter      Most Recent Value   Medication #1  pegfilgrastim (NEULASTA) 6 mg/0.6 mL injection (Order#[id number], Rx#[id number])        Zabrina Crain is a 65 y.o. female, who is followed by the specialty pharmacy service for management and education.    Encounters since last clinical assessment   No encounters found.   Clinical call attempts since last clinical assessment   No call attempts found.     Today she received follow up education for her specialty medication(s).    Current Outpatient Medications   Medication Sig    AFLURIA QD 2019-20,3YR UP,,PF, 60 mcg (15 mcg x 4)/0.5 mL Syrg TO BE ADMINISTERED BY PHARMACIST FOR IMMUNIZATION    albuterol (PROVENTIL HFA) 90 mcg/actuation inhaler Inhale 2 puffs into the lungs every 6 (six) hours as needed for Wheezing. Rescue    ascorbic acid, vitamin C, (VITAMIN C) 1000 MG tablet Take 1,000 mg by mouth once daily.    aspirin (ECOTRIN) 81 MG EC tablet Take 81 mg by mouth once daily.    biotin 1 mg tablet Take 1,000 mcg by mouth once daily.    calcium carbonate (OS-UNA) 500 mg calcium (1,250 mg) tablet Take 1 tablet (500 mg total) by mouth once daily. for 5 days    diphenoxylate-atropine 2.5-0.025 mg (LOMOTIL) 2.5-0.025 mg per tablet Take 1 tablet by mouth 4 (four) times daily as needed for Diarrhea.    duke's soln (benadryl 30 mL, mylanta 30 mL, lidocaine 30 mL, nystatin 30 mL) 120mL TAKE 10 ML BY MOUTH 4 TIMES A DAY    fish oil/borage/flax/om3,6,9 1 (OMEGA 3-6-9 COMPLEX ORAL) Take 1 capsule by mouth once daily.    gabapentin (NEURONTIN) 300 MG capsule TAKE 1 CAPSULE BY MOUTH THREE TIMES A DAY    HYDROmorphone (DILAUDID) 4 MG tablet Take 1 tablet (4 mg total) by mouth every 4 (four) hours as needed for Pain.    lidocaine-prilocaine (EMLA) cream APPLY TO AFFECTED AREA EVERY DAY AS NEEDED    loperamide (IMODIUM A-D) 2 mg Tab Take 2 mg by mouth 3 (three) times  daily as needed.    morphine (MS CONTIN) 200 MG 12 hr tablet Take 1 tablet (200 mg total) by mouth every 8 (eight) hours.    morphine (MS CONTIN) 200 MG 12 hr tablet Take 1 tablet (200 mg total) by mouth every 8 (eight) hours.    ondansetron (ZOFRAN) 4 MG tablet Take 1 tablet (4 mg total) by mouth every 8 (eight) hours as needed for Nausea.    pantoprazole (PROTONIX) 40 MG tablet Take 1 tablet (40 mg total) by mouth once daily.    phenyleph-min oil-petrolatum 0.25-14-74.9 % Oint Place 1 applicator rectally 4 (four) times daily as needed.    polyethylene glycol (GLYCOLAX) 17 gram PwPk Take 17 g by mouth once daily.    PREVIDENT 5000 DRY MOUTH 1.1 % Gel BRUSH AS DIRECTED    promethazine (PHENERGAN) 12.5 MG Tab Take 1 tablet (12.5 mg total) by mouth 4 (four) times daily.    senna-docusate 8.6-50 mg (PERICOLACE) 8.6-50 mg per tablet Take 1 tablet by mouth once daily.    sertraline (ZOLOFT) 50 MG tablet TAKE 1 TABLET BY MOUTH EVERY DAY    SYNTHROID 175 mcg tablet Take 1 tablet (175 mcg total) by mouth before breakfast.   Last reviewed on 10/23/2020  1:02 PM by Kelly Brand, PharmD    Review of patient's allergies indicates:   Allergen Reactions    Adhesive Rash     Tape, Paper tape is OK.    Codeine Itching     Itching very bad to upper body only.    Demerol [meperidine] Itching     To upper body only    Iodine and iodide containing products Anaphylaxis    Penicillins      Ulcers in mouth.    Arimidex [anastrozole] Hives    Aromasin [exemestane]     Clindamycin Itching and Rash   Last reviewed on  10/23/2020 1:02 PM by Kelly Brand    Drug Interactions    Drug interactions evaluated: yes  Clinically relevant drug interactions identified: no  Provided the patient with educational material regarding drug interactions: not applicable       Medication Adherence    What concerns does the patient have in regards to their medications: Denies  Patient reported X missed doses in the last month: 0  Any  gaps in refill history greater than 2 weeks in the last 3 months: no  Demonstrates understanding of importance of adherence: yes  Informant: patient  Reliability of informant: reliable  Provider-estimated medication adherence level: %  Reasons for non-adherence: no problems identified  Adherence tools used: calendar  Support network for adherence: family member  Confirmed plan for next specialty medication refill: delivery by pharmacy  Refills needed for supportive medications: not needed       Adverse Effects    Unable to perform a full ROS: Yes       Assessment Questions - Documented Responses      Most Recent Value   Assessment   Medication Reconciliation completed for patient  Yes   During the past 4 weeks, has patient missed any activities due to condition or medication?  No   During the past 4 weeks, did patient have any of the following urgent care visits?  None   Goals of Therapy Status  Achieving   Welcome packet contents reviewed and discussed with patient?  Yes   Assesment completed?  Yes   Plan  Therapy continued   Do you need to open a clinical intervention (i-vent)?  No   Do you want to schedule first shipment?  No [PharmD Sarahy arranged refill for 10/21]   Medication #1 Assessment Info   Patient status  Existing medication   Is this medication appropriate for the patient?  Yes   Is this medication effective?  Yes          Objective    She has a past medical history of Adjustment disorder with depressed mood (2/7/2017), Allergy, Anxiety, Asthma, Blood transfusion, Breast cancer (2013), Chemotherapy-induced neuropathy (7/26/2016), Depression, Diverticulosis, Falls frequently (6/18/2014), Hepatic cyst (1/30/2014), psychiatric care, Hypercholesteremia, Hypertension, Lung nodule 1/14 (1/30/2014), Lymphedema, Metastatic bone cancer (2015), MVP (mitral valve prolapse), Osteoporosis, unspecified (1/30/2014), Psychiatric problem, Therapy, and Thyroid disease.    Tried/failed medications: N/A    BP  "Readings from Last 4 Encounters:   10/20/20 (!) 108/53   09/29/20 (!) 106/50   09/22/20 (!) 107/52   09/09/20 (!) 106/55     Ht Readings from Last 4 Encounters:   10/08/20 5' 4" (1.626 m)   09/29/20 5' 4" (1.626 m)   09/02/20 5' 4" (1.626 m)   08/12/20 5' 4" (1.626 m)     Wt Readings from Last 4 Encounters:   10/08/20 67.4 kg (148 lb 9.4 oz)   09/29/20 67.4 kg (148 lb 9.6 oz)   09/02/20 66.3 kg (146 lb 3.2 oz)   08/12/20 65.6 kg (144 lb 9.6 oz)     Recent Labs   Lab Result Units 10/20/20  1135 10/12/20  1221 09/29/20  1129 09/22/20  1144   Creatinine mg/dL 0.7 0.7 0.7 0.7   ALT U/L 20 13 13 10   AST U/L 35 21 24 21   Hemoglobin g/dL 10.8 L 10.9 L 10.9 L 10.9 L     The goals of prescribed drug therapy management include:  · Supporting patient to meet the prescriber's medical treatment objectives  · Improving or maintaining quality of life  · Maintaining optimal therapy adherence  · Minimizing and managing side effects      Goals of Therapy Status: Achieving    Assessment/Plan  Patient plans to continue therapy without changes    Interventions added this encounter   Open: [facility] Provider Intervention: pegfilgrastim (NEULASTA) 6 mg/0.6 mL injection     Indication, dosage, appropriateness, effectiveness, safety and convenience of her specialty medication(s) were reviewed today.     Patient Counseling    Counseled the patient on the following: doses and administration discussed, safe handling, storage, and disposal discussed, possible adverse effects and management discussed, possible drug and prescription drug interactions discussed, possible drug and OTC drug and food interactions discussed, lab monitoring and follow-up discussed, use of contraception discussed, therapeutic rationale discussed, cost of medications and cost implications discussed, adherence and missed doses discussed, pharmacy contact information discussed       Spoke to Mrs. Rebecca Crain. She reports taking pegfilgrastim by injecting 0.6 mLs (6 mg " total) into the skin once each cycle. She requires pegfilgrastim on day 9 of 21 day cycle of eribulin. She reports 0 missed doses. She reports that she stores her pegfilgrastim in the refrigerator away from light.   She denies any side effects. She declined to review the following potential adverse effects: bone pain and pain in extremity. Declined to review the following potential warnings and precautions: fatal splenic rupture, acute respiratory distress syndrome (ARDS), and serious allergic reactions, including anaphylaxis. Use of contraception is not applicable given she is a 64 y/o female.    He denies any infections, changes in allergies, hypersensitivities, and intolerances, comorbidities, and medications.   Renal and hepatic dosage adjustments are not required for pegfilgrastim. ANC remains above 1.5. Drug-drug interactions with pegfilgrastim were not identified.   She denies any missed activities within the past 4 weeks. She denies any emergency room or urgent care visits within the past 4 weeks.  Confirmed 2 patient identifiers. No changes in allergies, conditions, and medications.   Prescription refill request sent on 10/23/20.     Tasks added this encounter   4/14/2021 - Clinical - Follow Up Assesement (180 day)   Tasks due within next 3 months   No tasks due.     Kelly Brand, Ash  [facility]  [address] [place] [id number]  Phone: [phone]  Fax: [phone]

## 2020-10-27 ENCOUNTER — INFUSION (OUTPATIENT)
Dept: INFUSION THERAPY | Facility: HOSPITAL | Age: 65
End: 2020-10-27
Attending: INTERNAL MEDICINE
Payer: MEDICARE

## 2020-10-27 ENCOUNTER — PATIENT MESSAGE (OUTPATIENT)
Dept: HEMATOLOGY/ONCOLOGY | Facility: CLINIC | Age: 65
End: 2020-10-27

## 2020-10-27 VITALS
HEART RATE: 95 BPM | DIASTOLIC BLOOD PRESSURE: 56 MMHG | OXYGEN SATURATION: 97 % | RESPIRATION RATE: 17 BRPM | SYSTOLIC BLOOD PRESSURE: 117 MMHG | TEMPERATURE: 98 F

## 2020-10-27 DIAGNOSIS — C79.51 METASTATIC CANCER TO SPINE: ICD-10-CM

## 2020-10-27 DIAGNOSIS — Z17.0 MALIGNANT NEOPLASM OF CENTRAL PORTION OF RIGHT BREAST IN FEMALE, ESTROGEN RECEPTOR POSITIVE: Primary | ICD-10-CM

## 2020-10-27 DIAGNOSIS — C78.7 METASTASIS TO LIVER: ICD-10-CM

## 2020-10-27 DIAGNOSIS — C50.111 MALIGNANT NEOPLASM OF CENTRAL PORTION OF RIGHT BREAST IN FEMALE, ESTROGEN RECEPTOR POSITIVE: Primary | ICD-10-CM

## 2020-10-27 LAB
ALBUMIN SERPL BCP-MCNC: 3.2 G/DL (ref 3.5–5.2)
ALP SERPL-CCNC: 222 U/L (ref 55–135)
ALT SERPL W/O P-5'-P-CCNC: 18 U/L (ref 10–44)
ANION GAP SERPL CALC-SCNC: 5 MMOL/L (ref 8–16)
AST SERPL-CCNC: 29 U/L (ref 10–40)
BASOPHILS # BLD AUTO: 0.05 K/UL (ref 0–0.2)
BASOPHILS NFR BLD: 1.6 % (ref 0–1.9)
BILIRUB SERPL-MCNC: 0.9 MG/DL (ref 0.1–1)
BUN SERPL-MCNC: 11 MG/DL (ref 8–23)
CALCIUM SERPL-MCNC: 8.2 MG/DL (ref 8.7–10.5)
CHLORIDE SERPL-SCNC: 106 MMOL/L (ref 95–110)
CO2 SERPL-SCNC: 28 MMOL/L (ref 23–29)
CREAT SERPL-MCNC: 0.7 MG/DL (ref 0.5–1.4)
DIFFERENTIAL METHOD: ABNORMAL
EOSINOPHIL # BLD AUTO: 0 K/UL (ref 0–0.5)
EOSINOPHIL NFR BLD: 0.6 % (ref 0–8)
ERYTHROCYTE [DISTWIDTH] IN BLOOD BY AUTOMATED COUNT: 18.5 % (ref 11.5–14.5)
EST. GFR  (AFRICAN AMERICAN): >60 ML/MIN/1.73 M^2
EST. GFR  (NON AFRICAN AMERICAN): >60 ML/MIN/1.73 M^2
GLUCOSE SERPL-MCNC: 87 MG/DL (ref 70–110)
HCT VFR BLD AUTO: 34.1 % (ref 37–48.5)
HGB BLD-MCNC: 10.7 G/DL (ref 12–16)
IMM GRANULOCYTES # BLD AUTO: 0.06 K/UL (ref 0–0.04)
IMM GRANULOCYTES NFR BLD AUTO: 1.9 % (ref 0–0.5)
LYMPHOCYTES # BLD AUTO: 1.3 K/UL (ref 1–4.8)
LYMPHOCYTES NFR BLD: 41.1 % (ref 18–48)
MCH RBC QN AUTO: 28.2 PG (ref 27–31)
MCHC RBC AUTO-ENTMCNC: 31.4 G/DL (ref 32–36)
MCV RBC AUTO: 90 FL (ref 82–98)
MONOCYTES # BLD AUTO: 0.1 K/UL (ref 0.3–1)
MONOCYTES NFR BLD: 3.6 % (ref 4–15)
NEUTROPHILS # BLD AUTO: 1.6 K/UL (ref 1.8–7.7)
NEUTROPHILS NFR BLD: 51.2 % (ref 38–73)
NRBC BLD-RTO: 0 /100 WBC
PLATELET # BLD AUTO: 96 K/UL (ref 150–350)
PMV BLD AUTO: 10.1 FL (ref 9.2–12.9)
POTASSIUM SERPL-SCNC: 4.2 MMOL/L (ref 3.5–5.1)
PROT SERPL-MCNC: 6.6 G/DL (ref 6–8.4)
RBC # BLD AUTO: 3.79 M/UL (ref 4–5.4)
SODIUM SERPL-SCNC: 139 MMOL/L (ref 136–145)
WBC # BLD AUTO: 3.09 K/UL (ref 3.9–12.7)

## 2020-10-27 PROCEDURE — 85025 COMPLETE CBC W/AUTO DIFF WBC: CPT | Mod: HCNC

## 2020-10-27 PROCEDURE — 80053 COMPREHEN METABOLIC PANEL: CPT | Mod: HCNC

## 2020-10-27 PROCEDURE — 96409 CHEMO IV PUSH SNGL DRUG: CPT | Mod: HCNC

## 2020-10-27 PROCEDURE — 96372 THER/PROPH/DIAG INJ SC/IM: CPT | Mod: HCNC,59

## 2020-10-27 PROCEDURE — 63600175 PHARM REV CODE 636 W HCPCS: Mod: JG,HCNC | Performed by: INTERNAL MEDICINE

## 2020-10-27 PROCEDURE — 96367 TX/PROPH/DG ADDL SEQ IV INF: CPT | Mod: HCNC

## 2020-10-27 PROCEDURE — A4216 STERILE WATER/SALINE, 10 ML: HCPCS | Mod: HCNC | Performed by: INTERNAL MEDICINE

## 2020-10-27 PROCEDURE — 25000003 PHARM REV CODE 250: Mod: HCNC | Performed by: INTERNAL MEDICINE

## 2020-10-27 RX ORDER — HEPARIN 100 UNIT/ML
500 SYRINGE INTRAVENOUS
Status: DISCONTINUED | OUTPATIENT
Start: 2020-10-27 | End: 2020-10-27 | Stop reason: HOSPADM

## 2020-10-27 RX ORDER — SODIUM CHLORIDE 0.9 % (FLUSH) 0.9 %
10 SYRINGE (ML) INJECTION
Status: DISCONTINUED | OUTPATIENT
Start: 2020-10-27 | End: 2020-10-27 | Stop reason: HOSPADM

## 2020-10-27 RX ADMIN — Medication 10 ML: at 02:10

## 2020-10-27 RX ADMIN — HEPARIN 500 UNITS: 100 SYRINGE at 02:10

## 2020-10-27 RX ADMIN — ERIBULIN MESYLATE 2 MG: 0.5 INJECTION INTRAVENOUS at 01:10

## 2020-10-27 RX ADMIN — SODIUM CHLORIDE 1 MG: 9 INJECTION, SOLUTION INTRAVENOUS at 01:10

## 2020-10-27 RX ADMIN — DENOSUMAB 120 MG: 120 INJECTION SUBCUTANEOUS at 02:10

## 2020-10-27 NOTE — PLAN OF CARE
Pt arrived to unit. VSS. Pt afebrile. Blood collected from port for cbc, cmp. Pt reports having a fall at home. She tripped on the floor, feels it was due to neuropathy. Teressa Rodriguez NP reviewed labs, will proceed with chemo. Tolerated [place], no reactions noted. Xgeva given per order. Pt has next appts. Pt in wheelchair discharged from unit with MA. No distress noted.

## 2020-10-30 DIAGNOSIS — D70.1 CHEMOTHERAPY INDUCED NEUTROPENIA: Primary | ICD-10-CM

## 2020-10-30 DIAGNOSIS — T45.1X5A CHEMOTHERAPY INDUCED NEUTROPENIA: Primary | ICD-10-CM

## 2020-10-30 DIAGNOSIS — C50.111 MALIGNANT NEOPLASM OF CENTRAL PORTION OF RIGHT BREAST IN FEMALE, ESTROGEN RECEPTOR POSITIVE: Primary | ICD-10-CM

## 2020-10-30 DIAGNOSIS — D70.1 CHEMOTHERAPY INDUCED NEUTROPENIA: ICD-10-CM

## 2020-10-30 DIAGNOSIS — T45.1X5A CHEMOTHERAPY INDUCED NEUTROPENIA: ICD-10-CM

## 2020-10-30 DIAGNOSIS — Z17.0 MALIGNANT NEOPLASM OF CENTRAL PORTION OF RIGHT BREAST IN FEMALE, ESTROGEN RECEPTOR POSITIVE: Primary | ICD-10-CM

## 2020-11-04 ENCOUNTER — OFFICE VISIT (OUTPATIENT)
Dept: PODIATRY | Facility: CLINIC | Age: 65
End: 2020-11-04
Payer: MEDICARE

## 2020-11-04 ENCOUNTER — TELEPHONE (OUTPATIENT)
Dept: PHARMACY | Facility: CLINIC | Age: 65
End: 2020-11-04

## 2020-11-04 VITALS — HEIGHT: 64 IN | BODY MASS INDEX: 25.36 KG/M2 | WEIGHT: 148.56 LBS

## 2020-11-04 DIAGNOSIS — L60.0 INGROWN NAIL: ICD-10-CM

## 2020-11-04 DIAGNOSIS — C50.111 MALIGNANT NEOPLASM OF CENTRAL PORTION OF RIGHT BREAST IN FEMALE, ESTROGEN RECEPTOR POSITIVE: ICD-10-CM

## 2020-11-04 DIAGNOSIS — B07.0 PLANTAR WART: ICD-10-CM

## 2020-11-04 DIAGNOSIS — T45.1X5A CHEMOTHERAPY INDUCED NEUTROPENIA: ICD-10-CM

## 2020-11-04 DIAGNOSIS — C79.51 BONE METASTASIS: ICD-10-CM

## 2020-11-04 DIAGNOSIS — B35.1 ONYCHOMYCOSIS DUE TO DERMATOPHYTE: Primary | ICD-10-CM

## 2020-11-04 DIAGNOSIS — D70.1 CHEMOTHERAPY INDUCED NEUTROPENIA: ICD-10-CM

## 2020-11-04 DIAGNOSIS — Z17.0 MALIGNANT NEOPLASM OF CENTRAL PORTION OF RIGHT BREAST IN FEMALE, ESTROGEN RECEPTOR POSITIVE: ICD-10-CM

## 2020-11-04 PROCEDURE — 99213 PR OFFICE/OUTPT VISIT, EST, LEVL III, 20-29 MIN: ICD-10-PCS | Mod: HCNC,S$GLB,, | Performed by: PODIATRIST

## 2020-11-04 PROCEDURE — 3008F PR BODY MASS INDEX (BMI) DOCUMENTED: ICD-10-PCS | Mod: HCNC,CPTII,S$GLB, | Performed by: PODIATRIST

## 2020-11-04 PROCEDURE — 99999 PR PBB SHADOW E&M-EST. PATIENT-LVL IV: ICD-10-PCS | Mod: PBBFAC,HCNC,, | Performed by: PODIATRIST

## 2020-11-04 PROCEDURE — 99999 PR PBB SHADOW E&M-EST. PATIENT-LVL IV: CPT | Mod: PBBFAC,HCNC,, | Performed by: PODIATRIST

## 2020-11-04 PROCEDURE — 3008F BODY MASS INDEX DOCD: CPT | Mod: HCNC,CPTII,S$GLB, | Performed by: PODIATRIST

## 2020-11-04 PROCEDURE — 99213 OFFICE O/P EST LOW 20 MIN: CPT | Mod: HCNC,S$GLB,, | Performed by: PODIATRIST

## 2020-11-04 RX ORDER — DOXYCYCLINE 100 MG/1
100 CAPSULE ORAL EVERY 12 HOURS
Qty: 14 CAPSULE | Refills: 0 | Status: SHIPPED | OUTPATIENT
Start: 2020-11-04 | End: 2020-12-23 | Stop reason: SDUPTHER

## 2020-11-04 NOTE — PATIENT INSTRUCTIONS
Apply Compound W to lesion on bottom of left toe daily. Allow to dry then cover with duct tape. Remove dead skin gently every 6-7 days.

## 2020-11-09 ENCOUNTER — PATIENT MESSAGE (OUTPATIENT)
Dept: HEMATOLOGY/ONCOLOGY | Facility: CLINIC | Age: 65
End: 2020-11-09

## 2020-11-09 DIAGNOSIS — C50.111 MALIGNANT NEOPLASM OF CENTRAL PORTION OF RIGHT FEMALE BREAST, UNSPECIFIED ESTROGEN RECEPTOR STATUS: ICD-10-CM

## 2020-11-09 DIAGNOSIS — C79.51 BONE METASTASIS: ICD-10-CM

## 2020-11-09 DIAGNOSIS — C79.51 METASTATIC CANCER TO SPINE: ICD-10-CM

## 2020-11-09 RX ORDER — MORPHINE SULFATE 200 MG/1
200 TABLET, FILM COATED, EXTENDED RELEASE ORAL EVERY 8 HOURS
Qty: 90 TABLET | Refills: 0 | Status: SHIPPED | OUTPATIENT
Start: 2020-11-09 | End: 2021-02-24

## 2020-11-09 NOTE — PROGRESS NOTES
Subjective:       Patient ID: Rebecca Crain is a 65 y.o. female.    Chief Complaint: No chief complaint on file.    HPI Ms. Crain returns for followup of metastatic carcinoma of the right breast.  She is currently on eribulin therapy.  She has had 13 cycles thus far.    She had [id number] yesterday.    The patient location is: Home.  The chief complaint leading to consultation is: breast cancer.    Visit type: audiovisual    Face to Face time with patient: 15 minutes.  20 minutes of total time spent on the encounter, which includes face to face time and non-face to face time preparing to see the patient (eg, review of tests), Obtaining and/or reviewing separately obtained history, Documenting clinical information in the electronic or other health record, Independently interpreting results (not separately reported) and communicating results to the patient/family/caregiver, or Care coordination (not separately reported).         Each patient to whom he or she provides medical services by telemedicine is:  (1) informed of the relationship between the physician and patient and the respective role of any other health care provider with respect to management of the patient; and (2) notified that he or she may decline to receive medical services by telemedicine and may withdraw from such care at any time.    Notes:     She has had several additional falls. Tripped twice. Using her walker.  Has had problems with a toe.Saw podiatry.Had curretage of possible wart and is on antibiotics.     No SOB. Appetite decreased, occasional mild nausea.  Bowels irregular.    Pain control generally ok.      ECOG-1-2    Breast history: In 2013 she was diagnosed with stage IIB carcinoma of the right breast, ER positive with a low Oncotype score.  She was enrolled on protocol  and randomized to endocrine therapy alone.  She was tried on all 3 aromatase inhibitors and had itching and rash to all of them.  In August 2013 she was  started on tamoxifen.   She was found to have  bone metastasis in May 2015, 2015.  A subsequent bone biopsy was performed on a lesion at the T8 vertebra.   The pathology from that was consistent with metastatic breast cancer, ER +80%, MO +80%, and HER-2 negative.     STRATA  -PIK3CA mutation, FGFR2 mutation, ESR1 mutation     She received letrozole plus palbociclib from July 2015 until May 2016.  She also received bone protective therapy with Xgeva.      Faslodex was started in June 2016.      Exemestane and Afinitor started in August 2017.  That was discontinued in February 2018 due to progression in the liver and bone.     Navelbine was started February 16, 2018.  She had 8 cycles for that.  Scans in November 2018 showed progression as follows     Capecitabine was started in December 2018.  She received 5 cycles of that therapy.    Follow-up scans in April 2019 showed progression with a new hepatic lesion and worsening bone disease.    She began clinical trial therapy with erdafitinib on May 8, 2019.  That was discontinued in September 2019 due to progression in the bone.    She then received a course of radiation therapy to her pelvis.  She began weekly Taxol in October 2019.That was discontinued in December 2019 due to progression.    She began eribulin therapy on 1/15/2020.    CT results from October 12, 2020-CT - The 2 index lesions, 1 in the dome of the right lobe of the liver measures 1.7 cm (1.5 cm on the recent study of 08/10/2020 ).               The 2nd lesion in the segment 5 measures 1.7 cm similar to the prior exam.             A third lesion in the left lobe near the dome of the diaphragm is not well defined measures 2.8 cm previously 2.7 on 08/10/2020.     Extensive metastatic disease throughout the spine, pelvic bones, femurs and ribs essentially appears stable.      Review of Systems   Constitutional: Positive for fatigue. Negative for activity change, appetite change, fever and unexpected weight  change.   HENT: Negative for mouth sores and trouble swallowing.    Eyes: Negative for visual disturbance.   Respiratory: Negative for cough and shortness of breath.    Cardiovascular: Negative for chest pain.   Gastrointestinal: Positive for constipation. Negative for abdominal pain and diarrhea.   Genitourinary: Negative for frequency.   Musculoskeletal: Positive for back pain (controlled).   Skin: Negative for rash.   Neurological: Positive for numbness (improved). Negative for headaches.   Hematological: Negative for adenopathy.   Psychiatric/Behavioral: Negative for dysphoric mood. The patient is not nervous/anxious.        Objective:      Physical Exam  Constitutional:       General: She is not in acute distress.     Appearance: Normal appearance.   Neurological:      Mental Status: She is alert and oriented to person, place, and time.   Psychiatric:         Mood and Affect: Mood normal.         Behavior: Behavior normal.         Thought Content: Thought content normal.         Judgment: Judgment normal.         Assessment:     CBC yesterday showed normal white blood cell count, hemoglobin 11.7, platelet count a [id number], metabolic profile unremarkable  1. Malignant neoplasm of central portion of right breast in female, estrogen receptor positive    2. Bone metastasis    3. Metastasis to liver      4.  Chronic bronchitis  Plan:       Continue current therapy with cycle 14 of eribulin.        Return in 4 weeks.  Repeat scans after cycle 15.

## 2020-11-09 NOTE — PROGRESS NOTES
Subjective:      Patient ID: Rebecca Crain is a 65 y.o. female.    Chief Complaint: Ingrown Toenail (left foot third toe)    Rebecca is a 65 y.o. female who presents to the clinic complaining of thick and discolored toenails on the left foot Left third toe. . Rebecca is inquiring about treatment options.      Review of Systems   Constitution: Negative for chills.   Cardiovascular: Negative for chest pain and claudication.   Respiratory: Negative for cough.    Skin: Positive for color change, dry skin and nail changes.   Musculoskeletal: Positive for joint pain.   Gastrointestinal: Negative for nausea.   Neurological: Positive for paresthesias. Negative for numbness.   Psychiatric/Behavioral: The patient is not nervous/anxious.            Objective:      Physical Exam  Constitutional:       Appearance: She is well-developed.      Comments: Oriented to time, place, and person.   Cardiovascular:      Comments: DP and PT pulses are palpable bilaterally. 3 sec capillary refill time and toes and feet are warm to touch proximally .  There is  hair growth on the feet and toes b/l. There is no edema b/l. No spider veins or varicosities present b/l.     Musculoskeletal:      Comments: Equinus noted b/l ankles with < 10 deg DF noted. MMT 5/5 in DF/PF/Inv/Ev resistance with no reproduction of pain in any direction. Passive range of motion of ankle and pedal joints is painless b/l.     Feet:      Right foot:      Skin integrity: No callus or dry skin.      Left foot:      Skin integrity: No callus or dry skin.   Lymphadenopathy:      Comments: Negative lymphadenopathy bilateral popliteal fossa and tarsal tunnel.   Skin:     Comments: No open lesions, lacerations or wounds noted.Interdigital spaces clean, dry and intact b/l. No erythema noted to b/l foot.    Lateral nail margin left third toe   with ingrown nail plate. No purulent drainage noted.        Discrete papule noted to left third toe  With visible thrombosed  capillaries and divergent skin lines. Mild erythema noted.            Neurological:      Mental Status: She is alert.      Comments: Light touch, proprioception, and sharp/dull sensation are all intact bilaterally. Protective threshold with the Buckingham-Wienstein monofilament is intact bilaterally.    Psychiatric:         Behavior: Behavior is cooperative.               Assessment:       Encounter Diagnoses   Name Primary?    Onychomycosis due to dermatophyte Yes    Ingrown nail     Plantar wart          Plan:       Rebecca was seen today for ingrown toenail.    Diagnoses and all orders for this visit:    Onychomycosis due to dermatophyte  -     Ankle Brachial Indices (LORENZO); Future    Ingrown nail  -     Ankle Brachial Indices (LORENZO); Future    Plantar wart  -     Ankle Brachial Indices (LORENZO); Future    Other orders  -     doxycycline (VIBRAMYCIN) 100 MG Cap; Take 1 capsule (100 mg total) by mouth every 12 (twelve) hours.      I counseled the patient on her conditions, their implications and medical management.      Instructed patient on the importance of keeping feet dry. Patient instructed to use absorbent cotton socks and change them if they become sweaty; or wear an open-toe shoe or sandal. Wash the feet at least once a day with soap and water. Patient instructed to use lysol or over-the-counter antifungal powders or sprays to shoes daily and allow them to air dry, switching shoes from every other day would be optimal. Patient is to avoid barefoot walking in high-risk environments (public showers, gyms and locker rooms) may prevent future infections.     Discussed treatment options with patient. Options included soaking, avulsion and matrixectomy. Risks and benefits discussed and all questions were answered. The patient wishes to proceed with  Nail avulsion at a later date      In depth conversation on the treatment of ingrown nail; partial nail avulsion vs chemical matrixectomy vs conservative treatment of  soaking and nail trimming    Utilizing sterile toenail clippers I aggressively trimmed  the offending Left third toenail lateral  border approximately 3 mm from its edge and carried the nail plate incision down at an angle in order to wedge out the offending cryptotic portion of the nail plate. The offending border was then removed in toto. The remaining nail was grinded down with an electric  down to nail bed.  No blood was drawn. Patient tolerated the procedure well and related significant relief.    With the patient's verbal consent a sterile #15 scalpel was used to trim the hyperkeratotic lesion described above. She tolerated the procedure well without complication. Discussed treatment options for painful plantar warts to include OTC salicylic acid, liquid nitrogen and surgical excision in detail. Apply Compound W to lesion on bottom of left foot daily. Allow to dry then cover with duct tape. Remove dead skin gently every 6-7 days.      LORENZO ordered.     Rx. Doxycycline sent to pharmacy on file.

## 2020-11-10 ENCOUNTER — INFUSION (OUTPATIENT)
Dept: INFUSION THERAPY | Facility: HOSPITAL | Age: 65
End: 2020-11-10
Attending: INTERNAL MEDICINE
Payer: MEDICARE

## 2020-11-10 VITALS
HEART RATE: 88 BPM | RESPIRATION RATE: 18 BRPM | DIASTOLIC BLOOD PRESSURE: 56 MMHG | SYSTOLIC BLOOD PRESSURE: 107 MMHG | OXYGEN SATURATION: 96 % | TEMPERATURE: 98 F

## 2020-11-10 DIAGNOSIS — C79.51 METASTATIC CANCER TO SPINE: ICD-10-CM

## 2020-11-10 DIAGNOSIS — C78.7 METASTASIS TO LIVER: ICD-10-CM

## 2020-11-10 DIAGNOSIS — Z17.0 MALIGNANT NEOPLASM OF CENTRAL PORTION OF RIGHT BREAST IN FEMALE, ESTROGEN RECEPTOR POSITIVE: Primary | ICD-10-CM

## 2020-11-10 DIAGNOSIS — C50.111 MALIGNANT NEOPLASM OF CENTRAL PORTION OF RIGHT BREAST IN FEMALE, ESTROGEN RECEPTOR POSITIVE: Primary | ICD-10-CM

## 2020-11-10 LAB
ALBUMIN SERPL BCP-MCNC: 3.5 G/DL (ref 3.5–5.2)
ALP SERPL-CCNC: 193 U/L (ref 55–135)
ALT SERPL W/O P-5'-P-CCNC: 10 U/L (ref 10–44)
ANION GAP SERPL CALC-SCNC: 6 MMOL/L (ref 8–16)
AST SERPL-CCNC: 25 U/L (ref 10–40)
BILIRUB SERPL-MCNC: 0.9 MG/DL (ref 0.1–1)
BUN SERPL-MCNC: 18 MG/DL (ref 8–23)
CALCIUM SERPL-MCNC: 9 MG/DL (ref 8.7–10.5)
CHLORIDE SERPL-SCNC: 104 MMOL/L (ref 95–110)
CO2 SERPL-SCNC: 28 MMOL/L (ref 23–29)
CREAT SERPL-MCNC: 0.7 MG/DL (ref 0.5–1.4)
ERYTHROCYTE [DISTWIDTH] IN BLOOD BY AUTOMATED COUNT: 19.3 % (ref 11.5–14.5)
EST. GFR  (AFRICAN AMERICAN): >60 ML/MIN/1.73 M^2
EST. GFR  (NON AFRICAN AMERICAN): >60 ML/MIN/1.73 M^2
GLUCOSE SERPL-MCNC: 104 MG/DL (ref 70–110)
HCT VFR BLD AUTO: 37.9 % (ref 37–48.5)
HGB BLD-MCNC: 11.7 G/DL (ref 12–16)
IMM GRANULOCYTES # BLD AUTO: 0.03 K/UL (ref 0–0.04)
MCH RBC QN AUTO: 27.9 PG (ref 27–31)
MCHC RBC AUTO-ENTMCNC: 30.9 G/DL (ref 32–36)
MCV RBC AUTO: 90 FL (ref 82–98)
NEUTROPHILS # BLD AUTO: 5.3 K/UL (ref 1.8–7.7)
PLATELET # BLD AUTO: 111 K/UL (ref 150–350)
PMV BLD AUTO: 9.8 FL (ref 9.2–12.9)
POTASSIUM SERPL-SCNC: 3.9 MMOL/L (ref 3.5–5.1)
PROT SERPL-MCNC: 7 G/DL (ref 6–8.4)
RBC # BLD AUTO: 4.2 M/UL (ref 4–5.4)
SODIUM SERPL-SCNC: 138 MMOL/L (ref 136–145)
WBC # BLD AUTO: 6.76 K/UL (ref 3.9–12.7)

## 2020-11-10 PROCEDURE — 96367 TX/PROPH/DG ADDL SEQ IV INF: CPT | Mod: HCNC

## 2020-11-10 PROCEDURE — 36591 DRAW BLOOD OFF VENOUS DEVICE: CPT | Mod: HCNC

## 2020-11-10 PROCEDURE — 25000003 PHARM REV CODE 250: Mod: HCNC | Performed by: INTERNAL MEDICINE

## 2020-11-10 PROCEDURE — 63600175 PHARM REV CODE 636 W HCPCS: Mod: HCNC | Performed by: INTERNAL MEDICINE

## 2020-11-10 PROCEDURE — 96409 CHEMO IV PUSH SNGL DRUG: CPT | Mod: HCNC

## 2020-11-10 PROCEDURE — 85027 COMPLETE CBC AUTOMATED: CPT | Mod: HCNC

## 2020-11-10 PROCEDURE — A4216 STERILE WATER/SALINE, 10 ML: HCPCS | Mod: HCNC | Performed by: INTERNAL MEDICINE

## 2020-11-10 PROCEDURE — 80053 COMPREHEN METABOLIC PANEL: CPT | Mod: HCNC

## 2020-11-10 RX ORDER — HEPARIN 100 UNIT/ML
500 SYRINGE INTRAVENOUS
Status: CANCELLED | OUTPATIENT
Start: 2020-11-10

## 2020-11-10 RX ORDER — HEPARIN 100 UNIT/ML
500 SYRINGE INTRAVENOUS
Status: CANCELLED | OUTPATIENT
Start: 2020-11-17

## 2020-11-10 RX ORDER — SODIUM CHLORIDE 0.9 % (FLUSH) 0.9 %
10 SYRINGE (ML) INJECTION
Status: CANCELLED | OUTPATIENT
Start: 2020-11-17

## 2020-11-10 RX ORDER — SODIUM CHLORIDE 0.9 % (FLUSH) 0.9 %
10 SYRINGE (ML) INJECTION
Status: DISCONTINUED | OUTPATIENT
Start: 2020-11-10 | End: 2020-11-10 | Stop reason: HOSPADM

## 2020-11-10 RX ORDER — SODIUM CHLORIDE 0.9 % (FLUSH) 0.9 %
10 SYRINGE (ML) INJECTION
Status: CANCELLED | OUTPATIENT
Start: 2020-11-10

## 2020-11-10 RX ORDER — HEPARIN 100 UNIT/ML
500 SYRINGE INTRAVENOUS
Status: DISCONTINUED | OUTPATIENT
Start: 2020-11-10 | End: 2020-11-10 | Stop reason: HOSPADM

## 2020-11-10 RX ADMIN — ERIBULIN MESYLATE 2 MG: 0.5 INJECTION INTRAVENOUS at 02:11

## 2020-11-10 RX ADMIN — Medication 10 ML: at 02:11

## 2020-11-10 RX ADMIN — SODIUM CHLORIDE 1 MG: 9 INJECTION, SOLUTION INTRAVENOUS at 01:11

## 2020-11-10 RX ADMIN — HEPARIN 500 UNITS: 100 SYRINGE at 02:11

## 2020-11-10 NOTE — PLAN OF CARE
Pt arrived to unit afebrile for C14D1 Halaven infusion. Blood collected from port for cbc, cmp. Pt reports having fallen again at home.Encouraged pt to use walker for stability to prevent future falls.Pt states neuropathy has extended to elbows and as high as her knees. Dr. Schroeder reviewed labs, will proceed with chemo. Tolerated Halaven, no reactions noted. . Pt has next appts. Pt in wheelchair discharged from unit with RN. No distress noted.

## 2020-11-11 ENCOUNTER — OFFICE VISIT (OUTPATIENT)
Dept: HEMATOLOGY/ONCOLOGY | Facility: CLINIC | Age: 65
End: 2020-11-11
Payer: MEDICARE

## 2020-11-11 DIAGNOSIS — Z17.0 MALIGNANT NEOPLASM OF CENTRAL PORTION OF RIGHT BREAST IN FEMALE, ESTROGEN RECEPTOR POSITIVE: Primary | ICD-10-CM

## 2020-11-11 DIAGNOSIS — C78.7 METASTASIS TO LIVER: ICD-10-CM

## 2020-11-11 DIAGNOSIS — C50.111 MALIGNANT NEOPLASM OF CENTRAL PORTION OF RIGHT BREAST IN FEMALE, ESTROGEN RECEPTOR POSITIVE: Primary | ICD-10-CM

## 2020-11-11 DIAGNOSIS — C79.51 BONE METASTASIS: ICD-10-CM

## 2020-11-11 PROCEDURE — 99213 OFFICE O/P EST LOW 20 MIN: CPT | Mod: HCNC,95,, | Performed by: INTERNAL MEDICINE

## 2020-11-11 PROCEDURE — 99213 PR OFFICE/OUTPT VISIT, EST, LEVL III, 20-29 MIN: ICD-10-PCS | Mod: HCNC,95,, | Performed by: INTERNAL MEDICINE

## 2020-11-11 NOTE — Clinical Note
Halaven day 1 and 8 on the [facility] every 3 weeks, she is due for day 8 next week please add CA 27.29 to those labs  See me in 1 month with CBC CMP and CA 27.29

## 2020-11-12 DIAGNOSIS — Z17.0 MALIGNANT NEOPLASM OF CENTRAL PORTION OF RIGHT BREAST IN FEMALE, ESTROGEN RECEPTOR POSITIVE: Primary | ICD-10-CM

## 2020-11-12 DIAGNOSIS — C79.51 BONE METASTASIS: ICD-10-CM

## 2020-11-12 DIAGNOSIS — C50.111 MALIGNANT NEOPLASM OF CENTRAL PORTION OF RIGHT BREAST IN FEMALE, ESTROGEN RECEPTOR POSITIVE: Primary | ICD-10-CM

## 2020-11-16 ENCOUNTER — SPECIALTY PHARMACY (OUTPATIENT)
Dept: PHARMACY | Facility: CLINIC | Age: 65
End: 2020-11-16

## 2020-11-16 NOTE — TELEPHONE ENCOUNTER
Specialty Pharmacy - Refill Coordination    Specialty Medication Orders Linked to Encounter      Most Recent Value   Medication #1  pegfilgrastim (NEULASTA) 6 mg/0.6 mL injection (Order#[id number], Rx#[id number])          Refill Questions - Documented Responses      Most Recent Value   Relationship to patient of person spoken to?  Self   HIPAA/medical authority confirmed?  Yes   Any changes in contact preferences or allowed representatives?  No   Has the patient had any insurance changes?  No   Has the patient had any changes to specialty medication, dose, or instructions?  No   Has the patient started taking any new medications, herbals, or supplements?  No   Has the patient been diagnosed with any new medical conditions?  No   Does the patient have any new allergies to medications or foods?  No   Does the patient have any concerns about side effects?  No   Can the patient store medication/sharps container properly (at the correct temperature, away from children/pets, etc.)?  Yes   Can the patient call emergency services (911) in the event of an emergency?  Yes   How many doses did the patient miss in the past 4 weeks or since the last fill?  0   How many doses does the patient have on hand?  0   How many days does the patient report on hand quantity will last?  0   Does the number of doses/days supply remaining match pharmacy expected amounts?  Yes   Does the patient feel that this medication is effective?  Yes   During the past 4 weeks, has patient missed any activities due to condition or medication?  No   During the past 4 weeks, did patient have any of the following urgent care visits?  None   How will the patient receive the medication?  Mail   When does the patient need to receive the medication?  11/17/20   Shipping Address  Home   Address in [facility] confirmed and updated if neccessary?  Yes   Expected Copay ($)  5   Is the patient able to afford the medication copay?  Yes   Payment Method  CC on  file   Days supply of Refill  21   Would patient like to speak to a pharmacist?  No   Do you want to trigger an intervention?  No   Do you want to trigger an additional referral task?  No   Refill activity completed?  Yes   Refill activity plan  Refill scheduled   Shipment/Pickup Date:  11/16/20          Current Outpatient Medications   Medication Sig    AFLURIA QD 2019-20,3YR UP,,PF, 60 mcg (15 mcg x 4)/0.5 mL Syrg TO BE ADMINISTERED BY PHARMACIST FOR IMMUNIZATION    albuterol (PROVENTIL HFA) 90 mcg/actuation inhaler Inhale 2 puffs into the lungs every 6 (six) hours as needed for Wheezing. Rescue    ascorbic acid, vitamin C, (VITAMIN C) 1000 MG tablet Take 1,000 mg by mouth once daily.    aspirin (ECOTRIN) 81 MG EC tablet Take 81 mg by mouth once daily.    biotin 1 mg tablet Take 1,000 mcg by mouth once daily.    calcium carbonate (OS-UNA) 500 mg calcium (1,250 mg) tablet Take 1 tablet (500 mg total) by mouth once daily. for 5 days    diphenoxylate-atropine 2.5-0.025 mg (LOMOTIL) 2.5-0.025 mg per tablet Take 1 tablet by mouth 4 (four) times daily as needed for Diarrhea.    doxycycline (VIBRAMYCIN) 100 MG Cap Take 1 capsule (100 mg total) by mouth every 12 (twelve) hours.    duke's soln (benadryl 30 mL, mylanta 30 mL, lidocaine 30 mL, nystatin 30 mL) 120mL TAKE 10 ML BY MOUTH 4 TIMES A DAY    fish oil/borage/flax/om3,6,9 1 (OMEGA 3-6-9 COMPLEX ORAL) Take 1 capsule by mouth once daily.    gabapentin (NEURONTIN) 300 MG capsule TAKE 1 CAPSULE BY MOUTH THREE TIMES A DAY    HYDROmorphone (DILAUDID) 4 MG tablet Take 1 tablet (4 mg total) by mouth every 4 (four) hours as needed for Pain.    lidocaine-prilocaine (EMLA) cream APPLY TO AFFECTED AREA EVERY DAY AS NEEDED    loperamide (IMODIUM A-D) 2 mg Tab Take 2 mg by mouth 3 (three) times daily as needed.    morphine (MS CONTIN) 200 MG 12 hr tablet Take 1 tablet (200 mg total) by mouth every 8 (eight) hours.    morphine (MS CONTIN) 200 MG 12 hr tablet Take 1  tablet (200 mg total) by mouth every 8 (eight) hours.    ondansetron (ZOFRAN) 4 MG tablet Take 1 tablet (4 mg total) by mouth every 8 (eight) hours as needed for Nausea.    pantoprazole (PROTONIX) 40 MG tablet Take 1 tablet (40 mg total) by mouth once daily.    pegfilgrastim (NEULASTA) 6 mg/0.6 mL injection Inject 0.6 mLs (6 mg total) into the skin once.    phenyleph-min oil-petrolatum 0.25-14-74.9 % Oint Place 1 applicator rectally 4 (four) times daily as needed.    polyethylene glycol (GLYCOLAX) 17 gram PwPk Take 17 g by mouth once daily.    PREVIDENT 5000 DRY MOUTH 1.1 % Gel BRUSH AS DIRECTED    promethazine (PHENERGAN) 12.5 MG Tab Take 1 tablet (12.5 mg total) by mouth 4 (four) times daily.    senna-docusate 8.6-50 mg (PERICOLACE) 8.6-50 mg per tablet Take 1 tablet by mouth once daily.    sertraline (ZOLOFT) 50 MG tablet TAKE 1 TABLET BY MOUTH EVERY DAY    SYNTHROID 175 mcg tablet Take 1 tablet (175 mcg total) by mouth before breakfast.   Last reviewed on 11/16/2020  3:06 PM by Neeraj Marr    Review of patient's allergies indicates:   Allergen Reactions    Adhesive Rash     Tape, Paper tape is OK.    Codeine Itching     Itching very bad to upper body only.    Demerol [meperidine] Itching     To upper body only    Iodine and iodide containing products Anaphylaxis    Penicillins      Ulcers in mouth.    Arimidex [anastrozole] Hives    Aromasin [exemestane]     Clindamycin Itching and Rash    Last reviewed on  11/12/2020 7:30 AM by Teressa Rodriguez      Tasks added this encounter   11/16/2020 - Refill Call (Auto Added)   Tasks due within next 3 months   No tasks due.     Neeraj Marr  [facility]  [address] [place] [id number]  Phone: [phone]  Fax: [phone]

## 2020-11-17 ENCOUNTER — INFUSION (OUTPATIENT)
Dept: INFUSION THERAPY | Facility: HOSPITAL | Age: 65
End: 2020-11-17
Attending: INTERNAL MEDICINE
Payer: MEDICARE

## 2020-11-17 ENCOUNTER — SPECIALTY PHARMACY (OUTPATIENT)
Dept: PHARMACY | Facility: CLINIC | Age: 65
End: 2020-11-17

## 2020-11-17 DIAGNOSIS — C78.7 METASTASIS TO LIVER: ICD-10-CM

## 2020-11-17 DIAGNOSIS — C79.51 METASTATIC CANCER TO SPINE: ICD-10-CM

## 2020-11-17 DIAGNOSIS — Z17.0 MALIGNANT NEOPLASM OF CENTRAL PORTION OF RIGHT BREAST IN FEMALE, ESTROGEN RECEPTOR POSITIVE: Primary | ICD-10-CM

## 2020-11-17 DIAGNOSIS — C50.111 MALIGNANT NEOPLASM OF CENTRAL PORTION OF RIGHT BREAST IN FEMALE, ESTROGEN RECEPTOR POSITIVE: Primary | ICD-10-CM

## 2020-11-17 LAB
ALBUMIN SERPL BCP-MCNC: 3.1 G/DL (ref 3.5–5.2)
ALP SERPL-CCNC: 219 U/L (ref 55–135)
ALT SERPL W/O P-5'-P-CCNC: 19 U/L (ref 10–44)
ANION GAP SERPL CALC-SCNC: 6 MMOL/L (ref 8–16)
AST SERPL-CCNC: 33 U/L (ref 10–40)
BASOPHILS # BLD AUTO: 0.05 K/UL (ref 0–0.2)
BASOPHILS NFR BLD: 1.5 % (ref 0–1.9)
BILIRUB SERPL-MCNC: 1 MG/DL (ref 0.1–1)
BUN SERPL-MCNC: 10 MG/DL (ref 8–23)
CALCIUM SERPL-MCNC: 8.1 MG/DL (ref 8.7–10.5)
CHLORIDE SERPL-SCNC: 104 MMOL/L (ref 95–110)
CO2 SERPL-SCNC: 29 MMOL/L (ref 23–29)
CREAT SERPL-MCNC: 0.7 MG/DL (ref 0.5–1.4)
DIFFERENTIAL METHOD: ABNORMAL
EOSINOPHIL # BLD AUTO: 0 K/UL (ref 0–0.5)
EOSINOPHIL NFR BLD: 0 % (ref 0–8)
ERYTHROCYTE [DISTWIDTH] IN BLOOD BY AUTOMATED COUNT: 19.1 % (ref 11.5–14.5)
EST. GFR  (AFRICAN AMERICAN): >60 ML/MIN/1.73 M^2
EST. GFR  (NON AFRICAN AMERICAN): >60 ML/MIN/1.73 M^2
GLUCOSE SERPL-MCNC: 142 MG/DL (ref 70–110)
HCT VFR BLD AUTO: 31.4 % (ref 37–48.5)
HGB BLD-MCNC: 10 G/DL (ref 12–16)
IMM GRANULOCYTES # BLD AUTO: 0.04 K/UL (ref 0–0.04)
IMM GRANULOCYTES NFR BLD AUTO: 1.2 % (ref 0–0.5)
LYMPHOCYTES # BLD AUTO: 0.8 K/UL (ref 1–4.8)
LYMPHOCYTES NFR BLD: 23.8 % (ref 18–48)
MCH RBC QN AUTO: 28.4 PG (ref 27–31)
MCHC RBC AUTO-ENTMCNC: 31.8 G/DL (ref 32–36)
MCV RBC AUTO: 89 FL (ref 82–98)
MONOCYTES # BLD AUTO: 0.1 K/UL (ref 0.3–1)
MONOCYTES NFR BLD: 4 % (ref 4–15)
NEUTROPHILS # BLD AUTO: 2.3 K/UL (ref 1.8–7.7)
NEUTROPHILS NFR BLD: 69.5 % (ref 38–73)
NRBC BLD-RTO: 0 /100 WBC
PLATELET # BLD AUTO: 84 K/UL (ref 150–350)
PMV BLD AUTO: 10.2 FL (ref 9.2–12.9)
POTASSIUM SERPL-SCNC: 3.7 MMOL/L (ref 3.5–5.1)
PROT SERPL-MCNC: 6.3 G/DL (ref 6–8.4)
RBC # BLD AUTO: 3.52 M/UL (ref 4–5.4)
SODIUM SERPL-SCNC: 139 MMOL/L (ref 136–145)
WBC # BLD AUTO: 3.24 K/UL (ref 3.9–12.7)

## 2020-11-17 PROCEDURE — 96409 CHEMO IV PUSH SNGL DRUG: CPT | Mod: HCNC

## 2020-11-17 PROCEDURE — 80053 COMPREHEN METABOLIC PANEL: CPT | Mod: HCNC

## 2020-11-17 PROCEDURE — 25000003 PHARM REV CODE 250: Mod: HCNC | Performed by: INTERNAL MEDICINE

## 2020-11-17 PROCEDURE — 86300 IMMUNOASSAY TUMOR CA 15-3: CPT | Mod: HCNC

## 2020-11-17 PROCEDURE — A4216 STERILE WATER/SALINE, 10 ML: HCPCS | Mod: HCNC | Performed by: INTERNAL MEDICINE

## 2020-11-17 PROCEDURE — 85025 COMPLETE CBC W/AUTO DIFF WBC: CPT | Mod: HCNC

## 2020-11-17 PROCEDURE — 63600175 PHARM REV CODE 636 W HCPCS: Mod: HCNC | Performed by: INTERNAL MEDICINE

## 2020-11-17 PROCEDURE — 96367 TX/PROPH/DG ADDL SEQ IV INF: CPT | Mod: HCNC

## 2020-11-17 PROCEDURE — 36592 COLLECT BLOOD FROM PICC: CPT | Mod: HCNC

## 2020-11-17 RX ORDER — HEPARIN 100 UNIT/ML
500 SYRINGE INTRAVENOUS
Status: DISCONTINUED | OUTPATIENT
Start: 2020-11-17 | End: 2020-11-17 | Stop reason: HOSPADM

## 2020-11-17 RX ORDER — SODIUM CHLORIDE 0.9 % (FLUSH) 0.9 %
10 SYRINGE (ML) INJECTION
Status: DISCONTINUED | OUTPATIENT
Start: 2020-11-17 | End: 2020-11-17 | Stop reason: HOSPADM

## 2020-11-17 RX ADMIN — ERIBULIN MESYLATE 2 MG: 0.5 INJECTION INTRAVENOUS at 02:11

## 2020-11-17 RX ADMIN — HEPARIN 500 UNITS: 100 SYRINGE at 03:11

## 2020-11-17 RX ADMIN — SODIUM CHLORIDE 1 MG: 9 INJECTION, SOLUTION INTRAVENOUS at 01:11

## 2020-11-17 RX ADMIN — Medication 10 ML: at 03:11

## 2020-11-19 ENCOUNTER — PATIENT MESSAGE (OUTPATIENT)
Dept: PODIATRY | Facility: CLINIC | Age: 65
End: 2020-11-19

## 2020-11-19 ENCOUNTER — TELEPHONE (OUTPATIENT)
Dept: PODIATRY | Facility: CLINIC | Age: 65
End: 2020-11-19

## 2020-11-19 LAB — CANCER AG27-29 SERPL-ACNC: 102 U/ML

## 2020-11-24 RX ORDER — SODIUM CHLORIDE 0.9 % (FLUSH) 0.9 %
10 SYRINGE (ML) INJECTION
Status: CANCELLED | OUTPATIENT
Start: 2020-12-08

## 2020-11-24 RX ORDER — HEPARIN 100 UNIT/ML
500 SYRINGE INTRAVENOUS
Status: CANCELLED | OUTPATIENT
Start: 2020-12-01

## 2020-11-24 RX ORDER — HEPARIN 100 UNIT/ML
500 SYRINGE INTRAVENOUS
Status: CANCELLED | OUTPATIENT
Start: 2020-12-08

## 2020-11-24 RX ORDER — SODIUM CHLORIDE 0.9 % (FLUSH) 0.9 %
10 SYRINGE (ML) INJECTION
Status: CANCELLED | OUTPATIENT
Start: 2020-12-01

## 2020-11-30 ENCOUNTER — SPECIALTY PHARMACY (OUTPATIENT)
Dept: PHARMACY | Facility: CLINIC | Age: 65
End: 2020-11-30

## 2020-11-30 ENCOUNTER — HOSPITAL ENCOUNTER (OUTPATIENT)
Dept: CARDIOLOGY | Facility: HOSPITAL | Age: 65
Discharge: HOME | End: 2020-11-30
Attending: PODIATRIST
Payer: MEDICARE

## 2020-11-30 DIAGNOSIS — L60.0 INGROWN NAIL: ICD-10-CM

## 2020-11-30 DIAGNOSIS — B07.0 PLANTAR WART: ICD-10-CM

## 2020-11-30 DIAGNOSIS — B35.1 ONYCHOMYCOSIS DUE TO DERMATOPHYTE: ICD-10-CM

## 2020-11-30 LAB
LEFT ABI: 1.06
LEFT ARM BP: 102 MMHG
LEFT DORSALIS PEDIS: 108 MMHG
LEFT POSTERIOR TIBIAL: 108 MMHG
LEFT TBI: 1.22
LEFT TOE PRESSURE: 124 MMHG
RIGHT ABI: 1.25
RIGHT DORSALIS PEDIS: 121 MMHG
RIGHT POSTERIOR TIBIAL: 128 MMHG
RIGHT TBI: 0.63
RIGHT TOE PRESSURE: 64 MMHG

## 2020-11-30 PROCEDURE — 93922 UPR/L XTREMITY ART 2 LEVELS: CPT | Mod: HCNC

## 2020-11-30 PROCEDURE — 93922 ANKLE BRACHIAL INDICES (ABI): ICD-10-PCS | Mod: 26,HCNC,, | Performed by: INTERNAL MEDICINE

## 2020-11-30 PROCEDURE — 93922 UPR/L XTREMITY ART 2 LEVELS: CPT | Mod: 26,HCNC,, | Performed by: INTERNAL MEDICINE

## 2020-11-30 NOTE — TELEPHONE ENCOUNTER
Specialty Pharmacy - Refill Coordination    Specialty Medication Orders Linked to Encounter      Most Recent Value   Medication #1  pegfilgrastim (NEULASTA) 6 mg/0.6 mL injection (Order#[id number], Rx#[id number])          Refill Questions - Documented Responses      Most Recent Value   Relationship to patient of person spoken to?  Self   HIPAA/medical authority confirmed?  Yes   Any changes in contact preferences or allowed representatives?  No   Has the patient had any insurance changes?  No   Has the patient had any changes to specialty medication, dose, or instructions?  No   Has the patient started taking any new medications, herbals, or supplements?  No   Has the patient been diagnosed with any new medical conditions?  No   Does the patient have any new allergies to medications or foods?  No   Does the patient have any concerns about side effects?  No   Can the patient store medication/sharps container properly (at the correct temperature, away from children/pets, etc.)?  Yes   Can the patient call emergency services (911) in the event of an emergency?  Yes   Does the patient have any concerns or questions about taking or administering this medication as prescribed?  No   How many doses did the patient miss in the past 4 weeks or since the last fill?  0   How many doses does the patient have on hand?  0   How many days does the patient report on hand quantity will last?  0   Does the number of doses/days supply remaining match pharmacy expected amounts?  Yes   Does the patient feel that this medication is effective?  Yes   During the past 4 weeks, has patient missed any activities due to condition or medication?  No   During the past 4 weeks, did patient have any of the following urgent care visits?  None   How will the patient receive the medication?  Mail   When does the patient need to receive the medication?  12/09/20   Shipping Address  Home   Address in [facility] confirmed and updated if  neccessary?  Yes   Expected Copay ($)  5   Is the patient able to afford the medication copay?  Yes   Payment Method  CC on file   Days supply of Refill  21   Would patient like to speak to a pharmacist?  No   Do you want to trigger an intervention?  No   Do you want to trigger an additional referral task?  No   Refill activity completed?  Yes   Refill activity plan  Refill scheduled   Shipment/Pickup Date:  12/07/20          Current Outpatient Medications   Medication Sig    AFLURIA QD 2019-20,3YR UP,,PF, 60 mcg (15 mcg x 4)/0.5 mL Syrg TO BE ADMINISTERED BY PHARMACIST FOR IMMUNIZATION    albuterol (PROVENTIL HFA) 90 mcg/actuation inhaler Inhale 2 puffs into the lungs every 6 (six) hours as needed for Wheezing. Rescue    ascorbic acid, vitamin C, (VITAMIN C) 1000 MG tablet Take 1,000 mg by mouth once daily.    aspirin (ECOTRIN) 81 MG EC tablet Take 81 mg by mouth once daily.    biotin 1 mg tablet Take 1,000 mcg by mouth once daily.    calcium carbonate (OS-UNA) 500 mg calcium (1,250 mg) tablet Take 1 tablet (500 mg total) by mouth once daily. for 5 days    diphenoxylate-atropine 2.5-0.025 mg (LOMOTIL) 2.5-0.025 mg per tablet Take 1 tablet by mouth 4 (four) times daily as needed for Diarrhea.    doxycycline (VIBRAMYCIN) 100 MG Cap Take 1 capsule (100 mg total) by mouth every 12 (twelve) hours.    duke's soln (benadryl 30 mL, mylanta 30 mL, lidocaine 30 mL, nystatin 30 mL) 120mL TAKE 10 ML BY MOUTH 4 TIMES A DAY    fish oil/borage/flax/om3,6,9 1 (OMEGA 3-6-9 COMPLEX ORAL) Take 1 capsule by mouth once daily.    gabapentin (NEURONTIN) 300 MG capsule TAKE 1 CAPSULE BY MOUTH THREE TIMES A DAY    HYDROmorphone (DILAUDID) 4 MG tablet Take 1 tablet (4 mg total) by mouth every 4 (four) hours as needed for Pain.    lidocaine-prilocaine (EMLA) cream APPLY TO AFFECTED AREA EVERY DAY AS NEEDED    loperamide (IMODIUM A-D) 2 mg Tab Take 2 mg by mouth 3 (three) times daily as needed.    morphine (MS CONTIN) 200 MG 12  hr tablet Take 1 tablet (200 mg total) by mouth every 8 (eight) hours.    morphine (MS CONTIN) 200 MG 12 hr tablet Take 1 tablet (200 mg total) by mouth every 8 (eight) hours.    ondansetron (ZOFRAN) 4 MG tablet Take 1 tablet (4 mg total) by mouth every 8 (eight) hours as needed for Nausea.    pantoprazole (PROTONIX) 40 MG tablet Take 1 tablet (40 mg total) by mouth once daily.    pegfilgrastim (NEULASTA) 6 mg/0.6 mL injection Inject 0.6 mLs (6 mg total) into the skin once.    phenyleph-min oil-petrolatum 0.25-14-74.9 % Oint Place 1 applicator rectally 4 (four) times daily as needed.    polyethylene glycol (GLYCOLAX) 17 gram PwPk Take 17 g by mouth once daily.    PREVIDENT 5000 DRY MOUTH 1.1 % Gel BRUSH AS DIRECTED    promethazine (PHENERGAN) 12.5 MG Tab Take 1 tablet (12.5 mg total) by mouth 4 (four) times daily.    senna-docusate 8.6-50 mg (PERICOLACE) 8.6-50 mg per tablet Take 1 tablet by mouth once daily.    sertraline (ZOLOFT) 50 MG tablet TAKE 1 TABLET BY MOUTH EVERY DAY    SYNTHROID 175 mcg tablet Take 1 tablet (175 mcg total) by mouth before breakfast.   Last reviewed on 11/16/2020  3:06 PM by Neeraj Marr    Review of patient's allergies indicates:   Allergen Reactions    Adhesive Rash     Tape, Paper tape is OK.    Codeine Itching     Itching very bad to upper body only.    Demerol [meperidine] Itching     To upper body only    Iodine and iodide containing products Anaphylaxis    Penicillins      Ulcers in mouth.    Arimidex [anastrozole] Hives    Aromasin [exemestane]     Clindamycin Itching and Rash    Last reviewed on  11/17/2020 1:18 PM by Gabriela Gomez    Spoke with Mrs. Crain. She denies 0 missed doses. She reports a 0 day supply in her possession. She denies any changes in allergies, medical conditions, or medications. Shipping scheduled for 12/7 for delivery on 12/8. $5 copay at 004. Confirmed 2 patient identifiers, allergies, medication list, comorbidities, shipping  address, and payment information.    Tasks added this encounter   12/23/2020 - Refill Call (Auto Added)   Tasks due within next 3 months   No tasks due.     Kelly Brand, PharmD  [facility]  [address] [place] [id number]  Phone: [phone]  Fax: [phone]

## 2020-12-01 ENCOUNTER — INFUSION (OUTPATIENT)
Dept: INFUSION THERAPY | Facility: HOSPITAL | Age: 65
End: 2020-12-01
Attending: INTERNAL MEDICINE
Payer: MEDICARE

## 2020-12-01 VITALS
RESPIRATION RATE: 17 BRPM | SYSTOLIC BLOOD PRESSURE: 103 MMHG | DIASTOLIC BLOOD PRESSURE: 55 MMHG | OXYGEN SATURATION: 96 % | HEART RATE: 89 BPM | TEMPERATURE: 98 F

## 2020-12-01 DIAGNOSIS — C50.919 BREAST CANCER: Primary | ICD-10-CM

## 2020-12-01 DIAGNOSIS — C50.111 MALIGNANT NEOPLASM OF CENTRAL PORTION OF RIGHT BREAST IN FEMALE, ESTROGEN RECEPTOR POSITIVE: ICD-10-CM

## 2020-12-01 DIAGNOSIS — C78.7 METASTASIS TO LIVER: ICD-10-CM

## 2020-12-01 DIAGNOSIS — C79.51 METASTATIC CANCER TO SPINE: ICD-10-CM

## 2020-12-01 DIAGNOSIS — Z17.0 MALIGNANT NEOPLASM OF CENTRAL PORTION OF RIGHT BREAST IN FEMALE, ESTROGEN RECEPTOR POSITIVE: ICD-10-CM

## 2020-12-01 LAB
ALBUMIN SERPL BCP-MCNC: 3.4 G/DL (ref 3.5–5.2)
ALP SERPL-CCNC: 215 U/L (ref 55–135)
ALT SERPL W/O P-5'-P-CCNC: 11 U/L (ref 10–44)
ANION GAP SERPL CALC-SCNC: 6 MMOL/L (ref 8–16)
AST SERPL-CCNC: 29 U/L (ref 10–40)
BILIRUB SERPL-MCNC: 0.8 MG/DL (ref 0.1–1)
BUN SERPL-MCNC: 19 MG/DL (ref 8–23)
CALCIUM SERPL-MCNC: 8.3 MG/DL (ref 8.7–10.5)
CHLORIDE SERPL-SCNC: 108 MMOL/L (ref 95–110)
CO2 SERPL-SCNC: 25 MMOL/L (ref 23–29)
CREAT SERPL-MCNC: 0.7 MG/DL (ref 0.5–1.4)
ERYTHROCYTE [DISTWIDTH] IN BLOOD BY AUTOMATED COUNT: 19.2 % (ref 11.5–14.5)
EST. GFR  (AFRICAN AMERICAN): >60 ML/MIN/1.73 M^2
EST. GFR  (NON AFRICAN AMERICAN): >60 ML/MIN/1.73 M^2
GLUCOSE SERPL-MCNC: 143 MG/DL (ref 70–110)
HCT VFR BLD AUTO: 36 % (ref 37–48.5)
HGB BLD-MCNC: 11.5 G/DL (ref 12–16)
IMM GRANULOCYTES # BLD AUTO: 0.01 K/UL (ref 0–0.04)
MCH RBC QN AUTO: 27.4 PG (ref 27–31)
MCHC RBC AUTO-ENTMCNC: 31.9 G/DL (ref 32–36)
MCV RBC AUTO: 86 FL (ref 82–98)
NEUTROPHILS # BLD AUTO: 3.9 K/UL (ref 1.8–7.7)
PLATELET # BLD AUTO: 121 K/UL (ref 150–350)
PMV BLD AUTO: 10.4 FL (ref 9.2–12.9)
POTASSIUM SERPL-SCNC: 4 MMOL/L (ref 3.5–5.1)
PROT SERPL-MCNC: 7 G/DL (ref 6–8.4)
RBC # BLD AUTO: 4.19 M/UL (ref 4–5.4)
SODIUM SERPL-SCNC: 139 MMOL/L (ref 136–145)
WBC # BLD AUTO: 5.24 K/UL (ref 3.9–12.7)

## 2020-12-01 PROCEDURE — 25000003 PHARM REV CODE 250: Mod: HCNC | Performed by: INTERNAL MEDICINE

## 2020-12-01 PROCEDURE — 63600175 PHARM REV CODE 636 W HCPCS: Mod: HCNC | Performed by: INTERNAL MEDICINE

## 2020-12-01 PROCEDURE — 80053 COMPREHEN METABOLIC PANEL: CPT | Mod: HCNC

## 2020-12-01 PROCEDURE — 85027 COMPLETE CBC AUTOMATED: CPT | Mod: HCNC

## 2020-12-01 PROCEDURE — 96367 TX/PROPH/DG ADDL SEQ IV INF: CPT | Mod: HCNC

## 2020-12-01 PROCEDURE — 96409 CHEMO IV PUSH SNGL DRUG: CPT | Mod: HCNC

## 2020-12-01 PROCEDURE — A4216 STERILE WATER/SALINE, 10 ML: HCPCS | Mod: HCNC | Performed by: INTERNAL MEDICINE

## 2020-12-01 RX ORDER — HEPARIN 100 UNIT/ML
500 SYRINGE INTRAVENOUS
Status: DISCONTINUED | OUTPATIENT
Start: 2020-12-01 | End: 2020-12-01 | Stop reason: HOSPADM

## 2020-12-01 RX ORDER — SODIUM CHLORIDE 0.9 % (FLUSH) 0.9 %
10 SYRINGE (ML) INJECTION
Status: DISCONTINUED | OUTPATIENT
Start: 2020-12-01 | End: 2020-12-01 | Stop reason: HOSPADM

## 2020-12-01 RX ADMIN — SODIUM CHLORIDE 1 MG: 9 INJECTION, SOLUTION INTRAVENOUS at 01:12

## 2020-12-01 RX ADMIN — HEPARIN 500 UNITS: 100 SYRINGE at 01:12

## 2020-12-01 RX ADMIN — Medication 10 ML: at 01:12

## 2020-12-01 RX ADMIN — ERIBULIN MESYLATE 2 MG: 0.5 INJECTION INTRAVENOUS at 01:12

## 2020-12-01 NOTE — PLAN OF CARE
Pt arrived to unit. Blood collected for cbc, cmp. Will proceed with chemo. Tolerated kytril and Halaven. Pt has next appt. Pt discharged from unit in wheelchair, no distress noted.

## 2020-12-08 ENCOUNTER — INFUSION (OUTPATIENT)
Dept: INFUSION THERAPY | Facility: HOSPITAL | Age: 65
End: 2020-12-08
Attending: INTERNAL MEDICINE
Payer: MEDICARE

## 2020-12-08 VITALS
RESPIRATION RATE: 17 BRPM | TEMPERATURE: 98 F | DIASTOLIC BLOOD PRESSURE: 54 MMHG | HEART RATE: 87 BPM | SYSTOLIC BLOOD PRESSURE: 114 MMHG | OXYGEN SATURATION: 97 %

## 2020-12-08 DIAGNOSIS — C50.919 BREAST CANCER: Primary | ICD-10-CM

## 2020-12-08 DIAGNOSIS — C50.111 MALIGNANT NEOPLASM OF CENTRAL PORTION OF RIGHT BREAST IN FEMALE, ESTROGEN RECEPTOR POSITIVE: ICD-10-CM

## 2020-12-08 DIAGNOSIS — Z17.0 MALIGNANT NEOPLASM OF CENTRAL PORTION OF RIGHT BREAST IN FEMALE, ESTROGEN RECEPTOR POSITIVE: ICD-10-CM

## 2020-12-08 DIAGNOSIS — C79.51 METASTATIC CANCER TO SPINE: ICD-10-CM

## 2020-12-08 DIAGNOSIS — C78.7 METASTASIS TO LIVER: ICD-10-CM

## 2020-12-08 LAB
ALBUMIN SERPL BCP-MCNC: 3.1 G/DL (ref 3.5–5.2)
ALP SERPL-CCNC: 239 U/L (ref 55–135)
ALT SERPL W/O P-5'-P-CCNC: 20 U/L (ref 10–44)
ANION GAP SERPL CALC-SCNC: 2 MMOL/L (ref 8–16)
AST SERPL-CCNC: 30 U/L (ref 10–40)
BILIRUB SERPL-MCNC: 0.8 MG/DL (ref 0.1–1)
BUN SERPL-MCNC: 13 MG/DL (ref 8–23)
CALCIUM SERPL-MCNC: 8.9 MG/DL (ref 8.7–10.5)
CHLORIDE SERPL-SCNC: 109 MMOL/L (ref 95–110)
CO2 SERPL-SCNC: 30 MMOL/L (ref 23–29)
CREAT SERPL-MCNC: 0.7 MG/DL (ref 0.5–1.4)
ERYTHROCYTE [DISTWIDTH] IN BLOOD BY AUTOMATED COUNT: 18.6 % (ref 11.5–14.5)
EST. GFR  (AFRICAN AMERICAN): >60 ML/MIN/1.73 M^2
EST. GFR  (NON AFRICAN AMERICAN): >60 ML/MIN/1.73 M^2
GLUCOSE SERPL-MCNC: 118 MG/DL (ref 70–110)
HCT VFR BLD AUTO: 31.8 % (ref 37–48.5)
HGB BLD-MCNC: 9.9 G/DL (ref 12–16)
IMM GRANULOCYTES # BLD AUTO: 0.06 K/UL (ref 0–0.04)
MCH RBC QN AUTO: 27.7 PG (ref 27–31)
MCHC RBC AUTO-ENTMCNC: 31.1 G/DL (ref 32–36)
MCV RBC AUTO: 89 FL (ref 82–98)
NEUTROPHILS # BLD AUTO: 1.9 K/UL (ref 1.8–7.7)
PLATELET # BLD AUTO: 85 K/UL (ref 150–350)
PMV BLD AUTO: 10.2 FL (ref 9.2–12.9)
POTASSIUM SERPL-SCNC: 3.8 MMOL/L (ref 3.5–5.1)
PROT SERPL-MCNC: 6.6 G/DL (ref 6–8.4)
RBC # BLD AUTO: 3.58 M/UL (ref 4–5.4)
SODIUM SERPL-SCNC: 141 MMOL/L (ref 136–145)
WBC # BLD AUTO: 3.08 K/UL (ref 3.9–12.7)

## 2020-12-08 PROCEDURE — 25000003 PHARM REV CODE 250: Mod: HCNC | Performed by: INTERNAL MEDICINE

## 2020-12-08 PROCEDURE — 96367 TX/PROPH/DG ADDL SEQ IV INF: CPT | Mod: HCNC

## 2020-12-08 PROCEDURE — 80053 COMPREHEN METABOLIC PANEL: CPT | Mod: HCNC

## 2020-12-08 PROCEDURE — 96409 CHEMO IV PUSH SNGL DRUG: CPT | Mod: HCNC

## 2020-12-08 PROCEDURE — 63600175 PHARM REV CODE 636 W HCPCS: Mod: JG,HCNC | Performed by: INTERNAL MEDICINE

## 2020-12-08 PROCEDURE — 85027 COMPLETE CBC AUTOMATED: CPT | Mod: HCNC

## 2020-12-08 PROCEDURE — 96372 THER/PROPH/DIAG INJ SC/IM: CPT | Mod: HCNC

## 2020-12-08 PROCEDURE — A4216 STERILE WATER/SALINE, 10 ML: HCPCS | Mod: HCNC | Performed by: INTERNAL MEDICINE

## 2020-12-08 RX ORDER — HEPARIN 100 UNIT/ML
500 SYRINGE INTRAVENOUS
Status: DISCONTINUED | OUTPATIENT
Start: 2020-12-08 | End: 2020-12-08 | Stop reason: HOSPADM

## 2020-12-08 RX ORDER — SODIUM CHLORIDE 0.9 % (FLUSH) 0.9 %
10 SYRINGE (ML) INJECTION
Status: DISCONTINUED | OUTPATIENT
Start: 2020-12-08 | End: 2020-12-08 | Stop reason: HOSPADM

## 2020-12-08 RX ADMIN — HEPARIN 500 UNITS: 100 SYRINGE at 02:12

## 2020-12-08 RX ADMIN — DENOSUMAB 120 MG: 120 INJECTION SUBCUTANEOUS at 02:12

## 2020-12-08 RX ADMIN — GRANISETRON HYDROCHLORIDE 1 MG: 1 INJECTION, SOLUTION INTRAVENOUS at 01:12

## 2020-12-08 RX ADMIN — ERIBULIN MESYLATE 2 MG: 0.5 INJECTION INTRAVENOUS at 02:12

## 2020-12-08 RX ADMIN — Medication 10 ML: at 02:12

## 2020-12-08 NOTE — PLAN OF CARE
Pt arrived to unit. VSS. Blood collected for cbc, cmp. Dr. Schroeder reviewed and will proceed with chemo. Tolerated premed and Halaven. Xgeva given. Pt has next appts. Pt discharged in wheelchair, accompanied by MA. No distress noted.

## 2020-12-09 ENCOUNTER — OFFICE VISIT (OUTPATIENT)
Dept: PODIATRY | Facility: CLINIC | Age: 65
End: 2020-12-09
Payer: MEDICARE

## 2020-12-09 VITALS — HEIGHT: 64 IN | WEIGHT: 148.56 LBS | BODY MASS INDEX: 25.36 KG/M2

## 2020-12-09 DIAGNOSIS — M79.676 PAIN AROUND TOENAIL: ICD-10-CM

## 2020-12-09 DIAGNOSIS — M79.675 TOE PAIN, LEFT: ICD-10-CM

## 2020-12-09 DIAGNOSIS — B07.0 PLANTAR WART: Primary | ICD-10-CM

## 2020-12-09 PROCEDURE — 1125F PR PAIN SEVERITY QUANTIFIED, PAIN PRESENT: ICD-10-PCS | Mod: HCNC,S$GLB,, | Performed by: PODIATRIST

## 2020-12-09 PROCEDURE — 88305 TISSUE EXAM BY PATHOLOGIST: ICD-10-PCS | Mod: 26,HCNC,, | Performed by: PATHOLOGY

## 2020-12-09 PROCEDURE — 11104 PR PUNCH BIOPSY, SKIN, SINGLE LESION: ICD-10-PCS | Mod: HCNC,S$GLB,, | Performed by: PODIATRIST

## 2020-12-09 PROCEDURE — 99499 UNLISTED E&M SERVICE: CPT | Mod: HCNC,S$GLB,, | Performed by: PODIATRIST

## 2020-12-09 PROCEDURE — 1125F AMNT PAIN NOTED PAIN PRSNT: CPT | Mod: HCNC,S$GLB,, | Performed by: PODIATRIST

## 2020-12-09 PROCEDURE — 88342 IMHCHEM/IMCYTCHM 1ST ANTB: CPT | Mod: 26,HCNC,, | Performed by: PATHOLOGY

## 2020-12-09 PROCEDURE — 3008F BODY MASS INDEX DOCD: CPT | Mod: HCNC,CPTII,S$GLB, | Performed by: PODIATRIST

## 2020-12-09 PROCEDURE — 88305 TISSUE EXAM BY PATHOLOGIST: CPT | Mod: 26,HCNC,, | Performed by: PATHOLOGY

## 2020-12-09 PROCEDURE — 99999 PR PBB SHADOW E&M-EST. PATIENT-LVL IV: CPT | Mod: PBBFAC,HCNC,, | Performed by: PODIATRIST

## 2020-12-09 PROCEDURE — 3008F PR BODY MASS INDEX (BMI) DOCUMENTED: ICD-10-PCS | Mod: HCNC,CPTII,S$GLB, | Performed by: PODIATRIST

## 2020-12-09 PROCEDURE — 88342 IMHCHEM/IMCYTCHM 1ST ANTB: CPT | Mod: HCNC | Performed by: PATHOLOGY

## 2020-12-09 PROCEDURE — 99499 NO LOS: ICD-10-PCS | Mod: HCNC,S$GLB,, | Performed by: PODIATRIST

## 2020-12-09 PROCEDURE — 88305 TISSUE EXAM BY PATHOLOGIST: CPT | Mod: HCNC | Performed by: PATHOLOGY

## 2020-12-09 PROCEDURE — 99999 PR PBB SHADOW E&M-EST. PATIENT-LVL IV: ICD-10-PCS | Mod: PBBFAC,HCNC,, | Performed by: PODIATRIST

## 2020-12-09 PROCEDURE — 88342 CHG IMMUNOCYTOCHEMISTRY: ICD-10-PCS | Mod: 26,HCNC,, | Performed by: PATHOLOGY

## 2020-12-09 PROCEDURE — 11104 PUNCH BX SKIN SINGLE LESION: CPT | Mod: HCNC,S$GLB,, | Performed by: PODIATRIST

## 2020-12-11 ENCOUNTER — PATIENT MESSAGE (OUTPATIENT)
Dept: OTHER | Facility: OTHER | Age: 65
End: 2020-12-11

## 2020-12-13 DIAGNOSIS — C79.51 METASTATIC CANCER TO SPINE: ICD-10-CM

## 2020-12-13 DIAGNOSIS — C79.51 BONE METASTASIS: ICD-10-CM

## 2020-12-13 DIAGNOSIS — C50.111 MALIGNANT NEOPLASM OF CENTRAL PORTION OF RIGHT FEMALE BREAST, UNSPECIFIED ESTROGEN RECEPTOR STATUS: ICD-10-CM

## 2020-12-14 ENCOUNTER — LAB VISIT (OUTPATIENT)
Dept: LAB | Facility: HOSPITAL | Age: 65
End: 2020-12-14
Attending: INTERNAL MEDICINE
Payer: MEDICARE

## 2020-12-14 DIAGNOSIS — C50.111 MALIGNANT NEOPLASM OF CENTRAL PORTION OF RIGHT BREAST IN FEMALE, ESTROGEN RECEPTOR POSITIVE: ICD-10-CM

## 2020-12-14 DIAGNOSIS — Z17.0 MALIGNANT NEOPLASM OF CENTRAL PORTION OF RIGHT BREAST IN FEMALE, ESTROGEN RECEPTOR POSITIVE: ICD-10-CM

## 2020-12-14 LAB
ALBUMIN SERPL BCP-MCNC: 3.4 G/DL (ref 3.5–5.2)
ALP SERPL-CCNC: 241 U/L (ref 55–135)
ALT SERPL W/O P-5'-P-CCNC: 18 U/L (ref 10–44)
ANION GAP SERPL CALC-SCNC: 5 MMOL/L (ref 8–16)
AST SERPL-CCNC: 26 U/L (ref 10–40)
BASOPHILS # BLD AUTO: ABNORMAL K/UL (ref 0–0.2)
BASOPHILS NFR BLD: 0 % (ref 0–1.9)
BILIRUB SERPL-MCNC: 1.5 MG/DL (ref 0.1–1)
BUN SERPL-MCNC: 6 MG/DL (ref 8–23)
CALCIUM SERPL-MCNC: 9.7 MG/DL (ref 8.7–10.5)
CHLORIDE SERPL-SCNC: 103 MMOL/L (ref 95–110)
CO2 SERPL-SCNC: 30 MMOL/L (ref 23–29)
CREAT SERPL-MCNC: 0.7 MG/DL (ref 0.5–1.4)
DIFFERENTIAL METHOD: ABNORMAL
EOSINOPHIL # BLD AUTO: ABNORMAL K/UL (ref 0–0.5)
EOSINOPHIL NFR BLD: 0 % (ref 0–8)
ERYTHROCYTE [DISTWIDTH] IN BLOOD BY AUTOMATED COUNT: 19.1 % (ref 11.5–14.5)
EST. GFR  (AFRICAN AMERICAN): >60 ML/MIN/1.73 M^2
EST. GFR  (NON AFRICAN AMERICAN): >60 ML/MIN/1.73 M^2
GLUCOSE SERPL-MCNC: 106 MG/DL (ref 70–110)
HCT VFR BLD AUTO: 32.3 % (ref 37–48.5)
HGB BLD-MCNC: 9.9 G/DL (ref 12–16)
IMM GRANULOCYTES # BLD AUTO: ABNORMAL K/UL (ref 0–0.04)
IMM GRANULOCYTES NFR BLD AUTO: ABNORMAL % (ref 0–0.5)
LYMPHOCYTES # BLD AUTO: ABNORMAL K/UL (ref 1–4.8)
LYMPHOCYTES NFR BLD: 35 % (ref 18–48)
MCH RBC QN AUTO: 27.1 PG (ref 27–31)
MCHC RBC AUTO-ENTMCNC: 30.7 G/DL (ref 32–36)
MCV RBC AUTO: 89 FL (ref 82–98)
METAMYELOCYTES NFR BLD MANUAL: 2 %
MONOCYTES # BLD AUTO: ABNORMAL K/UL (ref 0.3–1)
MONOCYTES NFR BLD: 2 % (ref 4–15)
NEUTROPHILS NFR BLD: 34 % (ref 38–73)
NEUTS BAND NFR BLD MANUAL: 27 %
NRBC BLD-RTO: 1 /100 WBC
PLATELET # BLD AUTO: 87 K/UL (ref 150–350)
PMV BLD AUTO: 12.2 FL (ref 9.2–12.9)
POTASSIUM SERPL-SCNC: 4 MMOL/L (ref 3.5–5.1)
PROT SERPL-MCNC: 6.8 G/DL (ref 6–8.4)
RBC # BLD AUTO: 3.65 M/UL (ref 4–5.4)
SODIUM SERPL-SCNC: 138 MMOL/L (ref 136–145)
WBC # BLD AUTO: 6.63 K/UL (ref 3.9–12.7)

## 2020-12-14 PROCEDURE — 85007 BL SMEAR W/DIFF WBC COUNT: CPT | Mod: HCNC

## 2020-12-14 PROCEDURE — 86300 IMMUNOASSAY TUMOR CA 15-3: CPT | Mod: HCNC

## 2020-12-14 PROCEDURE — 80053 COMPREHEN METABOLIC PANEL: CPT | Mod: HCNC

## 2020-12-14 PROCEDURE — 85027 COMPLETE CBC AUTOMATED: CPT | Mod: HCNC

## 2020-12-14 PROCEDURE — 36415 COLL VENOUS BLD VENIPUNCTURE: CPT | Mod: HCNC

## 2020-12-14 RX ORDER — MORPHINE SULFATE 200 MG/1
200 TABLET, FILM COATED, EXTENDED RELEASE ORAL EVERY 8 HOURS
Qty: 90 TABLET | Refills: 0 | Status: SHIPPED | OUTPATIENT
Start: 2020-12-14 | End: 2021-01-22 | Stop reason: SDUPTHER

## 2020-12-14 NOTE — PROGRESS NOTES
Subjective:       Patient ID: Rebecca Crain is a 65 y.o. female.    Chief Complaint: No chief complaint on file.    HPI Ms. Crain returns for followup of metastatic carcinoma of the right breast.  She is currently on eribulin therapy.  She has had 14 cycles thus far.      The patient location is: home.  The chief complaint leading to consultation is: breast cancer.    Visit type: audiovisual    Face to Face time with patient: 10 minutes  15 minutes of total time spent on the encounter, which includes face to face time and non-face to face time preparing to see the patient (eg, review of tests), Obtaining and/or reviewing separately obtained history, Documenting clinical information in the electronic or other health record, Independently interpreting results (not separately reported) and communicating results to the patient/family/caregiver, or Care coordination (not separately reported).         Each patient to whom he or she provides medical services by telemedicine is:  (1) informed of the relationship between the physician and patient and the respective role of any other health care provider with respect to management of the patient; and (2) notified that he or she may decline to receive medical services by telemedicine and may withdraw from such care at any time.    Notes:   Today she reports that she has had some exertional dyspnea recently which is a new symptom.  She has had no coughing or congestion.  Her pain control has been good.  She is needing no breakthrough medication.  She did had 2 additional falls, tripping at 1 time and slipping on some wet surface on the other occasion.  Her neuropathy is worse now up to her elbows and knees.    ECOG-1-2    Breast history: In 2013 she was diagnosed with stage IIB carcinoma of the right breast, ER positive with a low Oncotype score.  She was enrolled on protocol  and randomized to endocrine therapy alone.  She was tried on all 3 aromatase inhibitors and  had itching and rash to all of them.  In August 2013 she was started on tamoxifen.   She was found to have  bone metastasis in May 2015, 2015.  A subsequent bone biopsy was performed on a lesion at the T8 vertebra.   The pathology from that was consistent with metastatic breast cancer, ER +80%, WA +80%, and HER-2 negative.     STRATA  -PIK3CA mutation, FGFR2 mutation, ESR1 mutation     She received letrozole plus palbociclib from July 2015 until May 2016.  She also received bone protective therapy with Xgeva.      Faslodex was started in June 2016.      Exemestane and Afinitor started in August 2017.  That was discontinued in February 2018 due to progression in the liver and bone.     Navelbine was started February 16, 2018.  She had 8 cycles for that.  Scans in November 2018 showed progression as follows     Capecitabine was started in December 2018.  She received 5 cycles of that therapy.    Follow-up scans in April 2019 showed progression with a new hepatic lesion and worsening bone disease.    She began clinical trial therapy with erdafitinib on May 8, 2019.  That was discontinued in September 2019 due to progression in the bone.    She then received a course of radiation therapy to her pelvis.  She began weekly Taxol in October 2019.That was discontinued in December 2019 due to progression.    She began eribulin therapy on 1/15/2020.    CT results from October 12, 2020-CT - The 2 index lesions, 1 in the dome of the right lobe of the liver measures 1.7 cm (1.5 cm on the recent study of 08/10/2020 ).               The 2nd lesion in the segment 5 measures 1.7 cm similar to the prior exam.             A third lesion in the left lobe near the dome of the diaphragm is not well defined measures 2.8 cm previously 2.7 on 08/10/2020.     Extensive metastatic disease throughout the spine, pelvic bones, femurs and ribs essentially appears stable.      Review of Systems   Constitutional: Positive for fatigue. Negative for  activity change, appetite change, fever and unexpected weight change.   HENT: Negative for mouth sores and trouble swallowing.    Eyes: Negative for visual disturbance.   Respiratory: Positive for shortness of breath. Negative for cough.    Cardiovascular: Negative for chest pain.   Gastrointestinal: Negative for abdominal pain, constipation and diarrhea.   Genitourinary: Negative for frequency.   Musculoskeletal: Positive for back pain (controlled).   Skin: Negative for rash.   Neurological: Positive for numbness (to elbows and knees). Negative for headaches.   Hematological: Negative for adenopathy.   Psychiatric/Behavioral: Negative for dysphoric mood. The patient is not nervous/anxious.        Objective:      Physical Exam  Constitutional:       General: She is not in acute distress.     Appearance: Normal appearance.   Neurological:      Mental Status: She is alert and oriented to person, place, and time.   Psychiatric:         Mood and Affect: Mood normal.         Behavior: Behavior normal.         Thought Content: Thought content normal.         Judgment: Judgment normal.         Assessment:     CBC   1. Malignant neoplasm of central portion of right breast in female, estrogen receptor positive    2. Bone metastasis    3. Metastasis to liver      4.  Chronic bronchitis  Plan:       Will delay her therapy 2 weeks  Continue current therapy with cycle 15 of eribulin.        Return in 5 weeks.  Repeat scans after this cycle

## 2020-12-14 NOTE — PROGRESS NOTES
Subjective:      Patient ID: Rebecca Crain is a 65 y.o. female.    Chief Complaint: Nail Problem (left 3rd toe)    Rebecca is a 65 y.o. female who presents to the clinic complaining of thick and discolored toenails on the left foot Left third toe. . Rebecca is inquiring about treatment options.    12/9/20: Reports continue left third toenail pain laterally. Reports applying compound W which made pain worse.     Review of Systems   Constitution: Negative for chills.   Cardiovascular: Negative for chest pain and claudication.   Respiratory: Negative for cough.    Skin: Positive for color change, dry skin and nail changes.   Musculoskeletal: Positive for joint pain.   Gastrointestinal: Negative for nausea.   Neurological: Positive for paresthesias. Negative for numbness.   Psychiatric/Behavioral: The patient is not nervous/anxious.            Objective:      Physical Exam  Constitutional:       Appearance: She is well-developed.      Comments: Oriented to time, place, and person.   Cardiovascular:      Comments: DP and PT pulses are palpable bilaterally. 3 sec capillary refill time and toes and feet are warm to touch proximally .  There is  hair growth on the feet and toes b/l. There is no edema b/l. No spider veins or varicosities present b/l.     Musculoskeletal:      Comments: Equinus noted b/l ankles with < 10 deg DF noted. MMT 5/5 in DF/PF/Inv/Ev resistance with no reproduction of pain in any direction. Passive range of motion of ankle and pedal joints is painless b/l.     Feet:      Right foot:      Skin integrity: No callus or dry skin.      Left foot:      Skin integrity: No callus or dry skin.   Lymphadenopathy:      Comments: Negative lymphadenopathy bilateral popliteal fossa and tarsal tunnel.   Skin:     Comments: No open lesions, lacerations or wounds noted.Interdigital spaces clean, dry and intact b/l. No erythema noted to b/l foot.    Lateral nail margin left third toe   with ingrown nail plate. No  purulent drainage noted.        Discrete papule noted to left third toe  With visible thrombosed capillaries and divergent skin lines. Mild erythema noted.            Neurological:      Mental Status: She is alert.      Comments: Light touch, proprioception, and sharp/dull sensation are all intact bilaterally. Protective threshold with the [place]-Wienstein monofilament is intact bilaterally.    Psychiatric:         Behavior: Behavior is cooperative.               Assessment:       Encounter Diagnoses   Name Primary?    Plantar wart Yes    Toe pain, left          Plan:       Rebecca was seen today for nail problem.    Diagnoses and all orders for this visit:    Plantar wart  -     Specimen to Pathology Other    Toe pain, left  -     Specimen to Pathology Other      I counseled the patient on her conditions, their implications and medical management.      Instructed patient on the importance of keeping feet dry. Patient instructed to use absorbent cotton socks and change them if they become sweaty; or wear an open-toe shoe or sandal. Wash the feet at least once a day with soap and water. Patient instructed to use lysol or over-the-counter antifungal powders or sprays to shoes daily and allow them to air dry, switching shoes from every other day would be optimal. Patient is to avoid barefoot walking in high-risk environments (public showers, gyms and locker rooms) may prevent future infections.     Discussed treatment options with patient. Options included soaking, avulsion and matrixectomy. Risks and benefits discussed and all questions were answered. The patient wishes to proceed with  Nail avulsion at a later date      In depth conversation on the treatment of ingrown nail; partial nail avulsion vs chemical matrixectomy vs conservative treatment of soaking and nail trimming    Utilizing sterile toenail clippers I aggressively trimmed  the offending Left third toenail lateral  border approximately 3 mm from its edge  "and carried the nail plate incision down at an angle in order to wedge out the offending cryptotic portion of the nail plate. The offending border was then removed in toto. The remaining nail was grinded down with an electric  down to nail bed.  No blood was drawn. Patient tolerated the procedure well and related significant relief..      LORENZO ordered. - WNL.     Punch  Biopsy    Written consent obtained from the patient. The patient was brought to the clinic room where 1:1 mixture 2% Lido P  0.5% marcaine plain  local anesthetic  was injected into the left third toe. .  A "time out" was performed to ensure the correct surgical site was being operated on.  The patient was prepped and draped in the usual sterile fashion. A #15 blade was used to remove superficial layer of HPK, sent for pathology. Next a 3mm punch was used to obtain specimen.  The specimen was sent to Pathology. Hemostasis was achieved with direct pressure , 3-0 nylon used to close site. .Betadine applied, football dressing applied.     Assistant: none   Estimated blood loss: <2ml                "

## 2020-12-15 ENCOUNTER — OFFICE VISIT (OUTPATIENT)
Dept: HEMATOLOGY/ONCOLOGY | Facility: CLINIC | Age: 65
End: 2020-12-15
Payer: MEDICARE

## 2020-12-15 DIAGNOSIS — Z17.0 MALIGNANT NEOPLASM OF CENTRAL PORTION OF RIGHT BREAST IN FEMALE, ESTROGEN RECEPTOR POSITIVE: Primary | ICD-10-CM

## 2020-12-15 DIAGNOSIS — C50.111 MALIGNANT NEOPLASM OF CENTRAL PORTION OF RIGHT BREAST IN FEMALE, ESTROGEN RECEPTOR POSITIVE: Primary | ICD-10-CM

## 2020-12-15 DIAGNOSIS — C79.51 BONE METASTASIS: ICD-10-CM

## 2020-12-15 DIAGNOSIS — C78.7 METASTASIS TO LIVER: ICD-10-CM

## 2020-12-15 PROCEDURE — 99213 PR OFFICE/OUTPT VISIT, EST, LEVL III, 20-29 MIN: ICD-10-PCS | Mod: HCNC,95,, | Performed by: INTERNAL MEDICINE

## 2020-12-15 PROCEDURE — 99213 OFFICE O/P EST LOW 20 MIN: CPT | Mod: HCNC,95,, | Performed by: INTERNAL MEDICINE

## 2020-12-15 NOTE — Clinical Note
Start next cycle of eribulin the week of December 28th-no labs on that date([facility])  See me 3 weeks later with CT scans, CBC, CMP, CA 27.29

## 2020-12-16 LAB
CANCER AG27-29 SERPL-ACNC: 127 U/ML
FINAL PATHOLOGIC DIAGNOSIS: NORMAL
GROSS: NORMAL
MICROSCOPIC EXAM: NORMAL

## 2020-12-17 ENCOUNTER — OFFICE VISIT (OUTPATIENT)
Dept: PODIATRY | Facility: CLINIC | Age: 65
End: 2020-12-17
Payer: MEDICARE

## 2020-12-17 VITALS
WEIGHT: 148 LBS | BODY MASS INDEX: 25.27 KG/M2 | SYSTOLIC BLOOD PRESSURE: 136 MMHG | HEIGHT: 64 IN | DIASTOLIC BLOOD PRESSURE: 87 MMHG

## 2020-12-17 DIAGNOSIS — Z98.890 S/P BIOPSY: ICD-10-CM

## 2020-12-17 DIAGNOSIS — M79.675 TOE PAIN, LEFT: Primary | ICD-10-CM

## 2020-12-17 PROCEDURE — 99999 PR PBB SHADOW E&M-EST. PATIENT-LVL IV: ICD-10-PCS | Mod: PBBFAC,HCNC,, | Performed by: PODIATRIST

## 2020-12-17 PROCEDURE — 3008F PR BODY MASS INDEX (BMI) DOCUMENTED: ICD-10-PCS | Mod: HCNC,CPTII,S$GLB, | Performed by: PODIATRIST

## 2020-12-17 PROCEDURE — 3075F SYST BP GE 130 - 139MM HG: CPT | Mod: HCNC,CPTII,S$GLB, | Performed by: PODIATRIST

## 2020-12-17 PROCEDURE — 3079F PR MOST RECENT DIASTOLIC BLOOD PRESSURE 80-89 MM HG: ICD-10-PCS | Mod: HCNC,CPTII,S$GLB, | Performed by: PODIATRIST

## 2020-12-17 PROCEDURE — 99999 PR PBB SHADOW E&M-EST. PATIENT-LVL IV: CPT | Mod: PBBFAC,HCNC,, | Performed by: PODIATRIST

## 2020-12-17 PROCEDURE — 99212 PR OFFICE/OUTPT VISIT, EST, LEVL II, 10-19 MIN: ICD-10-PCS | Mod: HCNC,S$GLB,, | Performed by: PODIATRIST

## 2020-12-17 PROCEDURE — 1125F AMNT PAIN NOTED PAIN PRSNT: CPT | Mod: HCNC,S$GLB,, | Performed by: PODIATRIST

## 2020-12-17 PROCEDURE — 3079F DIAST BP 80-89 MM HG: CPT | Mod: HCNC,CPTII,S$GLB, | Performed by: PODIATRIST

## 2020-12-17 PROCEDURE — 3075F PR MOST RECENT SYSTOLIC BLOOD PRESS GE 130-139MM HG: ICD-10-PCS | Mod: HCNC,CPTII,S$GLB, | Performed by: PODIATRIST

## 2020-12-17 PROCEDURE — 99212 OFFICE O/P EST SF 10 MIN: CPT | Mod: HCNC,S$GLB,, | Performed by: PODIATRIST

## 2020-12-17 PROCEDURE — 1125F PR PAIN SEVERITY QUANTIFIED, PAIN PRESENT: ICD-10-PCS | Mod: HCNC,S$GLB,, | Performed by: PODIATRIST

## 2020-12-17 PROCEDURE — 3008F BODY MASS INDEX DOCD: CPT | Mod: HCNC,CPTII,S$GLB, | Performed by: PODIATRIST

## 2020-12-18 ENCOUNTER — OFFICE VISIT (OUTPATIENT)
Dept: URGENT CARE | Facility: CLINIC | Age: 65
End: 2020-12-18
Payer: MEDICARE

## 2020-12-18 VITALS
BODY MASS INDEX: 25.27 KG/M2 | OXYGEN SATURATION: 98 % | HEART RATE: 71 BPM | TEMPERATURE: 99 F | SYSTOLIC BLOOD PRESSURE: 134 MMHG | WEIGHT: 148 LBS | DIASTOLIC BLOOD PRESSURE: 79 MMHG | HEIGHT: 64 IN

## 2020-12-18 DIAGNOSIS — R09.81 NASAL CONGESTION: Primary | ICD-10-CM

## 2020-12-18 LAB
CTP QC/QA: YES
SARS-COV-2 RDRP RESP QL NAA+PROBE: NEGATIVE

## 2020-12-18 PROCEDURE — 99214 PR OFFICE/OUTPT VISIT, EST, LEVL IV, 30-39 MIN: ICD-10-PCS | Mod: S$GLB,CS,, | Performed by: PHYSICIAN ASSISTANT

## 2020-12-18 PROCEDURE — U0002: ICD-10-PCS | Mod: QW,S$GLB,, | Performed by: PHYSICIAN ASSISTANT

## 2020-12-18 PROCEDURE — 3008F PR BODY MASS INDEX (BMI) DOCUMENTED: ICD-10-PCS | Mod: CPTII,S$GLB,, | Performed by: PHYSICIAN ASSISTANT

## 2020-12-18 PROCEDURE — 3008F BODY MASS INDEX DOCD: CPT | Mod: CPTII,S$GLB,, | Performed by: PHYSICIAN ASSISTANT

## 2020-12-18 PROCEDURE — 99214 OFFICE O/P EST MOD 30 MIN: CPT | Mod: S$GLB,CS,, | Performed by: PHYSICIAN ASSISTANT

## 2020-12-18 PROCEDURE — U0002 COVID-19 LAB TEST NON-CDC: HCPCS | Mod: QW,S$GLB,, | Performed by: PHYSICIAN ASSISTANT

## 2020-12-18 RX ORDER — BENZONATATE 100 MG/1
200 CAPSULE ORAL 3 TIMES DAILY PRN
Qty: 30 CAPSULE | Refills: 1 | Status: SHIPPED | OUTPATIENT
Start: 2020-12-18 | End: 2020-12-25

## 2020-12-18 RX ORDER — CETIRIZINE HYDROCHLORIDE 10 MG/1
10 TABLET ORAL DAILY
Qty: 30 TABLET | Refills: 0 | Status: ON HOLD | OUTPATIENT
Start: 2020-12-18 | End: 2021-08-25 | Stop reason: HOSPADM

## 2020-12-18 RX ORDER — FLUTICASONE PROPIONATE 50 MCG
2 SPRAY, SUSPENSION (ML) NASAL DAILY
Qty: 9.9 ML | Refills: 0 | Status: ON HOLD | OUTPATIENT
Start: 2020-12-18 | End: 2021-08-25 | Stop reason: HOSPADM

## 2020-12-19 NOTE — PATIENT INSTRUCTIONS
"    If your condition worsens or fails to improve we recommend that you receive another evaluation at the ER immediately or contact your PCP to discuss your concerns or return here. You must understand that you've received an urgent care treatment only and that you may be released before all your medical problems are known or treated. You the patient will arrange for followup care as instructed.       If your condition worsens or fails to improve we recommend that you receive another evaluation at the ER immediately or contact your PCP to discuss your concerns or return here. You must understand that you've received an urgent care treatment only and that you may be released before all your medical problems are known or treated. You the patient will arrange for follouwp care as instructed.     If we discussed that I think your illness is viral, it will not respond to antibiotics and will last 5-7 days.    -  You can take prescribed cough medication as directed as needed for cough.    -  Flonase (fluticasone) is a nasal spray which  may help with your symptoms     -  If you just have drainage you can take plain Zyrtec, Claritin or Allegra   -  Zyrtec D, Claritin D or allegra D can also help with symptoms of congestion and drainage.   -  If you have hypertension avoid using the "D" which is the decongestant. Instead, you can use Coricidin HBP for your cold and cough symptoms.       Rest and fluids are also important.     -  Tylenol or ibuprofen can also be used as directed for pain unless you have an allergy to them or medical condition such as stomach ulcers, kidney or liver disease or blood thinners etc for which you should not be taking these type of medications.           Sinusitis (No Antibiotics)    The sinuses are air-filled spaces within the bones of the face. They connect to the inside of the nose. Sinusitis is an inflammation of the tissue lining the sinus cavity. Sinus inflammation can occur during a " cold. It can also be due to allergies to pollens and other particles in the air. It can cause symptoms such as sinus congestion, headache, sore throat, facial swelling and fullness. It may also cause a low-grade fever. No infection is present, and no antibiotic treatment is needed.  Home care  · Drink plenty of water, hot tea, and other liquids. This may help thin mucus. It also may promote sinus drainage.  · Heat may help soothe painful areas of the face. Use a towel soaked in hot water. Or,  the shower and direct the hot spray onto your face. Using a vaporizer along with a menthol rub at night may also help.   · An expectorant containing guaifenesin may help thin the mucus and promote drainage from the sinuses.  · Over-the-counter decongestants may be used unless a similar medicine was prescribed. Nasal sprays work the fastest. Use one that contains phenylephrine or oxymetazoline. First blow the nose gently. Then use the spray. Do not use these medicines more often than directed on the label or symptoms may get worse. You may also use tablets containing pseudoephedrine. Avoid products that combine ingredients, because side effects may be increased. Read labels. You can also ask the pharmacist for help. (NOTE: Persons with high blood pressure should not use decongestants. They can raise blood pressure.)  · Over-the-counter antihistamines may help if allergies contributed to your sinusitis.    · Use acetaminophen or ibuprofen to control pain, unless another pain medicine was prescribed. (If you have chronic liver or kidney disease or ever had a stomach ulcer, talk with your doctor before using these medicines. Aspirin should never be used in anyone under 18 years of age who is ill with a fever. It may cause severe liver damage.)  · Use nasal rinses or irrigation as instructed by your health care provider.  · Don't smoke. This can worsen symptoms.  Follow-up care  Follow up with your healthcare provider or  our staff if you are not improving within the next week.  When to seek medical advice  Call your healthcare provider if any of these occur:  · Green or yellow discharge from the nose or into the throat  · Facial pain or headache becoming more severe  · Stiff neck  · Unusual drowsiness or confusion  · Swelling of the forehead or eyelids  · Vision problems, including blurred or double vision  · Fever of 100.4ºF (38ºC) or higher, or as directed by your healthcare provider  · Seizure  · Breathing problems  · Symptoms not resolving within 10 days  Date Last Reviewed: 4/13/2015  © 2466-5377 [employer]. [address] [place] [address], [place] [address]. All rights reserved. This information is not intended as a substitute for professional medical care. Always follow your healthcare professional's instructions.        Self-Care for Sinusitis     Drinking plenty of water can help sinuses drain.   Sinusitis can often be managed with self-care. Self-care can keep sinuses moist and make you feel more comfortable. Remember to follow your doctor's instructions closely. This can make a big difference in getting your sinus problem under control.  Drink fluids  Drinking extra fluids helps thin your mucus. This lets it drain from your sinuses more easily. Have a glass of water every hour or two. A humidifier helps in much the same way. Fluids can also offset the drying effects of certain medicines. If you use a humidifier, follow the product maker's instructions on how to use it. Clean it on a regular schedule.  Use saltwater rinses  Rinses help keep your sinuses and nose moist. Mix a teaspoon of salt in 8 ounces of fresh, warm water. Use a bulb syringe to gently squirt the water into your nose a few times a day. You can also buy ready-made saline nasal sprays.  Apply hot or cold packs  Applying heat to the area surrounding your sinuses may make you feel more comfortable. Use a hot water bottle or a hand towel dipped in hot  water. Some people also find ice packs effective for relieving pain.  Medicines  Your doctor may prescribe medications to help treat your sinusitis. If you have an infection, antibiotics can help clear it up. If you are prescribed antibiotics, take all pills on schedule until they are gone, even if you feel better. Decongestants help relieve swelling. Use decongestant sprays for short periods only under the direction of your doctor. If you have allergies, your doctor may prescribe medications to help relieve them.   Date Last Reviewed: 10/1/2016  © 4199-8715 [employer]. [address] [place] [address]. All rights reserved. This information is not intended as a substitute for professional medical care. Always follow your healthcare professional's instructions.

## 2020-12-19 NOTE — PROGRESS NOTES
"Subjective:       Patient ID: Rebecca Crain is a 65 y.o. female.    Vitals:  height is 5' 4" (1.626 m) and weight is 67.1 kg (148 lb). Her temperature is 98.8 °F (37.1 °C). Her blood pressure is 134/79 and her pulse is 71. Her oxygen saturation is 98%.     Chief Complaint: COVID-19 Concerns    Pt presents with sinus congestion,rhinorrhea, PND, and problems with taste and smell that began this morning. Denies known exposure to COVID-19.  At home treatments tried: Claritin with mild relief.    Sinus Problem  This is a new problem. The current episode started today. Associated symptoms include congestion. Pertinent negatives include no chills, coughing, diaphoresis, ear pain, headaches, neck pain, shortness of breath, sinus pressure, sneezing or sore throat.       Constitution: Negative for chills, sweating, fatigue, fever and generalized weakness.   HENT: Positive for congestion and postnasal drip. Negative for ear pain, sinus pain, sinus pressure, sore throat and trouble swallowing.         Rhinorrhea  Loss of taste and smell   Neck: Negative for neck pain, neck stiffness and painful lymph nodes.   Cardiovascular: Negative for chest pain, leg swelling and palpitations.   Eyes: Negative for double vision and blurred vision.   Respiratory: Negative for chest tightness, cough, sputum production, shortness of breath, stridor and wheezing.    Gastrointestinal: Negative for abdominal pain, nausea, vomiting and diarrhea.   Genitourinary: Negative for dysuria, frequency, urgency and history of kidney stones.   Musculoskeletal: Negative for joint pain, joint swelling, muscle cramps and muscle ache.   Skin: Negative for color change, pale, rash and bruising.   Allergic/Immunologic: Negative for seasonal allergies, itching and sneezing.   Neurological: Negative for dizziness, history of vertigo, light-headedness, passing out, headaches, numbness and tingling.   Hematologic/Lymphatic: Negative for swollen lymph nodes. "   Psychiatric/Behavioral: Negative for nervous/anxious, sleep disturbance and depression. The patient is not nervous/anxious.        Objective:      Physical Exam   Constitutional: She is oriented to person, place, and time. She appears well-developed. She is cooperative.  Non-toxic appearance. She does not appear ill. No distress.      Comments:Patient is sitting pleasantly on exam table in no acute distress. Nontoxic appearing. AAOx3   HENT:   Head: Normocephalic and atraumatic.   Ears:   Right Ear: Hearing, tympanic membrane, external ear and ear canal normal.   Left Ear: Hearing, tympanic membrane, external ear and ear canal normal.   Nose: Rhinorrhea and congestion present. No mucosal edema or nasal deformity. No epistaxis. Right sinus exhibits maxillary sinus tenderness. Right sinus exhibits no frontal sinus tenderness. Left sinus exhibits maxillary sinus tenderness. Left sinus exhibits no frontal sinus tenderness.   Mouth/Throat: Uvula is midline and mucous membranes are normal. No trismus in the jaw. Normal dentition. No uvula swelling. Posterior oropharyngeal erythema and cobblestoning (PND noted) present. No oropharyngeal exudate, posterior oropharyngeal edema or tonsillar abscesses. No tonsillar exudate.   Eyes: Pupils are equal, round, and reactive to light. Conjunctivae and lids are normal. No scleral icterus.   Neck: Trachea normal, normal range of motion, full passive range of motion without pain and phonation normal. Neck supple. No neck rigidity. No edema and no erythema present.   Cardiovascular: Normal rate, regular rhythm, normal heart sounds and normal pulses.   Pulmonary/Chest: Effort normal and breath sounds normal. No stridor. No respiratory distress. She has no decreased breath sounds. She has no wheezes. She has no rhonchi. She has no rales.   Abdominal: Normal appearance.   Musculoskeletal: Normal range of motion.         General: No deformity.      Comments: ambulatory with walker for  support   Lymphadenopathy:     She has no cervical adenopathy.   Neurological: She is alert and oriented to person, place, and time. She exhibits normal muscle tone. Coordination normal.   Skin: Skin is warm, dry, intact, not diaphoretic and not pale. Psychiatric: Her speech is normal and behavior is normal. Judgment and thought content normal.   Nursing note and vitals reviewed.        Results for orders placed or performed in visit on 12/18/20   POCT COVID-19 Rapid Screening   Result Value Ref Range    POC Rapid COVID Negative Negative     Acceptable Yes      *Note: Due to a large number of results and/or encounters for the requested time period, some results have not been displayed. A complete set of results can be found in Results Review.       Clinically well appearing, VSS.  Rapid COVID-19 negative. Denies known exposure to COVID-19 positive persons.  Symptoms appear viral in nature. Advised on symptomatic care.  Advised on return/follow-up precautions. Advised on ER precautions. Answered all patient questions. Patient verbalized understanding and voiced agreement with current treatment plan.      Assessment:       1. Nasal congestion        Plan:         Nasal congestion  -     POCT COVID-19 Rapid Screening  -     fluticasone propionate (FLONASE) 50 mcg/actuation nasal spray; 2 sprays (100 mcg total) by Each Nostril route once daily.  Dispense: 9.9 mL; Refill: 0  -     benzonatate (TESSALON PERLES) 100 MG capsule; Take 2 capsules (200 mg total) by mouth 3 (three) times daily as needed for Cough.  Dispense: 30 capsule; Refill: 1  -     cetirizine (ZYRTEC) 10 MG tablet; Take 1 tablet (10 mg total) by mouth once daily.  Dispense: 30 tablet; Refill: 0      Patient Instructions         If your condition worsens or fails to improve we recommend that you receive another evaluation at the ER immediately or contact your PCP to discuss your concerns or return here. You must understand that you've  "received an urgent care treatment only and that you may be released before all your medical problems are known or treated. You the patient will arrange for followup care as instructed.       If your condition worsens or fails to improve we recommend that you receive another evaluation at the ER immediately or contact your PCP to discuss your concerns or return here. You must understand that you've received an urgent care treatment only and that you may be released before all your medical problems are known or treated. You the patient will arrange for follouwp care as instructed.     If we discussed that I think your illness is viral, it will not respond to antibiotics and will last 5-7 days.    -  You can take prescribed cough medication as directed as needed for cough.    -  Flonase (fluticasone) is a nasal spray which  may help with your symptoms     -  If you just have drainage you can take plain Zyrtec, Claritin or Allegra   -  Zyrtec D, Claritin D or allegra D can also help with symptoms of congestion and drainage.   -  If you have hypertension avoid using the "D" which is the decongestant. Instead, you can use Coricidin HBP for your cold and cough symptoms.       Rest and fluids are also important.     -  Tylenol or ibuprofen can also be used as directed for pain unless you have an allergy to them or medical condition such as stomach ulcers, kidney or liver disease or blood thinners etc for which you should not be taking these type of medications.           Sinusitis (No Antibiotics)    The sinuses are air-filled spaces within the bones of the face. They connect to the inside of the nose. Sinusitis is an inflammation of the tissue lining the sinus cavity. Sinus inflammation can occur during a cold. It can also be due to allergies to pollens and other particles in the air. It can cause symptoms such as sinus congestion, headache, sore throat, facial swelling and fullness. It may also cause a low-grade fever. No " infection is present, and no antibiotic treatment is needed.  Home care  · Drink plenty of water, hot tea, and other liquids. This may help thin mucus. It also may promote sinus drainage.  · Heat may help soothe painful areas of the face. Use a towel soaked in hot water. Or,  the shower and direct the hot spray onto your face. Using a vaporizer along with a menthol rub at night may also help.   · An expectorant containing guaifenesin may help thin the mucus and promote drainage from the sinuses.  · Over-the-counter decongestants may be used unless a similar medicine was prescribed. Nasal sprays work the fastest. Use one that contains phenylephrine or oxymetazoline. First blow the nose gently. Then use the spray. Do not use these medicines more often than directed on the label or symptoms may get worse. You may also use tablets containing pseudoephedrine. Avoid products that combine ingredients, because side effects may be increased. Read labels. You can also ask the pharmacist for help. (NOTE: Persons with high blood pressure should not use decongestants. They can raise blood pressure.)  · Over-the-counter antihistamines may help if allergies contributed to your sinusitis.    · Use acetaminophen or ibuprofen to control pain, unless another pain medicine was prescribed. (If you have chronic liver or kidney disease or ever had a stomach ulcer, talk with your doctor before using these medicines. Aspirin should never be used in anyone under 18 years of age who is ill with a fever. It may cause severe liver damage.)  · Use nasal rinses or irrigation as instructed by your health care provider.  · Don't smoke. This can worsen symptoms.  Follow-up care  Follow up with your healthcare provider or our staff if you are not improving within the next week.  When to seek medical advice  Call your healthcare provider if any of these occur:  · Green or yellow discharge from the nose or into the throat  · Facial pain or  headache becoming more severe  · Stiff neck  · Unusual drowsiness or confusion  · Swelling of the forehead or eyelids  · Vision problems, including blurred or double vision  · Fever of 100.4ºF (38ºC) or higher, or as directed by your healthcare provider  · Seizure  · Breathing problems  · Symptoms not resolving within 10 days  Date Last Reviewed: 4/13/2015  © 2142-7539 [employer]. [address] [place] [address], [place] [address]. All rights reserved. This information is not intended as a substitute for professional medical care. Always follow your healthcare professional's instructions.        Self-Care for Sinusitis     Drinking plenty of water can help sinuses drain.   Sinusitis can often be managed with self-care. Self-care can keep sinuses moist and make you feel more comfortable. Remember to follow your doctor's instructions closely. This can make a big difference in getting your sinus problem under control.  Drink fluids  Drinking extra fluids helps thin your mucus. This lets it drain from your sinuses more easily. Have a glass of water every hour or two. A humidifier helps in much the same way. Fluids can also offset the drying effects of certain medicines. If you use a humidifier, follow the product maker's instructions on how to use it. Clean it on a regular schedule.  Use saltwater rinses  Rinses help keep your sinuses and nose moist. Mix a teaspoon of salt in 8 ounces of fresh, warm water. Use a bulb syringe to gently squirt the water into your nose a few times a day. You can also buy ready-made saline nasal sprays.  Apply hot or cold packs  Applying heat to the area surrounding your sinuses may make you feel more comfortable. Use a hot water bottle or a hand towel dipped in hot water. Some people also find ice packs effective for relieving pain.  Medicines  Your doctor may prescribe medications to help treat your sinusitis. If you have an infection, antibiotics can help clear it up. If you are  prescribed antibiotics, take all pills on schedule until they are gone, even if you feel better. Decongestants help relieve swelling. Use decongestant sprays for short periods only under the direction of your doctor. If you have allergies, your doctor may prescribe medications to help relieve them.   Date Last Reviewed: 10/1/2016  © 9530-5935 [employer]. [address] [place] [address]. All rights reserved. This information is not intended as a substitute for professional medical care. Always follow your healthcare professional's instructions.

## 2020-12-22 ENCOUNTER — PATIENT MESSAGE (OUTPATIENT)
Dept: HEMATOLOGY/ONCOLOGY | Facility: CLINIC | Age: 65
End: 2020-12-22

## 2020-12-23 ENCOUNTER — OFFICE VISIT (OUTPATIENT)
Dept: PODIATRY | Facility: CLINIC | Age: 65
End: 2020-12-23
Payer: MEDICARE

## 2020-12-23 ENCOUNTER — SPECIALTY PHARMACY (OUTPATIENT)
Dept: PHARMACY | Facility: CLINIC | Age: 65
End: 2020-12-23

## 2020-12-23 ENCOUNTER — PATIENT MESSAGE (OUTPATIENT)
Dept: PHARMACY | Facility: CLINIC | Age: 65
End: 2020-12-23

## 2020-12-23 VITALS — BODY MASS INDEX: 25.25 KG/M2 | HEIGHT: 64 IN | WEIGHT: 147.94 LBS

## 2020-12-23 DIAGNOSIS — Z98.890 S/P BIOPSY: ICD-10-CM

## 2020-12-23 DIAGNOSIS — M79.675 TOE PAIN, LEFT: Primary | ICD-10-CM

## 2020-12-23 PROCEDURE — 3008F BODY MASS INDEX DOCD: CPT | Mod: HCNC,CPTII,S$GLB, | Performed by: PODIATRIST

## 2020-12-23 PROCEDURE — 99213 OFFICE O/P EST LOW 20 MIN: CPT | Mod: HCNC,S$GLB,, | Performed by: PODIATRIST

## 2020-12-23 PROCEDURE — 3008F PR BODY MASS INDEX (BMI) DOCUMENTED: ICD-10-PCS | Mod: HCNC,CPTII,S$GLB, | Performed by: PODIATRIST

## 2020-12-23 PROCEDURE — 1126F PR PAIN SEVERITY QUANTIFIED, NO PAIN PRESENT: ICD-10-PCS | Mod: HCNC,S$GLB,, | Performed by: PODIATRIST

## 2020-12-23 PROCEDURE — 99999 PR PBB SHADOW E&M-EST. PATIENT-LVL II: ICD-10-PCS | Mod: PBBFAC,HCNC,, | Performed by: PODIATRIST

## 2020-12-23 PROCEDURE — 1126F AMNT PAIN NOTED NONE PRSNT: CPT | Mod: HCNC,S$GLB,, | Performed by: PODIATRIST

## 2020-12-23 PROCEDURE — 99999 PR PBB SHADOW E&M-EST. PATIENT-LVL II: CPT | Mod: PBBFAC,HCNC,, | Performed by: PODIATRIST

## 2020-12-23 PROCEDURE — 99213 PR OFFICE/OUTPT VISIT, EST, LEVL III, 20-29 MIN: ICD-10-PCS | Mod: HCNC,S$GLB,, | Performed by: PODIATRIST

## 2020-12-23 RX ORDER — DOXYCYCLINE 100 MG/1
100 CAPSULE ORAL EVERY 12 HOURS
Qty: 20 CAPSULE | Refills: 0 | Status: SHIPPED | OUTPATIENT
Start: 2020-12-23 | End: 2021-02-03

## 2020-12-23 NOTE — PROGRESS NOTES
Subjective:      Patient ID: Rebecca Crain is a 65 y.o. female.    Chief Complaint: Foot Problem (Left foot ), Foot Pain, and Follow-up    Rebecca is a 65 y.o. female who presents to the clinic complaining of thick and discolored toenails on the left foot Left third toe. . Rebecca is inquiring about treatment options.    12/9/20: Reports continue left third toenail pain laterally. Reports applying compound W which made pain worse.     12/17/20: S/p left third toenail biopsy. Reports improvement in pain left third toe.     Review of Systems   Constitution: Negative for chills.   Cardiovascular: Negative for chest pain and claudication.   Respiratory: Negative for cough.    Skin: Positive for color change, dry skin and nail changes.   Musculoskeletal: Positive for joint pain.   Gastrointestinal: Negative for nausea.   Neurological: Positive for paresthesias. Negative for numbness.   Psychiatric/Behavioral: The patient is not nervous/anxious.            Objective:      Physical Exam  Constitutional:       Appearance: She is well-developed.      Comments: Oriented to time, place, and person.   Cardiovascular:      Comments: DP and PT pulses are palpable bilaterally. 3 sec capillary refill time and toes and feet are warm to touch proximally .  There is  hair growth on the feet and toes b/l. There is no edema b/l. No spider veins or varicosities present b/l.     Musculoskeletal:      Comments: Equinus noted b/l ankles with < 10 deg DF noted. MMT 5/5 in DF/PF/Inv/Ev resistance with no reproduction of pain in any direction. Passive range of motion of ankle and pedal joints is painless b/l.     Feet:      Right foot:      Skin integrity: No callus or dry skin.      Left foot:      Skin integrity: No callus or dry skin.   Lymphadenopathy:      Comments: Negative lymphadenopathy bilateral popliteal fossa and tarsal tunnel.   Skin:     Comments: No open lesions, lacerations or wounds noted.Interdigital spaces clean, dry  and intact b/l. No erythema noted to b/l foot.    S/p left third toe biopsy. Suture intact.                Neurological:      Mental Status: She is alert.      Comments: Light touch, proprioception, and sharp/dull sensation are all intact bilaterally. Protective threshold with the Cope-Wienstein monofilament is intact bilaterally.    Psychiatric:         Behavior: Behavior is cooperative.               Assessment:       Encounter Diagnoses   Name Primary?    Toe pain, left Yes    S/P biopsy          Plan:       Rebecca was seen today for foot problem, foot pain and follow-up.    Diagnoses and all orders for this visit:    Toe pain, left    S/P biopsy      I counseled the patient on her conditions, their implications and medical management.    S/p left 3rd toe biopsy. Suture intact. Plan for suture removal next week. Instructed on daily betadine application cover with band aid.     Instructed patient on the importance of keeping feet dry. Patient instructed to use absorbent cotton socks and change them if they become sweaty; or wear an open-toe shoe or sandal. Wash the feet at least once a day with soap and water. Patient instructed to use lysol or over-the-counter antifungal powders or sprays to shoes daily and allow them to air dry, switching shoes from every other day would be optimal. Patient is to avoid barefoot walking in high-risk environments (public showers, gyms and locker rooms) may prevent future infections.         LORENZO ordered. - WNL.       F/u one week suture removal.

## 2020-12-23 NOTE — TELEPHONE ENCOUNTER
Specialty Pharmacy - Refill Coordination    Specialty Medication Orders Linked to Encounter      Most Recent Value   Medication #1  pegfilgrastim (NEULASTA) 6 mg/0.6 mL injection (Order#[id number], Rx#[id number])        Refill Questions - Documented Responses        Most Recent Value   Relationship to patient of person spoken to?  Self   HIPAA/medical authority confirmed?  Yes   Any changes in contact preferences or allowed representatives?  No   Has the patient had any insurance changes?  No   Has the patient had any changes to specialty medication, dose, or instructions?  No   Has the patient started taking any new medications, herbals, or supplements?  (!) Yes [Patient was prescribed doxycycline to treat foot infection for 10 days. There are no DDIs expected with Neulasta.]   Has the patient been diagnosed with any new medical conditions?  No   Does the patient have any new allergies to medications or foods?  No   Does the patient have any concerns about side effects?  No   Can the patient store medication/sharps container properly (at the correct temperature, away from children/pets, etc.)?  Yes   Can the patient call emergency services (911) in the event of an emergency?  Yes   Does the patient have any concerns or questions about taking or administering this medication as prescribed?  No   How many doses did the patient miss in the past 4 weeks or since the last fill?  0   How many doses does the patient have on hand?  0   How many days does the patient report on hand quantity will last?  0   Does the number of doses/days supply remaining match pharmacy expected amounts?  Yes   Does the patient feel that this medication is effective?  Yes   During the past 4 weeks, has patient missed any activities due to condition or medication?  No   During the past 4 weeks, did patient have any of the following urgent care visits?  Urgent Care [Patient was tested for COVID-19, but test results came back negative.]   How  will the patient receive the medication?  Mail   When does the patient need to receive the medication?  01/06/21   Shipping Address  Home   Address in [facility] confirmed and updated if neccessary?  Yes   Expected Copay ($)  5   Is the patient able to afford the medication copay?  Yes   Payment Method  CC on file   Days supply of Refill  21   Would patient like to speak to a pharmacist?  Yes   Do you want to trigger an intervention?  Yes   Do you want to trigger an additional referral task?  No   Refill activity completed?  Yes   Refill activity plan  Refill scheduled   Shipment/Pickup Date:  12/29/20        Current Outpatient Medications   Medication Sig    AFLURIA QD 2019-20,3YR UP,,PF, 60 mcg (15 mcg x 4)/0.5 mL Syrg TO BE ADMINISTERED BY PHARMACIST FOR IMMUNIZATION    albuterol (PROVENTIL HFA) 90 mcg/actuation inhaler Inhale 2 puffs into the lungs every 6 (six) hours as needed for Wheezing. Rescue    ascorbic acid, vitamin C, (VITAMIN C) 1000 MG tablet Take 1,000 mg by mouth once daily.    aspirin (ECOTRIN) 81 MG EC tablet Take 81 mg by mouth once daily.    benzonatate (TESSALON PERLES) 100 MG capsule Take 2 capsules (200 mg total) by mouth 3 (three) times daily as needed for Cough.    biotin 1 mg tablet Take 1,000 mcg by mouth once daily.    calcium carbonate (OS-UNA) 500 mg calcium (1,250 mg) tablet Take 1 tablet (500 mg total) by mouth once daily. for 5 days    cetirizine (ZYRTEC) 10 MG tablet Take 1 tablet (10 mg total) by mouth once daily.    diphenoxylate-atropine 2.5-0.025 mg (LOMOTIL) 2.5-0.025 mg per tablet Take 1 tablet by mouth 4 (four) times daily as needed for Diarrhea.    doxycycline (VIBRAMYCIN) 100 MG Cap Take 1 capsule (100 mg total) by mouth every 12 (twelve) hours.    duke's soln (benadryl 30 mL, mylanta 30 mL, lidocaine 30 mL, nystatin 30 mL) 120mL TAKE 10 ML BY MOUTH 4 TIMES A DAY    fish oil/borage/flax/om3,6,9 1 (OMEGA 3-6-9 COMPLEX ORAL) Take 1 capsule by mouth once  daily.    fluticasone propionate (FLONASE) 50 mcg/actuation nasal spray 2 sprays (100 mcg total) by Each Nostril route once daily.    gabapentin (NEURONTIN) 300 MG capsule TAKE 1 CAPSULE BY MOUTH THREE TIMES A DAY    HYDROmorphone (DILAUDID) 4 MG tablet Take 1 tablet (4 mg total) by mouth every 4 (four) hours as needed for Pain.    lidocaine-prilocaine (EMLA) cream APPLY TO AFFECTED AREA EVERY DAY AS NEEDED    loperamide (IMODIUM A-D) 2 mg Tab Take 2 mg by mouth 3 (three) times daily as needed.    morphine (MS CONTIN) 200 MG 12 hr tablet Take 1 tablet (200 mg total) by mouth every 8 (eight) hours.    morphine (MS CONTIN) 200 MG 12 hr tablet Take 1 tablet (200 mg total) by mouth every 8 (eight) hours.    ondansetron (ZOFRAN) 4 MG tablet Take 1 tablet (4 mg total) by mouth every 8 (eight) hours as needed for Nausea.    pantoprazole (PROTONIX) 40 MG tablet Take 1 tablet (40 mg total) by mouth once daily.    pegfilgrastim (NEULASTA) 6 mg/0.6 mL injection Inject 0.6 mLs (6 mg total) into the skin once.    phenyleph-min oil-petrolatum 0.25-14-74.9 % Oint Place 1 applicator rectally 4 (four) times daily as needed.    polyethylene glycol (GLYCOLAX) 17 gram PwPk Take 17 g by mouth once daily.    PREVIDENT 5000 DRY MOUTH 1.1 % Gel BRUSH AS DIRECTED    promethazine (PHENERGAN) 12.5 MG Tab Take 1 tablet (12.5 mg total) by mouth 4 (four) times daily.    senna-docusate 8.6-50 mg (PERICOLACE) 8.6-50 mg per tablet Take 1 tablet by mouth once daily.    sertraline (ZOLOFT) 50 MG tablet TAKE 1 TABLET BY MOUTH EVERY DAY    SYNTHROID 175 mcg tablet Take 1 tablet (175 mcg total) by mouth before breakfast.   Last reviewed on 12/23/2020 11:20 AM by Sadie Alejo MA    Review of patient's allergies indicates:   Allergen Reactions    Adhesive Rash     Tape, Paper tape is OK.    Codeine Itching     Itching very bad to upper body only.    Demerol [meperidine] Itching     To upper body only    Iodine and iodide  containing products Anaphylaxis    Penicillins      Ulcers in mouth.    Arimidex [anastrozole] Hives    Aromasin [exemestane]     Clindamycin Itching and Rash    Last reviewed on  12/23/2020 11:20 AM by Sadie Alejo    Patient reports no changes to medications, allergies, or health conditions. She is not in need of a sharps container at this time.    Patient has no questions or concerns at this time. She is aware to contact [facility] if she needs anything.    Tasks added this encounter   1/20/2021 - Refill Call (Auto Added)   Tasks due within next 3 months   No tasks due.     Heath Whyte, PharmD  [facility]  [address] [place] [id number]  Phone: [phone]  Fax: [phone]

## 2020-12-29 ENCOUNTER — INFUSION (OUTPATIENT)
Dept: INFUSION THERAPY | Facility: HOSPITAL | Age: 65
End: 2020-12-29
Attending: INTERNAL MEDICINE
Payer: MEDICARE

## 2020-12-29 VITALS
DIASTOLIC BLOOD PRESSURE: 54 MMHG | RESPIRATION RATE: 17 BRPM | TEMPERATURE: 98 F | OXYGEN SATURATION: 97 % | SYSTOLIC BLOOD PRESSURE: 94 MMHG | HEART RATE: 91 BPM

## 2020-12-29 DIAGNOSIS — C78.7 METASTASIS TO LIVER: ICD-10-CM

## 2020-12-29 DIAGNOSIS — Z17.0 MALIGNANT NEOPLASM OF CENTRAL PORTION OF RIGHT BREAST IN FEMALE, ESTROGEN RECEPTOR POSITIVE: Primary | ICD-10-CM

## 2020-12-29 DIAGNOSIS — C50.111 MALIGNANT NEOPLASM OF CENTRAL PORTION OF RIGHT BREAST IN FEMALE, ESTROGEN RECEPTOR POSITIVE: Primary | ICD-10-CM

## 2020-12-29 DIAGNOSIS — C79.51 METASTATIC CANCER TO SPINE: ICD-10-CM

## 2020-12-29 LAB
ALBUMIN SERPL BCP-MCNC: 3.2 G/DL (ref 3.5–5.2)
ALP SERPL-CCNC: 216 U/L (ref 55–135)
ALT SERPL W/O P-5'-P-CCNC: 13 U/L (ref 10–44)
ANION GAP SERPL CALC-SCNC: 4 MMOL/L (ref 8–16)
AST SERPL-CCNC: 40 U/L (ref 10–40)
BASOPHILS # BLD AUTO: 0.03 K/UL (ref 0–0.2)
BASOPHILS NFR BLD: 0.7 % (ref 0–1.9)
BILIRUB SERPL-MCNC: 0.9 MG/DL (ref 0.1–1)
BUN SERPL-MCNC: 16 MG/DL (ref 8–23)
CALCIUM SERPL-MCNC: 9.4 MG/DL (ref 8.7–10.5)
CHLORIDE SERPL-SCNC: 104 MMOL/L (ref 95–110)
CO2 SERPL-SCNC: 28 MMOL/L (ref 23–29)
CREAT SERPL-MCNC: 0.7 MG/DL (ref 0.5–1.4)
DIFFERENTIAL METHOD: ABNORMAL
EOSINOPHIL # BLD AUTO: 0 K/UL (ref 0–0.5)
EOSINOPHIL NFR BLD: 0.2 % (ref 0–8)
ERYTHROCYTE [DISTWIDTH] IN BLOOD BY AUTOMATED COUNT: 18.9 % (ref 11.5–14.5)
EST. GFR  (AFRICAN AMERICAN): >60 ML/MIN/1.73 M^2
EST. GFR  (NON AFRICAN AMERICAN): >60 ML/MIN/1.73 M^2
GLUCOSE SERPL-MCNC: 106 MG/DL (ref 70–110)
HCT VFR BLD AUTO: 34.9 % (ref 37–48.5)
HGB BLD-MCNC: 11 G/DL (ref 12–16)
IMM GRANULOCYTES # BLD AUTO: 0.01 K/UL (ref 0–0.04)
IMM GRANULOCYTES NFR BLD AUTO: 0.2 % (ref 0–0.5)
LYMPHOCYTES # BLD AUTO: 1 K/UL (ref 1–4.8)
LYMPHOCYTES NFR BLD: 23.7 % (ref 18–48)
MCH RBC QN AUTO: 27.8 PG (ref 27–31)
MCHC RBC AUTO-ENTMCNC: 31.5 G/DL (ref 32–36)
MCV RBC AUTO: 88 FL (ref 82–98)
MONOCYTES # BLD AUTO: 0.5 K/UL (ref 0.3–1)
MONOCYTES NFR BLD: 12.4 % (ref 4–15)
NEUTROPHILS # BLD AUTO: 2.7 K/UL (ref 1.8–7.7)
NEUTROPHILS NFR BLD: 62.8 % (ref 38–73)
NRBC BLD-RTO: 0 /100 WBC
PLATELET # BLD AUTO: 112 K/UL (ref 150–350)
PMV BLD AUTO: 10.4 FL (ref 9.2–12.9)
POTASSIUM SERPL-SCNC: 4.1 MMOL/L (ref 3.5–5.1)
PROT SERPL-MCNC: 6.9 G/DL (ref 6–8.4)
RBC # BLD AUTO: 3.96 M/UL (ref 4–5.4)
SODIUM SERPL-SCNC: 136 MMOL/L (ref 136–145)
WBC # BLD AUTO: 4.26 K/UL (ref 3.9–12.7)

## 2020-12-29 PROCEDURE — 96409 CHEMO IV PUSH SNGL DRUG: CPT | Mod: HCNC

## 2020-12-29 PROCEDURE — 36592 COLLECT BLOOD FROM PICC: CPT | Mod: HCNC

## 2020-12-29 PROCEDURE — 80053 COMPREHEN METABOLIC PANEL: CPT | Mod: HCNC

## 2020-12-29 PROCEDURE — 96367 TX/PROPH/DG ADDL SEQ IV INF: CPT | Mod: HCNC

## 2020-12-29 PROCEDURE — A4216 STERILE WATER/SALINE, 10 ML: HCPCS | Mod: HCNC | Performed by: INTERNAL MEDICINE

## 2020-12-29 PROCEDURE — 25000003 PHARM REV CODE 250: Mod: HCNC | Performed by: INTERNAL MEDICINE

## 2020-12-29 PROCEDURE — 63600175 PHARM REV CODE 636 W HCPCS: Mod: HCNC | Performed by: INTERNAL MEDICINE

## 2020-12-29 PROCEDURE — 85025 COMPLETE CBC W/AUTO DIFF WBC: CPT | Mod: HCNC

## 2020-12-29 RX ORDER — SODIUM CHLORIDE 0.9 % (FLUSH) 0.9 %
10 SYRINGE (ML) INJECTION
Status: DISCONTINUED | OUTPATIENT
Start: 2020-12-29 | End: 2020-12-29 | Stop reason: HOSPADM

## 2020-12-29 RX ORDER — SODIUM CHLORIDE 0.9 % (FLUSH) 0.9 %
10 SYRINGE (ML) INJECTION
Status: CANCELLED | OUTPATIENT
Start: 2020-12-29

## 2020-12-29 RX ORDER — HEPARIN 100 UNIT/ML
500 SYRINGE INTRAVENOUS
Status: DISCONTINUED | OUTPATIENT
Start: 2020-12-29 | End: 2020-12-29 | Stop reason: HOSPADM

## 2020-12-29 RX ORDER — HEPARIN 100 UNIT/ML
500 SYRINGE INTRAVENOUS
Status: CANCELLED | OUTPATIENT
Start: 2020-12-29

## 2020-12-29 RX ADMIN — ERIBULIN MESYLATE 2 MG: 0.5 INJECTION INTRAVENOUS at 02:12

## 2020-12-29 RX ADMIN — HEPARIN 500 UNITS: 100 SYRINGE at 03:12

## 2020-12-29 RX ADMIN — Medication 10 ML: at 03:12

## 2020-12-29 RX ADMIN — GRANISETRON HYDROCHLORIDE 1 MG: 1 INJECTION, SOLUTION INTRAVENOUS at 02:12

## 2020-12-29 NOTE — PROGRESS NOTES
Subjective:      Patient ID: Rebecca Crain is a 65 y.o. female.    Chief Complaint: Follow-up (left toes)    Rebecca is a 65 y.o. female who presents to the clinic complaining of thick and discolored toenails on the left foot Left third toe. . Rebecca is inquiring about treatment options.    12/9/20: Reports continue left third toenail pain laterally. Reports applying compound W which made pain worse.     12/17/20: S/p left third toe biopsy. Reports improvement in pain left third toe.     12/23/20: S/p left third toe biopsy. Reports increase in pain in the last week left third toe.     Review of Systems   Constitution: Negative for chills.   Cardiovascular: Negative for chest pain and claudication.   Respiratory: Negative for cough.    Skin: Positive for color change, dry skin and nail changes.   Musculoskeletal: Positive for joint pain.   Gastrointestinal: Negative for nausea.   Neurological: Positive for paresthesias. Negative for numbness.   Psychiatric/Behavioral: The patient is not nervous/anxious.            Objective:      Physical Exam  Constitutional:       Appearance: She is well-developed.      Comments: Oriented to time, place, and person.   Cardiovascular:      Comments: DP and PT pulses are palpable bilaterally. 3 sec capillary refill time and toes and feet are warm to touch proximally .  There is  hair growth on the feet and toes b/l. There is no edema b/l. No spider veins or varicosities present b/l.     Musculoskeletal:      Comments: Equinus noted b/l ankles with < 10 deg DF noted. MMT 5/5 in DF/PF/Inv/Ev resistance with no reproduction of pain in any direction. Passive range of motion of ankle and pedal joints is painless b/l.     Feet:      Right foot:      Skin integrity: No callus or dry skin.      Left foot:      Skin integrity: No callus or dry skin.   Lymphadenopathy:      Comments: Negative lymphadenopathy bilateral popliteal fossa and tarsal tunnel.   Skin:     Comments: No open  lesions, lacerations or wounds noted.Interdigital spaces clean, dry and intact b/l. No erythema noted to b/l foot.    S/p left third toe biopsy. Suture intact. Mild erythema noted.              Neurological:      Mental Status: She is alert.      Comments: Light touch, proprioception, and sharp/dull sensation are all intact bilaterally. Protective threshold with the [place]ien[place] monofilament is intact bilaterally.    Psychiatric:         Behavior: Behavior is cooperative.               Assessment:       Encounter Diagnoses   Name Primary?    Toe pain, left Yes    S/P biopsy          Plan:       Rebecca was seen today for follow-up.    Diagnoses and all orders for this visit:    Toe pain, left    S/P biopsy    Other orders  -     doxycycline (VIBRAMYCIN) 100 MG Cap; Take 1 capsule (100 mg total) by mouth every 12 (twelve) hours.      I counseled the patient on her conditions, their implications and medical management.    S/p left 3rd toe biopsy. Suture removed. Reports increase in pain left third toe.  Discussed treatment options for painful  Warts See pathology report below.  to include OTC salicylic acid, liquid nitrogen and surgical excision in detail. Mild erythema noted. Rx. Doxycycline sent to pharmacy on file.       Instructed patient on the importance of keeping feet dry. Patient instructed to use absorbent cotton socks and change them if they become sweaty; or wear an open-toe shoe or sandal. Wash the feet at least once a day with soap and water. Patient instructed to use lysol or over-the-counter antifungal powders or sprays to shoes daily and allow them to air dry, switching shoes from every other day would be optimal. Patient is to avoid barefoot walking in high-risk environments (public showers, gyms and locker rooms) may prevent future infections.         LORENZO ordered. - WNL.       F/u 2 weeks. Will plan for candin injection if no improvement in pain.         Specimen to Pathology Other  Order:  "[id number]  Status:  Final result   Visible to patient:  No (not released)   Next appt:  Today at 11:30 AM in [facility] ( [facility])   Dx:  Plantar wart; Toe pain, left  Component 2wk ago   Final Pathologic Diagnosis Skin, left 3rd toe mass, biopsy:   -VERRUCA VULGARIS    Comment: Interp By Lincoln Gutierrez M.D., Signed on 12/16/2020 at 11:24   Gross Patient ID/ Pathology ID:  [id number]   Received in formalin, labeled "left third toe mass," is a 5 x 3 x 2 mm   aggregate of tan brown to dark brown skin fragments. The specimen is   submitted entirely in cassette [id number].   Grossed by: Chela Villa    Microscopic Exam Biopsy sections show fragments of acanthotic epidermis with hypergranulosis,   low papillomatosis, and hyperkeratosis.  There is scant papillary/superficial   dermis seen with mild fibrosis and patchy chronic inflammation.  The   epithelium shows patchy positivity for p16.  Immunohistochemical stain was   reviewed in conjunction with adequate positive control.  Additional H&E   levels were examined.  The epithelium is negative for significant cytologic   pleomorphism or malignancy.                    "

## 2020-12-29 NOTE — PLAN OF CARE
Patient arrived to unit for stat labs and C16D1 Halaven infusion. Pt continues with weakness and peripheral neuropathy to hands/arms feet/legs. Stat labs drawn and reviewed. Pt cleared for chemo per José Manuel Shore MD. Pt to have scans in January. Plan of care reviewed, patient agreeable to plan. Patient tolerated infusion wells.VSS. Discharge instructions reviewed, patient instructed to return 1/5/2021. Patient left off unit in w/c escorted by MA. Patient in NAD at time of discharge.

## 2021-01-03 RX ORDER — SODIUM CHLORIDE 0.9 % (FLUSH) 0.9 %
10 SYRINGE (ML) INJECTION
Status: CANCELLED | OUTPATIENT
Start: 2021-01-05

## 2021-01-03 RX ORDER — HEPARIN 100 UNIT/ML
500 SYRINGE INTRAVENOUS
Status: CANCELLED | OUTPATIENT
Start: 2021-01-05

## 2021-01-05 ENCOUNTER — INFUSION (OUTPATIENT)
Dept: INFUSION THERAPY | Facility: HOSPITAL | Age: 66
End: 2021-01-05
Attending: INTERNAL MEDICINE
Payer: MEDICARE

## 2021-01-05 VITALS
RESPIRATION RATE: 17 BRPM | SYSTOLIC BLOOD PRESSURE: 100 MMHG | OXYGEN SATURATION: 97 % | TEMPERATURE: 98 F | HEART RATE: 93 BPM | DIASTOLIC BLOOD PRESSURE: 59 MMHG

## 2021-01-05 DIAGNOSIS — Z17.0 MALIGNANT NEOPLASM OF CENTRAL PORTION OF RIGHT BREAST IN FEMALE, ESTROGEN RECEPTOR POSITIVE: ICD-10-CM

## 2021-01-05 DIAGNOSIS — C50.111 MALIGNANT NEOPLASM OF CENTRAL PORTION OF RIGHT BREAST IN FEMALE, ESTROGEN RECEPTOR POSITIVE: ICD-10-CM

## 2021-01-05 DIAGNOSIS — C79.51 METASTATIC CANCER TO SPINE: ICD-10-CM

## 2021-01-05 DIAGNOSIS — C78.7 METASTASIS TO LIVER: Primary | ICD-10-CM

## 2021-01-05 LAB
ALBUMIN SERPL BCP-MCNC: 3.3 G/DL (ref 3.5–5.2)
ALP SERPL-CCNC: 308 U/L (ref 55–135)
ALT SERPL W/O P-5'-P-CCNC: 28 U/L (ref 10–44)
ANION GAP SERPL CALC-SCNC: 8 MMOL/L (ref 8–16)
AST SERPL-CCNC: 49 U/L (ref 10–40)
BILIRUB SERPL-MCNC: 1.1 MG/DL (ref 0.1–1)
BUN SERPL-MCNC: 13 MG/DL (ref 8–23)
CALCIUM SERPL-MCNC: 9 MG/DL (ref 8.7–10.5)
CHLORIDE SERPL-SCNC: 105 MMOL/L (ref 95–110)
CO2 SERPL-SCNC: 27 MMOL/L (ref 23–29)
CREAT SERPL-MCNC: 0.7 MG/DL (ref 0.5–1.4)
ERYTHROCYTE [DISTWIDTH] IN BLOOD BY AUTOMATED COUNT: 18.9 % (ref 11.5–14.5)
EST. GFR  (AFRICAN AMERICAN): >60 ML/MIN/1.73 M^2
EST. GFR  (NON AFRICAN AMERICAN): >60 ML/MIN/1.73 M^2
GLUCOSE SERPL-MCNC: 133 MG/DL (ref 70–110)
HCT VFR BLD AUTO: 33.8 % (ref 37–48.5)
HGB BLD-MCNC: 10.4 G/DL (ref 12–16)
IMM GRANULOCYTES # BLD AUTO: 0.06 K/UL (ref 0–0.04)
MCH RBC QN AUTO: 27.2 PG (ref 27–31)
MCHC RBC AUTO-ENTMCNC: 30.8 G/DL (ref 32–36)
MCV RBC AUTO: 88 FL (ref 82–98)
NEUTROPHILS # BLD AUTO: 2.3 K/UL (ref 1.8–7.7)
PLATELET # BLD AUTO: 94 K/UL (ref 150–350)
PMV BLD AUTO: 10 FL (ref 9.2–12.9)
POTASSIUM SERPL-SCNC: 3.9 MMOL/L (ref 3.5–5.1)
PROT SERPL-MCNC: 6.8 G/DL (ref 6–8.4)
RBC # BLD AUTO: 3.83 M/UL (ref 4–5.4)
SODIUM SERPL-SCNC: 140 MMOL/L (ref 136–145)
WBC # BLD AUTO: 3.53 K/UL (ref 3.9–12.7)

## 2021-01-05 PROCEDURE — 96409 CHEMO IV PUSH SNGL DRUG: CPT | Mod: HCNC

## 2021-01-05 PROCEDURE — 96372 THER/PROPH/DIAG INJ SC/IM: CPT | Mod: HCNC

## 2021-01-05 PROCEDURE — 80053 COMPREHEN METABOLIC PANEL: CPT | Mod: HCNC

## 2021-01-05 PROCEDURE — 85027 COMPLETE CBC AUTOMATED: CPT | Mod: HCNC

## 2021-01-05 PROCEDURE — 63600175 PHARM REV CODE 636 W HCPCS: Mod: HCNC | Performed by: INTERNAL MEDICINE

## 2021-01-05 PROCEDURE — A4216 STERILE WATER/SALINE, 10 ML: HCPCS | Mod: HCNC | Performed by: INTERNAL MEDICINE

## 2021-01-05 PROCEDURE — 96367 TX/PROPH/DG ADDL SEQ IV INF: CPT | Mod: HCNC

## 2021-01-05 PROCEDURE — 25000003 PHARM REV CODE 250: Mod: HCNC | Performed by: INTERNAL MEDICINE

## 2021-01-05 RX ORDER — SODIUM CHLORIDE 0.9 % (FLUSH) 0.9 %
10 SYRINGE (ML) INJECTION
Status: DISCONTINUED | OUTPATIENT
Start: 2021-01-05 | End: 2021-01-05 | Stop reason: HOSPADM

## 2021-01-05 RX ORDER — HEPARIN 100 UNIT/ML
500 SYRINGE INTRAVENOUS
Status: DISCONTINUED | OUTPATIENT
Start: 2021-01-05 | End: 2021-01-05 | Stop reason: HOSPADM

## 2021-01-05 RX ADMIN — GRANISETRON HYDROCHLORIDE 1 MG: 1 INJECTION, SOLUTION INTRAVENOUS at 12:01

## 2021-01-05 RX ADMIN — HEPARIN 500 UNITS: 100 SYRINGE at 01:01

## 2021-01-05 RX ADMIN — Medication 10 ML: at 01:01

## 2021-01-05 RX ADMIN — ERIBULIN MESYLATE 2 MG: 0.5 INJECTION INTRAVENOUS at 12:01

## 2021-01-05 RX ADMIN — DENOSUMAB 120 MG: 120 INJECTION SUBCUTANEOUS at 12:01

## 2021-01-13 ENCOUNTER — PATIENT MESSAGE (OUTPATIENT)
Dept: HEMATOLOGY/ONCOLOGY | Facility: CLINIC | Age: 66
End: 2021-01-13

## 2021-01-14 ENCOUNTER — OFFICE VISIT (OUTPATIENT)
Dept: PODIATRY | Facility: CLINIC | Age: 66
End: 2021-01-14
Payer: COMMERCIAL

## 2021-01-14 VITALS
HEIGHT: 64 IN | SYSTOLIC BLOOD PRESSURE: 132 MMHG | DIASTOLIC BLOOD PRESSURE: 84 MMHG | WEIGHT: 147.94 LBS | BODY MASS INDEX: 25.25 KG/M2

## 2021-01-14 DIAGNOSIS — T45.1X5A CHEMOTHERAPY-INDUCED NEUROPATHY: Primary | ICD-10-CM

## 2021-01-14 DIAGNOSIS — G62.0 CHEMOTHERAPY-INDUCED NEUROPATHY: Primary | ICD-10-CM

## 2021-01-14 PROCEDURE — 1125F AMNT PAIN NOTED PAIN PRSNT: CPT | Mod: S$GLB,,, | Performed by: PODIATRIST

## 2021-01-14 PROCEDURE — 99999 PR PBB SHADOW E&M-EST. PATIENT-LVL II: ICD-10-PCS | Mod: PBBFAC,,, | Performed by: PODIATRIST

## 2021-01-14 PROCEDURE — 99213 PR OFFICE/OUTPT VISIT, EST, LEVL III, 20-29 MIN: ICD-10-PCS | Mod: S$GLB,,, | Performed by: PODIATRIST

## 2021-01-14 PROCEDURE — 99213 OFFICE O/P EST LOW 20 MIN: CPT | Mod: S$GLB,,, | Performed by: PODIATRIST

## 2021-01-14 PROCEDURE — 3008F BODY MASS INDEX DOCD: CPT | Mod: CPTII,S$GLB,, | Performed by: PODIATRIST

## 2021-01-14 PROCEDURE — 3075F SYST BP GE 130 - 139MM HG: CPT | Mod: CPTII,S$GLB,, | Performed by: PODIATRIST

## 2021-01-14 PROCEDURE — 3079F PR MOST RECENT DIASTOLIC BLOOD PRESSURE 80-89 MM HG: ICD-10-PCS | Mod: CPTII,S$GLB,, | Performed by: PODIATRIST

## 2021-01-14 PROCEDURE — 1125F PR PAIN SEVERITY QUANTIFIED, PAIN PRESENT: ICD-10-PCS | Mod: S$GLB,,, | Performed by: PODIATRIST

## 2021-01-14 PROCEDURE — 3075F PR MOST RECENT SYSTOLIC BLOOD PRESS GE 130-139MM HG: ICD-10-PCS | Mod: CPTII,S$GLB,, | Performed by: PODIATRIST

## 2021-01-14 PROCEDURE — 99999 PR PBB SHADOW E&M-EST. PATIENT-LVL II: CPT | Mod: PBBFAC,,, | Performed by: PODIATRIST

## 2021-01-14 PROCEDURE — 3079F DIAST BP 80-89 MM HG: CPT | Mod: CPTII,S$GLB,, | Performed by: PODIATRIST

## 2021-01-14 PROCEDURE — 3008F PR BODY MASS INDEX (BMI) DOCUMENTED: ICD-10-PCS | Mod: CPTII,S$GLB,, | Performed by: PODIATRIST

## 2021-01-14 RX ORDER — DICLOFENAC SODIUM 10 MG/G
2 GEL TOPICAL DAILY
Qty: 100 G | Refills: 3 | Status: ON HOLD | OUTPATIENT
Start: 2021-01-14 | End: 2021-08-25 | Stop reason: HOSPADM

## 2021-01-15 ENCOUNTER — HOSPITAL ENCOUNTER (OUTPATIENT)
Dept: RADIOLOGY | Facility: HOSPITAL | Age: 66
Discharge: HOME | End: 2021-01-15
Attending: INTERNAL MEDICINE
Payer: MEDICARE

## 2021-01-15 ENCOUNTER — LAB VISIT (OUTPATIENT)
Dept: LAB | Facility: HOSPITAL | Age: 66
End: 2021-01-15
Attending: INTERNAL MEDICINE
Payer: MEDICARE

## 2021-01-15 ENCOUNTER — TELEPHONE (OUTPATIENT)
Dept: HEMATOLOGY/ONCOLOGY | Facility: CLINIC | Age: 66
End: 2021-01-15

## 2021-01-15 ENCOUNTER — PATIENT MESSAGE (OUTPATIENT)
Dept: HEMATOLOGY/ONCOLOGY | Facility: CLINIC | Age: 66
End: 2021-01-15

## 2021-01-15 DIAGNOSIS — C79.51 BONE METASTASIS: ICD-10-CM

## 2021-01-15 DIAGNOSIS — C78.7 METASTASIS TO LIVER: ICD-10-CM

## 2021-01-15 DIAGNOSIS — Z17.0 MALIGNANT NEOPLASM OF CENTRAL PORTION OF RIGHT BREAST IN FEMALE, ESTROGEN RECEPTOR POSITIVE: ICD-10-CM

## 2021-01-15 DIAGNOSIS — C50.111 MALIGNANT NEOPLASM OF CENTRAL PORTION OF RIGHT BREAST IN FEMALE, ESTROGEN RECEPTOR POSITIVE: ICD-10-CM

## 2021-01-15 LAB
ALBUMIN SERPL BCP-MCNC: 3.5 G/DL (ref 3.5–5.2)
ALP SERPL-CCNC: 278 U/L (ref 55–135)
ALT SERPL W/O P-5'-P-CCNC: 18 U/L (ref 10–44)
ANION GAP SERPL CALC-SCNC: 9 MMOL/L (ref 8–16)
AST SERPL-CCNC: 38 U/L (ref 10–40)
BASOPHILS # BLD AUTO: 0.12 K/UL (ref 0–0.2)
BASOPHILS NFR BLD: 0.9 % (ref 0–1.9)
BILIRUB SERPL-MCNC: 1.3 MG/DL (ref 0.1–1)
BUN SERPL-MCNC: 9 MG/DL (ref 8–23)
CALCIUM SERPL-MCNC: 10.6 MG/DL (ref 8.7–10.5)
CHLORIDE SERPL-SCNC: 102 MMOL/L (ref 95–110)
CO2 SERPL-SCNC: 27 MMOL/L (ref 23–29)
CREAT SERPL-MCNC: 0.7 MG/DL (ref 0.5–1.4)
DIFFERENTIAL METHOD: ABNORMAL
EOSINOPHIL # BLD AUTO: 0 K/UL (ref 0–0.5)
EOSINOPHIL NFR BLD: 0.1 % (ref 0–8)
ERYTHROCYTE [DISTWIDTH] IN BLOOD BY AUTOMATED COUNT: 20.2 % (ref 11.5–14.5)
EST. GFR  (AFRICAN AMERICAN): >60 ML/MIN/1.73 M^2
EST. GFR  (NON AFRICAN AMERICAN): >60 ML/MIN/1.73 M^2
GLUCOSE SERPL-MCNC: 110 MG/DL (ref 70–110)
HCT VFR BLD AUTO: 37.6 % (ref 37–48.5)
HGB BLD-MCNC: 11.5 G/DL (ref 12–16)
IMM GRANULOCYTES # BLD AUTO: 0.26 K/UL (ref 0–0.04)
IMM GRANULOCYTES NFR BLD AUTO: 1.9 % (ref 0–0.5)
LYMPHOCYTES # BLD AUTO: 1.5 K/UL (ref 1–4.8)
LYMPHOCYTES NFR BLD: 10.8 % (ref 18–48)
MCH RBC QN AUTO: 27.3 PG (ref 27–31)
MCHC RBC AUTO-ENTMCNC: 30.6 G/DL (ref 32–36)
MCV RBC AUTO: 89 FL (ref 82–98)
MONOCYTES # BLD AUTO: 1 K/UL (ref 0.3–1)
MONOCYTES NFR BLD: 7.5 % (ref 4–15)
NEUTROPHILS # BLD AUTO: 10.6 K/UL (ref 1.8–7.7)
NEUTROPHILS NFR BLD: 78.8 % (ref 38–73)
NRBC BLD-RTO: 0 /100 WBC
PLATELET # BLD AUTO: 96 K/UL (ref 150–350)
PMV BLD AUTO: 10.4 FL (ref 9.2–12.9)
POTASSIUM SERPL-SCNC: 4.4 MMOL/L (ref 3.5–5.1)
PROT SERPL-MCNC: 7.3 G/DL (ref 6–8.4)
RBC # BLD AUTO: 4.22 M/UL (ref 4–5.4)
SODIUM SERPL-SCNC: 138 MMOL/L (ref 136–145)
WBC # BLD AUTO: 13.42 K/UL (ref 3.9–12.7)

## 2021-01-15 PROCEDURE — 74176 CT ABD & PELVIS W/O CONTRAST: CPT | Mod: 26,,, | Performed by: RADIOLOGY

## 2021-01-15 PROCEDURE — 74176 CT CHEST ABDOMEN PELVIS WITHOUT CONTRAST(XPD): ICD-10-PCS | Mod: 26,,, | Performed by: RADIOLOGY

## 2021-01-15 PROCEDURE — 86300 IMMUNOASSAY TUMOR CA 15-3: CPT

## 2021-01-15 PROCEDURE — 71250 CT THORAX DX C-: CPT | Mod: TC

## 2021-01-15 PROCEDURE — 85025 COMPLETE CBC W/AUTO DIFF WBC: CPT

## 2021-01-15 PROCEDURE — 71250 CT CHEST ABDOMEN PELVIS WITHOUT CONTRAST(XPD): ICD-10-PCS | Mod: 26,,, | Performed by: RADIOLOGY

## 2021-01-15 PROCEDURE — 74176 CT ABD & PELVIS W/O CONTRAST: CPT | Mod: TC

## 2021-01-15 PROCEDURE — 80053 COMPREHEN METABOLIC PANEL: CPT

## 2021-01-15 PROCEDURE — 71250 CT THORAX DX C-: CPT | Mod: 26,,, | Performed by: RADIOLOGY

## 2021-01-19 ENCOUNTER — OFFICE VISIT (OUTPATIENT)
Dept: HEMATOLOGY/ONCOLOGY | Facility: CLINIC | Age: 66
End: 2021-01-19
Payer: COMMERCIAL

## 2021-01-19 DIAGNOSIS — C50.111 MALIGNANT NEOPLASM OF CENTRAL PORTION OF RIGHT BREAST IN FEMALE, ESTROGEN RECEPTOR POSITIVE: Primary | ICD-10-CM

## 2021-01-19 DIAGNOSIS — C78.7 METASTASIS TO LIVER: ICD-10-CM

## 2021-01-19 DIAGNOSIS — C79.51 BONE METASTASIS: ICD-10-CM

## 2021-01-19 DIAGNOSIS — Z17.0 MALIGNANT NEOPLASM OF CENTRAL PORTION OF RIGHT BREAST IN FEMALE, ESTROGEN RECEPTOR POSITIVE: Primary | ICD-10-CM

## 2021-01-19 PROCEDURE — 99214 OFFICE O/P EST MOD 30 MIN: CPT | Mod: 95,,, | Performed by: INTERNAL MEDICINE

## 2021-01-19 PROCEDURE — 99214 PR OFFICE/OUTPT VISIT, EST, LEVL IV, 30-39 MIN: ICD-10-PCS | Mod: 95,,, | Performed by: INTERNAL MEDICINE

## 2021-01-19 RX ORDER — ALPELISIB 200 MG/1
300 TABLET ORAL DAILY
Qty: 56 TABLET | Refills: 6 | OUTPATIENT
Start: 2021-01-19 | End: 2021-01-21 | Stop reason: DRUGHIGH

## 2021-01-20 ENCOUNTER — SPECIALTY PHARMACY (OUTPATIENT)
Dept: PHARMACY | Facility: CLINIC | Age: 66
End: 2021-01-20

## 2021-01-21 DIAGNOSIS — C78.7 METASTASIS TO LIVER: ICD-10-CM

## 2021-01-21 DIAGNOSIS — Z17.0 MALIGNANT NEOPLASM OF CENTRAL PORTION OF RIGHT BREAST IN FEMALE, ESTROGEN RECEPTOR POSITIVE: ICD-10-CM

## 2021-01-21 DIAGNOSIS — C50.111 MALIGNANT NEOPLASM OF CENTRAL PORTION OF RIGHT BREAST IN FEMALE, ESTROGEN RECEPTOR POSITIVE: ICD-10-CM

## 2021-01-22 DIAGNOSIS — C79.51 BONE METASTASIS: ICD-10-CM

## 2021-01-22 DIAGNOSIS — C50.111 MALIGNANT NEOPLASM OF CENTRAL PORTION OF RIGHT FEMALE BREAST, UNSPECIFIED ESTROGEN RECEPTOR STATUS: ICD-10-CM

## 2021-01-22 DIAGNOSIS — C79.51 METASTATIC CANCER TO SPINE: ICD-10-CM

## 2021-01-22 RX ORDER — MORPHINE SULFATE 200 MG/1
200 TABLET, FILM COATED, EXTENDED RELEASE ORAL EVERY 8 HOURS
Qty: 90 TABLET | Refills: 0 | Status: SHIPPED | OUTPATIENT
Start: 2021-01-22 | End: 2021-02-26 | Stop reason: SDUPTHER

## 2021-01-27 ENCOUNTER — PATIENT MESSAGE (OUTPATIENT)
Dept: HEMATOLOGY/ONCOLOGY | Facility: CLINIC | Age: 66
End: 2021-01-27

## 2021-01-27 DIAGNOSIS — G89.3 CANCER ASSOCIATED PAIN: ICD-10-CM

## 2021-01-27 RX ORDER — HYDROMORPHONE HYDROCHLORIDE 4 MG/1
4 TABLET ORAL EVERY 4 HOURS PRN
Qty: 120 TABLET | Refills: 0 | Status: SHIPPED | OUTPATIENT
Start: 2021-01-27

## 2021-01-29 ENCOUNTER — PATIENT MESSAGE (OUTPATIENT)
Dept: HEMATOLOGY/ONCOLOGY | Facility: CLINIC | Age: 66
End: 2021-01-29

## 2021-02-01 ENCOUNTER — PATIENT MESSAGE (OUTPATIENT)
Dept: HEMATOLOGY/ONCOLOGY | Facility: CLINIC | Age: 66
End: 2021-02-01

## 2021-02-02 ENCOUNTER — PATIENT MESSAGE (OUTPATIENT)
Dept: HEMATOLOGY/ONCOLOGY | Facility: CLINIC | Age: 66
End: 2021-02-02

## 2021-02-03 ENCOUNTER — INFUSION (OUTPATIENT)
Dept: INFUSION THERAPY | Facility: HOSPITAL | Age: 66
End: 2021-02-03
Attending: INTERNAL MEDICINE
Payer: MEDICARE

## 2021-02-03 VITALS
TEMPERATURE: 98 F | DIASTOLIC BLOOD PRESSURE: 58 MMHG | RESPIRATION RATE: 17 BRPM | OXYGEN SATURATION: 97 % | SYSTOLIC BLOOD PRESSURE: 127 MMHG | HEART RATE: 89 BPM

## 2021-02-03 DIAGNOSIS — C79.51 METASTATIC CANCER TO SPINE: ICD-10-CM

## 2021-02-03 DIAGNOSIS — Z17.0 MALIGNANT NEOPLASM OF CENTRAL PORTION OF RIGHT BREAST IN FEMALE, ESTROGEN RECEPTOR POSITIVE: ICD-10-CM

## 2021-02-03 DIAGNOSIS — C78.7 METASTASIS TO LIVER: Primary | ICD-10-CM

## 2021-02-03 DIAGNOSIS — C50.111 MALIGNANT NEOPLASM OF CENTRAL PORTION OF RIGHT BREAST IN FEMALE, ESTROGEN RECEPTOR POSITIVE: ICD-10-CM

## 2021-02-03 PROCEDURE — 96372 THER/PROPH/DIAG INJ SC/IM: CPT

## 2021-02-03 PROCEDURE — 96402 CHEMO HORMON ANTINEOPL SQ/IM: CPT

## 2021-02-03 PROCEDURE — 63600175 PHARM REV CODE 636 W HCPCS: Mod: JG | Performed by: INTERNAL MEDICINE

## 2021-02-03 RX ORDER — LAMOTRIGINE 25 MG/1
500 TABLET ORAL
Status: CANCELLED | OUTPATIENT
Start: 2021-03-12

## 2021-02-03 RX ORDER — LAMOTRIGINE 25 MG/1
500 TABLET ORAL
Status: CANCELLED | OUTPATIENT
Start: 2021-02-26

## 2021-02-03 RX ORDER — LAMOTRIGINE 25 MG/1
500 TABLET ORAL
Status: COMPLETED | OUTPATIENT
Start: 2021-02-03 | End: 2021-02-03

## 2021-02-03 RX ADMIN — FULVESTRANT 500 MG: 50 INJECTION INTRAMUSCULAR at 12:02

## 2021-02-03 RX ADMIN — DENOSUMAB 120 MG: 120 INJECTION SUBCUTANEOUS at 12:02

## 2021-02-05 ENCOUNTER — PATIENT MESSAGE (OUTPATIENT)
Dept: HEMATOLOGY/ONCOLOGY | Facility: CLINIC | Age: 66
End: 2021-02-05

## 2021-02-10 ENCOUNTER — PATIENT MESSAGE (OUTPATIENT)
Dept: HEMATOLOGY/ONCOLOGY | Facility: CLINIC | Age: 66
End: 2021-02-10

## 2021-02-11 ENCOUNTER — PATIENT MESSAGE (OUTPATIENT)
Dept: HEMATOLOGY/ONCOLOGY | Facility: CLINIC | Age: 66
End: 2021-02-11

## 2021-02-12 ENCOUNTER — PATIENT MESSAGE (OUTPATIENT)
Dept: HEMATOLOGY/ONCOLOGY | Facility: CLINIC | Age: 66
End: 2021-02-12

## 2021-02-12 ENCOUNTER — TELEPHONE (OUTPATIENT)
Dept: HEMATOLOGY/ONCOLOGY | Facility: CLINIC | Age: 66
End: 2021-02-12

## 2021-02-12 ENCOUNTER — HOSPITAL ENCOUNTER (OUTPATIENT)
Facility: HOSPITAL | Age: 66
Discharge: HOME | End: 2021-02-14
Attending: EMERGENCY MEDICINE | Admitting: EMERGENCY MEDICINE
Payer: MEDICARE

## 2021-02-12 DIAGNOSIS — R73.9 HYPERGLYCEMIA: Primary | ICD-10-CM

## 2021-02-12 DIAGNOSIS — R07.9 CHEST PAIN: ICD-10-CM

## 2021-02-12 DIAGNOSIS — N93.9 VAGINAL BLEEDING: ICD-10-CM

## 2021-02-12 DIAGNOSIS — D69.6 THROMBOCYTOPENIA: ICD-10-CM

## 2021-02-12 LAB
ABO + RH BLD: NORMAL
ABO + RH BLD: NORMAL
ALBUMIN SERPL BCP-MCNC: 3.8 G/DL (ref 3.5–5.2)
ALLENS TEST: ABNORMAL
ALP SERPL-CCNC: 459 U/L (ref 55–135)
ALT SERPL W/O P-5'-P-CCNC: 26 U/L (ref 10–44)
ANION GAP SERPL CALC-SCNC: 10 MMOL/L (ref 8–16)
ANION GAP SERPL CALC-SCNC: 12 MMOL/L (ref 8–16)
APTT BLDCRRT: 26.8 SEC (ref 21–32)
AST SERPL-CCNC: 23 U/L (ref 10–40)
B-OH-BUTYR BLD STRIP-SCNC: 0.1 MMOL/L (ref 0–0.5)
BACTERIA #/AREA URNS AUTO: NORMAL /HPF
BASOPHILS # BLD AUTO: 0.01 K/UL (ref 0–0.2)
BASOPHILS # BLD AUTO: 0.02 K/UL (ref 0–0.2)
BASOPHILS NFR BLD: 0.3 % (ref 0–1.9)
BASOPHILS NFR BLD: 0.5 % (ref 0–1.9)
BILIRUB SERPL-MCNC: 1.6 MG/DL (ref 0.1–1)
BILIRUB UR QL STRIP: NEGATIVE
BLD GP AB SCN CELLS X3 SERPL QL: NORMAL
BLD GP AB SCN CELLS X3 SERPL QL: NORMAL
BLD PROD TYP BPU: NORMAL
BLOOD UNIT EXPIRATION DATE: NORMAL
BLOOD UNIT TYPE CODE: 6200
BLOOD UNIT TYPE: NORMAL
BUN SERPL-MCNC: 14 MG/DL (ref 8–23)
BUN SERPL-MCNC: 17 MG/DL (ref 8–23)
CALCIUM SERPL-MCNC: 8.7 MG/DL (ref 8.7–10.5)
CALCIUM SERPL-MCNC: 9.3 MG/DL (ref 8.7–10.5)
CHLORIDE SERPL-SCNC: 92 MMOL/L (ref 95–110)
CHLORIDE SERPL-SCNC: 98 MMOL/L (ref 95–110)
CLARITY UR REFRACT.AUTO: CLEAR
CO2 SERPL-SCNC: 24 MMOL/L (ref 23–29)
CO2 SERPL-SCNC: 27 MMOL/L (ref 23–29)
CODING SYSTEM: NORMAL
COLOR UR AUTO: ABNORMAL
CREAT SERPL-MCNC: 0.8 MG/DL (ref 0.5–1.4)
CREAT SERPL-MCNC: 1.2 MG/DL (ref 0.5–1.4)
CTP QC/QA: YES
D DIMER PPP IA.FEU-MCNC: 4.47 MG/L FEU
DELSYS: ABNORMAL
DIFFERENTIAL METHOD: ABNORMAL
DIFFERENTIAL METHOD: ABNORMAL
DISPENSE STATUS: NORMAL
EOSINOPHIL # BLD AUTO: 0.2 K/UL (ref 0–0.5)
EOSINOPHIL # BLD AUTO: 0.2 K/UL (ref 0–0.5)
EOSINOPHIL NFR BLD: 4.7 % (ref 0–8)
EOSINOPHIL NFR BLD: 4.9 % (ref 0–8)
ERYTHROCYTE [DISTWIDTH] IN BLOOD BY AUTOMATED COUNT: 18.6 % (ref 11.5–14.5)
ERYTHROCYTE [DISTWIDTH] IN BLOOD BY AUTOMATED COUNT: 18.8 % (ref 11.5–14.5)
EST. GFR  (AFRICAN AMERICAN): 55.2 ML/MIN/1.73 M^2
EST. GFR  (AFRICAN AMERICAN): >60 ML/MIN/1.73 M^2
EST. GFR  (NON AFRICAN AMERICAN): 47.9 ML/MIN/1.73 M^2
EST. GFR  (NON AFRICAN AMERICAN): >60 ML/MIN/1.73 M^2
FIBRINOGEN PPP-MCNC: 300 MG/DL (ref 182–366)
FIO2: 21
GLUCOSE SERPL-MCNC: 262 MG/DL (ref 70–110)
GLUCOSE SERPL-MCNC: 620 MG/DL (ref 70–110)
GLUCOSE UR QL STRIP: ABNORMAL
HCO3 UR-SCNC: 34 MMOL/L (ref 24–28)
HCT VFR BLD AUTO: 36.2 % (ref 37–48.5)
HCT VFR BLD AUTO: 43.5 % (ref 37–48.5)
HGB BLD-MCNC: 11.6 G/DL (ref 12–16)
HGB BLD-MCNC: 13.6 G/DL (ref 12–16)
HGB UR QL STRIP: ABNORMAL
IMM GRANULOCYTES # BLD AUTO: 0.02 K/UL (ref 0–0.04)
IMM GRANULOCYTES # BLD AUTO: 0.02 K/UL (ref 0–0.04)
IMM GRANULOCYTES NFR BLD AUTO: 0.5 % (ref 0–0.5)
IMM GRANULOCYTES NFR BLD AUTO: 0.5 % (ref 0–0.5)
INR PPP: 1.1 (ref 0.8–1.2)
KETONES UR QL STRIP: NEGATIVE
LEUKOCYTE ESTERASE UR QL STRIP: NEGATIVE
LYMPHOCYTES # BLD AUTO: 0.9 K/UL (ref 1–4.8)
LYMPHOCYTES # BLD AUTO: 1.2 K/UL (ref 1–4.8)
LYMPHOCYTES NFR BLD: 23.3 % (ref 18–48)
LYMPHOCYTES NFR BLD: 29.1 % (ref 18–48)
MCH RBC QN AUTO: 26.5 PG (ref 27–31)
MCH RBC QN AUTO: 26.6 PG (ref 27–31)
MCHC RBC AUTO-ENTMCNC: 31.3 G/DL (ref 32–36)
MCHC RBC AUTO-ENTMCNC: 32 G/DL (ref 32–36)
MCV RBC AUTO: 83 FL (ref 82–98)
MCV RBC AUTO: 85 FL (ref 82–98)
MICROSCOPIC COMMENT: NORMAL
MODE: ABNORMAL
MONOCYTES # BLD AUTO: 0.2 K/UL (ref 0.3–1)
MONOCYTES # BLD AUTO: 0.3 K/UL (ref 0.3–1)
MONOCYTES NFR BLD: 5.5 % (ref 4–15)
MONOCYTES NFR BLD: 6.1 % (ref 4–15)
NEUTROPHILS # BLD AUTO: 2.4 K/UL (ref 1.8–7.7)
NEUTROPHILS # BLD AUTO: 2.5 K/UL (ref 1.8–7.7)
NEUTROPHILS NFR BLD: 59.1 % (ref 38–73)
NEUTROPHILS NFR BLD: 65.5 % (ref 38–73)
NITRITE UR QL STRIP: NEGATIVE
NRBC BLD-RTO: 0 /100 WBC
NRBC BLD-RTO: 0 /100 WBC
NUM UNITS TRANS WBC-POOR PLATPHERESIS: NORMAL
PCO2 BLDA: 65 MMHG (ref 35–45)
PH SMN: 7.33 [PH] (ref 7.35–7.45)
PH UR STRIP: 5 [PH] (ref 5–8)
PLATELET # BLD AUTO: 44 K/UL (ref 150–350)
PLATELET # BLD AUTO: 62 K/UL (ref 150–350)
PMV BLD AUTO: 8.6 FL (ref 9.2–12.9)
PMV BLD AUTO: ABNORMAL FL (ref 9.2–12.9)
PO2 BLDA: 21 MMHG (ref 40–60)
POC BE: 8 MMOL/L
POC SATURATED O2: 28 % (ref 95–100)
POC TCO2: 36 MMOL/L (ref 24–29)
POCT GLUCOSE: 280 MG/DL (ref 70–110)
POCT GLUCOSE: 452 MG/DL (ref 70–110)
POCT GLUCOSE: >500 MG/DL (ref 70–110)
POTASSIUM SERPL-SCNC: 4.1 MMOL/L (ref 3.5–5.1)
POTASSIUM SERPL-SCNC: 5.3 MMOL/L (ref 3.5–5.1)
PROT SERPL-MCNC: 8.3 G/DL (ref 6–8.4)
PROT UR QL STRIP: NEGATIVE
PROTHROMBIN TIME: 11.6 SEC (ref 9–12.5)
RBC # BLD AUTO: 4.37 M/UL (ref 4–5.4)
RBC # BLD AUTO: 5.12 M/UL (ref 4–5.4)
RBC #/AREA URNS AUTO: 1 /HPF (ref 0–4)
SAMPLE: ABNORMAL
SARS-COV-2 RDRP RESP QL NAA+PROBE: NEGATIVE
SITE: ABNORMAL
SODIUM SERPL-SCNC: 128 MMOL/L (ref 136–145)
SODIUM SERPL-SCNC: 135 MMOL/L (ref 136–145)
SP GR UR STRIP: 1.02 (ref 1–1.03)
SQUAMOUS #/AREA URNS AUTO: 0 /HPF
URN SPEC COLLECT METH UR: ABNORMAL
WBC # BLD AUTO: 3.65 K/UL (ref 3.9–12.7)
WBC # BLD AUTO: 4.26 K/UL (ref 3.9–12.7)
YEAST UR QL AUTO: NORMAL

## 2021-02-12 PROCEDURE — 36430 TRANSFUSION BLD/BLD COMPNT: CPT

## 2021-02-12 PROCEDURE — P9037 PLATE PHERES LEUKOREDU IRRAD: HCPCS

## 2021-02-12 PROCEDURE — 85025 COMPLETE CBC W/AUTO DIFF WBC: CPT

## 2021-02-12 PROCEDURE — 82010 KETONE BODYS QUAN: CPT

## 2021-02-12 PROCEDURE — 96374 THER/PROPH/DIAG INJ IV PUSH: CPT

## 2021-02-12 PROCEDURE — 82962 GLUCOSE BLOOD TEST: CPT | Mod: 91

## 2021-02-12 PROCEDURE — 85384 FIBRINOGEN ACTIVITY: CPT

## 2021-02-12 PROCEDURE — 63600175 PHARM REV CODE 636 W HCPCS: Performed by: EMERGENCY MEDICINE

## 2021-02-12 PROCEDURE — 80048 BASIC METABOLIC PNL TOTAL CA: CPT

## 2021-02-12 PROCEDURE — 99291 PR CRITICAL CARE, E/M 30-74 MINUTES: ICD-10-PCS | Mod: ,,, | Performed by: EMERGENCY MEDICINE

## 2021-02-12 PROCEDURE — 86870 RBC ANTIBODY IDENTIFICATION: CPT

## 2021-02-12 PROCEDURE — 25000003 PHARM REV CODE 250: Performed by: EMERGENCY MEDICINE

## 2021-02-12 PROCEDURE — 96361 HYDRATE IV INFUSION ADD-ON: CPT

## 2021-02-12 PROCEDURE — 81001 URINALYSIS AUTO W/SCOPE: CPT

## 2021-02-12 PROCEDURE — 85730 THROMBOPLASTIN TIME PARTIAL: CPT

## 2021-02-12 PROCEDURE — 99291 CRITICAL CARE FIRST HOUR: CPT | Mod: ,,, | Performed by: EMERGENCY MEDICINE

## 2021-02-12 PROCEDURE — 85610 PROTHROMBIN TIME: CPT

## 2021-02-12 PROCEDURE — 86905 BLOOD TYPING RBC ANTIGENS: CPT | Mod: 91

## 2021-02-12 PROCEDURE — 86900 BLOOD TYPING SEROLOGIC ABO: CPT | Mod: 91

## 2021-02-12 PROCEDURE — 99291 CRITICAL CARE FIRST HOUR: CPT | Mod: 25

## 2021-02-12 PROCEDURE — G0378 HOSPITAL OBSERVATION PER HR: HCPCS

## 2021-02-12 PROCEDURE — 99900035 HC TECH TIME PER 15 MIN (STAT)

## 2021-02-12 PROCEDURE — 82803 BLOOD GASES ANY COMBINATION: CPT

## 2021-02-12 PROCEDURE — 80053 COMPREHEN METABOLIC PANEL: CPT

## 2021-02-12 PROCEDURE — U0002 COVID-19 LAB TEST NON-CDC: HCPCS | Performed by: STUDENT IN AN ORGANIZED HEALTH CARE EDUCATION/TRAINING PROGRAM

## 2021-02-12 PROCEDURE — 85379 FIBRIN DEGRADATION QUANT: CPT

## 2021-02-12 RX ORDER — IBUPROFEN 200 MG
16 TABLET ORAL
Status: DISCONTINUED | OUTPATIENT
Start: 2021-02-12 | End: 2021-02-14 | Stop reason: HOSPADM

## 2021-02-12 RX ORDER — HYDROCODONE BITARTRATE AND ACETAMINOPHEN 500; 5 MG/1; MG/1
TABLET ORAL
Status: DISCONTINUED | OUTPATIENT
Start: 2021-02-12 | End: 2021-02-14 | Stop reason: HOSPADM

## 2021-02-12 RX ORDER — SODIUM CHLORIDE 0.9 % (FLUSH) 0.9 %
10 SYRINGE (ML) INJECTION
Status: DISCONTINUED | OUTPATIENT
Start: 2021-02-12 | End: 2021-02-14 | Stop reason: HOSPADM

## 2021-02-12 RX ORDER — IBUPROFEN 200 MG
24 TABLET ORAL
Status: DISCONTINUED | OUTPATIENT
Start: 2021-02-12 | End: 2021-02-14 | Stop reason: HOSPADM

## 2021-02-12 RX ORDER — GLUCAGON 1 MG
1 KIT INJECTION
Status: DISCONTINUED | OUTPATIENT
Start: 2021-02-12 | End: 2021-02-14 | Stop reason: HOSPADM

## 2021-02-12 RX ADMIN — SODIUM CHLORIDE 1000 ML: 0.9 INJECTION, SOLUTION INTRAVENOUS at 05:02

## 2021-02-12 RX ADMIN — INSULIN HUMAN 6 UNITS: 100 INJECTION, SOLUTION PARENTERAL at 06:02

## 2021-02-13 PROBLEM — R73.9 HYPERGLYCEMIA: Status: ACTIVE | Noted: 2021-02-13

## 2021-02-13 PROBLEM — N93.9 VAGINAL BLEEDING: Status: ACTIVE | Noted: 2021-02-13

## 2021-02-13 PROBLEM — L81.9 DISCOLORATION OF SKIN OF LOWER LEG: Status: ACTIVE | Noted: 2021-02-13

## 2021-02-13 LAB
ALBUMIN SERPL BCP-MCNC: 3.6 G/DL (ref 3.5–5.2)
ALP SERPL-CCNC: 390 U/L (ref 55–135)
ALT SERPL W/O P-5'-P-CCNC: 22 U/L (ref 10–44)
ANION GAP SERPL CALC-SCNC: 15 MMOL/L (ref 8–16)
AST SERPL-CCNC: 29 U/L (ref 10–40)
B-HCG UR QL: NEGATIVE
BASOPHILS # BLD AUTO: 0.02 K/UL (ref 0–0.2)
BASOPHILS NFR BLD: 0.3 % (ref 0–1.9)
BILIRUB SERPL-MCNC: 1.6 MG/DL (ref 0.1–1)
BLOOD GROUP ANTIBODIES SERPL: NORMAL
BUN SERPL-MCNC: 13 MG/DL (ref 8–23)
CALCIUM SERPL-MCNC: 8.7 MG/DL (ref 8.7–10.5)
CHLORIDE SERPL-SCNC: 96 MMOL/L (ref 95–110)
CO2 SERPL-SCNC: 25 MMOL/L (ref 23–29)
CREAT SERPL-MCNC: 0.8 MG/DL (ref 0.5–1.4)
CTP QC/QA: YES
DIFFERENTIAL METHOD: ABNORMAL
EOSINOPHIL # BLD AUTO: 0.3 K/UL (ref 0–0.5)
EOSINOPHIL NFR BLD: 4 % (ref 0–8)
ERYTHROCYTE [DISTWIDTH] IN BLOOD BY AUTOMATED COUNT: 19.1 % (ref 11.5–14.5)
EST. GFR  (AFRICAN AMERICAN): >60 ML/MIN/1.73 M^2
EST. GFR  (NON AFRICAN AMERICAN): >60 ML/MIN/1.73 M^2
ESTIMATED AVG GLUCOSE: 128 MG/DL (ref 68–131)
GLUCOSE SERPL-MCNC: 86 MG/DL (ref 70–110)
HBA1C MFR BLD: 6.1 % (ref 4–5.6)
HCT VFR BLD AUTO: 39.1 % (ref 37–48.5)
HGB BLD-MCNC: 12.1 G/DL (ref 12–16)
IMM GRANULOCYTES # BLD AUTO: 0.01 K/UL (ref 0–0.04)
IMM GRANULOCYTES NFR BLD AUTO: 0.2 % (ref 0–0.5)
LYMPHOCYTES # BLD AUTO: 0.9 K/UL (ref 1–4.8)
LYMPHOCYTES NFR BLD: 14.4 % (ref 18–48)
MAGNESIUM SERPL-MCNC: 1.9 MG/DL (ref 1.6–2.6)
MCH RBC QN AUTO: 26.4 PG (ref 27–31)
MCHC RBC AUTO-ENTMCNC: 30.9 G/DL (ref 32–36)
MCV RBC AUTO: 85 FL (ref 82–98)
MONOCYTES # BLD AUTO: 0.2 K/UL (ref 0.3–1)
MONOCYTES NFR BLD: 3.5 % (ref 4–15)
NEUTROPHILS # BLD AUTO: 4.9 K/UL (ref 1.8–7.7)
NEUTROPHILS NFR BLD: 77.6 % (ref 38–73)
NRBC BLD-RTO: 0 /100 WBC
PHOSPHATE SERPL-MCNC: 3.2 MG/DL (ref 2.7–4.5)
PLATELET # BLD AUTO: 58 K/UL (ref 150–350)
PMV BLD AUTO: ABNORMAL FL (ref 9.2–12.9)
POCT GLUCOSE: 103 MG/DL (ref 70–110)
POCT GLUCOSE: 141 MG/DL (ref 70–110)
POCT GLUCOSE: 143 MG/DL (ref 70–110)
POCT GLUCOSE: 148 MG/DL (ref 70–110)
POTASSIUM SERPL-SCNC: 3.8 MMOL/L (ref 3.5–5.1)
PROT SERPL-MCNC: 7.5 G/DL (ref 6–8.4)
RBC # BLD AUTO: 4.58 M/UL (ref 4–5.4)
SODIUM SERPL-SCNC: 136 MMOL/L (ref 136–145)
TSH SERPL DL<=0.005 MIU/L-ACNC: 1.24 UIU/ML (ref 0.4–4)
WBC # BLD AUTO: 6.26 K/UL (ref 3.9–12.7)

## 2021-02-13 PROCEDURE — G0378 HOSPITAL OBSERVATION PER HR: HCPCS

## 2021-02-13 PROCEDURE — 99213 PR OFFICE/OUTPT VISIT, EST, LEVL III, 20-29 MIN: ICD-10-PCS | Mod: ,,, | Performed by: NURSE PRACTITIONER

## 2021-02-13 PROCEDURE — 99213 OFFICE O/P EST LOW 20 MIN: CPT | Mod: ,,, | Performed by: NURSE PRACTITIONER

## 2021-02-13 PROCEDURE — 36415 COLL VENOUS BLD VENIPUNCTURE: CPT

## 2021-02-13 PROCEDURE — 99220 PR INITIAL OBSERVATION CARE,LEVL III: CPT | Mod: GC,,, | Performed by: INTERNAL MEDICINE

## 2021-02-13 PROCEDURE — 80053 COMPREHEN METABOLIC PANEL: CPT

## 2021-02-13 PROCEDURE — 25000003 PHARM REV CODE 250: Performed by: STUDENT IN AN ORGANIZED HEALTH CARE EDUCATION/TRAINING PROGRAM

## 2021-02-13 PROCEDURE — 85025 COMPLETE CBC W/AUTO DIFF WBC: CPT

## 2021-02-13 PROCEDURE — 83735 ASSAY OF MAGNESIUM: CPT

## 2021-02-13 PROCEDURE — 83036 HEMOGLOBIN GLYCOSYLATED A1C: CPT

## 2021-02-13 PROCEDURE — 84443 ASSAY THYROID STIM HORMONE: CPT

## 2021-02-13 PROCEDURE — 81025 URINE PREGNANCY TEST: CPT | Performed by: STUDENT IN AN ORGANIZED HEALTH CARE EDUCATION/TRAINING PROGRAM

## 2021-02-13 PROCEDURE — 99220 PR INITIAL OBSERVATION CARE,LEVL III: ICD-10-PCS | Mod: GC,,, | Performed by: INTERNAL MEDICINE

## 2021-02-13 PROCEDURE — 84100 ASSAY OF PHOSPHORUS: CPT

## 2021-02-13 RX ORDER — MORPHINE SULFATE 100 MG/1
200 TABLET, FILM COATED, EXTENDED RELEASE ORAL EVERY 8 HOURS
Status: DISCONTINUED | OUTPATIENT
Start: 2021-02-13 | End: 2021-02-14 | Stop reason: HOSPADM

## 2021-02-13 RX ORDER — PANTOPRAZOLE SODIUM 40 MG/1
40 TABLET, DELAYED RELEASE ORAL DAILY
Status: DISCONTINUED | OUTPATIENT
Start: 2021-02-13 | End: 2021-02-14 | Stop reason: HOSPADM

## 2021-02-13 RX ORDER — INSULIN ASPART 100 [IU]/ML
0-5 INJECTION, SOLUTION INTRAVENOUS; SUBCUTANEOUS
Status: DISCONTINUED | OUTPATIENT
Start: 2021-02-13 | End: 2021-02-14 | Stop reason: HOSPADM

## 2021-02-13 RX ORDER — SERTRALINE HYDROCHLORIDE 50 MG/1
50 TABLET, FILM COATED ORAL DAILY
Status: DISCONTINUED | OUTPATIENT
Start: 2021-02-13 | End: 2021-02-14 | Stop reason: HOSPADM

## 2021-02-13 RX ORDER — ONDANSETRON 4 MG/1
4 TABLET, FILM COATED ORAL EVERY 8 HOURS PRN
Status: DISCONTINUED | OUTPATIENT
Start: 2021-02-13 | End: 2021-02-14 | Stop reason: HOSPADM

## 2021-02-13 RX ORDER — PEN NEEDLE, DIABETIC 30 GX3/16"
NEEDLE, DISPOSABLE MISCELLANEOUS
Qty: 120 EACH | Refills: 3 | Status: SHIPPED | OUTPATIENT
Start: 2021-02-13 | End: 2021-04-12 | Stop reason: SDUPTHER

## 2021-02-13 RX ORDER — INSULIN LISPRO 100 [IU]/ML
1-10 INJECTION, SOLUTION INTRAVENOUS; SUBCUTANEOUS
Qty: 15 ML | Refills: 3 | Status: SHIPPED | OUTPATIENT
Start: 2021-02-13 | End: 2021-04-12 | Stop reason: SDUPTHER

## 2021-02-13 RX ORDER — BLOOD-GLUCOSE CONTROL, NORMAL
EACH MISCELLANEOUS
Qty: 100 EACH | Refills: 3 | Status: SHIPPED | OUTPATIENT
Start: 2021-02-13 | End: 2021-02-24 | Stop reason: SDUPTHER

## 2021-02-13 RX ORDER — FLUTICASONE PROPIONATE 50 MCG
2 SPRAY, SUSPENSION (ML) NASAL DAILY
Status: DISCONTINUED | OUTPATIENT
Start: 2021-02-13 | End: 2021-02-14 | Stop reason: HOSPADM

## 2021-02-13 RX ORDER — DEXTROSE 4 G
TABLET,CHEWABLE ORAL
Qty: 1 EACH | Refills: 1 | Status: ON HOLD | OUTPATIENT
Start: 2021-02-13 | End: 2021-08-25 | Stop reason: HOSPADM

## 2021-02-13 RX ORDER — GABAPENTIN 300 MG/1
300 CAPSULE ORAL 3 TIMES DAILY
Status: DISCONTINUED | OUTPATIENT
Start: 2021-02-13 | End: 2021-02-14 | Stop reason: HOSPADM

## 2021-02-13 RX ADMIN — MORPHINE SULFATE 200 MG: 100 TABLET, FILM COATED, EXTENDED RELEASE ORAL at 09:02

## 2021-02-13 RX ADMIN — GABAPENTIN 300 MG: 300 CAPSULE ORAL at 09:02

## 2021-02-13 RX ADMIN — ALUMINUM HYDROXIDE, MAGNESIUM HYDROXIDE, AND DIMETHICONE 10 ML: 400; 400; 40 SUSPENSION ORAL at 09:02

## 2021-02-13 RX ADMIN — PANTOPRAZOLE SODIUM 40 MG: 40 TABLET, DELAYED RELEASE ORAL at 08:02

## 2021-02-13 RX ADMIN — LEVOTHYROXINE SODIUM 175 MCG: 25 TABLET ORAL at 06:02

## 2021-02-13 RX ADMIN — SERTRALINE HYDROCHLORIDE 50 MG: 50 TABLET ORAL at 08:02

## 2021-02-13 RX ADMIN — GABAPENTIN 300 MG: 300 CAPSULE ORAL at 08:02

## 2021-02-13 RX ADMIN — GABAPENTIN 300 MG: 300 CAPSULE ORAL at 03:02

## 2021-02-13 RX ADMIN — ALUMINUM HYDROXIDE, MAGNESIUM HYDROXIDE, AND DIMETHICONE 10 ML: 400; 400; 40 SUSPENSION ORAL at 08:02

## 2021-02-13 RX ADMIN — MORPHINE SULFATE 200 MG: 100 TABLET, FILM COATED, EXTENDED RELEASE ORAL at 03:02

## 2021-02-14 VITALS
WEIGHT: 135.13 LBS | HEIGHT: 64 IN | RESPIRATION RATE: 17 BRPM | BODY MASS INDEX: 23.07 KG/M2 | DIASTOLIC BLOOD PRESSURE: 53 MMHG | HEART RATE: 59 BPM | SYSTOLIC BLOOD PRESSURE: 112 MMHG | TEMPERATURE: 98 F | OXYGEN SATURATION: 100 %

## 2021-02-14 PROBLEM — R23.1 LIVEDO RETICULARIS WITHOUT ULCERATION: Status: ACTIVE | Noted: 2021-02-14

## 2021-02-14 LAB
ALBUMIN SERPL BCP-MCNC: 2.9 G/DL (ref 3.5–5.2)
ALP SERPL-CCNC: 313 U/L (ref 55–135)
ALT SERPL W/O P-5'-P-CCNC: 18 U/L (ref 10–44)
ANION GAP SERPL CALC-SCNC: 10 MMOL/L (ref 8–16)
AST SERPL-CCNC: 24 U/L (ref 10–40)
BASOPHILS # BLD AUTO: 0.01 K/UL (ref 0–0.2)
BASOPHILS NFR BLD: 0.2 % (ref 0–1.9)
BILIRUB SERPL-MCNC: 1 MG/DL (ref 0.1–1)
BUN SERPL-MCNC: 14 MG/DL (ref 8–23)
CALCIUM SERPL-MCNC: 8.3 MG/DL (ref 8.7–10.5)
CHLORIDE SERPL-SCNC: 104 MMOL/L (ref 95–110)
CO2 SERPL-SCNC: 26 MMOL/L (ref 23–29)
CREAT SERPL-MCNC: 0.8 MG/DL (ref 0.5–1.4)
DIFFERENTIAL METHOD: ABNORMAL
EOSINOPHIL # BLD AUTO: 0.3 K/UL (ref 0–0.5)
EOSINOPHIL NFR BLD: 5.9 % (ref 0–8)
ERYTHROCYTE [DISTWIDTH] IN BLOOD BY AUTOMATED COUNT: 18.6 % (ref 11.5–14.5)
EST. GFR  (AFRICAN AMERICAN): >60 ML/MIN/1.73 M^2
EST. GFR  (NON AFRICAN AMERICAN): >60 ML/MIN/1.73 M^2
GLUCOSE SERPL-MCNC: 116 MG/DL (ref 70–110)
HCT VFR BLD AUTO: 35.2 % (ref 37–48.5)
HGB BLD-MCNC: 11.2 G/DL (ref 12–16)
IMM GRANULOCYTES # BLD AUTO: 0.01 K/UL (ref 0–0.04)
IMM GRANULOCYTES NFR BLD AUTO: 0.2 % (ref 0–0.5)
LYMPHOCYTES # BLD AUTO: 1.6 K/UL (ref 1–4.8)
LYMPHOCYTES NFR BLD: 37.9 % (ref 18–48)
MAGNESIUM SERPL-MCNC: 2.1 MG/DL (ref 1.6–2.6)
MCH RBC QN AUTO: 27 PG (ref 27–31)
MCHC RBC AUTO-ENTMCNC: 31.8 G/DL (ref 32–36)
MCV RBC AUTO: 85 FL (ref 82–98)
MONOCYTES # BLD AUTO: 0.3 K/UL (ref 0.3–1)
MONOCYTES NFR BLD: 7.8 % (ref 4–15)
NEUTROPHILS # BLD AUTO: 2 K/UL (ref 1.8–7.7)
NEUTROPHILS NFR BLD: 48 % (ref 38–73)
NRBC BLD-RTO: 0 /100 WBC
PHOSPHATE SERPL-MCNC: 4.1 MG/DL (ref 2.7–4.5)
PLATELET # BLD AUTO: 52 K/UL (ref 150–350)
PMV BLD AUTO: ABNORMAL FL (ref 9.2–12.9)
POCT GLUCOSE: 114 MG/DL (ref 70–110)
POCT GLUCOSE: 155 MG/DL (ref 70–110)
POTASSIUM SERPL-SCNC: 4.1 MMOL/L (ref 3.5–5.1)
PROT SERPL-MCNC: 6.4 G/DL (ref 6–8.4)
RBC # BLD AUTO: 4.15 M/UL (ref 4–5.4)
SODIUM SERPL-SCNC: 140 MMOL/L (ref 136–145)
WBC # BLD AUTO: 4.25 K/UL (ref 3.9–12.7)

## 2021-02-14 PROCEDURE — 99217 PR OBSERVATION CARE DISCHARGE: ICD-10-PCS | Mod: GC,,, | Performed by: INTERNAL MEDICINE

## 2021-02-14 PROCEDURE — 83735 ASSAY OF MAGNESIUM: CPT

## 2021-02-14 PROCEDURE — 85025 COMPLETE CBC W/AUTO DIFF WBC: CPT

## 2021-02-14 PROCEDURE — 25000242 PHARM REV CODE 250 ALT 637 W/ HCPCS: Performed by: STUDENT IN AN ORGANIZED HEALTH CARE EDUCATION/TRAINING PROGRAM

## 2021-02-14 PROCEDURE — 25000003 PHARM REV CODE 250: Performed by: STUDENT IN AN ORGANIZED HEALTH CARE EDUCATION/TRAINING PROGRAM

## 2021-02-14 PROCEDURE — 80053 COMPREHEN METABOLIC PANEL: CPT

## 2021-02-14 PROCEDURE — G0378 HOSPITAL OBSERVATION PER HR: HCPCS

## 2021-02-14 PROCEDURE — 84100 ASSAY OF PHOSPHORUS: CPT

## 2021-02-14 PROCEDURE — 36415 COLL VENOUS BLD VENIPUNCTURE: CPT

## 2021-02-14 PROCEDURE — 99217 PR OBSERVATION CARE DISCHARGE: CPT | Mod: GC,,, | Performed by: INTERNAL MEDICINE

## 2021-02-14 RX ADMIN — FLUTICASONE PROPIONATE 100 MCG: 50 SPRAY, METERED NASAL at 08:02

## 2021-02-14 RX ADMIN — LEVOTHYROXINE SODIUM 175 MCG: 25 TABLET ORAL at 06:02

## 2021-02-14 RX ADMIN — MORPHINE SULFATE 200 MG: 100 TABLET, FILM COATED, EXTENDED RELEASE ORAL at 07:02

## 2021-02-14 RX ADMIN — GABAPENTIN 300 MG: 300 CAPSULE ORAL at 08:02

## 2021-02-14 RX ADMIN — SERTRALINE HYDROCHLORIDE 50 MG: 50 TABLET ORAL at 08:02

## 2021-02-14 RX ADMIN — PANTOPRAZOLE SODIUM 40 MG: 40 TABLET, DELAYED RELEASE ORAL at 08:02

## 2021-02-15 ENCOUNTER — PATIENT MESSAGE (OUTPATIENT)
Dept: ENDOCRINOLOGY | Facility: CLINIC | Age: 66
End: 2021-02-15

## 2021-02-15 ENCOUNTER — TELEPHONE (OUTPATIENT)
Dept: OBSTETRICS AND GYNECOLOGY | Facility: CLINIC | Age: 66
End: 2021-02-15

## 2021-02-15 DIAGNOSIS — R73.9 ACUTE HYPERGLYCEMIA: ICD-10-CM

## 2021-02-15 DIAGNOSIS — D69.59 THROMBOCYTOPENIA, SECONDARY: Primary | ICD-10-CM

## 2021-02-17 ENCOUNTER — INFUSION (OUTPATIENT)
Dept: INFUSION THERAPY | Facility: HOSPITAL | Age: 66
End: 2021-02-17
Attending: INTERNAL MEDICINE
Payer: MEDICARE

## 2021-02-17 ENCOUNTER — TELEPHONE (OUTPATIENT)
Dept: HEMATOLOGY/ONCOLOGY | Facility: CLINIC | Age: 66
End: 2021-02-17

## 2021-02-17 VITALS
SYSTOLIC BLOOD PRESSURE: 141 MMHG | HEART RATE: 75 BPM | TEMPERATURE: 98 F | DIASTOLIC BLOOD PRESSURE: 58 MMHG | RESPIRATION RATE: 18 BRPM | OXYGEN SATURATION: 98 %

## 2021-02-17 DIAGNOSIS — Z17.0 MALIGNANT NEOPLASM OF CENTRAL PORTION OF RIGHT BREAST IN FEMALE, ESTROGEN RECEPTOR POSITIVE: ICD-10-CM

## 2021-02-17 DIAGNOSIS — C79.51 METASTATIC CANCER TO SPINE: ICD-10-CM

## 2021-02-17 DIAGNOSIS — C50.111 MALIGNANT NEOPLASM OF CENTRAL PORTION OF RIGHT BREAST IN FEMALE, ESTROGEN RECEPTOR POSITIVE: ICD-10-CM

## 2021-02-17 DIAGNOSIS — C78.7 METASTASIS TO LIVER: Primary | ICD-10-CM

## 2021-02-17 PROCEDURE — 96402 CHEMO HORMON ANTINEOPL SQ/IM: CPT

## 2021-02-17 PROCEDURE — 63600175 PHARM REV CODE 636 W HCPCS: Mod: JG | Performed by: INTERNAL MEDICINE

## 2021-02-17 RX ORDER — LAMOTRIGINE 25 MG/1
500 TABLET ORAL
Status: COMPLETED | OUTPATIENT
Start: 2021-02-17 | End: 2021-02-17

## 2021-02-17 RX ADMIN — FULVESTRANT 500 MG: 50 INJECTION INTRAMUSCULAR at 12:02

## 2021-02-18 ENCOUNTER — PATIENT MESSAGE (OUTPATIENT)
Dept: ENDOCRINOLOGY | Facility: CLINIC | Age: 66
End: 2021-02-18

## 2021-02-18 ENCOUNTER — TELEPHONE (OUTPATIENT)
Dept: ENDOCRINOLOGY | Facility: CLINIC | Age: 66
End: 2021-02-18

## 2021-02-19 ENCOUNTER — SPECIALTY PHARMACY (OUTPATIENT)
Dept: PHARMACY | Facility: CLINIC | Age: 66
End: 2021-02-19

## 2021-02-19 ENCOUNTER — PES CALL (OUTPATIENT)
Dept: ADMINISTRATIVE | Facility: CLINIC | Age: 66
End: 2021-02-19

## 2021-02-19 ENCOUNTER — TELEPHONE (OUTPATIENT)
Dept: ENDOCRINOLOGY | Facility: CLINIC | Age: 66
End: 2021-02-19

## 2021-02-22 ENCOUNTER — LAB VISIT (OUTPATIENT)
Dept: LAB | Facility: HOSPITAL | Age: 66
End: 2021-02-22
Attending: INTERNAL MEDICINE
Payer: MEDICARE

## 2021-02-22 ENCOUNTER — PATIENT MESSAGE (OUTPATIENT)
Dept: HEMATOLOGY/ONCOLOGY | Facility: CLINIC | Age: 66
End: 2021-02-22

## 2021-02-22 DIAGNOSIS — C50.111 MALIGNANT NEOPLASM OF CENTRAL PORTION OF RIGHT BREAST IN FEMALE, ESTROGEN RECEPTOR POSITIVE: ICD-10-CM

## 2021-02-22 DIAGNOSIS — C79.51 BONE METASTASIS: ICD-10-CM

## 2021-02-22 DIAGNOSIS — C78.7 METASTASIS TO LIVER: ICD-10-CM

## 2021-02-22 DIAGNOSIS — Z17.0 MALIGNANT NEOPLASM OF CENTRAL PORTION OF RIGHT BREAST IN FEMALE, ESTROGEN RECEPTOR POSITIVE: ICD-10-CM

## 2021-02-22 LAB
ALBUMIN SERPL BCP-MCNC: 3.8 G/DL (ref 3.5–5.2)
ALP SERPL-CCNC: 260 U/L (ref 55–135)
ALT SERPL W/O P-5'-P-CCNC: 23 U/L (ref 10–44)
ANION GAP SERPL CALC-SCNC: 11 MMOL/L (ref 8–16)
AST SERPL-CCNC: 26 U/L (ref 10–40)
BASOPHILS # BLD AUTO: 0.02 K/UL (ref 0–0.2)
BASOPHILS NFR BLD: 0.4 % (ref 0–1.9)
BILIRUB SERPL-MCNC: 1.2 MG/DL (ref 0.1–1)
BUN SERPL-MCNC: 14 MG/DL (ref 8–23)
CALCIUM SERPL-MCNC: 8.8 MG/DL (ref 8.7–10.5)
CHLORIDE SERPL-SCNC: 103 MMOL/L (ref 95–110)
CO2 SERPL-SCNC: 28 MMOL/L (ref 23–29)
CREAT SERPL-MCNC: 0.9 MG/DL (ref 0.5–1.4)
DIFFERENTIAL METHOD: ABNORMAL
EOSINOPHIL # BLD AUTO: 0.2 K/UL (ref 0–0.5)
EOSINOPHIL NFR BLD: 4.5 % (ref 0–8)
ERYTHROCYTE [DISTWIDTH] IN BLOOD BY AUTOMATED COUNT: 20 % (ref 11.5–14.5)
EST. GFR  (AFRICAN AMERICAN): >60 ML/MIN/1.73 M^2
EST. GFR  (NON AFRICAN AMERICAN): >60 ML/MIN/1.73 M^2
GLUCOSE SERPL-MCNC: 201 MG/DL (ref 70–110)
HCT VFR BLD AUTO: 41.9 % (ref 37–48.5)
HGB BLD-MCNC: 13.2 G/DL (ref 12–16)
IMM GRANULOCYTES # BLD AUTO: 0.02 K/UL (ref 0–0.04)
IMM GRANULOCYTES NFR BLD AUTO: 0.4 % (ref 0–0.5)
LYMPHOCYTES # BLD AUTO: 2.1 K/UL (ref 1–4.8)
LYMPHOCYTES NFR BLD: 43.4 % (ref 18–48)
MCH RBC QN AUTO: 26.8 PG (ref 27–31)
MCHC RBC AUTO-ENTMCNC: 31.5 G/DL (ref 32–36)
MCV RBC AUTO: 85 FL (ref 82–98)
MONOCYTES # BLD AUTO: 0.2 K/UL (ref 0.3–1)
MONOCYTES NFR BLD: 4.5 % (ref 4–15)
NEUTROPHILS # BLD AUTO: 2.3 K/UL (ref 1.8–7.7)
NEUTROPHILS NFR BLD: 46.8 % (ref 38–73)
NRBC BLD-RTO: 0 /100 WBC
PLATELET # BLD AUTO: 47 K/UL (ref 150–350)
PMV BLD AUTO: 8.1 FL (ref 9.2–12.9)
POTASSIUM SERPL-SCNC: 4.3 MMOL/L (ref 3.5–5.1)
PROT SERPL-MCNC: 7.7 G/DL (ref 6–8.4)
RBC # BLD AUTO: 4.92 M/UL (ref 4–5.4)
SODIUM SERPL-SCNC: 142 MMOL/L (ref 136–145)
WBC # BLD AUTO: 4.91 K/UL (ref 3.9–12.7)

## 2021-02-22 PROCEDURE — 36415 COLL VENOUS BLD VENIPUNCTURE: CPT

## 2021-02-22 PROCEDURE — 80053 COMPREHEN METABOLIC PANEL: CPT

## 2021-02-22 PROCEDURE — 85025 COMPLETE CBC W/AUTO DIFF WBC: CPT

## 2021-02-23 ENCOUNTER — PATIENT MESSAGE (OUTPATIENT)
Dept: ENDOCRINOLOGY | Facility: CLINIC | Age: 66
End: 2021-02-23

## 2021-02-23 ENCOUNTER — PATIENT MESSAGE (OUTPATIENT)
Dept: HEMATOLOGY/ONCOLOGY | Facility: CLINIC | Age: 66
End: 2021-02-23

## 2021-02-24 ENCOUNTER — OFFICE VISIT (OUTPATIENT)
Dept: ENDOCRINOLOGY | Facility: CLINIC | Age: 66
End: 2021-02-24
Payer: MEDICARE

## 2021-02-24 ENCOUNTER — PROCEDURE VISIT (OUTPATIENT)
Dept: OBSTETRICS AND GYNECOLOGY | Facility: CLINIC | Age: 66
End: 2021-02-24
Payer: MEDICARE

## 2021-02-24 ENCOUNTER — PATIENT MESSAGE (OUTPATIENT)
Dept: ENDOCRINOLOGY | Facility: CLINIC | Age: 66
End: 2021-02-24

## 2021-02-24 VITALS
HEIGHT: 64 IN | WEIGHT: 139.31 LBS | BODY MASS INDEX: 23.78 KG/M2 | DIASTOLIC BLOOD PRESSURE: 68 MMHG | SYSTOLIC BLOOD PRESSURE: 114 MMHG

## 2021-02-24 DIAGNOSIS — N95.0 POSTMENOPAUSAL BLEEDING: ICD-10-CM

## 2021-02-24 DIAGNOSIS — C50.111 MALIGNANT NEOPLASM OF CENTRAL PORTION OF RIGHT BREAST IN FEMALE, ESTROGEN RECEPTOR POSITIVE: ICD-10-CM

## 2021-02-24 DIAGNOSIS — E21.1 SECONDARY HYPERPARATHYROIDISM, NON-RENAL: ICD-10-CM

## 2021-02-24 DIAGNOSIS — Z01.419 WELL WOMAN EXAM: Primary | ICD-10-CM

## 2021-02-24 DIAGNOSIS — R73.9 ACUTE HYPERGLYCEMIA: Primary | ICD-10-CM

## 2021-02-24 DIAGNOSIS — Z12.11 ENCOUNTER FOR SCREENING COLONOSCOPY: ICD-10-CM

## 2021-02-24 DIAGNOSIS — E06.3 HYPOTHYROIDISM DUE TO HASHIMOTO'S THYROIDITIS: ICD-10-CM

## 2021-02-24 DIAGNOSIS — Z32.02 NEGATIVE PREGNANCY TEST: ICD-10-CM

## 2021-02-24 DIAGNOSIS — Z17.0 MALIGNANT NEOPLASM OF CENTRAL PORTION OF RIGHT BREAST IN FEMALE, ESTROGEN RECEPTOR POSITIVE: ICD-10-CM

## 2021-02-24 DIAGNOSIS — Z12.4 ENCOUNTER FOR SCREENING FOR MALIGNANT NEOPLASM OF CERVIX: ICD-10-CM

## 2021-02-24 DIAGNOSIS — M81.0 OSTEOPOROSIS, UNSPECIFIED OSTEOPOROSIS TYPE, UNSPECIFIED PATHOLOGICAL FRACTURE PRESENCE: ICD-10-CM

## 2021-02-24 DIAGNOSIS — Z11.51 ENCOUNTER FOR SCREENING FOR HUMAN PAPILLOMAVIRUS (HPV): ICD-10-CM

## 2021-02-24 DIAGNOSIS — E03.8 HYPOTHYROIDISM DUE TO HASHIMOTO'S THYROIDITIS: ICD-10-CM

## 2021-02-24 LAB
B-HCG UR QL: NEGATIVE
CTP QC/QA: YES

## 2021-02-24 PROCEDURE — 88305 TISSUE EXAM BY PATHOLOGIST: ICD-10-PCS | Mod: 26,,, | Performed by: PATHOLOGY

## 2021-02-24 PROCEDURE — 99499 RISK ADDL DX/OHS AUDIT: ICD-10-PCS | Mod: 95,,, | Performed by: NURSE PRACTITIONER

## 2021-02-24 PROCEDURE — G0101 CA SCREEN;PELVIC/BREAST EXAM: HCPCS | Mod: S$GLB,,, | Performed by: OBSTETRICS & GYNECOLOGY

## 2021-02-24 PROCEDURE — 99499 UNLISTED E&M SERVICE: CPT | Mod: 95,,, | Performed by: NURSE PRACTITIONER

## 2021-02-24 PROCEDURE — 58100 BIOPSY OF UTERUS LINING: CPT | Mod: S$GLB,,, | Performed by: OBSTETRICS & GYNECOLOGY

## 2021-02-24 PROCEDURE — 99214 OFFICE O/P EST MOD 30 MIN: CPT | Mod: 95,,, | Performed by: NURSE PRACTITIONER

## 2021-02-24 PROCEDURE — 88175 CYTOPATH C/V AUTO FLUID REDO: CPT

## 2021-02-24 PROCEDURE — 81025 URINE PREGNANCY TEST: CPT | Mod: S$GLB,,, | Performed by: OBSTETRICS & GYNECOLOGY

## 2021-02-24 PROCEDURE — 81025 POCT URINE PREGNANCY: ICD-10-PCS | Mod: S$GLB,,, | Performed by: OBSTETRICS & GYNECOLOGY

## 2021-02-24 PROCEDURE — G0101 PR CA SCREEN;PELVIC/BREAST EXAM: ICD-10-PCS | Mod: S$GLB,,, | Performed by: OBSTETRICS & GYNECOLOGY

## 2021-02-24 PROCEDURE — 88305 TISSUE EXAM BY PATHOLOGIST: CPT | Performed by: PATHOLOGY

## 2021-02-24 PROCEDURE — 58100 ENDOMETRIAL BIOPSY: ICD-10-PCS | Mod: S$GLB,,, | Performed by: OBSTETRICS & GYNECOLOGY

## 2021-02-24 PROCEDURE — 88305 TISSUE EXAM BY PATHOLOGIST: CPT | Mod: 26,,, | Performed by: PATHOLOGY

## 2021-02-24 PROCEDURE — 87624 HPV HI-RISK TYP POOLED RSLT: CPT

## 2021-02-24 PROCEDURE — 99214 PR OFFICE/OUTPT VISIT, EST, LEVL IV, 30-39 MIN: ICD-10-PCS | Mod: 95,,, | Performed by: NURSE PRACTITIONER

## 2021-02-24 RX ORDER — BLOOD-GLUCOSE CONTROL, NORMAL
EACH MISCELLANEOUS
Qty: 200 EACH | Refills: 3 | Status: SHIPPED | OUTPATIENT
Start: 2021-02-24 | End: 2021-03-23 | Stop reason: SDUPTHER

## 2021-02-24 RX ORDER — METFORMIN HYDROCHLORIDE 500 MG/1
500 TABLET, EXTENDED RELEASE ORAL 2 TIMES DAILY WITH MEALS
Qty: 180 TABLET | Refills: 1 | Status: SHIPPED | OUTPATIENT
Start: 2021-02-24 | End: 2021-03-23 | Stop reason: SDUPTHER

## 2021-02-25 ENCOUNTER — OFFICE VISIT (OUTPATIENT)
Dept: HEMATOLOGY/ONCOLOGY | Facility: CLINIC | Age: 66
End: 2021-02-25
Payer: MEDICARE

## 2021-02-25 ENCOUNTER — PATIENT MESSAGE (OUTPATIENT)
Dept: HEMATOLOGY/ONCOLOGY | Facility: CLINIC | Age: 66
End: 2021-02-25

## 2021-02-25 DIAGNOSIS — C50.111 MALIGNANT NEOPLASM OF CENTRAL PORTION OF RIGHT BREAST IN FEMALE, ESTROGEN RECEPTOR POSITIVE: Primary | ICD-10-CM

## 2021-02-25 DIAGNOSIS — C78.7 METASTASIS TO LIVER: ICD-10-CM

## 2021-02-25 DIAGNOSIS — C79.51 BONE METASTASIS: ICD-10-CM

## 2021-02-25 DIAGNOSIS — Z17.0 MALIGNANT NEOPLASM OF CENTRAL PORTION OF RIGHT BREAST IN FEMALE, ESTROGEN RECEPTOR POSITIVE: Primary | ICD-10-CM

## 2021-02-25 PROCEDURE — 99212 PR OFFICE/OUTPT VISIT, EST, LEVL II, 10-19 MIN: ICD-10-PCS | Mod: 95,,, | Performed by: INTERNAL MEDICINE

## 2021-02-25 PROCEDURE — 99212 OFFICE O/P EST SF 10 MIN: CPT | Mod: 95,,, | Performed by: INTERNAL MEDICINE

## 2021-02-25 RX ORDER — LAMOTRIGINE 25 MG/1
500 TABLET ORAL
Status: CANCELLED | OUTPATIENT
Start: 2021-03-02

## 2021-02-26 ENCOUNTER — PATIENT MESSAGE (OUTPATIENT)
Dept: HEMATOLOGY/ONCOLOGY | Facility: CLINIC | Age: 66
End: 2021-02-26

## 2021-02-26 DIAGNOSIS — C79.51 BONE METASTASIS: ICD-10-CM

## 2021-02-26 DIAGNOSIS — C50.111 MALIGNANT NEOPLASM OF CENTRAL PORTION OF RIGHT FEMALE BREAST, UNSPECIFIED ESTROGEN RECEPTOR STATUS: ICD-10-CM

## 2021-02-26 DIAGNOSIS — C79.51 METASTATIC CANCER TO SPINE: ICD-10-CM

## 2021-02-26 RX ORDER — MORPHINE SULFATE 200 MG/1
200 TABLET, FILM COATED, EXTENDED RELEASE ORAL EVERY 8 HOURS
Qty: 90 TABLET | Refills: 0 | Status: SHIPPED | OUTPATIENT
Start: 2021-02-26 | End: 2021-03-23 | Stop reason: SDUPTHER

## 2021-03-01 ENCOUNTER — OFFICE VISIT (OUTPATIENT)
Dept: FAMILY MEDICINE | Facility: CLINIC | Age: 66
End: 2021-03-01
Payer: MEDICARE

## 2021-03-01 VITALS
TEMPERATURE: 98 F | OXYGEN SATURATION: 95 % | HEART RATE: 88 BPM | BODY MASS INDEX: 22.36 KG/M2 | HEIGHT: 66 IN | RESPIRATION RATE: 16 BRPM | WEIGHT: 139.13 LBS

## 2021-03-01 DIAGNOSIS — K57.90 DIVERTICULOSIS: ICD-10-CM

## 2021-03-01 DIAGNOSIS — Z00.00 ENCOUNTER FOR PREVENTIVE HEALTH EXAMINATION: ICD-10-CM

## 2021-03-01 DIAGNOSIS — E06.3 HYPOTHYROIDISM DUE TO HASHIMOTO'S THYROIDITIS: ICD-10-CM

## 2021-03-01 DIAGNOSIS — D69.6 THROMBOCYTOPENIA: ICD-10-CM

## 2021-03-01 DIAGNOSIS — G62.0 CHEMOTHERAPY-INDUCED NEUROPATHY: ICD-10-CM

## 2021-03-01 DIAGNOSIS — E21.1 SECONDARY HYPERPARATHYROIDISM, NON-RENAL: ICD-10-CM

## 2021-03-01 DIAGNOSIS — R26.81 GAIT INSTABILITY: ICD-10-CM

## 2021-03-01 DIAGNOSIS — C50.111 MALIGNANT NEOPLASM OF CENTRAL PORTION OF RIGHT BREAST IN FEMALE, ESTROGEN RECEPTOR POSITIVE: ICD-10-CM

## 2021-03-01 DIAGNOSIS — R29.6 FALLS FREQUENTLY: ICD-10-CM

## 2021-03-01 DIAGNOSIS — C79.51 BONE METASTASIS: ICD-10-CM

## 2021-03-01 DIAGNOSIS — R26.9 ABNORMALITY OF GAIT AND MOBILITY: ICD-10-CM

## 2021-03-01 DIAGNOSIS — J41.1 MUCOPURULENT CHRONIC BRONCHITIS: ICD-10-CM

## 2021-03-01 DIAGNOSIS — F43.21 ADJUSTMENT DISORDER WITH DEPRESSED MOOD: Chronic | ICD-10-CM

## 2021-03-01 DIAGNOSIS — R73.9 ACUTE HYPERGLYCEMIA: Primary | ICD-10-CM

## 2021-03-01 DIAGNOSIS — Z99.89 DEPENDENCE ON OTHER ENABLING MACHINES AND DEVICES: ICD-10-CM

## 2021-03-01 DIAGNOSIS — I70.0 ATHEROSCLEROSIS OF AORTA: ICD-10-CM

## 2021-03-01 DIAGNOSIS — Z17.0 MALIGNANT NEOPLASM OF CENTRAL PORTION OF RIGHT BREAST IN FEMALE, ESTROGEN RECEPTOR POSITIVE: ICD-10-CM

## 2021-03-01 DIAGNOSIS — E03.8 HYPOTHYROIDISM DUE TO HASHIMOTO'S THYROIDITIS: ICD-10-CM

## 2021-03-01 DIAGNOSIS — C78.7 METASTASIS TO LIVER: ICD-10-CM

## 2021-03-01 DIAGNOSIS — R63.4 WEIGHT LOSS, ABNORMAL: ICD-10-CM

## 2021-03-01 DIAGNOSIS — I97.2 POST-MASTECTOMY LYMPHEDEMA SYNDROME: ICD-10-CM

## 2021-03-01 DIAGNOSIS — G89.3 CANCER RELATED PAIN: ICD-10-CM

## 2021-03-01 DIAGNOSIS — I34.1 MVP (MITRAL VALVE PROLAPSE): ICD-10-CM

## 2021-03-01 DIAGNOSIS — C79.51 METASTATIC CANCER TO SPINE: ICD-10-CM

## 2021-03-01 DIAGNOSIS — M81.0 OSTEOPOROSIS, UNSPECIFIED OSTEOPOROSIS TYPE, UNSPECIFIED PATHOLOGICAL FRACTURE PRESENCE: ICD-10-CM

## 2021-03-01 DIAGNOSIS — T45.1X5A CHEMOTHERAPY-INDUCED NEUROPATHY: ICD-10-CM

## 2021-03-01 PROCEDURE — 1125F PR PAIN SEVERITY QUANTIFIED, PAIN PRESENT: ICD-10-PCS | Mod: S$GLB,,, | Performed by: PHYSICIAN ASSISTANT

## 2021-03-01 PROCEDURE — 3008F BODY MASS INDEX DOCD: CPT | Mod: CPTII,S$GLB,, | Performed by: PHYSICIAN ASSISTANT

## 2021-03-01 PROCEDURE — 99499 RISK ADDL DX/OHS AUDIT: ICD-10-PCS | Mod: S$GLB,,, | Performed by: PHYSICIAN ASSISTANT

## 2021-03-01 PROCEDURE — 3008F PR BODY MASS INDEX (BMI) DOCUMENTED: ICD-10-PCS | Mod: CPTII,S$GLB,, | Performed by: PHYSICIAN ASSISTANT

## 2021-03-01 PROCEDURE — G0439 PR MEDICARE ANNUAL WELLNESS SUBSEQUENT VISIT: ICD-10-PCS | Mod: S$GLB,,, | Performed by: PHYSICIAN ASSISTANT

## 2021-03-01 PROCEDURE — 99499 UNLISTED E&M SERVICE: CPT | Mod: S$GLB,,, | Performed by: PHYSICIAN ASSISTANT

## 2021-03-01 PROCEDURE — 99999 PR PBB SHADOW E&M-EST. PATIENT-LVL V: CPT | Mod: PBBFAC,,, | Performed by: PHYSICIAN ASSISTANT

## 2021-03-01 PROCEDURE — G0439 PPPS, SUBSEQ VISIT: HCPCS | Mod: S$GLB,,, | Performed by: PHYSICIAN ASSISTANT

## 2021-03-01 PROCEDURE — 1125F AMNT PAIN NOTED PAIN PRSNT: CPT | Mod: S$GLB,,, | Performed by: PHYSICIAN ASSISTANT

## 2021-03-01 PROCEDURE — 99999 PR PBB SHADOW E&M-EST. PATIENT-LVL V: ICD-10-PCS | Mod: PBBFAC,,, | Performed by: PHYSICIAN ASSISTANT

## 2021-03-02 ENCOUNTER — PATIENT MESSAGE (OUTPATIENT)
Dept: ENDOCRINOLOGY | Facility: CLINIC | Age: 66
End: 2021-03-02

## 2021-03-02 ENCOUNTER — INFUSION (OUTPATIENT)
Dept: INFUSION THERAPY | Facility: HOSPITAL | Age: 66
End: 2021-03-02
Attending: INTERNAL MEDICINE
Payer: MEDICARE

## 2021-03-02 VITALS
TEMPERATURE: 98 F | DIASTOLIC BLOOD PRESSURE: 80 MMHG | SYSTOLIC BLOOD PRESSURE: 113 MMHG | HEART RATE: 68 BPM | RESPIRATION RATE: 16 BRPM | OXYGEN SATURATION: 96 %

## 2021-03-02 DIAGNOSIS — C78.7 METASTASIS TO LIVER: ICD-10-CM

## 2021-03-02 DIAGNOSIS — Z17.0 MALIGNANT NEOPLASM OF CENTRAL PORTION OF RIGHT BREAST IN FEMALE, ESTROGEN RECEPTOR POSITIVE: ICD-10-CM

## 2021-03-02 DIAGNOSIS — C79.51 METASTATIC CANCER TO SPINE: ICD-10-CM

## 2021-03-02 DIAGNOSIS — C50.111 MALIGNANT NEOPLASM OF CENTRAL PORTION OF RIGHT BREAST IN FEMALE, ESTROGEN RECEPTOR POSITIVE: ICD-10-CM

## 2021-03-02 DIAGNOSIS — Z51.11 ENCOUNTER FOR ANTINEOPLASTIC CHEMOTHERAPY: Primary | ICD-10-CM

## 2021-03-02 PROCEDURE — 63600175 PHARM REV CODE 636 W HCPCS: Mod: JG | Performed by: INTERNAL MEDICINE

## 2021-03-02 PROCEDURE — A4216 STERILE WATER/SALINE, 10 ML: HCPCS | Performed by: INTERNAL MEDICINE

## 2021-03-02 PROCEDURE — 96402 CHEMO HORMON ANTINEOPL SQ/IM: CPT

## 2021-03-02 PROCEDURE — 96372 THER/PROPH/DIAG INJ SC/IM: CPT

## 2021-03-02 PROCEDURE — 25000003 PHARM REV CODE 250: Performed by: INTERNAL MEDICINE

## 2021-03-02 RX ORDER — SODIUM CHLORIDE 0.9 % (FLUSH) 0.9 %
10 SYRINGE (ML) INJECTION
Status: COMPLETED | OUTPATIENT
Start: 2021-03-02 | End: 2021-03-02

## 2021-03-02 RX ORDER — LAMOTRIGINE 25 MG/1
500 TABLET ORAL
Status: COMPLETED | OUTPATIENT
Start: 2021-03-02 | End: 2021-03-02

## 2021-03-02 RX ORDER — HEPARIN 100 UNIT/ML
500 SYRINGE INTRAVENOUS
Status: COMPLETED | OUTPATIENT
Start: 2021-03-02 | End: 2021-03-02

## 2021-03-02 RX ADMIN — HEPARIN 500 UNITS: 100 SYRINGE at 11:03

## 2021-03-02 RX ADMIN — Medication 10 ML: at 11:03

## 2021-03-02 RX ADMIN — DENOSUMAB 120 MG: 120 INJECTION SUBCUTANEOUS at 11:03

## 2021-03-02 RX ADMIN — FULVESTRANT 500 MG: 50 INJECTION INTRAMUSCULAR at 12:03

## 2021-03-03 ENCOUNTER — PATIENT MESSAGE (OUTPATIENT)
Dept: HEMATOLOGY/ONCOLOGY | Facility: CLINIC | Age: 66
End: 2021-03-03

## 2021-03-03 DIAGNOSIS — E78.00 HYPERCHOLESTEREMIA: Primary | ICD-10-CM

## 2021-03-04 ENCOUNTER — TELEPHONE (OUTPATIENT)
Dept: ENDOCRINOLOGY | Facility: CLINIC | Age: 66
End: 2021-03-04

## 2021-03-04 LAB
CLINICAL INFO: NORMAL
CYTO CVX: NORMAL
CYTOLOGIST CVX/VAG CYTO: NORMAL
CYTOLOGY CMNT CVX/VAG CYTO-IMP: NORMAL
CYTOLOGY PAP THIN PREP EXPLANATION: NORMAL
DATE OF PREVIOUS PAP: NORMAL
DATE PREVIOUS BX: NO
HPV I/H RISK 4 DNA CVX QL NAA+PROBE: NOT DETECTED
LMP START DATE: NORMAL
SPECIMEN SOURCE CVX/VAG CYTO: NORMAL
STAT OF ADQ CVX/VAG CYTO-IMP: NORMAL

## 2021-03-05 ENCOUNTER — TELEPHONE (OUTPATIENT)
Dept: OBSTETRICS AND GYNECOLOGY | Facility: CLINIC | Age: 66
End: 2021-03-05

## 2021-03-05 LAB — FINAL PATHOLOGIC DIAGNOSIS: NORMAL

## 2021-03-08 ENCOUNTER — PATIENT MESSAGE (OUTPATIENT)
Dept: OBSTETRICS AND GYNECOLOGY | Facility: CLINIC | Age: 66
End: 2021-03-08

## 2021-03-16 ENCOUNTER — PATIENT MESSAGE (OUTPATIENT)
Dept: ENDOCRINOLOGY | Facility: CLINIC | Age: 66
End: 2021-03-16

## 2021-03-16 DIAGNOSIS — R73.9 ACUTE HYPERGLYCEMIA: Primary | ICD-10-CM

## 2021-03-19 ENCOUNTER — SPECIALTY PHARMACY (OUTPATIENT)
Dept: PHARMACY | Facility: CLINIC | Age: 66
End: 2021-03-19

## 2021-03-23 ENCOUNTER — PATIENT MESSAGE (OUTPATIENT)
Dept: ENDOCRINOLOGY | Facility: CLINIC | Age: 66
End: 2021-03-23

## 2021-03-23 DIAGNOSIS — C79.51 METASTATIC CANCER TO SPINE: ICD-10-CM

## 2021-03-23 DIAGNOSIS — C79.51 BONE METASTASIS: ICD-10-CM

## 2021-03-23 DIAGNOSIS — R73.9 ACUTE HYPERGLYCEMIA: ICD-10-CM

## 2021-03-23 DIAGNOSIS — C50.111 MALIGNANT NEOPLASM OF CENTRAL PORTION OF RIGHT FEMALE BREAST, UNSPECIFIED ESTROGEN RECEPTOR STATUS: ICD-10-CM

## 2021-03-23 RX ORDER — MORPHINE SULFATE 200 MG/1
200 TABLET, FILM COATED, EXTENDED RELEASE ORAL EVERY 8 HOURS
Qty: 90 TABLET | Refills: 0 | Status: SHIPPED | OUTPATIENT
Start: 2021-03-23 | End: 2021-05-14 | Stop reason: SDUPTHER

## 2021-03-23 RX ORDER — LAMOTRIGINE 25 MG/1
500 TABLET ORAL
Status: CANCELLED | OUTPATIENT
Start: 2021-03-30

## 2021-03-24 ENCOUNTER — OFFICE VISIT (OUTPATIENT)
Dept: HEMATOLOGY/ONCOLOGY | Facility: CLINIC | Age: 66
End: 2021-03-24
Payer: COMMERCIAL

## 2021-03-24 DIAGNOSIS — C78.7 METASTASIS TO LIVER: ICD-10-CM

## 2021-03-24 DIAGNOSIS — C50.111 MALIGNANT NEOPLASM OF CENTRAL PORTION OF RIGHT BREAST IN FEMALE, ESTROGEN RECEPTOR POSITIVE: Primary | ICD-10-CM

## 2021-03-24 DIAGNOSIS — Z17.0 MALIGNANT NEOPLASM OF CENTRAL PORTION OF RIGHT BREAST IN FEMALE, ESTROGEN RECEPTOR POSITIVE: Primary | ICD-10-CM

## 2021-03-24 DIAGNOSIS — C79.51 METASTATIC CANCER TO SPINE: ICD-10-CM

## 2021-03-24 DIAGNOSIS — G89.3 CANCER RELATED PAIN: ICD-10-CM

## 2021-03-24 PROCEDURE — 99213 PR OFFICE/OUTPT VISIT, EST, LEVL III, 20-29 MIN: ICD-10-PCS | Mod: 95,,, | Performed by: INTERNAL MEDICINE

## 2021-03-24 PROCEDURE — 99213 OFFICE O/P EST LOW 20 MIN: CPT | Mod: 95,,, | Performed by: INTERNAL MEDICINE

## 2021-03-24 RX ORDER — BLOOD-GLUCOSE CONTROL, NORMAL
EACH MISCELLANEOUS
Qty: 450 EACH | Refills: 3 | Status: SHIPPED | OUTPATIENT
Start: 2021-03-24 | End: 2021-04-12 | Stop reason: SDUPTHER

## 2021-03-24 RX ORDER — METFORMIN HYDROCHLORIDE 500 MG/1
500 TABLET, EXTENDED RELEASE ORAL 2 TIMES DAILY WITH MEALS
Qty: 180 TABLET | Refills: 3 | Status: SHIPPED | OUTPATIENT
Start: 2021-03-24 | End: 2021-04-30

## 2021-03-26 ENCOUNTER — TELEPHONE (OUTPATIENT)
Dept: OBSTETRICS AND GYNECOLOGY | Facility: CLINIC | Age: 66
End: 2021-03-26

## 2021-03-26 DIAGNOSIS — Z12.11 ENCOUNTER FOR SCREENING COLONOSCOPY: Primary | ICD-10-CM

## 2021-03-29 ENCOUNTER — PATIENT MESSAGE (OUTPATIENT)
Dept: HEMATOLOGY/ONCOLOGY | Facility: CLINIC | Age: 66
End: 2021-03-29

## 2021-03-30 ENCOUNTER — INFUSION (OUTPATIENT)
Dept: INFUSION THERAPY | Facility: HOSPITAL | Age: 66
End: 2021-03-30
Attending: INTERNAL MEDICINE
Payer: MEDICARE

## 2021-03-30 VITALS
RESPIRATION RATE: 16 BRPM | DIASTOLIC BLOOD PRESSURE: 67 MMHG | HEART RATE: 77 BPM | TEMPERATURE: 98 F | OXYGEN SATURATION: 96 % | SYSTOLIC BLOOD PRESSURE: 145 MMHG

## 2021-03-30 DIAGNOSIS — C78.7 METASTASIS TO LIVER: ICD-10-CM

## 2021-03-30 DIAGNOSIS — Z17.0 MALIGNANT NEOPLASM OF CENTRAL PORTION OF RIGHT BREAST IN FEMALE, ESTROGEN RECEPTOR POSITIVE: ICD-10-CM

## 2021-03-30 DIAGNOSIS — C50.111 MALIGNANT NEOPLASM OF CENTRAL PORTION OF RIGHT BREAST IN FEMALE, ESTROGEN RECEPTOR POSITIVE: ICD-10-CM

## 2021-03-30 DIAGNOSIS — Z51.11 ENCOUNTER FOR ANTINEOPLASTIC CHEMOTHERAPY: Primary | ICD-10-CM

## 2021-03-30 DIAGNOSIS — C79.51 METASTATIC CANCER TO SPINE: ICD-10-CM

## 2021-03-30 PROCEDURE — A4216 STERILE WATER/SALINE, 10 ML: HCPCS | Performed by: INTERNAL MEDICINE

## 2021-03-30 PROCEDURE — 96402 CHEMO HORMON ANTINEOPL SQ/IM: CPT

## 2021-03-30 PROCEDURE — 63600175 PHARM REV CODE 636 W HCPCS: Performed by: INTERNAL MEDICINE

## 2021-03-30 PROCEDURE — 25000003 PHARM REV CODE 250: Performed by: INTERNAL MEDICINE

## 2021-03-30 PROCEDURE — 96372 THER/PROPH/DIAG INJ SC/IM: CPT | Mod: 59

## 2021-03-30 RX ORDER — LAMOTRIGINE 25 MG/1
500 TABLET ORAL
Status: COMPLETED | OUTPATIENT
Start: 2021-03-30 | End: 2021-03-30

## 2021-03-30 RX ORDER — SODIUM CHLORIDE 0.9 % (FLUSH) 0.9 %
10 SYRINGE (ML) INJECTION
Status: COMPLETED | OUTPATIENT
Start: 2021-03-30 | End: 2021-03-30

## 2021-03-30 RX ORDER — HEPARIN 100 UNIT/ML
500 SYRINGE INTRAVENOUS
Status: COMPLETED | OUTPATIENT
Start: 2021-03-30 | End: 2021-03-30

## 2021-03-30 RX ADMIN — Medication 10 ML: at 11:03

## 2021-03-30 RX ADMIN — HEPARIN SODIUM (PORCINE) LOCK FLUSH IV SOLN 100 UNIT/ML 500 UNITS: 100 SOLUTION at 11:03

## 2021-03-30 RX ADMIN — DENOSUMAB 120 MG: 120 INJECTION SUBCUTANEOUS at 12:03

## 2021-03-30 RX ADMIN — FULVESTRANT 500 MG: 50 INJECTION INTRAMUSCULAR at 12:03

## 2021-03-31 ENCOUNTER — PATIENT MESSAGE (OUTPATIENT)
Dept: ENDOCRINOLOGY | Facility: CLINIC | Age: 66
End: 2021-03-31

## 2021-03-31 ENCOUNTER — PATIENT MESSAGE (OUTPATIENT)
Dept: HEMATOLOGY/ONCOLOGY | Facility: CLINIC | Age: 66
End: 2021-03-31

## 2021-04-12 ENCOUNTER — PATIENT MESSAGE (OUTPATIENT)
Dept: ENDOCRINOLOGY | Facility: CLINIC | Age: 66
End: 2021-04-12

## 2021-04-12 DIAGNOSIS — R73.9 ACUTE HYPERGLYCEMIA: Primary | ICD-10-CM

## 2021-04-12 RX ORDER — PEN NEEDLE, DIABETIC 30 GX3/16"
NEEDLE, DISPOSABLE MISCELLANEOUS
Qty: 200 EACH | Refills: 3 | Status: ON HOLD | OUTPATIENT
Start: 2021-04-12 | End: 2021-08-25 | Stop reason: HOSPADM

## 2021-04-12 RX ORDER — BLOOD-GLUCOSE CONTROL, NORMAL
EACH MISCELLANEOUS
Qty: 450 EACH | Refills: 3 | Status: SHIPPED | OUTPATIENT
Start: 2021-04-12 | End: 2021-06-18 | Stop reason: SDUPTHER

## 2021-04-12 RX ORDER — INSULIN LISPRO 100 [IU]/ML
1-10 INJECTION, SOLUTION INTRAVENOUS; SUBCUTANEOUS
Qty: 15 ML | Refills: 3 | Status: SHIPPED | OUTPATIENT
Start: 2021-04-12 | End: 2021-06-16

## 2021-04-15 ENCOUNTER — PATIENT MESSAGE (OUTPATIENT)
Dept: ENDOCRINOLOGY | Facility: CLINIC | Age: 66
End: 2021-04-15

## 2021-04-15 RX ORDER — PIOGLITAZONEHYDROCHLORIDE 15 MG/1
15 TABLET ORAL DAILY
Qty: 30 TABLET | Refills: 3 | Status: SHIPPED | OUTPATIENT
Start: 2021-04-15 | End: 2021-04-30

## 2021-04-20 ENCOUNTER — LAB VISIT (OUTPATIENT)
Dept: LAB | Facility: HOSPITAL | Age: 66
End: 2021-04-20
Attending: INTERNAL MEDICINE
Payer: MEDICARE

## 2021-04-20 ENCOUNTER — SPECIALTY PHARMACY (OUTPATIENT)
Dept: PHARMACY | Facility: CLINIC | Age: 66
End: 2021-04-20

## 2021-04-20 DIAGNOSIS — Z17.0 MALIGNANT NEOPLASM OF CENTRAL PORTION OF RIGHT BREAST IN FEMALE, ESTROGEN RECEPTOR POSITIVE: ICD-10-CM

## 2021-04-20 DIAGNOSIS — C50.111 MALIGNANT NEOPLASM OF CENTRAL PORTION OF RIGHT BREAST IN FEMALE, ESTROGEN RECEPTOR POSITIVE: ICD-10-CM

## 2021-04-20 LAB
ALBUMIN SERPL BCP-MCNC: 4.1 G/DL (ref 3.5–5.2)
ALP SERPL-CCNC: 130 U/L (ref 55–135)
ALT SERPL W/O P-5'-P-CCNC: 20 U/L (ref 10–44)
ANION GAP SERPL CALC-SCNC: 11 MMOL/L (ref 8–16)
AST SERPL-CCNC: 18 U/L (ref 10–40)
BASOPHILS # BLD AUTO: 0.02 K/UL (ref 0–0.2)
BASOPHILS NFR BLD: 0.3 % (ref 0–1.9)
BILIRUB SERPL-MCNC: 1.1 MG/DL (ref 0.1–1)
BUN SERPL-MCNC: 11 MG/DL (ref 8–23)
CALCIUM SERPL-MCNC: 9.4 MG/DL (ref 8.7–10.5)
CHLORIDE SERPL-SCNC: 98 MMOL/L (ref 95–110)
CO2 SERPL-SCNC: 29 MMOL/L (ref 23–29)
CREAT SERPL-MCNC: 0.9 MG/DL (ref 0.5–1.4)
DIFFERENTIAL METHOD: ABNORMAL
EOSINOPHIL # BLD AUTO: 0.2 K/UL (ref 0–0.5)
EOSINOPHIL NFR BLD: 4.2 % (ref 0–8)
ERYTHROCYTE [DISTWIDTH] IN BLOOD BY AUTOMATED COUNT: 17.5 % (ref 11.5–14.5)
EST. GFR  (AFRICAN AMERICAN): >60 ML/MIN/1.73 M^2
EST. GFR  (NON AFRICAN AMERICAN): >60 ML/MIN/1.73 M^2
GLUCOSE SERPL-MCNC: 259 MG/DL (ref 70–110)
HCT VFR BLD AUTO: 44.1 % (ref 37–48.5)
HGB BLD-MCNC: 14.6 G/DL (ref 12–16)
IMM GRANULOCYTES # BLD AUTO: 0.01 K/UL (ref 0–0.04)
IMM GRANULOCYTES NFR BLD AUTO: 0.2 % (ref 0–0.5)
LYMPHOCYTES # BLD AUTO: 2.5 K/UL (ref 1–4.8)
LYMPHOCYTES NFR BLD: 43 % (ref 18–48)
MCH RBC QN AUTO: 28.3 PG (ref 27–31)
MCHC RBC AUTO-ENTMCNC: 33.1 G/DL (ref 32–36)
MCV RBC AUTO: 86 FL (ref 82–98)
MONOCYTES # BLD AUTO: 0.3 K/UL (ref 0.3–1)
MONOCYTES NFR BLD: 5.9 % (ref 4–15)
NEUTROPHILS # BLD AUTO: 2.7 K/UL (ref 1.8–7.7)
NEUTROPHILS NFR BLD: 46.4 % (ref 38–73)
NRBC BLD-RTO: 0 /100 WBC
PLATELET # BLD AUTO: 101 K/UL (ref 150–450)
PMV BLD AUTO: 9.4 FL (ref 9.2–12.9)
POTASSIUM SERPL-SCNC: 4.4 MMOL/L (ref 3.5–5.1)
PROT SERPL-MCNC: 8 G/DL (ref 6–8.4)
RBC # BLD AUTO: 5.15 M/UL (ref 4–5.4)
SODIUM SERPL-SCNC: 138 MMOL/L (ref 136–145)
WBC # BLD AUTO: 5.75 K/UL (ref 3.9–12.7)

## 2021-04-20 PROCEDURE — 85025 COMPLETE CBC W/AUTO DIFF WBC: CPT | Performed by: INTERNAL MEDICINE

## 2021-04-20 PROCEDURE — 80053 COMPREHEN METABOLIC PANEL: CPT | Performed by: INTERNAL MEDICINE

## 2021-04-20 PROCEDURE — 86300 IMMUNOASSAY TUMOR CA 15-3: CPT | Performed by: INTERNAL MEDICINE

## 2021-04-20 RX ORDER — LAMOTRIGINE 25 MG/1
500 TABLET ORAL
Status: CANCELLED | OUTPATIENT
Start: 2021-04-27

## 2021-04-21 ENCOUNTER — OFFICE VISIT (OUTPATIENT)
Dept: HEMATOLOGY/ONCOLOGY | Facility: CLINIC | Age: 66
End: 2021-04-21
Payer: MEDICARE

## 2021-04-21 ENCOUNTER — TELEPHONE (OUTPATIENT)
Dept: HEMATOLOGY/ONCOLOGY | Facility: CLINIC | Age: 66
End: 2021-04-21

## 2021-04-21 DIAGNOSIS — C79.51 BONE METASTASIS: ICD-10-CM

## 2021-04-21 DIAGNOSIS — C50.111 MALIGNANT NEOPLASM OF CENTRAL PORTION OF RIGHT BREAST IN FEMALE, ESTROGEN RECEPTOR POSITIVE: Primary | ICD-10-CM

## 2021-04-21 DIAGNOSIS — C78.7 METASTASIS TO LIVER: ICD-10-CM

## 2021-04-21 DIAGNOSIS — Z17.0 MALIGNANT NEOPLASM OF CENTRAL PORTION OF RIGHT BREAST IN FEMALE, ESTROGEN RECEPTOR POSITIVE: Primary | ICD-10-CM

## 2021-04-21 PROCEDURE — 99213 OFFICE O/P EST LOW 20 MIN: CPT | Mod: 95,,, | Performed by: INTERNAL MEDICINE

## 2021-04-21 PROCEDURE — 99499 UNLISTED E&M SERVICE: CPT | Mod: 95,,, | Performed by: INTERNAL MEDICINE

## 2021-04-21 PROCEDURE — 99499 RISK ADDL DX/OHS AUDIT: ICD-10-PCS | Mod: 95,,, | Performed by: INTERNAL MEDICINE

## 2021-04-21 PROCEDURE — 99213 PR OFFICE/OUTPT VISIT, EST, LEVL III, 20-29 MIN: ICD-10-PCS | Mod: 95,,, | Performed by: INTERNAL MEDICINE

## 2021-04-22 ENCOUNTER — PATIENT MESSAGE (OUTPATIENT)
Dept: ENDOCRINOLOGY | Facility: CLINIC | Age: 66
End: 2021-04-22

## 2021-04-22 LAB — CANCER AG27-29 SERPL-ACNC: 78.6 U/ML

## 2021-04-23 ENCOUNTER — PATIENT MESSAGE (OUTPATIENT)
Dept: ENDOCRINOLOGY | Facility: CLINIC | Age: 66
End: 2021-04-23

## 2021-04-23 ENCOUNTER — PATIENT MESSAGE (OUTPATIENT)
Dept: PHARMACY | Facility: CLINIC | Age: 66
End: 2021-04-23

## 2021-04-23 DIAGNOSIS — R73.9 ACUTE HYPERGLYCEMIA: ICD-10-CM

## 2021-04-27 ENCOUNTER — INFUSION (OUTPATIENT)
Dept: INFUSION THERAPY | Facility: HOSPITAL | Age: 66
End: 2021-04-27
Attending: INTERNAL MEDICINE
Payer: MEDICARE

## 2021-04-27 VITALS
OXYGEN SATURATION: 96 % | SYSTOLIC BLOOD PRESSURE: 123 MMHG | DIASTOLIC BLOOD PRESSURE: 69 MMHG | TEMPERATURE: 97 F | HEART RATE: 75 BPM | RESPIRATION RATE: 16 BRPM

## 2021-04-27 DIAGNOSIS — C79.51 METASTATIC CANCER TO SPINE: ICD-10-CM

## 2021-04-27 DIAGNOSIS — C78.7 METASTASIS TO LIVER: ICD-10-CM

## 2021-04-27 DIAGNOSIS — C50.111 MALIGNANT NEOPLASM OF CENTRAL PORTION OF RIGHT BREAST IN FEMALE, ESTROGEN RECEPTOR POSITIVE: ICD-10-CM

## 2021-04-27 DIAGNOSIS — Z17.0 MALIGNANT NEOPLASM OF CENTRAL PORTION OF RIGHT BREAST IN FEMALE, ESTROGEN RECEPTOR POSITIVE: ICD-10-CM

## 2021-04-27 DIAGNOSIS — Z51.11 ENCOUNTER FOR ANTINEOPLASTIC CHEMOTHERAPY: Primary | ICD-10-CM

## 2021-04-27 PROCEDURE — 96372 THER/PROPH/DIAG INJ SC/IM: CPT | Mod: 59

## 2021-04-27 PROCEDURE — 63600175 PHARM REV CODE 636 W HCPCS: Mod: JG | Performed by: INTERNAL MEDICINE

## 2021-04-27 PROCEDURE — A4216 STERILE WATER/SALINE, 10 ML: HCPCS | Performed by: INTERNAL MEDICINE

## 2021-04-27 PROCEDURE — 96402 CHEMO HORMON ANTINEOPL SQ/IM: CPT

## 2021-04-27 PROCEDURE — 96523 IRRIG DRUG DELIVERY DEVICE: CPT

## 2021-04-27 PROCEDURE — 25000003 PHARM REV CODE 250: Performed by: INTERNAL MEDICINE

## 2021-04-27 RX ORDER — LAMOTRIGINE 25 MG/1
500 TABLET ORAL
Status: COMPLETED | OUTPATIENT
Start: 2021-04-27 | End: 2021-04-27

## 2021-04-27 RX ORDER — HEPARIN 100 UNIT/ML
500 SYRINGE INTRAVENOUS
Status: COMPLETED | OUTPATIENT
Start: 2021-04-27 | End: 2021-04-27

## 2021-04-27 RX ORDER — SODIUM CHLORIDE 0.9 % (FLUSH) 0.9 %
10 SYRINGE (ML) INJECTION
Status: COMPLETED | OUTPATIENT
Start: 2021-04-27 | End: 2021-04-27

## 2021-04-27 RX ADMIN — HEPARIN 500 UNITS: 100 SYRINGE at 11:04

## 2021-04-27 RX ADMIN — FULVESTRANT 500 MG: 50 INJECTION INTRAMUSCULAR at 11:04

## 2021-04-27 RX ADMIN — DENOSUMAB 120 MG: 120 INJECTION SUBCUTANEOUS at 11:04

## 2021-04-27 RX ADMIN — Medication 10 ML: at 11:04

## 2021-04-29 ENCOUNTER — PATIENT MESSAGE (OUTPATIENT)
Dept: ENDOCRINOLOGY | Facility: CLINIC | Age: 66
End: 2021-04-29

## 2021-04-29 ENCOUNTER — PATIENT MESSAGE (OUTPATIENT)
Dept: HEMATOLOGY/ONCOLOGY | Facility: CLINIC | Age: 66
End: 2021-04-29

## 2021-04-29 DIAGNOSIS — R73.9 ACUTE HYPERGLYCEMIA: Primary | ICD-10-CM

## 2021-04-30 ENCOUNTER — PATIENT MESSAGE (OUTPATIENT)
Dept: ENDOCRINOLOGY | Facility: CLINIC | Age: 66
End: 2021-04-30

## 2021-04-30 DIAGNOSIS — R73.9 ACUTE HYPERGLYCEMIA: Primary | ICD-10-CM

## 2021-04-30 RX ORDER — FLASH GLUCOSE SCANNING READER
EACH MISCELLANEOUS
Qty: 1 EACH | Refills: 0 | Status: ON HOLD | OUTPATIENT
Start: 2021-04-30 | End: 2021-08-25 | Stop reason: HOSPADM

## 2021-04-30 RX ORDER — FLASH GLUCOSE SENSOR
2 KIT MISCELLANEOUS
Qty: 2 KIT | Refills: 11 | Status: SHIPPED | OUTPATIENT
Start: 2021-04-30 | End: 2021-07-07 | Stop reason: SDUPTHER

## 2021-04-30 RX ORDER — FLASH GLUCOSE SENSOR
KIT MISCELLANEOUS
Qty: 2 KIT | Refills: 3 | Status: SHIPPED | OUTPATIENT
Start: 2021-04-30 | End: 2021-04-30

## 2021-04-30 RX ORDER — INSULIN DEGLUDEC 100 U/ML
10 INJECTION, SOLUTION SUBCUTANEOUS DAILY
Qty: 3 SYRINGE | Refills: 3 | Status: SHIPPED | OUTPATIENT
Start: 2021-04-30 | End: 2021-06-16

## 2021-05-07 ENCOUNTER — PATIENT MESSAGE (OUTPATIENT)
Dept: ENDOCRINOLOGY | Facility: CLINIC | Age: 66
End: 2021-05-07

## 2021-05-07 DIAGNOSIS — Z79.4 DRUG OR CHEMICAL INDUCED DIABETES MELLITUS WITH HYPERGLYCEMIA, WITH LONG-TERM CURRENT USE OF INSULIN: ICD-10-CM

## 2021-05-07 DIAGNOSIS — R73.9 ACUTE HYPERGLYCEMIA: Primary | ICD-10-CM

## 2021-05-07 DIAGNOSIS — E09.65 DRUG OR CHEMICAL INDUCED DIABETES MELLITUS WITH HYPERGLYCEMIA, WITH LONG-TERM CURRENT USE OF INSULIN: ICD-10-CM

## 2021-05-10 ENCOUNTER — TELEPHONE (OUTPATIENT)
Dept: ENDOCRINOLOGY | Facility: CLINIC | Age: 66
End: 2021-05-10

## 2021-05-12 ENCOUNTER — PATIENT MESSAGE (OUTPATIENT)
Dept: ENDOSCOPY | Facility: HOSPITAL | Age: 66
End: 2021-05-12

## 2021-05-12 DIAGNOSIS — Z01.818 PRE-OP TESTING: ICD-10-CM

## 2021-05-12 DIAGNOSIS — Z12.11 SPECIAL SCREENING FOR MALIGNANT NEOPLASMS, COLON: Primary | ICD-10-CM

## 2021-05-12 RX ORDER — SODIUM, POTASSIUM,MAG SULFATES 17.5-3.13G
1 SOLUTION, RECONSTITUTED, ORAL ORAL DAILY
Qty: 1 KIT | Refills: 0 | Status: SHIPPED | OUTPATIENT
Start: 2021-05-12 | End: 2021-05-14

## 2021-05-13 ENCOUNTER — PATIENT OUTREACH (OUTPATIENT)
Dept: DIABETES | Facility: CLINIC | Age: 66
End: 2021-05-13

## 2021-05-14 ENCOUNTER — PATIENT MESSAGE (OUTPATIENT)
Dept: HEMATOLOGY/ONCOLOGY | Facility: CLINIC | Age: 66
End: 2021-05-14

## 2021-05-14 DIAGNOSIS — C50.111 MALIGNANT NEOPLASM OF CENTRAL PORTION OF RIGHT BREAST IN FEMALE, ESTROGEN RECEPTOR POSITIVE: Primary | ICD-10-CM

## 2021-05-14 DIAGNOSIS — C50.111 MALIGNANT NEOPLASM OF CENTRAL PORTION OF RIGHT FEMALE BREAST, UNSPECIFIED ESTROGEN RECEPTOR STATUS: ICD-10-CM

## 2021-05-14 DIAGNOSIS — C79.51 BONE METASTASIS: ICD-10-CM

## 2021-05-14 DIAGNOSIS — C79.51 METASTATIC CANCER TO SPINE: ICD-10-CM

## 2021-05-14 DIAGNOSIS — Z17.0 MALIGNANT NEOPLASM OF CENTRAL PORTION OF RIGHT BREAST IN FEMALE, ESTROGEN RECEPTOR POSITIVE: Primary | ICD-10-CM

## 2021-05-14 RX ORDER — MORPHINE SULFATE 200 MG/1
200 TABLET, FILM COATED, EXTENDED RELEASE ORAL EVERY 8 HOURS
Qty: 90 TABLET | Refills: 0 | Status: SHIPPED | OUTPATIENT
Start: 2021-05-14 | End: 2021-07-07 | Stop reason: SDUPTHER

## 2021-05-14 RX ORDER — NALOXONE HYDROCHLORIDE 4 MG/.1ML
1 SPRAY NASAL ONCE
Qty: 1 EACH | Refills: 0 | Status: SHIPPED | OUTPATIENT
Start: 2021-05-14 | End: 2021-05-14

## 2021-05-17 ENCOUNTER — CLINICAL SUPPORT (OUTPATIENT)
Dept: DIABETES | Facility: CLINIC | Age: 66
End: 2021-05-17
Payer: MEDICARE

## 2021-05-17 ENCOUNTER — TELEPHONE (OUTPATIENT)
Dept: HEMATOLOGY/ONCOLOGY | Facility: CLINIC | Age: 66
End: 2021-05-17

## 2021-05-17 ENCOUNTER — LAB VISIT (OUTPATIENT)
Dept: LAB | Facility: HOSPITAL | Age: 66
End: 2021-05-17
Attending: INTERNAL MEDICINE
Payer: MEDICARE

## 2021-05-17 ENCOUNTER — PATIENT MESSAGE (OUTPATIENT)
Dept: HEMATOLOGY/ONCOLOGY | Facility: CLINIC | Age: 66
End: 2021-05-17

## 2021-05-17 ENCOUNTER — PATIENT MESSAGE (OUTPATIENT)
Dept: ENDOCRINOLOGY | Facility: CLINIC | Age: 66
End: 2021-05-17

## 2021-05-17 DIAGNOSIS — Z17.0 MALIGNANT NEOPLASM OF CENTRAL PORTION OF RIGHT BREAST IN FEMALE, ESTROGEN RECEPTOR POSITIVE: ICD-10-CM

## 2021-05-17 DIAGNOSIS — R73.9 ACUTE HYPERGLYCEMIA: ICD-10-CM

## 2021-05-17 DIAGNOSIS — E09.65 DRUG OR CHEMICAL INDUCED DIABETES MELLITUS WITH HYPERGLYCEMIA, WITH LONG-TERM CURRENT USE OF INSULIN: ICD-10-CM

## 2021-05-17 DIAGNOSIS — Z79.4 DRUG OR CHEMICAL INDUCED DIABETES MELLITUS WITH HYPERGLYCEMIA, WITH LONG-TERM CURRENT USE OF INSULIN: ICD-10-CM

## 2021-05-17 DIAGNOSIS — C50.111 MALIGNANT NEOPLASM OF CENTRAL PORTION OF RIGHT BREAST IN FEMALE, ESTROGEN RECEPTOR POSITIVE: ICD-10-CM

## 2021-05-17 LAB
ALBUMIN SERPL BCP-MCNC: 3.8 G/DL (ref 3.5–5.2)
ALP SERPL-CCNC: 106 U/L (ref 55–135)
ALT SERPL W/O P-5'-P-CCNC: 22 U/L (ref 10–44)
ANION GAP SERPL CALC-SCNC: 10 MMOL/L (ref 8–16)
AST SERPL-CCNC: 18 U/L (ref 10–40)
BASOPHILS # BLD AUTO: 0.02 K/UL (ref 0–0.2)
BASOPHILS NFR BLD: 0.3 % (ref 0–1.9)
BILIRUB SERPL-MCNC: 1 MG/DL (ref 0.1–1)
BUN SERPL-MCNC: 16 MG/DL (ref 8–23)
CALCIUM SERPL-MCNC: 8.9 MG/DL (ref 8.7–10.5)
CHLORIDE SERPL-SCNC: 103 MMOL/L (ref 95–110)
CO2 SERPL-SCNC: 25 MMOL/L (ref 23–29)
CREAT SERPL-MCNC: 1 MG/DL (ref 0.5–1.4)
DIFFERENTIAL METHOD: ABNORMAL
EOSINOPHIL # BLD AUTO: 0.2 K/UL (ref 0–0.5)
EOSINOPHIL NFR BLD: 2.6 % (ref 0–8)
ERYTHROCYTE [DISTWIDTH] IN BLOOD BY AUTOMATED COUNT: 15.9 % (ref 11.5–14.5)
EST. GFR  (AFRICAN AMERICAN): >60 ML/MIN/1.73 M^2
EST. GFR  (NON AFRICAN AMERICAN): 60 ML/MIN/1.73 M^2
GLUCOSE SERPL-MCNC: 216 MG/DL (ref 70–110)
HCT VFR BLD AUTO: 45.8 % (ref 37–48.5)
HGB BLD-MCNC: 15.2 G/DL (ref 12–16)
IMM GRANULOCYTES # BLD AUTO: 0.01 K/UL (ref 0–0.04)
IMM GRANULOCYTES NFR BLD AUTO: 0.2 % (ref 0–0.5)
LYMPHOCYTES # BLD AUTO: 2.6 K/UL (ref 1–4.8)
LYMPHOCYTES NFR BLD: 44.8 % (ref 18–48)
MCH RBC QN AUTO: 29.3 PG (ref 27–31)
MCHC RBC AUTO-ENTMCNC: 33.2 G/DL (ref 32–36)
MCV RBC AUTO: 88 FL (ref 82–98)
MONOCYTES # BLD AUTO: 0.3 K/UL (ref 0.3–1)
MONOCYTES NFR BLD: 5.4 % (ref 4–15)
NEUTROPHILS # BLD AUTO: 2.7 K/UL (ref 1.8–7.7)
NEUTROPHILS NFR BLD: 46.7 % (ref 38–73)
NRBC BLD-RTO: 0 /100 WBC
PLATELET # BLD AUTO: 73 K/UL (ref 150–450)
PMV BLD AUTO: 9.3 FL (ref 9.2–12.9)
POTASSIUM SERPL-SCNC: 4.1 MMOL/L (ref 3.5–5.1)
PROT SERPL-MCNC: 7.6 G/DL (ref 6–8.4)
RBC # BLD AUTO: 5.18 M/UL (ref 4–5.4)
SODIUM SERPL-SCNC: 138 MMOL/L (ref 136–145)
WBC # BLD AUTO: 5.78 K/UL (ref 3.9–12.7)

## 2021-05-17 PROCEDURE — 99999 PR PBB SHADOW E&M-EST. PATIENT-LVL I: CPT | Mod: PBBFAC,,, | Performed by: DIETITIAN, REGISTERED

## 2021-05-17 PROCEDURE — 36415 COLL VENOUS BLD VENIPUNCTURE: CPT | Performed by: INTERNAL MEDICINE

## 2021-05-17 PROCEDURE — 99999 PR PBB SHADOW E&M-EST. PATIENT-LVL I: ICD-10-PCS | Mod: PBBFAC,,, | Performed by: DIETITIAN, REGISTERED

## 2021-05-17 PROCEDURE — 85025 COMPLETE CBC W/AUTO DIFF WBC: CPT | Performed by: INTERNAL MEDICINE

## 2021-05-17 PROCEDURE — G0108 DIAB MANAGE TRN  PER INDIV: HCPCS | Mod: S$GLB,,, | Performed by: DIETITIAN, REGISTERED

## 2021-05-17 PROCEDURE — 86300 IMMUNOASSAY TUMOR CA 15-3: CPT | Performed by: INTERNAL MEDICINE

## 2021-05-17 PROCEDURE — G0108 PR DIAB MANAGE TRN  PER INDIV: ICD-10-PCS | Mod: S$GLB,,, | Performed by: DIETITIAN, REGISTERED

## 2021-05-17 PROCEDURE — 80053 COMPREHEN METABOLIC PANEL: CPT | Performed by: INTERNAL MEDICINE

## 2021-05-18 ENCOUNTER — PATIENT MESSAGE (OUTPATIENT)
Dept: ENDOCRINOLOGY | Facility: CLINIC | Age: 66
End: 2021-05-18

## 2021-05-18 ENCOUNTER — TELEPHONE (OUTPATIENT)
Dept: HEMATOLOGY/ONCOLOGY | Facility: CLINIC | Age: 66
End: 2021-05-18

## 2021-05-18 ENCOUNTER — PATIENT MESSAGE (OUTPATIENT)
Dept: HEMATOLOGY/ONCOLOGY | Facility: CLINIC | Age: 66
End: 2021-05-18

## 2021-05-18 RX ORDER — LAMOTRIGINE 25 MG/1
500 TABLET ORAL
Status: CANCELLED | OUTPATIENT
Start: 2021-05-25

## 2021-05-19 LAB — CANCER AG27-29 SERPL-ACNC: 59 U/ML

## 2021-05-21 ENCOUNTER — PATIENT MESSAGE (OUTPATIENT)
Dept: HEMATOLOGY/ONCOLOGY | Facility: CLINIC | Age: 66
End: 2021-05-21

## 2021-05-21 ENCOUNTER — SPECIALTY PHARMACY (OUTPATIENT)
Dept: PHARMACY | Facility: CLINIC | Age: 66
End: 2021-05-21

## 2021-05-23 ENCOUNTER — PATIENT MESSAGE (OUTPATIENT)
Dept: HEMATOLOGY/ONCOLOGY | Facility: CLINIC | Age: 66
End: 2021-05-23

## 2021-05-24 ENCOUNTER — PATIENT MESSAGE (OUTPATIENT)
Dept: ENDOCRINOLOGY | Facility: CLINIC | Age: 66
End: 2021-05-24

## 2021-05-25 ENCOUNTER — INFUSION (OUTPATIENT)
Dept: INFUSION THERAPY | Facility: HOSPITAL | Age: 66
End: 2021-05-25
Attending: INTERNAL MEDICINE
Payer: MEDICARE

## 2021-05-25 VITALS
RESPIRATION RATE: 18 BRPM | SYSTOLIC BLOOD PRESSURE: 104 MMHG | TEMPERATURE: 98 F | OXYGEN SATURATION: 97 % | DIASTOLIC BLOOD PRESSURE: 57 MMHG | HEART RATE: 78 BPM

## 2021-05-25 DIAGNOSIS — Z17.0 MALIGNANT NEOPLASM OF CENTRAL PORTION OF RIGHT BREAST IN FEMALE, ESTROGEN RECEPTOR POSITIVE: ICD-10-CM

## 2021-05-25 DIAGNOSIS — C79.51 METASTATIC CANCER TO SPINE: ICD-10-CM

## 2021-05-25 DIAGNOSIS — C78.7 METASTASIS TO LIVER: ICD-10-CM

## 2021-05-25 DIAGNOSIS — C50.111 MALIGNANT NEOPLASM OF CENTRAL PORTION OF RIGHT BREAST IN FEMALE, ESTROGEN RECEPTOR POSITIVE: ICD-10-CM

## 2021-05-25 DIAGNOSIS — Z51.11 ENCOUNTER FOR ANTINEOPLASTIC CHEMOTHERAPY: Primary | ICD-10-CM

## 2021-05-25 PROCEDURE — 96523 IRRIG DRUG DELIVERY DEVICE: CPT

## 2021-05-25 PROCEDURE — 63600175 PHARM REV CODE 636 W HCPCS: Mod: JG | Performed by: INTERNAL MEDICINE

## 2021-05-25 PROCEDURE — A4216 STERILE WATER/SALINE, 10 ML: HCPCS | Performed by: INTERNAL MEDICINE

## 2021-05-25 PROCEDURE — 96372 THER/PROPH/DIAG INJ SC/IM: CPT

## 2021-05-25 PROCEDURE — 25000003 PHARM REV CODE 250: Performed by: INTERNAL MEDICINE

## 2021-05-25 PROCEDURE — 96402 CHEMO HORMON ANTINEOPL SQ/IM: CPT

## 2021-05-25 RX ORDER — HEPARIN 100 UNIT/ML
500 SYRINGE INTRAVENOUS
Status: COMPLETED | OUTPATIENT
Start: 2021-05-25 | End: 2021-05-25

## 2021-05-25 RX ORDER — SODIUM CHLORIDE 0.9 % (FLUSH) 0.9 %
10 SYRINGE (ML) INJECTION
Status: COMPLETED | OUTPATIENT
Start: 2021-05-25 | End: 2021-05-25

## 2021-05-25 RX ORDER — LAMOTRIGINE 25 MG/1
500 TABLET ORAL
Status: COMPLETED | OUTPATIENT
Start: 2021-05-25 | End: 2021-05-25

## 2021-05-25 RX ADMIN — HEPARIN 500 UNITS: 100 SYRINGE at 10:05

## 2021-05-25 RX ADMIN — Medication 10 ML: at 10:05

## 2021-05-25 RX ADMIN — FULVESTRANT 500 MG: 50 INJECTION INTRAMUSCULAR at 11:05

## 2021-05-25 RX ADMIN — DENOSUMAB 120 MG: 120 INJECTION SUBCUTANEOUS at 11:05

## 2021-05-28 ENCOUNTER — PATIENT MESSAGE (OUTPATIENT)
Dept: ENDOCRINOLOGY | Facility: CLINIC | Age: 66
End: 2021-05-28

## 2021-05-28 ENCOUNTER — TELEPHONE (OUTPATIENT)
Dept: DIABETES | Facility: CLINIC | Age: 66
End: 2021-05-28

## 2021-05-31 ENCOUNTER — HOSPITAL ENCOUNTER (OUTPATIENT)
Dept: RADIOLOGY | Facility: HOSPITAL | Age: 66
Discharge: HOME | End: 2021-05-31
Attending: INTERNAL MEDICINE
Payer: MEDICARE

## 2021-05-31 DIAGNOSIS — C50.111 MALIGNANT NEOPLASM OF CENTRAL PORTION OF RIGHT BREAST IN FEMALE, ESTROGEN RECEPTOR POSITIVE: ICD-10-CM

## 2021-05-31 DIAGNOSIS — Z17.0 MALIGNANT NEOPLASM OF CENTRAL PORTION OF RIGHT BREAST IN FEMALE, ESTROGEN RECEPTOR POSITIVE: ICD-10-CM

## 2021-05-31 PROCEDURE — 71250 CT CHEST ABDOMEN PELVIS WITHOUT CONTRAST(XPD): ICD-10-PCS | Mod: 26,,, | Performed by: RADIOLOGY

## 2021-05-31 PROCEDURE — 74176 CT ABD & PELVIS W/O CONTRAST: CPT | Mod: TC

## 2021-05-31 PROCEDURE — 71250 CT THORAX DX C-: CPT | Mod: TC

## 2021-05-31 PROCEDURE — 74176 CT ABD & PELVIS W/O CONTRAST: CPT | Mod: 26,,, | Performed by: RADIOLOGY

## 2021-05-31 PROCEDURE — 74176 CT CHEST ABDOMEN PELVIS WITHOUT CONTRAST(XPD): ICD-10-PCS | Mod: 26,,, | Performed by: RADIOLOGY

## 2021-05-31 PROCEDURE — 71250 CT THORAX DX C-: CPT | Mod: 26,,, | Performed by: RADIOLOGY

## 2021-05-31 PROCEDURE — 25500020 PHARM REV CODE 255: Performed by: INTERNAL MEDICINE

## 2021-05-31 RX ADMIN — IOHEXOL 15 ML: 300 INJECTION, SOLUTION INTRAVENOUS at 10:05

## 2021-06-01 ENCOUNTER — OFFICE VISIT (OUTPATIENT)
Dept: HEMATOLOGY/ONCOLOGY | Facility: CLINIC | Age: 66
End: 2021-06-01
Payer: MEDICARE

## 2021-06-01 DIAGNOSIS — Z17.0 MALIGNANT NEOPLASM OF CENTRAL PORTION OF RIGHT BREAST IN FEMALE, ESTROGEN RECEPTOR POSITIVE: Primary | ICD-10-CM

## 2021-06-01 DIAGNOSIS — C78.7 METASTASIS TO LIVER: ICD-10-CM

## 2021-06-01 DIAGNOSIS — C79.51 BONE METASTASIS: ICD-10-CM

## 2021-06-01 DIAGNOSIS — C50.111 MALIGNANT NEOPLASM OF CENTRAL PORTION OF RIGHT BREAST IN FEMALE, ESTROGEN RECEPTOR POSITIVE: Primary | ICD-10-CM

## 2021-06-01 PROCEDURE — 99213 PR OFFICE/OUTPT VISIT, EST, LEVL III, 20-29 MIN: ICD-10-PCS | Mod: 95,,, | Performed by: INTERNAL MEDICINE

## 2021-06-01 PROCEDURE — 99213 OFFICE O/P EST LOW 20 MIN: CPT | Mod: 95,,, | Performed by: INTERNAL MEDICINE

## 2021-06-01 RX ORDER — LAMOTRIGINE 25 MG/1
500 TABLET ORAL
Status: CANCELLED | OUTPATIENT
Start: 2021-06-30

## 2021-06-07 ENCOUNTER — PATIENT MESSAGE (OUTPATIENT)
Dept: ENDOCRINOLOGY | Facility: CLINIC | Age: 66
End: 2021-06-07

## 2021-06-07 DIAGNOSIS — E03.8 HYPOTHYROIDISM DUE TO HASHIMOTO'S THYROIDITIS: Primary | ICD-10-CM

## 2021-06-07 DIAGNOSIS — E06.3 HYPOTHYROIDISM DUE TO HASHIMOTO'S THYROIDITIS: Primary | ICD-10-CM

## 2021-06-09 ENCOUNTER — PATIENT MESSAGE (OUTPATIENT)
Dept: ENDOCRINOLOGY | Facility: CLINIC | Age: 66
End: 2021-06-09

## 2021-06-16 ENCOUNTER — PATIENT MESSAGE (OUTPATIENT)
Dept: ENDOCRINOLOGY | Facility: CLINIC | Age: 66
End: 2021-06-16

## 2021-06-16 ENCOUNTER — PATIENT MESSAGE (OUTPATIENT)
Dept: INTERNAL MEDICINE | Facility: CLINIC | Age: 66
End: 2021-06-16

## 2021-06-16 ENCOUNTER — OFFICE VISIT (OUTPATIENT)
Dept: URGENT CARE | Facility: CLINIC | Age: 66
End: 2021-06-16
Payer: MEDICARE

## 2021-06-16 ENCOUNTER — OFFICE VISIT (OUTPATIENT)
Dept: ENDOCRINOLOGY | Facility: CLINIC | Age: 66
End: 2021-06-16
Payer: MEDICARE

## 2021-06-16 ENCOUNTER — PATIENT MESSAGE (OUTPATIENT)
Dept: HEMATOLOGY/ONCOLOGY | Facility: CLINIC | Age: 66
End: 2021-06-16

## 2021-06-16 VITALS
HEIGHT: 64 IN | SYSTOLIC BLOOD PRESSURE: 136 MMHG | DIASTOLIC BLOOD PRESSURE: 75 MMHG | BODY MASS INDEX: 21.85 KG/M2 | TEMPERATURE: 98 F | WEIGHT: 128 LBS | OXYGEN SATURATION: 97 % | RESPIRATION RATE: 10 BRPM | HEART RATE: 80 BPM

## 2021-06-16 DIAGNOSIS — T78.40XA ALLERGIC REACTION, INITIAL ENCOUNTER: Primary | ICD-10-CM

## 2021-06-16 DIAGNOSIS — C50.111 MALIGNANT NEOPLASM OF CENTRAL PORTION OF RIGHT BREAST IN FEMALE, ESTROGEN RECEPTOR POSITIVE: ICD-10-CM

## 2021-06-16 DIAGNOSIS — E21.1 SECONDARY HYPERPARATHYROIDISM, NON-RENAL: ICD-10-CM

## 2021-06-16 DIAGNOSIS — E03.8 HYPOTHYROIDISM DUE TO HASHIMOTO'S THYROIDITIS: ICD-10-CM

## 2021-06-16 DIAGNOSIS — E06.3 HYPOTHYROIDISM DUE TO HASHIMOTO'S THYROIDITIS: ICD-10-CM

## 2021-06-16 DIAGNOSIS — R73.9 ACUTE HYPERGLYCEMIA: Primary | ICD-10-CM

## 2021-06-16 DIAGNOSIS — R21 RASH: ICD-10-CM

## 2021-06-16 DIAGNOSIS — M81.0 OSTEOPOROSIS, UNSPECIFIED OSTEOPOROSIS TYPE, UNSPECIFIED PATHOLOGICAL FRACTURE PRESENCE: ICD-10-CM

## 2021-06-16 DIAGNOSIS — Z17.0 MALIGNANT NEOPLASM OF CENTRAL PORTION OF RIGHT BREAST IN FEMALE, ESTROGEN RECEPTOR POSITIVE: ICD-10-CM

## 2021-06-16 PROCEDURE — 3008F BODY MASS INDEX DOCD: CPT | Mod: CPTII,S$GLB,, | Performed by: PHYSICIAN ASSISTANT

## 2021-06-16 PROCEDURE — 99499 RISK ADDL DX/OHS AUDIT: ICD-10-PCS | Mod: 95,,, | Performed by: NURSE PRACTITIONER

## 2021-06-16 PROCEDURE — 99214 PR OFFICE/OUTPT VISIT, EST, LEVL IV, 30-39 MIN: ICD-10-PCS | Mod: S$GLB,,, | Performed by: PHYSICIAN ASSISTANT

## 2021-06-16 PROCEDURE — 99499 UNLISTED E&M SERVICE: CPT | Mod: 95,,, | Performed by: NURSE PRACTITIONER

## 2021-06-16 PROCEDURE — 3008F PR BODY MASS INDEX (BMI) DOCUMENTED: ICD-10-PCS | Mod: CPTII,S$GLB,, | Performed by: PHYSICIAN ASSISTANT

## 2021-06-16 PROCEDURE — 99214 PR OFFICE/OUTPT VISIT, EST, LEVL IV, 30-39 MIN: ICD-10-PCS | Mod: 95,,, | Performed by: NURSE PRACTITIONER

## 2021-06-16 PROCEDURE — 99214 OFFICE O/P EST MOD 30 MIN: CPT | Mod: S$GLB,,, | Performed by: PHYSICIAN ASSISTANT

## 2021-06-16 PROCEDURE — 99214 OFFICE O/P EST MOD 30 MIN: CPT | Mod: 95,,, | Performed by: NURSE PRACTITIONER

## 2021-06-16 RX ORDER — INSULIN DEGLUDEC 100 U/ML
16 INJECTION, SOLUTION SUBCUTANEOUS DAILY
Qty: 3 PEN | Refills: 3 | Status: ON HOLD | OUTPATIENT
Start: 2021-06-16 | End: 2021-08-25 | Stop reason: HOSPADM

## 2021-06-16 RX ORDER — PREDNISONE 20 MG/1
40 TABLET ORAL DAILY
Qty: 10 TABLET | Refills: 0 | Status: SHIPPED | OUTPATIENT
Start: 2021-06-16 | End: 2021-06-21

## 2021-06-16 RX ORDER — INSULIN LISPRO 100 [IU]/ML
4 INJECTION, SOLUTION INTRAVENOUS; SUBCUTANEOUS
Qty: 15 ML | Refills: 3 | Status: ON HOLD | OUTPATIENT
Start: 2021-06-16 | End: 2021-08-25 | Stop reason: HOSPADM

## 2021-06-16 RX ORDER — PIOGLITAZONEHYDROCHLORIDE 15 MG/1
15 TABLET ORAL DAILY
Qty: 90 TABLET | Refills: 3 | Status: ON HOLD
Start: 2021-06-16 | End: 2021-08-25 | Stop reason: HOSPADM

## 2021-06-17 ENCOUNTER — PATIENT MESSAGE (OUTPATIENT)
Dept: HEMATOLOGY/ONCOLOGY | Facility: CLINIC | Age: 66
End: 2021-06-17

## 2021-06-17 ENCOUNTER — PATIENT MESSAGE (OUTPATIENT)
Dept: INTERNAL MEDICINE | Facility: CLINIC | Age: 66
End: 2021-06-17

## 2021-06-17 ENCOUNTER — PATIENT MESSAGE (OUTPATIENT)
Dept: ENDOCRINOLOGY | Facility: CLINIC | Age: 66
End: 2021-06-17

## 2021-06-17 DIAGNOSIS — Z17.0 MALIGNANT NEOPLASM OF CENTRAL PORTION OF RIGHT BREAST IN FEMALE, ESTROGEN RECEPTOR POSITIVE: Primary | ICD-10-CM

## 2021-06-17 DIAGNOSIS — C50.111 MALIGNANT NEOPLASM OF CENTRAL PORTION OF RIGHT BREAST IN FEMALE, ESTROGEN RECEPTOR POSITIVE: Primary | ICD-10-CM

## 2021-06-18 ENCOUNTER — SPECIALTY PHARMACY (OUTPATIENT)
Dept: PHARMACY | Facility: CLINIC | Age: 66
End: 2021-06-18

## 2021-06-18 DIAGNOSIS — Z17.0 MALIGNANT NEOPLASM OF CENTRAL PORTION OF RIGHT BREAST IN FEMALE, ESTROGEN RECEPTOR POSITIVE: ICD-10-CM

## 2021-06-18 DIAGNOSIS — R73.9 ACUTE HYPERGLYCEMIA: ICD-10-CM

## 2021-06-18 DIAGNOSIS — C50.111 MALIGNANT NEOPLASM OF CENTRAL PORTION OF RIGHT BREAST IN FEMALE, ESTROGEN RECEPTOR POSITIVE: ICD-10-CM

## 2021-06-18 RX ORDER — BLOOD-GLUCOSE CONTROL, NORMAL
EACH MISCELLANEOUS
Qty: 450 EACH | Refills: 3 | Status: ON HOLD | OUTPATIENT
Start: 2021-06-18 | End: 2021-08-25 | Stop reason: HOSPADM

## 2021-06-18 RX ORDER — ALPELISIB 150 MG/1
300 TABLET ORAL DAILY
Qty: 60 TABLET | Refills: 6 | Status: SHIPPED | OUTPATIENT
Start: 2021-06-18 | End: 2021-08-10

## 2021-06-21 ENCOUNTER — PATIENT MESSAGE (OUTPATIENT)
Dept: ENDOCRINOLOGY | Facility: CLINIC | Age: 66
End: 2021-06-21

## 2021-06-21 ENCOUNTER — PATIENT MESSAGE (OUTPATIENT)
Dept: HEMATOLOGY/ONCOLOGY | Facility: CLINIC | Age: 66
End: 2021-06-21

## 2021-06-21 ENCOUNTER — TELEPHONE (OUTPATIENT)
Dept: ENDOCRINOLOGY | Facility: CLINIC | Age: 66
End: 2021-06-21

## 2021-06-21 DIAGNOSIS — C50.111 MALIGNANT NEOPLASM OF CENTRAL PORTION OF RIGHT BREAST IN FEMALE, ESTROGEN RECEPTOR POSITIVE: Primary | ICD-10-CM

## 2021-06-21 DIAGNOSIS — Z17.0 MALIGNANT NEOPLASM OF CENTRAL PORTION OF RIGHT BREAST IN FEMALE, ESTROGEN RECEPTOR POSITIVE: Primary | ICD-10-CM

## 2021-06-22 ENCOUNTER — INFUSION (OUTPATIENT)
Dept: INFUSION THERAPY | Facility: HOSPITAL | Age: 66
End: 2021-06-22
Attending: INTERNAL MEDICINE
Payer: MEDICARE

## 2021-06-22 ENCOUNTER — OFFICE VISIT (OUTPATIENT)
Dept: HEMATOLOGY/ONCOLOGY | Facility: CLINIC | Age: 66
End: 2021-06-22
Payer: MEDICARE

## 2021-06-22 ENCOUNTER — SPECIALTY PHARMACY (OUTPATIENT)
Dept: PHARMACY | Facility: CLINIC | Age: 66
End: 2021-06-22

## 2021-06-22 VITALS
HEART RATE: 84 BPM | RESPIRATION RATE: 20 BRPM | WEIGHT: 145.5 LBS | TEMPERATURE: 98 F | BODY MASS INDEX: 24.84 KG/M2 | OXYGEN SATURATION: 97 % | SYSTOLIC BLOOD PRESSURE: 167 MMHG | HEIGHT: 64 IN | DIASTOLIC BLOOD PRESSURE: 72 MMHG

## 2021-06-22 VITALS
HEART RATE: 93 BPM | SYSTOLIC BLOOD PRESSURE: 118 MMHG | RESPIRATION RATE: 16 BRPM | OXYGEN SATURATION: 95 % | DIASTOLIC BLOOD PRESSURE: 55 MMHG | TEMPERATURE: 98 F

## 2021-06-22 DIAGNOSIS — G62.0 CHEMOTHERAPY-INDUCED NEUROPATHY: ICD-10-CM

## 2021-06-22 DIAGNOSIS — C78.7 METASTASIS TO LIVER: ICD-10-CM

## 2021-06-22 DIAGNOSIS — C79.51 METASTATIC CANCER TO SPINE: ICD-10-CM

## 2021-06-22 DIAGNOSIS — Z17.0 MALIGNANT NEOPLASM OF CENTRAL PORTION OF RIGHT BREAST IN FEMALE, ESTROGEN RECEPTOR POSITIVE: Primary | ICD-10-CM

## 2021-06-22 DIAGNOSIS — R21 RASH AND NONSPECIFIC SKIN ERUPTION: ICD-10-CM

## 2021-06-22 DIAGNOSIS — Z51.11 ENCOUNTER FOR ANTINEOPLASTIC CHEMOTHERAPY: ICD-10-CM

## 2021-06-22 DIAGNOSIS — T45.1X5A CHEMOTHERAPY-INDUCED NEUROPATHY: ICD-10-CM

## 2021-06-22 DIAGNOSIS — G89.3 CANCER RELATED PAIN: ICD-10-CM

## 2021-06-22 DIAGNOSIS — C50.111 MALIGNANT NEOPLASM OF CENTRAL PORTION OF RIGHT BREAST IN FEMALE, ESTROGEN RECEPTOR POSITIVE: Primary | ICD-10-CM

## 2021-06-22 DIAGNOSIS — E78.00 HYPERCHOLESTEREMIA: ICD-10-CM

## 2021-06-22 DIAGNOSIS — C79.51 BONE METASTASIS: ICD-10-CM

## 2021-06-22 LAB
ALBUMIN SERPL BCP-MCNC: 3.1 G/DL (ref 3.5–5.2)
ALP SERPL-CCNC: 119 U/L (ref 55–135)
ALT SERPL W/O P-5'-P-CCNC: 48 U/L (ref 10–44)
ANION GAP SERPL CALC-SCNC: 8 MMOL/L (ref 8–16)
AST SERPL-CCNC: 38 U/L (ref 10–40)
BASOPHILS # BLD AUTO: 0.01 K/UL (ref 0–0.2)
BASOPHILS NFR BLD: 0.1 % (ref 0–1.9)
BILIRUB SERPL-MCNC: 0.8 MG/DL (ref 0.1–1)
BUN SERPL-MCNC: 11 MG/DL (ref 8–23)
CALCIUM SERPL-MCNC: 8.1 MG/DL (ref 8.7–10.5)
CHLORIDE SERPL-SCNC: 106 MMOL/L (ref 95–110)
CO2 SERPL-SCNC: 26 MMOL/L (ref 23–29)
CREAT SERPL-MCNC: 0.7 MG/DL (ref 0.5–1.4)
DIFFERENTIAL METHOD: ABNORMAL
EOSINOPHIL # BLD AUTO: 0 K/UL (ref 0–0.5)
EOSINOPHIL NFR BLD: 0.1 % (ref 0–8)
ERYTHROCYTE [DISTWIDTH] IN BLOOD BY AUTOMATED COUNT: 15 % (ref 11.5–14.5)
EST. GFR  (AFRICAN AMERICAN): >60 ML/MIN/1.73 M^2
EST. GFR  (NON AFRICAN AMERICAN): >60 ML/MIN/1.73 M^2
GLUCOSE SERPL-MCNC: 69 MG/DL (ref 70–110)
HCT VFR BLD AUTO: 36.3 % (ref 37–48.5)
HGB BLD-MCNC: 12.1 G/DL (ref 12–16)
IMM GRANULOCYTES # BLD AUTO: 0.02 K/UL (ref 0–0.04)
IMM GRANULOCYTES NFR BLD AUTO: 0.2 % (ref 0–0.5)
LYMPHOCYTES # BLD AUTO: 1.7 K/UL (ref 1–4.8)
LYMPHOCYTES NFR BLD: 19.6 % (ref 18–48)
MCH RBC QN AUTO: 30 PG (ref 27–31)
MCHC RBC AUTO-ENTMCNC: 33.3 G/DL (ref 32–36)
MCV RBC AUTO: 90 FL (ref 82–98)
MONOCYTES # BLD AUTO: 0.7 K/UL (ref 0.3–1)
MONOCYTES NFR BLD: 8.6 % (ref 4–15)
NEUTROPHILS # BLD AUTO: 6 K/UL (ref 1.8–7.7)
NEUTROPHILS NFR BLD: 71.4 % (ref 38–73)
NRBC BLD-RTO: 0 /100 WBC
PLATELET # BLD AUTO: 110 K/UL (ref 150–450)
PMV BLD AUTO: 9.5 FL (ref 9.2–12.9)
POTASSIUM SERPL-SCNC: 4.2 MMOL/L (ref 3.5–5.1)
PROT SERPL-MCNC: 6 G/DL (ref 6–8.4)
RBC # BLD AUTO: 4.03 M/UL (ref 4–5.4)
SODIUM SERPL-SCNC: 140 MMOL/L (ref 136–145)
TSH SERPL DL<=0.005 MIU/L-ACNC: 3.6 UIU/ML (ref 0.4–4)
WBC # BLD AUTO: 8.4 K/UL (ref 3.9–12.7)

## 2021-06-22 PROCEDURE — 1125F AMNT PAIN NOTED PAIN PRSNT: CPT | Mod: S$GLB,,, | Performed by: NURSE PRACTITIONER

## 2021-06-22 PROCEDURE — 99999 PR PBB SHADOW E&M-EST. PATIENT-LVL V: CPT | Mod: PBBFAC,,, | Performed by: NURSE PRACTITIONER

## 2021-06-22 PROCEDURE — 85025 COMPLETE CBC W/AUTO DIFF WBC: CPT | Performed by: INTERNAL MEDICINE

## 2021-06-22 PROCEDURE — 99999 PR PBB SHADOW E&M-EST. PATIENT-LVL V: ICD-10-PCS | Mod: PBBFAC,,, | Performed by: NURSE PRACTITIONER

## 2021-06-22 PROCEDURE — 25000003 PHARM REV CODE 250: Performed by: INTERNAL MEDICINE

## 2021-06-22 PROCEDURE — 3008F PR BODY MASS INDEX (BMI) DOCUMENTED: ICD-10-PCS | Mod: CPTII,S$GLB,, | Performed by: NURSE PRACTITIONER

## 2021-06-22 PROCEDURE — 99214 PR OFFICE/OUTPT VISIT, EST, LEVL IV, 30-39 MIN: ICD-10-PCS | Mod: S$GLB,,, | Performed by: NURSE PRACTITIONER

## 2021-06-22 PROCEDURE — 63600175 PHARM REV CODE 636 W HCPCS: Performed by: INTERNAL MEDICINE

## 2021-06-22 PROCEDURE — 84443 ASSAY THYROID STIM HORMONE: CPT | Performed by: NURSE PRACTITIONER

## 2021-06-22 PROCEDURE — 96523 IRRIG DRUG DELIVERY DEVICE: CPT

## 2021-06-22 PROCEDURE — 3008F BODY MASS INDEX DOCD: CPT | Mod: CPTII,S$GLB,, | Performed by: NURSE PRACTITIONER

## 2021-06-22 PROCEDURE — 99214 OFFICE O/P EST MOD 30 MIN: CPT | Mod: S$GLB,,, | Performed by: NURSE PRACTITIONER

## 2021-06-22 PROCEDURE — 99499 RISK ADDL DX/OHS AUDIT: ICD-10-PCS | Mod: S$GLB,,, | Performed by: NURSE PRACTITIONER

## 2021-06-22 PROCEDURE — A4216 STERILE WATER/SALINE, 10 ML: HCPCS | Performed by: INTERNAL MEDICINE

## 2021-06-22 PROCEDURE — 1125F PR PAIN SEVERITY QUANTIFIED, PAIN PRESENT: ICD-10-PCS | Mod: S$GLB,,, | Performed by: NURSE PRACTITIONER

## 2021-06-22 PROCEDURE — 99499 UNLISTED E&M SERVICE: CPT | Mod: S$GLB,,, | Performed by: NURSE PRACTITIONER

## 2021-06-22 PROCEDURE — 80053 COMPREHEN METABOLIC PANEL: CPT | Performed by: INTERNAL MEDICINE

## 2021-06-22 PROCEDURE — 36591 DRAW BLOOD OFF VENOUS DEVICE: CPT

## 2021-06-22 PROCEDURE — 86300 IMMUNOASSAY TUMOR CA 15-3: CPT | Performed by: INTERNAL MEDICINE

## 2021-06-22 RX ORDER — PREDNISONE 10 MG/1
TABLET ORAL
Qty: 13 TABLET | Refills: 0 | Status: SHIPPED | OUTPATIENT
Start: 2021-06-22 | End: 2021-06-30

## 2021-06-22 RX ORDER — HEPARIN 100 UNIT/ML
500 SYRINGE INTRAVENOUS
Status: COMPLETED | OUTPATIENT
Start: 2021-06-22 | End: 2021-06-22

## 2021-06-22 RX ORDER — SODIUM CHLORIDE 0.9 % (FLUSH) 0.9 %
10 SYRINGE (ML) INJECTION
Status: COMPLETED | OUTPATIENT
Start: 2021-06-22 | End: 2021-06-22

## 2021-06-22 RX ADMIN — HEPARIN 500 UNITS: 100 SYRINGE at 12:06

## 2021-06-22 RX ADMIN — Medication 10 ML: at 12:06

## 2021-06-24 ENCOUNTER — PATIENT MESSAGE (OUTPATIENT)
Dept: HEMATOLOGY/ONCOLOGY | Facility: CLINIC | Age: 66
End: 2021-06-24

## 2021-06-24 ENCOUNTER — PATIENT MESSAGE (OUTPATIENT)
Dept: ENDOCRINOLOGY | Facility: CLINIC | Age: 66
End: 2021-06-24

## 2021-06-24 DIAGNOSIS — R60.9 ABDOMINAL EDEMA ON EXAMINATION: Primary | ICD-10-CM

## 2021-06-24 RX ORDER — FUROSEMIDE 20 MG/1
20 TABLET ORAL DAILY
Qty: 8 TABLET | Refills: 0 | Status: SHIPPED | OUTPATIENT
Start: 2021-06-24 | End: 2021-07-30 | Stop reason: SDUPTHER

## 2021-06-25 LAB — CANCER AG27-29 SERPL-ACNC: 89.9 U/ML

## 2021-06-29 ENCOUNTER — PATIENT MESSAGE (OUTPATIENT)
Dept: HEMATOLOGY/ONCOLOGY | Facility: CLINIC | Age: 66
End: 2021-06-29

## 2021-06-29 ENCOUNTER — SPECIALTY PHARMACY (OUTPATIENT)
Dept: PHARMACY | Facility: CLINIC | Age: 66
End: 2021-06-29

## 2021-06-30 ENCOUNTER — OFFICE VISIT (OUTPATIENT)
Dept: HEMATOLOGY/ONCOLOGY | Facility: CLINIC | Age: 66
End: 2021-06-30
Payer: MEDICARE

## 2021-06-30 ENCOUNTER — SPECIALTY PHARMACY (OUTPATIENT)
Dept: PHARMACY | Facility: CLINIC | Age: 66
End: 2021-06-30

## 2021-06-30 ENCOUNTER — TELEPHONE (OUTPATIENT)
Dept: HEMATOLOGY/ONCOLOGY | Facility: CLINIC | Age: 66
End: 2021-06-30

## 2021-06-30 DIAGNOSIS — Z17.0 MALIGNANT NEOPLASM OF CENTRAL PORTION OF RIGHT BREAST IN FEMALE, ESTROGEN RECEPTOR POSITIVE: Primary | ICD-10-CM

## 2021-06-30 DIAGNOSIS — C50.111 MALIGNANT NEOPLASM OF CENTRAL PORTION OF RIGHT BREAST IN FEMALE, ESTROGEN RECEPTOR POSITIVE: Primary | ICD-10-CM

## 2021-06-30 DIAGNOSIS — C79.51 BONE METASTASIS: ICD-10-CM

## 2021-06-30 PROCEDURE — 99213 OFFICE O/P EST LOW 20 MIN: CPT | Mod: 95,,, | Performed by: INTERNAL MEDICINE

## 2021-06-30 PROCEDURE — 99213 PR OFFICE/OUTPT VISIT, EST, LEVL III, 20-29 MIN: ICD-10-PCS | Mod: 95,,, | Performed by: INTERNAL MEDICINE

## 2021-07-01 ENCOUNTER — INFUSION (OUTPATIENT)
Dept: INFUSION THERAPY | Facility: HOSPITAL | Age: 66
End: 2021-07-01
Attending: INTERNAL MEDICINE
Payer: MEDICARE

## 2021-07-01 VITALS
OXYGEN SATURATION: 97 % | SYSTOLIC BLOOD PRESSURE: 116 MMHG | HEART RATE: 83 BPM | TEMPERATURE: 98 F | DIASTOLIC BLOOD PRESSURE: 56 MMHG | RESPIRATION RATE: 16 BRPM

## 2021-07-01 DIAGNOSIS — C79.51 METASTATIC CANCER TO SPINE: Primary | ICD-10-CM

## 2021-07-01 DIAGNOSIS — C78.7 METASTASIS TO LIVER: ICD-10-CM

## 2021-07-01 DIAGNOSIS — C50.111 MALIGNANT NEOPLASM OF CENTRAL PORTION OF RIGHT BREAST IN FEMALE, ESTROGEN RECEPTOR POSITIVE: ICD-10-CM

## 2021-07-01 DIAGNOSIS — Z17.0 MALIGNANT NEOPLASM OF CENTRAL PORTION OF RIGHT BREAST IN FEMALE, ESTROGEN RECEPTOR POSITIVE: ICD-10-CM

## 2021-07-01 PROCEDURE — 96372 THER/PROPH/DIAG INJ SC/IM: CPT | Mod: 59

## 2021-07-01 PROCEDURE — 63600175 PHARM REV CODE 636 W HCPCS: Mod: JG | Performed by: INTERNAL MEDICINE

## 2021-07-01 PROCEDURE — 96401 CHEMO ANTI-NEOPL SQ/IM: CPT

## 2021-07-01 RX ORDER — LAMOTRIGINE 25 MG/1
500 TABLET ORAL
Status: COMPLETED | OUTPATIENT
Start: 2021-07-01 | End: 2021-07-01

## 2021-07-01 RX ADMIN — FULVESTRANT 500 MG: 50 INJECTION INTRAMUSCULAR at 03:07

## 2021-07-01 RX ADMIN — DENOSUMAB 120 MG: 120 INJECTION SUBCUTANEOUS at 03:07

## 2021-07-02 ENCOUNTER — PATIENT MESSAGE (OUTPATIENT)
Dept: ENDOCRINOLOGY | Facility: CLINIC | Age: 66
End: 2021-07-02

## 2021-07-02 ENCOUNTER — PATIENT MESSAGE (OUTPATIENT)
Dept: HEMATOLOGY/ONCOLOGY | Facility: CLINIC | Age: 66
End: 2021-07-02

## 2021-07-02 DIAGNOSIS — C50.111 MALIGNANT NEOPLASM OF CENTRAL PORTION OF RIGHT BREAST IN FEMALE, ESTROGEN RECEPTOR POSITIVE: Primary | ICD-10-CM

## 2021-07-02 DIAGNOSIS — Z17.0 MALIGNANT NEOPLASM OF CENTRAL PORTION OF RIGHT BREAST IN FEMALE, ESTROGEN RECEPTOR POSITIVE: Primary | ICD-10-CM

## 2021-07-02 DIAGNOSIS — R53.1 WEAKNESS: ICD-10-CM

## 2021-07-06 ENCOUNTER — TELEPHONE (OUTPATIENT)
Dept: ENDOCRINOLOGY | Facility: CLINIC | Age: 66
End: 2021-07-06

## 2021-07-06 ENCOUNTER — PATIENT MESSAGE (OUTPATIENT)
Dept: ENDOCRINOLOGY | Facility: CLINIC | Age: 66
End: 2021-07-06

## 2021-07-06 ENCOUNTER — PATIENT MESSAGE (OUTPATIENT)
Dept: HEMATOLOGY/ONCOLOGY | Facility: CLINIC | Age: 66
End: 2021-07-06

## 2021-07-06 DIAGNOSIS — Z17.0 MALIGNANT NEOPLASM OF CENTRAL PORTION OF RIGHT BREAST IN FEMALE, ESTROGEN RECEPTOR POSITIVE: Primary | ICD-10-CM

## 2021-07-06 DIAGNOSIS — C50.111 MALIGNANT NEOPLASM OF CENTRAL PORTION OF RIGHT BREAST IN FEMALE, ESTROGEN RECEPTOR POSITIVE: Primary | ICD-10-CM

## 2021-07-07 ENCOUNTER — PATIENT MESSAGE (OUTPATIENT)
Dept: ENDOCRINOLOGY | Facility: CLINIC | Age: 66
End: 2021-07-07

## 2021-07-07 ENCOUNTER — HOSPITAL ENCOUNTER (OUTPATIENT)
Dept: RADIOLOGY | Facility: HOSPITAL | Age: 66
Discharge: HOME | End: 2021-07-07
Attending: INTERNAL MEDICINE
Payer: MEDICARE

## 2021-07-07 DIAGNOSIS — C50.111 MALIGNANT NEOPLASM OF CENTRAL PORTION OF RIGHT BREAST IN FEMALE, ESTROGEN RECEPTOR POSITIVE: Primary | ICD-10-CM

## 2021-07-07 DIAGNOSIS — R07.1 CHEST PAIN ON BREATHING: ICD-10-CM

## 2021-07-07 DIAGNOSIS — G44.89 OTHER HEADACHE SYNDROME: ICD-10-CM

## 2021-07-07 DIAGNOSIS — R73.9 ACUTE HYPERGLYCEMIA: ICD-10-CM

## 2021-07-07 DIAGNOSIS — E03.8 HYPOTHYROIDISM DUE TO HASHIMOTO'S THYROIDITIS: ICD-10-CM

## 2021-07-07 DIAGNOSIS — C50.111 MALIGNANT NEOPLASM OF CENTRAL PORTION OF RIGHT BREAST IN FEMALE, ESTROGEN RECEPTOR POSITIVE: ICD-10-CM

## 2021-07-07 DIAGNOSIS — E06.3 HYPOTHYROIDISM DUE TO HASHIMOTO'S THYROIDITIS: ICD-10-CM

## 2021-07-07 DIAGNOSIS — C79.51 METASTATIC CANCER TO SPINE: ICD-10-CM

## 2021-07-07 DIAGNOSIS — R07.81 PLEURITIC CHEST PAIN: ICD-10-CM

## 2021-07-07 DIAGNOSIS — Z17.0 MALIGNANT NEOPLASM OF CENTRAL PORTION OF RIGHT BREAST IN FEMALE, ESTROGEN RECEPTOR POSITIVE: Primary | ICD-10-CM

## 2021-07-07 DIAGNOSIS — C79.51 BONE METASTASIS: ICD-10-CM

## 2021-07-07 DIAGNOSIS — C50.111 MALIGNANT NEOPLASM OF CENTRAL PORTION OF RIGHT FEMALE BREAST, UNSPECIFIED ESTROGEN RECEPTOR STATUS: ICD-10-CM

## 2021-07-07 DIAGNOSIS — Z17.0 MALIGNANT NEOPLASM OF CENTRAL PORTION OF RIGHT BREAST IN FEMALE, ESTROGEN RECEPTOR POSITIVE: ICD-10-CM

## 2021-07-07 PROCEDURE — 71046 X-RAY EXAM CHEST 2 VIEWS: CPT | Mod: 26,,, | Performed by: RADIOLOGY

## 2021-07-07 PROCEDURE — 71046 X-RAY EXAM CHEST 2 VIEWS: CPT | Mod: TC,FY

## 2021-07-07 PROCEDURE — 71046 XR CHEST PA AND LATERAL: ICD-10-PCS | Mod: 26,,, | Performed by: RADIOLOGY

## 2021-07-07 RX ORDER — FLASH GLUCOSE SENSOR
2 KIT MISCELLANEOUS
Qty: 2 KIT | Refills: 11 | Status: SHIPPED | OUTPATIENT
Start: 2021-07-07 | End: 2021-07-07 | Stop reason: SDUPTHER

## 2021-07-07 RX ORDER — LEVOTHYROXINE SODIUM 175 UG/1
175 TABLET ORAL
Qty: 90 TABLET | Refills: 3 | Status: SHIPPED | OUTPATIENT
Start: 2021-07-07 | End: 2022-07-07

## 2021-07-08 RX ORDER — MORPHINE SULFATE 200 MG/1
200 TABLET, FILM COATED, EXTENDED RELEASE ORAL EVERY 8 HOURS
Qty: 90 TABLET | Refills: 0 | Status: SHIPPED | OUTPATIENT
Start: 2021-07-08

## 2021-07-08 RX ORDER — FLASH GLUCOSE SENSOR
2 KIT MISCELLANEOUS
Qty: 2 KIT | Refills: 11 | Status: SHIPPED | OUTPATIENT
Start: 2021-07-08 | End: 2021-07-15 | Stop reason: SDUPTHER

## 2021-07-09 ENCOUNTER — PATIENT MESSAGE (OUTPATIENT)
Dept: ENDOCRINOLOGY | Facility: CLINIC | Age: 66
End: 2021-07-09

## 2021-07-13 ENCOUNTER — HOSPITAL ENCOUNTER (OUTPATIENT)
Dept: RADIOLOGY | Facility: HOSPITAL | Age: 66
Discharge: HOME | End: 2021-07-13
Attending: INTERNAL MEDICINE
Payer: MEDICARE

## 2021-07-13 DIAGNOSIS — R07.81 PLEURITIC CHEST PAIN: ICD-10-CM

## 2021-07-13 DIAGNOSIS — R07.1 CHEST PAIN ON BREATHING: ICD-10-CM

## 2021-07-13 DIAGNOSIS — G44.89 OTHER HEADACHE SYNDROME: ICD-10-CM

## 2021-07-13 PROCEDURE — 71250 CT CHEST WITHOUT CONTRAST: ICD-10-PCS | Mod: 26,,, | Performed by: RADIOLOGY

## 2021-07-13 PROCEDURE — 71250 CT THORAX DX C-: CPT | Mod: 26,,, | Performed by: RADIOLOGY

## 2021-07-13 PROCEDURE — 70553 MRI BRAIN W WO CONTRAST: ICD-10-PCS | Mod: 26,,, | Performed by: RADIOLOGY

## 2021-07-13 PROCEDURE — 70553 MRI BRAIN STEM W/O & W/DYE: CPT | Mod: TC

## 2021-07-13 PROCEDURE — A9585 GADOBUTROL INJECTION: HCPCS | Performed by: INTERNAL MEDICINE

## 2021-07-13 PROCEDURE — 71250 CT THORAX DX C-: CPT | Mod: TC

## 2021-07-13 PROCEDURE — 70553 MRI BRAIN STEM W/O & W/DYE: CPT | Mod: 26,,, | Performed by: RADIOLOGY

## 2021-07-13 PROCEDURE — 25500020 PHARM REV CODE 255: Performed by: INTERNAL MEDICINE

## 2021-07-13 RX ORDER — GADOBUTROL 604.72 MG/ML
7 INJECTION INTRAVENOUS
Status: COMPLETED | OUTPATIENT
Start: 2021-07-13 | End: 2021-07-13

## 2021-07-13 RX ADMIN — GADOBUTROL 7 ML: 604.72 INJECTION INTRAVENOUS at 07:07

## 2021-07-14 ENCOUNTER — PATIENT MESSAGE (OUTPATIENT)
Dept: HEMATOLOGY/ONCOLOGY | Facility: CLINIC | Age: 66
End: 2021-07-14

## 2021-07-14 DIAGNOSIS — C50.111 MALIGNANT NEOPLASM OF CENTRAL PORTION OF RIGHT BREAST IN FEMALE, ESTROGEN RECEPTOR POSITIVE: ICD-10-CM

## 2021-07-14 DIAGNOSIS — Z17.0 MALIGNANT NEOPLASM OF CENTRAL PORTION OF RIGHT BREAST IN FEMALE, ESTROGEN RECEPTOR POSITIVE: ICD-10-CM

## 2021-07-14 DIAGNOSIS — C79.51 BONE METASTASIS: Primary | ICD-10-CM

## 2021-07-15 ENCOUNTER — PATIENT MESSAGE (OUTPATIENT)
Dept: ENDOCRINOLOGY | Facility: CLINIC | Age: 66
End: 2021-07-15

## 2021-07-15 DIAGNOSIS — C50.111 MALIGNANT NEOPLASM OF CENTRAL PORTION OF RIGHT BREAST IN FEMALE, ESTROGEN RECEPTOR POSITIVE: Primary | ICD-10-CM

## 2021-07-15 DIAGNOSIS — R73.9 ACUTE HYPERGLYCEMIA: ICD-10-CM

## 2021-07-15 DIAGNOSIS — Z17.0 MALIGNANT NEOPLASM OF CENTRAL PORTION OF RIGHT BREAST IN FEMALE, ESTROGEN RECEPTOR POSITIVE: Primary | ICD-10-CM

## 2021-07-16 RX ORDER — FLASH GLUCOSE SENSOR
2 KIT MISCELLANEOUS
Qty: 2 KIT | Refills: 0 | Status: ON HOLD | OUTPATIENT
Start: 2021-07-16 | End: 2021-08-25 | Stop reason: HOSPADM

## 2021-07-19 ENCOUNTER — PATIENT MESSAGE (OUTPATIENT)
Dept: HEMATOLOGY/ONCOLOGY | Facility: CLINIC | Age: 66
End: 2021-07-19

## 2021-07-19 ENCOUNTER — TELEPHONE (OUTPATIENT)
Dept: HEMATOLOGY/ONCOLOGY | Facility: CLINIC | Age: 66
End: 2021-07-19

## 2021-07-19 ENCOUNTER — HOSPITAL ENCOUNTER (INPATIENT)
Facility: HOSPITAL | Age: 66
LOS: 2 days | Discharge: HOME | DRG: 055 | End: 2021-07-22
Attending: EMERGENCY MEDICINE | Admitting: INTERNAL MEDICINE
Payer: MEDICARE

## 2021-07-19 DIAGNOSIS — R20.0 NUMBNESS: Primary | ICD-10-CM

## 2021-07-19 DIAGNOSIS — R79.89 LFT ELEVATION: ICD-10-CM

## 2021-07-19 DIAGNOSIS — C79.31 BRAIN METASTASES: Primary | ICD-10-CM

## 2021-07-19 DIAGNOSIS — C50.111 MALIGNANT NEOPLASM OF CENTRAL PORTION OF RIGHT BREAST IN FEMALE, ESTROGEN RECEPTOR POSITIVE: ICD-10-CM

## 2021-07-19 DIAGNOSIS — R20.0 LEFT FACIAL NUMBNESS: ICD-10-CM

## 2021-07-19 DIAGNOSIS — F43.21 ADJUSTMENT DISORDER WITH DEPRESSED MOOD: Chronic | ICD-10-CM

## 2021-07-19 DIAGNOSIS — R26.81 GAIT INSTABILITY: ICD-10-CM

## 2021-07-19 DIAGNOSIS — Z17.0 MALIGNANT NEOPLASM OF CENTRAL PORTION OF RIGHT BREAST IN FEMALE, ESTROGEN RECEPTOR POSITIVE: ICD-10-CM

## 2021-07-19 LAB
ALBUMIN SERPL BCP-MCNC: 2.4 G/DL (ref 3.5–5.2)
ALP SERPL-CCNC: 462 U/L (ref 55–135)
ALT SERPL W/O P-5'-P-CCNC: 58 U/L (ref 10–44)
ANION GAP SERPL CALC-SCNC: 7 MMOL/L (ref 8–16)
ANISOCYTOSIS BLD QL SMEAR: SLIGHT
AST SERPL-CCNC: 63 U/L (ref 10–40)
BASOPHILS # BLD AUTO: 0.03 K/UL (ref 0–0.2)
BASOPHILS NFR BLD: 0.5 % (ref 0–1.9)
BILIRUB SERPL-MCNC: 0.9 MG/DL (ref 0.1–1)
BUN SERPL-MCNC: 12 MG/DL (ref 8–23)
CALCIUM SERPL-MCNC: 8.8 MG/DL (ref 8.7–10.5)
CHLORIDE SERPL-SCNC: 109 MMOL/L (ref 95–110)
CO2 SERPL-SCNC: 24 MMOL/L (ref 23–29)
CREAT SERPL-MCNC: 0.7 MG/DL (ref 0.5–1.4)
DIFFERENTIAL METHOD: ABNORMAL
EOSINOPHIL # BLD AUTO: 0.3 K/UL (ref 0–0.5)
EOSINOPHIL NFR BLD: 5.5 % (ref 0–8)
ERYTHROCYTE [DISTWIDTH] IN BLOOD BY AUTOMATED COUNT: 15.8 % (ref 11.5–14.5)
EST. GFR  (AFRICAN AMERICAN): >60 ML/MIN/1.73 M^2
EST. GFR  (NON AFRICAN AMERICAN): >60 ML/MIN/1.73 M^2
GLUCOSE SERPL-MCNC: 115 MG/DL (ref 70–110)
HCT VFR BLD AUTO: 34.4 % (ref 37–48.5)
HGB BLD-MCNC: 10.8 G/DL (ref 12–16)
IMM GRANULOCYTES # BLD AUTO: 0.01 K/UL (ref 0–0.04)
IMM GRANULOCYTES NFR BLD AUTO: 0.2 % (ref 0–0.5)
INR PPP: 1.1 (ref 0.8–1.2)
LYMPHOCYTES # BLD AUTO: 1.9 K/UL (ref 1–4.8)
LYMPHOCYTES NFR BLD: 34.9 % (ref 18–48)
MCH RBC QN AUTO: 30.3 PG (ref 27–31)
MCHC RBC AUTO-ENTMCNC: 31.4 G/DL (ref 32–36)
MCV RBC AUTO: 97 FL (ref 82–98)
MONOCYTES # BLD AUTO: 0.4 K/UL (ref 0.3–1)
MONOCYTES NFR BLD: 6.9 % (ref 4–15)
NEUTROPHILS # BLD AUTO: 2.8 K/UL (ref 1.8–7.7)
NEUTROPHILS NFR BLD: 52 % (ref 38–73)
NRBC BLD-RTO: 0 /100 WBC
PLATELET # BLD AUTO: 121 K/UL (ref 150–450)
PLATELET BLD QL SMEAR: ABNORMAL
PMV BLD AUTO: 9.1 FL (ref 9.2–12.9)
POCT GLUCOSE: 133 MG/DL (ref 70–110)
POTASSIUM SERPL-SCNC: 3.6 MMOL/L (ref 3.5–5.1)
PROT SERPL-MCNC: 5.7 G/DL (ref 6–8.4)
PROTHROMBIN TIME: 11.7 SEC (ref 9–12.5)
RBC # BLD AUTO: 3.56 M/UL (ref 4–5.4)
SCHISTOCYTES BLD QL SMEAR: ABNORMAL
SODIUM SERPL-SCNC: 140 MMOL/L (ref 136–145)
WBC # BLD AUTO: 5.47 K/UL (ref 3.9–12.7)

## 2021-07-19 PROCEDURE — 99285 EMERGENCY DEPT VISIT HI MDM: CPT

## 2021-07-19 PROCEDURE — 80053 COMPREHEN METABOLIC PANEL: CPT | Performed by: NURSE PRACTITIONER

## 2021-07-19 PROCEDURE — 86803 HEPATITIS C AB TEST: CPT | Performed by: EMERGENCY MEDICINE

## 2021-07-19 PROCEDURE — 82962 GLUCOSE BLOOD TEST: CPT

## 2021-07-19 PROCEDURE — 85025 COMPLETE CBC W/AUTO DIFF WBC: CPT | Performed by: NURSE PRACTITIONER

## 2021-07-19 PROCEDURE — 87389 HIV-1 AG W/HIV-1&-2 AB AG IA: CPT | Performed by: EMERGENCY MEDICINE

## 2021-07-19 PROCEDURE — 99285 PR EMERGENCY DEPT VISIT,LEVEL V: ICD-10-PCS | Mod: GC,,, | Performed by: EMERGENCY MEDICINE

## 2021-07-19 PROCEDURE — 99285 EMERGENCY DEPT VISIT HI MDM: CPT | Mod: GC,,, | Performed by: EMERGENCY MEDICINE

## 2021-07-19 PROCEDURE — 85610 PROTHROMBIN TIME: CPT | Performed by: NURSE PRACTITIONER

## 2021-07-19 RX ORDER — DEXAMETHASONE 4 MG/1
4 TABLET ORAL EVERY 12 HOURS
Qty: 40 TABLET | Refills: 0 | Status: SHIPPED | OUTPATIENT
Start: 2021-07-19 | End: 2021-08-08

## 2021-07-20 ENCOUNTER — PATIENT MESSAGE (OUTPATIENT)
Dept: ENDOCRINOLOGY | Facility: CLINIC | Age: 66
End: 2021-07-20

## 2021-07-20 PROBLEM — T50.905A DRUG-INDUCED HYPERGLYCEMIA: Status: ACTIVE | Noted: 2021-02-13

## 2021-07-20 PROBLEM — R79.89 LFT ELEVATION: Status: ACTIVE | Noted: 2021-07-20

## 2021-07-20 PROBLEM — H91.91 HEARING LOSS OF RIGHT EAR: Status: ACTIVE | Noted: 2021-07-20

## 2021-07-20 PROBLEM — R20.0 LEFT FACIAL NUMBNESS: Status: ACTIVE | Noted: 2021-07-20

## 2021-07-20 LAB
ALBUMIN SERPL BCP-MCNC: 2.8 G/DL (ref 3.5–5.2)
ALP SERPL-CCNC: 558 U/L (ref 55–135)
ALT SERPL W/O P-5'-P-CCNC: 66 U/L (ref 10–44)
ANION GAP SERPL CALC-SCNC: 8 MMOL/L (ref 8–16)
AST SERPL-CCNC: 73 U/L (ref 10–40)
BASOPHILS # BLD AUTO: 0.02 K/UL (ref 0–0.2)
BASOPHILS NFR BLD: 0.4 % (ref 0–1.9)
BILIRUB SERPL-MCNC: 1.2 MG/DL (ref 0.1–1)
BUN SERPL-MCNC: 14 MG/DL (ref 8–23)
CALCIUM SERPL-MCNC: 10 MG/DL (ref 8.7–10.5)
CHLORIDE SERPL-SCNC: 101 MMOL/L (ref 95–110)
CO2 SERPL-SCNC: 28 MMOL/L (ref 23–29)
CREAT SERPL-MCNC: 0.8 MG/DL (ref 0.5–1.4)
DIFFERENTIAL METHOD: ABNORMAL
EOSINOPHIL # BLD AUTO: 0.3 K/UL (ref 0–0.5)
EOSINOPHIL NFR BLD: 5.7 % (ref 0–8)
ERYTHROCYTE [DISTWIDTH] IN BLOOD BY AUTOMATED COUNT: 15.7 % (ref 11.5–14.5)
EST. GFR  (AFRICAN AMERICAN): >60 ML/MIN/1.73 M^2
EST. GFR  (NON AFRICAN AMERICAN): >60 ML/MIN/1.73 M^2
GLUCOSE SERPL-MCNC: 161 MG/DL (ref 70–110)
HCT VFR BLD AUTO: 38.5 % (ref 37–48.5)
HCV AB SERPL QL IA: NEGATIVE
HGB BLD-MCNC: 12.1 G/DL (ref 12–16)
HIV 1+2 AB+HIV1 P24 AG SERPL QL IA: NEGATIVE
IMM GRANULOCYTES # BLD AUTO: 0.01 K/UL (ref 0–0.04)
IMM GRANULOCYTES NFR BLD AUTO: 0.2 % (ref 0–0.5)
LACTATE SERPL-SCNC: 0.6 MMOL/L (ref 0.5–2.2)
LIPASE SERPL-CCNC: 5 U/L (ref 4–60)
LYMPHOCYTES # BLD AUTO: 1.6 K/UL (ref 1–4.8)
LYMPHOCYTES NFR BLD: 32.9 % (ref 18–48)
MAGNESIUM SERPL-MCNC: 1.9 MG/DL (ref 1.6–2.6)
MCH RBC QN AUTO: 29.2 PG (ref 27–31)
MCHC RBC AUTO-ENTMCNC: 31.4 G/DL (ref 32–36)
MCV RBC AUTO: 93 FL (ref 82–98)
MONOCYTES # BLD AUTO: 0.3 K/UL (ref 0.3–1)
MONOCYTES NFR BLD: 6.3 % (ref 4–15)
NEUTROPHILS # BLD AUTO: 2.6 K/UL (ref 1.8–7.7)
NEUTROPHILS NFR BLD: 54.5 % (ref 38–73)
NRBC BLD-RTO: 0 /100 WBC
PHOSPHATE SERPL-MCNC: 2.8 MG/DL (ref 2.7–4.5)
PLATELET # BLD AUTO: 148 K/UL (ref 150–450)
PMV BLD AUTO: 9 FL (ref 9.2–12.9)
POCT GLUCOSE: 142 MG/DL (ref 70–110)
POCT GLUCOSE: 173 MG/DL (ref 70–110)
POCT GLUCOSE: 183 MG/DL (ref 70–110)
POCT GLUCOSE: 250 MG/DL (ref 70–110)
POCT GLUCOSE: 283 MG/DL (ref 70–110)
POTASSIUM SERPL-SCNC: 4 MMOL/L (ref 3.5–5.1)
PROT SERPL-MCNC: 6.8 G/DL (ref 6–8.4)
RBC # BLD AUTO: 4.14 M/UL (ref 4–5.4)
SODIUM SERPL-SCNC: 137 MMOL/L (ref 136–145)
WBC # BLD AUTO: 4.77 K/UL (ref 3.9–12.7)

## 2021-07-20 PROCEDURE — 63600175 PHARM REV CODE 636 W HCPCS: Performed by: STUDENT IN AN ORGANIZED HEALTH CARE EDUCATION/TRAINING PROGRAM

## 2021-07-20 PROCEDURE — 20600001 HC STEP DOWN PRIVATE ROOM

## 2021-07-20 PROCEDURE — 84100 ASSAY OF PHOSPHORUS: CPT | Performed by: STUDENT IN AN ORGANIZED HEALTH CARE EDUCATION/TRAINING PROGRAM

## 2021-07-20 PROCEDURE — 25000242 PHARM REV CODE 250 ALT 637 W/ HCPCS: Performed by: STUDENT IN AN ORGANIZED HEALTH CARE EDUCATION/TRAINING PROGRAM

## 2021-07-20 PROCEDURE — 25500020 PHARM REV CODE 255: Performed by: INTERNAL MEDICINE

## 2021-07-20 PROCEDURE — A9585 GADOBUTROL INJECTION: HCPCS | Performed by: INTERNAL MEDICINE

## 2021-07-20 PROCEDURE — 97530 THERAPEUTIC ACTIVITIES: CPT

## 2021-07-20 PROCEDURE — 83690 ASSAY OF LIPASE: CPT | Performed by: STUDENT IN AN ORGANIZED HEALTH CARE EDUCATION/TRAINING PROGRAM

## 2021-07-20 PROCEDURE — 97161 PT EVAL LOW COMPLEX 20 MIN: CPT

## 2021-07-20 PROCEDURE — 25000003 PHARM REV CODE 250: Performed by: STUDENT IN AN ORGANIZED HEALTH CARE EDUCATION/TRAINING PROGRAM

## 2021-07-20 PROCEDURE — 80053 COMPREHEN METABOLIC PANEL: CPT | Performed by: STUDENT IN AN ORGANIZED HEALTH CARE EDUCATION/TRAINING PROGRAM

## 2021-07-20 PROCEDURE — 99223 PR INITIAL HOSPITAL CARE,LEVL III: ICD-10-PCS | Mod: AI,,, | Performed by: INTERNAL MEDICINE

## 2021-07-20 PROCEDURE — C9399 UNCLASSIFIED DRUGS OR BIOLOG: HCPCS | Performed by: STUDENT IN AN ORGANIZED HEALTH CARE EDUCATION/TRAINING PROGRAM

## 2021-07-20 PROCEDURE — 25000003 PHARM REV CODE 250: Performed by: INTERNAL MEDICINE

## 2021-07-20 PROCEDURE — 99223 1ST HOSP IP/OBS HIGH 75: CPT | Mod: AI,,, | Performed by: INTERNAL MEDICINE

## 2021-07-20 PROCEDURE — 97165 OT EVAL LOW COMPLEX 30 MIN: CPT

## 2021-07-20 PROCEDURE — 83735 ASSAY OF MAGNESIUM: CPT | Performed by: STUDENT IN AN ORGANIZED HEALTH CARE EDUCATION/TRAINING PROGRAM

## 2021-07-20 PROCEDURE — 83605 ASSAY OF LACTIC ACID: CPT | Performed by: STUDENT IN AN ORGANIZED HEALTH CARE EDUCATION/TRAINING PROGRAM

## 2021-07-20 PROCEDURE — 85025 COMPLETE CBC W/AUTO DIFF WBC: CPT | Performed by: STUDENT IN AN ORGANIZED HEALTH CARE EDUCATION/TRAINING PROGRAM

## 2021-07-20 RX ORDER — MORPHINE SULFATE 100 MG/1
200 TABLET, FILM COATED, EXTENDED RELEASE ORAL EVERY 8 HOURS
Status: DISCONTINUED | OUTPATIENT
Start: 2021-07-20 | End: 2021-07-22 | Stop reason: HOSPADM

## 2021-07-20 RX ORDER — SODIUM CHLORIDE 0.9 % (FLUSH) 0.9 %
10 SYRINGE (ML) INJECTION
Status: DISCONTINUED | OUTPATIENT
Start: 2021-07-20 | End: 2021-07-22 | Stop reason: HOSPADM

## 2021-07-20 RX ORDER — DEXAMETHASONE 4 MG/1
4 TABLET ORAL EVERY 12 HOURS
Status: DISCONTINUED | OUTPATIENT
Start: 2021-07-20 | End: 2021-07-22 | Stop reason: HOSPADM

## 2021-07-20 RX ORDER — FLUTICASONE PROPIONATE 50 MCG
2 SPRAY, SUSPENSION (ML) NASAL DAILY
Status: DISCONTINUED | OUTPATIENT
Start: 2021-07-20 | End: 2021-07-22 | Stop reason: HOSPADM

## 2021-07-20 RX ORDER — INSULIN ASPART 100 [IU]/ML
0-5 INJECTION, SOLUTION INTRAVENOUS; SUBCUTANEOUS
Status: DISCONTINUED | OUTPATIENT
Start: 2021-07-20 | End: 2021-07-22 | Stop reason: HOSPADM

## 2021-07-20 RX ORDER — OMEGA-3/DHA/EPA/FISH OIL 300-1000MG
1 CAPSULE,DELAYED RELEASE (ENTERIC COATED) ORAL DAILY
Status: DISCONTINUED | OUTPATIENT
Start: 2021-07-20 | End: 2021-07-22 | Stop reason: HOSPADM

## 2021-07-20 RX ORDER — MORPHINE SULFATE 2 MG/ML
2 INJECTION, SOLUTION INTRAMUSCULAR; INTRAVENOUS EVERY 4 HOURS PRN
Status: DISCONTINUED | OUTPATIENT
Start: 2021-07-20 | End: 2021-07-20

## 2021-07-20 RX ORDER — SERTRALINE HYDROCHLORIDE 50 MG/1
50 TABLET, FILM COATED ORAL DAILY
Status: DISCONTINUED | OUTPATIENT
Start: 2021-07-20 | End: 2021-07-20

## 2021-07-20 RX ORDER — ASPIRIN 81 MG/1
81 TABLET ORAL NIGHTLY
Status: DISCONTINUED | OUTPATIENT
Start: 2021-07-20 | End: 2021-07-20

## 2021-07-20 RX ORDER — TALC
6 POWDER (GRAM) TOPICAL NIGHTLY PRN
Status: DISCONTINUED | OUTPATIENT
Start: 2021-07-20 | End: 2021-07-22 | Stop reason: HOSPADM

## 2021-07-20 RX ORDER — IBUPROFEN 200 MG
16 TABLET ORAL
Status: DISCONTINUED | OUTPATIENT
Start: 2021-07-20 | End: 2021-07-22 | Stop reason: HOSPADM

## 2021-07-20 RX ORDER — ALBUTEROL SULFATE 90 UG/1
2 AEROSOL, METERED RESPIRATORY (INHALATION) EVERY 6 HOURS PRN
Status: DISCONTINUED | OUTPATIENT
Start: 2021-07-20 | End: 2021-07-22 | Stop reason: HOSPADM

## 2021-07-20 RX ORDER — DIPHENOXYLATE HYDROCHLORIDE AND ATROPINE SULFATE 2.5; .025 MG/1; MG/1
1 TABLET ORAL 4 TIMES DAILY PRN
Status: DISCONTINUED | OUTPATIENT
Start: 2021-07-20 | End: 2021-07-22 | Stop reason: HOSPADM

## 2021-07-20 RX ORDER — LOPERAMIDE HYDROCHLORIDE 2 MG/1
2 CAPSULE ORAL 3 TIMES DAILY PRN
Status: DISCONTINUED | OUTPATIENT
Start: 2021-07-20 | End: 2021-07-22 | Stop reason: HOSPADM

## 2021-07-20 RX ORDER — ACETAMINOPHEN 325 MG/1
650 TABLET ORAL EVERY 4 HOURS PRN
Status: DISCONTINUED | OUTPATIENT
Start: 2021-07-20 | End: 2021-07-20

## 2021-07-20 RX ORDER — GADOBUTROL 604.72 MG/ML
6 INJECTION INTRAVENOUS
Status: COMPLETED | OUTPATIENT
Start: 2021-07-20 | End: 2021-07-20

## 2021-07-20 RX ORDER — ONDANSETRON 8 MG/1
8 TABLET, ORALLY DISINTEGRATING ORAL EVERY 8 HOURS PRN
Status: DISCONTINUED | OUTPATIENT
Start: 2021-07-20 | End: 2021-07-22 | Stop reason: HOSPADM

## 2021-07-20 RX ORDER — GABAPENTIN 300 MG/1
300 CAPSULE ORAL 3 TIMES DAILY
Status: DISCONTINUED | OUTPATIENT
Start: 2021-07-20 | End: 2021-07-20

## 2021-07-20 RX ORDER — POLYETHYLENE GLYCOL 3350 17 G/17G
17 POWDER, FOR SOLUTION ORAL DAILY
Status: DISCONTINUED | OUTPATIENT
Start: 2021-07-20 | End: 2021-07-20

## 2021-07-20 RX ORDER — HYDROMORPHONE HYDROCHLORIDE 2 MG/1
4 TABLET ORAL EVERY 4 HOURS PRN
Status: DISCONTINUED | OUTPATIENT
Start: 2021-07-20 | End: 2021-07-22 | Stop reason: HOSPADM

## 2021-07-20 RX ORDER — IBUPROFEN 200 MG
24 TABLET ORAL
Status: DISCONTINUED | OUTPATIENT
Start: 2021-07-20 | End: 2021-07-22 | Stop reason: HOSPADM

## 2021-07-20 RX ORDER — ENOXAPARIN SODIUM 100 MG/ML
40 INJECTION SUBCUTANEOUS EVERY 24 HOURS
Status: DISCONTINUED | OUTPATIENT
Start: 2021-07-20 | End: 2021-07-20

## 2021-07-20 RX ORDER — ACETAMINOPHEN 325 MG/1
650 TABLET ORAL EVERY 8 HOURS PRN
Status: DISCONTINUED | OUTPATIENT
Start: 2021-07-20 | End: 2021-07-22 | Stop reason: HOSPADM

## 2021-07-20 RX ORDER — PROMETHAZINE HYDROCHLORIDE 25 MG/1
25 TABLET ORAL EVERY 6 HOURS PRN
Status: DISCONTINUED | OUTPATIENT
Start: 2021-07-20 | End: 2021-07-22 | Stop reason: HOSPADM

## 2021-07-20 RX ORDER — PANTOPRAZOLE SODIUM 40 MG/1
40 TABLET, DELAYED RELEASE ORAL DAILY
Status: DISCONTINUED | OUTPATIENT
Start: 2021-07-20 | End: 2021-07-22 | Stop reason: HOSPADM

## 2021-07-20 RX ORDER — MUPIROCIN 20 MG/G
OINTMENT TOPICAL 2 TIMES DAILY
Status: DISCONTINUED | OUTPATIENT
Start: 2021-07-20 | End: 2021-07-22 | Stop reason: HOSPADM

## 2021-07-20 RX ORDER — GLUCAGON 1 MG
1 KIT INJECTION
Status: DISCONTINUED | OUTPATIENT
Start: 2021-07-20 | End: 2021-07-22 | Stop reason: HOSPADM

## 2021-07-20 RX ORDER — ASPIRIN 81 MG/1
81 TABLET ORAL DAILY
Status: DISCONTINUED | OUTPATIENT
Start: 2021-07-20 | End: 2021-07-20

## 2021-07-20 RX ORDER — HYDROCODONE BITARTRATE AND ACETAMINOPHEN 5; 325 MG/1; MG/1
1 TABLET ORAL EVERY 6 HOURS PRN
Status: DISCONTINUED | OUTPATIENT
Start: 2021-07-20 | End: 2021-07-20

## 2021-07-20 RX ORDER — CETIRIZINE HYDROCHLORIDE 10 MG/1
10 TABLET ORAL DAILY
Status: DISCONTINUED | OUTPATIENT
Start: 2021-07-20 | End: 2021-07-22 | Stop reason: HOSPADM

## 2021-07-20 RX ORDER — GABAPENTIN 300 MG/1
300 CAPSULE ORAL 3 TIMES DAILY
Status: DISCONTINUED | OUTPATIENT
Start: 2021-07-20 | End: 2021-07-22 | Stop reason: HOSPADM

## 2021-07-20 RX ORDER — SERTRALINE HYDROCHLORIDE 50 MG/1
50 TABLET, FILM COATED ORAL NIGHTLY
Status: DISCONTINUED | OUTPATIENT
Start: 2021-07-20 | End: 2021-07-22 | Stop reason: HOSPADM

## 2021-07-20 RX ADMIN — CETIRIZINE HYDROCHLORIDE 10 MG: 10 TABLET, FILM COATED ORAL at 08:07

## 2021-07-20 RX ADMIN — GADOBUTROL 6 ML: 604.72 INJECTION INTRAVENOUS at 01:07

## 2021-07-20 RX ADMIN — ACETAMINOPHEN 650 MG: 325 TABLET ORAL at 03:07

## 2021-07-20 RX ADMIN — GABAPENTIN 300 MG: 300 CAPSULE ORAL at 02:07

## 2021-07-20 RX ADMIN — GABAPENTIN 300 MG: 300 CAPSULE ORAL at 08:07

## 2021-07-20 RX ADMIN — LEVOTHYROXINE SODIUM 175 MCG: 125 TABLET ORAL at 06:07

## 2021-07-20 RX ADMIN — PANTOPRAZOLE SODIUM 40 MG: 40 TABLET, DELAYED RELEASE ORAL at 08:07

## 2021-07-20 RX ADMIN — DEXAMETHASONE 4 MG: 4 TABLET ORAL at 01:07

## 2021-07-20 RX ADMIN — Medication 1 CAPSULE: at 11:07

## 2021-07-20 RX ADMIN — MUPIROCIN: 20 OINTMENT TOPICAL at 08:07

## 2021-07-20 RX ADMIN — DEXAMETHASONE 4 MG: 4 TABLET ORAL at 08:07

## 2021-07-20 RX ADMIN — INSULIN ASPART 2 UNITS: 100 INJECTION, SOLUTION INTRAVENOUS; SUBCUTANEOUS at 05:07

## 2021-07-20 RX ADMIN — INSULIN ASPART 3 UNITS: 100 INJECTION, SOLUTION INTRAVENOUS; SUBCUTANEOUS at 11:07

## 2021-07-20 RX ADMIN — MUPIROCIN: 20 OINTMENT TOPICAL at 09:07

## 2021-07-20 RX ADMIN — INSULIN DETEMIR 10 UNITS: 100 INJECTION, SOLUTION SUBCUTANEOUS at 08:07

## 2021-07-20 RX ADMIN — ASPIRIN 81 MG: 81 TABLET, COATED ORAL at 03:07

## 2021-07-20 RX ADMIN — MORPHINE SULFATE 200 MG: 100 TABLET, FILM COATED, EXTENDED RELEASE ORAL at 09:07

## 2021-07-20 RX ADMIN — HYDROMORPHONE HYDROCHLORIDE 4 MG: 2 TABLET ORAL at 03:07

## 2021-07-20 RX ADMIN — FLUTICASONE PROPIONATE 100 MCG: 50 SPRAY, METERED NASAL at 11:07

## 2021-07-20 RX ADMIN — GABAPENTIN 300 MG: 300 CAPSULE ORAL at 03:07

## 2021-07-20 RX ADMIN — ONDANSETRON 8 MG: 8 TABLET, ORALLY DISINTEGRATING ORAL at 03:07

## 2021-07-20 RX ADMIN — SERTRALINE HYDROCHLORIDE 50 MG: 50 TABLET, FILM COATED ORAL at 03:07

## 2021-07-20 RX ADMIN — MORPHINE SULFATE 200 MG: 100 TABLET, FILM COATED, EXTENDED RELEASE ORAL at 02:07

## 2021-07-20 RX ADMIN — SERTRALINE HYDROCHLORIDE 50 MG: 50 TABLET, FILM COATED ORAL at 08:07

## 2021-07-20 RX ADMIN — MORPHINE SULFATE 200 MG: 100 TABLET, FILM COATED, EXTENDED RELEASE ORAL at 06:07

## 2021-07-21 ENCOUNTER — ANESTHESIA (OUTPATIENT)
Dept: ONCOLOGY | Facility: HOSPITAL | Age: 66
DRG: 055 | End: 2021-07-21
Payer: MEDICARE

## 2021-07-21 ENCOUNTER — ANESTHESIA EVENT (OUTPATIENT)
Dept: ONCOLOGY | Facility: HOSPITAL | Age: 66
DRG: 055 | End: 2021-07-21
Payer: MEDICARE

## 2021-07-21 LAB
ALBUMIN SERPL BCP-MCNC: 2.7 G/DL (ref 3.5–5.2)
ALP SERPL-CCNC: 485 U/L (ref 55–135)
ALT SERPL W/O P-5'-P-CCNC: 57 U/L (ref 10–44)
ANION GAP SERPL CALC-SCNC: 10 MMOL/L (ref 8–16)
AST SERPL-CCNC: 49 U/L (ref 10–40)
BASOPHILS # BLD AUTO: 0.01 K/UL (ref 0–0.2)
BASOPHILS NFR BLD: 0.2 % (ref 0–1.9)
BILIRUB SERPL-MCNC: 0.9 MG/DL (ref 0.1–1)
BUN SERPL-MCNC: 14 MG/DL (ref 8–23)
CALCIUM SERPL-MCNC: 9.1 MG/DL (ref 8.7–10.5)
CHLORIDE SERPL-SCNC: 100 MMOL/L (ref 95–110)
CLARITY CSF: CLEAR
CO2 SERPL-SCNC: 27 MMOL/L (ref 23–29)
COLOR CSF: COLORLESS
CREAT SERPL-MCNC: 0.8 MG/DL (ref 0.5–1.4)
DIFFERENTIAL METHOD: ABNORMAL
EOSINOPHIL # BLD AUTO: 0 K/UL (ref 0–0.5)
EOSINOPHIL NFR BLD: 0 % (ref 0–8)
ERYTHROCYTE [DISTWIDTH] IN BLOOD BY AUTOMATED COUNT: 15.5 % (ref 11.5–14.5)
EST. GFR  (AFRICAN AMERICAN): >60 ML/MIN/1.73 M^2
EST. GFR  (NON AFRICAN AMERICAN): >60 ML/MIN/1.73 M^2
GLUCOSE CSF-MCNC: 114 MG/DL (ref 40–70)
GLUCOSE SERPL-MCNC: 192 MG/DL (ref 70–110)
HCT VFR BLD AUTO: 36.9 % (ref 37–48.5)
HGB BLD-MCNC: 11.9 G/DL (ref 12–16)
IMM GRANULOCYTES # BLD AUTO: 0.02 K/UL (ref 0–0.04)
IMM GRANULOCYTES NFR BLD AUTO: 0.3 % (ref 0–0.5)
LYMPHOCYTES # BLD AUTO: 0.8 K/UL (ref 1–4.8)
LYMPHOCYTES NFR BLD: 12.9 % (ref 18–48)
LYMPHOCYTES NFR CSF MANUAL: 67 % (ref 40–80)
MAGNESIUM SERPL-MCNC: 1.8 MG/DL (ref 1.6–2.6)
MCH RBC QN AUTO: 29.3 PG (ref 27–31)
MCHC RBC AUTO-ENTMCNC: 32.2 G/DL (ref 32–36)
MCV RBC AUTO: 91 FL (ref 82–98)
MONOCYTES # BLD AUTO: 0.4 K/UL (ref 0.3–1)
MONOCYTES NFR BLD: 6.4 % (ref 4–15)
MONOS+MACROS NFR CSF MANUAL: 26 % (ref 15–45)
NEUTROPHILS # BLD AUTO: 5.2 K/UL (ref 1.8–7.7)
NEUTROPHILS NFR BLD: 80.2 % (ref 38–73)
NEUTROPHILS NFR CSF MANUAL: 7 % (ref 0–6)
NRBC BLD-RTO: 0 /100 WBC
PHOSPHATE SERPL-MCNC: 2.7 MG/DL (ref 2.7–4.5)
PLATELET # BLD AUTO: 151 K/UL (ref 150–450)
PMV BLD AUTO: 9.2 FL (ref 9.2–12.9)
POCT GLUCOSE: 101 MG/DL (ref 70–110)
POCT GLUCOSE: 153 MG/DL (ref 70–110)
POCT GLUCOSE: 167 MG/DL (ref 70–110)
POCT GLUCOSE: 183 MG/DL (ref 70–110)
POTASSIUM SERPL-SCNC: 4.1 MMOL/L (ref 3.5–5.1)
PROT CSF-MCNC: 47 MG/DL (ref 15–40)
PROT SERPL-MCNC: 6.6 G/DL (ref 6–8.4)
RBC # BLD AUTO: 4.06 M/UL (ref 4–5.4)
RBC # CSF: 237 /CU MM
SODIUM SERPL-SCNC: 137 MMOL/L (ref 136–145)
SPECIMEN VOL CSF: 3.5 ML
WBC # BLD AUTO: 6.53 K/UL (ref 3.9–12.7)
WBC # CSF: 1 /CU MM (ref 0–5)

## 2021-07-21 PROCEDURE — 88108 PR  CYTOPATH FLUIDS,CONCENTRATN,INTERP: ICD-10-PCS | Mod: 26,,, | Performed by: STUDENT IN AN ORGANIZED HEALTH CARE EDUCATION/TRAINING PROGRAM

## 2021-07-21 PROCEDURE — 85025 COMPLETE CBC W/AUTO DIFF WBC: CPT | Performed by: STUDENT IN AN ORGANIZED HEALTH CARE EDUCATION/TRAINING PROGRAM

## 2021-07-21 PROCEDURE — 89051 BODY FLUID CELL COUNT: CPT | Performed by: INTERNAL MEDICINE

## 2021-07-21 PROCEDURE — 25000003 PHARM REV CODE 250

## 2021-07-21 PROCEDURE — 82945 GLUCOSE OTHER FLUID: CPT | Performed by: INTERNAL MEDICINE

## 2021-07-21 PROCEDURE — 84100 ASSAY OF PHOSPHORUS: CPT | Performed by: STUDENT IN AN ORGANIZED HEALTH CARE EDUCATION/TRAINING PROGRAM

## 2021-07-21 PROCEDURE — 88108 CYTOPATH CONCENTRATE TECH: CPT | Mod: 26,,, | Performed by: STUDENT IN AN ORGANIZED HEALTH CARE EDUCATION/TRAINING PROGRAM

## 2021-07-21 PROCEDURE — 25000242 PHARM REV CODE 250 ALT 637 W/ HCPCS: Performed by: STUDENT IN AN ORGANIZED HEALTH CARE EDUCATION/TRAINING PROGRAM

## 2021-07-21 PROCEDURE — 83735 ASSAY OF MAGNESIUM: CPT | Performed by: STUDENT IN AN ORGANIZED HEALTH CARE EDUCATION/TRAINING PROGRAM

## 2021-07-21 PROCEDURE — 20600001 HC STEP DOWN PRIVATE ROOM

## 2021-07-21 PROCEDURE — 80053 COMPREHEN METABOLIC PANEL: CPT | Performed by: STUDENT IN AN ORGANIZED HEALTH CARE EDUCATION/TRAINING PROGRAM

## 2021-07-21 PROCEDURE — 99233 SBSQ HOSP IP/OBS HIGH 50: CPT | Mod: ,,, | Performed by: INTERNAL MEDICINE

## 2021-07-21 PROCEDURE — 99000 SPECIMEN HANDLING OFFICE-LAB: CPT | Performed by: INTERNAL MEDICINE

## 2021-07-21 PROCEDURE — 63600175 PHARM REV CODE 636 W HCPCS: Performed by: STUDENT IN AN ORGANIZED HEALTH CARE EDUCATION/TRAINING PROGRAM

## 2021-07-21 PROCEDURE — 62270 LUMBAR PUNCTURE: ICD-10-PCS | Mod: ,,, | Performed by: ANESTHESIOLOGY

## 2021-07-21 PROCEDURE — 62270 DX LMBR SPI PNXR: CPT | Mod: ,,, | Performed by: ANESTHESIOLOGY

## 2021-07-21 PROCEDURE — 88108 CYTOPATH CONCENTRATE TECH: CPT | Performed by: STUDENT IN AN ORGANIZED HEALTH CARE EDUCATION/TRAINING PROGRAM

## 2021-07-21 PROCEDURE — 83615 LACTATE (LD) (LDH) ENZYME: CPT | Performed by: INTERNAL MEDICINE

## 2021-07-21 PROCEDURE — 25000003 PHARM REV CODE 250: Performed by: STUDENT IN AN ORGANIZED HEALTH CARE EDUCATION/TRAINING PROGRAM

## 2021-07-21 PROCEDURE — 99233 PR SUBSEQUENT HOSPITAL CARE,LEVL III: ICD-10-PCS | Mod: ,,, | Performed by: INTERNAL MEDICINE

## 2021-07-21 PROCEDURE — 84157 ASSAY OF PROTEIN OTHER: CPT | Performed by: INTERNAL MEDICINE

## 2021-07-21 RX ORDER — ACETAMINOPHEN 325 MG/1
650 TABLET ORAL EVERY 4 HOURS PRN
Status: COMPLETED | OUTPATIENT
Start: 2021-07-21 | End: 2021-07-21

## 2021-07-21 RX ORDER — INSULIN ASPART 100 [IU]/ML
1-10 INJECTION, SOLUTION INTRAVENOUS; SUBCUTANEOUS
Status: CANCELLED | OUTPATIENT
Start: 2021-07-21

## 2021-07-21 RX ADMIN — GABAPENTIN 300 MG: 300 CAPSULE ORAL at 02:07

## 2021-07-21 RX ADMIN — ACETAMINOPHEN 650 MG: 325 TABLET ORAL at 08:07

## 2021-07-21 RX ADMIN — DEXAMETHASONE 4 MG: 4 TABLET ORAL at 08:07

## 2021-07-21 RX ADMIN — MORPHINE SULFATE 200 MG: 100 TABLET, FILM COATED, EXTENDED RELEASE ORAL at 09:07

## 2021-07-21 RX ADMIN — SERTRALINE HYDROCHLORIDE 50 MG: 50 TABLET, FILM COATED ORAL at 09:07

## 2021-07-21 RX ADMIN — GABAPENTIN 300 MG: 300 CAPSULE ORAL at 08:07

## 2021-07-21 RX ADMIN — PANTOPRAZOLE SODIUM 40 MG: 40 TABLET, DELAYED RELEASE ORAL at 08:07

## 2021-07-21 RX ADMIN — DEXAMETHASONE 4 MG: 4 TABLET ORAL at 09:07

## 2021-07-21 RX ADMIN — LEVOTHYROXINE SODIUM 175 MCG: 125 TABLET ORAL at 06:07

## 2021-07-21 RX ADMIN — FLUTICASONE PROPIONATE 100 MCG: 50 SPRAY, METERED NASAL at 08:07

## 2021-07-21 RX ADMIN — MORPHINE SULFATE 200 MG: 100 TABLET, FILM COATED, EXTENDED RELEASE ORAL at 06:07

## 2021-07-21 RX ADMIN — GABAPENTIN 300 MG: 300 CAPSULE ORAL at 09:07

## 2021-07-21 RX ADMIN — MORPHINE SULFATE 200 MG: 100 TABLET, FILM COATED, EXTENDED RELEASE ORAL at 02:07

## 2021-07-21 RX ADMIN — MUPIROCIN: 20 OINTMENT TOPICAL at 08:07

## 2021-07-21 RX ADMIN — INSULIN DETEMIR 10 UNITS: 100 INJECTION, SOLUTION SUBCUTANEOUS at 08:07

## 2021-07-21 RX ADMIN — Medication 1 CAPSULE: at 08:07

## 2021-07-21 RX ADMIN — MUPIROCIN: 20 OINTMENT TOPICAL at 09:07

## 2021-07-21 RX ADMIN — CETIRIZINE HYDROCHLORIDE 10 MG: 10 TABLET, FILM COATED ORAL at 08:07

## 2021-07-22 ENCOUNTER — PATIENT MESSAGE (OUTPATIENT)
Dept: HEMATOLOGY/ONCOLOGY | Facility: CLINIC | Age: 66
End: 2021-07-22

## 2021-07-22 VITALS
SYSTOLIC BLOOD PRESSURE: 101 MMHG | DIASTOLIC BLOOD PRESSURE: 55 MMHG | HEART RATE: 62 BPM | OXYGEN SATURATION: 96 % | HEIGHT: 64 IN | RESPIRATION RATE: 18 BRPM | BODY MASS INDEX: 22.74 KG/M2 | WEIGHT: 133.19 LBS | TEMPERATURE: 98 F

## 2021-07-22 LAB
ALBUMIN SERPL BCP-MCNC: 2.6 G/DL (ref 3.5–5.2)
ALP SERPL-CCNC: 411 U/L (ref 55–135)
ALT SERPL W/O P-5'-P-CCNC: 42 U/L (ref 10–44)
ANION GAP SERPL CALC-SCNC: 8 MMOL/L (ref 8–16)
AST SERPL-CCNC: 31 U/L (ref 10–40)
BASOPHILS # BLD AUTO: 0.01 K/UL (ref 0–0.2)
BASOPHILS NFR BLD: 0.2 % (ref 0–1.9)
BILIRUB SERPL-MCNC: 0.5 MG/DL (ref 0.1–1)
BUN SERPL-MCNC: 18 MG/DL (ref 8–23)
CALCIUM SERPL-MCNC: 9.3 MG/DL (ref 8.7–10.5)
CHLORIDE SERPL-SCNC: 101 MMOL/L (ref 95–110)
CO2 SERPL-SCNC: 29 MMOL/L (ref 23–29)
CREAT SERPL-MCNC: 0.7 MG/DL (ref 0.5–1.4)
DIFFERENTIAL METHOD: ABNORMAL
EOSINOPHIL # BLD AUTO: 0 K/UL (ref 0–0.5)
EOSINOPHIL NFR BLD: 0 % (ref 0–8)
ERYTHROCYTE [DISTWIDTH] IN BLOOD BY AUTOMATED COUNT: 15.5 % (ref 11.5–14.5)
EST. GFR  (AFRICAN AMERICAN): >60 ML/MIN/1.73 M^2
EST. GFR  (NON AFRICAN AMERICAN): >60 ML/MIN/1.73 M^2
GLUCOSE SERPL-MCNC: 147 MG/DL (ref 70–110)
HCT VFR BLD AUTO: 34.7 % (ref 37–48.5)
HGB BLD-MCNC: 11.1 G/DL (ref 12–16)
IMM GRANULOCYTES # BLD AUTO: 0.02 K/UL (ref 0–0.04)
IMM GRANULOCYTES NFR BLD AUTO: 0.3 % (ref 0–0.5)
LACTATE DEHYDROGENASE FLUID: 30 U/L
LYMPHOCYTES # BLD AUTO: 0.7 K/UL (ref 1–4.8)
LYMPHOCYTES NFR BLD: 11.3 % (ref 18–48)
MAGNESIUM SERPL-MCNC: 1.9 MG/DL (ref 1.6–2.6)
MCH RBC QN AUTO: 29.7 PG (ref 27–31)
MCHC RBC AUTO-ENTMCNC: 32 G/DL (ref 32–36)
MCV RBC AUTO: 93 FL (ref 82–98)
MONOCYTES # BLD AUTO: 0.4 K/UL (ref 0.3–1)
MONOCYTES NFR BLD: 5.9 % (ref 4–15)
NEUTROPHILS # BLD AUTO: 5 K/UL (ref 1.8–7.7)
NEUTROPHILS NFR BLD: 82.3 % (ref 38–73)
NRBC BLD-RTO: 0 /100 WBC
PHOSPHATE SERPL-MCNC: 2.8 MG/DL (ref 2.7–4.5)
PLATELET # BLD AUTO: 122 K/UL (ref 150–450)
PMV BLD AUTO: 9.4 FL (ref 9.2–12.9)
POCT GLUCOSE: 145 MG/DL (ref 70–110)
POCT GLUCOSE: 156 MG/DL (ref 70–110)
POTASSIUM SERPL-SCNC: 4.1 MMOL/L (ref 3.5–5.1)
PROT SERPL-MCNC: 6.2 G/DL (ref 6–8.4)
RBC # BLD AUTO: 3.74 M/UL (ref 4–5.4)
SODIUM SERPL-SCNC: 138 MMOL/L (ref 136–145)
WBC # BLD AUTO: 6.09 K/UL (ref 3.9–12.7)

## 2021-07-22 PROCEDURE — 85025 COMPLETE CBC W/AUTO DIFF WBC: CPT | Performed by: STUDENT IN AN ORGANIZED HEALTH CARE EDUCATION/TRAINING PROGRAM

## 2021-07-22 PROCEDURE — 84100 ASSAY OF PHOSPHORUS: CPT | Performed by: STUDENT IN AN ORGANIZED HEALTH CARE EDUCATION/TRAINING PROGRAM

## 2021-07-22 PROCEDURE — 1111F PR DISCHARGE MEDS RECONCILED W/ CURRENT OUTPATIENT MED LIST: ICD-10-PCS | Mod: CPTII,,, | Performed by: INTERNAL MEDICINE

## 2021-07-22 PROCEDURE — 25000003 PHARM REV CODE 250: Performed by: STUDENT IN AN ORGANIZED HEALTH CARE EDUCATION/TRAINING PROGRAM

## 2021-07-22 PROCEDURE — 63600175 PHARM REV CODE 636 W HCPCS: Performed by: INTERNAL MEDICINE

## 2021-07-22 PROCEDURE — 83735 ASSAY OF MAGNESIUM: CPT | Performed by: STUDENT IN AN ORGANIZED HEALTH CARE EDUCATION/TRAINING PROGRAM

## 2021-07-22 PROCEDURE — 1111F DSCHRG MED/CURRENT MED MERGE: CPT | Mod: CPTII,,, | Performed by: INTERNAL MEDICINE

## 2021-07-22 PROCEDURE — 80053 COMPREHEN METABOLIC PANEL: CPT | Performed by: STUDENT IN AN ORGANIZED HEALTH CARE EDUCATION/TRAINING PROGRAM

## 2021-07-22 PROCEDURE — 63600175 PHARM REV CODE 636 W HCPCS: Performed by: STUDENT IN AN ORGANIZED HEALTH CARE EDUCATION/TRAINING PROGRAM

## 2021-07-22 PROCEDURE — 99238 HOSP IP/OBS DSCHRG MGMT 30/<: CPT | Mod: ,,, | Performed by: INTERNAL MEDICINE

## 2021-07-22 PROCEDURE — 99238 PR HOSPITAL DISCHARGE DAY,<30 MIN: ICD-10-PCS | Mod: ,,, | Performed by: INTERNAL MEDICINE

## 2021-07-22 RX ORDER — HEPARIN 100 UNIT/ML
300 SYRINGE INTRAVENOUS ONCE
Status: COMPLETED | OUTPATIENT
Start: 2021-07-22 | End: 2021-07-22

## 2021-07-22 RX ORDER — ASPIRIN 81 MG/1
81 TABLET ORAL DAILY
Status: CANCELLED | OUTPATIENT
Start: 2021-07-22

## 2021-07-22 RX ORDER — ENOXAPARIN SODIUM 100 MG/ML
40 INJECTION SUBCUTANEOUS EVERY 24 HOURS
Status: CANCELLED | OUTPATIENT
Start: 2021-07-22

## 2021-07-22 RX ADMIN — GABAPENTIN 300 MG: 300 CAPSULE ORAL at 08:07

## 2021-07-22 RX ADMIN — FLUTICASONE PROPIONATE 100 MCG: 50 SPRAY, METERED NASAL at 08:07

## 2021-07-22 RX ADMIN — MORPHINE SULFATE 200 MG: 100 TABLET, FILM COATED, EXTENDED RELEASE ORAL at 05:07

## 2021-07-22 RX ADMIN — Medication 1 CAPSULE: at 08:07

## 2021-07-22 RX ADMIN — HEPARIN 300 UNITS: 100 SYRINGE at 09:07

## 2021-07-22 RX ADMIN — DEXAMETHASONE 4 MG: 4 TABLET ORAL at 08:07

## 2021-07-22 RX ADMIN — PANTOPRAZOLE SODIUM 40 MG: 40 TABLET, DELAYED RELEASE ORAL at 08:07

## 2021-07-22 RX ADMIN — INSULIN DETEMIR 10 UNITS: 100 INJECTION, SOLUTION SUBCUTANEOUS at 08:07

## 2021-07-22 RX ADMIN — MUPIROCIN: 20 OINTMENT TOPICAL at 08:07

## 2021-07-22 RX ADMIN — CETIRIZINE HYDROCHLORIDE 10 MG: 10 TABLET, FILM COATED ORAL at 08:07

## 2021-07-22 RX ADMIN — LEVOTHYROXINE SODIUM 175 MCG: 125 TABLET ORAL at 05:07

## 2021-07-23 ENCOUNTER — TELEPHONE (OUTPATIENT)
Dept: HEMATOLOGY/ONCOLOGY | Facility: CLINIC | Age: 66
End: 2021-07-23

## 2021-07-23 ENCOUNTER — PATIENT MESSAGE (OUTPATIENT)
Dept: HEMATOLOGY/ONCOLOGY | Facility: CLINIC | Age: 66
End: 2021-07-23

## 2021-07-23 LAB
FINAL PATHOLOGIC DIAGNOSIS: NORMAL
Lab: NORMAL
MICROSCOPIC EXAM: NORMAL

## 2021-07-26 ENCOUNTER — TELEPHONE (OUTPATIENT)
Dept: HEMATOLOGY/ONCOLOGY | Facility: CLINIC | Age: 66
End: 2021-07-26

## 2021-07-27 ENCOUNTER — OFFICE VISIT (OUTPATIENT)
Dept: HEMATOLOGY/ONCOLOGY | Facility: CLINIC | Age: 66
End: 2021-07-27
Payer: MEDICARE

## 2021-07-27 VITALS
WEIGHT: 137.38 LBS | RESPIRATION RATE: 18 BRPM | BODY MASS INDEX: 23.45 KG/M2 | DIASTOLIC BLOOD PRESSURE: 69 MMHG | OXYGEN SATURATION: 97 % | TEMPERATURE: 96 F | HEIGHT: 64 IN | HEART RATE: 54 BPM | SYSTOLIC BLOOD PRESSURE: 150 MMHG

## 2021-07-27 DIAGNOSIS — Z17.0 MALIGNANT NEOPLASM OF CENTRAL PORTION OF RIGHT BREAST IN FEMALE, ESTROGEN RECEPTOR POSITIVE: Primary | ICD-10-CM

## 2021-07-27 DIAGNOSIS — C50.111 MALIGNANT NEOPLASM OF CENTRAL PORTION OF RIGHT BREAST IN FEMALE, ESTROGEN RECEPTOR POSITIVE: Primary | ICD-10-CM

## 2021-07-27 DIAGNOSIS — C79.51 BONE METASTASIS: ICD-10-CM

## 2021-07-27 PROCEDURE — 3078F PR MOST RECENT DIASTOLIC BLOOD PRESSURE < 80 MM HG: ICD-10-PCS | Mod: CPTII,S$GLB,, | Performed by: INTERNAL MEDICINE

## 2021-07-27 PROCEDURE — 99999 PR PBB SHADOW E&M-EST. PATIENT-LVL V: CPT | Mod: PBBFAC,,, | Performed by: INTERNAL MEDICINE

## 2021-07-27 PROCEDURE — 1159F PR MEDICATION LIST DOCUMENTED IN MEDICAL RECORD: ICD-10-PCS | Mod: CPTII,S$GLB,, | Performed by: INTERNAL MEDICINE

## 2021-07-27 PROCEDURE — 3008F PR BODY MASS INDEX (BMI) DOCUMENTED: ICD-10-PCS | Mod: CPTII,S$GLB,, | Performed by: INTERNAL MEDICINE

## 2021-07-27 PROCEDURE — 1125F AMNT PAIN NOTED PAIN PRSNT: CPT | Mod: CPTII,S$GLB,, | Performed by: INTERNAL MEDICINE

## 2021-07-27 PROCEDURE — 99999 PR PBB SHADOW E&M-EST. PATIENT-LVL V: ICD-10-PCS | Mod: PBBFAC,,, | Performed by: INTERNAL MEDICINE

## 2021-07-27 PROCEDURE — 3008F BODY MASS INDEX DOCD: CPT | Mod: CPTII,S$GLB,, | Performed by: INTERNAL MEDICINE

## 2021-07-27 PROCEDURE — 99214 OFFICE O/P EST MOD 30 MIN: CPT | Mod: S$GLB,,, | Performed by: INTERNAL MEDICINE

## 2021-07-27 PROCEDURE — 3078F DIAST BP <80 MM HG: CPT | Mod: CPTII,S$GLB,, | Performed by: INTERNAL MEDICINE

## 2021-07-27 PROCEDURE — 1111F DSCHRG MED/CURRENT MED MERGE: CPT | Mod: CPTII,S$GLB,, | Performed by: INTERNAL MEDICINE

## 2021-07-27 PROCEDURE — 3077F SYST BP >= 140 MM HG: CPT | Mod: CPTII,S$GLB,, | Performed by: INTERNAL MEDICINE

## 2021-07-27 PROCEDURE — 1125F PR PAIN SEVERITY QUANTIFIED, PAIN PRESENT: ICD-10-PCS | Mod: CPTII,S$GLB,, | Performed by: INTERNAL MEDICINE

## 2021-07-27 PROCEDURE — 1160F PR REVIEW ALL MEDS BY PRESCRIBER/CLIN PHARMACIST DOCUMENTED: ICD-10-PCS | Mod: CPTII,S$GLB,, | Performed by: INTERNAL MEDICINE

## 2021-07-27 PROCEDURE — 1160F RVW MEDS BY RX/DR IN RCRD: CPT | Mod: CPTII,S$GLB,, | Performed by: INTERNAL MEDICINE

## 2021-07-27 PROCEDURE — 3077F PR MOST RECENT SYSTOLIC BLOOD PRESSURE >= 140 MM HG: ICD-10-PCS | Mod: CPTII,S$GLB,, | Performed by: INTERNAL MEDICINE

## 2021-07-27 PROCEDURE — 1111F PR DISCHARGE MEDS RECONCILED W/ CURRENT OUTPATIENT MED LIST: ICD-10-PCS | Mod: CPTII,S$GLB,, | Performed by: INTERNAL MEDICINE

## 2021-07-27 PROCEDURE — 1159F MED LIST DOCD IN RCRD: CPT | Mod: CPTII,S$GLB,, | Performed by: INTERNAL MEDICINE

## 2021-07-27 PROCEDURE — 99214 PR OFFICE/OUTPT VISIT, EST, LEVL IV, 30-39 MIN: ICD-10-PCS | Mod: S$GLB,,, | Performed by: INTERNAL MEDICINE

## 2021-07-27 RX ORDER — SITAGLIPTIN 100 MG/1
TABLET, FILM COATED ORAL
COMMUNITY
Start: 2021-06-16 | End: 2021-08-04

## 2021-07-27 RX ORDER — NALOXONE HYDROCHLORIDE 4 MG/.1ML
SPRAY NASAL
Status: ON HOLD | COMMUNITY
Start: 2021-05-14 | End: 2021-08-25 | Stop reason: HOSPADM

## 2021-07-27 RX ORDER — METFORMIN HYDROCHLORIDE 500 MG/1
TABLET, EXTENDED RELEASE ORAL
COMMUNITY
Start: 2021-06-21 | End: 2021-08-04

## 2021-07-28 ENCOUNTER — TELEPHONE (OUTPATIENT)
Dept: RADIATION ONCOLOGY | Facility: CLINIC | Age: 66
End: 2021-07-28

## 2021-07-29 ENCOUNTER — PATIENT MESSAGE (OUTPATIENT)
Dept: HEMATOLOGY/ONCOLOGY | Facility: CLINIC | Age: 66
End: 2021-07-29

## 2021-07-29 ENCOUNTER — PATIENT MESSAGE (OUTPATIENT)
Dept: ENDOCRINOLOGY | Facility: CLINIC | Age: 66
End: 2021-07-29

## 2021-07-30 ENCOUNTER — PATIENT MESSAGE (OUTPATIENT)
Dept: HEMATOLOGY/ONCOLOGY | Facility: CLINIC | Age: 66
End: 2021-07-30

## 2021-07-30 ENCOUNTER — OFFICE VISIT (OUTPATIENT)
Dept: RADIATION ONCOLOGY | Facility: CLINIC | Age: 66
End: 2021-07-30
Payer: MEDICARE

## 2021-07-30 VITALS
WEIGHT: 135.69 LBS | BODY MASS INDEX: 23.17 KG/M2 | HEIGHT: 64 IN | HEART RATE: 59 BPM | SYSTOLIC BLOOD PRESSURE: 134 MMHG | DIASTOLIC BLOOD PRESSURE: 64 MMHG | RESPIRATION RATE: 16 BRPM

## 2021-07-30 DIAGNOSIS — Z17.0 MALIGNANT NEOPLASM OF CENTRAL PORTION OF RIGHT BREAST IN FEMALE, ESTROGEN RECEPTOR POSITIVE: ICD-10-CM

## 2021-07-30 DIAGNOSIS — R60.9 ABDOMINAL EDEMA ON EXAMINATION: ICD-10-CM

## 2021-07-30 DIAGNOSIS — C79.51 BONE METASTASIS: Primary | ICD-10-CM

## 2021-07-30 DIAGNOSIS — C79.31 METASTASIS TO BRAIN: ICD-10-CM

## 2021-07-30 DIAGNOSIS — C50.111 MALIGNANT NEOPLASM OF CENTRAL PORTION OF RIGHT BREAST IN FEMALE, ESTROGEN RECEPTOR POSITIVE: ICD-10-CM

## 2021-07-30 PROCEDURE — 1160F RVW MEDS BY RX/DR IN RCRD: CPT | Mod: CPTII,S$GLB,, | Performed by: RADIOLOGY

## 2021-07-30 PROCEDURE — 1159F MED LIST DOCD IN RCRD: CPT | Mod: CPTII,S$GLB,, | Performed by: RADIOLOGY

## 2021-07-30 PROCEDURE — 99204 PR OFFICE/OUTPT VISIT, NEW, LEVL IV, 45-59 MIN: ICD-10-PCS | Mod: S$GLB,,, | Performed by: RADIOLOGY

## 2021-07-30 PROCEDURE — 1159F PR MEDICATION LIST DOCUMENTED IN MEDICAL RECORD: ICD-10-PCS | Mod: CPTII,S$GLB,, | Performed by: RADIOLOGY

## 2021-07-30 PROCEDURE — 1125F PR PAIN SEVERITY QUANTIFIED, PAIN PRESENT: ICD-10-PCS | Mod: CPTII,S$GLB,, | Performed by: RADIOLOGY

## 2021-07-30 PROCEDURE — 99204 OFFICE O/P NEW MOD 45 MIN: CPT | Mod: S$GLB,,, | Performed by: RADIOLOGY

## 2021-07-30 PROCEDURE — 3078F DIAST BP <80 MM HG: CPT | Mod: CPTII,S$GLB,, | Performed by: RADIOLOGY

## 2021-07-30 PROCEDURE — 1111F PR DISCHARGE MEDS RECONCILED W/ CURRENT OUTPATIENT MED LIST: ICD-10-PCS | Mod: CPTII,S$GLB,, | Performed by: RADIOLOGY

## 2021-07-30 PROCEDURE — 3078F PR MOST RECENT DIASTOLIC BLOOD PRESSURE < 80 MM HG: ICD-10-PCS | Mod: CPTII,S$GLB,, | Performed by: RADIOLOGY

## 2021-07-30 PROCEDURE — 1111F DSCHRG MED/CURRENT MED MERGE: CPT | Mod: CPTII,S$GLB,, | Performed by: RADIOLOGY

## 2021-07-30 PROCEDURE — 99999 PR PBB SHADOW E&M-EST. PATIENT-LVL V: CPT | Mod: PBBFAC,,, | Performed by: RADIOLOGY

## 2021-07-30 PROCEDURE — 1125F AMNT PAIN NOTED PAIN PRSNT: CPT | Mod: CPTII,S$GLB,, | Performed by: RADIOLOGY

## 2021-07-30 PROCEDURE — 3075F PR MOST RECENT SYSTOLIC BLOOD PRESS GE 130-139MM HG: ICD-10-PCS | Mod: CPTII,S$GLB,, | Performed by: RADIOLOGY

## 2021-07-30 PROCEDURE — 3008F PR BODY MASS INDEX (BMI) DOCUMENTED: ICD-10-PCS | Mod: CPTII,S$GLB,, | Performed by: RADIOLOGY

## 2021-07-30 PROCEDURE — 1160F PR REVIEW ALL MEDS BY PRESCRIBER/CLIN PHARMACIST DOCUMENTED: ICD-10-PCS | Mod: CPTII,S$GLB,, | Performed by: RADIOLOGY

## 2021-07-30 PROCEDURE — 99999 PR PBB SHADOW E&M-EST. PATIENT-LVL V: ICD-10-PCS | Mod: PBBFAC,,, | Performed by: RADIOLOGY

## 2021-07-30 PROCEDURE — 3075F SYST BP GE 130 - 139MM HG: CPT | Mod: CPTII,S$GLB,, | Performed by: RADIOLOGY

## 2021-07-30 PROCEDURE — 3044F HG A1C LEVEL LT 7.0%: CPT | Mod: CPTII,S$GLB,, | Performed by: RADIOLOGY

## 2021-07-30 PROCEDURE — 3044F PR MOST RECENT HEMOGLOBIN A1C LEVEL <7.0%: ICD-10-PCS | Mod: CPTII,S$GLB,, | Performed by: RADIOLOGY

## 2021-07-30 PROCEDURE — 3008F BODY MASS INDEX DOCD: CPT | Mod: CPTII,S$GLB,, | Performed by: RADIOLOGY

## 2021-07-30 RX ORDER — FUROSEMIDE 20 MG/1
20 TABLET ORAL DAILY
Qty: 8 TABLET | Refills: 0 | Status: ON HOLD | OUTPATIENT
Start: 2021-07-30 | End: 2021-08-25 | Stop reason: HOSPADM

## 2021-08-02 ENCOUNTER — TELEPHONE (OUTPATIENT)
Dept: INTERNAL MEDICINE | Facility: CLINIC | Age: 66
End: 2021-08-02

## 2021-08-02 ENCOUNTER — TELEPHONE (OUTPATIENT)
Dept: HEMATOLOGY/ONCOLOGY | Facility: CLINIC | Age: 66
End: 2021-08-02

## 2021-08-02 ENCOUNTER — PATIENT MESSAGE (OUTPATIENT)
Dept: HEMATOLOGY/ONCOLOGY | Facility: CLINIC | Age: 66
End: 2021-08-02

## 2021-08-02 ENCOUNTER — PATIENT MESSAGE (OUTPATIENT)
Dept: ENDOCRINOLOGY | Facility: CLINIC | Age: 66
End: 2021-08-02

## 2021-08-02 DIAGNOSIS — H61.20 CERUMEN DEBRIS ON TYMPANIC MEMBRANE, UNSPECIFIED LATERALITY: Primary | ICD-10-CM

## 2021-08-02 RX ORDER — LAMOTRIGINE 25 MG/1
500 TABLET ORAL
Status: CANCELLED | OUTPATIENT
Start: 2021-08-03

## 2021-08-03 ENCOUNTER — HOSPITAL ENCOUNTER (OUTPATIENT)
Dept: RADIOLOGY | Facility: HOSPITAL | Age: 66
Discharge: HOME | End: 2021-08-03
Attending: INTERNAL MEDICINE
Payer: MEDICARE

## 2021-08-03 ENCOUNTER — TELEPHONE (OUTPATIENT)
Dept: HEMATOLOGY/ONCOLOGY | Facility: CLINIC | Age: 66
End: 2021-08-03

## 2021-08-03 ENCOUNTER — PATIENT MESSAGE (OUTPATIENT)
Dept: ENDOCRINOLOGY | Facility: CLINIC | Age: 66
End: 2021-08-03

## 2021-08-03 ENCOUNTER — INFUSION (OUTPATIENT)
Dept: INFUSION THERAPY | Facility: HOSPITAL | Age: 66
End: 2021-08-03
Attending: INTERNAL MEDICINE
Payer: MEDICARE

## 2021-08-03 VITALS
SYSTOLIC BLOOD PRESSURE: 149 MMHG | RESPIRATION RATE: 16 BRPM | TEMPERATURE: 98 F | HEART RATE: 80 BPM | OXYGEN SATURATION: 95 % | DIASTOLIC BLOOD PRESSURE: 69 MMHG

## 2021-08-03 DIAGNOSIS — C50.111 MALIGNANT NEOPLASM OF CENTRAL PORTION OF RIGHT BREAST IN FEMALE, ESTROGEN RECEPTOR POSITIVE: Primary | ICD-10-CM

## 2021-08-03 DIAGNOSIS — Z17.0 MALIGNANT NEOPLASM OF CENTRAL PORTION OF RIGHT BREAST IN FEMALE, ESTROGEN RECEPTOR POSITIVE: Primary | ICD-10-CM

## 2021-08-03 DIAGNOSIS — Z51.11 ENCOUNTER FOR ANTINEOPLASTIC CHEMOTHERAPY: ICD-10-CM

## 2021-08-03 DIAGNOSIS — C78.7 METASTASIS TO LIVER: ICD-10-CM

## 2021-08-03 DIAGNOSIS — Z17.0 MALIGNANT NEOPLASM OF CENTRAL PORTION OF RIGHT BREAST IN FEMALE, ESTROGEN RECEPTOR POSITIVE: ICD-10-CM

## 2021-08-03 DIAGNOSIS — C50.111 MALIGNANT NEOPLASM OF CENTRAL PORTION OF RIGHT BREAST IN FEMALE, ESTROGEN RECEPTOR POSITIVE: ICD-10-CM

## 2021-08-03 DIAGNOSIS — C79.51 BONE METASTASIS: ICD-10-CM

## 2021-08-03 DIAGNOSIS — R53.1 WEAKNESS: ICD-10-CM

## 2021-08-03 DIAGNOSIS — C79.51 METASTATIC CANCER TO SPINE: ICD-10-CM

## 2021-08-03 LAB
ALBUMIN SERPL BCP-MCNC: 2.9 G/DL (ref 3.5–5.2)
ALP SERPL-CCNC: 355 U/L (ref 55–135)
ALT SERPL W/O P-5'-P-CCNC: 41 U/L (ref 10–44)
ANION GAP SERPL CALC-SCNC: 11 MMOL/L (ref 8–16)
AST SERPL-CCNC: 35 U/L (ref 10–40)
BILIRUB SERPL-MCNC: 1.2 MG/DL (ref 0.1–1)
BUN SERPL-MCNC: 17 MG/DL (ref 8–23)
CALCIUM SERPL-MCNC: 9 MG/DL (ref 8.7–10.5)
CHLORIDE SERPL-SCNC: 96 MMOL/L (ref 95–110)
CO2 SERPL-SCNC: 27 MMOL/L (ref 23–29)
CREAT SERPL-MCNC: 1 MG/DL (ref 0.5–1.4)
ERYTHROCYTE [DISTWIDTH] IN BLOOD BY AUTOMATED COUNT: 15.5 % (ref 11.5–14.5)
EST. GFR  (AFRICAN AMERICAN): >60 ML/MIN/1.73 M^2
EST. GFR  (NON AFRICAN AMERICAN): 60 ML/MIN/1.73 M^2
GLUCOSE SERPL-MCNC: 554 MG/DL (ref 70–110)
HCT VFR BLD AUTO: 40.9 % (ref 37–48.5)
HGB BLD-MCNC: 13.5 G/DL (ref 12–16)
IMM GRANULOCYTES # BLD AUTO: 0.05 K/UL (ref 0–0.04)
MCH RBC QN AUTO: 29 PG (ref 27–31)
MCHC RBC AUTO-ENTMCNC: 33 G/DL (ref 32–36)
MCV RBC AUTO: 88 FL (ref 82–98)
NEUTROPHILS # BLD AUTO: 9.8 K/UL (ref 1.8–7.7)
PLATELET # BLD AUTO: 108 K/UL (ref 150–450)
PMV BLD AUTO: 10.4 FL (ref 9.2–12.9)
POTASSIUM SERPL-SCNC: 3.9 MMOL/L (ref 3.5–5.1)
PROT SERPL-MCNC: 6.9 G/DL (ref 6–8.4)
RBC # BLD AUTO: 4.65 M/UL (ref 4–5.4)
SODIUM SERPL-SCNC: 134 MMOL/L (ref 136–145)
TSH SERPL DL<=0.005 MIU/L-ACNC: 1.47 UIU/ML (ref 0.4–4)
WBC # BLD AUTO: 10.94 K/UL (ref 3.9–12.7)

## 2021-08-03 PROCEDURE — 63600175 PHARM REV CODE 636 W HCPCS: Mod: JG | Performed by: INTERNAL MEDICINE

## 2021-08-03 PROCEDURE — 84443 ASSAY THYROID STIM HORMONE: CPT | Performed by: INTERNAL MEDICINE

## 2021-08-03 PROCEDURE — 74176 CT ABD & PELVIS W/O CONTRAST: CPT | Mod: TC

## 2021-08-03 PROCEDURE — 36591 DRAW BLOOD OFF VENOUS DEVICE: CPT

## 2021-08-03 PROCEDURE — 80053 COMPREHEN METABOLIC PANEL: CPT | Performed by: INTERNAL MEDICINE

## 2021-08-03 PROCEDURE — 85027 COMPLETE CBC AUTOMATED: CPT | Performed by: INTERNAL MEDICINE

## 2021-08-03 PROCEDURE — 96372 THER/PROPH/DIAG INJ SC/IM: CPT

## 2021-08-03 PROCEDURE — 74176 CT ABDOMEN PELVIS WITHOUT CONTRAST: ICD-10-PCS | Mod: 26,,, | Performed by: RADIOLOGY

## 2021-08-03 PROCEDURE — 96401 CHEMO ANTI-NEOPL SQ/IM: CPT

## 2021-08-03 PROCEDURE — 74176 CT ABD & PELVIS W/O CONTRAST: CPT | Mod: 26,,, | Performed by: RADIOLOGY

## 2021-08-03 PROCEDURE — 25000003 PHARM REV CODE 250: Performed by: INTERNAL MEDICINE

## 2021-08-03 PROCEDURE — 86300 IMMUNOASSAY TUMOR CA 15-3: CPT | Performed by: INTERNAL MEDICINE

## 2021-08-03 PROCEDURE — A4216 STERILE WATER/SALINE, 10 ML: HCPCS | Performed by: INTERNAL MEDICINE

## 2021-08-03 PROCEDURE — 25500020 PHARM REV CODE 255: Performed by: INTERNAL MEDICINE

## 2021-08-03 PROCEDURE — 96523 IRRIG DRUG DELIVERY DEVICE: CPT

## 2021-08-03 RX ORDER — HEPARIN 100 UNIT/ML
500 SYRINGE INTRAVENOUS
Status: COMPLETED | OUTPATIENT
Start: 2021-08-03 | End: 2021-08-03

## 2021-08-03 RX ORDER — SODIUM CHLORIDE 0.9 % (FLUSH) 0.9 %
10 SYRINGE (ML) INJECTION
Status: COMPLETED | OUTPATIENT
Start: 2021-08-03 | End: 2021-08-03

## 2021-08-03 RX ORDER — LAMOTRIGINE 25 MG/1
500 TABLET ORAL
Status: COMPLETED | OUTPATIENT
Start: 2021-08-03 | End: 2021-08-03

## 2021-08-03 RX ADMIN — FULVESTRANT 500 MG: 50 INJECTION INTRAMUSCULAR at 12:08

## 2021-08-03 RX ADMIN — DENOSUMAB 120 MG: 120 INJECTION SUBCUTANEOUS at 12:08

## 2021-08-03 RX ADMIN — IOHEXOL 15 ML: 300 INJECTION, SOLUTION INTRAVENOUS at 01:08

## 2021-08-03 RX ADMIN — Medication 10 ML: at 12:08

## 2021-08-03 RX ADMIN — HEPARIN 500 UNITS: 100 SYRINGE at 12:08

## 2021-08-04 ENCOUNTER — PATIENT MESSAGE (OUTPATIENT)
Dept: HEMATOLOGY/ONCOLOGY | Facility: CLINIC | Age: 66
End: 2021-08-04

## 2021-08-04 ENCOUNTER — OFFICE VISIT (OUTPATIENT)
Dept: HEMATOLOGY/ONCOLOGY | Facility: CLINIC | Age: 66
End: 2021-08-04
Payer: MEDICARE

## 2021-08-04 ENCOUNTER — HOSPITAL ENCOUNTER (OUTPATIENT)
Dept: RADIOLOGY | Facility: HOSPITAL | Age: 66
Discharge: HOME | End: 2021-08-04
Attending: INTERNAL MEDICINE
Payer: MEDICARE

## 2021-08-04 ENCOUNTER — HOSPITAL ENCOUNTER (OUTPATIENT)
Dept: RADIATION THERAPY | Facility: HOSPITAL | Age: 66
Discharge: HOME | End: 2021-08-04
Attending: RADIOLOGY
Payer: MEDICARE

## 2021-08-04 ENCOUNTER — TELEPHONE (OUTPATIENT)
Dept: HEMATOLOGY/ONCOLOGY | Facility: CLINIC | Age: 66
End: 2021-08-04

## 2021-08-04 ENCOUNTER — SPECIALTY PHARMACY (OUTPATIENT)
Dept: PHARMACY | Facility: CLINIC | Age: 66
End: 2021-08-04

## 2021-08-04 DIAGNOSIS — C50.111 MALIGNANT NEOPLASM OF CENTRAL PORTION OF RIGHT BREAST IN FEMALE, ESTROGEN RECEPTOR POSITIVE: Primary | ICD-10-CM

## 2021-08-04 DIAGNOSIS — C79.51 BONE METASTASIS: ICD-10-CM

## 2021-08-04 DIAGNOSIS — C50.111 MALIGNANT NEOPLASM OF CENTRAL PORTION OF RIGHT BREAST IN FEMALE, ESTROGEN RECEPTOR POSITIVE: ICD-10-CM

## 2021-08-04 DIAGNOSIS — C78.7 METASTASIS TO LIVER: ICD-10-CM

## 2021-08-04 DIAGNOSIS — Z17.0 MALIGNANT NEOPLASM OF CENTRAL PORTION OF RIGHT BREAST IN FEMALE, ESTROGEN RECEPTOR POSITIVE: Primary | ICD-10-CM

## 2021-08-04 DIAGNOSIS — Z17.0 MALIGNANT NEOPLASM OF CENTRAL PORTION OF RIGHT BREAST IN FEMALE, ESTROGEN RECEPTOR POSITIVE: ICD-10-CM

## 2021-08-04 PROCEDURE — 3044F PR MOST RECENT HEMOGLOBIN A1C LEVEL <7.0%: ICD-10-PCS | Mod: CPTII,95,, | Performed by: INTERNAL MEDICINE

## 2021-08-04 PROCEDURE — 77290 THER RAD SIMULAJ FIELD CPLX: CPT | Mod: TC | Performed by: RADIOLOGY

## 2021-08-04 PROCEDURE — 77334 RADIATION TREATMENT AID(S): CPT | Mod: TC | Performed by: RADIOLOGY

## 2021-08-04 PROCEDURE — 77290 PR  SET RADN THERAPY FIELD COMPLEX: ICD-10-PCS | Mod: 26,,, | Performed by: RADIOLOGY

## 2021-08-04 PROCEDURE — 77334 PR  RADN TREATMENT AID(S) COMPLX: ICD-10-PCS | Mod: 26,,, | Performed by: RADIOLOGY

## 2021-08-04 PROCEDURE — A9503 TC99M MEDRONATE: HCPCS

## 2021-08-04 PROCEDURE — 77290 THER RAD SIMULAJ FIELD CPLX: CPT | Mod: 26,,, | Performed by: RADIOLOGY

## 2021-08-04 PROCEDURE — 99213 PR OFFICE/OUTPT VISIT, EST, LEVL III, 20-29 MIN: ICD-10-PCS | Mod: 95,,, | Performed by: INTERNAL MEDICINE

## 2021-08-04 PROCEDURE — 1111F PR DISCHARGE MEDS RECONCILED W/ CURRENT OUTPATIENT MED LIST: ICD-10-PCS | Mod: CPTII,95,, | Performed by: INTERNAL MEDICINE

## 2021-08-04 PROCEDURE — 1159F MED LIST DOCD IN RCRD: CPT | Mod: CPTII,95,, | Performed by: INTERNAL MEDICINE

## 2021-08-04 PROCEDURE — 77263 PR  RADIATION THERAPY PLAN COMPLEX: ICD-10-PCS | Mod: ,,, | Performed by: RADIOLOGY

## 2021-08-04 PROCEDURE — 77263 THER RADIOLOGY TX PLNG CPLX: CPT | Mod: ,,, | Performed by: RADIOLOGY

## 2021-08-04 PROCEDURE — 1111F DSCHRG MED/CURRENT MED MERGE: CPT | Mod: CPTII,95,, | Performed by: INTERNAL MEDICINE

## 2021-08-04 PROCEDURE — 78306 NM BONE SCAN WHOLE BODY: ICD-10-PCS | Mod: 26,,, | Performed by: STUDENT IN AN ORGANIZED HEALTH CARE EDUCATION/TRAINING PROGRAM

## 2021-08-04 PROCEDURE — 1160F RVW MEDS BY RX/DR IN RCRD: CPT | Mod: CPTII,95,, | Performed by: INTERNAL MEDICINE

## 2021-08-04 PROCEDURE — 3044F HG A1C LEVEL LT 7.0%: CPT | Mod: CPTII,95,, | Performed by: INTERNAL MEDICINE

## 2021-08-04 PROCEDURE — 77334 RADIATION TREATMENT AID(S): CPT | Mod: 26,,, | Performed by: RADIOLOGY

## 2021-08-04 PROCEDURE — 77014 HC CT GUIDANCE RADIATION THERAPY FLDS PLACEMENT: CPT | Mod: TC | Performed by: RADIOLOGY

## 2021-08-04 PROCEDURE — 78306 BONE IMAGING WHOLE BODY: CPT | Mod: TC

## 2021-08-04 PROCEDURE — 78306 BONE IMAGING WHOLE BODY: CPT | Mod: 26,,, | Performed by: STUDENT IN AN ORGANIZED HEALTH CARE EDUCATION/TRAINING PROGRAM

## 2021-08-04 PROCEDURE — 1159F PR MEDICATION LIST DOCUMENTED IN MEDICAL RECORD: ICD-10-PCS | Mod: CPTII,95,, | Performed by: INTERNAL MEDICINE

## 2021-08-04 PROCEDURE — 1160F PR REVIEW ALL MEDS BY PRESCRIBER/CLIN PHARMACIST DOCUMENTED: ICD-10-PCS | Mod: CPTII,95,, | Performed by: INTERNAL MEDICINE

## 2021-08-04 PROCEDURE — 99213 OFFICE O/P EST LOW 20 MIN: CPT | Mod: 95,,, | Performed by: INTERNAL MEDICINE

## 2021-08-05 LAB — CANCER AG27-29 SERPL-ACNC: 623 U/ML

## 2021-08-06 ENCOUNTER — TELEPHONE (OUTPATIENT)
Dept: RADIATION THERAPY | Facility: OTHER | Age: 66
End: 2021-08-06

## 2021-08-06 ENCOUNTER — PATIENT MESSAGE (OUTPATIENT)
Dept: HEMATOLOGY/ONCOLOGY | Facility: CLINIC | Age: 66
End: 2021-08-06

## 2021-08-09 ENCOUNTER — PATIENT MESSAGE (OUTPATIENT)
Dept: HEMATOLOGY/ONCOLOGY | Facility: CLINIC | Age: 66
End: 2021-08-09

## 2021-08-11 ENCOUNTER — PATIENT MESSAGE (OUTPATIENT)
Dept: HEMATOLOGY/ONCOLOGY | Facility: CLINIC | Age: 66
End: 2021-08-11

## 2021-08-11 ENCOUNTER — OFFICE VISIT (OUTPATIENT)
Dept: PODIATRY | Facility: CLINIC | Age: 66
End: 2021-08-11
Payer: MEDICARE

## 2021-08-11 VITALS — WEIGHT: 135.81 LBS | HEIGHT: 64 IN | BODY MASS INDEX: 23.18 KG/M2

## 2021-08-11 DIAGNOSIS — C50.111 MALIGNANT NEOPLASM OF CENTRAL PORTION OF RIGHT BREAST IN FEMALE, ESTROGEN RECEPTOR POSITIVE: Primary | ICD-10-CM

## 2021-08-11 DIAGNOSIS — Z17.0 MALIGNANT NEOPLASM OF CENTRAL PORTION OF RIGHT BREAST IN FEMALE, ESTROGEN RECEPTOR POSITIVE: Primary | ICD-10-CM

## 2021-08-11 DIAGNOSIS — L97.522 TOE ULCER, LEFT, WITH FAT LAYER EXPOSED: Primary | ICD-10-CM

## 2021-08-11 PROCEDURE — 3044F HG A1C LEVEL LT 7.0%: CPT | Mod: CPTII,S$GLB,, | Performed by: PODIATRIST

## 2021-08-11 PROCEDURE — 99214 OFFICE O/P EST MOD 30 MIN: CPT | Mod: S$GLB,,, | Performed by: PODIATRIST

## 2021-08-11 PROCEDURE — 99999 PR PBB SHADOW E&M-EST. PATIENT-LVL IV: CPT | Mod: PBBFAC,,, | Performed by: PODIATRIST

## 2021-08-11 PROCEDURE — 1111F PR DISCHARGE MEDS RECONCILED W/ CURRENT OUTPATIENT MED LIST: ICD-10-PCS | Mod: CPTII,S$GLB,, | Performed by: PODIATRIST

## 2021-08-11 PROCEDURE — 99999 PR PBB SHADOW E&M-EST. PATIENT-LVL IV: ICD-10-PCS | Mod: PBBFAC,,, | Performed by: PODIATRIST

## 2021-08-11 PROCEDURE — 3044F PR MOST RECENT HEMOGLOBIN A1C LEVEL <7.0%: ICD-10-PCS | Mod: CPTII,S$GLB,, | Performed by: PODIATRIST

## 2021-08-11 PROCEDURE — 87075 CULTR BACTERIA EXCEPT BLOOD: CPT | Performed by: PODIATRIST

## 2021-08-11 PROCEDURE — 3008F PR BODY MASS INDEX (BMI) DOCUMENTED: ICD-10-PCS | Mod: CPTII,S$GLB,, | Performed by: PODIATRIST

## 2021-08-11 PROCEDURE — 99214 PR OFFICE/OUTPT VISIT, EST, LEVL IV, 30-39 MIN: ICD-10-PCS | Mod: S$GLB,,, | Performed by: PODIATRIST

## 2021-08-11 PROCEDURE — 1159F MED LIST DOCD IN RCRD: CPT | Mod: CPTII,S$GLB,, | Performed by: PODIATRIST

## 2021-08-11 PROCEDURE — 1111F DSCHRG MED/CURRENT MED MERGE: CPT | Mod: CPTII,S$GLB,, | Performed by: PODIATRIST

## 2021-08-11 PROCEDURE — 87070 CULTURE OTHR SPECIMN AEROBIC: CPT | Performed by: PODIATRIST

## 2021-08-11 PROCEDURE — 3008F BODY MASS INDEX DOCD: CPT | Mod: CPTII,S$GLB,, | Performed by: PODIATRIST

## 2021-08-11 PROCEDURE — 1159F PR MEDICATION LIST DOCUMENTED IN MEDICAL RECORD: ICD-10-PCS | Mod: CPTII,S$GLB,, | Performed by: PODIATRIST

## 2021-08-12 ENCOUNTER — PATIENT MESSAGE (OUTPATIENT)
Dept: HEMATOLOGY/ONCOLOGY | Facility: CLINIC | Age: 66
End: 2021-08-12

## 2021-08-12 ENCOUNTER — HOSPITAL ENCOUNTER (OUTPATIENT)
Dept: RADIOLOGY | Facility: HOSPITAL | Age: 66
Discharge: HOME | End: 2021-08-12
Attending: PODIATRIST
Payer: MEDICARE

## 2021-08-12 DIAGNOSIS — L97.522 TOE ULCER, LEFT, WITH FAT LAYER EXPOSED: ICD-10-CM

## 2021-08-12 PROCEDURE — 73630 X-RAY EXAM OF FOOT: CPT | Mod: TC,FY,PO,LT

## 2021-08-12 PROCEDURE — 73630 XR FOOT COMPLETE 3 VIEW LEFT: ICD-10-PCS | Mod: 26,LT,, | Performed by: RADIOLOGY

## 2021-08-12 PROCEDURE — 73630 X-RAY EXAM OF FOOT: CPT | Mod: 26,LT,, | Performed by: RADIOLOGY

## 2021-08-13 ENCOUNTER — HOSPITAL ENCOUNTER (OUTPATIENT)
Dept: RADIOLOGY | Facility: HOSPITAL | Age: 66
Discharge: HOME | End: 2021-08-13
Attending: INTERNAL MEDICINE
Payer: MEDICARE

## 2021-08-13 ENCOUNTER — PATIENT MESSAGE (OUTPATIENT)
Dept: HEMATOLOGY/ONCOLOGY | Facility: CLINIC | Age: 66
End: 2021-08-13

## 2021-08-13 DIAGNOSIS — C50.111 MALIGNANT NEOPLASM OF CENTRAL PORTION OF RIGHT BREAST IN FEMALE, ESTROGEN RECEPTOR POSITIVE: ICD-10-CM

## 2021-08-13 DIAGNOSIS — Z17.0 MALIGNANT NEOPLASM OF CENTRAL PORTION OF RIGHT BREAST IN FEMALE, ESTROGEN RECEPTOR POSITIVE: ICD-10-CM

## 2021-08-13 PROCEDURE — 76700 US EXAM ABDOM COMPLETE: CPT | Mod: 26,,, | Performed by: RADIOLOGY

## 2021-08-13 PROCEDURE — 76700 US ABDOMEN COMPLETE: ICD-10-PCS | Mod: 26,,, | Performed by: RADIOLOGY

## 2021-08-13 PROCEDURE — 76700 US EXAM ABDOM COMPLETE: CPT | Mod: TC

## 2021-08-14 LAB — BACTERIA SPEC AEROBE CULT: NORMAL

## 2021-08-16 ENCOUNTER — PATIENT MESSAGE (OUTPATIENT)
Dept: ENDOCRINOLOGY | Facility: CLINIC | Age: 66
End: 2021-08-16

## 2021-08-16 ENCOUNTER — PATIENT MESSAGE (OUTPATIENT)
Dept: HEMATOLOGY/ONCOLOGY | Facility: CLINIC | Age: 66
End: 2021-08-16

## 2021-08-16 LAB — BACTERIA SPEC ANAEROBE CULT: NORMAL

## 2021-08-17 ENCOUNTER — TELEPHONE (OUTPATIENT)
Dept: HEMATOLOGY/ONCOLOGY | Facility: CLINIC | Age: 66
End: 2021-08-17

## 2021-08-17 DIAGNOSIS — R14.0 POSTPRANDIAL ABDOMINAL BLOATING: Primary | ICD-10-CM

## 2021-08-18 ENCOUNTER — HOSPITAL ENCOUNTER (OUTPATIENT)
Dept: RADIOLOGY | Facility: HOSPITAL | Age: 66
Discharge: HOME | End: 2021-08-18
Attending: INTERNAL MEDICINE
Payer: MEDICARE

## 2021-08-18 DIAGNOSIS — K22.4 ESOPHAGEAL DYSMOTILITY DUE TO SYSTEMIC DISEASE: Primary | ICD-10-CM

## 2021-08-18 DIAGNOSIS — R14.0 POSTPRANDIAL ABDOMINAL BLOATING: ICD-10-CM

## 2021-08-18 PROCEDURE — 74240 X-RAY XM UPR GI TRC 1CNTRST: CPT | Mod: TC,FY

## 2021-08-18 PROCEDURE — 74240 FL UPPER GI TO INCLUDE ESOPHAGRAM: ICD-10-PCS | Mod: 26,,, | Performed by: RADIOLOGY

## 2021-08-18 PROCEDURE — A9698 NON-RAD CONTRAST MATERIALNOC: HCPCS | Performed by: INTERNAL MEDICINE

## 2021-08-18 PROCEDURE — 74240 X-RAY XM UPR GI TRC 1CNTRST: CPT | Mod: 26,,, | Performed by: RADIOLOGY

## 2021-08-18 PROCEDURE — 25500020 PHARM REV CODE 255: Performed by: INTERNAL MEDICINE

## 2021-08-18 RX ORDER — METOCLOPRAMIDE 10 MG/1
10 TABLET ORAL
Qty: 90 TABLET | Refills: 1 | Status: SHIPPED | OUTPATIENT
Start: 2021-08-18 | End: 2022-08-18

## 2021-08-18 RX ADMIN — BARIUM SULFATE 175 ML: 0.6 SUSPENSION ORAL at 11:08

## 2021-08-20 ENCOUNTER — SPECIALTY PHARMACY (OUTPATIENT)
Dept: PHARMACY | Facility: CLINIC | Age: 66
End: 2021-08-20

## 2021-08-20 DIAGNOSIS — C50.111 MALIGNANT NEOPLASM OF CENTRAL PORTION OF RIGHT BREAST IN FEMALE, ESTROGEN RECEPTOR POSITIVE: Primary | ICD-10-CM

## 2021-08-20 DIAGNOSIS — Z17.0 MALIGNANT NEOPLASM OF CENTRAL PORTION OF RIGHT BREAST IN FEMALE, ESTROGEN RECEPTOR POSITIVE: Primary | ICD-10-CM

## 2021-08-23 ENCOUNTER — HOSPITAL ENCOUNTER (INPATIENT)
Facility: HOSPITAL | Age: 66
LOS: 3 days | Discharge: HOSPICE | DRG: 055 | End: 2021-08-26
Attending: EMERGENCY MEDICINE | Admitting: EMERGENCY MEDICINE
Payer: MEDICARE

## 2021-08-23 DIAGNOSIS — R52 PAIN: ICD-10-CM

## 2021-08-23 DIAGNOSIS — C50.111 MALIGNANT NEOPLASM OF CENTRAL PORTION OF RIGHT BREAST IN FEMALE, ESTROGEN RECEPTOR POSITIVE: ICD-10-CM

## 2021-08-23 DIAGNOSIS — E06.3 HYPOTHYROIDISM DUE TO HASHIMOTO'S THYROIDITIS: ICD-10-CM

## 2021-08-23 DIAGNOSIS — S09.90XA CLOSED HEAD INJURY, INITIAL ENCOUNTER: Primary | ICD-10-CM
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
## 2021-08-23 DIAGNOSIS — R07.9 CHEST PAIN, UNSPECIFIED TYPE: ICD-10-CM

## 2021-08-23 DIAGNOSIS — S09.90XS CLOSED HEAD INJURY, SEQUELA: ICD-10-CM

## 2021-08-23 DIAGNOSIS — D64.9 ANEMIA, UNSPECIFIED TYPE: ICD-10-CM

## 2021-08-23 DIAGNOSIS — Z51.5 PALLIATIVE CARE ENCOUNTER: ICD-10-CM

## 2021-08-23 DIAGNOSIS — Z71.89 ADVANCE CARE PLANNING: ICD-10-CM

## 2021-08-23 DIAGNOSIS — E03.8 HYPOTHYROIDISM DUE TO HASHIMOTO'S THYROIDITIS: ICD-10-CM

## 2021-08-23 DIAGNOSIS — E83.52 HYPERCALCEMIA OF MALIGNANCY: ICD-10-CM

## 2021-08-23 DIAGNOSIS — F43.21 ADJUSTMENT DISORDER WITH DEPRESSED MOOD: Chronic | ICD-10-CM

## 2021-08-23 DIAGNOSIS — Z17.0 MALIGNANT NEOPLASM OF CENTRAL PORTION OF RIGHT BREAST IN FEMALE, ESTROGEN RECEPTOR POSITIVE: ICD-10-CM

## 2021-08-23 DIAGNOSIS — R07.9 CHEST PAIN: ICD-10-CM

## 2021-08-23 DIAGNOSIS — R26.81 GAIT INSTABILITY: ICD-10-CM

## 2021-08-23 DIAGNOSIS — G89.3 CANCER RELATED PAIN: ICD-10-CM

## 2021-08-23 DIAGNOSIS — Z71.89 GOALS OF CARE, COUNSELING/DISCUSSION: ICD-10-CM

## 2021-08-23 LAB
ALBUMIN SERPL BCP-MCNC: 2.2 G/DL (ref 3.5–5.2)
ALP SERPL-CCNC: 524 U/L (ref 55–135)
ALT SERPL W/O P-5'-P-CCNC: 112 U/L (ref 10–44)
ANION GAP SERPL CALC-SCNC: 6 MMOL/L (ref 8–16)
AST SERPL-CCNC: 167 U/L (ref 10–40)
BASOPHILS # BLD AUTO: 0.04 K/UL (ref 0–0.2)
BASOPHILS NFR BLD: 0.7 % (ref 0–1.9)
BILIRUB SERPL-MCNC: 4.9 MG/DL (ref 0.1–1)
BILIRUB UR QL STRIP: NEGATIVE
BUN SERPL-MCNC: 11 MG/DL (ref 8–23)
BUN SERPL-MCNC: 14 MG/DL (ref 6–30)
CALCIUM SERPL-MCNC: 10.5 MG/DL (ref 8.7–10.5)
CHLORIDE SERPL-SCNC: 105 MMOL/L (ref 95–110)
CHLORIDE SERPL-SCNC: 106 MMOL/L (ref 95–110)
CLARITY UR REFRACT.AUTO: CLEAR
CO2 SERPL-SCNC: 26 MMOL/L (ref 23–29)
COLOR UR AUTO: NORMAL
CREAT SERPL-MCNC: 0.7 MG/DL (ref 0.5–1.4)
CREAT SERPL-MCNC: 0.7 MG/DL (ref 0.5–1.4)
CTP QC/QA: YES
DIFFERENTIAL METHOD: ABNORMAL
EOSINOPHIL # BLD AUTO: 0.2 K/UL (ref 0–0.5)
EOSINOPHIL NFR BLD: 2.5 % (ref 0–8)
ERYTHROCYTE [DISTWIDTH] IN BLOOD BY AUTOMATED COUNT: 17 % (ref 11.5–14.5)
EST. GFR  (AFRICAN AMERICAN): >60 ML/MIN/1.73 M^2
EST. GFR  (NON AFRICAN AMERICAN): >60 ML/MIN/1.73 M^2
GLUCOSE SERPL-MCNC: 114 MG/DL (ref 70–110)
GLUCOSE SERPL-MCNC: 89 MG/DL (ref 70–110)
GLUCOSE UR QL STRIP: NEGATIVE
HCT VFR BLD AUTO: 35.5 % (ref 37–48.5)
HCT VFR BLD CALC: 35 %PCV (ref 36–54)
HGB BLD-MCNC: 10.9 G/DL (ref 12–16)
HGB UR QL STRIP: NEGATIVE
IMM GRANULOCYTES # BLD AUTO: 0.03 K/UL (ref 0–0.04)
IMM GRANULOCYTES NFR BLD AUTO: 0.5 % (ref 0–0.5)
KETONES UR QL STRIP: NEGATIVE
LEUKOCYTE ESTERASE UR QL STRIP: NEGATIVE
LYMPHOCYTES # BLD AUTO: 0.9 K/UL (ref 1–4.8)
LYMPHOCYTES NFR BLD: 14.1 % (ref 18–48)
MCH RBC QN AUTO: 27.7 PG (ref 27–31)
MCHC RBC AUTO-ENTMCNC: 30.7 G/DL (ref 32–36)
MCV RBC AUTO: 90 FL (ref 82–98)
MONOCYTES # BLD AUTO: 0.2 K/UL (ref 0.3–1)
MONOCYTES NFR BLD: 2.8 % (ref 4–15)
NEUTROPHILS # BLD AUTO: 4.8 K/UL (ref 1.8–7.7)
NEUTROPHILS NFR BLD: 79.4 % (ref 38–73)
NITRITE UR QL STRIP: NEGATIVE
NRBC BLD-RTO: 0 /100 WBC
PH UR STRIP: 7 [PH] (ref 5–8)
PLATELET # BLD AUTO: 57 K/UL (ref 150–450)
PMV BLD AUTO: 10.4 FL (ref 9.2–12.9)
POC IONIZED CALCIUM: 1.34 MMOL/L (ref 1.06–1.42)
POC TCO2 (MEASURED): 29 MMOL/L (ref 23–29)
POTASSIUM BLD-SCNC: 6.8 MMOL/L (ref 3.5–5.1)
POTASSIUM SERPL-SCNC: 4.1 MMOL/L (ref 3.5–5.1)
PROT SERPL-MCNC: 5.8 G/DL (ref 6–8.4)
PROT UR QL STRIP: NEGATIVE
RBC # BLD AUTO: 3.93 M/UL (ref 4–5.4)
SAMPLE: ABNORMAL
SARS-COV-2 RDRP RESP QL NAA+PROBE: NEGATIVE
SODIUM BLD-SCNC: 135 MMOL/L (ref 136–145)
SODIUM SERPL-SCNC: 137 MMOL/L (ref 136–145)
SP GR UR STRIP: 1.02 (ref 1–1.03)
TROPONIN I SERPL DL<=0.01 NG/ML-MCNC: <0.006 NG/ML (ref 0–0.03)
URN SPEC COLLECT METH UR: NORMAL
WBC # BLD AUTO: 6.08 K/UL (ref 3.9–12.7)

## 2021-08-23 PROCEDURE — 84484 ASSAY OF TROPONIN QUANT: CPT | Performed by: EMERGENCY MEDICINE

## 2021-08-23 PROCEDURE — 97535 SELF CARE MNGMENT TRAINING: CPT

## 2021-08-23 PROCEDURE — 97530 THERAPEUTIC ACTIVITIES: CPT

## 2021-08-23 PROCEDURE — 99497 PR ADVNCD CARE PLAN 30 MIN: ICD-10-PCS | Mod: 25,,, | Performed by: CLINICAL NURSE SPECIALIST

## 2021-08-23 PROCEDURE — 99497 ADVNCD CARE PLAN 30 MIN: CPT | Mod: 25,,, | Performed by: CLINICAL NURSE SPECIALIST

## 2021-08-23 PROCEDURE — U0002 COVID-19 LAB TEST NON-CDC: HCPCS | Performed by: EMERGENCY MEDICINE

## 2021-08-23 PROCEDURE — 25000003 PHARM REV CODE 250

## 2021-08-23 PROCEDURE — 99285 EMERGENCY DEPT VISIT HI MDM: CPT | Mod: CS,,, | Performed by: EMERGENCY MEDICINE

## 2021-08-23 PROCEDURE — 99223 1ST HOSP IP/OBS HIGH 75: CPT | Mod: ,,, | Performed by: CLINICAL NURSE SPECIALIST

## 2021-08-23 PROCEDURE — 36415 COLL VENOUS BLD VENIPUNCTURE: CPT

## 2021-08-23 PROCEDURE — 93010 EKG 12-LEAD: ICD-10-PCS | Mod: ,,, | Performed by: INTERNAL MEDICINE

## 2021-08-23 PROCEDURE — 81003 URINALYSIS AUTO W/O SCOPE: CPT | Performed by: EMERGENCY MEDICINE

## 2021-08-23 PROCEDURE — 97165 OT EVAL LOW COMPLEX 30 MIN: CPT

## 2021-08-23 PROCEDURE — 97161 PT EVAL LOW COMPLEX 20 MIN: CPT

## 2021-08-23 PROCEDURE — 25000003 PHARM REV CODE 250: Performed by: INTERNAL MEDICINE

## 2021-08-23 PROCEDURE — 80053 COMPREHEN METABOLIC PANEL: CPT

## 2021-08-23 PROCEDURE — 25500020 PHARM REV CODE 255: Performed by: INTERNAL MEDICINE

## 2021-08-23 PROCEDURE — 63600175 PHARM REV CODE 636 W HCPCS: Mod: GA,JG | Performed by: STUDENT IN AN ORGANIZED HEALTH CARE EDUCATION/TRAINING PROGRAM

## 2021-08-23 PROCEDURE — 99223 PR INITIAL HOSPITAL CARE,LEVL III: ICD-10-PCS | Mod: ,,, | Performed by: CLINICAL NURSE SPECIALIST

## 2021-08-23 PROCEDURE — 93010 ELECTROCARDIOGRAM REPORT: CPT | Mod: ,,, | Performed by: INTERNAL MEDICINE

## 2021-08-23 PROCEDURE — 93005 ELECTROCARDIOGRAM TRACING: CPT

## 2021-08-23 PROCEDURE — 25000003 PHARM REV CODE 250: Performed by: STUDENT IN AN ORGANIZED HEALTH CARE EDUCATION/TRAINING PROGRAM

## 2021-08-23 PROCEDURE — 20600001 HC STEP DOWN PRIVATE ROOM

## 2021-08-23 PROCEDURE — 99285 EMERGENCY DEPT VISIT HI MDM: CPT | Mod: 25

## 2021-08-23 PROCEDURE — A9585 GADOBUTROL INJECTION: HCPCS | Performed by: INTERNAL MEDICINE

## 2021-08-23 PROCEDURE — 85025 COMPLETE CBC W/AUTO DIFF WBC: CPT | Performed by: EMERGENCY MEDICINE

## 2021-08-23 PROCEDURE — 99285 PR EMERGENCY DEPT VISIT,LEVEL V: ICD-10-PCS | Mod: CS,,, | Performed by: EMERGENCY MEDICINE

## 2021-08-23 RX ORDER — IBUPROFEN 200 MG
24 TABLET ORAL
Status: DISCONTINUED | OUTPATIENT
Start: 2021-08-23 | End: 2021-08-26 | Stop reason: HOSPADM

## 2021-08-23 RX ORDER — IBUPROFEN 200 MG
16 TABLET ORAL
Status: DISCONTINUED | OUTPATIENT
Start: 2021-08-23 | End: 2021-08-23

## 2021-08-23 RX ORDER — GLUCAGON 1 MG
1 KIT INJECTION
Status: DISCONTINUED | OUTPATIENT
Start: 2021-08-23 | End: 2021-08-26 | Stop reason: HOSPADM

## 2021-08-23 RX ORDER — PANTOPRAZOLE SODIUM 40 MG/1
40 TABLET, DELAYED RELEASE ORAL DAILY
Status: DISCONTINUED | OUTPATIENT
Start: 2021-08-23 | End: 2021-08-26 | Stop reason: HOSPADM

## 2021-08-23 RX ORDER — GADOBUTROL 604.72 MG/ML
7 INJECTION INTRAVENOUS
Status: COMPLETED | OUTPATIENT
Start: 2021-08-23 | End: 2021-08-23

## 2021-08-23 RX ORDER — MORPHINE SULFATE 100 MG/1
200 TABLET, FILM COATED, EXTENDED RELEASE ORAL EVERY 8 HOURS
Status: DISCONTINUED | OUTPATIENT
Start: 2021-08-23 | End: 2021-08-24

## 2021-08-23 RX ORDER — MORPHINE SULFATE 15 MG/1
60 TABLET, FILM COATED, EXTENDED RELEASE ORAL EVERY 12 HOURS PRN
Status: DISCONTINUED | OUTPATIENT
Start: 2021-08-23 | End: 2021-08-23

## 2021-08-23 RX ORDER — AMOXICILLIN 250 MG
1 CAPSULE ORAL 2 TIMES DAILY
Status: DISCONTINUED | OUTPATIENT
Start: 2021-08-23 | End: 2021-08-26 | Stop reason: HOSPADM

## 2021-08-23 RX ORDER — MUPIROCIN 20 MG/G
OINTMENT TOPICAL 2 TIMES DAILY
Status: DISCONTINUED | OUTPATIENT
Start: 2021-08-23 | End: 2021-08-26 | Stop reason: HOSPADM

## 2021-08-23 RX ORDER — SERTRALINE HYDROCHLORIDE 50 MG/1
50 TABLET, FILM COATED ORAL DAILY
Status: DISCONTINUED | OUTPATIENT
Start: 2021-08-23 | End: 2021-08-26 | Stop reason: HOSPADM

## 2021-08-23 RX ORDER — HEPARIN 100 UNIT/ML
5 SYRINGE INTRAVENOUS
Status: DISCONTINUED | OUTPATIENT
Start: 2021-08-23 | End: 2021-08-26 | Stop reason: HOSPADM

## 2021-08-23 RX ORDER — GLUCAGON 1 MG
1 KIT INJECTION
Status: DISCONTINUED | OUTPATIENT
Start: 2021-08-23 | End: 2021-08-23

## 2021-08-23 RX ORDER — NAPROXEN SODIUM 220 MG/1
81 TABLET, FILM COATED ORAL DAILY
Status: DISCONTINUED | OUTPATIENT
Start: 2021-08-23 | End: 2021-08-25

## 2021-08-23 RX ORDER — HYDROMORPHONE HYDROCHLORIDE 2 MG/1
4 TABLET ORAL EVERY 4 HOURS PRN
Status: DISCONTINUED | OUTPATIENT
Start: 2021-08-23 | End: 2021-08-23

## 2021-08-23 RX ORDER — NALOXONE HCL 0.4 MG/ML
0.4 VIAL (ML) INJECTION
Status: DISCONTINUED | OUTPATIENT
Start: 2021-08-24 | End: 2021-08-26 | Stop reason: HOSPADM

## 2021-08-23 RX ORDER — IBUPROFEN 200 MG
16 TABLET ORAL
Status: DISCONTINUED | OUTPATIENT
Start: 2021-08-23 | End: 2021-08-26 | Stop reason: HOSPADM

## 2021-08-23 RX ORDER — NALOXONE HCL 0.4 MG/ML
VIAL (ML) INJECTION
Status: DISCONTINUED
Start: 2021-08-23 | End: 2021-08-24 | Stop reason: WASHOUT

## 2021-08-23 RX ORDER — DICYCLOMINE HYDROCHLORIDE 10 MG/1
10 CAPSULE ORAL 4 TIMES DAILY
Status: DISCONTINUED | OUTPATIENT
Start: 2021-08-23 | End: 2021-08-26 | Stop reason: HOSPADM

## 2021-08-23 RX ORDER — HYDROMORPHONE HYDROCHLORIDE 2 MG/1
4 TABLET ORAL EVERY 4 HOURS PRN
Status: DISCONTINUED | OUTPATIENT
Start: 2021-08-23 | End: 2021-08-26 | Stop reason: HOSPADM

## 2021-08-23 RX ORDER — SODIUM CHLORIDE 0.9 % (FLUSH) 0.9 %
10 SYRINGE (ML) INJECTION
Status: DISCONTINUED | OUTPATIENT
Start: 2021-08-23 | End: 2021-08-26 | Stop reason: HOSPADM

## 2021-08-23 RX ORDER — IBUPROFEN 200 MG
24 TABLET ORAL
Status: DISCONTINUED | OUTPATIENT
Start: 2021-08-23 | End: 2021-08-23

## 2021-08-23 RX ORDER — MORPHINE SULFATE 30 MG/1
60 TABLET, FILM COATED, EXTENDED RELEASE ORAL EVERY 12 HOURS PRN
Status: DISCONTINUED | OUTPATIENT
Start: 2021-08-23 | End: 2021-08-23

## 2021-08-23 RX ORDER — GABAPENTIN 300 MG/1
300 CAPSULE ORAL 3 TIMES DAILY
Status: DISCONTINUED | OUTPATIENT
Start: 2021-08-23 | End: 2021-08-26 | Stop reason: HOSPADM

## 2021-08-23 RX ORDER — METOCLOPRAMIDE 10 MG/1
10 TABLET ORAL
Status: DISCONTINUED | OUTPATIENT
Start: 2021-08-23 | End: 2021-08-26 | Stop reason: HOSPADM

## 2021-08-23 RX ADMIN — PANTOPRAZOLE SODIUM 40 MG: 40 TABLET, DELAYED RELEASE ORAL at 08:08

## 2021-08-23 RX ADMIN — GABAPENTIN 300 MG: 300 CAPSULE ORAL at 03:08

## 2021-08-23 RX ADMIN — ALTEPLASE 2 MG: 2.2 INJECTION, POWDER, LYOPHILIZED, FOR SOLUTION INTRAVENOUS at 05:08

## 2021-08-23 RX ADMIN — ASPIRIN 81 MG CHEWABLE TABLET 81 MG: 81 TABLET CHEWABLE at 08:08

## 2021-08-23 RX ADMIN — DICYCLOMINE HYDROCHLORIDE 10 MG: 10 CAPSULE ORAL at 08:08

## 2021-08-23 RX ADMIN — METOCLOPRAMIDE 10 MG: 10 TABLET ORAL at 04:08

## 2021-08-23 RX ADMIN — GABAPENTIN 300 MG: 300 CAPSULE ORAL at 08:08

## 2021-08-23 RX ADMIN — METOCLOPRAMIDE 10 MG: 10 TABLET ORAL at 12:08

## 2021-08-23 RX ADMIN — MUPIROCIN: 20 OINTMENT TOPICAL at 08:08

## 2021-08-23 RX ADMIN — DOCUSATE SODIUM 50MG AND SENNOSIDES 8.6MG 1 TABLET: 8.6; 5 TABLET, FILM COATED ORAL at 08:08

## 2021-08-23 RX ADMIN — GADOBUTROL 7 ML: 604.72 INJECTION INTRAVENOUS at 01:08

## 2021-08-23 RX ADMIN — HYDROMORPHONE HYDROCHLORIDE 4 MG: 2 TABLET ORAL at 08:08

## 2021-08-23 RX ADMIN — SERTRALINE HYDROCHLORIDE 50 MG: 50 TABLET ORAL at 08:08

## 2021-08-23 RX ADMIN — MORPHINE SULFATE 200 MG: 100 TABLET, FILM COATED, EXTENDED RELEASE ORAL at 12:08

## 2021-08-23 RX ADMIN — LEVOTHYROXINE SODIUM 175 MCG: 125 TABLET ORAL at 07:08

## 2021-08-23 RX ADMIN — DICYCLOMINE HYDROCHLORIDE 10 MG: 10 CAPSULE ORAL at 04:08

## 2021-08-24 LAB
ABO + RH BLD: NORMAL
ALBUMIN SERPL BCP-MCNC: 1.9 G/DL (ref 3.5–5.2)
ALP SERPL-CCNC: 500 U/L (ref 55–135)
ALT SERPL W/O P-5'-P-CCNC: 106 U/L (ref 10–44)
ANION GAP SERPL CALC-SCNC: 5 MMOL/L (ref 8–16)
ANISOCYTOSIS BLD QL SMEAR: SLIGHT
AST SERPL-CCNC: 151 U/L (ref 10–40)
BASOPHILS # BLD AUTO: 0.03 K/UL (ref 0–0.2)
BASOPHILS NFR BLD: 0.5 % (ref 0–1.9)
BILIRUB SERPL-MCNC: 4.6 MG/DL (ref 0.1–1)
BLD GP AB SCN CELLS X3 SERPL QL: NORMAL
BLOOD GROUP ANTIBODIES SERPL: NORMAL
BUN SERPL-MCNC: 10 MG/DL (ref 8–23)
BURR CELLS BLD QL SMEAR: ABNORMAL
CALCIUM SERPL-MCNC: 10.8 MG/DL (ref 8.7–10.5)
CHLORIDE SERPL-SCNC: 104 MMOL/L (ref 95–110)
CO2 SERPL-SCNC: 26 MMOL/L (ref 23–29)
CREAT SERPL-MCNC: 0.7 MG/DL (ref 0.5–1.4)
DIFFERENTIAL METHOD: ABNORMAL
EOSINOPHIL # BLD AUTO: 0.2 K/UL (ref 0–0.5)
EOSINOPHIL NFR BLD: 3.8 % (ref 0–8)
ERYTHROCYTE [DISTWIDTH] IN BLOOD BY AUTOMATED COUNT: 17.2 % (ref 11.5–14.5)
EST. GFR  (AFRICAN AMERICAN): >60 ML/MIN/1.73 M^2
EST. GFR  (NON AFRICAN AMERICAN): >60 ML/MIN/1.73 M^2
GLUCOSE SERPL-MCNC: 111 MG/DL (ref 70–110)
HCT VFR BLD AUTO: 31.7 % (ref 37–48.5)
HGB BLD-MCNC: 9.8 G/DL (ref 12–16)
IMM GRANULOCYTES # BLD AUTO: 0.03 K/UL (ref 0–0.04)
IMM GRANULOCYTES NFR BLD AUTO: 0.5 % (ref 0–0.5)
LYMPHOCYTES # BLD AUTO: 1.1 K/UL (ref 1–4.8)
LYMPHOCYTES NFR BLD: 18.9 % (ref 18–48)
MAGNESIUM SERPL-MCNC: 2.3 MG/DL (ref 1.6–2.6)
MCH RBC QN AUTO: 28.2 PG (ref 27–31)
MCHC RBC AUTO-ENTMCNC: 30.9 G/DL (ref 32–36)
MCV RBC AUTO: 91 FL (ref 82–98)
MONOCYTES # BLD AUTO: 0.2 K/UL (ref 0.3–1)
MONOCYTES NFR BLD: 2.9 % (ref 4–15)
NEUTROPHILS # BLD AUTO: 4.3 K/UL (ref 1.8–7.7)
NEUTROPHILS NFR BLD: 73.4 % (ref 38–73)
NRBC BLD-RTO: 0 /100 WBC
PHOSPHATE SERPL-MCNC: 1.6 MG/DL (ref 2.7–4.5)
PLATELET # BLD AUTO: 42 K/UL (ref 150–450)
PLATELET BLD QL SMEAR: ABNORMAL
PMV BLD AUTO: 10.9 FL (ref 9.2–12.9)
POIKILOCYTOSIS BLD QL SMEAR: SLIGHT
POTASSIUM SERPL-SCNC: 3.7 MMOL/L (ref 3.5–5.1)
PROT SERPL-MCNC: 5.2 G/DL (ref 6–8.4)
RBC # BLD AUTO: 3.47 M/UL (ref 4–5.4)
SODIUM SERPL-SCNC: 135 MMOL/L (ref 136–145)
WBC # BLD AUTO: 5.83 K/UL (ref 3.9–12.7)

## 2021-08-24 PROCEDURE — 99222 PR INITIAL HOSPITAL CARE,LEVL II: ICD-10-PCS | Mod: GC,,, | Performed by: NEUROLOGICAL SURGERY

## 2021-08-24 PROCEDURE — 20600001 HC STEP DOWN PRIVATE ROOM

## 2021-08-24 PROCEDURE — 99233 PR SUBSEQUENT HOSPITAL CARE,LEVL III: ICD-10-PCS | Mod: ,,, | Performed by: INTERNAL MEDICINE

## 2021-08-24 PROCEDURE — 25000003 PHARM REV CODE 250: Performed by: STUDENT IN AN ORGANIZED HEALTH CARE EDUCATION/TRAINING PROGRAM

## 2021-08-24 PROCEDURE — 99222 1ST HOSP IP/OBS MODERATE 55: CPT | Mod: GC,,, | Performed by: NEUROLOGICAL SURGERY

## 2021-08-24 PROCEDURE — 25000003 PHARM REV CODE 250

## 2021-08-24 PROCEDURE — 63600175 PHARM REV CODE 636 W HCPCS

## 2021-08-24 PROCEDURE — 85025 COMPLETE CBC W/AUTO DIFF WBC: CPT | Performed by: STUDENT IN AN ORGANIZED HEALTH CARE EDUCATION/TRAINING PROGRAM

## 2021-08-24 PROCEDURE — 86900 BLOOD TYPING SEROLOGIC ABO: CPT | Performed by: INTERNAL MEDICINE

## 2021-08-24 PROCEDURE — 25000003 PHARM REV CODE 250: Performed by: INTERNAL MEDICINE

## 2021-08-24 PROCEDURE — 36430 TRANSFUSION BLD/BLD COMPNT: CPT

## 2021-08-24 PROCEDURE — 97530 THERAPEUTIC ACTIVITIES: CPT | Mod: CQ

## 2021-08-24 PROCEDURE — 97535 SELF CARE MNGMENT TRAINING: CPT

## 2021-08-24 PROCEDURE — 83735 ASSAY OF MAGNESIUM: CPT | Performed by: STUDENT IN AN ORGANIZED HEALTH CARE EDUCATION/TRAINING PROGRAM

## 2021-08-24 PROCEDURE — 99233 SBSQ HOSP IP/OBS HIGH 50: CPT | Mod: ,,, | Performed by: INTERNAL MEDICINE

## 2021-08-24 PROCEDURE — 84100 ASSAY OF PHOSPHORUS: CPT | Performed by: STUDENT IN AN ORGANIZED HEALTH CARE EDUCATION/TRAINING PROGRAM

## 2021-08-24 PROCEDURE — 97530 THERAPEUTIC ACTIVITIES: CPT

## 2021-08-24 PROCEDURE — 97116 GAIT TRAINING THERAPY: CPT | Mod: CQ

## 2021-08-24 PROCEDURE — 80053 COMPREHEN METABOLIC PANEL: CPT | Performed by: STUDENT IN AN ORGANIZED HEALTH CARE EDUCATION/TRAINING PROGRAM

## 2021-08-24 PROCEDURE — 86870 RBC ANTIBODY IDENTIFICATION: CPT | Performed by: INTERNAL MEDICINE

## 2021-08-24 RX ORDER — MORPHINE SULFATE 30 MG/1
150 TABLET, FILM COATED, EXTENDED RELEASE ORAL EVERY 8 HOURS
Status: DISCONTINUED | OUTPATIENT
Start: 2021-08-24 | End: 2021-08-26 | Stop reason: HOSPADM

## 2021-08-24 RX ORDER — SODIUM CHLORIDE 9 MG/ML
INJECTION, SOLUTION INTRAVENOUS CONTINUOUS
Status: ACTIVE | OUTPATIENT
Start: 2021-08-24 | End: 2021-08-24

## 2021-08-24 RX ORDER — HYDROCODONE BITARTRATE AND ACETAMINOPHEN 500; 5 MG/1; MG/1
TABLET ORAL
Status: DISCONTINUED | OUTPATIENT
Start: 2021-08-24 | End: 2021-08-26 | Stop reason: HOSPADM

## 2021-08-24 RX ADMIN — DICYCLOMINE HYDROCHLORIDE 10 MG: 10 CAPSULE ORAL at 08:08

## 2021-08-24 RX ADMIN — MUPIROCIN: 20 OINTMENT TOPICAL at 09:08

## 2021-08-24 RX ADMIN — ZOLEDRONIC ACID 4 MG: 4 INJECTION, SOLUTION, CONCENTRATE INTRAVENOUS at 12:08

## 2021-08-24 RX ADMIN — MORPHINE SULFATE 150 MG: 30 TABLET, FILM COATED, EXTENDED RELEASE ORAL at 02:08

## 2021-08-24 RX ADMIN — GABAPENTIN 300 MG: 300 CAPSULE ORAL at 02:08

## 2021-08-24 RX ADMIN — PANTOPRAZOLE SODIUM 40 MG: 40 TABLET, DELAYED RELEASE ORAL at 09:08

## 2021-08-24 RX ADMIN — ASPIRIN 81 MG CHEWABLE TABLET 81 MG: 81 TABLET CHEWABLE at 09:08

## 2021-08-24 RX ADMIN — GABAPENTIN 300 MG: 300 CAPSULE ORAL at 09:08

## 2021-08-24 RX ADMIN — MUPIROCIN: 20 OINTMENT TOPICAL at 08:08

## 2021-08-24 RX ADMIN — DICYCLOMINE HYDROCHLORIDE 10 MG: 10 CAPSULE ORAL at 04:08

## 2021-08-24 RX ADMIN — METOCLOPRAMIDE 10 MG: 10 TABLET ORAL at 06:08

## 2021-08-24 RX ADMIN — DOCUSATE SODIUM 50MG AND SENNOSIDES 8.6MG 1 TABLET: 8.6; 5 TABLET, FILM COATED ORAL at 08:08

## 2021-08-24 RX ADMIN — DOCUSATE SODIUM 50MG AND SENNOSIDES 8.6MG 1 TABLET: 8.6; 5 TABLET, FILM COATED ORAL at 09:08

## 2021-08-24 RX ADMIN — METOCLOPRAMIDE 10 MG: 10 TABLET ORAL at 11:08

## 2021-08-24 RX ADMIN — METOCLOPRAMIDE 10 MG: 10 TABLET ORAL at 04:08

## 2021-08-24 RX ADMIN — SODIUM CHLORIDE: 0.9 INJECTION, SOLUTION INTRAVENOUS at 10:08

## 2021-08-24 RX ADMIN — DICYCLOMINE HYDROCHLORIDE 10 MG: 10 CAPSULE ORAL at 12:08

## 2021-08-24 RX ADMIN — MORPHINE SULFATE 150 MG: 30 TABLET, FILM COATED, EXTENDED RELEASE ORAL at 10:08

## 2021-08-24 RX ADMIN — GABAPENTIN 300 MG: 300 CAPSULE ORAL at 08:08

## 2021-08-24 RX ADMIN — LEVOTHYROXINE SODIUM 175 MCG: 125 TABLET ORAL at 06:08

## 2021-08-24 RX ADMIN — SERTRALINE HYDROCHLORIDE 50 MG: 50 TABLET ORAL at 09:08

## 2021-08-24 RX ADMIN — DICYCLOMINE HYDROCHLORIDE 10 MG: 10 CAPSULE ORAL at 09:08

## 2021-08-25 LAB
ALBUMIN SERPL BCP-MCNC: 2 G/DL (ref 3.5–5.2)
ALP SERPL-CCNC: 531 U/L (ref 55–135)
ALT SERPL W/O P-5'-P-CCNC: 101 U/L (ref 10–44)
ANION GAP SERPL CALC-SCNC: 5 MMOL/L (ref 8–16)
ANISOCYTOSIS BLD QL SMEAR: SLIGHT
AST SERPL-CCNC: 132 U/L (ref 10–40)
BASOPHILS # BLD AUTO: 0.02 K/UL (ref 0–0.2)
BASOPHILS NFR BLD: 0.4 % (ref 0–1.9)
BILIRUB SERPL-MCNC: 4.9 MG/DL (ref 0.1–1)
BLD PROD TYP BPU: NORMAL
BLOOD UNIT EXPIRATION DATE: NORMAL
BLOOD UNIT TYPE CODE: 6200
BLOOD UNIT TYPE: NORMAL
BUN SERPL-MCNC: 10 MG/DL (ref 8–23)
BURR CELLS BLD QL SMEAR: ABNORMAL
CALCIUM SERPL-MCNC: 10.3 MG/DL (ref 8.7–10.5)
CHLORIDE SERPL-SCNC: 107 MMOL/L (ref 95–110)
CO2 SERPL-SCNC: 23 MMOL/L (ref 23–29)
CODING SYSTEM: NORMAL
CREAT SERPL-MCNC: 0.7 MG/DL (ref 0.5–1.4)
DIFFERENTIAL METHOD: ABNORMAL
DISPENSE STATUS: NORMAL
EOSINOPHIL # BLD AUTO: 0.2 K/UL (ref 0–0.5)
EOSINOPHIL NFR BLD: 3.6 % (ref 0–8)
ERYTHROCYTE [DISTWIDTH] IN BLOOD BY AUTOMATED COUNT: 17.2 % (ref 11.5–14.5)
EST. GFR  (AFRICAN AMERICAN): >60 ML/MIN/1.73 M^2
EST. GFR  (NON AFRICAN AMERICAN): >60 ML/MIN/1.73 M^2
GLUCOSE SERPL-MCNC: 103 MG/DL (ref 70–110)
HCT VFR BLD AUTO: 33.2 % (ref 37–48.5)
HGB BLD-MCNC: 10.3 G/DL (ref 12–16)
HOWELL-JOLLY BOD BLD QL SMEAR: ABNORMAL
HYPOCHROMIA BLD QL SMEAR: ABNORMAL
IMM GRANULOCYTES # BLD AUTO: 0.03 K/UL (ref 0–0.04)
IMM GRANULOCYTES NFR BLD AUTO: 0.6 % (ref 0–0.5)
LYMPHOCYTES # BLD AUTO: 1 K/UL (ref 1–4.8)
LYMPHOCYTES NFR BLD: 18.4 % (ref 18–48)
MAGNESIUM SERPL-MCNC: 2.2 MG/DL (ref 1.6–2.6)
MCH RBC QN AUTO: 28.3 PG (ref 27–31)
MCHC RBC AUTO-ENTMCNC: 31 G/DL (ref 32–36)
MCV RBC AUTO: 91 FL (ref 82–98)
MONOCYTES # BLD AUTO: 0.1 K/UL (ref 0.3–1)
MONOCYTES NFR BLD: 2.4 % (ref 4–15)
NEUTROPHILS # BLD AUTO: 4 K/UL (ref 1.8–7.7)
NEUTROPHILS NFR BLD: 74.6 % (ref 38–73)
NRBC BLD-RTO: 0 /100 WBC
NUM UNITS TRANS WBC-POOR PLATPHERESIS: NORMAL
OVALOCYTES BLD QL SMEAR: ABNORMAL
PHOSPHATE SERPL-MCNC: 1.1 MG/DL (ref 2.7–4.5)
PLATELET # BLD AUTO: 34 K/UL (ref 150–450)
PLATELET BLD QL SMEAR: ABNORMAL
PMV BLD AUTO: 11.8 FL (ref 9.2–12.9)
POIKILOCYTOSIS BLD QL SMEAR: SLIGHT
POTASSIUM SERPL-SCNC: 3.5 MMOL/L (ref 3.5–5.1)
PROT SERPL-MCNC: 5.5 G/DL (ref 6–8.4)
RBC # BLD AUTO: 3.64 M/UL (ref 4–5.4)
SODIUM SERPL-SCNC: 135 MMOL/L (ref 136–145)
WBC # BLD AUTO: 5.32 K/UL (ref 3.9–12.7)

## 2021-08-25 PROCEDURE — 25000242 PHARM REV CODE 250 ALT 637 W/ HCPCS: Performed by: STUDENT IN AN ORGANIZED HEALTH CARE EDUCATION/TRAINING PROGRAM

## 2021-08-25 PROCEDURE — 25000003 PHARM REV CODE 250

## 2021-08-25 PROCEDURE — 20600001 HC STEP DOWN PRIVATE ROOM

## 2021-08-25 PROCEDURE — 94640 AIRWAY INHALATION TREATMENT: CPT

## 2021-08-25 PROCEDURE — 99233 PR SUBSEQUENT HOSPITAL CARE,LEVL III: ICD-10-PCS | Mod: ,,, | Performed by: CLINICAL NURSE SPECIALIST

## 2021-08-25 PROCEDURE — P9037 PLATE PHERES LEUKOREDU IRRAD: HCPCS

## 2021-08-25 PROCEDURE — 80053 COMPREHEN METABOLIC PANEL: CPT | Performed by: STUDENT IN AN ORGANIZED HEALTH CARE EDUCATION/TRAINING PROGRAM

## 2021-08-25 PROCEDURE — 25000003 PHARM REV CODE 250: Performed by: INTERNAL MEDICINE

## 2021-08-25 PROCEDURE — 99233 SBSQ HOSP IP/OBS HIGH 50: CPT | Mod: ,,, | Performed by: CLINICAL NURSE SPECIALIST

## 2021-08-25 PROCEDURE — 27000221 HC OXYGEN, UP TO 24 HOURS

## 2021-08-25 PROCEDURE — 36430 TRANSFUSION BLD/BLD COMPNT: CPT

## 2021-08-25 PROCEDURE — 84100 ASSAY OF PHOSPHORUS: CPT | Performed by: STUDENT IN AN ORGANIZED HEALTH CARE EDUCATION/TRAINING PROGRAM

## 2021-08-25 PROCEDURE — 85025 COMPLETE CBC W/AUTO DIFF WBC: CPT | Performed by: STUDENT IN AN ORGANIZED HEALTH CARE EDUCATION/TRAINING PROGRAM

## 2021-08-25 PROCEDURE — 97535 SELF CARE MNGMENT TRAINING: CPT

## 2021-08-25 PROCEDURE — 94761 N-INVAS EAR/PLS OXIMETRY MLT: CPT

## 2021-08-25 PROCEDURE — 25000003 PHARM REV CODE 250: Performed by: STUDENT IN AN ORGANIZED HEALTH CARE EDUCATION/TRAINING PROGRAM

## 2021-08-25 PROCEDURE — 99900035 HC TECH TIME PER 15 MIN (STAT)

## 2021-08-25 PROCEDURE — 83735 ASSAY OF MAGNESIUM: CPT | Performed by: STUDENT IN AN ORGANIZED HEALTH CARE EDUCATION/TRAINING PROGRAM

## 2021-08-25 RX ORDER — SODIUM,POTASSIUM PHOSPHATES 280-250MG
1 POWDER IN PACKET (EA) ORAL EVERY 4 HOURS
Status: COMPLETED | OUTPATIENT
Start: 2021-08-25 | End: 2021-08-25

## 2021-08-25 RX ORDER — IPRATROPIUM BROMIDE AND ALBUTEROL SULFATE 2.5; .5 MG/3ML; MG/3ML
3 SOLUTION RESPIRATORY (INHALATION) EVERY 6 HOURS PRN
Status: DISCONTINUED | OUTPATIENT
Start: 2021-08-25 | End: 2021-08-26 | Stop reason: HOSPADM

## 2021-08-25 RX ORDER — SODIUM CHLORIDE 9 MG/ML
INJECTION, SOLUTION INTRAVENOUS CONTINUOUS
Status: DISCONTINUED | OUTPATIENT
Start: 2021-08-25 | End: 2021-08-26 | Stop reason: HOSPADM

## 2021-08-25 RX ADMIN — IPRATROPIUM BROMIDE AND ALBUTEROL SULFATE 3 ML: .5; 2.5 SOLUTION RESPIRATORY (INHALATION) at 05:08

## 2021-08-25 RX ADMIN — POTASSIUM & SODIUM PHOSPHATES POWDER PACK 280-160-250 MG 1 PACKET: 280-160-250 PACK at 05:08

## 2021-08-25 RX ADMIN — GABAPENTIN 300 MG: 300 CAPSULE ORAL at 09:08

## 2021-08-25 RX ADMIN — MORPHINE SULFATE 150 MG: 30 TABLET, FILM COATED, EXTENDED RELEASE ORAL at 05:08

## 2021-08-25 RX ADMIN — PANTOPRAZOLE SODIUM 40 MG: 40 TABLET, DELAYED RELEASE ORAL at 09:08

## 2021-08-25 RX ADMIN — SERTRALINE HYDROCHLORIDE 50 MG: 50 TABLET ORAL at 09:08

## 2021-08-25 RX ADMIN — ASPIRIN 81 MG CHEWABLE TABLET 81 MG: 81 TABLET CHEWABLE at 09:08

## 2021-08-25 RX ADMIN — METOCLOPRAMIDE 10 MG: 10 TABLET ORAL at 05:08

## 2021-08-25 RX ADMIN — MORPHINE SULFATE 150 MG: 30 TABLET, FILM COATED, EXTENDED RELEASE ORAL at 01:08

## 2021-08-25 RX ADMIN — SODIUM CHLORIDE: 0.9 INJECTION, SOLUTION INTRAVENOUS at 10:08

## 2021-08-25 RX ADMIN — MUPIROCIN: 20 OINTMENT TOPICAL at 09:08

## 2021-08-25 RX ADMIN — DOCUSATE SODIUM 50MG AND SENNOSIDES 8.6MG 1 TABLET: 8.6; 5 TABLET, FILM COATED ORAL at 09:08

## 2021-08-25 RX ADMIN — DICYCLOMINE HYDROCHLORIDE 10 MG: 10 CAPSULE ORAL at 09:08

## 2021-08-25 RX ADMIN — POTASSIUM & SODIUM PHOSPHATES POWDER PACK 280-160-250 MG 1 PACKET: 280-160-250 PACK at 01:08

## 2021-08-25 RX ADMIN — METOCLOPRAMIDE 10 MG: 10 TABLET ORAL at 01:08

## 2021-08-25 RX ADMIN — MORPHINE SULFATE 150 MG: 30 TABLET, FILM COATED, EXTENDED RELEASE ORAL at 10:08

## 2021-08-25 RX ADMIN — LEVOTHYROXINE SODIUM 175 MCG: 125 TABLET ORAL at 05:08

## 2021-08-25 RX ADMIN — POTASSIUM & SODIUM PHOSPHATES POWDER PACK 280-160-250 MG 1 PACKET: 280-160-250 PACK at 09:08

## 2021-08-25 RX ADMIN — DICYCLOMINE HYDROCHLORIDE 10 MG: 10 CAPSULE ORAL at 05:08

## 2021-08-25 RX ADMIN — DICYCLOMINE HYDROCHLORIDE 10 MG: 10 CAPSULE ORAL at 01:08

## 2021-08-26 VITALS
HEART RATE: 108 BPM | BODY MASS INDEX: 23.34 KG/M2 | WEIGHT: 136.69 LBS | SYSTOLIC BLOOD PRESSURE: 112 MMHG | OXYGEN SATURATION: 93 % | DIASTOLIC BLOOD PRESSURE: 60 MMHG | TEMPERATURE: 98 F | HEIGHT: 64 IN | RESPIRATION RATE: 18 BRPM

## 2021-08-26 PROCEDURE — 99233 PR SUBSEQUENT HOSPITAL CARE,LEVL III: ICD-10-PCS | Mod: ,,, | Performed by: INTERNAL MEDICINE

## 2021-08-26 PROCEDURE — 25000003 PHARM REV CODE 250: Performed by: INTERNAL MEDICINE

## 2021-08-26 PROCEDURE — 97535 SELF CARE MNGMENT TRAINING: CPT

## 2021-08-26 PROCEDURE — 25000003 PHARM REV CODE 250: Performed by: STUDENT IN AN ORGANIZED HEALTH CARE EDUCATION/TRAINING PROGRAM

## 2021-08-26 PROCEDURE — 25000003 PHARM REV CODE 250

## 2021-08-26 PROCEDURE — 63600175 PHARM REV CODE 636 W HCPCS

## 2021-08-26 PROCEDURE — 99233 SBSQ HOSP IP/OBS HIGH 50: CPT | Mod: ,,, | Performed by: INTERNAL MEDICINE

## 2021-08-26 PROCEDURE — 29105 APPLICATION LONG ARM SPLINT: CPT

## 2021-08-26 PROCEDURE — 97116 GAIT TRAINING THERAPY: CPT | Mod: CQ

## 2021-08-26 RX ORDER — HEPARIN 100 UNIT/ML
300 SYRINGE INTRAVENOUS ONCE
Status: COMPLETED | OUTPATIENT
Start: 2021-08-26 | End: 2021-08-26

## 2021-08-26 RX ADMIN — MORPHINE SULFATE 150 MG: 30 TABLET, FILM COATED, EXTENDED RELEASE ORAL at 06:08

## 2021-08-26 RX ADMIN — SODIUM CHLORIDE: 0.9 INJECTION, SOLUTION INTRAVENOUS at 03:08

## 2021-08-26 RX ADMIN — SODIUM CHLORIDE: 0.9 INJECTION, SOLUTION INTRAVENOUS at 04:08

## 2021-08-26 RX ADMIN — PANTOPRAZOLE SODIUM 40 MG: 40 TABLET, DELAYED RELEASE ORAL at 09:08

## 2021-08-26 RX ADMIN — GABAPENTIN 300 MG: 300 CAPSULE ORAL at 03:08

## 2021-08-26 RX ADMIN — MORPHINE SULFATE 150 MG: 30 TABLET, FILM COATED, EXTENDED RELEASE ORAL at 02:08

## 2021-08-26 RX ADMIN — METOCLOPRAMIDE 10 MG: 10 TABLET ORAL at 06:08

## 2021-08-26 RX ADMIN — LEVOTHYROXINE SODIUM 175 MCG: 125 TABLET ORAL at 06:08

## 2021-08-26 RX ADMIN — METOCLOPRAMIDE 10 MG: 10 TABLET ORAL at 12:08

## 2021-08-26 RX ADMIN — METOCLOPRAMIDE 10 MG: 10 TABLET ORAL at 04:08

## 2021-08-26 RX ADMIN — HEPARIN 300 UNITS: 100 SYRINGE at 06:08

## 2021-08-26 RX ADMIN — DICYCLOMINE HYDROCHLORIDE 10 MG: 10 CAPSULE ORAL at 04:08

## 2021-08-26 RX ADMIN — DOCUSATE SODIUM 50MG AND SENNOSIDES 8.6MG 1 TABLET: 8.6; 5 TABLET, FILM COATED ORAL at 09:08

## 2021-08-26 RX ADMIN — GABAPENTIN 300 MG: 300 CAPSULE ORAL at 09:08

## 2021-08-26 RX ADMIN — DICYCLOMINE HYDROCHLORIDE 10 MG: 10 CAPSULE ORAL at 12:08

## 2021-08-26 RX ADMIN — SERTRALINE HYDROCHLORIDE 50 MG: 50 TABLET ORAL at 09:08

## 2021-08-26 RX ADMIN — MUPIROCIN: 20 OINTMENT TOPICAL at 09:08

## 2021-08-26 RX ADMIN — DICYCLOMINE HYDROCHLORIDE 10 MG: 10 CAPSULE ORAL at 09:08

## 2021-09-09 ENCOUNTER — TELEPHONE (OUTPATIENT)
Dept: OTOLARYNGOLOGY | Facility: CLINIC | Age: 66
End: 2021-09-09

## 2021-09-14 ENCOUNTER — PATIENT MESSAGE (OUTPATIENT)
Dept: HEMATOLOGY/ONCOLOGY | Facility: CLINIC | Age: 66
End: 2021-09-14

## 2022-03-18 ENCOUNTER — TELEPHONE (OUTPATIENT)
Dept: HEMATOLOGY/ONCOLOGY | Facility: CLINIC | Age: 67
End: 2022-03-18
Payer: MEDICARE

## 2022-03-18 NOTE — TELEPHONE ENCOUNTER
"Returned call, spoke to Becki.  Condolences offered.  Becki would like to speak to Dr. Schroeder to thank him for his care and concern.                ----- Message from Soco Lynn sent at 3/18/2022  3:06 PM CDT -----         Consult/Advisory:      Name Of Caller:becki pt daughter   Contact Preference?:[phone]       Provider Name: Alexandre   Does patient feel the need to be seen today?no      What is the nature of the call?:She states that her mother donated her body to research and she wants to speak with Dr Schroeder regarding the findings and to thank him     Additional Notes:  "Thank you for all that you do for our patients'"                             "

## 2022-10-05 NOTE — PT/OT/SLP EVAL
"Physical Therapy Evaluation  & Treatment    Patient Name:  Rebecca Crain  MRN: [id number]    Recommendations:     Discharge Recommendations: Home Health PT  Equipment recommendations: none  Barriers to discharge: Fall risk     Assessment:     Rebecca Crain is a 64 y.o. female admitted to [facility] on 1/25/2020 with medical diagnosis of Neutropenic fever. Pt presents with impaired balance, decreased endurance, impaired functional mobility , edema, pain and gait instability. Rebecca Crain would benefit from acute PT intervention to address listed functional deficits, provide patient/caregiver education, reduce fall risk, and maximize (I) and safety with functional mobility. Once medically stable, recommending pt discharge home with HHPT.     Rehab Prognosis: Good; patient would benefit from acute skilled PT services to address these deficits and reach maximum level of function.      Plan:     During this hospitalization, patient to be seen 3 x/week to address the identified rehab impairments via gait training, therapeutic activities, therapeutic exercises, neuromuscular re-education and progress towards stated goals.     Plan of Care Expires:  02/24/20  Plan of Care reviewed with: patient    This plan of care has been discussed with the patient/caregiver, who was included in its development and is in agreement with the identified goals and treatment plan.     Subjective     Communicated with RN prior to session.  Patient agreeable to participate. No family/caregiver present at bedside upon PT entrance into room.    Patient comments/goals: return to PLOF     Pain/Comfort:  · Location: back and "everywhere"   · Pain Rating: Pt did not rate pain on numeric scale; reported increased pain with mobility. RN notified. Pt assisted with repositioning for comfort.     Patients cultural, spiritual, Jehovah's witness conflicts given the current situation: No cultural, spiritual, or educational needs identified.    Patient History: " information obtained from pt      Living Environment: Pt lives with daughter and daughter's friend in [facility] with no JAKOB.   Prior Level of Function: modified (I) for mobility and ADLs using RW as AD as needed   DME owned: rolling walker, bedside commode, shower chair and wheelchair  Caregiver Assistance: pt's daughter not home during the day; pt has sisters that would be available to help during day time     Objective:     Patient found HOB elevated with: telemetry, peripheral IV(port)    Recent Surgery: * No surgery found *    General Precautions: Standard, fall, droplet, contact   Orthopedic Precautions:N/A   Braces: N/A   Body mass index is 29.71 kg/m².  Oxygen Device: room air      Exams:     Cognition:  o Pt is alert and oriented   o Pt's ability to follow single step commands: intact     Sensation:   o Light touch sensation: impaired B feet      Gross Motor Coordination: No deficits noted during functional mobility tasks      Edema: moderate BLEs      Postural examination/scapula alignment: Rounded shoulder and Head forward     Lower Extremity Range of Motion:  o Right Lower Extremity: WFL  o Left Lower Extremity: WFL     Lower Extremity Strength:  o Right Lower Extremity: WFL  o Left Lower Extremity: WFL    Functional Mobility:    Bed Mobility:  · Supine > Sit: Stand-by Assistance  · Sit > Supine: Stand-by Assistance    Transfers:   · Sit <> Stand Transfer: Stand-by Assistance x 1 trials from EOB with RW; x 1 trial from toilet with RW              Gait:  · Distance: ~10 ft. + 10 ft   · Assistance level: Stand-by Assistance  · Assistive Device: rolling walker  · Gait Assessment: decreased step length , decreased gait speed and unsteady gait     Balance:  · Static Sit: Independent at EOB  · Static Stand: Stand-By Assist with Rolling walker    Outcome Measure: AM-PAC 6 CLICK MOBILITY  Total Score:20     Patient/Caregiver Education:     Therapist educated pt/caregiver regarding:    PT POC and goals for  therapy    Safety with mobility and fall risk    Instruction on use of call button and importance of calling nursing staff for assistance with mobility     Patient/caregiver able to verbalize understanding; will follow-up with pt/caregiver during current admit for additional questions/concerns within scope of practice.     White board updated.     Patient left HOB elevated with all lines intact, call button in reach, bed alarm on and RN notified.    GOALS:   Multidisciplinary Problems     Physical Therapy Goals        Problem: Physical Therapy Goal    Goal Priority Disciplines Outcome Goal Variances Interventions   Physical Therapy Goal     PT, PT/OT Ongoing, Progressing     Description:  Goals to be met by: 20    Patient will increase functional independence with mobility by performin. Sit to stand transfer with Modified [place]  2. Gait  x 150 feet with Modified [place] using Rolling Walker.   3. Stand for 3 minutes with Modified [place] using Rolling Walker  4. Lower extremity exercise program x20 reps per handout, with independence                        History:     Past Medical History:   Diagnosis Date    Adjustment disorder with depressed mood 2017    Allergy     Anxiety     Asthma     seasonal    Blood transfusion         Breast cancer     stage IIB carcinoma of the right breast, ER positive with a low Oncotype score    Chemotherapy-induced neuropathy 2016    Depression     Diverticulosis     Falls frequently 2014    Hepatic cyst 2014    Hx of psychiatric care     Hypercholesteremia     Hypertension     Lung nodule 2014    Lymphedema     S/P right breast mastectomy    Metastatic bone cancer     MVP (mitral valve prolapse)     Osteoporosis, unspecified 2014    Psychiatric problem     Therapy     Thyroid disease     Hasimoto disease       Past Surgical History:   Procedure Laterality Date    BREAST LUMPECTOMY  Right     X 2    BREAST RECONSTRUCTION  2013    X 2     SECTION      x2    COLONOSCOPY N/A 2016    Procedure: COLONOSCOPY;  Surgeon: Miguel Vu MD;  Location: [facility] ([address] [place] [address]);  Service: Endoscopy;  Laterality: N/A;  MRSA-at time of scheduling pending results on 2016    endometriosis      EYE SURGERY      RK bilateral     GANGLION CYST EXCISION      right index finger    HEMORRHOID SURGERY      KNEE SURGERY Bilateral     ortho    MASTECTOMY  2013    Bilateral     TONSILLECTOMY      TUBAL LIGATION         Time Tracking:     PT Received On: 20  PT Start Time: 1421     PT Stop Time: 1445  PT Total Time (min): 24 min     Billable Minutes: Evaluation 14 min and Therapeutic Activity 10 min    Liss Sevilla, PT  2020       Rituxan Pregnancy And Lactation Text: This medication is Pregnancy Category C and it isn't know if it is safe during pregnancy. It is unknown if this medication is excreted in breast milk but similar antibodies are known to be excreted.

## 2022-12-12 NOTE — ED PROVIDER NOTES
Encounter Date: 9/21/2017       History     Chief Complaint   Patient presents with    Chest Pain     x6 weeks    Headache     62 yo F w/ history of metastatic breast cancer mostly to her spine, osteoperosis, HLD and hashimoto w/ chest pain of 6 week duration, onset with starting her new chemo treatment of afinitor. It is located lower right of rib cage, aggravated by movement, spasming and radiating to the back of her right rib cage and described as stabbing. Narcotics have not helped even though she is on morphine and dilaudid. She is experiencing SOB and nausea but denies any f/a/c, heart palpitations, sweating, family (although on chart review several family members have had CVD) or personal history of cardiovascular disease and /or leg swelling. She states that her chest pain was thought to be due to her chemo treatment and she was trialed off the affinitor with some improvement but w/o full resolution. She was restarted on afinitor and states her pain increased in intensity thereafter.           Review of patient's allergies indicates:   Allergen Reactions    Adhesive Rash     Tape, Paper tape is OK.    Codeine Itching     Itching very bad to upper body only.    Demerol [meperidine] Itching     To upper body only    Iodine and iodide containing products Anaphylaxis    Penicillins      Ulcers in mouth.    Arimidex [anastrozole] Hives    Aromasin [exemestane]     Clindamycin Itching and Rash     Past Medical History:   Diagnosis Date    Adjustment disorder with depressed mood 2/7/2017    Allergy     Anxiety     Asthma     seasonal    Blood transfusion     1981    Breast cancer 2013    stage IIB carcinoma of the right breast, ER positive with a low Oncotype score    Chemotherapy-induced neuropathy 7/26/2016    Depression     Diverticulosis     Falls frequently 6/18/2014    Hepatic cyst 1/30/2014    Hx of psychiatric care     Hypercholesteremia     Hypertension     Lung nodule 1/14  2014    Lymphedema     S/P right breast mastectomy    Metastatic bone cancer     MVP (mitral valve prolapse)     Osteoporosis, unspecified 2014    Psychiatric problem     Therapy     Thyroid disease     Hasimoto disease     Past Surgical History:   Procedure Laterality Date    BREAST LUMPECTOMY Right     X 2    BREAST RECONSTRUCTION  2013    X 2     SECTION      x2    COLONOSCOPY N/A 2016    Procedure: COLONOSCOPY;  Surgeon: Miguel Vu MD;  Location: [facility] ([address] [place] [address]);  Service: Endoscopy;  Laterality: N/A;  MRSA-at time of scheduling pending results on 2016    endometriosis      EYE SURGERY      RK bilateral     GANGLION CYST EXCISION      right index finger    HEMORRHOID SURGERY      KNEE SURGERY Bilateral     ortho    MASTECTOMY  2013    Bilateral     TONSILLECTOMY      TUBAL LIGATION       Family History   Problem Relation Age of Onset    Stroke Mother     Hypertension Mother     Cancer Mother      Skin    Melanoma Mother     Cancer Father      tonsil cancer    Physical abuse Father     Ovarian cancer Paternal Grandmother     Cancer Paternal Grandmother 94     ovarian    Heart disease Sister      MI    Depression Sister     Arthritis Brother     Depression Brother     COPD Paternal Grandfather     Depression Sister     Depression Sister     ADD / ADHD Daughter     Crohn's disease Son     Breast cancer Neg Hx      She says that 2nd/3rd cousins with hx breast CA    Colon cancer Neg Hx      Social History   Substance Use Topics    Smoking status: Never Smoker    Smokeless tobacco: Never Used    Alcohol use No     Review of Systems   Constitutional: Negative for fever.   HENT: Negative for sore throat.    Respiratory: Positive for shortness of breath (2/2 to pain). Negative for chest tightness.    Cardiovascular: Positive for chest pain. Negative for palpitations and leg swelling.   Gastrointestinal: Negative for abdominal pain  and nausea.   Genitourinary: Negative for dysuria.   Musculoskeletal: Negative for back pain.   Skin: Negative for rash.   Neurological: Positive for headaches. Negative for weakness.   Hematological: Does not bruise/bleed easily.       Physical Exam     Initial Vitals [09/21/17 1859]   BP Pulse Resp Temp SpO2   (!) 154/94 84 18 98.4 °F (36.9 °C) 97 %      MAP       114         Physical Exam    Constitutional: She appears well-developed. She is not diaphoretic. She appears distressed.   HENT:   Head: Normocephalic and atraumatic.   Eyes: Pupils are equal, round, and reactive to light.   Cardiovascular: Normal rate, regular rhythm and normal heart sounds.   No murmur heard.  Pulmonary/Chest: Breath sounds normal. She is in respiratory distress. She has no wheezes. She exhibits tenderness.   Abdominal: Soft. Bowel sounds are normal. She exhibits no distension. There is no tenderness.         ED Course   Procedures  Labs Reviewed - No data to display  EKG Readings: (Independently Interpreted)   Normal sinus rythym, normal axis, no CO or QT prolongation, no st elevation or depression       X-Rays:   Independently Interpreted Readings:   Other Readings:  CT chest: new sclerotic lesions involving the T4 and T10 vertebral bodies, worrisome for metastatic disease. Possible sclerotic lesion involving the right 5th rib anteriorly. Consider correlation with nuclear medicine bone scan.    Stable sclerotic compression fracture involving the T11 vertebral body.    Medical Decision Making:   Initial Assessment:   60 yo female with past medical history of metastatic breast cancer presenting with right sided chest pain that is reproducible on palpation, aggrevated by movement and is accompanied with SOB which is likely secondary to pain and nausea. She is exquisitely tender to palpation and states the pain radiates to the back of her rib cage. Her EKG and troponins have not returned any significant findings and her risk factors are  low. She is not experiencing any leg swelling nor is she tachycardic and so our suspicion for PE is low but is still of concern given her cancer burden. CT revealed evolving lesion in her thoracic vertebrae as well as a new sclerotic lesion of the 4/5th right rib, of the spot where her pain is reproducible to palpation. At this time her pain is most likely due to metastatic disease to her rib and vertebrae w/ or w/o nerve involvement.  Differential Diagnosis:   Metastatic cancer to rib vs vertebrae, PE, cardiac cause (MI, unstable angina)  Clinical Tests:   Lab Tests: Ordered and Reviewed  The following lab test(s) were unremarkable: CBC and CMP       <> Summary of Lab: Troponin unremarkable  CBC is wnl  CMP   Radiological Study: Ordered and Reviewed  Medical Tests: Ordered and Reviewed  ED Management:  Diagnostic exams: CXR, EKG, Troponins, BNP, CBC, CMP,   - Pain control with 8mg morphine  - given home dose of clonodine as BP was >200  - ketorolac for headache  - CT non con after CXR was unrevealing  - Contacted oncology and per conversation with fellow they will admit to floor.              Attending Attestation:   Physician Attestation Statement for Resident:  As the supervising MD . -: Attending Note:  Physician Attestation Statement: I have personally seen and examined this patient. As the supervising MD I agree with the above history. As the supervising MD I agree with the above PE. As the supervising MD I agree with the above treatment, course, plan, and disposition.                      ED Course      Clinical Impression:   The encounter diagnosis was Chest pain.                           Maxwell Townsend MD  09/25/17 3684     Manual Repair Warning Statement: We plan on removing the manually selected variable below in favor of our much easier automatic structured text blocks found in the previous tab. We decided to do this to help make the flow better and give you the full power of structured data. Manual selection is never going to be ideal in our platform and I would encourage you to avoid using manual selection from this point on, especially since I will be sunsetting this feature. It is important that you do one of two things with the customized text below. First, you can save all of the text in a word file so you can have it for future reference. Second, transfer the text to the appropriate area in the Library tab. Lastly, if there is a flap or graft type which we do not have you need to let us know right away so I can add it in before the variable is hidden. No need to panic, we plan to give you roughly 6 months to make the change.

## 2024-10-29 NOTE — PLAN OF CARE
Problem: Patient Care Overview  Goal: Plan of Care Review  Outcome: Ongoing (interventions implemented as appropriate)  Pt tolerated navelbine well.  No s/s of reaction. Vitals stable, NAD.         Cigarettes

## 2025-06-18 NOTE — ASSESSMENT & PLAN NOTE
Patient with history of metastatic breast cancer on chemotherapy (Halaven, last dose 01/22) presenting with malaise, fever Tmax 102.4, productive cough, noted to be positive for influenza B.   On admission, WBC 0.25, ANC count 37.5 indicating severe neutropenia.   Lactate 2.3. CXR shows focal lucency in the left mid lung, could be projectional versus possible small intracavitary focus, otherwise unremarkeable.   Received 1 dose of oseltamivir, vancomycin and zosyn in the ED.     Plan  -Begin Filgrastim and monitor for improvement in ANC  -Continue with oseltamivir  -Continue with IV fluids 75cc/hr of NS  -IV Cefepime 2g q8hrs to cover for possible bacterial infection  -Daily CBC, repeat lactate, follow up with UA and blood cultures  -Neutropenic contact isolation     No

## (undated) DEVICE — SOL NACL 0.9% INJ PF/50151

## (undated) DEVICE — SUT VICRYL 3-0 27 SH

## (undated) DEVICE — SEE MEDLINE ITEM 157131

## (undated) DEVICE — SUT MONOCRYL 4-0 PS-2

## (undated) DEVICE — TRAY MINOR GEN SURG

## (undated) DEVICE — ELECTRODE REM PLYHSV RETURN 9

## (undated) DEVICE — STRIP STERI REIN CLSR 1/2X2IN

## (undated) DEVICE — DRESSING TRANS 6X8 TEGADERM

## (undated) DEVICE — DRAPE THYROID WITH ARMBOARD

## (undated) DEVICE — DRAPE C ARM 42 X 120 10/BX

## (undated) DEVICE — ADHESIVE MASTISOL VIAL 48/BX

## (undated) DEVICE — SUT 2-0 VICRYL / SH (J417)

## (undated) DEVICE — DRESSING TELFA STRL 4X3 LF